# Patient Record
Sex: MALE | Race: WHITE | Employment: UNEMPLOYED | URBAN - METROPOLITAN AREA
[De-identification: names, ages, dates, MRNs, and addresses within clinical notes are randomized per-mention and may not be internally consistent; named-entity substitution may affect disease eponyms.]

---

## 2017-07-30 ENCOUNTER — APPOINTMENT (OUTPATIENT)
Dept: GENERAL RADIOLOGY | Age: 52
DRG: 137 | End: 2017-07-30
Attending: INTERNAL MEDICINE
Payer: MEDICAID

## 2017-07-30 ENCOUNTER — HOSPITAL ENCOUNTER (INPATIENT)
Age: 52
LOS: 2 days | Discharge: HOME | DRG: 137 | End: 2017-08-01
Attending: INTERNAL MEDICINE | Admitting: HOSPITALIST
Payer: MEDICAID

## 2017-07-30 DIAGNOSIS — T17.908A ASPIRATION INTO AIRWAY, INITIAL ENCOUNTER: ICD-10-CM

## 2017-07-30 DIAGNOSIS — E87.1 HYPONATREMIA: Primary | ICD-10-CM

## 2017-07-30 DIAGNOSIS — C14.0 THROAT CANCER (HCC): ICD-10-CM

## 2017-07-30 PROBLEM — Z93.1 STATUS POST INSERTION OF PERCUTANEOUS ENDOSCOPIC GASTROSTOMY (PEG) TUBE (HCC): Status: ACTIVE | Noted: 2017-07-30

## 2017-07-30 LAB
ALBUMIN SERPL BCP-MCNC: 3.2 G/DL (ref 3.4–5)
ALBUMIN/GLOB SERPL: 0.9 {RATIO} (ref 0.8–1.7)
ALP SERPL-CCNC: 93 U/L (ref 45–117)
ALT SERPL-CCNC: 16 U/L (ref 16–61)
ANION GAP BLD CALC-SCNC: 5 MMOL/L (ref 3–18)
AST SERPL W P-5'-P-CCNC: 19 U/L (ref 15–37)
ATRIAL RATE: 100 BPM
BASOPHILS # BLD AUTO: 0 K/UL (ref 0–0.06)
BASOPHILS # BLD: 0 % (ref 0–2)
BILIRUB SERPL-MCNC: 0.3 MG/DL (ref 0.2–1)
BNP SERPL-MCNC: 172 PG/ML (ref 0–900)
BUN SERPL-MCNC: 7 MG/DL (ref 7–18)
BUN/CREAT SERPL: 9 (ref 12–20)
CALCIUM SERPL-MCNC: 8.7 MG/DL (ref 8.5–10.1)
CALCULATED P AXIS, ECG09: 77 DEGREES
CALCULATED R AXIS, ECG10: 71 DEGREES
CALCULATED T AXIS, ECG11: 68 DEGREES
CHLORIDE SERPL-SCNC: 87 MMOL/L (ref 100–108)
CK MB CFR SERPL CALC: 2.7 % (ref 0–4)
CK MB SERPL-MCNC: 2.8 NG/ML (ref 5–25)
CK SERPL-CCNC: 104 U/L (ref 39–308)
CO2 SERPL-SCNC: 29 MMOL/L (ref 21–32)
CREAT SERPL-MCNC: 0.75 MG/DL (ref 0.6–1.3)
DIAGNOSIS, 93000: NORMAL
DIFFERENTIAL METHOD BLD: ABNORMAL
EOSINOPHIL # BLD: 0.2 K/UL (ref 0–0.4)
EOSINOPHIL NFR BLD: 3 % (ref 0–5)
ERYTHROCYTE [DISTWIDTH] IN BLOOD BY AUTOMATED COUNT: 13.6 % (ref 11.6–14.5)
EST. AVERAGE GLUCOSE BLD GHB EST-MCNC: NORMAL MG/DL
GLOBULIN SER CALC-MCNC: 3.5 G/DL (ref 2–4)
GLUCOSE SERPL-MCNC: 91 MG/DL (ref 74–99)
HBA1C MFR BLD: 4.8 % (ref 4.5–5.6)
HCT VFR BLD AUTO: 31 % (ref 36–48)
HGB BLD-MCNC: 11 G/DL (ref 13–16)
LACTATE SERPL-SCNC: 1.4 MMOL/L (ref 0.4–2)
LYMPHOCYTES # BLD AUTO: 10 % (ref 21–52)
LYMPHOCYTES # BLD: 0.6 K/UL (ref 0.9–3.6)
MCH RBC QN AUTO: 34.5 PG (ref 24–34)
MCHC RBC AUTO-ENTMCNC: 35.5 G/DL (ref 31–37)
MCV RBC AUTO: 97.2 FL (ref 74–97)
MONOCYTES # BLD: 0.5 K/UL (ref 0.05–1.2)
MONOCYTES NFR BLD AUTO: 8 % (ref 3–10)
NEUTS SEG # BLD: 4.9 K/UL (ref 1.8–8)
NEUTS SEG NFR BLD AUTO: 79 % (ref 40–73)
P-R INTERVAL, ECG05: 146 MS
PLATELET # BLD AUTO: 228 K/UL (ref 135–420)
PMV BLD AUTO: 9.1 FL (ref 9.2–11.8)
POTASSIUM SERPL-SCNC: 4.2 MMOL/L (ref 3.5–5.5)
PROT SERPL-MCNC: 6.7 G/DL (ref 6.4–8.2)
Q-T INTERVAL, ECG07: 360 MS
QRS DURATION, ECG06: 80 MS
QTC CALCULATION (BEZET), ECG08: 464 MS
RBC # BLD AUTO: 3.19 M/UL (ref 4.7–5.5)
SODIUM SERPL-SCNC: 121 MMOL/L (ref 136–145)
TROPONIN I SERPL-MCNC: <0.02 NG/ML (ref 0–0.06)
VENTRICULAR RATE, ECG03: 100 BPM
WBC # BLD AUTO: 6.1 K/UL (ref 4.6–13.2)

## 2017-07-30 PROCEDURE — 74011000250 HC RX REV CODE- 250: Performed by: EMERGENCY MEDICINE

## 2017-07-30 PROCEDURE — 80053 COMPREHEN METABOLIC PANEL: CPT | Performed by: INTERNAL MEDICINE

## 2017-07-30 PROCEDURE — 77030029684 HC NEB SM VOL KT MONA -A

## 2017-07-30 PROCEDURE — 74011250636 HC RX REV CODE- 250/636: Performed by: HOSPITALIST

## 2017-07-30 PROCEDURE — 83036 HEMOGLOBIN GLYCOSYLATED A1C: CPT | Performed by: HOSPITALIST

## 2017-07-30 PROCEDURE — 93005 ELECTROCARDIOGRAM TRACING: CPT

## 2017-07-30 PROCEDURE — 99285 EMERGENCY DEPT VISIT HI MDM: CPT

## 2017-07-30 PROCEDURE — 74011250636 HC RX REV CODE- 250/636: Performed by: EMERGENCY MEDICINE

## 2017-07-30 PROCEDURE — 96374 THER/PROPH/DIAG INJ IV PUSH: CPT

## 2017-07-30 PROCEDURE — 74011000258 HC RX REV CODE- 258: Performed by: EMERGENCY MEDICINE

## 2017-07-30 PROCEDURE — 96361 HYDRATE IV INFUSION ADD-ON: CPT

## 2017-07-30 PROCEDURE — 74011000250 HC RX REV CODE- 250: Performed by: HOSPITALIST

## 2017-07-30 PROCEDURE — 87040 BLOOD CULTURE FOR BACTERIA: CPT | Performed by: INTERNAL MEDICINE

## 2017-07-30 PROCEDURE — 83880 ASSAY OF NATRIURETIC PEPTIDE: CPT | Performed by: INTERNAL MEDICINE

## 2017-07-30 PROCEDURE — 94762 N-INVAS EAR/PLS OXIMTRY CONT: CPT

## 2017-07-30 PROCEDURE — 96376 TX/PRO/DX INJ SAME DRUG ADON: CPT

## 2017-07-30 PROCEDURE — 65660000000 HC RM CCU STEPDOWN

## 2017-07-30 PROCEDURE — 85025 COMPLETE CBC W/AUTO DIFF WBC: CPT | Performed by: INTERNAL MEDICINE

## 2017-07-30 PROCEDURE — 74011250636 HC RX REV CODE- 250/636: Performed by: INTERNAL MEDICINE

## 2017-07-30 PROCEDURE — 94640 AIRWAY INHALATION TREATMENT: CPT

## 2017-07-30 PROCEDURE — 71010 XR CHEST PORT: CPT

## 2017-07-30 PROCEDURE — 82550 ASSAY OF CK (CPK): CPT | Performed by: INTERNAL MEDICINE

## 2017-07-30 PROCEDURE — 96375 TX/PRO/DX INJ NEW DRUG ADDON: CPT

## 2017-07-30 PROCEDURE — C9113 INJ PANTOPRAZOLE SODIUM, VIA: HCPCS | Performed by: HOSPITALIST

## 2017-07-30 PROCEDURE — 83605 ASSAY OF LACTIC ACID: CPT | Performed by: INTERNAL MEDICINE

## 2017-07-30 RX ORDER — INSULIN LISPRO 100 [IU]/ML
INJECTION, SOLUTION INTRAVENOUS; SUBCUTANEOUS
Status: DISCONTINUED | OUTPATIENT
Start: 2017-07-30 | End: 2017-08-01 | Stop reason: HOSPADM

## 2017-07-30 RX ORDER — ALBUTEROL SULFATE 0.83 MG/ML
5 SOLUTION RESPIRATORY (INHALATION)
Status: COMPLETED | OUTPATIENT
Start: 2017-07-30 | End: 2017-07-30

## 2017-07-30 RX ORDER — ONDANSETRON 2 MG/ML
4 INJECTION INTRAMUSCULAR; INTRAVENOUS
Status: COMPLETED | OUTPATIENT
Start: 2017-07-30 | End: 2017-07-30

## 2017-07-30 RX ORDER — GUAIFENESIN 100 MG/5ML
100 SOLUTION ORAL
Status: DISCONTINUED | OUTPATIENT
Start: 2017-07-30 | End: 2017-08-01 | Stop reason: HOSPADM

## 2017-07-30 RX ORDER — OXYCODONE AND ACETAMINOPHEN 10; 325 MG/1; MG/1
1 TABLET ORAL
Status: ACTIVE | OUTPATIENT
Start: 2017-07-30 | End: 2017-07-31

## 2017-07-30 RX ORDER — HYDROMORPHONE HYDROCHLORIDE 1 MG/ML
1 INJECTION, SOLUTION INTRAMUSCULAR; INTRAVENOUS; SUBCUTANEOUS
Status: DISCONTINUED | OUTPATIENT
Start: 2017-07-30 | End: 2017-08-01 | Stop reason: HOSPADM

## 2017-07-30 RX ORDER — DEXAMETHASONE SODIUM PHOSPHATE 4 MG/ML
10 INJECTION, SOLUTION INTRA-ARTICULAR; INTRALESIONAL; INTRAMUSCULAR; INTRAVENOUS; SOFT TISSUE ONCE
Status: COMPLETED | OUTPATIENT
Start: 2017-07-30 | End: 2017-07-30

## 2017-07-30 RX ORDER — MAGNESIUM SULFATE 100 %
4 CRYSTALS MISCELLANEOUS AS NEEDED
Status: DISCONTINUED | OUTPATIENT
Start: 2017-07-30 | End: 2017-08-01 | Stop reason: HOSPADM

## 2017-07-30 RX ORDER — IPRATROPIUM BROMIDE AND ALBUTEROL SULFATE 2.5; .5 MG/3ML; MG/3ML
3 SOLUTION RESPIRATORY (INHALATION)
Status: DISCONTINUED | OUTPATIENT
Start: 2017-07-30 | End: 2017-07-31

## 2017-07-30 RX ORDER — ACETAMINOPHEN 325 MG/1
650 TABLET ORAL
Status: DISCONTINUED | OUTPATIENT
Start: 2017-07-30 | End: 2017-08-01 | Stop reason: HOSPADM

## 2017-07-30 RX ORDER — HYDROMORPHONE HYDROCHLORIDE 1 MG/ML
1 INJECTION, SOLUTION INTRAMUSCULAR; INTRAVENOUS; SUBCUTANEOUS ONCE
Status: COMPLETED | OUTPATIENT
Start: 2017-07-30 | End: 2017-07-30

## 2017-07-30 RX ORDER — ENOXAPARIN SODIUM 100 MG/ML
40 INJECTION SUBCUTANEOUS EVERY 24 HOURS
Status: DISCONTINUED | OUTPATIENT
Start: 2017-07-30 | End: 2017-08-01 | Stop reason: HOSPADM

## 2017-07-30 RX ORDER — DEXTROSE 50 % IN WATER (D50W) INTRAVENOUS SYRINGE
25-50 AS NEEDED
Status: DISCONTINUED | OUTPATIENT
Start: 2017-07-30 | End: 2017-08-01 | Stop reason: HOSPADM

## 2017-07-30 RX ORDER — CLINDAMYCIN PHOSPHATE 900 MG/50ML
900 INJECTION, SOLUTION INTRAVENOUS
Status: DISCONTINUED | OUTPATIENT
Start: 2017-07-30 | End: 2017-07-30

## 2017-07-30 RX ORDER — IBUPROFEN 200 MG
1 TABLET ORAL DAILY
Status: DISCONTINUED | OUTPATIENT
Start: 2017-07-31 | End: 2017-08-01 | Stop reason: HOSPADM

## 2017-07-30 RX ORDER — ONDANSETRON 2 MG/ML
4 INJECTION INTRAMUSCULAR; INTRAVENOUS
Status: DISCONTINUED | OUTPATIENT
Start: 2017-07-30 | End: 2017-08-01 | Stop reason: HOSPADM

## 2017-07-30 RX ORDER — SODIUM CHLORIDE 9 MG/ML
150 INJECTION, SOLUTION INTRAVENOUS CONTINUOUS
Status: DISCONTINUED | OUTPATIENT
Start: 2017-07-30 | End: 2017-07-31

## 2017-07-30 RX ADMIN — HYDROMORPHONE HYDROCHLORIDE 1 MG: 1 INJECTION, SOLUTION INTRAMUSCULAR; INTRAVENOUS; SUBCUTANEOUS at 19:36

## 2017-07-30 RX ADMIN — SODIUM CHLORIDE 40 MG: 9 INJECTION INTRAMUSCULAR; INTRAVENOUS; SUBCUTANEOUS at 23:11

## 2017-07-30 RX ADMIN — HYDROMORPHONE HYDROCHLORIDE 1 MG: 1 INJECTION, SOLUTION INTRAMUSCULAR; INTRAVENOUS; SUBCUTANEOUS at 23:06

## 2017-07-30 RX ADMIN — SODIUM CHLORIDE 1000 ML: 900 INJECTION, SOLUTION INTRAVENOUS at 21:02

## 2017-07-30 RX ADMIN — CLINDAMYCIN PHOSPHATE 900 MG: 150 INJECTION, SOLUTION INTRAVENOUS at 23:19

## 2017-07-30 RX ADMIN — HYDROMORPHONE HYDROCHLORIDE 1 MG: 1 INJECTION, SOLUTION INTRAMUSCULAR; INTRAVENOUS; SUBCUTANEOUS at 20:49

## 2017-07-30 RX ADMIN — ENOXAPARIN SODIUM 40 MG: 40 INJECTION SUBCUTANEOUS at 23:09

## 2017-07-30 RX ADMIN — ALBUTEROL SULFATE 5 MG: 2.5 SOLUTION RESPIRATORY (INHALATION) at 20:49

## 2017-07-30 RX ADMIN — METHYLPREDNISOLONE SODIUM SUCCINATE 40 MG: 40 INJECTION, POWDER, FOR SOLUTION INTRAMUSCULAR; INTRAVENOUS at 23:10

## 2017-07-30 RX ADMIN — DEXAMETHASONE SODIUM PHOSPHATE 10 MG: 4 INJECTION, SOLUTION INTRAMUSCULAR; INTRAVENOUS at 20:48

## 2017-07-30 RX ADMIN — ONDANSETRON 4 MG: 2 INJECTION INTRAMUSCULAR; INTRAVENOUS at 19:36

## 2017-07-30 RX ADMIN — SODIUM CHLORIDE 1000 ML: 900 INJECTION, SOLUTION INTRAVENOUS at 19:36

## 2017-07-30 NOTE — IP AVS SNAPSHOT
Summary of Care Report The Summary of Care report has been created to help improve care coordination. Users with access to [employer] or [address] (Web-based application) may access additional patient information including the Discharge Summary. If you are not currently a [address] user and need more information, please call the number listed below in the [place] [address] section and ask to be connected with Medical Records. Facility Information Name Address Phone [address] [place] [id number] [phone] Patient Information Patient Name Sex  Kelley Mitchell ([id number]) Male 1965 Discharge Information Admitting Provider Service Area Unit César Acevedo MD / [phone] [address] [facility] 3 [address] [facility] / [phone] Discharge Provider Discharge Date/Time Discharge Disposition Destination (none) (none) (none) (none) Patient Language Language ENGLISH [13] Hospital Problems as of 2017  Reviewed: 2017  9:01 PM by César Acevedo MD  
  
  
  
 Class Noted - Resolved Last Modified POA Active Problems * (Principal)Aspiration into airway  2017 - Present 2017 by César Acevedo MD Yes Entered by César Acevedo MD  
  Throat cancer [facility])  2017 - Present 2017 by César Acevedo MD Yes Entered by César Acevedo MD  
  Status post insertion of percutaneous endoscopic gastrostomy (PEG) tube ([facility] [address]  2017 - Present 2017 by éCsar Acevedo MD Yes Entered by César Acevedo MD  
  Hyponatremia  2017 - Present 2017 by César Acevedo MD Yes Entered by César Acevedo MD  
  
Non-Hospital Problems as of 2017  Reviewed: 2017  9:01 PM by César Acevedo MD  
 None You are allergic to the following No active allergies Current Discharge Medication List  
  
 START taking these medications Dose & Instructions Dispensing Information Comments  
 albuterol-ipratropium 2.5 mg-0.5 mg/3 ml Nebu Commonly known as:  Sky Cast Dose:  3 mL  
3 mL by Nebulization route every four (4) hours as needed. Quantity:  30 Nebule Refills:  1  
   
 guaiFENesin 100 mg/5 mL liquid Commonly known as:  ROBITUSSIN Dose:  100 mg  
5 mL by Per G Tube route every four (4) hours as needed for Cough. Quantity:  1 Bottle Refills:  0  
   
 levoFLOXacin 500 mg tablet Commonly known as:  Hellen Beards Dose:  500 mg  
1 Tab by Per G Tube route daily. Quantity:  7 Tab Refills:  0  
   
 multivitamin, tx-iron-ca-min 9 mg iron-400 mcg Tab tablet Commonly known as:  THERA-M w/ IRON Dose:  1 Tab Take 1 Tab by mouth daily. Quantity:  30 Tab Refills:  1 Nebulizer Accessories Kit Use nebs Q4H PRN Quantity:  1 Kit Refills:  0  
   
 nicotine 14 mg/24 hr patch Commonly known as:  Ermias Mins Dose:  1 Patch 1 Patch by TransDERmal route daily for 30 days. Quantity:  30 Patch Refills:  1  
   
 predniSONE 20 mg tablet Commonly known as:  DELTASONE  
 2 tab via peg daily X 3 days, then 1 tab daily via peg X 3 days, then 1/2 tab daily via peg X 3 days Quantity:  12 Tab Refills:  0 CONTINUE these medications which have NOT CHANGED Dose & Instructions Dispensing Information Comments PERCOCET PO Take  by mouth. Refills:  0 Follow-up Information Follow up With Details Comments Contact Info [facility] physician Schedule an appointment as soon as possible for a visit in 1 week Discharge Instructions DISCHARGE SUMMARY from Nurse The following personal items are in your possession at time of discharge: 
 
Dental Appliances: None Visual Aid: None Home Medications: None Jewelry: None Clothing: Footwear, Shorts, Shirt, Undergarments Other Valuables: Cell Phone, Eyeglasses, With patient PATIENT INSTRUCTIONS: 
 
 
F-face looks uneven A-arms unable to move or move unevenly S-speech slurred or non-existent T-time-call 911 as soon as signs and symptoms begin-DO NOT go Back to bed or wait to see if you get better-TIME IS BRAIN. Warning Signs of HEART ATTACK Call 911 if you have these symptoms: 
? Chest discomfort. Most heart attacks involve discomfort in the center of the chest that lasts more than a few minutes, or that goes away and comes back. It can feel like uncomfortable pressure, squeezing, fullness, or pain. ? Discomfort in other areas of the upper body. Symptoms can include pain or discomfort in one or both arms, the back, neck, jaw, or stomach. ? Shortness of breath with or without chest discomfort. ? Other signs may include breaking out in a cold sweat, nausea, or lightheadedness. Don't wait more than five minutes to call [address]! Fast action can save your life. Calling 911 is almost always the fastest way to get lifesaving treatment. Emergency Medical Services staff can begin treatment when they arrive  up to an hour sooner than if someone gets to the hospital by car. The discharge information has been reviewed with the patient. The patient verbalized understanding. Discharge medications reviewed with the patient and appropriate educational materials and side effects teaching were provided. Patient armband removed and shredded Chart Review Routing History No Routing History on File

## 2017-07-30 NOTE — ED PROVIDER NOTES
[address]  EMERGENCY DEPARTMENT HISTORY AND PHYSICAL EXAM       Date: 7/30/2017   Patient Name: Carol Ann Casarez   YOB: 1965  Medical Record Number: [id number]    History of Presenting Illness     Chief Complaint   Patient presents with    Shortness of Breath        History Provided By:  patient    Additional History:   6:13 PM   Carol Ann Casarez is a 45 y.o. 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 Associated sxs include throat pain and cough. Pt states he used his inhaler a few times today with no relief. Most recent radiation therapy in May. PMHx includes HTN and peg tube in palce. SHx includes tobacco use. Pt denies fever, chills, n/v/d/constipation, bleed, cp, and/or any other symptoms or complaints. Written by DONAL Jamesibzainab, as dictated by Keyonna Geronimo MD     Primary Care Provider: Hugh Garrido MD   Specialist:    Past History     Past Medical History:   Past Medical History:   Diagnosis Date    Hypertension     Ill-defined condition     peg tube in place    Ill-defined condition     chemo    S/P radiation therapy     [facility] [address]     Tobacco abuse         Past Surgical History:   Past Surgical History:   Procedure Laterality Date    HX HEENT      sinus surgery        Family History:   No family history on file. Social History:   Social History   Substance Use Topics    Smoking status: Current Every Day Smoker    Smokeless tobacco: Not on file    Alcohol use Not on file        Allergies:   No Known Allergies     Review of Systems   Review of Systems   Constitutional: Negative for chills and fever. HENT:        (+) throat pain     Respiratory: Positive for cough (productive) and shortness of breath. All other systems reviewed and are negative.       Physical Exam  Vitals:    07/30/17 2000 07/30/17 2030 07/30/17 2045 07/30/17 2049   BP:    141/86   Pulse: (!) 106 (!) 104 (!) 111 (!) 109   Resp: 20 14 17    Temp:       SpO2: 97% 93% 98%    Weight:       Height:           Physical Exam    Vital signs and nursing notes reviewed    CONSTITUTIONAL: Alert, in no apparent distress; well-developed thin male  HEAD:  Normocephalic, atraumatic  EYES: PERRL; EOM's intact. ENTM: Nose: no rhinorrhea; Throat: mild erythema in posterior oropharynx  Neck:  No JVD, supple without lymphadenopathy  RESP: Crackles in bilateral bases, expiratory wheezing  CV: S1 and S2 WNL; No murmurs, gallops or rubs. GI: Normal bowel sounds, abdomen soft and non-tender. No masses or organomegaly. peg tube in LUQ no drainage, bleed, redness or mass  : No costo-vertebral angle tenderness. BACK:  Non-tender  UPPER EXT:  Normal inspection  LOWER EXT: No edema, no calf tenderness. Distal pulses intact. NEURO: CN intact, reflexes 2/4 and sym, strength 5/5 and sym, sensation intact. SKIN: No rashes; Normal for age and stage. PSYCH:  Alert and oriented, normal affect. Diagnostic Study Results     Labs -      Recent Results (from the past 12 hour(s))   CBC WITH AUTOMATED DIFF    Collection Time: 07/30/17  6:22 PM   Result Value Ref Range    WBC 6.1 4.6 - 13.2 K/uL    RBC 3.19 (L) 4.70 - 5.50 M/uL    HGB 11.0 (L) 13.0 - 16.0 g/dL    HCT 31.0 (L) 36.0 - 48.0 %    MCV 97.2 (H) 74.0 - 97.0 FL    MCH 34.5 (H) 24.0 - 34.0 PG    MCHC 35.5 31.0 - 37.0 g/dL    RDW 13.6 11.6 - 14.5 %    PLATELET 972 907 - 219 K/uL    MPV 9.1 (L) 9.2 - 11.8 FL    NEUTROPHILS 79 (H) 40 - 73 %    LYMPHOCYTES 10 (L) 21 - 52 %    MONOCYTES 8 3 - 10 %    EOSINOPHILS 3 0 - 5 %    BASOPHILS 0 0 - 2 %    ABS. NEUTROPHILS 4.9 1.8 - 8.0 K/UL    ABS. LYMPHOCYTES 0.6 (L) 0.9 - 3.6 K/UL    ABS. MONOCYTES 0.5 0.05 - 1.2 K/UL    ABS. EOSINOPHILS 0.2 0.0 - 0.4 K/UL    ABS.  BASOPHILS 0.0 0.0 - 0.06 K/UL    DF AUTOMATED     METABOLIC PANEL, COMPREHENSIVE    Collection Time: 07/30/17  6:22 PM Result Value Ref Range    Sodium 121 (L) 136 - 145 mmol/L    Potassium 4.2 3.5 - 5.5 mmol/L    Chloride 87 (L) 100 - 108 mmol/L    CO2 29 21 - 32 mmol/L    Anion gap 5 3.0 - 18 mmol/L    Glucose 91 74 - 99 mg/dL    BUN 7 7.0 - 18 MG/DL    Creatinine 0.75 0.6 - 1.3 MG/DL    BUN/Creatinine ratio 9 (L) 12 - 20      GFR est AA >60 >60 ml/min/1.73m2    GFR est non-AA >60 >60 ml/min/1.73m2    Calcium 8.7 8.5 - 10.1 MG/DL    Bilirubin, total 0.3 0.2 - 1.0 MG/DL    ALT (SGPT) 16 16 - 61 U/L    AST (SGOT) 19 15 - 37 U/L    Alk.  phosphatase 93 45 - 117 U/L    Protein, total 6.7 6.4 - 8.2 g/dL    Albumin 3.2 (L) 3.4 - 5.0 g/dL    Globulin 3.5 2.0 - 4.0 g/dL    A-G Ratio 0.9 0.8 - 1.7     NT-PRO BNP    Collection Time: 07/30/17  6:22 PM   Result Value Ref Range    NT pro- 0 - 900 PG/ML   CARDIAC PANEL,(CK, CKMB & TROPONIN)    Collection Time: 07/30/17  6:22 PM   Result Value Ref Range     39 - 308 U/L    CK - MB 2.8 <3.6 ng/ml    CK-MB Index 2.7 0.0 - 4.0 %    Troponin-I, Qt. <0.02 0.00 - 0.06 NG/ML   LACTIC ACID    Collection Time: 07/30/17  6:35 PM   Result Value Ref Range    Lactic acid 1.4 0.4 - 2.0 MMOL/L   EKG, 12 LEAD, INITIAL    Collection Time: 07/30/17  7:46 PM   Result Value Ref Range    Ventricular Rate 100 BPM    Atrial Rate 100 BPM    P-R Interval 146 ms    QRS Duration 80 ms    Q-T Interval 360 ms    QTC Calculation (Bezet) 464 ms    Calculated P Axis 77 degrees    Calculated R Axis 71 degrees    Calculated T Axis 68 degrees    Diagnosis       Sinus rhythm with Possible premature atrial complexes with aberrant   conduction  Otherwise normal ECG  When compared with ECG of 07-AUG-2012 09:00,  aberrant conduction is now present  Non-specific change in ST segment in Anterior leads  T wave inversion no longer evident in Inferior leads  Nonspecific T wave abnormality no longer evident in Anterolateral leads         Radiologic Studies -  The following have been ordered and reviewed:  XR CHEST PORT (Results Pending)     RADIOLOGY FINDINGS  Chest X-ray shows NAP  Pending review by Radiologist  Recorded by Hilda Jane ED Scribe, as dictated by Mary Celestin MD      Medical Decision Making   I am the first provider for this patient. I reviewed the vital signs, available nursing notes, past medical history, past surgical history, family history and social history. Vital Signs-Reviewed the patient's vital signs. Patient Vitals for the past 12 hrs:   Temp Pulse Resp BP SpO2   07/30/17 2049 - (!) 109 - 141/86 -   07/30/17 2045 - (!) 111 17 - 98 %   07/30/17 2030 - (!) 104 14 - 93 %   07/30/17 2000 - (!) 106 20 - 97 %   07/30/17 1945 - 99 17 - 96 %   07/30/17 1930 - (!) 108 21 133/72 97 %   07/30/17 1915 - (!) 110 20 - 99 %   07/30/17 1900 - (!) 104 10 114/64 98 %   07/30/17 1830 - (!) 106 14 - 93 %   07/30/17 1815 - (!) 112 16 - 93 %   07/30/17 1800 - (!) 105 14 112/71 94 %   07/30/17 1748 97.5 °F (36.4 °C) (!) 109 14 116/72 94 %       Pulse Oximetry Analysis - Normal 94% on RA     Cardiac Monitor:   Rate: 100 bpm  Rhythm: Sinus rhythm with possible premature atrial complexes with aberrant conduction. EKG interpretation: (Preliminary)  Rhythm: Sinus rhythm with possible premature atrial complexes with aberrant conduction. Rate (approx.): 100 bpm; Normal QRS   EKG read by Mary Celestin MD at 19:46    Old Medical Records: Nursing notes. DDX: Bronchitis, PNA, CHF, CA, pleuritic effusion    Procedures:   Procedures    ED Course:  6:13 PM  Initial assessment performed. The patients presenting problems have been discussed, and they are in agreement with the care plan formulated and outlined with them. I have encouraged them to ask questions as they arise throughout their visit. BEDSIDE TRANSFER OF CARE:  7:02 PM  Patient care will be transferred from Vivian Montoya MD  to Mary Celestin MD .  Discussed available diagnostic results and care plan at length at beside.  Patient and family made aware of provider change. All questions answered. Patient and family voiced understanding. Written by DONAL Kyle, as dictated by Reed Daniel MD .     7:25 PM Assumed care from Dr. Kush Borrero. Pt with stage 3 throat CA on radiation with G-tube. Pt is c/o severe throat pain. He has some stridor. He feels like he may have swallowed wrong and he presented wheezing and SOB. Hx of COPD. Awaiting labs and CXR. His sodium is 121. Most likely secondary to hyponatremic dehydration or radiation. 8:54 PM Discussed patient's history, exam, and available diagnostics results with Lisandra Nelson MD, internal medicine, who agrees to admit pt to [facility]. Medications Given in the ED:  Medications   oxyCODONE-acetaminophen (PERCOCET 10)  mg per tablet 1 Tab (not administered)   sodium chloride 0.9 % bolus infusion 1,000 mL (not administered)   0.9% sodium chloride infusion (not administered)   sodium chloride 0.9 % bolus infusion 1,000 mL (1,000 mL IntraVENous New Bag 7/30/17 1936)   ondansetron (ZOFRAN) injection 4 mg (4 mg IntraVENous Given 7/30/17 1936)   HYDROmorphone (PF) (DILAUDID) injection 1 mg (1 mg IntraVENous Given 7/30/17 1936)   dexamethasone (DECADRON) 4 mg/mL injection 10 mg (10 mg IntraVENous Given 7/30/17 2048)   albuterol (PROVENTIL VENTOLIN) nebulizer solution 5 mg (5 mg Nebulization Given 7/30/17 2049)   HYDROmorphone (PF) (DILAUDID) injection 1 mg (1 mg IntraVENous Given 7/30/17 2049)       Admission Note:  8:59 PM Patient is being admitted to the hospital by Lisandra Nelson MD. The results of their tests and reasons for their admission have been discussed with them and/or available family. They convey agreement and understanding for the need to be admitted and for their admission diagnosis. CONDITIONS ON ADMISSION:  Deep Vein Thrombosis is not present at the time of admission. Thrombosis is not present at the time of admission.  Urinary Tract Infection is not present at the time of admission. Pneumonia is not present at the time of admission. MRSA is not present at the time of admission. Wound infection is not present at the time of admission. Pressure Ulcer is not present at the time of admission. Diagnosis   Clinical Impression:   1. Hyponatremia    2. Aspiration into airway, initial encounter    3. Throat cancer ([address]           _______________________________   Attestations: This note is prepared by Tona Montero, acting as a Scribe for Vladimir Joiner MD  on 5:53 PM on 7/30/2017 . Vladimir Joiner MD : The scribe's documentation has been prepared under my direction and personally reviewed by me in its entirety. This note is also prepared by Oli Edmonds, acting as Scribe for [employer], MD.    [employer], MD: The scribe's documentation has been prepared under my direction and personally reviewed by me in its entirety.  I confirm that the note above accurately reflects all work, treatment, procedures, and medical decision making performed by me.   _______________________________

## 2017-07-30 NOTE — IP AVS SNAPSHOT
[address] [place] [id number] 
[phone] Patient: Juvenal Molina MRN: [id number] :1965 You are allergic to the following No active allergies Recent Documentation Height Weight BMI Smoking Status 1.702 m 74.6 kg 25.76 kg/m2 Current Every Day Smoker Unresulted Labs Order Current Status CULTURE, BLOOD Preliminary result Emergency Contacts Name Discharge Info Relation Home Work Mobile [place]  Parent [1] [phone] [address]  Spouse [3]   [phone] About your hospitalization You were admitted on:  2017 You last received care in the:  [address] [place] [address] You were discharged on:  2017 Unit phone number:  [phone] Why you were hospitalized Your primary diagnosis was:  Aspiration Into Airway Your diagnoses also included:  Throat Cancer (Hcc), Status Post Insertion Of Percutaneous Endoscopic Gastrostomy (Peg) Tube (Hcc), Hyponatremia Providers Seen During Your Hospitalizations Provider Role Specialty Primary office phone Brandi Baldwin MD Attending Provider Emergency Medicine [phone] Matilda Warner MD Attending Provider [facility] [phone] Your Primary Care Physician (PCP) Primary Care Physician Office Phone Office Fax OTHER, PHYS ** None ** ** None ** Follow-up Information Follow up With Details Comments Contact Info [facility] physician Schedule an appointment as soon as possible for a visit in 1 week Current Discharge Medication List  
  
START taking these medications Dose & Instructions Dispensing Information Comments Morning Noon Evening Bedtime  
 albuterol-ipratropium 2.5 mg-0.5 mg/3 ml Nebu Commonly known as:  Giovany Medellin Your last dose was: Your next dose is:    
   
   
 Dose:  3 mL 3 mL by Nebulization route every four (4) hours as needed. Quantity:  30 Nebule Refills:  1  
     
   
   
   
  
 guaiFENesin 100 mg/5 mL liquid Commonly known as:  ROBITUSSIN Your last dose was: Your next dose is:    
   
   
 Dose:  100 mg  
5 mL by Per G Tube route every four (4) hours as needed for Cough. Quantity:  1 Bottle Refills:  0  
     
   
   
   
  
 levoFLOXacin 500 mg tablet Commonly known as:  Ligia Havers Your last dose was: Your next dose is:    
   
   
 Dose:  500 mg  
1 Tab by Per G Tube route daily. Quantity:  7 Tab Refills:  0  
     
   
   
   
  
 multivitamin, tx-iron-ca-min 9 mg iron-400 mcg Tab tablet Commonly known as:  THERA-M w/ IRON Your last dose was: Your next dose is:    
   
   
 Dose:  1 Tab Take 1 Tab by mouth daily. Quantity:  30 Tab Refills:  1 Nebulizer Accessories Kit Your last dose was: Your next dose is:    
   
   
 Use nebs Q4H PRN Quantity:  1 Kit Refills:  0  
     
   
   
   
  
 nicotine 14 mg/24 hr patch Commonly known as:  Leslye Sergey Your last dose was: Your next dose is:    
   
   
 Dose:  1 Patch 1 Patch by TransDERmal route daily for 30 days. Quantity:  30 Patch Refills:  1  
     
   
   
   
  
 predniSONE 20 mg tablet Commonly known as:  Ambreen Humbreonnad Your last dose was: Your next dose is:    
   
   
 2 tab via peg daily X 3 days, then 1 tab daily via peg X 3 days, then 1/2 tab daily via peg X 3 days Quantity:  12 Tab Refills:  0 CONTINUE these medications which have NOT CHANGED Dose & Instructions Dispensing Information Comments Morning Noon Evening Bedtime PERCOCET PO Your last dose was: Your next dose is: Take  by mouth. Refills:  0 Where to Get Your Medications Information on where to get these meds will be given to you by the nurse or doctor. ! Ask your nurse or doctor about these medications  
  albuterol-ipratropium 2.5 mg-0.5 mg/3 ml Nebu  
 guaiFENesin 100 mg/5 mL liquid  
 levoFLOXacin 500 mg tablet  
 multivitamin, tx-iron-ca-min 9 mg iron-400 mcg Tab tablet Nebulizer Accessories Kit  
 nicotine 14 mg/24 hr patch  
 predniSONE 20 mg tablet Discharge Instructions DISCHARGE SUMMARY from Nurse The following personal items are in your possession at time of discharge: 
 
Dental Appliances: None Visual Aid: None Home Medications: None Jewelry: None Clothing: Footwear, Shorts, Shirt, Undergarments Other Valuables: Cell Phone, Eyeglasses, With patient PATIENT INSTRUCTIONS: 
 
 
F-face looks uneven A-arms unable to move or move unevenly S-speech slurred or non-existent T-time-call 911 as soon as signs and symptoms begin-DO NOT go Back to bed or wait to see if you get better-TIME IS BRAIN. Warning Signs of HEART ATTACK Call 911 if you have these symptoms: 
? Chest discomfort. Most heart attacks involve discomfort in the center of the chest that lasts more than a few minutes, or that goes away and comes back. It can feel like uncomfortable pressure, squeezing, fullness, or pain. ? Discomfort in other areas of the upper body. Symptoms can include pain or discomfort in one or both arms, the back, neck, jaw, or stomach. ? Shortness of breath with or without chest discomfort. ? Other signs may include breaking out in a cold sweat, nausea, or lightheadedness. Don't wait more than five minutes to call [address]! Fast action can save your life. Calling 911 is almost always the fastest way to get lifesaving treatment.  Emergency Medical Services staff can begin treatment when they arrive  up to an hour sooner than if someone gets to the hospital by car. The discharge information has been reviewed with the patient. The patient verbalized understanding. Discharge medications reviewed with the patient and appropriate educational materials and side effects teaching were provided. Patient armband removed and shredded Discharge Instructions Attachments/References ASPIRATION PNEUMONIA (ENGLISH) Discharge Orders None [employer] Announcement We are excited to announce that we are making your provider's discharge notes available to you in [employer]. You will see these notes when they are completed and signed by the physician that discharged you from your recent hospital stay. If you have any questions or concerns about any information you see in [employer], please call the Health Information Department where you were seen or reach out to your Primary Care Provider for more information about your plan of care. Introducing [facility]! Anya Sumner introduces [employer] patient portal. Now you can access parts of your medical record, email your doctor's office, and request medication refills online. 1. In your internet browser, go to [email or web address] 2. Click on the First Time User? Click Here link in the Sign In box. You will see the New Member Sign Up page. 3. Enter your [employer] Access Code exactly as it appears below. You will not need to use this code after youve completed the sign-up process. If you do not sign up before the expiration date, you must request a new code. · [employer] Access Code: [email or web address] Expires: 10/30/2017  1:10 PM 
 
4. Enter the last four digits of your Social Security Number (xxxx) and Date of Birth (mm/dd/yyyy) as indicated and click Submit. You will be taken to the next sign-up page. 5. Create a [employer] ID.  This will be your [employer] login ID and cannot be changed, so think of one that is secure and easy to remember. 6. Create a [employer] password. You can change your password at any time. 7. Enter your Password Reset Question and Answer. This can be used at a later time if you forget your password. 8. Enter your e-mail address. You will receive e-mail notification when new information is available in [address] 9. Click Sign Up. You can now view and download portions of your medical record. 10. Click the Download Summary menu link to download a portable copy of your medical information. If you have questions, please visit the Frequently Asked Questions section of the [employer] website. Remember, [employer] is NOT to be used for urgent needs. For medical emergencies, dial 911. Now available from your iPhone and Android! General Information Please provide this summary of care documentation to your next provider. Patient Signature:  ____________________________________________________________ Date:  ____________________________________________________________  
  
Scott Banerjee Provider Signature:  ____________________________________________________________ Date:  ____________________________________________________________ More Information Aspiration Pneumonia: Care Instructions Your Care Instructions Aspiration pneumonia is an inflammation of the lungs. It may occur after you breathe in large amounts of foreign material, such as food, liquid, vomit, or mucus. Aspiration may happen because of a health problem that makes it hard to swallow. These problems include stroke or seizure. Pneumonia makes it hard to breathe. Follow-up care is a key part of your treatment and safety. Be sure to make and go to all appointments, and call your doctor if you are having problems. It's also a good idea to know your test results and keep a list of the medicines you take. How can you care for yourself at home? To help with swallowing · You may need to do exercises to train your muscles to work together to help you swallow. You may also need to learn how to position your body or how to put food in your mouth to be able to swallow better. · You may need to change the foods you eat. Your doctor may tell you to eat certain foods and liquids to make swallowing easier. · You may need to change how you prepare foods. For example, you may need to add thickeners to some liquids, or puree certain foods in a . To help with pneumonia · Take your antibiotics as directed. Do not stop taking them just because you feel better. You need to take the full course of antibiotics. · Take your medicines exactly as prescribed. For example, your doctor may have given you medicine that makes breathing easier. Call your doctor if you think you are having a problem with your medicine. · Get plenty of rest and sleep. You may feel weak and tired for a while, but your energy level will improve with time. · Take care of your cough so you can rest. A cough that brings up mucus from your lungs is common with pneumonia. It is one way your body gets rid of the infection. But if coughing keeps you from resting or causes severe fatigue and chest-wall pain, talk to your doctor. He or she may suggest that you take a medicine to reduce the cough. · Use a humidifier to increase the moisture in the air. Dry air makes coughing worse. Follow the instructions for cleaning the machine. · Do not smoke, and avoid others smoke. Smoke will make your cough last longer. If you need help quitting, talk to your doctor about stop-smoking programs and medicines. These can increase your chances of quitting for good.  
· Take an over-the-counter pain medicine, such as acetaminophen (Tylenol), ibuprofen (Advil, Motrin), or naproxen (Aleve) to help reduce fever and reduce chest pain caused by coughing. Read and follow all instructions on the label. · Do not take two or more pain medicines at the same time unless the doctor told you to. Many pain medicines have acetaminophen, which is Tylenol. Too much acetaminophen (Tylenol) can be harmful. When should you call for help? Call 911 anytime you think you may need emergency care. For example, call if: 
· You have severe trouble breathing. Call your doctor now or seek immediate medical care if: 
· You have a new or higher fever. · You have new or worse trouble breathing. · You cough up blood. · You are dizzy or lightheaded, or you feel like you may faint. Watch closely for changes in your health, and be sure to contact your doctor if: 
· You do not get better as expected. · You are coughing more deeply or more often. Where can you learn more? Go to http://justine-serena.info/. Enter 01211 [phone] [id number] [phone] in the search box to learn more about \"Aspiration Pneumonia: Care Instructions. \" Current as of: March 25, 2017 Content Version: 11.3 © 6146-3518 [employer]. Care instructions adapted under license by [employer] (which disclaims liability or warranty for this information). If you have questions about a medical condition or this instruction, always ask your healthcare professional. [place] [address] any warranty or liability for your use of this information.

## 2017-07-30 NOTE — ED TRIAGE NOTES
C/o increased shortness of breath for 1 week. Pt [place] with [place] [facility] Sepsis Screening completed    (  )Patient meets SIRS criteria. ( x )Patient does not meet SIRS criteria.       SIRS Criteria is achieved when two or more of the following are present   Temperature < 96.8°F (36°C) or > 100.9°F (38.3°C)   Heart Rate > 90 beats per minute   Respiratory Rate > 20 breaths per minute   WBC count > 12,000 or <4,000 or > 10% bands

## 2017-07-31 LAB
ANION GAP BLD CALC-SCNC: 10 MMOL/L (ref 3–18)
BUN SERPL-MCNC: 10 MG/DL (ref 7–18)
BUN/CREAT SERPL: 15 (ref 12–20)
CALCIUM SERPL-MCNC: 9.5 MG/DL (ref 8.5–10.1)
CHLORIDE SERPL-SCNC: 97 MMOL/L (ref 100–108)
CO2 SERPL-SCNC: 26 MMOL/L (ref 21–32)
CREAT SERPL-MCNC: 0.68 MG/DL (ref 0.6–1.3)
ERYTHROCYTE [DISTWIDTH] IN BLOOD BY AUTOMATED COUNT: 14 % (ref 11.6–14.5)
GLUCOSE BLD STRIP.AUTO-MCNC: 155 MG/DL (ref 70–110)
GLUCOSE BLD STRIP.AUTO-MCNC: 155 MG/DL (ref 70–110)
GLUCOSE BLD STRIP.AUTO-MCNC: 156 MG/DL (ref 70–110)
GLUCOSE BLD STRIP.AUTO-MCNC: 232 MG/DL (ref 70–110)
GLUCOSE SERPL-MCNC: 137 MG/DL (ref 74–99)
HCT VFR BLD AUTO: 34 % (ref 36–48)
HGB BLD-MCNC: 12 G/DL (ref 13–16)
MCH RBC QN AUTO: 34.9 PG (ref 24–34)
MCHC RBC AUTO-ENTMCNC: 35.3 G/DL (ref 31–37)
MCV RBC AUTO: 98.8 FL (ref 74–97)
PLATELET # BLD AUTO: 234 K/UL (ref 135–420)
PMV BLD AUTO: 9.3 FL (ref 9.2–11.8)
POTASSIUM SERPL-SCNC: 4 MMOL/L (ref 3.5–5.5)
RBC # BLD AUTO: 3.44 M/UL (ref 4.7–5.5)
SODIUM SERPL-SCNC: 133 MMOL/L (ref 136–145)
WBC # BLD AUTO: 4.4 K/UL (ref 4.6–13.2)

## 2017-07-31 PROCEDURE — 94640 AIRWAY INHALATION TREATMENT: CPT

## 2017-07-31 PROCEDURE — 65660000000 HC RM CCU STEPDOWN

## 2017-07-31 PROCEDURE — 74011000250 HC RX REV CODE- 250: Performed by: INTERNAL MEDICINE

## 2017-07-31 PROCEDURE — 85027 COMPLETE CBC AUTOMATED: CPT | Performed by: HOSPITALIST

## 2017-07-31 PROCEDURE — 74011636637 HC RX REV CODE- 636/637: Performed by: HOSPITALIST

## 2017-07-31 PROCEDURE — 74011250636 HC RX REV CODE- 250/636: Performed by: EMERGENCY MEDICINE

## 2017-07-31 PROCEDURE — 74011250636 HC RX REV CODE- 250/636: Performed by: HOSPITALIST

## 2017-07-31 PROCEDURE — 74011000250 HC RX REV CODE- 250: Performed by: HOSPITALIST

## 2017-07-31 PROCEDURE — 36415 COLL VENOUS BLD VENIPUNCTURE: CPT | Performed by: HOSPITALIST

## 2017-07-31 PROCEDURE — 74011250637 HC RX REV CODE- 250/637: Performed by: HOSPITALIST

## 2017-07-31 PROCEDURE — 74011000258 HC RX REV CODE- 258: Performed by: INTERNAL MEDICINE

## 2017-07-31 PROCEDURE — 94760 N-INVAS EAR/PLS OXIMETRY 1: CPT

## 2017-07-31 PROCEDURE — 80048 BASIC METABOLIC PNL TOTAL CA: CPT | Performed by: HOSPITALIST

## 2017-07-31 PROCEDURE — C9113 INJ PANTOPRAZOLE SODIUM, VIA: HCPCS | Performed by: HOSPITALIST

## 2017-07-31 PROCEDURE — 82962 GLUCOSE BLOOD TEST: CPT

## 2017-07-31 RX ORDER — IPRATROPIUM BROMIDE AND ALBUTEROL SULFATE 2.5; .5 MG/3ML; MG/3ML
3 SOLUTION RESPIRATORY (INHALATION)
Status: DISCONTINUED | OUTPATIENT
Start: 2017-07-31 | End: 2017-08-01 | Stop reason: HOSPADM

## 2017-07-31 RX ORDER — OXYCODONE AND ACETAMINOPHEN 10; 325 MG/1; MG/1
2 TABLET ORAL
Status: DISCONTINUED | OUTPATIENT
Start: 2017-07-31 | End: 2017-08-01 | Stop reason: HOSPADM

## 2017-07-31 RX ORDER — OXYCODONE AND ACETAMINOPHEN 10; 325 MG/1; MG/1
1 TABLET ORAL
Status: DISCONTINUED | OUTPATIENT
Start: 2017-07-31 | End: 2017-07-31

## 2017-07-31 RX ORDER — DIAZEPAM 5 MG/1
2.5 TABLET ORAL
Status: DISCONTINUED | OUTPATIENT
Start: 2017-07-31 | End: 2017-08-01 | Stop reason: HOSPADM

## 2017-07-31 RX ADMIN — IPRATROPIUM BROMIDE AND ALBUTEROL SULFATE 3 ML: .5; 3 SOLUTION RESPIRATORY (INHALATION) at 20:12

## 2017-07-31 RX ADMIN — INSULIN LISPRO 2 UNITS: 100 INJECTION, SOLUTION INTRAVENOUS; SUBCUTANEOUS at 17:24

## 2017-07-31 RX ADMIN — INSULIN LISPRO 4 UNITS: 100 INJECTION, SOLUTION INTRAVENOUS; SUBCUTANEOUS at 22:10

## 2017-07-31 RX ADMIN — CLINDAMYCIN PHOSPHATE 600 MG: 150 INJECTION, SOLUTION INTRAVENOUS at 14:34

## 2017-07-31 RX ADMIN — SODIUM CHLORIDE 40 MG: 9 INJECTION INTRAMUSCULAR; INTRAVENOUS; SUBCUTANEOUS at 22:09

## 2017-07-31 RX ADMIN — HYDROMORPHONE HYDROCHLORIDE 1 MG: 1 INJECTION, SOLUTION INTRAMUSCULAR; INTRAVENOUS; SUBCUTANEOUS at 03:34

## 2017-07-31 RX ADMIN — CLINDAMYCIN PHOSPHATE 600 MG: 150 INJECTION, SOLUTION INTRAVENOUS at 07:16

## 2017-07-31 RX ADMIN — INSULIN LISPRO 2 UNITS: 100 INJECTION, SOLUTION INTRAVENOUS; SUBCUTANEOUS at 08:02

## 2017-07-31 RX ADMIN — METHYLPREDNISOLONE SODIUM SUCCINATE 40 MG: 40 INJECTION, POWDER, FOR SOLUTION INTRAMUSCULAR; INTRAVENOUS at 07:16

## 2017-07-31 RX ADMIN — OXYCODONE HYDROCHLORIDE AND ACETAMINOPHEN 2 TABLET: 10; 325 TABLET ORAL at 22:10

## 2017-07-31 RX ADMIN — IPRATROPIUM BROMIDE AND ALBUTEROL SULFATE 3 ML: .5; 3 SOLUTION RESPIRATORY (INHALATION) at 09:38

## 2017-07-31 RX ADMIN — METHYLPREDNISOLONE SODIUM SUCCINATE 40 MG: 40 INJECTION, POWDER, FOR SOLUTION INTRAMUSCULAR; INTRAVENOUS at 14:34

## 2017-07-31 RX ADMIN — HYDROMORPHONE HYDROCHLORIDE 1 MG: 1 INJECTION, SOLUTION INTRAMUSCULAR; INTRAVENOUS; SUBCUTANEOUS at 09:14

## 2017-07-31 RX ADMIN — IPRATROPIUM BROMIDE AND ALBUTEROL SULFATE 3 ML: .5; 3 SOLUTION RESPIRATORY (INHALATION) at 15:36

## 2017-07-31 RX ADMIN — INSULIN LISPRO 2 UNITS: 100 INJECTION, SOLUTION INTRAVENOUS; SUBCUTANEOUS at 12:31

## 2017-07-31 RX ADMIN — CLINDAMYCIN PHOSPHATE 600 MG: 150 INJECTION, SOLUTION INTRAVENOUS at 22:10

## 2017-07-31 RX ADMIN — SODIUM CHLORIDE 150 ML/HR: 900 INJECTION, SOLUTION INTRAVENOUS at 03:02

## 2017-07-31 RX ADMIN — IPRATROPIUM BROMIDE AND ALBUTEROL SULFATE 3 ML: .5; 3 SOLUTION RESPIRATORY (INHALATION) at 11:48

## 2017-07-31 RX ADMIN — OXYCODONE HYDROCHLORIDE AND ACETAMINOPHEN 2 TABLET: 10; 325 TABLET ORAL at 14:34

## 2017-07-31 RX ADMIN — METHYLPREDNISOLONE SODIUM SUCCINATE 40 MG: 40 INJECTION, POWDER, FOR SOLUTION INTRAMUSCULAR; INTRAVENOUS at 22:10

## 2017-07-31 RX ADMIN — ENOXAPARIN SODIUM 40 MG: 40 INJECTION SUBCUTANEOUS at 22:10

## 2017-07-31 RX ADMIN — HYDROMORPHONE HYDROCHLORIDE 1 MG: 1 INJECTION, SOLUTION INTRAMUSCULAR; INTRAVENOUS; SUBCUTANEOUS at 12:35

## 2017-07-31 RX ADMIN — HYDROMORPHONE HYDROCHLORIDE 1 MG: 1 INJECTION, SOLUTION INTRAMUSCULAR; INTRAVENOUS; SUBCUTANEOUS at 17:53

## 2017-07-31 NOTE — PROGRESS NOTES
[facility] care of from Dusty Ricardo RN    924 AM assessment completed, patient is alert and oriented x's 4, c/o bilateral ear pain, PRN pain medication administered per patient's request. ST on the monitor, lung fields are clear throughout on RA with a productive cough noted. Dietician consult placed to assist patient with peg tube nutrition needs. Patient is voiding in urinal without any complications. Scheduled nicotine patch applied to right arm. Patient is currently sitting up on side of bed, no s/s of any distress, will continue to monitor. 1237 PRN pain medication administered per patient's request. Patient is currently sitting up on side of bed watching television, no s/s of any distress, will continue to monitor. 1415 patient left unit after being told not to leave. Patient's room has the aroma of cigarettes, none  physically found. Code purple activated    1430 patient returned back to unit by security, patient admitted to going outside to smoke. Patient was made aware of the risks of smoking while having a nicotine patch in place. Patient stated that he performs this behavior at home as well.     1600 NAD, will continue to monitor. 1753 PRN pain medication administered per patient's request. Patient is currently OOB to chair, no s/s of any distress, will continue to monitor. 2001 Bedside and Verbal shift change report given to Alejandra Booth RN (oncoming nurse) by Berlin Montague RN (offgoing nurse). Report included the following information SBAR.

## 2017-07-31 NOTE — ED NOTES
RN in room with patient. Patient on phone with wife and asks RN to update wife. Spoke with wife on phone and retrieved good phone number to reach her - Lovetta Challenger [phone].

## 2017-07-31 NOTE — ROUTINE PROCESS
TRANSFER - OUT REPORT:    Verbal report given to Morris Canales RN (name) on [place]  being transferred to telemetry (unit) for routine progression of care       Report consisted of patients Situation, Background, Assessment and   Recommendations(SBAR). MEWS score: 2 communicated with RN assuming care. All questions answered at this time at bedside    Information from the following report(s) SBAR, Kardex, ED Summary, Procedure Summary, Intake/Output, MAR, Accordion, Recent Results, Med Rec Status, Cardiac Rhythm sinus tach and Alarm Parameters  was reviewed with the receiving nurse. Lines:   Peripheral IV 07/30/17 Left Antecubital (Active)   Site Assessment Clean, dry, & intact 7/30/2017  5:56 PM   Phlebitis Assessment 0 7/30/2017  5:56 PM   Infiltration Assessment 0 7/30/2017  5:56 PM   Dressing Status Clean, dry, & intact 7/30/2017  5:56 PM   Dressing Type Transparent 7/30/2017  5:56 PM       Peripheral IV 07/30/17 Right Antecubital (Active)   Site Assessment Clean, dry, & intact 7/30/2017  6:34 PM   Phlebitis Assessment 0 7/30/2017  6:34 PM   Infiltration Assessment 0 7/30/2017  6:34 PM   Dressing Status Clean, dry, & intact 7/30/2017  6:34 PM   Dressing Type Transparent 7/30/2017  6:34 PM   Hub Color/Line Status Green;Flushed 7/30/2017  6:34 PM   Action Taken Blood drawn 7/30/2017  6:34 PM   Alcohol Cap Used Yes 7/30/2017  6:34 PM        Opportunity for questions and clarification was provided.       Patient transported with:   Monitor  Registered Nurse  Tech

## 2017-07-31 NOTE — PROGRESS NOTES
attempted to visit patient but he was unavailable,  provided brochure and devotional material for patient. . Chaplains remain available to provide pastoral support to patient and family as needed and requested. Rev.  Nickolas Mutters    [phone]

## 2017-07-31 NOTE — PROGRESS NOTES
TRANSFER - IN REPORT:    Verbal report received from ARIANNE Camarillo R.N. on [facility]  being received from Emergency Dept. for routine progression of care      Report consisted of patients Situation, Background, Assessment and   Recommendations(SBAR). Information from the following report(s) SBAR, Kardex, ED Summary, Procedure Summary, Intake/Output, MAR and Recent Results was reviewed with the receiving nurse. Opportunity for questions and clarification was provided. Assessment completed upon patients arrival to unit and care assumed. 2200: Assumed patient care, he was alert and oriented to person, place, time and situation. Respiratory status was stable on room air. Vital signs were stable. MEWS score was a two. Patient denied any nausea vomiting dizziness or anxiety. White board was updated and explained. Bed was locked and in lowest position. Call bell, water and personal belongings were within reach. Patient had no questions, comments or concerns after bedside shift report. 0700: Patient had an uneventful shift. Respiratory status, vital signs and MEWS score remained stable. Patient was resting quietly with no signs of distress noted. Bed locked and in lowest position. Call bell water and personal belongings were within reach. Patient had no questions, comments or concerns after bedside shift report. Bedside report given to ARIANNE Lee R.N.

## 2017-07-31 NOTE — ED NOTES
MD in room to discuss treatment plan and being admitted to hospital. Pt requesting RN to call wife to notify her. Wife notified at this time.

## 2017-07-31 NOTE — PROGRESS NOTES
Hospitalist Progress Note    Patient: Satinder Gary MRN: [id number]  CSN: [id number]    YOB: 1965  Age: 45 y.o.   Sex: male    DOA: 7/30/2017 LOS:  LOS: 1 day          Chief Complaint:  Hyponatremia, SOB        Assessment/Plan     Aspiration pneumonitis  COPD exacerbation  Hyponatremia  Throat cancer with peg in place for dysphagia  Continued tobacco abuse  Chronic pain and narcotic dependence    Na has elevated notably, thus stop IVF and start free water with tube feeds as Na low from volume depletion, now resolved  Continue IV abx, steroids, nebs for breathing  He asks for 30 mg percocets at one time as this is what he is on at home-will inc dose and monitor, I suspect he will withdraw if we dont come close to that dose  He also asked if he can leave this am, as 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, I would like to see him less wheezy and dyspneic before we clear him for that as well as tolerating tube feedings again  nicoderm patch    DVT proph  Tele monitoring    Patient Active Problem List   Diagnosis Code    Aspiration into airway T17.908A    Throat cancer ([address] C14.0    Status post insertion of percutaneous endoscopic gastrostomy (PEG) tube ([address] Z93.1    Hyponatremia E87.1       Subjective:    I need my 30 mg percocet tabs  Denies Chest pain  Feeling wheezy and winded after being up to change this am  Has a lot of mucus production which is not new for him    Review of systems:    Constitutional: denies fevers, chills, myalgias  Respiratory: SOB, cough  Cardiovascular: denies chest pain, palpitations  Gastrointestinal: denies nausea, vomiting, diarrhea      Vital signs/Intake and Output:  Visit Vitals    /74 (BP 1 Location: Left arm, BP Patient Position: At rest)    Pulse (!) 104    Temp 98 °F (36.7 °C)    Resp 18    Ht 5' 7\" (1.702 m)    Wt 73 kg (161 lb)    SpO2 98%    BMI 25.22 kg/m2     Current Shift:     Last three shifts:  07/29 1901 - 07/31 0700  In: 0   Out: 2900 [[id number]]    Exam:    General: Well developed, alert, NAD, OX3  Head/Neck: NCAT, supple, No masses, No lymphadenopathy  CVS:Regular rate and rhythm, no M/R/G, S1/S2 heard, no thrill  Lungs:exp wheezes, coarse BS, few rhonchi  Abdomen: Soft, Nontender, peg tube, No distention, Normal Bowel sounds, No hepatomegaly  Extremities: No C/C/E, pulses palpable 2+  Skin:normal texture and turgor, no rashes, no lesions  Neuro:grossly normal , follows commands  Psych:appropriate                Labs: Results:       Chemistry Recent Labs      07/31/17 0249 07/30/17 1822   GLU  137*  91   NA  133*  121*   K  4.0  4.2   CL  97*  87*   CO2  26  29   BUN  10  7   CREA  0.68  0.75   CA  9.5  8.7   AGAP  10  5   BUCR  15  9*   AP   --   93   TP   --   6.7   ALB   --   3.2*   GLOB   --   3.5   AGRAT   --   0.9      CBC w/Diff Recent Labs      07/31/17 0249 07/30/17 1822   WBC  4.4*  6.1   RBC  3.44*  3.19*   HGB  12.0*  11.0*   HCT  34.0*  31.0*   PLT  234  228   GRANS   --   79*   LYMPH   --   10*   EOS   --   3      Cardiac Enzymes Recent Labs      07/30/17 1822   CPK  104   CKND1  2.7      Coagulation No results for input(s): PTP, INR, APTT in the last 72 hours. No lab exists for component: INREXT    Lipid Panel No results found for: CHOL, CHOLPOCT, CHOLX, CHLST, CHOLV, 837176, HDL, LDL, LDLC, DLDLP, 273500, VLDLC, VLDL, TGLX, TRIGL, TRIGP, TGLPOCT, CHHD, CHHDX   BNP No results for input(s): BNPP in the last 72 hours.    Liver Enzymes Recent Labs      07/30/17 1822   TP  6.7   ALB  3.2*   AP  93   SGOT  19      Thyroid Studies No results found for: T4, T3U, TSH, TSHEXT     Procedures/imaging: see electronic medical records for all procedures/Xrays and details which were not copied into this note but were reviewed prior to creation of Lui Abebe MD

## 2017-07-31 NOTE — PROGRESS NOTES
D/c plan anticipate home     Patient may need HHC but unable to verify at this tiime    Met with patient during IDR's wants to leave today. D    Patient states he does his own tube feedings via peg at home.  Patient left room will attempt to met with him

## 2017-07-31 NOTE — PROGRESS NOTES
Chart Reviewed. Noted patient admitted to the hospital for further medical management. Care Management to follow up with patient and/or family for transition of care needs prn. Readmission Risk Assessment: Low Risk and MSSP/[facility] patients    RRAT Score: 1 - 12    Initial Assessment: ) presents to the emergency department via EMS C/O SOB onset 1 week ago Admitted for medical management of aspiration to airway    Emergency Contact: see face sheet    Pertinent Medical Hx: Throat cancer, tobacco use, HTN    PCP/Specialists: [address]:     DME:     Low Risk Care Transition Plan:  1. Evaluate for [facility] or [address] care coordination of resources  2. Involve patient/caregiver in assessment, planning, education and implement of intervention. 3. CM daily patient care huddles/interdisciplinary rounds. 4. PCP/Specialist appointment within 7 - 10 days made prior to discharge. 5. Facilitate transportation and logistics for follow-up appointments.   6. Handoff to [facility] Nurse Navigator or PCP practice    Care Management Interventions  Transition of Care Consult (CM Consult): Discharge Planning     Care manager available and will assist with safe transition of care

## 2017-07-31 NOTE — PROGRESS NOTES
INITIAL NUTRITION ASSESSMENT     RECOMMENDATIONS/PLAN:   1. Pt previously not meeting estimated needs because ensure cannot provide for pt estimated needs. Contacted care manager to see if there is a way to get pt actual tube feeds   Will change order to twocal 5 cans daily to provide: 2370kcal, 99g Protein, 830ml free water, 259g CHO. 2. Will recommend additional 150ml free water flush before and after each can. 3. Monitor lytes, fluid status, weight, skin integrity, labs, lytes  4. Will order MVI+M because tube feeds alone will not provide for estimated needs    Adult Malnutrition Criteria:     Nutrition assessment was completed by RDN and the patient was found to meet the following malnutrition criteria established by ASPEN/AND:    Adult Malnutrition Guidelines:  SEVERE PROTEIN CALORIE MALNUTRITION IN THE CONTEXT OF CHRONIC ILLNESS  Weight Loss:  >5% x 1 month or >7.5% x 3 months or >10% x 6 months or >20% x 12 months  Energy Intake: <75% energy intake compared to estimated energy needs >1 month  Muscle Mass: Severe Depletion      Janneth Malave  07/31/17      REASON FOR ASSESSMENT:   [x]  MD Consult:    [] General Nutrition Management and Supplements   [x] Management of Tube Feeding   [] Calorie Count    [] Diet Education    NUTRITION ASSESSMENT:   Client History: 45 yrs old Male admitted with hyponatremia, SOB, admitted with possible aspiration pneumonia after pt attempted to swallow water and chocked per MD note. Pt with PEG in place for feedings. PMHx: HTN, throat cancer s/p chemo and radiation, ongoing tobacco abuse. Noted today code purple called on pt leaving unit  Cultural/Restoration Food Preferences: None Identified    FOOD/NUTRITION HISTORY  Diet History: per documentation pt is taking 6 cans of ensure daily  Food Allergies:  [x] NKFA    Pertinent PTA Medications: reviewed    NUTRITION INTAKE   Diet Order:  NPO  Tube feeds: 6 cans Jevity 1.2 daily:  This provides: 1706kcal, 79g protein 241g CHO, 1148ml free water, 100% RDI     Average PO Intake:       Patient Vitals for the past 100 hrs:   % Diet Eaten   07/30/17 2200 0 %      Pertinent Medications:  [x] Reviewed; lispro, protonix, prednisone  Insulin:  [] SSI  [x] Pre-meal   []  Basal   [] Drip  [] None  Pt expected to meet estimated nutrient needs through next review:          [x]  Yes     Tube feed regimen meets estimated needs ANTHROPOMETRICS  Height: 5' 7\" (170.2 cm)       Weight: 73 kg (161 lb)    BMI: 25.2 kg/m^2  -  overweight (25.0%-29.9% BMI)        Weight change: wt loss of approx 40-50lbs x 4-5 months. Approx  21% body weight                                  Comparison to Reference Standards:  IBW: 148 lbs      %IBW: 109%      AdjBW: N/A kg    NUTRITION-FOCUSED PHYSICAL ASSESSMENT  Skin: skin intact per documentation   GI: BM 7/29    BIOCHEMICAL DATA & MEDICAL TESTS  Pertinent Labs:  [x] Reviewed; [id number]     NUTRITION PRESCRIPTION  Calories: 2185 kcal/day based on miffilin x 1.3 x1.1  Protein: 102-116 g/day based on 1.4-1.6 g/kg  CHO: 273 g/day based on 50% of total energy  Fluid: 2185 ml/day based on 1 kcal/ml      NUTRITION DIAGNOSES:   1. Inadequate energy and protein intake related to unable to meet estimated needs with ensure alone as evidenced by pt reported wt loss of 50lbs x 4-5 months    NUTRITION INTERVENTIONS:   INTERVENTIONS:        GOALS:  1. Adjust tube feed regimen to meet estimated needs 1.  Meet estimated needs via tube feeds throughout the next 3 days     LEARNING NEEDS (Diet, Supplementation, Food/Nutrient-Drug Interaction):   [x] None Identified  [] Inpatient education provided/documented    [] Identified and patient:  [] Declined     [] Was not appropriate/indicated  NUTRITION MONITORING /EVALUATION:   Adjust EN/PN as appropriate  Monitor wt  Monitor renal labs, electrolytes, fluid status  Monitor for additional supplement needs    [] Participated in Interdisciplinary Rounds  [x] [address] [place] [address] Reviewed/Documented  DISCHARGE NUTRITION RECOMMENDATIONS ADDRESSED:     [x] Yes- recommended tube feeds as noted above    [] To be determined closer to discharge    NUTRITION RISK:     [x]  At risk                     []  Not currently at risk     Will follow-up per policy.   Nikole Quiros, [address] [place] [address]  PAGER:  [phone]

## 2017-08-01 VITALS
TEMPERATURE: 97.7 F | DIASTOLIC BLOOD PRESSURE: 63 MMHG | BODY MASS INDEX: 25.81 KG/M2 | SYSTOLIC BLOOD PRESSURE: 107 MMHG | HEIGHT: 67 IN | OXYGEN SATURATION: 96 % | HEART RATE: 107 BPM | RESPIRATION RATE: 18 BRPM | WEIGHT: 164.46 LBS

## 2017-08-01 LAB
ANION GAP BLD CALC-SCNC: 11 MMOL/L (ref 3–18)
BUN SERPL-MCNC: 21 MG/DL (ref 7–18)
BUN/CREAT SERPL: 24 (ref 12–20)
CALCIUM SERPL-MCNC: 9.2 MG/DL (ref 8.5–10.1)
CHLORIDE SERPL-SCNC: 95 MMOL/L (ref 100–108)
CO2 SERPL-SCNC: 30 MMOL/L (ref 21–32)
CREAT SERPL-MCNC: 0.89 MG/DL (ref 0.6–1.3)
GLUCOSE BLD STRIP.AUTO-MCNC: 170 MG/DL (ref 70–110)
GLUCOSE BLD STRIP.AUTO-MCNC: 216 MG/DL (ref 70–110)
GLUCOSE SERPL-MCNC: 145 MG/DL (ref 74–99)
POTASSIUM SERPL-SCNC: 4.2 MMOL/L (ref 3.5–5.5)
SODIUM SERPL-SCNC: 136 MMOL/L (ref 136–145)

## 2017-08-01 PROCEDURE — 82962 GLUCOSE BLOOD TEST: CPT

## 2017-08-01 PROCEDURE — 74011250637 HC RX REV CODE- 250/637: Performed by: HOSPITALIST

## 2017-08-01 PROCEDURE — 80048 BASIC METABOLIC PNL TOTAL CA: CPT | Performed by: HOSPITALIST

## 2017-08-01 PROCEDURE — 74011000258 HC RX REV CODE- 258: Performed by: INTERNAL MEDICINE

## 2017-08-01 PROCEDURE — 74011000250 HC RX REV CODE- 250: Performed by: INTERNAL MEDICINE

## 2017-08-01 PROCEDURE — 94760 N-INVAS EAR/PLS OXIMETRY 1: CPT

## 2017-08-01 PROCEDURE — 36415 COLL VENOUS BLD VENIPUNCTURE: CPT | Performed by: HOSPITALIST

## 2017-08-01 PROCEDURE — 94640 AIRWAY INHALATION TREATMENT: CPT

## 2017-08-01 PROCEDURE — 74011000250 HC RX REV CODE- 250: Performed by: HOSPITALIST

## 2017-08-01 PROCEDURE — 74011250636 HC RX REV CODE- 250/636: Performed by: HOSPITALIST

## 2017-08-01 PROCEDURE — 74011636637 HC RX REV CODE- 636/637: Performed by: HOSPITALIST

## 2017-08-01 RX ORDER — IPRATROPIUM BROMIDE AND ALBUTEROL SULFATE 2.5; .5 MG/3ML; MG/3ML
3 SOLUTION RESPIRATORY (INHALATION)
Qty: 30 NEBULE | Refills: 1 | Status: SHIPPED | OUTPATIENT
Start: 2017-08-01

## 2017-08-01 RX ORDER — LEVOFLOXACIN 500 MG/1
500 TABLET, FILM COATED ORAL DAILY
Qty: 7 TAB | Refills: 0 | Status: SHIPPED | OUTPATIENT
Start: 2017-08-01 | End: 2017-08-16

## 2017-08-01 RX ORDER — IBUPROFEN 200 MG
1 TABLET ORAL DAILY
Qty: 30 PATCH | Refills: 1 | Status: SHIPPED | OUTPATIENT
Start: 2017-08-01 | End: 2017-08-31

## 2017-08-01 RX ORDER — PREDNISONE 20 MG/1
TABLET ORAL
Qty: 12 TAB | Refills: 0 | Status: SHIPPED | OUTPATIENT
Start: 2017-08-01 | End: 2017-08-16

## 2017-08-01 RX ORDER — GUAIFENESIN 100 MG/5ML
100 SOLUTION ORAL
Qty: 1 BOTTLE | Refills: 0 | Status: SHIPPED | OUTPATIENT
Start: 2017-08-01 | End: 2017-08-16

## 2017-08-01 RX ADMIN — METHYLPREDNISOLONE SODIUM SUCCINATE 40 MG: 40 INJECTION, POWDER, FOR SOLUTION INTRAMUSCULAR; INTRAVENOUS at 06:41

## 2017-08-01 RX ADMIN — IPRATROPIUM BROMIDE AND ALBUTEROL SULFATE 3 ML: .5; 3 SOLUTION RESPIRATORY (INHALATION) at 07:00

## 2017-08-01 RX ADMIN — CLINDAMYCIN PHOSPHATE 600 MG: 150 INJECTION, SOLUTION INTRAVENOUS at 06:41

## 2017-08-01 RX ADMIN — HYDROMORPHONE HYDROCHLORIDE 1 MG: 1 INJECTION, SOLUTION INTRAMUSCULAR; INTRAVENOUS; SUBCUTANEOUS at 10:37

## 2017-08-01 RX ADMIN — OXYCODONE HYDROCHLORIDE AND ACETAMINOPHEN 2 TABLET: 10; 325 TABLET ORAL at 06:41

## 2017-08-01 RX ADMIN — IPRATROPIUM BROMIDE AND ALBUTEROL SULFATE 3 ML: .5; 3 SOLUTION RESPIRATORY (INHALATION) at 00:59

## 2017-08-01 RX ADMIN — HYDROMORPHONE HYDROCHLORIDE 1 MG: 1 INJECTION, SOLUTION INTRAMUSCULAR; INTRAVENOUS; SUBCUTANEOUS at 01:03

## 2017-08-01 RX ADMIN — IPRATROPIUM BROMIDE AND ALBUTEROL SULFATE 3 ML: .5; 3 SOLUTION RESPIRATORY (INHALATION) at 03:53

## 2017-08-01 RX ADMIN — INSULIN LISPRO 6 UNITS: 100 INJECTION, SOLUTION INTRAVENOUS; SUBCUTANEOUS at 06:41

## 2017-08-01 RX ADMIN — IPRATROPIUM BROMIDE AND ALBUTEROL SULFATE 3 ML: .5; 3 SOLUTION RESPIRATORY (INHALATION) at 11:18

## 2017-08-01 NOTE — DISCHARGE SUMMARY
[facility] SUMMARY    Name:  Katlyn Champion  MR#:  [id number]  :  1965  Account #:  [de-identified]  Date of Adm:  2017  Date of Discharge:  2017      DIAGNOSES AT DISCHARGE:  1. Aspiration pneumonitis. 2. Chronic obstructive pulmonary disease exacerbation. 3. Hyponatremia. 4. Dehydration. 5. Throat cancer with PEG in place for dysphagia. 6. Continued tobacco abuse. 7. Chronic pain and narcotic dependence. HOSPITAL SUMMARY: The patient is 45years old. He is dealing with  throat cancer. Currently he has completed a cycle of therapy for this  and he has a PEG tube in place as he has notable dysphasia to liquids  and solids. All medications and nutrition goes through his PEG. He is  managed at [facility] by an oncologist there, but he  lives down in this area. He came to the emergency room because he  had tried to drink water at home and he had choked on it. He  developed worsening shortness of breath, wheezing, cough and  congestion as well as sore throat and that was over the course of a  week. When he came in his sodium was quite low and it was notable  that he looked dehydrated, so he was started on intravenous normal  saline and we are able to correct his sodium appropriately with  hydration. He has had elevated blood sugars due to steroids that were  administered because of acute wheezing and COPD exacerbation. Unfortunately, he has an extensive history of heavy tobacco use and  recently started smoking heavily, at least a pack of cigarettes a day  again. His lactate was normal. Cardiac markers normal as well. He is  not a diabetic. His sodium has improved. We wanted a repeat  metabolic profile this morning to confirm that his sodium was at least  133 or better considering what it was yesterday, but the lab is  delayed in getting this test for us.  In the meantime, the patient is  anxious to leave the hospital because he has to care for his elderly  mother. His wheezing is better. He is feeling a bit better today. He has  less dyspnea on exertion and he feels like he is able to manage this at  home. He does not have a nebulizer which would be helpful for him, so  we are going to order one of those for him. His chest x-ray showed no  acute cardiopulmonary disease. When he was admitted an EKG  showed sinus rhythm with PAC's. He appears to be improved overall. A few expiratory wheezes were heard on exam. Cardiac exam  otherwise mildly tachycardic, regular rhythm, no murmur, rub, or  gallop. Abdomen is soft, nontender. PEG tube in place. Bowel sounds  are normal. Lower extremities, no clubbing, cyanosis, or edema. Today's vital signs are blood pressure 107/63, pulse 107, temperature  97.7, respiratory rate 18, SaO2 98% on room air. He wants to be discharged today. He feels like he can manage this at  home. I would like to have his metabolic profile before he leaves, but  he may leave before then. He has a followup with his [facility] physician  coming up, which he is going to continue in the next 1-2 weeks. For  discharge I recommended a nebulizer machine which we are going to  have either delivered here before he leaves or brought to him; it  will likely be charitable as he has no medical insurance to cover this. DISCHARGE MEDICATIONS:  1. We are going to prescribe DuoNeb every 4 hours as needed via the  nebulizer. 2. Robitussin 5 mL per G-tube every 4 hours as needed for cough. 3. Levaquin 500 mg per G-tube daily for 7 days. 4. Multivitamin one a day through the PEG tube. 5. Nicoderm patch 14 mg daily and avoid any tobacco use. 6. Prednisone 40 mg daily through the PEG for 3 days, 20 mg daily  through the PEG for 3 days thereafter, then 10 mg daily through the  PEG for 3 days. 7. He can resume his Percocet that he was on at home for pain.  I  believe he takes OxyContin as well, but that was not on his prior to  admission list.    At this point he should followup with his physician as noted. He has been recommended to change his nutritional nourishment to  TwoCal 5 cans a day with 150 mL of water before and after each can;  the registered dietitian met with him here and consulted on that. We  have written a prescription for him to take this to a pharmacy to get his  supplies for his feedings, so hopefully he can maintain his electrolytes  better, as well as his weight with that instead of using Ensure that he  was doing at home prior. At this point he appears stable to be discharged from the hospital. If he  has any worsening breathing or respiratory complaints, he is to come  straight back to the ER, but he is very anxious to be able to leave the  hospital today and would like that to happen now, so will go ahead and  help him with that and advise that he should come back if anything  changes.         MD KURT Barron / Basil Gonzalez  D:  08/01/2017   11:09  T:  08/01/2017   12:28  Job #:  [id number]

## 2017-08-01 NOTE — PROGRESS NOTES
1930: Care assumed of pt at this time from [facility] Pt appears to be in stable condition. No distress noted. Will monitor. 2030: Pt PEG tube site clean dry intact. Tube is pt with no residual. Pt administered feedings independently. Will monitor. 2200: Pt with c/o pain. Meds administered through PEG. Pt tolerated well. 0200: Pt sleeping at this time. No distress noted. 0600: Pt had an uneventful shift. No acute changes.

## 2017-08-01 NOTE — ROUTINE PROCESS
Dual AVS reviewed with Valeriano Sarkar. All medications reviewed individually with patient. Opportunities for questions and concerns provided. Patient discharged via (mode of transport ie. Car, ambulance or air transport) car  Patient's arm band appropriately discarded.

## 2017-08-01 NOTE — PROGRESS NOTES
Bedside and Verbal shift change report given to Amrit RN (oncoming nurse) by Chastity Jaramillo RN   (offgoing nurse). Report included the following information SBAR, Kardex and MAR.

## 2017-08-01 NOTE — PROGRESS NOTES
Spoke with patient  States he did get approved for SSDI and they cut out his food stamps. States he was living in [place] but had to move to . Lives with his wife and his mother. He expressed concern that his wife will loose her job if he does not come home and care for his elderly mother. States she goes to the pace program but he has to be there to get her on and off the bus and change her diaper because she is incont. States his wife works at [employer] in [place]. Mr. Ramya Batista reports that he does get his nutrition from [facility] [place] [facility] on [address]. He is willing to change to the one that was recommended for his tube feedings. However, he needs a prescription to be called into that pharmacy to change. States he does not have to pay for these Nutritional  Feeds when he gets them from the pharmacy. Patient did state that [facility] has applied for medicaid. Patient states he is anxious to get home. [address] patient unable to provide Nebullizer    1311 Telephone call with patient informed him that [employer] has on line for $45.00 for Nebulizer. . States he has internet on his phone and will order one. States he will call  if needs further assistance.

## 2017-08-03 ENCOUNTER — APPOINTMENT (OUTPATIENT)
Dept: CT IMAGING | Age: 52
End: 2017-08-03
Attending: INTERNAL MEDICINE
Payer: SELF-PAY

## 2017-08-03 ENCOUNTER — APPOINTMENT (OUTPATIENT)
Dept: GENERAL RADIOLOGY | Age: 52
End: 2017-08-03
Attending: EMERGENCY MEDICINE
Payer: SELF-PAY

## 2017-08-03 ENCOUNTER — HOSPITAL ENCOUNTER (OUTPATIENT)
Age: 52
Setting detail: OBSERVATION
Discharge: HOME | End: 2017-08-04
Attending: EMERGENCY MEDICINE | Admitting: INTERNAL MEDICINE
Payer: SELF-PAY

## 2017-08-03 DIAGNOSIS — C14.0 THROAT CANCER (HCC): ICD-10-CM

## 2017-08-03 DIAGNOSIS — R06.03 ACUTE RESPIRATORY DISTRESS: ICD-10-CM

## 2017-08-03 DIAGNOSIS — R09.02 HYPOXIA: Primary | ICD-10-CM

## 2017-08-03 PROBLEM — R06.89 RESPIRATORY DEPRESSION: Status: ACTIVE | Noted: 2017-08-03

## 2017-08-03 PROBLEM — R06.1 STRIDOR: Status: ACTIVE | Noted: 2017-08-03

## 2017-08-03 LAB
ALBUMIN SERPL BCP-MCNC: 3.4 G/DL (ref 3.4–5)
ALBUMIN/GLOB SERPL: 0.9 {RATIO} (ref 0.8–1.7)
ALP SERPL-CCNC: 105 U/L (ref 45–117)
ALT SERPL-CCNC: 20 U/L (ref 16–61)
ANION GAP BLD CALC-SCNC: 9 MMOL/L (ref 3–18)
ARTERIAL PATENCY WRIST A: YES
AST SERPL W P-5'-P-CCNC: 17 U/L (ref 15–37)
BASE EXCESS BLD CALC-SCNC: 7 MMOL/L
BASOPHILS # BLD AUTO: 0 K/UL (ref 0–0.06)
BASOPHILS # BLD: 0 % (ref 0–2)
BDY SITE: ABNORMAL
BILIRUB SERPL-MCNC: 0.2 MG/DL (ref 0.2–1)
BUN SERPL-MCNC: 18 MG/DL (ref 7–18)
BUN/CREAT SERPL: 20 (ref 12–20)
CALCIUM SERPL-MCNC: 9.4 MG/DL (ref 8.5–10.1)
CHLORIDE SERPL-SCNC: 98 MMOL/L (ref 100–108)
CK MB CFR SERPL CALC: 1.9 % (ref 0–4)
CK MB SERPL-MCNC: 1.3 NG/ML (ref 5–25)
CK SERPL-CCNC: 69 U/L (ref 39–308)
CO2 SERPL-SCNC: 30 MMOL/L (ref 21–32)
CREAT SERPL-MCNC: 0.88 MG/DL (ref 0.6–1.3)
DIFFERENTIAL METHOD BLD: ABNORMAL
EOSINOPHIL # BLD: 0 K/UL (ref 0–0.4)
EOSINOPHIL NFR BLD: 0 % (ref 0–5)
ERYTHROCYTE [DISTWIDTH] IN BLOOD BY AUTOMATED COUNT: 14.6 % (ref 11.6–14.5)
GAS FLOW.O2 O2 DELIVERY SYS: ABNORMAL L/MIN
GLOBULIN SER CALC-MCNC: 3.8 G/DL (ref 2–4)
GLUCOSE BLD STRIP.AUTO-MCNC: 140 MG/DL (ref 70–110)
GLUCOSE BLD STRIP.AUTO-MCNC: 182 MG/DL (ref 70–110)
GLUCOSE BLD STRIP.AUTO-MCNC: 205 MG/DL (ref 70–110)
GLUCOSE BLD STRIP.AUTO-MCNC: 221 MG/DL (ref 70–110)
GLUCOSE SERPL-MCNC: 127 MG/DL (ref 74–99)
HCO3 BLD-SCNC: 30.7 MMOL/L (ref 22–26)
HCT VFR BLD AUTO: 34.4 % (ref 36–48)
HGB BLD-MCNC: 11.8 G/DL (ref 13–16)
LIPASE SERPL-CCNC: 46 U/L (ref 73–393)
LYMPHOCYTES # BLD AUTO: 6 % (ref 21–52)
LYMPHOCYTES # BLD: 0.5 K/UL (ref 0.9–3.6)
MCH RBC QN AUTO: 35.1 PG (ref 24–34)
MCHC RBC AUTO-ENTMCNC: 34.3 G/DL (ref 31–37)
MCV RBC AUTO: 102.4 FL (ref 74–97)
MONOCYTES # BLD: 1 K/UL (ref 0.05–1.2)
MONOCYTES NFR BLD AUTO: 12 % (ref 3–10)
NEUTS SEG # BLD: 6.5 K/UL (ref 1.8–8)
NEUTS SEG NFR BLD AUTO: 82 % (ref 40–73)
PCO2 BLD: 41.4 MMHG (ref 35–45)
PH BLD: 7.48 [PH] (ref 7.35–7.45)
PLATELET # BLD AUTO: 194 K/UL (ref 135–420)
PMV BLD AUTO: 9.4 FL (ref 9.2–11.8)
PO2 BLD: 52 MMHG (ref 80–100)
POTASSIUM SERPL-SCNC: 4 MMOL/L (ref 3.5–5.5)
PROT SERPL-MCNC: 7.2 G/DL (ref 6.4–8.2)
RBC # BLD AUTO: 3.36 M/UL (ref 4.7–5.5)
SAO2 % BLD: 89 % (ref 92–97)
SERVICE CMNT-IMP: ABNORMAL
SODIUM SERPL-SCNC: 137 MMOL/L (ref 136–145)
SPECIMEN TYPE: ABNORMAL
TOTAL RESP. RATE, ITRR: 14
TROPONIN I SERPL-MCNC: <0.02 NG/ML (ref 0–0.06)
WBC # BLD AUTO: 8 K/UL (ref 4.6–13.2)

## 2017-08-03 PROCEDURE — 80053 COMPREHEN METABOLIC PANEL: CPT | Performed by: EMERGENCY MEDICINE

## 2017-08-03 PROCEDURE — 82962 GLUCOSE BLOOD TEST: CPT

## 2017-08-03 PROCEDURE — 96374 THER/PROPH/DIAG INJ IV PUSH: CPT

## 2017-08-03 PROCEDURE — 74011250637 HC RX REV CODE- 250/637: Performed by: INTERNAL MEDICINE

## 2017-08-03 PROCEDURE — 96375 TX/PRO/DX INJ NEW DRUG ADDON: CPT

## 2017-08-03 PROCEDURE — 74011000250 HC RX REV CODE- 250: Performed by: EMERGENCY MEDICINE

## 2017-08-03 PROCEDURE — 94640 AIRWAY INHALATION TREATMENT: CPT

## 2017-08-03 PROCEDURE — 71010 XR CHEST PORT: CPT

## 2017-08-03 PROCEDURE — 74011636320 HC RX REV CODE- 636/320: Performed by: INTERNAL MEDICINE

## 2017-08-03 PROCEDURE — 74011636637 HC RX REV CODE- 636/637: Performed by: INTERNAL MEDICINE

## 2017-08-03 PROCEDURE — 74011250636 HC RX REV CODE- 250/636: Performed by: INTERNAL MEDICINE

## 2017-08-03 PROCEDURE — 94760 N-INVAS EAR/PLS OXIMETRY 1: CPT

## 2017-08-03 PROCEDURE — 36600 WITHDRAWAL OF ARTERIAL BLOOD: CPT

## 2017-08-03 PROCEDURE — 85025 COMPLETE CBC W/AUTO DIFF WBC: CPT | Performed by: EMERGENCY MEDICINE

## 2017-08-03 PROCEDURE — 99285 EMERGENCY DEPT VISIT HI MDM: CPT

## 2017-08-03 PROCEDURE — 74011250636 HC RX REV CODE- 250/636: Performed by: EMERGENCY MEDICINE

## 2017-08-03 PROCEDURE — 99218 HC RM OBSERVATION: CPT

## 2017-08-03 PROCEDURE — 77030013140 HC MSK NEB VYRM -A

## 2017-08-03 PROCEDURE — 74011000250 HC RX REV CODE- 250: Performed by: INTERNAL MEDICINE

## 2017-08-03 PROCEDURE — 70491 CT SOFT TISSUE NECK W/DYE: CPT

## 2017-08-03 PROCEDURE — 93005 ELECTROCARDIOGRAM TRACING: CPT

## 2017-08-03 PROCEDURE — 74011000258 HC RX REV CODE- 258: Performed by: INTERNAL MEDICINE

## 2017-08-03 PROCEDURE — 82550 ASSAY OF CK (CPK): CPT | Performed by: EMERGENCY MEDICINE

## 2017-08-03 PROCEDURE — 82803 BLOOD GASES ANY COMBINATION: CPT

## 2017-08-03 PROCEDURE — 83690 ASSAY OF LIPASE: CPT | Performed by: EMERGENCY MEDICINE

## 2017-08-03 RX ORDER — ONDANSETRON 2 MG/ML
4 INJECTION INTRAMUSCULAR; INTRAVENOUS
Status: COMPLETED | OUTPATIENT
Start: 2017-08-03 | End: 2017-08-03

## 2017-08-03 RX ORDER — OXYCODONE AND ACETAMINOPHEN 10; 325 MG/1; MG/1
2 TABLET ORAL
Status: DISCONTINUED | OUTPATIENT
Start: 2017-08-03 | End: 2017-08-04 | Stop reason: HOSPADM

## 2017-08-03 RX ORDER — OXYCODONE AND ACETAMINOPHEN 10; 325 MG/1; MG/1
1 TABLET ORAL
Status: DISCONTINUED | OUTPATIENT
Start: 2017-08-03 | End: 2017-08-03

## 2017-08-03 RX ORDER — IPRATROPIUM BROMIDE AND ALBUTEROL SULFATE 2.5; .5 MG/3ML; MG/3ML
3 SOLUTION RESPIRATORY (INHALATION)
Status: COMPLETED | OUTPATIENT
Start: 2017-08-03 | End: 2017-08-03

## 2017-08-03 RX ORDER — DIAZEPAM 5 MG/1
2.5 TABLET ORAL
Status: DISCONTINUED | OUTPATIENT
Start: 2017-08-03 | End: 2017-08-04 | Stop reason: HOSPADM

## 2017-08-03 RX ORDER — IBUPROFEN 200 MG
1 TABLET ORAL DAILY
Status: DISCONTINUED | OUTPATIENT
Start: 2017-08-03 | End: 2017-08-04 | Stop reason: HOSPADM

## 2017-08-03 RX ORDER — DEXTROSE 50 % IN WATER (D50W) INTRAVENOUS SYRINGE
25-50 AS NEEDED
Status: DISCONTINUED | OUTPATIENT
Start: 2017-08-03 | End: 2017-08-03 | Stop reason: SDUPTHER

## 2017-08-03 RX ORDER — HYDROMORPHONE HYDROCHLORIDE 1 MG/ML
1 INJECTION, SOLUTION INTRAMUSCULAR; INTRAVENOUS; SUBCUTANEOUS
Status: DISCONTINUED | OUTPATIENT
Start: 2017-08-03 | End: 2017-08-04 | Stop reason: HOSPADM

## 2017-08-03 RX ORDER — IPRATROPIUM BROMIDE AND ALBUTEROL SULFATE 2.5; .5 MG/3ML; MG/3ML
3 SOLUTION RESPIRATORY (INHALATION)
Status: DISCONTINUED | OUTPATIENT
Start: 2017-08-03 | End: 2017-08-04 | Stop reason: HOSPADM

## 2017-08-03 RX ORDER — ENOXAPARIN SODIUM 100 MG/ML
40 INJECTION SUBCUTANEOUS EVERY 24 HOURS
Status: DISCONTINUED | OUTPATIENT
Start: 2017-08-03 | End: 2017-08-04 | Stop reason: HOSPADM

## 2017-08-03 RX ORDER — MAGNESIUM SULFATE 100 %
4 CRYSTALS MISCELLANEOUS AS NEEDED
Status: DISCONTINUED | OUTPATIENT
Start: 2017-08-03 | End: 2017-08-04 | Stop reason: HOSPADM

## 2017-08-03 RX ORDER — ACETAMINOPHEN 325 MG/1
650 TABLET ORAL
Status: DISCONTINUED | OUTPATIENT
Start: 2017-08-03 | End: 2017-08-04 | Stop reason: HOSPADM

## 2017-08-03 RX ORDER — HYDROMORPHONE HYDROCHLORIDE 1 MG/ML
1 INJECTION, SOLUTION INTRAMUSCULAR; INTRAVENOUS; SUBCUTANEOUS
Status: DISCONTINUED | OUTPATIENT
Start: 2017-08-03 | End: 2017-08-03 | Stop reason: SDUPTHER

## 2017-08-03 RX ORDER — INSULIN LISPRO 100 [IU]/ML
INJECTION, SOLUTION INTRAVENOUS; SUBCUTANEOUS
Status: DISCONTINUED | OUTPATIENT
Start: 2017-08-03 | End: 2017-08-04 | Stop reason: HOSPADM

## 2017-08-03 RX ORDER — ONDANSETRON 2 MG/ML
4 INJECTION INTRAMUSCULAR; INTRAVENOUS
Status: DISCONTINUED | OUTPATIENT
Start: 2017-08-03 | End: 2017-08-04 | Stop reason: HOSPADM

## 2017-08-03 RX ORDER — GUAIFENESIN 100 MG/5ML
100 SOLUTION ORAL
Status: DISCONTINUED | OUTPATIENT
Start: 2017-08-03 | End: 2017-08-04 | Stop reason: HOSPADM

## 2017-08-03 RX ORDER — HYDROMORPHONE HYDROCHLORIDE 1 MG/ML
1 INJECTION, SOLUTION INTRAMUSCULAR; INTRAVENOUS; SUBCUTANEOUS ONCE
Status: COMPLETED | OUTPATIENT
Start: 2017-08-03 | End: 2017-08-03

## 2017-08-03 RX ORDER — INSULIN LISPRO 100 [IU]/ML
INJECTION, SOLUTION INTRAVENOUS; SUBCUTANEOUS
Status: DISCONTINUED | OUTPATIENT
Start: 2017-08-03 | End: 2017-08-03 | Stop reason: SDUPTHER

## 2017-08-03 RX ORDER — PANTOPRAZOLE SODIUM 40 MG/1
40 TABLET, DELAYED RELEASE ORAL
Status: DISCONTINUED | OUTPATIENT
Start: 2017-08-03 | End: 2017-08-04 | Stop reason: HOSPADM

## 2017-08-03 RX ORDER — DEXTROSE 50 % IN WATER (D50W) INTRAVENOUS SYRINGE
25-50 AS NEEDED
Status: DISCONTINUED | OUTPATIENT
Start: 2017-08-03 | End: 2017-08-04 | Stop reason: HOSPADM

## 2017-08-03 RX ORDER — ENOXAPARIN SODIUM 100 MG/ML
40 INJECTION SUBCUTANEOUS EVERY 24 HOURS
Status: DISCONTINUED | OUTPATIENT
Start: 2017-08-03 | End: 2017-08-03 | Stop reason: SDUPTHER

## 2017-08-03 RX ORDER — MAGNESIUM SULFATE 100 %
4 CRYSTALS MISCELLANEOUS AS NEEDED
Status: DISCONTINUED | OUTPATIENT
Start: 2017-08-03 | End: 2017-08-03 | Stop reason: SDUPTHER

## 2017-08-03 RX ADMIN — IPRATROPIUM BROMIDE AND ALBUTEROL SULFATE 3 ML: .5; 3 SOLUTION RESPIRATORY (INHALATION) at 07:08

## 2017-08-03 RX ADMIN — IPRATROPIUM BROMIDE AND ALBUTEROL SULFATE 3 ML: .5; 3 SOLUTION RESPIRATORY (INHALATION) at 01:05

## 2017-08-03 RX ADMIN — ONDANSETRON 4 MG: 2 INJECTION INTRAMUSCULAR; INTRAVENOUS at 02:46

## 2017-08-03 RX ADMIN — CLINDAMYCIN PHOSPHATE 600 MG: 150 INJECTION, SOLUTION, CONCENTRATE INTRAVENOUS at 06:48

## 2017-08-03 RX ADMIN — OXYCODONE HYDROCHLORIDE AND ACETAMINOPHEN 2 TABLET: 10; 325 TABLET ORAL at 22:24

## 2017-08-03 RX ADMIN — ENOXAPARIN SODIUM 40 MG: 40 INJECTION SUBCUTANEOUS at 08:15

## 2017-08-03 RX ADMIN — METHYLPREDNISOLONE SODIUM SUCCINATE 40 MG: 40 INJECTION, POWDER, FOR SOLUTION INTRAMUSCULAR; INTRAVENOUS at 08:25

## 2017-08-03 RX ADMIN — HYDROMORPHONE HYDROCHLORIDE 1 MG: 1 INJECTION, SOLUTION INTRAMUSCULAR; INTRAVENOUS; SUBCUTANEOUS at 18:44

## 2017-08-03 RX ADMIN — INSULIN LISPRO 6 UNITS: 100 INJECTION, SOLUTION INTRAVENOUS; SUBCUTANEOUS at 22:24

## 2017-08-03 RX ADMIN — PANTOPRAZOLE SODIUM 40 MG: 40 TABLET, DELAYED RELEASE ORAL at 08:14

## 2017-08-03 RX ADMIN — INSULIN LISPRO 6 UNITS: 100 INJECTION, SOLUTION INTRAVENOUS; SUBCUTANEOUS at 16:51

## 2017-08-03 RX ADMIN — HYDROMORPHONE HYDROCHLORIDE 1 MG: 1 INJECTION, SOLUTION INTRAMUSCULAR; INTRAVENOUS; SUBCUTANEOUS at 02:48

## 2017-08-03 RX ADMIN — IPRATROPIUM BROMIDE AND ALBUTEROL SULFATE 3 ML: .5; 3 SOLUTION RESPIRATORY (INHALATION) at 20:08

## 2017-08-03 RX ADMIN — HYDROMORPHONE HYDROCHLORIDE 1 MG: 1 INJECTION, SOLUTION INTRAMUSCULAR; INTRAVENOUS; SUBCUTANEOUS at 13:17

## 2017-08-03 RX ADMIN — METHYLPREDNISOLONE SODIUM SUCCINATE 125 MG: 125 INJECTION, POWDER, FOR SOLUTION INTRAMUSCULAR; INTRAVENOUS at 01:11

## 2017-08-03 RX ADMIN — IPRATROPIUM BROMIDE AND ALBUTEROL SULFATE 3 ML: .5; 3 SOLUTION RESPIRATORY (INHALATION) at 15:13

## 2017-08-03 RX ADMIN — CLINDAMYCIN PHOSPHATE 600 MG: 150 INJECTION, SOLUTION, CONCENTRATE INTRAVENOUS at 13:05

## 2017-08-03 RX ADMIN — MULTIPLE VITAMINS W/ MINERALS TAB 1 TABLET: TAB at 08:14

## 2017-08-03 RX ADMIN — HYDROMORPHONE HYDROCHLORIDE 1 MG: 1 INJECTION, SOLUTION INTRAMUSCULAR; INTRAVENOUS; SUBCUTANEOUS at 08:14

## 2017-08-03 RX ADMIN — OXYCODONE HYDROCHLORIDE AND ACETAMINOPHEN 2 TABLET: 10; 325 TABLET ORAL at 14:02

## 2017-08-03 RX ADMIN — INSULIN LISPRO 3 UNITS: 100 INJECTION, SOLUTION INTRAVENOUS; SUBCUTANEOUS at 08:14

## 2017-08-03 RX ADMIN — METHYLPREDNISOLONE SODIUM SUCCINATE 40 MG: 40 INJECTION, POWDER, FOR SOLUTION INTRAMUSCULAR; INTRAVENOUS at 16:51

## 2017-08-03 RX ADMIN — CLINDAMYCIN PHOSPHATE 600 MG: 150 INJECTION, SOLUTION, CONCENTRATE INTRAVENOUS at 22:24

## 2017-08-03 RX ADMIN — IOPAMIDOL 100 ML: 612 INJECTION, SOLUTION INTRAVENOUS at 08:31

## 2017-08-03 RX ADMIN — IPRATROPIUM BROMIDE AND ALBUTEROL SULFATE 3 ML: .5; 3 SOLUTION RESPIRATORY (INHALATION) at 11:12

## 2017-08-03 NOTE — IP AVS SNAPSHOT
Adolfo Dillon [facility] [address] 
[phone] Patient: Wayne Calhoun MRN: [id number] :1965 You are allergic to the following No active allergies Recent Documentation Height Weight BMI Smoking Status 1.702 m 69.4 kg 23.96 kg/m2 Current Every Day Smoker Emergency Contacts Name Discharge Info Relation Home Work Mobile Kely Sena N/A  AT THIS TIME [6] Spouse [3]   [phone] Dulce  Parent [1] [phone] About your hospitalization You were admitted on:  August 3, 2017 You last received care in the:  [facility] You were discharged on:  2017 Unit phone number:  [phone] Why you were hospitalized Your primary diagnosis was:  Not on File Your diagnoses also included:  Throat Cancer (Hcc), Acute Respiratory Distress, Hypoxia, Stridor, Respiratory Depression Providers Seen During Your Hospitalizations Provider Role Specialty Primary office phone Jillian Baires MD Attending Provider Emergency Medicine [phone] Jennifer Cevallos MD Attending Provider Internal Medicine [phone] Your Primary Care Physician (PCP) Primary Care Physician Office Phone Office Fax OTHER, PHYS ** None ** ** None ** Follow-up Information Follow up With Details Comments Contact Info Francisca Brito MD On 8/10/2017 Follow up appointment scheduled for August 10, 2017 at 4:00 p.m. Francisca Brito MD 
[address] 
[phone] Phys MD Radhika   Patient can only remember the practice name and not the physician Dr Ashutosh Delgado, PCP Schedule an appointment as soon as possible for a visit in 3 days [facility] On 2017 Follow up appointment scheduled for 2017 at 1:00 p.m.  Please bring picture ID, list of current medications, proof of income, $75.00 to visit. Please arrive 30 minutes prior to appointment for check in. (first available appt). [address] [place] [address] 
[phone] Current Discharge Medication List  
  
CONTINUE these medications which have NOT CHANGED Dose & Instructions Dispensing Information Comments Morning Noon Evening Bedtime  
 albuterol-ipratropium 2.5 mg-0.5 mg/3 ml Nebu Commonly known as:  Izola Cons Dose:  3 mL  
3 mL by Nebulization route every four (4) hours as needed. Quantity:  30 Nebule Refills:  1  
     
   
   
   
  
 guaiFENesin 100 mg/5 mL liquid Commonly known as:  ROBITUSSIN Dose:  100 mg  
5 mL by Per G Tube route every four (4) hours as needed for Cough. Quantity:  1 Bottle Refills:  0  
     
   
   
   
  
 levoFLOXacin 500 mg tablet Commonly known as:  Walkerton Fang Dose:  500 mg  
1 Tab by Per G Tube route daily. Quantity:  7 Tab Refills:  0  
     
  
   
   
   
  
 multivitamin, tx-iron-ca-min 9 mg iron-400 mcg Tab tablet Commonly known as:  THERA-M w/ IRON Dose:  1 Tab Take 1 Tab by mouth daily. Quantity:  30 Tab Refills:  1 Nebulizer Accessories Kit Use nebs Q4H PRN Quantity:  1 Kit Refills:  0  
     
   
   
   
  
 nicotine 14 mg/24 hr patch Commonly known as:  Arcelia Camilla Dose:  1 Patch 1 Patch by TransDERmal route daily for 30 days. Quantity:  30 Patch Refills:  1 PERCOCET PO Take  by mouth. Refills:  0  
     
   
   
   
  
 predniSONE 20 mg tablet Commonly known as:  DELTASONE  
   
 2 tab via peg daily X 3 days, then 1 tab daily via peg X 3 days, then 1/2 tab daily via peg X 3 days Quantity:  12 Tab Refills:  0 Discharge Instructions DISCHARGE SUMMARY from Nurse The following personal items are in your possession at time of discharge: 
 
Dental Appliances: None Visual Aid: Glasses Home Medications: None Jewelry: None Clothing: Shorts, Jacket/Coat, Shirt, Undergarments Other Valuables: Cell Phone Personal Items Sent to Safe: none PATIENT INSTRUCTIONS: 
 
 
F-face looks uneven A-arms unable to move or move unevenly S-speech slurred or non-existent T-time-call 911 as soon as signs and symptoms begin-DO NOT go Back to bed or wait to see if you get better-TIME IS BRAIN. Warning Signs of HEART ATTACK Call 911 if you have these symptoms: 
? Chest discomfort. Most heart attacks involve discomfort in the center of the chest that lasts more than a few minutes, or that goes away and comes back. It can feel like uncomfortable pressure, squeezing, fullness, or pain. ? Discomfort in other areas of the upper body. Symptoms can include pain or discomfort in one or both arms, the back, neck, jaw, or stomach. ? Shortness of breath with or without chest discomfort. ? Other signs may include breaking out in a cold sweat, nausea, or lightheadedness. Don't wait more than five minutes to call [address]! Fast action can save your life. Calling 911 is almost always the fastest way to get lifesaving treatment. Emergency Medical Services staff can begin treatment when they arrive  up to an hour sooner than if someone gets to the hospital by car. The discharge information has been reviewed with the patient. The patient verbalized understanding.  
 
Discharge medications reviewed with the patient and appropriate educational materials and side effects teaching were provided. Resume prednisone, antibitoics, cough medicine, and nebulizer treatments ordered at last discharge that he now has picked up-instructions are the same. Patient armband removed and shredded. Discharge Orders None Introducing [address]! Ilan Caballero introduces [facility] patient portal. Now you can access parts of your medical record, email your doctor's office, and request medication refills online. 1. In your internet browser, go to [email or web address] 2. Click on the First Time User? Click Here link in the Sign In box. You will see the New Member Sign Up page. 3. Enter your [facility] Access Code exactly as it appears below. You will not need to use this code after youve completed the sign-up process. If you do not sign up before the expiration date, you must request a new code. · [facility] Access Code: [email or web address] Expires: 10/30/2017  1:10 PM 
 
4. Enter the last four digits of your Social Security Number (xxxx) and Date of Birth (mm/dd/yyyy) as indicated and click Submit. You will be taken to the next sign-up page. 5. Create a [facility] ID. This will be your [facility] login ID and cannot be changed, so think of one that is secure and easy to remember. 6. Create a [facility] password. You can change your password at any time. 7. Enter your Password Reset Question and Answer. This can be used at a later time if you forget your password. 8. Enter your e-mail address. You will receive e-mail notification when new information is available in [address] 9. Click Sign Up. You can now view and download portions of your medical record. 10. Click the Download Summary menu link to download a portable copy of your medical information. If you have questions, please visit the Frequently Asked Questions section of the [facility] website.  Remember, [facility] is NOT to be used for urgent needs. For medical emergencies, dial 911. Now available from your iPhone and Android! General Information Please provide this summary of care documentation to your next provider. Patient Signature:  ____________________________________________________________ Date:  ____________________________________________________________  
  
[facility] Signature:  ____________________________________________________________ Date:  ____________________________________________________________

## 2017-08-03 NOTE — H&P
History & Physical    Patient: Carl Padilla MRN: [id number]  CSN: [id number]    YOB: 1965  Age: 45 y.o. Sex: male      DOA: 8/3/2017    Chief Complaint:   Chief Complaint   Patient presents with    Shortness of Breath          HPI:     Carl Padilla is a 45 y.o.  male who returns to ER after being discharged from hospital less than 2 days ago patient states he was having increasing difficulty catching his breath and finishing sentences also noted he was having worsening swelling in his throat are and harder talking with inspirtion in ER was given several duonebs and steroids prior to improvement in breathing. Unfortunately when patient was discharged from hospital he did not  antibiotic or streoid taper that was prescribed so he went over 24 hr with no meds   He is unable to take po meds and uses his peg tube for all meds and most of his nutriton   In ER ABg showed compensated hypoxemia patient has no home nebulizer or oxygen and wonders if he can get this to help in his out patient care / and help with respitory symtoms    Past Medical History:   Diagnosis Date    Hypertension     Ill-defined condition     peg tube in place    Ill-defined condition     chemo    S/P radiation therapy     Throat cancer ([facility] [address]     Tobacco abuse        Past Surgical History:   Procedure Laterality Date    HX HEENT      sinus surgery       History reviewed. No pertinent family history. Social History     Social History    Marital status:      Spouse name: N/A    Number of children: N/A    Years of education: N/A     Social History Main Topics    Smoking status: Current Every Day Smoker     Packs/day: 0.50    Smokeless tobacco: Never Used    Alcohol use No    Drug use: No    Sexual activity: Not Asked     Other Topics Concern    None     Social History Narrative       Prior to Admission medications    Medication Sig Start Date End Date Taking?  Authorizing Provider albuterol-ipratropium (DUO-NEB) 2.5 mg-0.5 mg/3 ml nebu 3 mL by Nebulization route every four (4) hours as needed. 8/1/17  Yes George Ordoñez MD   guaiFENesin (ROBITUSSIN) 100 mg/5 mL liquid 5 mL by Per G Tube route every four (4) hours as needed for Cough. 8/1/17  Yes George Ordoñez MD   multivitamin, tx-iron-ca-min (THERA-M W/ IRON) 9 mg iron-400 mcg tab tablet Take 1 Tab by mouth daily. 8/1/17  Yes George Ordoñez MD   nicotine (NICODERM CQ) 14 mg/24 hr patch 1 Patch by TransDERmal route daily for 30 days. 8/1/17 8/31/17 Yes George Ordoñez MD   predniSONE (DELTASONE) 20 mg tablet 2 tab via peg daily X 3 days, then 1 tab daily via peg X 3 days, then 1/2 tab daily via peg X 3 days 8/1/17  Yes George Ordoñez MD   levoFLOXacin (LEVAQUIN) 500 mg tablet 1 Tab by Per G Tube route daily. 8/1/17  Yes George Ordoñez MD   Nebulizer Accessories kit Use nebs Q4H PRN 8/1/17  Yes George Ordoñez MD   OXYCODONE HCL/ACETAMINOPHEN (PERCOCET PO) Take  by mouth. Yes Hugh Garrido MD       No Known Allergies      Review of Systems  GENERAL: Patient alert, awake and oriented times 3, able to communicate full sentences and not in distress. HEENT: No change in vision, no earache, tinnitus, sore throat or sinus congestion. NECK: No pain or stiffness. PULMONARY:worsening shortness of breath, no cough worsening  wheeze. Cardiovascular: no pnd or orthopnea, no CP  GASTROINTESTINAL: No abdominal pain, nausea, vomiting or diarrhea, melena or bright red blood per rectum. GENITOURINARY: No urinary frequency, urgency, hesitancy or dysuria. MUSCULOSKELETAL: chronic joint or muscle pain, no back pain, no recent trauma. DERMATOLOGIC: No rash, no itching, no lesions. ENDOCRINE: No polyuria, polydipsia, no heat or cold intolerance. No recent change in weight. HEMATOLOGICAL: No anemia or easy bruising or bleeding. NEUROLOGIC: No headache, seizures, numbness, tingling or weakness.        Physical Exam:     Physical Exam:  Visit Vitals    /85 (BP 1 Location: Left arm, BP Patient Position: At rest)    Pulse (!) 121    Temp 98.1 °F (36.7 °C)    Resp 20    Ht 5' 7\" (1.702 m)    Wt 73 kg (161 lb)    SpO2 92%    BMI 25.22 kg/m2      O2 Device: Room air    Temp (24hrs), Av.1 °F (36.7 °C), Min:98.1 °F (36.7 °C), Max:98.1 °F (36.7 °C)             General:  Alert, cooperative, no distress, appears stated age. Head: Normocephalic, without obvious abnormality, atraumatic. Eyes:  Conjunctivae/corneas clear. PERRL, EOMs intact. Nose: Nares normal. No drainage or sinus tenderness. Neck: Supple, symmetrical, trachea midline, no adenopathy, thyroid: no enlargement, no carotid bruit and no JVD. stridor present pharngeal redness noted external radiation changes    Lungs:   Clear to auscultation bilaterally. diminshed breath sounds upper air way stridor noted    Heart:  Regular rate and rhythm, S1, S2 normal.     Abdomen: Soft, non-tender. Bowel sounds normal. Peg tube in    Extremities: Extremities normal, atraumatic, no cyanosis or edema. Pulses: 2+ and symmetric all extremities. Skin:  No rashes or lesions multiple keratosis    Neurologic: AAOx3, No focal motor or sensory deficit. Labs Reviewed:    Lab results reviewed. For significant abnormal values and values requiring intervention, see assessment and plan.   Xray no change labs stable nd EKG    Procedures/imaging: see electronic medical records for all procedures/Xrays and details which were not copied into this note but were reviewed prior to creation of Plan      Assessment/Plan     Active Problems:    Throat cancer ([facility] [address] (2017)  Pending [facility] at [facility] will need follow up has out patient PET       Acute respiratory distress (8/3/2017)  Observation   23 hrs of steroid/iv abx  Fill abx and Respitory  meds       Hypoxia (8/3/2017)  Evaluate for home oxygen case management consult   RT eval    Neck Swelling  Check CT with contrast     DVT/GI Prophylaxis: Hep SQ    Discussed with patient at bedside about hospital admission and my plan care, who understood and agree with my plan care.     Silvia Quijano MD  8/3/2017 3:24 AM

## 2017-08-03 NOTE — ED TRIAGE NOTES
Pt reports shortness of breath. EMS was called to home at 1900 for same, gave neb with relief and refused transport. Pt then called again this morning and agreed to come to hospital. Pt was discharged from hospital yesterday for COPD pneumonia and did not get Rx's filled upon discharge. + smoking since discharge. Sepsis Screening completed    (  )Patient meets SIRS criteria. (  X)Patient does not meet SIRS criteria.       SIRS Criteria is achieved when two or more of the following are present   Temperature < 96.8°F (36°C) or > 100.9°F (38.3°C)   Heart Rate > 90 beats per minute   Respiratory Rate > 20 breaths per minute   WBC count > 12,000 or <4,000 or > 10% bands

## 2017-08-03 NOTE — PROGRESS NOTES
Bedside and Verbal shift change report given to WILLIAM Trimble RN  (oncoming nurse) by  ANNALISA Love RN  (offgoing nurse). Report included the following information SBAR, Kardex, Recent Results and Med Rec Status.   Summary :

## 2017-08-03 NOTE — ED NOTES
Pt reports recently discharged from hospital for aspiration pneumonia, pt did not have prescriptions filled, states his breathing improves with his pain medication, spitting up large amount of clear phlegm.

## 2017-08-03 NOTE — PROGRESS NOTES
Shift Summary :  Since arrival to unit has been without pain or nausea. Self suctioning set up. Chest sounds with rhonchi and a congested cough. Peg tube site unremarkable. An unremarkable shift.

## 2017-08-03 NOTE — PROGRESS NOTES
Hospitalist Progress Note    Patient: Taylor Gonzalez MRN: [id number]  CSN: [id number]    YOB: 1965  Age: 45 y.o.   Sex: male    DOA: 8/3/2017 LOS:  LOS: 0 days          Chief Complaint:    Ac COPD exacerbation      Assessment/Plan     COPD exacerbation-recent d/c after tx for COPD and aspiration, hyponatremia-did NOT fill Rx for steroids or abx, nor get the nebulizer we ordered for him-thus worsened and returned to ER  Hyponatremia resolved  Throat cancer with peg in place, NPO status  Chronic narcotic dependence  Tobacco abuse  COPD    No evidence for pneumonia, but did not complete abx for recent aspiration pneumoniaits-so continue clina  IV steroids for COPD exac, regular nebs  2 [facility] Tube feeds with h20 flushes to peg  DVT prophylaxis    Resume his percocet-he takes a very high dosage at home, would watch for w/d sx    Patient Active Problem List   Diagnosis Code    Aspiration into airway T17.908A    Throat cancer ([facility] [address] C14.0    Status post insertion of percutaneous endoscopic gastrostomy (PEG) tube ([facility] [address] Z93.1    Hyponatremia E87.1    Acute respiratory distress R06.00    Hypoxia R09.02    Stridor R06.1    Respiratory depression R06.89       Subjective:    Breathing a little better  Denies worsened SOB since admission  NP cough, throat congested  No abd pain  No CP    Review of systems:    Constitutional: denies fevers, chills, myalgias    Cardiovascular: denies chest pain, palpitations  Gastrointestinal: denies nausea, vomiting, diarrhea      Vital signs/Intake and Output:  Visit Vitals    /87 (BP 1 Location: Right arm, BP Patient Position: At rest)    Pulse (!) 105    Temp 98.6 °F (37 °C)    Resp 20    Ht 5' 7\" (1.702 m)    Wt 69.4 kg (153 lb)    SpO2 99%    BMI 23.96 kg/m2     Current Shift:  08/03 0701 - 08/03 1900  In: -   Out: 650 [Urine:650]  Last three shifts:       Exam:    General: Well developed, alert, NAD, OX3  Head/Neck: NCAT, supple, No masses, No lymphadenopathy  CVS:Regular rate and rhythm, no M/R/G, S1/S2 heard, no thrill  Lungs:Cleoarse BS, few exp wheezes, scattered rhonchi  Abdomen: Soft, Nontender, peg tube, No distention, Normal Bowel sounds, No hepatomegaly  Extremities: No C/C/E, pulses palpable 2+  Skin:normal texture and turgor, no rashes, no lesions  Neuro:grossly normal , follows commands  Psych:appropriate                Labs: Results:       Chemistry Recent Labs      08/03/17   0028  08/01/17   1050   GLU  127*  145*   NA  137  136   K  4.0  4.2   CL  98*  95*   CO2  30  30   BUN  18  21*   CREA  0.88  0.89   CA  9.4  9.2   AGAP  9  11   BUCR  20  24*   AP  105   --    TP  7.2   --    ALB  3.4   --    GLOB  3.8   --    AGRAT  0.9   --       CBC w/Diff Recent Labs      08/03/17   0028   WBC  8.0   RBC  3.36*   HGB  11.8*   HCT  34.4*   PLT  194   GRANS  82*   LYMPH  6*   EOS  0      Cardiac Enzymes Recent Labs      08/03/17   0028   CPK  69   CKND1  1.9      Coagulation No results for input(s): PTP, INR, APTT in the last 72 hours. No lab exists for component: INREXT    Lipid Panel No results found for: CHOL, CHOLPOCT, CHOLX, CHLST, CHOLV, 356375, HDL, LDL, LDLC, DLDLP, 208100, VLDLC, VLDL, TGLX, TRIGL, TRIGP, TGLPOCT, CHHD, CHHDX   BNP No results for input(s): BNPP in the last 72 hours.    Liver Enzymes Recent Labs      08/03/17   0028   TP  7.2   ALB  3.4   AP  105   SGOT  17      Thyroid Studies No results found for: T4, T3U, TSH, TSHEXT     Procedures/imaging: see electronic medical records for all procedures/Xrays and details which were not copied into this note but were reviewed prior to creation of Payal Ndiaye MD

## 2017-08-03 NOTE — Clinical Note
Patient Class[de-identified] Observation [151] Type of Bed: Medical [8] Reason for Observation: hypoxia Admitting Diagnosis: Hypoxia [698842] Admitting Diagnosis: Acute respiratory distress [073695] Admitting Diagnosis: Throat cancer [facility]) [[id number]] Admitting Physician: Steven Robbins [[id number]] Attending Physician: Steven Robbins [[id number]]

## 2017-08-03 NOTE — PROGRESS NOTES
Physical Therapy Screening:  Services are not indicated at this time. An InBasket screening referral was triggered for physical therapy based on results obtained during the nursing admission assessment. The patients chart was reviewed and the patient is not appropriate for a skilled therapy evaluation at this time. Please consult physical therapy if any therapy needs arise. Thank you.     Sancho Wilkes PT, DPT

## 2017-08-03 NOTE — ROUTINE PROCESS
TRANSFER - OUT REPORT:    Verbal report given to ANNALISA Dailey RN on Constantin Hensley  being transferred to [facility]  for routine progression of care       Report consisted of patients Situation, Background, Assessment and   Recommendations(SBAR). Information from the following report(s) SBAR, ED Summary and MAR was reviewed with the receiving nurse. Lines:   Peripheral IV 08/03/17 Left Antecubital (Active)   Site Assessment Clean, dry, & intact 8/3/2017 12:37 AM   Phlebitis Assessment 0 8/3/2017 12:37 AM   Infiltration Assessment 0 8/3/2017 12:37 AM   Dressing Status Clean, dry, & intact 8/3/2017 12:37 AM        Opportunity for questions and clarification was provided.       Patient transported with:   [employer]

## 2017-08-03 NOTE — PROGRESS NOTES
Readmission Risk Assessment: Low Risk and [facility] patients    RRAT Score: 1 - 12    Initial Assessment: Chart reviewed and noted PT admitted to unit from ED for SOB. Recent dc from hospital. Pt did not fill his prescriptions as stated he would be doing. Pt continues to smoke. Pt has no insurance but is working with [facility]    Emergency Contact: Spouse- See facesheet    Pertinent Medical Hx: HTN, peg tube in place, chemo, throat Ca, tobacco abuse,     PCP/Specialists: Tana 6: nnoneon  DME: WC,     Low Risk Care Transition Plan:  1. Evaluate for [facility] or [address] coordination of resources  2. Involve patient/caregiver in assessment, planning, education and implement of intervention. 3. CM daily patient care huddles/interdisciplinary rounds. 4. PCP/Specialist appointment within 7 - 10 days made prior to discharge. 5. Facilitate transportation and logistics for follow-up appointments.   6. Handoff to [address] Nurse Navigator or PCP practice

## 2017-08-03 NOTE — PROGRESS NOTES
INITIAL NUTRITION ASSESSMENT     RECOMMENDATIONS/PLAN:   1. Recommend Twocal 4.5 cans daily to provide: 2133kcal, 97 Protein, 747ml free water, 233g CHO. 2. Will recommend additional 150ml free water flush before and after each can. 3. Monitor lytes, fluid status, weight, skin integrity, labs, lytes      Adult Malnutrition Criteria:      Nutrition assessment was completed by RDN and the patient was found to meet the following malnutrition criteria established by ASPEN/AND:    Adult Malnutrition Guidelines:  SEVERE PROTEIN CALORIE MALNUTRITION IN THE CONTEXT OF CHRONIC ILLNESS  Weight Loss:  >5% x 1 month or >7.5% x 3 months or >10% x 6 months or >20% x 12 months  Energy Intake: <75% energy intake compared to estimated energy needs >1 month  Muscle Mass: Severe Depletion      Clara S Edinson  08/03/17    REASON FOR ASSESSMENT:     []  RN Referral:    [x] MST score >/=2  Malnutrition Screening Tool (MST):  Recently Lost Weight Without Trying: Yes  If Yes, How Much Weight Loss: >33 lbs  Eating Poorly Due to Decreased Appetite: No  MST Score: 4      NUTRITION ASSESSMENT:   Client History: 45 yrs old Male admitted with COPD exacerbation was recent d/c after tx for COPD and aspiration, hyponatremia, but did not fill RXs causing him to worsen and return to ER per MD note. Pt has PEG in place for feedings. Zack Prado PMHx: HTN, s/p cehmo and radiation therapy, throat cancer, tobacco abuse   Cultural/Bahai Food Preferences: None Identified    FOOD/NUTRITION HISTORY  Diet History: per documentation pt was taking 5 cans Two [place]odn daily during previous hospital admission   Food Allergies:  [x] NKFA       Pertinent PTA Medications: MVI, prednisone,     NUTRITION INTAKE   Diet Order:  NPO  twocal 5 cans daily to provide: 2370kcal, 99g Protein, 830ml free water, 259g CHO w/ 150 ml free water flushes before and after each can     Average PO Intake:       No data found. Pertinent Medications:  [x] Reviewed;  Lovenox, MVI, methylprednisolone,protonix   Electrolyte Replacement Protocol: []K  []Mg  []PO4    Insulin:  [] SSI  [x] Pre-meal   []  Basal   [] Drip  [] None  Pt expected to meet estimated nutrient needs through next review:          [x]  Yes     [] No; Tube feed regimen meets estimated needs  ANTHROPOMETRICS  Height: 5' 7\" (170.2 cm)       Weight: 69.4 kg (153 lb)    BMI: 24 kg/m^2  -  normal weight (18.5%-24.9% BMI)        Weight change:  -8lbs since 7/30 RD note; Per pt on 7/30 wt loss of approx 40-50lbs x 4-5 months. Approx  21% body weight                                   Comparison to Reference Standards:  IBW: 148 lbs      %IBW: 103%      AdjBW: N/A kg    NUTRITION-FOCUSED PHYSICAL ASSESSMENT  Skin: no pressure injuries noted      GI: WNL    BIOCHEMICAL DATA & MEDICAL TESTS  Pertinent Labs:  [x] Reviewed;      NUTRITION PRESCRIPTION  Calories:2089kcal/day based on Miffilin x1.6x1.1  Protein: g/day based on 1.4-1.6 g/kg  CHO: 261g/day based on 50% of total energy  Fluid: 2089 ml/day based on 1 kcal/ml      NUTRITION DIAGNOSES:   1. Increased energy needs related to SOB and COPD as evidence by 8lb weight loss since 7/30 and 21% wt loss in last 4-5 months. NUTRITION INTERVENTIONS:   INTERVENTIONS:        GOALS:  1. Change Tube Feed to 4.5 Cans of Two Sheldon to provide: 2133kcal, 97 Protein, 747ml free water, 233g CHO 1. Meet estimated needs via tube feeds throughout the next 3 days             LEARNING NEEDS (Diet, Supplementation, Food/Nutrient-Drug Interaction):   [x] None Identified  [] Inpatient education provided/documented    [] Identified and patient:  [] Declined     [] Was not appropriate/indicated  NUTRITION MONITORING /EVALUATION:   Follow PO intake  Monitor wt  Monitor renal labs, electrolytes, fluid status   Adjust EN/PN as appropriate    [] Participated in Interdisciplinary Rounds  [x] [address] [place] [address] Reviewed/Documented  DISCHARGE NUTRITION RECOMMENDATIONS ADDRESSED: [x] Yes- recommended tube feeds as noted above    NUTRITION RISK:     [x]  At risk                     []  Not currently at risk     Will follow-up per policy.   Miquel Stephens RD  PAGER:  [phone]

## 2017-08-03 NOTE — PROGRESS NOTES
conducted an initial consultation and Spiritual Assessment for Chadwick Hopkins, who is a 45 y.o.,male. Patients Primary Language is: [facility]. According to the patients EMR Bahai Affiliation is: [facility].     The reason the Patient came to the hospital is:   Patient Active Problem List    Diagnosis Date Noted    Acute respiratory distress 08/03/2017    Hypoxia 08/03/2017    Stridor 08/03/2017    Respiratory depression 08/03/2017    Aspiration into airway 07/30/2017    Throat cancer ([facility] [address] 07/30/2017    Status post insertion of percutaneous endoscopic gastrostomy (PEG) tube ([facility] [address] 07/30/2017    Hyponatremia 07/30/2017        The  provided the following Interventions:  Initiated a relationship of care and support. Explored issues of dheeraj, belief, spirituality and Mandaeism/ritual needs while hospitalized. Listened empathically. Provided chaplaincy education. Provided information about Spiritual Care Services. Offered prayer and assurance of continued prayers on patients behalf. Chart reviewed. The following outcomes were achieved:  Patient shared limited information about both their medical narrative and spiritual journey/beliefs. Patient processed feeling about current hospitalization. Patient expressed gratitude for pastoral care visit. Assessment:  Patient does not have any Mandaeism/cultural needs that will affect patients preferences in health care. There are no further spiritual or Mandaeism issues which require intervention at this time. Plan:  Chaplains will continue to follow and will provide pastoral care on an as needed/requested basis.  recommends bedside caregivers page  on duty if patient shows signs of acute spiritual or emotional distress.       Sister Molina Jibrennen, [address], [address]  [phone]

## 2017-08-03 NOTE — IP AVS SNAPSHOT
Summary of Care Report The Summary of Care report has been created to help improve care coordination. Users with access to [employer] or [address] (Web-based application) may access additional patient information including the Discharge Summary. If you are not currently a [address] user and need more information, please call the number listed below in the [place] [address] section and ask to be connected with Medical Records. Facility Information Name Address Phone [place] [id number] [phone] Patient Information Patient Name Sex  Endy Mclean ([id number]) Male 1965 Discharge Information Admitting Provider Service Area Unit Rosas Ko MD / [address] [place] [address] Surg/Onco / [phone] Discharge Provider Discharge Date/Time Discharge Disposition Destination (none) 2017 (Pending) AHR (none) Patient Language Language ENGLISH [13] Hospital Problems as of 2017  Reviewed: 8/3/2017  3:22 AM by Rosas Ko MD  
  
  
  
 Class Noted - Resolved Last Modified POA Active Problems Throat cancer ([facility] [address]  2017 - Present 8/3/2017 by Yvonne Zamroa MD Unknown Entered by Matilda Warner MD  
  Acute respiratory distress  8/3/2017 - Present 8/3/2017 by Yvonne Zamora MD Unknown Entered by Yvonne Zamora MD  
  Hypoxia  8/3/2017 - Present 8/3/2017 by Yvonne Zamora MD Unknown Entered by Yvonne Zamora MD  
  Stridor  8/3/2017 - Present 8/3/2017 by Rosas Ko MD Unknown Entered by Rosas Ko MD  
  Respiratory depression  8/3/2017 - Present 8/3/2017 by Rosas Ko MD Unknown   Entered by Rosas Ko MD  
  
Non-Hospital Problems as of 2017  Reviewed: 8/3/2017  3:22 AM by Anne Nails MD  
  
  
  
 Class Noted - Resolved Last Modified Active Problems Aspiration into airway  7/30/2017 - Present 7/30/2017 by Philip Jiang MD  
  Entered by Philip Jiang MD  
  Status post insertion of percutaneous endoscopic gastrostomy (PEG) tube ([facility] [address]  7/30/2017 - Present 7/30/2017 by Philip Jiang MD  
  Entered by Philip Jiang MD  
  Hyponatremia  7/30/2017 - Present 7/30/2017 by Philip Jiang MD  
  Entered by Philip Jiang MD  
  
You are allergic to the following No active allergies Current Discharge Medication List  
  
CONTINUE these medications which have NOT CHANGED Dose & Instructions Dispensing Information Comments  
 albuterol-ipratropium 2.5 mg-0.5 mg/3 ml Nebu Commonly known as:  Neil Railing Dose:  3 mL  
3 mL by Nebulization route every four (4) hours as needed. Quantity:  30 Nebule Refills:  1  
   
 guaiFENesin 100 mg/5 mL liquid Commonly known as:  ROBITUSSIN Dose:  100 mg  
5 mL by Per G Tube route every four (4) hours as needed for Cough. Quantity:  1 Bottle Refills:  0  
   
 levoFLOXacin 500 mg tablet Commonly known as:  Loistine William Dose:  500 mg  
1 Tab by Per G Tube route daily. Quantity:  7 Tab Refills:  0  
   
 multivitamin, tx-iron-ca-min 9 mg iron-400 mcg Tab tablet Commonly known as:  THERA-M w/ IRON Dose:  1 Tab Take 1 Tab by mouth daily. Quantity:  30 Tab Refills:  1 Nebulizer Accessories Kit Use nebs Q4H PRN Quantity:  1 Kit Refills:  0  
   
 nicotine 14 mg/24 hr patch Commonly known as:  Charmaine Alt Dose:  1 Patch 1 Patch by TransDERmal route daily for 30 days. Quantity:  30 Patch Refills:  1 PERCOCET PO Take  by mouth. Refills:  0  
   
 predniSONE 20 mg tablet Commonly known as:  DELTASONE  
 2 tab via peg daily X 3 days, then 1 tab daily via peg X 3 days, then 1/2 tab daily via peg X 3 days Quantity:  12 Tab Refills:  0 Follow-up Information Follow up With Details Comments Contact Info Hieu Petty MD On 8/10/2017 Follow up appointment scheduled for August 10, 2017 at 4:00 p.m. Hieu Petty MD 
[address]  
[phone] Hugh Garrido MD   Patient can only remember the practice name and not the physician Dr Joseline Mendoza, PCP Schedule an appointment as soon as possible for a visit in 3 days [facility] On 8/16/2017 Follow up appointment scheduled for August 16, 2017 at 1:00 p.m. Please bring picture ID, list of current medications, proof of income, $75.00 to visit. Please arrive 30 minutes prior to appointment for check in. (first available appt). [address] [place] [address] 
[phone] Discharge Instructions DISCHARGE SUMMARY from Nurse The following personal items are in your possession at time of discharge: 
 
Dental Appliances: None Visual Aid: Glasses Home Medications: None Jewelry: None Clothing: Shorts, Jacket/Coat, Shirt, Undergarments Other Valuables: Cell Phone Personal Items Sent to Safe: none PATIENT INSTRUCTIONS: 
 
 
F-face looks uneven A-arms unable to move or move unevenly S-speech slurred or non-existent T-time-call 911 as soon as signs and symptoms begin-DO NOT go Back to bed or wait to see if you get better-TIME IS BRAIN. Warning Signs of HEART ATTACK Call 911 if you have these symptoms: 
? Chest discomfort. Most heart attacks involve discomfort in the center of the chest that lasts more than a few minutes, or that goes away and comes back. It can feel like uncomfortable pressure, squeezing, fullness, or pain. ? Discomfort in other areas of the upper body. Symptoms can include pain or discomfort in one or both arms, the back, neck, jaw, or stomach. ? Shortness of breath with or without chest discomfort. ? Other signs may include breaking out in a cold sweat, nausea, or lightheadedness. Don't wait more than five minutes to call [address]! Fast action can save your life. Calling 911 is almost always the fastest way to get lifesaving treatment. Emergency Medical Services staff can begin treatment when they arrive  up to an hour sooner than if someone gets to the hospital by car. The discharge information has been reviewed with the patient. The patient verbalized understanding. Discharge medications reviewed with the patient and appropriate educational materials and side effects teaching were provided. Resume prednisone, antibitoics, cough medicine, and nebulizer treatments ordered at last discharge that he now has picked up-instructions are the same. Patient armband removed and shredded. Chart Review Routing History No Routing History on File

## 2017-08-03 NOTE — ED PROVIDER NOTES
HPI Comments: 12:23 AM   Melissa Vidal is a 45 y.o. male presents to the ED via EMS c/o SOB onset PTA. Associated sxs include increased urine frequency. Pt was hospitalized yesterday and diagnosed with COPD exacerbation and PNA, but pt did not fill rx prescribed to him at hospital. Pt called EMS 5 hours ago and felt better after neb tx but pt stated having sxs again. Pt is currently receiving radiation in neck. Pt states throat pain is baseline and he is rx'd Percocet which he takes regularly. Pt had 2 cigarettes today and 2 yesterday. PMHx includes throat CA. SHx includes tobacco use (1ppd). Pt denies fever, chills and any other symptoms or complaints. Written by Luba Batista ED Scribzainab, as dictated by [employer]. Leana Menchaca MD      The history is provided by the patient. No  was used. Past Medical History:   Diagnosis Date    Hypertension     Ill-defined condition     peg tube in place    Ill-defined condition     chemo    S/P radiation therapy     Throat cancer ([facility] [address]     Tobacco abuse        Past Surgical History:   Procedure Laterality Date    HX HEENT      sinus surgery         History reviewed. No pertinent family history. Social History     Social History    Marital status:      Spouse name: N/A    Number of children: N/A    Years of education: N/A     Occupational History    Not on file. Social History Main Topics    Smoking status: Current Every Day Smoker     Packs/day: 0.50    Smokeless tobacco: Never Used    Alcohol use No    Drug use: No    Sexual activity: Not on file     Other Topics Concern    Not on file     Social History Narrative         ALLERGIES: Review of patient's allergies indicates no known allergies. Review of Systems   Constitutional: Negative for chills and fever. Respiratory: Positive for shortness of breath. Genitourinary: Positive for frequency. All other systems reviewed and are negative.       Vitals:    08/03/17 0030 08/03/17 0108   BP: 157/84    Pulse: (!) 124    Resp: 20    SpO2: 98% 98%   Weight: 73 kg (161 lb)    Height: 5' 7\" (1.702 m)           RESULTS:    CARDIAC MONITOR NOTE:  Cardiac Rhythm: Tachycardia   Rate: 124 bpm     PULSE OXIMETRY NOTE:  Pulse-ox is 98% on RA  Interpretation: normal        EKG interpretation: (Preliminary)  Rhythm: Sinus tachycardia   Rate: 112 bpm  Normal ST segments, no MAT  EKG read by Guerda García. Edmundo Bucio MD  at 12:47 AM    XR CHEST PORT    (Results Pending)        RADIOLOGY FINDINGS  Chest X-ray shows no visible evolving PNA, slight right perihilar adenopathy actually improved from last   Pending review by Radiologist  Recorded by Julienne Hunter , ED Scribe, as dictated by Guerda García. Edmundo Bucio MD      Labs Reviewed   CBC WITH AUTOMATED DIFF - Abnormal; Notable for the following:        Result Value    RBC 3.36 (*)     HGB 11.8 (*)     HCT 34.4 (*)     .4 (*)     MCH 35.1 (*)     RDW 14.6 (*)     NEUTROPHILS 82 (*)     LYMPHOCYTES 6 (*)     MONOCYTES 12 (*)     ABS.  LYMPHOCYTES 0.5 (*)     All other components within normal limits   METABOLIC PANEL, COMPREHENSIVE - Abnormal; Notable for the following:     Chloride 98 (*)     Glucose 127 (*)     All other components within normal limits   LIPASE - Abnormal; Notable for the following:     Lipase 46 (*)     All other components within normal limits   POC G3 - Abnormal; Notable for the following:     pH (POC) 7.478 (*)     pO2 (POC) 52 (*)     HCO3 (POC) 30.7 (*)     sO2 (POC) 89 (*)     All other components within normal limits   CARDIAC PANEL,(CK, CKMB & TROPONIN)   BLOOD GAS, ARTERIAL       Recent Results (from the past 12 hour(s))   CBC WITH AUTOMATED DIFF    Collection Time: 08/03/17 12:28 AM   Result Value Ref Range    WBC 8.0 4.6 - 13.2 K/uL    RBC 3.36 (L) 4.70 - 5.50 M/uL    HGB 11.8 (L) 13.0 - 16.0 g/dL    HCT 34.4 (L) 36.0 - 48.0 %    .4 (H) 74.0 - 97.0 FL    MCH 35.1 (H) 24.0 - 34.0 PG    MCHC 34.3 31.0 - 37.0 g/dL RDW 14.6 (H) 11.6 - 14.5 %    PLATELET 685 770 - 261 K/uL    MPV 9.4 9.2 - 11.8 FL    NEUTROPHILS 82 (H) 40 - 73 %    LYMPHOCYTES 6 (L) 21 - 52 %    MONOCYTES 12 (H) 3 - 10 %    EOSINOPHILS 0 0 - 5 %    BASOPHILS 0 0 - 2 %    ABS. NEUTROPHILS 6.5 1.8 - 8.0 K/UL    ABS. LYMPHOCYTES 0.5 (L) 0.9 - 3.6 K/UL    ABS. MONOCYTES 1.0 0.05 - 1.2 K/UL    ABS. EOSINOPHILS 0.0 0.0 - 0.4 K/UL    ABS. BASOPHILS 0.0 0.0 - 0.06 K/UL    DF AUTOMATED     METABOLIC PANEL, COMPREHENSIVE    Collection Time: 08/03/17 12:28 AM   Result Value Ref Range    Sodium 137 136 - 145 mmol/L    Potassium 4.0 3.5 - 5.5 mmol/L    Chloride 98 (L) 100 - 108 mmol/L    CO2 30 21 - 32 mmol/L    Anion gap 9 3.0 - 18 mmol/L    Glucose 127 (H) 74 - 99 mg/dL    BUN 18 7.0 - 18 MG/DL    Creatinine 0.88 0.6 - 1.3 MG/DL    BUN/Creatinine ratio 20 12 - 20      GFR est AA >60 >60 ml/min/1.73m2    GFR est non-AA >60 >60 ml/min/1.73m2    Calcium 9.4 8.5 - 10.1 MG/DL    Bilirubin, total 0.2 0.2 - 1.0 MG/DL    ALT (SGPT) 20 16 - 61 U/L    AST (SGOT) 17 15 - 37 U/L    Alk.  phosphatase 105 45 - 117 U/L    Protein, total 7.2 6.4 - 8.2 g/dL    Albumin 3.4 3.4 - 5.0 g/dL    Globulin 3.8 2.0 - 4.0 g/dL    A-G Ratio 0.9 0.8 - 1.7     LIPASE    Collection Time: 08/03/17 12:28 AM   Result Value Ref Range    Lipase 46 (L) 73 - 393 U/L   CARDIAC PANEL,(CK, CKMB & TROPONIN)    Collection Time: 08/03/17 12:28 AM   Result Value Ref Range    CK 69 39 - 308 U/L    CK - MB 1.3 <3.6 ng/ml    CK-MB Index 1.9 0.0 - 4.0 %    Troponin-I, Qt. <0.02 0.00 - 0.06 NG/ML   EKG, 12 LEAD, INITIAL    Collection Time: 08/03/17 12:47 AM   Result Value Ref Range    Ventricular Rate 112 BPM    Atrial Rate 112 BPM    P-R Interval 116 ms    QRS Duration 76 ms    Q-T Interval 324 ms    QTC Calculation (Bezet) 442 ms    Calculated P Axis 75 degrees    Calculated R Axis 69 degrees    Calculated T Axis 61 degrees    Diagnosis       Sinus tachycardia  Otherwise normal ECG  When compared with ECG of 30-JUL-2017 19:46,  aberrant conduction is no longer present     POC G3    Collection Time: 08/03/17  1:00 AM   Result Value Ref Range    Device: ROOM AIR      pH (POC) 7.478 (H) 7.35 - 7.45      pCO2 (POC) 41.4 35.0 - 45.0 MMHG    pO2 (POC) 52 (L) 80 - 100 MMHG    HCO3 (POC) 30.7 (H) 22 - 26 MMOL/L    sO2 (POC) 89 (L) 92 - 97 %    Base excess (POC) 7 mmol/L    Allens test (POC) YES      Total resp. rate 14      Site RIGHT RADIAL      Specimen type (POC) ARTERIAL      Performed by Laci Guzman       Physical Exam   Constitutional: He is oriented to person, place, and time. He appears well-developed and well-nourished. No distress. Middle aged   [de-identified] older than age stated   HENT:   Head: Normocephalic and atraumatic. Right Ear: External ear normal.   Left Ear: External ear normal.   Mouth/Throat: Oropharynx is clear and moist. Mucous membranes are dry. No oropharyngeal exudate. Tongue brown    Eyes: EOM are normal. Pupils are equal, round, and reactive to light. Right conjunctiva is injected. Left conjunctiva is injected. No scleral icterus. Right eye exhibits no nystagmus. Left eye exhibits no nystagmus. No pallor  No icterus  No disconjugate gaze     Neck: Normal range of motion. Neck supple. No JVD present. Tracheal tenderness (diffuse with scatter adenopathy but no prominent mass) present. No tracheal deviation present. No thyromegaly present. Cardiovascular: Regular rhythm and normal heart sounds. Tachycardia present. Pulmonary/Chest: Accessory muscle usage (some) present. No stridor. No respiratory distress. He has wheezes (inspiratory and expiratory in all fields). Speaking 5-7 word sentences with raspy voice. No stridor. Abdominal: Soft. Bowel sounds are normal. He exhibits no distension. There is no tenderness. There is no rebound and no guarding. Peg Tube LUQ   Musculoskeletal: Normal range of motion. He exhibits no edema or tenderness.    No soft tissue injuries   Lymphadenopathy:     He has no cervical adenopathy. Neurological: He is alert and oriented to person, place, and time. He has normal reflexes. No cranial nerve deficit. Coordination normal.   Skin: Skin is warm and dry. No rash noted. He is not diaphoretic. No erythema. Psychiatric: He has a normal mood and affect. His behavior is normal. Judgment and thought content normal.   Nursing note and vitals reviewed. MDM  Number of Diagnoses or Management Options  Acute respiratory distress:   Hypoxia:   Throat cancer [facility]):   Diagnosis management comments: DDX: most likely COPD exacerbation from lack of rx and continued tobacco use. Amount and/or Complexity of Data Reviewed  Clinical lab tests: ordered and reviewed  Tests in the radiology section of CPT®: ordered and reviewed (CXR)  Tests in the medicine section of CPT®: ordered and reviewed (EKG)  Decide to obtain previous medical records or to obtain history from someone other than the patient: yes  Review and summarize past medical records: yes  Independent visualization of images, tracings, or specimens: yes    Risk of Complications, Morbidity, and/or Mortality  Presenting problems: high  Diagnostic procedures: high  Management options: high    Critical Care  Total time providing critical care: 30-74 minutes    Patient Progress  Patient progress: stable    ED Course     MEDICATIONS GIVEN:  Medications   methylPREDNISolone (PF) (SOLU-MEDROL) injection 125 mg (125 mg IntraVENous Given 8/3/17 0111)   albuterol-ipratropium (DUO-NEB) 2.5 MG-0.5 MG/3 ML (3 mL Nebulization Given 8/3/17 0105)   albuterol-ipratropium (DUO-NEB) 2.5 MG-0.5 MG/3 ML (3 mL Nebulization Given 8/3/17 0105)      Procedures    PROGRESS NOTE:  12:23 AM  Initial assessment performed. CONSULT NOTE:   2:29 AM  [employer]. Reese Babinski, MD  spoke with Dr. Jihan Bates  Specialty: Hospitalist   Discussed pt's hx, disposition, and available diagnostic and imaging results over the telephone. Reviewed care plans. Consulting physician agrees with plans as outlined. Will admit pt for observation. ADMISSION NOTE:  2:35 AM  Patient is being admitted to the hospital by Dr. Padma Weiner. The results of their tests and reasons for their admission have been discussed with them and/or available family. They convey agreement and understanding for the need to be admitted and for their admission diagnosis. Written by Tamera Gan ED Scribe, as dictated by Mariluz Gruber. German Garcia MD .    CONDITIONS ON ADMISSION:  Deep Vein Thrombosis is not present at the time of admission. Thrombosis is not present at the time of admission. Urinary Tract Infection is not present at the time of admission. Pneumonia is not present at the time of admission. MRSA is not present at the time of admission. Wound infection is not present at the time of admission. Pressure Ulcer is not present at the time of admission. CLINICAL IMPRESSION    1. Hypoxia    2. Acute respiratory distress    3. Throat cancer ([facility] [address]       CLINICAL IMPRESSION:    1. Hypoxia    2. Acute respiratory distress    3. Throat cancer [facility])        PLAN: DISCHARGE HOME    Follow-up Information     None          Current Discharge Medication List              SCRIBE ATTESTATION STATEMENT  Documented by: Tamera Gan scribing for and in the presence of Mariluz Gruber. German Garcia MD      PROVIDER ATTESTATION STATEMENT  Mariluz Gruber. German Garcia MD  : I personally performed the services described in the documentation, reviewed the documentation, as recorded by the scribe in my presence, and it accurately and completely records my words and actions. CRITICAL CARE NOTE:  2:49 AM  I have spent 45 minutes of critical care time involved in lab review, consultations with specialist, family decision-making, and documentation. During this entire length of time I was immediately available to the patient. Critical Care:   The reason for providing this level of medical care for this critically ill patient was due a critical illness that impaired one or more vital organ systems such that there was a high probability of imminent or life threatening deterioration in the patients condition. This care involved high complexity decision making to assess, manipulate, and support vital system functions, to treat this degreee vital organ system failure and to prevent further life threatening deterioration of the patients condition.

## 2017-08-03 NOTE — ROUTINE PROCESS
TRANSFER - IN REPORT:    Verbal report received from ARIANNE Dunlap RN (name) on Maryan Pitch  being received from  Emergency Dept. (unit) for routine progression of care      Report consisted of patients Situation, Background, Assessment and   Recommendations(SBAR). Information from the following report(s) SBAR, Kardex, ED Summary, Recent Results and Med Rec Status was reviewed with the receiving nurse. Opportunity for questions and clarification was provided. Assessment completed upon patients arrival to unit and care assumed.

## 2017-08-04 VITALS
OXYGEN SATURATION: 99 % | RESPIRATION RATE: 20 BRPM | SYSTOLIC BLOOD PRESSURE: 117 MMHG | BODY MASS INDEX: 24.01 KG/M2 | WEIGHT: 153 LBS | HEIGHT: 67 IN | DIASTOLIC BLOOD PRESSURE: 70 MMHG | TEMPERATURE: 97.8 F | HEART RATE: 85 BPM

## 2017-08-04 LAB
ANION GAP BLD CALC-SCNC: 8 MMOL/L (ref 3–18)
BASOPHILS # BLD AUTO: 0 K/UL (ref 0–0.06)
BASOPHILS # BLD: 0 % (ref 0–2)
BUN SERPL-MCNC: 27 MG/DL (ref 7–18)
BUN/CREAT SERPL: 34 (ref 12–20)
CALCIUM SERPL-MCNC: 9.3 MG/DL (ref 8.5–10.1)
CHLORIDE SERPL-SCNC: 97 MMOL/L (ref 100–108)
CO2 SERPL-SCNC: 30 MMOL/L (ref 21–32)
CREAT SERPL-MCNC: 0.79 MG/DL (ref 0.6–1.3)
DIFFERENTIAL METHOD BLD: ABNORMAL
EOSINOPHIL # BLD: 0 K/UL (ref 0–0.4)
EOSINOPHIL NFR BLD: 0 % (ref 0–5)
ERYTHROCYTE [DISTWIDTH] IN BLOOD BY AUTOMATED COUNT: 14.2 % (ref 11.6–14.5)
GLUCOSE BLD STRIP.AUTO-MCNC: 139 MG/DL (ref 70–110)
GLUCOSE SERPL-MCNC: 118 MG/DL (ref 74–99)
HCT VFR BLD AUTO: 31.7 % (ref 36–48)
HGB BLD-MCNC: 10.6 G/DL (ref 13–16)
LYMPHOCYTES # BLD AUTO: 4 % (ref 21–52)
LYMPHOCYTES # BLD: 0.2 K/UL (ref 0.9–3.6)
MCH RBC QN AUTO: 34.1 PG (ref 24–34)
MCHC RBC AUTO-ENTMCNC: 33.4 G/DL (ref 31–37)
MCV RBC AUTO: 101.9 FL (ref 74–97)
MONOCYTES # BLD: 0.2 K/UL (ref 0.05–1.2)
MONOCYTES NFR BLD AUTO: 4 % (ref 3–10)
NEUTS SEG # BLD: 4.8 K/UL (ref 1.8–8)
NEUTS SEG NFR BLD AUTO: 92 % (ref 40–73)
PLATELET # BLD AUTO: 180 K/UL (ref 135–420)
PMV BLD AUTO: 9.2 FL (ref 9.2–11.8)
POTASSIUM SERPL-SCNC: 4 MMOL/L (ref 3.5–5.5)
RBC # BLD AUTO: 3.11 M/UL (ref 4.7–5.5)
SODIUM SERPL-SCNC: 135 MMOL/L (ref 136–145)
WBC # BLD AUTO: 5.3 K/UL (ref 4.6–13.2)

## 2017-08-04 PROCEDURE — 82962 GLUCOSE BLOOD TEST: CPT

## 2017-08-04 PROCEDURE — 85025 COMPLETE CBC W/AUTO DIFF WBC: CPT | Performed by: INTERNAL MEDICINE

## 2017-08-04 PROCEDURE — 74011000258 HC RX REV CODE- 258: Performed by: INTERNAL MEDICINE

## 2017-08-04 PROCEDURE — 94760 N-INVAS EAR/PLS OXIMETRY 1: CPT

## 2017-08-04 PROCEDURE — 94640 AIRWAY INHALATION TREATMENT: CPT

## 2017-08-04 PROCEDURE — 74011250636 HC RX REV CODE- 250/636: Performed by: INTERNAL MEDICINE

## 2017-08-04 PROCEDURE — 99218 HC RM OBSERVATION: CPT

## 2017-08-04 PROCEDURE — 74011000250 HC RX REV CODE- 250: Performed by: INTERNAL MEDICINE

## 2017-08-04 PROCEDURE — 74011250637 HC RX REV CODE- 250/637: Performed by: INTERNAL MEDICINE

## 2017-08-04 PROCEDURE — 36415 COLL VENOUS BLD VENIPUNCTURE: CPT | Performed by: INTERNAL MEDICINE

## 2017-08-04 PROCEDURE — 80048 BASIC METABOLIC PNL TOTAL CA: CPT | Performed by: INTERNAL MEDICINE

## 2017-08-04 RX ADMIN — ENOXAPARIN SODIUM 40 MG: 40 INJECTION SUBCUTANEOUS at 07:12

## 2017-08-04 RX ADMIN — MULTIPLE VITAMINS W/ MINERALS TAB 1 TABLET: TAB at 09:46

## 2017-08-04 RX ADMIN — OXYCODONE HYDROCHLORIDE AND ACETAMINOPHEN 2 TABLET: 10; 325 TABLET ORAL at 07:12

## 2017-08-04 RX ADMIN — IPRATROPIUM BROMIDE AND ALBUTEROL SULFATE 3 ML: .5; 3 SOLUTION RESPIRATORY (INHALATION) at 00:57

## 2017-08-04 RX ADMIN — HYDROMORPHONE HYDROCHLORIDE 1 MG: 1 INJECTION, SOLUTION INTRAMUSCULAR; INTRAVENOUS; SUBCUTANEOUS at 09:46

## 2017-08-04 RX ADMIN — IPRATROPIUM BROMIDE AND ALBUTEROL SULFATE 3 ML: .5; 3 SOLUTION RESPIRATORY (INHALATION) at 07:33

## 2017-08-04 RX ADMIN — HYDROMORPHONE HYDROCHLORIDE 1 MG: 1 INJECTION, SOLUTION INTRAMUSCULAR; INTRAVENOUS; SUBCUTANEOUS at 01:48

## 2017-08-04 RX ADMIN — CLINDAMYCIN PHOSPHATE 600 MG: 150 INJECTION, SOLUTION, CONCENTRATE INTRAVENOUS at 05:51

## 2017-08-04 RX ADMIN — METHYLPREDNISOLONE SODIUM SUCCINATE 40 MG: 40 INJECTION, POWDER, FOR SOLUTION INTRAMUSCULAR; INTRAVENOUS at 09:46

## 2017-08-04 RX ADMIN — METHYLPREDNISOLONE SODIUM SUCCINATE 40 MG: 40 INJECTION, POWDER, FOR SOLUTION INTRAMUSCULAR; INTRAVENOUS at 01:48

## 2017-08-04 RX ADMIN — IPRATROPIUM BROMIDE AND ALBUTEROL SULFATE 3 ML: .5; 3 SOLUTION RESPIRATORY (INHALATION) at 11:33

## 2017-08-04 RX ADMIN — IPRATROPIUM BROMIDE AND ALBUTEROL SULFATE 3 ML: .5; 3 SOLUTION RESPIRATORY (INHALATION) at 04:37

## 2017-08-04 RX ADMIN — HYDROMORPHONE HYDROCHLORIDE 1 MG: 1 INJECTION, SOLUTION INTRAMUSCULAR; INTRAVENOUS; SUBCUTANEOUS at 05:52

## 2017-08-04 NOTE — PROGRESS NOTES
NUTRITION FOLLOW-UP    RECOMMENDATIONS/PLAN:   Continue w/ POC  Monitor labs/lytes, fluid status, weight, skin integrity     NUTRITION ASSESSMENT:   Client Update: 45 yrs old Male with COPD exacerbation was recent d/c after tx for COPD and aspiration, hyponatremia, but did not fill RXs causing him to worsen and return to ER per MD note. Pt has PEG in place for feedings. .     Adult Malnutrition Criteria:     Nutrition assessment was completed by RDN and the patient was found to meet the following malnutrition criteria established by ASPEN/AND:    Adult Malnutrition Guidelines:  SEVERE PROTEIN CALORIE MALNUTRITION IN THE CONTEXT OF CHRONIC ILLNESS  Weight Loss:  >5% x 1 month or >7.5% x 3 months or >10% x 6 months or >20% x 12 months  Energy Intake: <75% energy intake compared to estimated energy needs >1 month  Muscle Mass: Severe Depletion        Clara PETERS Edinson  08/04/17       FOOD/NUTRITION INTAKE   Diet Order:  Twocal 4.5 cans daily to provide: 2133kcal, 97 Protein, 747ml free water, 233g CHO     Food Allergies: NKFA/  Average PO Intake:      No data found. Pertinent Medications:  [x] Reviewed; Lovenox, MVI, Humalog, methylprednisolone, Protonix     Insulin:  []SSI  [x]Pre-meal   []Basal    []Drip  []None  Cultural/Caodaism Food Preferences: None Identified    BIOCHEMICAL DATA & MEDICAL TESTS  Pertinent Labs:  [x] Reviewed; Sodium-135  ANTHROPOMETRICS  Height: 5' 7\" (170.2 cm)       Weight: 69.4 kg (153 lb)         BMI: 24 kg/m^2 normal weight (18.5%-24.9% BMI)   Adm Weight: 161 lbs                Weight change:  -8lbs since 7/30 RD note; Per pt on 7/30 wt loss of approx 40-50lbs x 4-5 months.  Approx  21% body weight    Adjusted Body Weight: n/a    NUTRITION-FOCUSED PHYSICAL ASSESSMENT  Skin: No Pressure Injury Noted     GI: WNL    NUTRITION PRESCRIPTION  Calories:2089kcal/day based on Miffilin x1.6x1.1  Protein: g/day based on 1.4-1.6 g/kg  CHO: 261g/day based on 50% of total energy  Fluid: 2089 ml/day based on 1 kcal/ml                   NUTRITION DIAGNOSES:   1. Increased energy needs related to SOB and COPD as evidence by 8lb weight loss since 7/30 and 21% wt loss in last 4-5 months. NUTRITION INTERVENTIONS:   INTERVENTIONS:        GOALS:  1. Continue w/ POC Meet estimated needs via tube feeds throughout the next 3 days             LEARNING NEEDS (Diet, Supplementation, Food/Nutrient-Drug Interaction):   [x] None Identified   [] Education provided/documented      Identified and patient: [] Declined   [] Was not appropriate/indicated        NUTRITION MONITORING /EVALUATION:   Follow PO intake  Monitor wt  Monitor renal labs, electrolytes, fluid status     Previous Recommendations Implemented: yes        Previous Goals Met:  yes -      [] Participated in Interdisciplinary Rounds    [x] Interdisciplinary Care Plan Reviewed  DISCHARGE NUTRITION RECOMMENDATIONS ADDRESSED:   [x] Yes- recommended tube feeds as noted above       NUTRITION RISK:           [x] At risk                        [] Not currently at risk        Will follow-up per policy.   Katarina Webster, ASIF  PAGER:  [phone]

## 2017-08-04 NOTE — ROUTINE PROCESS
Bedside shift change report given to Usama Wilkes  (oncoming nurse) by Shira Garza RN (offgoing nurse). Report included the following information Kardex, OR Summary, Intake/Output and Recent Results.

## 2017-08-04 NOTE — PROGRESS NOTES
Shift summary: pt pleasant and cooperative with care. Pt up ad felecia, able to give self feedings through peg tube. Verbalizes understanding to restart medications he was previously on. Pt discharged home. Dual AVS reviewed with Jacqueline Jimenez RN. All medications reviewed individually with patient. Opportunities for questions and concerns provided. Patient discharged via (mode of transport ie. Car, ambulance or air transport) car. Patient's arm band appropriately discarded.

## 2017-08-04 NOTE — ROUTINE PROCESS
Bedside and Verbal shift change report given to WILLIAM Hanson (oncoming nurse) by WILLIAM Cartagena RN (offgoing nurse). Report included the following information SBAR, Kardex, MAR and Recent Results.

## 2017-08-04 NOTE — DISCHARGE INSTRUCTIONS
DISCHARGE SUMMARY from Nurse    The following personal items are in your possession at time of discharge:    Dental Appliances: None  Visual Aid: Glasses     Home Medications: None  Jewelry: None  Clothing: Shorts, Jacket/Coat, Shirt, Undergarments  Other Valuables: Cell Phone  Personal Items Sent to Safe: none          PATIENT INSTRUCTIONS:    After general anesthesia or intravenous sedation, for 24 hours or while taking prescription Narcotics:  · Limit your activities  · Do not drive and operate hazardous machinery  · Do not make important personal or business decisions  · Do  not drink alcoholic beverages  · If you have not urinated within 8 hours after discharge, please contact your surgeon on call. Report the following to your surgeon:  · Excessive pain, swelling, redness or odor of or around the surgical area  · Temperature over 100.5  · Nausea and vomiting lasting longer than 4 hours or if unable to take medications  · Any signs of decreased circulation or nerve impairment to extremity: change in color, persistent  numbness, tingling, coldness or increase pain  · Any questions        What to do at Home:  Recommended activity: Activity as tolerated and No driving while on analgesics. If you experience any of the following symptoms Shortness of breath, unable to breathe, please follow up with MD/ER. *  Please give a list of your current medications to your Primary Care Provider. *  Please update this list whenever your medications are discontinued, doses are      changed, or new medications (including over-the-counter products) are added. *  Please carry medication information at all times in case of emergency situations. These are general instructions for a healthy lifestyle:    No smoking/ No tobacco products/ Avoid exposure to second hand smoke    Surgeon General's Warning:  Quitting smoking now greatly reduces serious risk to your health.     Obesity, smoking, and sedentary lifestyle greatly increases your risk for illness    A healthy diet, regular physical exercise & weight monitoring are important for maintaining a healthy lifestyle    You may be retaining fluid if you have a history of heart failure or if you experience any of the following symptoms:  Weight gain of 3 pounds or more overnight or 5 pounds in a week, increased swelling in our hands or feet or shortness of breath while lying flat in bed. Please call your doctor as soon as you notice any of these symptoms; do not wait until your next office visit. Recognize signs and symptoms of STROKE:    F-face looks uneven    A-arms unable to move or move unevenly    S-speech slurred or non-existent    T-time-call 911 as soon as signs and symptoms begin-DO NOT go       Back to bed or wait to see if you get better-TIME IS BRAIN. Warning Signs of HEART ATTACK     Call 911 if you have these symptoms:   Chest discomfort. Most heart attacks involve discomfort in the center of the chest that lasts more than a few minutes, or that goes away and comes back. It can feel like uncomfortable pressure, squeezing, fullness, or pain.  Discomfort in other areas of the upper body. Symptoms can include pain or discomfort in one or both arms, the back, neck, jaw, or stomach.  Shortness of breath with or without chest discomfort.  Other signs may include breaking out in a cold sweat, nausea, or lightheadedness. Don't wait more than five minutes to call 911 - MINUTES MATTER! Fast action can save your life. Calling 911 is almost always the fastest way to get lifesaving treatment. Emergency Medical Services staff can begin treatment when they arrive -- up to an hour sooner than if someone gets to the hospital by car. The discharge information has been reviewed with the patient. The patient verbalized understanding.     Discharge medications reviewed with the patient and appropriate educational materials and side effects teaching were provided. Resume prednisone, antibitoics, cough medicine, and nebulizer treatments ordered at last discharge that he now has picked up-instructions are the same. Patient armband removed and shredded.

## 2017-08-04 NOTE — DISCHARGE SUMMARY
[facility] SUMMARY    Name:  Ha Song  MR#:  [id number]  :  1965  Account #:  [de-identified]  Date of Adm:  2017  Date of Discharge:  2017      DIAGNOSES AT DISCHARGE  1. Acute chronic obstructive pulmonary disease exacerbation. 2. Chronic obstructive pulmonary disease. 3. Tobacco abuse. 4. Throat cancer with percutaneous endoscopic gastrostomy tube in  place for chronic dysphagia. 5. Chronic narcotic dependence. 6. Hyponatremia. HOSPITAL SUMMARY: The patient is 45years old. He has throat  cancer. He has a PEG in place because of throat narrowing and  chronic dysphagia due to this. He is on chronic narcotic therapy, which  apparently is prescribed to him by Dr. Dread Palacios in [place], and he was  recently in the hospital for aspiration pneumonitis after trying to drink  water which he had been advised not to. He developed some worsening  cough, shortness of breath and congestion after that, so we kept him in  the hospital; treated him with steroids, antibiotics, nebulizer treatments;  stabilized him for discharge and prescribed continued steroid therapy  orally as well as antibiotics and a nebulizer machine for him to do  treatments at home as needed. He was also advised not to continue  smoking, which he had started doing again over the last few months. However, the patient did discharge and did not  any of his  medications nor his nebulizer. This subsequently led to his decline  again and clinical status, and he came back to the hospital again with  worsening shortness of breath and again was admitted to the hospital,  started on antibiotics and nebulizer treatments which he tolerated very  nicely, and today he is again anxious to go home. He seems to have  improved; however, his lungs are clear today on auscultation, no  wheezes are noted. He is up and dressed, ambulating around the  room without any obvious dyspnea.  He had an uneventful night last  night. His blood pressure is stable at 117/70, pulse is 85, temperature  97.8, respiratory rate 20. He is saturating 99% on room air. He has had  no further issues with the hyponatremia since we changed his tube  feedings to TwoCal HN which he is doing with water flushes before  and after each can. Those cans have been provided to him by a  pharmacy that he has already picked those up, but that was another  problem, he did not pick those up right away after his discharge either. The cardiac markers were normal. His other electrolytes have been  stable since this admission. White counts have been normal range. Hemoglobin and hematocrit are stable at 10.6 and 31.7. His PEG tube  is working well. His chest x-ray from 08/03/2017 showed no active  pulmonary disease, so he can discharge from the hospital today. He  confirmed to me that he has actually picked up the medications that he  is supposed to be on, which he was supposed to be on last time, and  those are the following. DISCHARGE MEDICATIONS  1. DuoNeb every 4 hours as needed with nebulizer machine. 2. Robitussin solution 100 mg every 4 hours as needed for cough  through the PEG. 3. Levaquin 500 mg per PEG daily for 7 days. 4. Multivitamin 1 A day through the PEG. 5. Prednisone 40 mg daily for 3 days, then 20 mg daily for 3 days, then  10 mg daily through the PEG tube until completed. The nebulizer, again, he confirmed that someone has picked that up for  him. So he has got an antibiotic, the steroid taper, the nebulizer  solution and the nebulizer machine to continue his treatment. He is  stable for discharge from the hospital today. He has a followup at [address] [place] [address] for 08/16/2017 at 1 o'clock. He has a followup with his [facility]  doctor on 08/10/2017 at 4 o'clock, that is Dr. Barbara Philip, his oncologist; and  he says Dr. Dread Palacios in [place] prescribes his narcotics. I have not given  him any new narcotic prescriptions here.  He appears clinically stable to  discharge. Thirty-five minutes discharge time.         MD KURT Barnes / GABBY  D:  08/04/2017   12:03  T:  08/04/2017   17:54  Job #:  [id number]

## 2017-08-05 LAB
BACTERIA SPEC CULT: NORMAL
SERVICE CMNT-IMP: NORMAL

## 2017-08-13 LAB
ATRIAL RATE: 112 BPM
CALCULATED P AXIS, ECG09: 75 DEGREES
CALCULATED R AXIS, ECG10: 69 DEGREES
CALCULATED T AXIS, ECG11: 61 DEGREES
DIAGNOSIS, 93000: NORMAL
P-R INTERVAL, ECG05: 116 MS
Q-T INTERVAL, ECG07: 324 MS
QRS DURATION, ECG06: 76 MS
QTC CALCULATION (BEZET), ECG08: 442 MS
VENTRICULAR RATE, ECG03: 112 BPM

## 2017-08-16 ENCOUNTER — APPOINTMENT (OUTPATIENT)
Dept: GENERAL RADIOLOGY | Age: 52
DRG: 121 | End: 2017-08-16
Attending: EMERGENCY MEDICINE
Payer: MEDICAID

## 2017-08-16 ENCOUNTER — HOSPITAL ENCOUNTER (INPATIENT)
Age: 52
LOS: 13 days | Discharge: HOME HEALTH | DRG: 121 | End: 2017-08-29
Attending: EMERGENCY MEDICINE | Admitting: HOSPITALIST
Payer: MEDICAID

## 2017-08-16 DIAGNOSIS — R06.03 RESPIRATORY DISTRESS: Primary | ICD-10-CM

## 2017-08-16 DIAGNOSIS — J44.1 COPD WITH ACUTE EXACERBATION (HCC): ICD-10-CM

## 2017-08-16 PROBLEM — J96.01 ACUTE HYPOXEMIC RESPIRATORY FAILURE (HCC): Status: ACTIVE | Noted: 2017-08-16

## 2017-08-16 LAB
ABO + RH BLD: NORMAL
ALBUMIN SERPL-MCNC: 3.6 G/DL (ref 3.4–5)
ALBUMIN/GLOB SERPL: 0.9 {RATIO} (ref 0.8–1.7)
ALP SERPL-CCNC: 92 U/L (ref 45–117)
ALT SERPL-CCNC: 18 U/L (ref 16–61)
ANION GAP BLD CALC-SCNC: 0 MMOL/L (ref 3–18)
APPEARANCE UR: ABNORMAL
ARTERIAL PATENCY WRIST A: YES
ARTERIAL PATENCY WRIST A: YES
AST SERPL-CCNC: 38 U/L (ref 15–37)
BACTERIA URNS QL MICRO: ABNORMAL /HPF
BASE EXCESS BLD CALC-SCNC: 10 MMOL/L
BASE EXCESS BLD CALC-SCNC: 4 MMOL/L
BASOPHILS # BLD: 0 K/UL (ref 0–0.06)
BASOPHILS NFR BLD: 0 % (ref 0–2)
BDY SITE: ABNORMAL
BDY SITE: ABNORMAL
BILIRUB SERPL-MCNC: 0.4 MG/DL (ref 0.2–1)
BILIRUB UR QL: NEGATIVE
BLOOD GROUP ANTIBODIES SERPL: NORMAL
BNP SERPL-MCNC: 144 PG/ML (ref 0–900)
BODY TEMPERATURE: 98.6
BODY TEMPERATURE: 98.6
BUN SERPL-MCNC: 13 MG/DL (ref 7–18)
BUN SERPL-MCNC: 14 MG/DL (ref 7–18)
BUN/CREAT SERPL: 18 (ref 12–20)
CALCIUM SERPL-MCNC: 9 MG/DL (ref 8.5–10.1)
CHLORIDE SERPL-SCNC: 93 MMOL/L (ref 100–108)
CK MB CFR SERPL CALC: 3.4 % (ref 0–4)
CK MB SERPL-MCNC: 2.9 NG/ML (ref 5–25)
CK SERPL-CCNC: 85 U/L (ref 39–308)
CO2 SERPL-SCNC: 37 MMOL/L (ref 21–32)
COLOR UR: YELLOW
CREAT SERPL-MCNC: 0.78 MG/DL (ref 0.6–1.3)
DIFFERENTIAL METHOD BLD: ABNORMAL
EOSINOPHIL # BLD: 0.1 K/UL (ref 0–0.4)
EOSINOPHIL NFR BLD: 1 % (ref 0–5)
EPITH CASTS URNS QL MICRO: NEGATIVE /LPF (ref 0–5)
ERYTHROCYTE [DISTWIDTH] IN BLOOD BY AUTOMATED COUNT: 14.3 % (ref 11.6–14.5)
EST. AVERAGE GLUCOSE BLD GHB EST-MCNC: 114 MG/DL
GAS FLOW.O2 O2 DELIVERY SYS: ABNORMAL L/MIN
GAS FLOW.O2 O2 DELIVERY SYS: ABNORMAL L/MIN
GAS FLOW.O2 SETTING OXYMISER: 15 BPM
GAS FLOW.O2 SETTING OXYMISER: 15 L/M
GLOBULIN SER CALC-MCNC: 4 G/DL (ref 2–4)
GLUCOSE BLD STRIP.AUTO-MCNC: 141 MG/DL (ref 70–110)
GLUCOSE BLD STRIP.AUTO-MCNC: 163 MG/DL (ref 70–110)
GLUCOSE BLD STRIP.AUTO-MCNC: 224 MG/DL (ref 70–110)
GLUCOSE SERPL-MCNC: 168 MG/DL (ref 74–99)
GLUCOSE UR STRIP.AUTO-MCNC: 100 MG/DL
HBA1C MFR BLD: 5.6 % (ref 4.5–5.6)
HCO3 BLD-SCNC: 28.5 MMOL/L (ref 22–26)
HCO3 BLD-SCNC: 33.9 MMOL/L (ref 22–26)
HCT VFR BLD AUTO: 38.3 % (ref 36–48)
HGB BLD-MCNC: 12.9 G/DL (ref 13–16)
HGB UR QL STRIP: ABNORMAL
INR PPP: 0.9 (ref 0.8–1.2)
KETONES UR QL STRIP.AUTO: NEGATIVE MG/DL
LACTATE SERPL-SCNC: 1.4 MMOL/L (ref 0.4–2)
LEUKOCYTE ESTERASE UR QL STRIP.AUTO: NEGATIVE
LYMPHOCYTES # BLD: 1.4 K/UL (ref 0.9–3.6)
LYMPHOCYTES NFR BLD: 18 % (ref 21–52)
MAGNESIUM SERPL-MCNC: 2.1 MG/DL (ref 1.6–2.6)
MCH RBC QN AUTO: 34.7 PG (ref 24–34)
MCHC RBC AUTO-ENTMCNC: 33.7 G/DL (ref 31–37)
MCV RBC AUTO: 103 FL (ref 74–97)
MONOCYTES # BLD: 1 K/UL (ref 0.05–1.2)
MONOCYTES NFR BLD: 12 % (ref 3–10)
NEUTS SEG # BLD: 5.5 K/UL (ref 1.8–8)
NEUTS SEG NFR BLD: 69 % (ref 40–73)
NITRITE UR QL STRIP.AUTO: NEGATIVE
O2/TOTAL GAS SETTING VFR VENT: 100 %
O2/TOTAL GAS SETTING VFR VENT: 40 %
PCO2 BLD: 45.7 MMHG (ref 35–45)
PCO2 BLD: 47.1 MMHG (ref 35–45)
PEEP RESPIRATORY: 5 CMH2O
PH BLD: 7.39 [PH] (ref 7.35–7.45)
PH BLD: 7.48 [PH] (ref 7.35–7.45)
PH UR STRIP: 8 [PH] (ref 5–8)
PLATELET # BLD AUTO: 185 K/UL (ref 135–420)
PMV BLD AUTO: 9.6 FL (ref 9.2–11.8)
PO2 BLD: 332 MMHG (ref 80–100)
PO2 BLD: 98 MMHG (ref 80–100)
POTASSIUM SERPL-SCNC: 4.9 MMOL/L (ref 3.5–5.5)
PROT SERPL-MCNC: 7.6 G/DL (ref 6.4–8.2)
PROT UR STRIP-MCNC: 100 MG/DL
PROTHROMBIN TIME: 11.5 SEC (ref 11.5–15.2)
RBC # BLD AUTO: 3.72 M/UL (ref 4.7–5.5)
RBC #/AREA URNS HPF: ABNORMAL /HPF (ref 0–5)
SAO2 % BLD: 100 % (ref 92–97)
SAO2 % BLD: 97 % (ref 92–97)
SERVICE CMNT-IMP: ABNORMAL
SERVICE CMNT-IMP: ABNORMAL
SODIUM SERPL-SCNC: 130 MMOL/L (ref 136–145)
SP GR UR REFRACTOMETRY: 1.01 (ref 1–1.03)
SPECIMEN EXP DATE BLD: NORMAL
SPECIMEN TYPE: ABNORMAL
SPECIMEN TYPE: ABNORMAL
TOTAL RESP. RATE, ITRR: 16
TOTAL RESP. RATE, ITRR: 27
TROPONIN I SERPL-MCNC: 0.03 NG/ML (ref 0–0.06)
UROBILINOGEN UR QL STRIP.AUTO: 0.2 EU/DL (ref 0.2–1)
VENTILATION MODE VENT: ABNORMAL
VOLUME CONTROL IVLC: YES
VT SETTING VENT: 450 ML
WBC # BLD AUTO: 8 K/UL (ref 4.6–13.2)
WBC URNS QL MICRO: ABNORMAL /HPF (ref 0–5)

## 2017-08-16 PROCEDURE — 74011250636 HC RX REV CODE- 250/636: Performed by: HOSPITALIST

## 2017-08-16 PROCEDURE — 82962 GLUCOSE BLOOD TEST: CPT

## 2017-08-16 PROCEDURE — 36600 WITHDRAWAL OF ARTERIAL BLOOD: CPT

## 2017-08-16 PROCEDURE — 31500 INSERT EMERGENCY AIRWAY: CPT

## 2017-08-16 PROCEDURE — 94640 AIRWAY INHALATION TREATMENT: CPT

## 2017-08-16 PROCEDURE — 5A1945Z RESPIRATORY VENTILATION, 24-96 CONSECUTIVE HOURS: ICD-10-PCS | Performed by: EMERGENCY MEDICINE

## 2017-08-16 PROCEDURE — 77010033678 HC OXYGEN DAILY

## 2017-08-16 PROCEDURE — 82550 ASSAY OF CK (CPK): CPT | Performed by: EMERGENCY MEDICINE

## 2017-08-16 PROCEDURE — 74011000250 HC RX REV CODE- 250: Performed by: INTERNAL MEDICINE

## 2017-08-16 PROCEDURE — 74011250636 HC RX REV CODE- 250/636: Performed by: EMERGENCY MEDICINE

## 2017-08-16 PROCEDURE — 65610000006 HC RM INTENSIVE CARE

## 2017-08-16 PROCEDURE — 86900 BLOOD TYPING SEROLOGIC ABO: CPT | Performed by: EMERGENCY MEDICINE

## 2017-08-16 PROCEDURE — C9113 INJ PANTOPRAZOLE SODIUM, VIA: HCPCS | Performed by: HOSPITALIST

## 2017-08-16 PROCEDURE — 80053 COMPREHEN METABOLIC PANEL: CPT | Performed by: EMERGENCY MEDICINE

## 2017-08-16 PROCEDURE — 0BJ18ZZ INSPECTION OF TRACHEA, VIA NATURAL OR ARTIFICIAL OPENING ENDOSCOPIC: ICD-10-PCS | Performed by: EMERGENCY MEDICINE

## 2017-08-16 PROCEDURE — 77030011989 HC AIRWY NP ROBTZ RUSC -A

## 2017-08-16 PROCEDURE — 77030013140 HC MSK NEB VYRM -A

## 2017-08-16 PROCEDURE — 96374 THER/PROPH/DIAG INJ IV PUSH: CPT

## 2017-08-16 PROCEDURE — 94002 VENT MGMT INPAT INIT DAY: CPT

## 2017-08-16 PROCEDURE — 74011000250 HC RX REV CODE- 250: Performed by: HOSPITALIST

## 2017-08-16 PROCEDURE — 81001 URINALYSIS AUTO W/SCOPE: CPT | Performed by: EMERGENCY MEDICINE

## 2017-08-16 PROCEDURE — 85025 COMPLETE CBC W/AUTO DIFF WBC: CPT | Performed by: EMERGENCY MEDICINE

## 2017-08-16 PROCEDURE — 87070 CULTURE OTHR SPECIMN AEROBIC: CPT | Performed by: INTERNAL MEDICINE

## 2017-08-16 PROCEDURE — 82803 BLOOD GASES ANY COMBINATION: CPT

## 2017-08-16 PROCEDURE — 83036 HEMOGLOBIN GLYCOSYLATED A1C: CPT | Performed by: HOSPITALIST

## 2017-08-16 PROCEDURE — 74011000258 HC RX REV CODE- 258: Performed by: INTERNAL MEDICINE

## 2017-08-16 PROCEDURE — 0BH17EZ INSERTION OF ENDOTRACHEAL AIRWAY INTO TRACHEA, VIA NATURAL OR ARTIFICIAL OPENING: ICD-10-PCS | Performed by: INTERNAL MEDICINE

## 2017-08-16 PROCEDURE — 74011250636 HC RX REV CODE- 250/636: Performed by: INTERNAL MEDICINE

## 2017-08-16 PROCEDURE — 51702 INSERT TEMP BLADDER CATH: CPT

## 2017-08-16 PROCEDURE — 93005 ELECTROCARDIOGRAM TRACING: CPT

## 2017-08-16 PROCEDURE — 77030034850

## 2017-08-16 PROCEDURE — 94644 CONT INHLJ TX 1ST HOUR: CPT

## 2017-08-16 PROCEDURE — 96375 TX/PRO/DX INJ NEW DRUG ADDON: CPT

## 2017-08-16 PROCEDURE — 85610 PROTHROMBIN TIME: CPT | Performed by: EMERGENCY MEDICINE

## 2017-08-16 PROCEDURE — 74011250636 HC RX REV CODE- 250/636

## 2017-08-16 PROCEDURE — 36415 COLL VENOUS BLD VENIPUNCTURE: CPT | Performed by: HOSPITALIST

## 2017-08-16 PROCEDURE — 83880 ASSAY OF NATRIURETIC PEPTIDE: CPT | Performed by: EMERGENCY MEDICINE

## 2017-08-16 PROCEDURE — 84520 ASSAY OF UREA NITROGEN: CPT | Performed by: INTERNAL MEDICINE

## 2017-08-16 PROCEDURE — 74011000250 HC RX REV CODE- 250

## 2017-08-16 PROCEDURE — 83605 ASSAY OF LACTIC ACID: CPT | Performed by: INTERNAL MEDICINE

## 2017-08-16 PROCEDURE — 74011000250 HC RX REV CODE- 250: Performed by: EMERGENCY MEDICINE

## 2017-08-16 PROCEDURE — 71010 XR CHEST PORT: CPT

## 2017-08-16 PROCEDURE — 77030008477 HC STYL SATN SLP COVD -A

## 2017-08-16 PROCEDURE — 83735 ASSAY OF MAGNESIUM: CPT | Performed by: EMERGENCY MEDICINE

## 2017-08-16 PROCEDURE — 74011636637 HC RX REV CODE- 636/637: Performed by: HOSPITALIST

## 2017-08-16 PROCEDURE — 99285 EMERGENCY DEPT VISIT HI MDM: CPT

## 2017-08-16 PROCEDURE — 77030020454 HC TU ET CUF HI/LO COVD -B

## 2017-08-16 RX ORDER — HEPARIN SODIUM 5000 [USP'U]/ML
5000 INJECTION, SOLUTION INTRAVENOUS; SUBCUTANEOUS EVERY 8 HOURS
Status: DISCONTINUED | OUTPATIENT
Start: 2017-08-16 | End: 2017-08-29 | Stop reason: HOSPADM

## 2017-08-16 RX ORDER — MIDAZOLAM IN 0.9 % SOD.CHLORID 1 MG/ML
0-10 PLASTIC BAG, INJECTION (ML) INTRAVENOUS
Status: DISCONTINUED | OUTPATIENT
Start: 2017-08-16 | End: 2017-08-21

## 2017-08-16 RX ORDER — INSULIN LISPRO 100 [IU]/ML
INJECTION, SOLUTION INTRAVENOUS; SUBCUTANEOUS EVERY 6 HOURS
Status: DISCONTINUED | OUTPATIENT
Start: 2017-08-16 | End: 2017-08-22

## 2017-08-16 RX ORDER — ALBUTEROL SULFATE 0.83 MG/ML
SOLUTION RESPIRATORY (INHALATION)
Status: COMPLETED
Start: 2017-08-16 | End: 2017-08-16

## 2017-08-16 RX ORDER — ALBUTEROL SULFATE 0.83 MG/ML
10 SOLUTION RESPIRATORY (INHALATION)
Status: COMPLETED | OUTPATIENT
Start: 2017-08-16 | End: 2017-08-16

## 2017-08-16 RX ORDER — IPRATROPIUM BROMIDE AND ALBUTEROL SULFATE 2.5; .5 MG/3ML; MG/3ML
10 SOLUTION RESPIRATORY (INHALATION)
Status: DISCONTINUED | OUTPATIENT
Start: 2017-08-16 | End: 2017-08-16

## 2017-08-16 RX ORDER — MIDAZOLAM HYDROCHLORIDE 1 MG/ML
INJECTION, SOLUTION INTRAMUSCULAR; INTRAVENOUS
Status: COMPLETED
Start: 2017-08-16 | End: 2017-08-16

## 2017-08-16 RX ORDER — MAGNESIUM SULFATE HEPTAHYDRATE 40 MG/ML
2 INJECTION, SOLUTION INTRAVENOUS ONCE
Status: COMPLETED | OUTPATIENT
Start: 2017-08-16 | End: 2017-08-16

## 2017-08-16 RX ORDER — FENTANYL CITRATE 50 UG/ML
50 INJECTION, SOLUTION INTRAMUSCULAR; INTRAVENOUS
Status: ACTIVE | OUTPATIENT
Start: 2017-08-16 | End: 2017-08-16

## 2017-08-16 RX ORDER — MIDAZOLAM HYDROCHLORIDE 1 MG/ML
2 INJECTION, SOLUTION INTRAMUSCULAR; INTRAVENOUS
Status: ACTIVE | OUTPATIENT
Start: 2017-08-16 | End: 2017-08-16

## 2017-08-16 RX ORDER — SODIUM CHLORIDE 9 MG/ML
50 INJECTION, SOLUTION INTRAVENOUS CONTINUOUS
Status: DISCONTINUED | OUTPATIENT
Start: 2017-08-16 | End: 2017-08-21

## 2017-08-16 RX ORDER — MIDAZOLAM HYDROCHLORIDE 1 MG/ML
2 INJECTION, SOLUTION INTRAMUSCULAR; INTRAVENOUS
Status: COMPLETED | OUTPATIENT
Start: 2017-08-16 | End: 2017-08-16

## 2017-08-16 RX ORDER — ETOMIDATE 2 MG/ML
20 INJECTION INTRAVENOUS
Status: COMPLETED | OUTPATIENT
Start: 2017-08-16 | End: 2017-08-16

## 2017-08-16 RX ORDER — LEVOFLOXACIN 5 MG/ML
750 INJECTION, SOLUTION INTRAVENOUS EVERY 24 HOURS
Status: DISCONTINUED | OUTPATIENT
Start: 2017-08-16 | End: 2017-08-19

## 2017-08-16 RX ORDER — ACETAMINOPHEN 325 MG/1
650 TABLET ORAL
Status: DISCONTINUED | OUTPATIENT
Start: 2017-08-16 | End: 2017-08-19 | Stop reason: ALTCHOICE

## 2017-08-16 RX ORDER — MAGNESIUM SULFATE 100 %
4 CRYSTALS MISCELLANEOUS AS NEEDED
Status: DISCONTINUED | OUTPATIENT
Start: 2017-08-16 | End: 2017-08-29 | Stop reason: HOSPADM

## 2017-08-16 RX ORDER — DEXTROSE 50 % IN WATER (D50W) INTRAVENOUS SYRINGE
25-50 AS NEEDED
Status: DISCONTINUED | OUTPATIENT
Start: 2017-08-16 | End: 2017-08-29 | Stop reason: HOSPADM

## 2017-08-16 RX ORDER — IPRATROPIUM BROMIDE AND ALBUTEROL SULFATE 2.5; .5 MG/3ML; MG/3ML
3 SOLUTION RESPIRATORY (INHALATION)
Status: DISCONTINUED | OUTPATIENT
Start: 2017-08-16 | End: 2017-08-19

## 2017-08-16 RX ORDER — OXYCODONE HYDROCHLORIDE 10 MG/1
10 TABLET ORAL 2 TIMES DAILY
COMMUNITY
End: 2017-08-29

## 2017-08-16 RX ORDER — OXYCODONE HYDROCHLORIDE 30 MG/1
30 TABLET ORAL 2 TIMES DAILY
COMMUNITY
End: 2017-08-29

## 2017-08-16 RX ADMIN — HEPARIN SODIUM 5000 UNITS: 5000 INJECTION, SOLUTION INTRAVENOUS; SUBCUTANEOUS at 19:53

## 2017-08-16 RX ADMIN — IPRATROPIUM BROMIDE AND ALBUTEROL SULFATE 3 ML: .5; 3 SOLUTION RESPIRATORY (INHALATION) at 20:13

## 2017-08-16 RX ADMIN — MAGNESIUM SULFATE HEPTAHYDRATE 2 G: 40 INJECTION, SOLUTION INTRAVENOUS at 09:56

## 2017-08-16 RX ADMIN — ALBUTEROL SULFATE 10 MG: 0.83 SOLUTION RESPIRATORY (INHALATION) at 09:46

## 2017-08-16 RX ADMIN — MIDAZOLAM HYDROCHLORIDE 2 MG: 1 INJECTION, SOLUTION INTRAMUSCULAR; INTRAVENOUS at 11:05

## 2017-08-16 RX ADMIN — SODIUM CHLORIDE 40 MG: 9 INJECTION INTRAMUSCULAR; INTRAVENOUS; SUBCUTANEOUS at 10:57

## 2017-08-16 RX ADMIN — IPRATROPIUM BROMIDE AND ALBUTEROL SULFATE 3 ML: .5; 3 SOLUTION RESPIRATORY (INHALATION) at 23:34

## 2017-08-16 RX ADMIN — MIDAZOLAM HYDROCHLORIDE 2 MG: 1 INJECTION, SOLUTION INTRAMUSCULAR; INTRAVENOUS at 09:55

## 2017-08-16 RX ADMIN — HEPARIN SODIUM 5000 UNITS: 5000 INJECTION, SOLUTION INTRAVENOUS; SUBCUTANEOUS at 12:31

## 2017-08-16 RX ADMIN — INSULIN LISPRO 2 UNITS: 100 INJECTION, SOLUTION INTRAVENOUS; SUBCUTANEOUS at 18:26

## 2017-08-16 RX ADMIN — DEXMEDETOMIDINE HYDROCHLORIDE 0.2 MCG/KG/HR: 100 INJECTION, SOLUTION INTRAVENOUS at 18:17

## 2017-08-16 RX ADMIN — ETOMIDATE 20 MG: 2 INJECTION, SOLUTION INTRAVENOUS at 09:17

## 2017-08-16 RX ADMIN — Medication 10 MG/HR: at 22:34

## 2017-08-16 RX ADMIN — SODIUM CHLORIDE 100 ML/HR: 900 INJECTION, SOLUTION INTRAVENOUS at 12:15

## 2017-08-16 RX ADMIN — MIDAZOLAM HYDROCHLORIDE 2 MG: 1 INJECTION, SOLUTION INTRAMUSCULAR; INTRAVENOUS at 09:36

## 2017-08-16 RX ADMIN — INSULIN LISPRO 4 UNITS: 100 INJECTION, SOLUTION INTRAVENOUS; SUBCUTANEOUS at 12:00

## 2017-08-16 RX ADMIN — IPRATROPIUM BROMIDE AND ALBUTEROL SULFATE 3 ML: .5; 3 SOLUTION RESPIRATORY (INHALATION) at 11:46

## 2017-08-16 RX ADMIN — SODIUM CHLORIDE 100 ML/HR: 900 INJECTION, SOLUTION INTRAVENOUS at 22:48

## 2017-08-16 RX ADMIN — LEVOFLOXACIN 750 MG: 5 INJECTION, SOLUTION INTRAVENOUS at 10:56

## 2017-08-16 RX ADMIN — Medication 2 MG/HR: at 09:46

## 2017-08-16 RX ADMIN — SODIUM CHLORIDE 1000 ML: 900 INJECTION, SOLUTION INTRAVENOUS at 14:36

## 2017-08-16 RX ADMIN — PIPERACILLIN SODIUM,TAZOBACTAM SODIUM 3.38 G: 3; .375 INJECTION, POWDER, FOR SOLUTION INTRAVENOUS at 12:32

## 2017-08-16 RX ADMIN — IPRATROPIUM BROMIDE AND ALBUTEROL SULFATE 3 ML: .5; 3 SOLUTION RESPIRATORY (INHALATION) at 15:11

## 2017-08-16 RX ADMIN — Medication 50 MCG/HR: at 11:43

## 2017-08-16 RX ADMIN — PIPERACILLIN SODIUM,TAZOBACTAM SODIUM 3.38 G: 3; .375 INJECTION, POWDER, FOR SOLUTION INTRAVENOUS at 19:53

## 2017-08-16 RX ADMIN — ALBUTEROL SULFATE 10 MG: 2.5 SOLUTION RESPIRATORY (INHALATION) at 09:46

## 2017-08-16 RX ADMIN — METHYLPREDNISOLONE SODIUM SUCCINATE 80 MG: 40 INJECTION, POWDER, FOR SOLUTION INTRAMUSCULAR; INTRAVENOUS at 12:32

## 2017-08-16 RX ADMIN — METHYLPREDNISOLONE SODIUM SUCCINATE 80 MG: 40 INJECTION, POWDER, FOR SOLUTION INTRAMUSCULAR; INTRAVENOUS at 19:53

## 2017-08-16 RX ADMIN — SODIUM CHLORIDE 1000 ML: 900 INJECTION, SOLUTION INTRAVENOUS at 15:47

## 2017-08-16 NOTE — ED NOTES
Patient resting on stretcher. Intubated at this time and awaiting transfer to ICU. Versed drip infusing, as patient was starting to waken and pull at tubes. Magnsesium gtt infusing as well. Stomach decompressed from air. RT currently at bedside. Skin is mottled and cool to the touch. Indwelling diop catheter inserted. Urine sample sent to lab. 16 FR 10 ml.

## 2017-08-16 NOTE — PROGRESS NOTES
Patient brought in and intubated by Pulmonologist x 3 attempts. Patient has large mass in airway. Breath sounds coarse and equal. ETT size 6 and secured at 22 lip line on right side of mouth. Continuous neb treatment given in line with vent.

## 2017-08-16 NOTE — PROGRESS NOTES
Hospitalist Progress Note    Patient: Wayne Calhoun MRN: [id number]  CSN: [id number]    YOB: 1965  Age: 45 y.o. Sex: male    DOA: 8/16/2017 LOS:  LOS: 0 days          [phone]    [facility] transfer center will accept patient but they are currently on diversion due to high volume- thus each day they will require a call to the transfer center to update his condition and stability for transfer and to see what bed availability is.             César Acevedo MD

## 2017-08-16 NOTE — ED PROVIDER NOTES
[address]  EMERGENCY DEPARTMENT HISTORY AND PHYSICAL EXAM       Date: 8/16/2017   Patient Name: Wayne Calhoun   YOB: 1965  Medical Record Number: [id number]    History of Presenting Illness     Chief Complaint   Patient presents with    Respiratory Distress        History Provided By:  EMS    Additional History: 9:04 AM  Wayne Calhoun is a 45 y.o. male with HTN and stage 3 esophogeal cancer who presents to the emergency department via EMS C/O respiratory distress. Initial assessment was done in the back of ambulance at the hospital because EMS asked for a provider to come and assist in intubation. Per EMS on arrival pt was sitting up not moving air and BVM  was immediately started. Pt was 98% on RA. Breathing treatment started and Solumedrol was given in route. C-pap was started on truck but airway got worse. RSI was attempted and intubation failed. Primary Care Provider: Hugh Garrido MD   Specialist:    Past History     Past Medical History:   Past Medical History:   Diagnosis Date    Hypertension     Ill-defined condition     peg tube in place    Ill-defined condition     chemo    S/P radiation therapy     Throat cancer ([facility] [address]     Tobacco abuse         Past Surgical History:   Past Surgical History:   Procedure Laterality Date    HX HEENT      sinus surgery        Family History:   No family history on file.      Social History:   Social History   Substance Use Topics    Smoking status: Current Every Day Smoker     Packs/day: 0.50    Smokeless tobacco: Never Used    Alcohol use No        Allergies:   No Known Allergies     Review of Systems   Review of Systems   Unable to perform ROS: Severe respiratory distress       Physical Exam  Vitals:    08/16/17 0921 08/16/17 0930 08/16/17 0938 08/16/17 0948   BP: (!) 83/45 113/84     Pulse: (!) 131 (!) 117  97   Resp: 24 21  21   SpO2: 95% 100%  100%   Weight:   73.5 kg (162 lb 1.6 oz)        Physical Exam   Nursing note and vitals reviewed. As encounter in back of ambulance:  Constitutional: acute distress  Head: Normocephalic, Atraumatic  Eyes: Pupils are equal, round, and reactive to light, EOMI  Mouth- bloody sputum in airway  Neck: Supple  Cardiovascular: tachy, no murmurs, rubs, or gallops  Chest: Poor chest excursion bilaterally  Lungs: coarse and diminished with poor air movement  Abdomen: Soft, distended  Back: No evidence of trauma or deformity  Extremities: No evidence of trauma or deformity, no LE edema  Skin: mottled  Neuro: sedated and paralyzed    Diagnostic Study Results     Labs -      Recent Results (from the past 12 hour(s))   CBC WITH AUTOMATED DIFF    Collection Time: 08/16/17  9:10 AM   Result Value Ref Range    WBC 8.0 4.6 - 13.2 K/uL    RBC 3.72 (L) 4.70 - 5.50 M/uL    HGB 12.9 (L) 13.0 - 16.0 g/dL    HCT 38.3 36.0 - 48.0 %    .0 (H) 74.0 - 97.0 FL    MCH 34.7 (H) 24.0 - 34.0 PG    MCHC 33.7 31.0 - 37.0 g/dL    RDW 14.3 11.6 - 14.5 %    PLATELET 304 736 - 823 K/uL    MPV 9.6 9.2 - 11.8 FL    NEUTROPHILS 69 40 - 73 %    LYMPHOCYTES 18 (L) 21 - 52 %    MONOCYTES 12 (H) 3 - 10 %    EOSINOPHILS 1 0 - 5 %    BASOPHILS 0 0 - 2 %    ABS. NEUTROPHILS 5.5 1.8 - 8.0 K/UL    ABS. LYMPHOCYTES 1.4 0.9 - 3.6 K/UL    ABS. MONOCYTES 1.0 0.05 - 1.2 K/UL    ABS. EOSINOPHILS 0.1 0.0 - 0.4 K/UL    ABS.  BASOPHILS 0.0 0.0 - 0.06 K/UL    DF AUTOMATED     PROTHROMBIN TIME + INR    Collection Time: 08/16/17  9:10 AM   Result Value Ref Range    Prothrombin time 11.5 11.5 - 15.2 sec    INR 0.9 0.8 - 1.2     METABOLIC PANEL, COMPREHENSIVE    Collection Time: 08/16/17  9:10 AM   Result Value Ref Range    Sodium 130 (L) 136 - 145 mmol/L    Potassium 4.9 3.5 - 5.5 mmol/L    Chloride 93 (L) 100 - 108 mmol/L    CO2 37 (H) 21 - 32 mmol/L    Anion gap 0 (L) 3.0 - 18 mmol/L    Glucose 168 (H) 74 - 99 mg/dL    BUN 14 7.0 - 18 MG/DL    Creatinine 0.78 0.6 - 1.3 MG/DL    BUN/Creatinine ratio 18 12 - 20      GFR est AA >60 >60 ml/min/1.73m2    GFR est non-AA >60 >60 ml/min/1.73m2    Calcium 9.0 8.5 - 10.1 MG/DL    Bilirubin, total 0.4 0.2 - 1.0 MG/DL    ALT (SGPT) 18 16 - 61 U/L    AST (SGOT) 38 (H) 15 - 37 U/L    Alk. phosphatase 92 45 - 117 U/L    Protein, total 7.6 6.4 - 8.2 g/dL    Albumin 3.6 3.4 - 5.0 g/dL    Globulin 4.0 2.0 - 4.0 g/dL    A-G Ratio 0.9 0.8 - 1.7     MAGNESIUM    Collection Time: 08/16/17  9:10 AM   Result Value Ref Range    Magnesium 2.1 1.6 - 2.6 mg/dL   CARDIAC PANEL,(CK, CKMB & TROPONIN)    Collection Time: 08/16/17  9:10 AM   Result Value Ref Range    CK 85 39 - 308 U/L    CK - MB 2.9 <3.6 ng/ml    CK-MB Index 3.4 0.0 - 4.0 %    Troponin-I, Qt. 0.03 0.00 - 0.06 NG/ML   NT-PRO BNP    Collection Time: 08/16/17  9:10 AM   Result Value Ref Range    NT pro- 0 - 900 PG/ML   POC G3    Collection Time: 08/16/17  9:25 AM   Result Value Ref Range    Device: AMBU      Flow rate (POC) 15 L/M    FIO2 (POC) 100 %    pH (POC) 7.478 (H) 7.35 - 7.45      pCO2 (POC) 45.7 (H) 35.0 - 45.0 MMHG    pO2 (POC) 332 (H) 80 - 100 MMHG    HCO3 (POC) 33.9 (H) 22 - 26 MMOL/L    sO2 (POC) 100 (H) 92 - 97 %    Base excess (POC) 10 mmol/L    Allens test (POC) YES      Total resp. rate 27      Site RIGHT RADIAL      Patient temp.  98.6      Specimen type (POC) ARTERIAL      Performed by Ezekiel Aragon    EKG, 12 LEAD, INITIAL    Collection Time: 08/16/17  9:30 AM   Result Value Ref Range    Ventricular Rate 117 BPM    Atrial Rate 117 BPM    P-R Interval 122 ms    QRS Duration 84 ms    Q-T Interval 332 ms    QTC Calculation (Bezet) 463 ms    Calculated P Axis 76 degrees    Calculated R Axis 36 degrees    Calculated T Axis 80 degrees    Diagnosis       Sinus tachycardia  Nonspecific ST and T wave abnormality  Abnormal ECG  When compared with ECG of 03-AUG-2017 00:47,  Nonspecific T wave abnormality no longer evident in Anterior leads     URINALYSIS W/ RFLX MICROSCOPIC    Collection Time: 08/16/17  9:39 AM   Result Value Ref Range    Color YELLOW      Appearance CLOUDY      Specific gravity 1.011 1.005 - 1.030      pH (UA) 8.0 5.0 - 8.0      Protein 100 (A) NEG mg/dL    Glucose 100 (A) NEG mg/dL    Ketone NEGATIVE  NEG mg/dL    Bilirubin NEGATIVE  NEG      Blood TRACE (A) NEG      Urobilinogen 0.2 0.2 - 1.0 EU/dL    Nitrites NEGATIVE  NEG      Leukocyte Esterase NEGATIVE  NEG     URINE MICROSCOPIC ONLY    Collection Time: 08/16/17  9:39 AM   Result Value Ref Range    WBC 0 to 3 0 - 5 /hpf    RBC 0 to 3 0 - 5 /hpf    Epithelial cells NEGATIVE  0 - 5 /lpf    Bacteria FEW (A) NEG /hpf       Radiologic Studies -  The following have been ordered and reviewed:  XR CHEST PORT   Final Result   Tip of endotracheal tube is 6 cm above the reji.     No acute cardiopulmonary disease. As read by the radiologist.        Medical Decision Making   I am the first provider for this patient. I reviewed the vital signs, available nursing notes, past medical history, past surgical history, family history and social history. Vital Signs-Reviewed the patient's vital signs. Patient Vitals for the past 12 hrs:   Pulse Resp BP SpO2   08/16/17 0948 97 21 - 100 %   08/16/17 0930 (!) 117 21 113/84 100 %   08/16/17 0921 (!) 131 24 (!) 83/45 95 %       Pulse Oximetry Analysis - 100% on ventilation     Cardiac Monitor:   Rate: 117 bpm  Rhythm: Sinus Tachycardia     EKG interpretation: (Preliminary)  Rhythm: sinus tach. Rate (approx.): 117 bpm; nonspecific ST and T wave abnormality.     EKG read by Stephanie Garrison MD  at 9:30 AM    Procedures:   Intubation  Date/Time: 8/16/2017 9:04 AM  Performed by: Nicki Diaz  Authorized by: Nidia AL     Consent:     Consent obtained:  Emergent situation    Consent given by:  Healthcare agent  Pre-procedure details:     Patient status:  Unresponsive    Pretreatment medications:  None    Paralytics:  None  Procedure details:     Preoxygenation:  Bag valve mask    CPR in progress: no      Intubation method:  Oral    Oral intubation technique:  Video-assisted    Laryngoscope blade: Mac 4    Tube size (mm):  6.0    Tube type:  Cuffed    Number of attempts:  1  Comments:      Failed due to large mass in airway, intensivitst Dr. Rajeev Salcido at beside successfully passed 6.0 tube. ED Course:  9:04 AM  Initial assessment performed. The patients presenting problems have been discussed, and they are in agreement with the care plan formulated and outlined with them. I have encouraged them to ask questions as they arise throughout their visit. 9:04 AM LMA was tried but was not successful     9:17 AM Amidate administered    9:55 AM Discussed patient's history, exam, and available diagnostics results with Wero Zuleta MD, hospitalist, who agree with admitting pt to ICU.     Medications Given in the ED:  Medications   fentaNYL (PF) 900 mcg/30 ml infusion soln (not administered)   midazolam in normal saline (VERSED) 1 mg/mL infusion (2 mg/hr IntraVENous New Bag 8/16/17 0946)   magnesium sulfate 2 g/50 ml IVPB (premix or compounded) (2 g IntraVENous New Bag 8/16/17 0956)   fentaNYL citrate (PF) injection 50 mcg (not administered)   0.9% sodium chloride infusion (not administered)   methylPREDNISolone (PF) (SOLU-MEDROL) injection 40 mg (not administered)   pantoprazole (PROTONIX) 40 mg in sodium chloride 0.9 % 10 mL injection (not administered)   albuterol-ipratropium (DUO-NEB) 2.5 MG-0.5 MG/3 ML (not administered)   acetaminophen (TYLENOL) tablet 650 mg (not administered)   levoFLOXacin (LEVAQUIN) 750 mg in D5W IVPB (not administered)   midazolam (VERSED) 1 mg/mL injection (2 mg  Given 8/16/17 0936)   etomidate (AMIDATE) 2 mg/mL injection 20 mg (20 mg IntraVENous Given 8/16/17 0917)   midazolam (VERSED) injection 2 mg (2 mg IntraVENous Given 8/16/17 0955)   albuterol (PROVENTIL VENTOLIN) nebulizer solution 10 mg (10 mg Nebulization Given 8/16/17 0946)       9:55 AM  Patient is being admitted to the hospital by Wero Zuleta MD. The results of their tests and reasons for their admission have been discussed with them and/or available family. They convey agreement and understanding for the need to be admitted and for their admission diagnosis. CONDITIONS ON ADMISSION:  Deep Vein Thrombosis is not present at the time of admission. Thrombosis is not present at the time of admission. Urinary Tract Infection is not present at the time of admission. Pneumonia is not present at the time of admission. MRSA is not present at the time of admission. Wound infection is not present at the time of admission. Pressure Ulcer is not present at the time of admission. Critical Care Time:  9:57 AM  I have spent 42 minutes of critical care time involved in lab review, consultations with specialist, family decision-making, and documentation. During this entire length of time I was immediately available to the patient. Critical Care: The reason for providing this level of medical care for this critically ill patient was due a critical illness that impaired one or more vital organ systems such that there was a high probability of imminent or life threatening deterioration in the patients condition. This care involved high complexity decision making to assess, manipulate, and support vital system functions, to treat this degreee vital organ system failure and to prevent further life threatening deterioration of the patients condition. Diagnosis   Clinical Impression:   1. Respiratory distress    2. COPD with acute exacerbation [facility])         Discussion:  45 y.o. male arriving via EMS status post RSI with failed intubation. Initial intubation attempt revealed mass in airway. Intensivist at bedside successfully passed 6.0 tube.  Hx and exam consistent with COPD exacerbation discussed with hospitalist and intensivist for admission     Follow-up Information     None          Current Discharge Medication List          _______________________________ Attestations: This note is prepared by Marian Gómez , acting as a Scribe for Areli Arevalo MD  on 9:23 AM on 8/16/2017 . Areli Arevalo MD: The scribe's documentation has been prepared under my direction and personally reviewed by me in its entirety.   _______________________________

## 2017-08-16 NOTE — IP AVS SNAPSHOT
Sue Roland 
 
 
 [address] [place] [address] 
[phone] Patient: Taylor Gonzalez MRN: [id number] :1965 You are allergic to the following No active allergies Recent Documentation Height Weight BMI Smoking Status 1.702 m 74 kg 25.55 kg/m2 Current Every Day Smoker Emergency Contacts Name Discharge Info Relation Home Work Mobile Kely Sena N/A  AT THIS TIME [6] Spouse [3]   [phone] Dulce  Parent [1] [phone] About your hospitalization You were admitted on:  2017 You last received care in the:  [facility] You were discharged on:  2017 Unit phone number:  [phone] Why you were hospitalized Your primary diagnosis was:  Acute Hypoxemic Respiratory Failure (Hcc) Your diagnoses also included:  Copd Exacerbation (Hcc), Throat Cancer (Hcc), Status Post Insertion Of Percutaneous Endoscopic Gastrostomy (Peg) Tube (Hcc), Severe Protein-Calorie Malnutrition Alejandro Leblanc: Less Than 60% Of Standard Weight) (Hcc), Tracheostomy Dependence (Hcc) Providers Seen During Your Hospitalizations Provider Role Specialty Primary office phone Denise Chandler MD Attending Provider Emergency Medicine [phone] Mj Kasper MD Attending Provider [facility] [phone] Your Primary Care Physician (PCP) Primary Care Physician Office Phone Office Fax Radha Frazier [phone] Follow-up Information Follow up With Details Comments Contact Info Homar Flaherty MD On 2017 Follow up appointment scheduled for 2017 at 10:15 a.m. (MD's office is open on Holiday). [address] [facility] [place] [address] [address] [phone] [address] to continue managing your healthcare needs [facility] -[facility] Current Discharge Medication List  
  
START taking these medications Dose & Instructions Dispensing Information Comments Morning Noon Evening Bedtime  
 amoxicillin-clavulanate 400-57 mg/5 mL suspension Commonly known as:  AUGMENTIN Your last dose was: Your next dose is:    
   
   
 Dose:  400 mg  
5 mL by Per G Tube route every twelve (12) hours. Quantity:  70 mL Refills:  0  
     
   
   
   
  
 cloNIDine 0.2 mg/24 hr patch Commonly known as:  CATAPRES Your last dose was: Your next dose is:    
   
   
 Dose:  1 Patch 1 Patch by TransDERmal route every seven (7) days. Quantity:  4 Patch Refills:  0  
     
   
   
   
  
 * fentaNYL 100 mcg/hr PATCH Commonly known as:  Nathan Hobson Your last dose was: Your next dose is:    
   
   
 Dose:  1 Patch 1 Patch by TransDERmal route every seventy-two (72) hours. Max Daily Amount: 1 Patch. Quantity:  5 Patch Refills:  0  
     
   
   
   
  
 * fentaNYL 50 mcg/hr PATCH Commonly known as:  Nathan Hobson Your last dose was: Your next dose is:    
   
   
 Dose:  1 Patch 1 Patch by TransDERmal route every seventy-two (72) hours. Max Daily Amount: 1 Patch. Quantity:  5 Patch Refills:  0  
     
   
   
   
  
 guaiFENesin 100 mg/5 mL liquid Commonly known as:  ROBITUSSIN Your last dose was: Your next dose is:    
   
   
 Dose:  100 mg Take 5 mL by mouth every four (4) hours as needed for Cough. Quantity:  200 mL Refills:  0  
     
   
   
   
  
 lisinopril 5 mg tablet Commonly known as:  Kerry Lone Jack Your last dose was: Your next dose is:    
   
   
 Dose:  5 mg Take 1 Tab by mouth every twelve (12) hours. Quantity:  30 Tab Refills:  0  
     
   
   
   
  
 * Notice: This list has 2 medication(s) that are the same as other medications prescribed for you.  Read the directions carefully, and ask your doctor or other care provider to review them with you. CONTINUE these medications which have CHANGED Dose & Instructions Dispensing Information Comments Morning Noon Evening Bedtime  
 oxyCODONE IR 10 mg Tab immediate release tablet Commonly known as:  Doris Barr What changed:   
- how to take this - when to take this 
- reasons to take this - Another medication with the same name was removed. Continue taking this medication, and follow the directions you see here. Your last dose was: Your next dose is:    
   
   
 Dose:  10 mg  
1 Tab by Per G Tube route every six (6) hours as needed. Max Daily Amount: 40 mg.  
 Quantity:  30 Tab Refills:  0 CONTINUE these medications which have NOT CHANGED Dose & Instructions Dispensing Information Comments Morning Noon Evening Bedtime  
 albuterol-ipratropium 2.5 mg-0.5 mg/3 ml Nebu Commonly known as:  Haze Burt Your last dose was: Your next dose is:    
   
   
 Dose:  3 mL  
3 mL by Nebulization route every four (4) hours as needed. Quantity:  30 Nebule Refills:  1  
     
   
   
   
  
 magic mouthwash 475--40 mg/30 mL Mwsh oral suspension Commonly known as:  FIRST-MOUTHWASH BLM Your last dose was: Your next dose is:    
   
   
 Dose:  10 mL Take 10 mL by mouth every four (4) hours as needed. Refills:  0  
     
   
   
   
  
 multivitamin, tx-iron-ca-min 9 mg iron-400 mcg Tab tablet Commonly known as:  THERA-M w/ IRON Your last dose was: Your next dose is:    
   
   
 Dose:  1 Tab Take 1 Tab by mouth daily. Quantity:  30 Tab Refills:  1 Nebulizer Accessories Kit Your last dose was: Your next dose is:    
   
   
 Use nebs Q4H PRN Quantity:  1 Kit Refills:  0  
     
   
   
   
  
 nicotine 14 mg/24 hr patch Commonly known as:  Clemetine Fujita Your last dose was: Your next dose is:    
   
   
 Dose:  1 Patch 1 Patch by TransDERmal route daily for 30 days. Quantity:  30 Patch Refills:  1 Where to Get Your Medications Information on where to get these meds will be given to you by the nurse or doctor. ! Ask your nurse or doctor about these medications  
  amoxicillin-clavulanate 400-57 mg/5 mL suspension  
 cloNIDine 0.2 mg/24 hr patch  
 fentaNYL 100 mcg/hr PATCH  
 fentaNYL 50 mcg/hr PATCH  
 guaiFENesin 100 mg/5 mL liquid  
 lisinopril 5 mg tablet  
 oxyCODONE IR 10 mg Tab immediate release tablet Discharge Instructions Shortness of Breath: Care Instructions Your Care Instructions Shortness of breath has many causes. Sometimes conditions such as anxiety can lead to shortness of breath. Some people get mild shortness of breath when they exercise. Trouble breathing also can be a symptom of a serious problem, such as asthma, lung disease, emphysema, heart problems, and pneumonia. If your shortness of breath continues, you may need tests and treatment. Watch for any changes in your breathing and other symptoms. Follow-up care is a key part of your treatment and safety. Be sure to make and go to all appointments, and call your doctor if you are having problems. Its also a good idea to know your test results and keep a list of the medicines you take. How can you care for yourself at home? · Do not smoke or allow others to smoke around you. If you need help quitting, talk to your doctor about stop-smoking programs and medicines. These can increase your chances of quitting for good. · Get plenty of rest and sleep. · Take your medicines exactly as prescribed. Call your doctor if you think you are having a problem with your medicine. · Find healthy ways to deal with stress. ¨ Exercise daily. ¨ Get plenty of sleep. ¨ Eat regularly and well. When should you call for help? Call 911 anytime you think you may need emergency care. For example, call if: 
· You have severe shortness of breath. · You have symptoms of a heart attack. These may include: ¨ Chest pain or pressure, or a strange feeling in the chest. 
¨ Sweating. ¨ Shortness of breath. ¨ Nausea or vomiting. ¨ Pain, pressure, or a strange feeling in the back, neck, jaw, or upper belly or in one or both shoulders or arms. ¨ Lightheadedness or sudden weakness. ¨ A fast or irregular heartbeat. After you call 911, the  may tell you to chew 1 adult-strength or 2 to 4 low-dose aspirin. Wait for an ambulance. Do not try to drive yourself. Call your doctor now or seek immediate medical care if: 
· Your shortness of breath gets worse or you start to wheeze. Wheezing is a high-pitched sound when you breathe. · You wake up at night out of breath or have to prop your head up on several pillows to breathe. · You are short of breath after only light activity or while at rest. 
Watch closely for changes in your health, and be sure to contact your doctor if: 
· You do not get better over the next 1 to 2 days. Where can you learn more? Go to http://justine-serena.info/. Enter S780 in the search box to learn more about \"Shortness of Breath: Care Instructions. \" Current as of: March 25, 2017 Content Version: 11.3 © 4928-9988 [employer]. Care instructions adapted under license by [employer] (which disclaims liability or warranty for this information). If you have questions about a medical condition or this instruction, always ask your healthcare professional. [place] [address] any warranty or liability for your use of this information. Patient armband removed and shredded. [employer] Activation Thank you for requesting access to [employer]. Please follow the instructions below to securely access and download your online medical record.  [employer] allows you to send messages to your doctor, view your test results, renew your prescriptions, schedule appointments, and more. How Do I Sign Up? 1. In your internet browser, go to [email or web address] 
2. Click on the First Time User? Click Here link in the Sign In box. You will be redirect to the New Member Sign Up page. 3. Enter your [employer]M [employer] Access Code exactly as it appears below. You will not need to use this code after youve completed the sign-up process. If you do not sign up before the expiration date, you must request a new code. [employer] Access Code: [email or web address] Expires: 10/30/2017  1:10 PM (This is the date your [employer] access code will ) 4. Enter the last four digits of your Social Security Number (xxxx) and Date of Birth (mm/dd/yyyy) as indicated and click Submit. You will be taken to the next sign-up page. 5. Create a [employer] Software ID. This will be your [employer] login ID and cannot be changed, so think of one that is secure and easy to remember. 6. Create a [employer] password. You can change your password at any time. 7. Enter your Password Reset Question and Answer. This can be used at a later time if you forget your password. 8. Enter your e-mail address. You will receive e-mail notification when new information is available in [address] 9. Click Sign Up. You can now view and download portions of your medical record. 10. Click the Download Summary menu link to download a portable copy of your medical information. Additional Information If you have questions, please visit the Frequently Asked Questions section of the [employer] website at [email or web address] Remember, [employer] is NOT to be used for urgent needs. For medical emergencies, dial 911. DISCHARGE SUMMARY from Nurse The following personal items are in your possession at time of discharge: 
 
Dental Appliances: None Visual Aid: None Home Medications: None Jewelry: None Clothing: With patient PATIENT INSTRUCTIONS: 
 
 
F-face looks uneven A-arms unable to move or move unevenly S-speech slurred or non-existent T-time-call 911 as soon as signs and symptoms begin-DO NOT go Back to bed or wait to see if you get better-TIME IS BRAIN. Warning Signs of HEART ATTACK Call 911 if you have these symptoms: 
? Chest discomfort. Most heart attacks involve discomfort in the center of the chest that lasts more than a few minutes, or that goes away and comes back. It can feel like uncomfortable pressure, squeezing, fullness, or pain. ? Discomfort in other areas of the upper body. Symptoms can include pain or discomfort in one or both arms, the back, neck, jaw, or stomach. ? Shortness of breath with or without chest discomfort. ? Other signs may include breaking out in a cold sweat, nausea, or lightheadedness. Don't wait more than five minutes to call [address]! Fast action can save your life. Calling 911 is almost always the fastest way to get lifesaving treatment. Emergency Medical Services staff can begin treatment when they arrive  up to an hour sooner than if someone gets to the hospital by car. The discharge information has been reviewed with the patient. The patient verbalized understanding. Discharge medications reviewed with the patient and appropriate educational materials and side effects teaching were provided. Discharge Orders None [facility] Announcement We are excited to announce that we are making your provider's discharge notes available to you in [email or web address]. You will see these notes when they are completed and signed by the physician that discharged you from your recent hospital stay.   If you have any questions or concerns about any information you see in Catamaran, please call the Health Information Department where you were seen or reach out to your Primary Care Provider for more information about your plan of care. Introducing \[facility] [place] [facility]\"" & HEALTH SERVICES! [employer] introduces [employer] patient portal. Now you can access parts of your medical record, email your doctor's office, and request medication refills online. 1. In your internet browser, go to [email or web address] 2. Click on the First Time User? Click Here link in the Sign In box. You will see the New Member Sign Up page. 3. Enter your Catamaran Access Code exactly as it appears below. You will not need to use this code after youve completed the sign-up process. If you do not sign up before the expiration date, you must request a new code. · Cat[employer] Access Code: [email or web address] Expires: 10/30/2017  1:10 PM 
 
4. Enter the last four digits of your Social Security Number (xxxx) and Date of Birth (mm/dd/yyyy) as indicated and click Submit. You will be taken to the next sign-up page. 5. Create a Catamaran ID. This will be your Catamaran login ID and cannot be changed, so think of one that is secure and easy to remember. 6. Create a Catamaran password. You can change your password at any time. 7. Enter your Password Reset Question and Answer. This can be used at a later time if you forget your password. 8. Enter your e-mail address. You will receive e-mail notification when new information is available in [address] 9. Click Sign Up. You can now view and download portions of your medical record. 10. Click the Download Summary menu link to download a portable copy of your medical information. If you have questions, please visit the Frequently Asked Questions section of the [employer] website. Remember, Cat[employer]aran is NOT to be used for urgent needs. For medical emergencies, dial 911. Now available from your iPhone and Android! General Information Please provide this summary of care documentation to your next provider. Patient Signature:  ____________________________________________________________ Date:  ____________________________________________________________  
  
Georgette Ronquillo Provider Signature:  ____________________________________________________________ Date:  ____________________________________________________________

## 2017-08-16 NOTE — ED NOTES
0824- patient moved to Bed 5  Dr Tung Li and anesthesia  at bedside attempting to tube patient. 0915-Pulmononogist Dr. Yunior Amaya at bedside to attempt intubation  0917- 20 atomidate administered by ANNALISA Bell,   5540- ETT tube 6.0 and 22 at the lip obtained

## 2017-08-16 NOTE — ED NOTES
TRANSFER - OUT REPORT:    Verbal report given to Mayo Power RN(name) on Constantin Hensley  being transferred to ICU(unit) for routine progression of care       Report consisted of patients Situation, Background, Assessment and   Recommendations(SBAR). Information from the following report(s) SBAR, Kardex and ED Summary was reviewed with the receiving nurse. Lines:       Opportunity for questions and clarification was provided.       Patient transported with:   Registered Nurse  Tech

## 2017-08-16 NOTE — PROGRESS NOTES
Attempted to perform admission med rec on this pt being admitted through the ED. Pt is currently intubated and no family present to interview. [facility] as well as [facility] with Zaynab Mendosa at [facility] to obtain current RX med info. EHR updated accordingly. Unable to obtain any OTC med info at this time. Medication Reconciliation Interventions:   Wrong Medication Identified 1  Wrong/missing medication strength or dose identified  0  Wrong/missing Interval Identified 0  Wrong/missing Route Identified 0  Medication Duplication 0  Omissions 3  Commissions 3  Other Issue(s) Identified (Indicate): 0            Medication Compliance Issues and/or Medication Concerns: removed cough syrup, ABX and steroid from [facility] as short term tx completed already and these are not currently being taken. Although duoneb was prescribed and filled at [address] [place] [address] it was never picked up.     [address] [place] [address] Pharmacist  [phone]

## 2017-08-16 NOTE — IP AVS SNAPSHOT
[facility] [address] [address] 
[phone] Patient: Cecile Alejandre MRN: [id number] :1965 Current Discharge Medication List  
  
START taking these medications Dose & Instructions Dispensing Information Comments Morning Noon Evening Bedtime  
 amoxicillin-clavulanate 400-57 mg/5 mL suspension Commonly known as:  AUGMENTIN Your last dose was: Your next dose is:    
   
   
 Dose:  400 mg  
5 mL by Per G Tube route every twelve (12) hours. Quantity:  70 mL Refills:  0  
     
   
   
   
  
 cloNIDine 0.2 mg/24 hr patch Commonly known as:  CATAPRES Your last dose was: Your next dose is:    
   
   
 Dose:  1 Patch 1 Patch by TransDERmal route every seven (7) days. Quantity:  4 Patch Refills:  0  
     
   
   
   
  
 * fentaNYL 100 mcg/hr PATCH Commonly known as:  Sharon Guitar Your last dose was: Your next dose is:    
   
   
 Dose:  1 Patch 1 Patch by TransDERmal route every seventy-two (72) hours. Max Daily Amount: 1 Patch. Quantity:  5 Patch Refills:  0  
     
   
   
   
  
 * fentaNYL 50 mcg/hr PATCH Commonly known as:  Sharon Guitar Your last dose was: Your next dose is:    
   
   
 Dose:  1 Patch 1 Patch by TransDERmal route every seventy-two (72) hours. Max Daily Amount: 1 Patch. Quantity:  5 Patch Refills:  0  
     
   
   
   
  
 guaiFENesin 100 mg/5 mL liquid Commonly known as:  ROBITUSSIN Your last dose was: Your next dose is:    
   
   
 Dose:  100 mg Take 5 mL by mouth every four (4) hours as needed for Cough. Quantity:  200 mL Refills:  0  
     
   
   
   
  
 lisinopril 5 mg tablet Commonly known as:  Ankush Little Your last dose was: Your next dose is:    
   
   
 Dose:  5 mg Take 1 Tab by mouth every twelve (12) hours. Quantity:  30 Tab Refills:  0 * Notice: This list has 2 medication(s) that are the same as other medications prescribed for you. Read the directions carefully, and ask your doctor or other care provider to review them with you. CONTINUE these medications which have CHANGED Dose & Instructions Dispensing Information Comments Morning Noon Evening Bedtime  
 oxyCODONE IR 10 mg Tab immediate release tablet Commonly known as:  Onita Estimable What changed:   
- how to take this - when to take this 
- reasons to take this - Another medication with the same name was removed. Continue taking this medication, and follow the directions you see here. Your last dose was: Your next dose is:    
   
   
 Dose:  10 mg  
1 Tab by Per G Tube route every six (6) hours as needed. Max Daily Amount: 40 mg.  
 Quantity:  30 Tab Refills:  0 CONTINUE these medications which have NOT CHANGED Dose & Instructions Dispensing Information Comments Morning Noon Evening Bedtime  
 albuterol-ipratropium 2.5 mg-0.5 mg/3 ml Nebu Commonly known as:  Shilpi Barreto Your last dose was: Your next dose is:    
   
   
 Dose:  3 mL  
3 mL by Nebulization route every four (4) hours as needed. Quantity:  30 Nebule Refills:  1  
     
   
   
   
  
 magic mouthwash 841--40 mg/30 mL Mwsh oral suspension Commonly known as:  FIRST-MOUTHWASH BLM Your last dose was: Your next dose is:    
   
   
 Dose:  10 mL Take 10 mL by mouth every four (4) hours as needed. Refills:  0  
     
   
   
   
  
 multivitamin, tx-iron-ca-min 9 mg iron-400 mcg Tab tablet Commonly known as:  THERA-M w/ IRON Your last dose was: Your next dose is:    
   
   
 Dose:  1 Tab Take 1 Tab by mouth daily. Quantity:  30 Tab Refills:  1 Nebulizer Accessories Kit Your last dose was: Your next dose is:    
   
   
 Use nebs Q4H PRN Quantity:  1 Kit Refills:  0  
     
   
   
   
  
 nicotine 14 mg/24 hr patch Commonly known as:  Shaye Ramires Your last dose was: Your next dose is:    
   
   
 Dose:  1 Patch 1 Patch by TransDERmal route daily for 30 days. Quantity:  30 Patch Refills:  1 Where to Get Your Medications Information on where to get these meds will be given to you by the nurse or doctor. ! Ask your nurse or doctor about these medications  
  amoxicillin-clavulanate 400-57 mg/5 mL suspension  
 cloNIDine 0.2 mg/24 hr patch  
 fentaNYL 100 mcg/hr PATCH  
 fentaNYL 50 mcg/hr PATCH  
 guaiFENesin 100 mg/5 mL liquid  
 lisinopril 5 mg tablet  
 oxyCODONE IR 10 mg Tab immediate release tablet

## 2017-08-16 NOTE — PROGRESS NOTES
1052  Report received from A. Meldon Ganser, RN.     9303  Patient arrived on unit. Assumed patient care. Versed infusing at 2 mg/hr. 6.0 ETT, [email or web address]. Bilateral soft wrist restraints applied at this time per Dr. Felicity Palacios. Marrero in place, draining clear yellow urine. 1200  Reassessment complete per flowsheet. VSS, NAD, FLACC = 0.      1430  Patient BP 80's/50's. Dr. David López paged. Order received for 2L NS bolus. 1600  Reassessment complete per flowsheet. VSS, NAD, FLACC = 0.      1755  Patient awake, fighting vent, pulling at restraints. Fentanyl and versed infusing at max rates. Dr. Felicity Palacios paged, order received to start precedex drip. 1919  Bedside and Verbal shift change report given to Spencer Quezada RN (oncoming nurse) by Tayler Erickson. Jamal Shay RN (offgoing nurse). Report included the following information SBAR, Kardex, ED Summary, Procedure Summary, Intake/Output, MAR, Recent Results and Cardiac Rhythm ST/SR.

## 2017-08-16 NOTE — PROGRESS NOTES
Readmission Risk Assessment:     Moderate Risk and [facility]P/[facility] patients    RRAT Score:  13-20    Initial Assessment:chart reviewed pt came to ED via EMT in respiratory distress from home,pt intubated IN ED, diagnosed for COPD exacerbation,per Kamlesh Dawn note 8-16-17 pt pending [facility] transfer due to diverson physician plans to follow thru and update [facility] transfer center daily,cm will remain available for assistance if needed. Emergency Contact:  See chart    Pertinent Medical Hx: see chart       PCP/Specialists: non listed    Community Services:       DME:          Moderate Risk Care Transition Plan:  1. Evaluate for [facility], SNF, acute rehab, community care coordination of resources. 2. Involve patient/caregiver in assessment, planning, education and implement of intervention. 3. CM daily patient care huddles/interdisciplinary rounds. 4. PCP/Specialist appointment within 5  7 days made prior to discharge. 5. Facilitate transportation and logistics for follow-up appointments. 6. Medication reconciliation [address]  7. Formal handoff between hospital provider and post-acute provider to transition patient  Handoff to [facility] Nurse Navigator or PCP practice.

## 2017-08-16 NOTE — H&P
[address] [place] [address]  ROUTINE HISTORY AND PHYSICAL    Name:  Angelic White  MR#:  [id number]  :  1965  Account #:  [de-identified]  Date of Adm:  2017      ADMITTING DIAGNOSES  1. Acute hypoxic respiratory failure. 2. Chronic obstructive pulmonary disease exacerbation. 3. Throat cancer. 4. Percutaneous endoscopic gastrostomy tube. 5. Hyponatremia. 6. Continued tobacco use. HISTORY OF PRESENT ILLNESS: The patient came via ambulance  to the emergency room in acute respiratory distress. There was  difficulty intubating him. The ER physician was called to the  ambulance. Ultimately, the intensivist came in, as well as Anesthesia  to the emergency room for a difficult intubation. It was successful,  however, and he is actually awake on the ventilator at this point in  time. He is a gentleman who is 45years old and has a history of throat  cancer. He received radiation treatment through [facility]. He is on chronic narcotic therapy, which is prescribed to him  by Dr. Clau Golden. He had recently been in the hospital twice, initially for  aspiration pneumonitis and hyponatremia with dehydration. He is  insisting on going home after a short stay because he had to take care  of his elderly mother, but ended up coming back to the hospital with  worsening shortness of breath and COPD exacerbation. Extensive  work went into getting him on the right tube feedings and making  arrangements so that he could get these free through a pharmacy in  [place]. He was set up with a nebulizer machine, appropriate  medications for his illness, and close followup. Unfortunately, the  patient continues to smoke cigarettes. PAST MEDICAL HISTORY: As noted throat cancer, PEG tube in place  status post chemo and radiation, continued tobacco use, hypertension,  history of 2 hospitalizations for aspiration pneumonitis, hyponatremia,  and COPD exacerbation.     PREVIOUS SURGERIES: Sinus and throat surgery. FAMILY HISTORY: Cannot elucidate currently, but from what he said  in the past no notable cancer history. SOCIAL HISTORY: Smoking currently at least 1/2 pack of cigarettes a  day, prior to that smoking multiple packs of cigarettes a day for many  decades. He cannot speak at the moment, so I cannot clarify the  details on that, but from what he told me in the past, no regular alcohol  use. He lives at home with his fiancee. ALLERGIES: HE HAS NO MEDICATION ALLERGIES. MEDICATIONS  That he is on at home include:  1. Magic mouthwash. 2. DuoNeb. 3. Nicoderm patch was prescribed him the last time; I do not know if he  is wearing it. 4. OxyIR 30 mg twice a day. 5. Oxycodone 10 mg twice a day. REVIEW OF SYSTEMS: Generally no fevers or chills currently. Other  review of systems is not able to be obtained, due to the fact that the  patient is intubated. He is awake, however. PHYSICAL EXAMINATION  GENERAL: He is awake. He is motioning with his hands. He is able to  track and follow some basic commands. He is in no acute distress. He  has a peter facies. He is intubated orally. VITAL SIGNS: His pulse is 110, blood pressure 106/67, respiratory  rate 24, SaO2 100%. NECK: Supple. No thyromegaly or lymphadenopathy. His sclerae are  anicteric. Conjunctivae pink. LUNGS: Clear anteriorly now. No rales, rhonchi, or wheezes. CARDIAC: Tachycardic. Regular rhythm. No murmur, rub, or gallop. ABDOMEN: Soft, nontender. No distention. Diminished bowel sounds. No hepatosplenomegaly. PEG tube is in place. No surrounding  erythema or drainage. LOWER EXTREMITIES: No clubbing, cyanosis, or edema. Distal  pulses are weak, but palpable. SKIN: Shows no evidence for rash. LABORATORIES: White 8, hemoglobin and hematocrit 12.9 and 38.3,  platelets are 752. Urinalysis: No nitrites, leukocyte esterase, 0-3  WBCs. INR if 0.9. Chemistry: Sodium 130, potassium 4.9, glucose  168, GFR is greater than 60.  Troponin 0. 03. . Blood gas  showed a pH of 7.478, pCO2 45.7, pO2 was 332. This was from the  ambulance. He had a chest x-ray that showed no acute cardiopulmonary disease. Tip of endotracheal tube 6 cm above the reji. An EKG showed sinus  tachycardia, nonspecific ST and T-wave abnormality. ASSESSMENT  1. Acute hypoxic respiratory failure. 2. Chronic obstructive pulmonary disease exacerbation. 3. Advanced chronic obstructive pulmonary disease. 4. Throat cancer. 5. Chronic dysphasia, with dependence on a percutaneous endoscopic  gastrostomy tube. 6. Hyponatremia. 7. Continued tobacco use. PLAN: Admission to the intensive care unit. Vent bundle and care per  intensivist. Phani Mckinley consult placed from the emergency room. Empiric antibiotics with Levaquin daily. N.p.o. status. Intravenous  steroids with subcutaneous insulin via correctional scale coverage. DuoNeb every 4 hours routinely. Protonix IV daily for stress ulcer  prophylaxis and sedation as per intensivist.    RECOMMENDATIONS: Follow up hemoglobin A1c, BMP, CBC in the  morning. Fluid resuscitation with intravenous normal saline 100 mL an  hour. ANTICIPATED LENGTH OF STAY: Three days at this point in time. He may be able to extubate by tomorrow if he remains awake and alert  and improving, but the next main question will be due to 3 recent  hospitalizations and continued respiratory issues with a difficult  intubation status, addressing his code status and also addressing what  the status of his malignancy is, and consultation with his oncologist if  possible and [facility] once we get that information.         MD KURT Walter / CRISTOPHER  D:  08/16/2017   11:31  T:  08/16/2017   12:12  Job #:  [id number]

## 2017-08-16 NOTE — PROCEDURES
[facility]  Pulmonary, Critical Care, and Sleep Medicine    Name: Rodolfo Godinez MRN: [id number]   : 1965 Hospital: [facility]   Date: 2017        Intubation Note    Procedure: elective/emergent intubation    Indication: respiratory insufficiency    Anesthesia- Rapid sequence:      After assessing the airway, the patient underwent preoxygenation with 100% FiO2 for 5 min. The Sellick maneuver was performed throughout the entire sequence.    /4.0MAC,  blade  laryngoscope was used to visualize the epiglottis and vocal chords. The epiglottis seems enlarged and difficult to see the vocal cords clearly ? Mass . Third attempt done and able to intubate. O2 sat stays high 90's through out     After positive identification of the vocal cords, an 7cm ET tube was placed into the trachea with direct visualization. The tube was seen passing through the vocal chords  with some difficulty. CO2 colorimetry was employed immediately to verify tube in airway with  appropriate color change indicating detection/lack of CO2. Water vapor was seen within the ET tube, and auscultation of the abdomen revealed no bubbling sounds. Auscultation  and inspection of the chest after intubation showed symmetric chest excursion and symmetric air entry bilaterally. Chest X-ray has been ordered and is pending. The patient has been placed on a mechanical ventilator. There were no complications.     Fernando Bearden MD

## 2017-08-16 NOTE — PROGRESS NOTES
conducted an initial consultation and Spiritual Assessment for Isidro Crump, who is a 45 y.o.,male. According to the patients chart Alevism Affiliation is: [facility].     Patient was in ED. Difficult intubation. Woman arrived. Introduced herself to me as his wife but to another fiance. Her daughter whom she said is patient's step-daughter arrived. Patient had his eyes open when she spoke to him and nodded. Patient was moved to ICU 4. Family went home to gather and rest.  They care for patient's mother at home. She is with [facility]. Later in the ICU patient's son was at the bedside also. The reason the Patient came to the hospital is:   Patient Active Problem List    Diagnosis Date Noted    COPD exacerbation ([facility] 08/16/2017    Acute hypoxemic respiratory failure ([facility] 08/16/2017    Acute respiratory distress 08/03/2017    Hypoxia 08/03/2017    Stridor 08/03/2017    Respiratory depression 08/03/2017    Aspiration into airway 07/30/2017    Throat cancer ([facility] 07/30/2017    Status post insertion of percutaneous endoscopic gastrostomy (PEG) tube ([facility] 07/30/2017    Hyponatremia 07/30/2017        The  provided the following Interventions with loved ones:  Initiated a relationship of care and support. Listened empathically. Provided information about Spiritual Care Services. Offered prayer and assurance of continued prayers on patients behalf. Chart reviewed. The following outcomes were achieved:  Family expressed gratitude for Spiritual Care visit. Assessment:  There are no significant spiritual or Sikhism issues which require further intervention at this time. Patient does not have any Sikhism or cultural needs that will affect patients preferences in health care. Plan:  Chaplains will continue to follow and will provide pastoral care as needed or requested.     recommends bedside caregivers page  on duty if patient shows signs of acute spiritual or emotional distress. [id number]   Board Certified   [phone] - Office

## 2017-08-16 NOTE — ED TRIAGE NOTES
Pt broght into er via ems for complaints of respiratory distress, upon ems arrival they stated they could clearly see patient was in distress and not moving air. BVM in place RA 98%, C02 78, goave JAREK treatment and 125mg solumedrol. EMS gave RSI and attempted intubation. Upon arrival to er Dr. Jose Rodriguez in ambulance bay to assist in intubation.

## 2017-08-16 NOTE — CONSULTS
[employer] [facility]  Pulmonary, Critical Care, and Sleep Medicine    Name: Patrick Bowen MRN: [id number]   : 1965 Hospital: [facility] [place]   Date: 2017        Critical Care Follow Up                                                [x]I have reviewed the flowsheet and previous days notes. Events, vitals, medications and notes from last 24 hours reviewed. Care plan discussed with staff, patient, family and on multidisciplinary rounds. IMPRESSION:   · Acute respiratory failure  · Stridor on admission S/P intubation  · COPD with acute exacerbation  · Throat cancer was treated with Chemotherapy by Oncologist in [place]  · Tobacco user  · Dysphagia S/P PEG tube  · Hypotension. resolving    · Code status:       RECOMMENDATIONS:   · ventilator management  · Empirically start antibiotics  · PEG tube to low intermittent suction  · DVT and GI prophylaxis  · Sputum cultures  · Sedation with Versed and Fentanyl  · ENT consult  Will definitely need their input when ready for extubation. ? Trach or any furtgher surgical intervention  · Will get records from [facility]  · Folys cath  · Vent, Folys cath, ventilator bundle orders. ·   ·   ·        Subjective/History of Present Illness:     Patrick Bowen has been seen and evaluated in ICU/ERPatient is a 45 y.o. male who was brought in to ER with history of throat cancer and recent hospitalization 2 weeks ago after he had cycle of chemotherapy and then developed COPD with acute exacerbation and was also mentioned that he had Dysphagia. He had PEG tube but had another hospitalization after he tried to drink water and aspirated. Anyway according to EMS he was found at home very short of breath, stridor, and trial if intubation was attempted on route. Later in ER he was then intubated by myself and placed on vent then transferred to ICU. He is sedated    All info were from chart review last hospitalization.     No family present now for more history      The patient is critically ill and can not provide additional history due to Unconsciousness and Ventilated. Review of Systems:  Constitutional: stridor and short of breath according to EMS  Respiratory: positive for stridor  Cardiovascular: negative  Gastrointestinal: negative  Genitourinary:negative     No Known Allergies   Past Medical History:   Diagnosis Date    Hypertension     Ill-defined condition     peg tube in place    Ill-defined condition     chemo    S/P radiation therapy     Throat cancer (HCC)     Tobacco abuse       Current Facility-Administered Medications   Medication Dose Route Frequency    fentaNYL (PF) 900 mcg/30 ml infusion soln  0-200 mcg/hr IntraVENous TITRATE    midazolam in normal saline (VERSED) 1 mg/mL infusion  0-10 mg/hr IntraVENous TITRATE    fentaNYL citrate (PF) injection 50 mcg  50 mcg IntraVENous NOW    0.9% sodium chloride infusion  100 mL/hr IntraVENous CONTINUOUS    methylPREDNISolone (PF) (SOLU-MEDROL) injection 40 mg  40 mg IntraVENous Q8H    pantoprazole (PROTONIX) 40 mg in sodium chloride 0.9 % 10 mL injection  40 mg IntraVENous DAILY    albuterol-ipratropium (DUO-NEB) 2.5 MG-0.5 MG/3 ML  3 mL Nebulization Q4H RT    levoFLOXacin (LEVAQUIN) 750 mg in D5W IVPB  750 mg IntraVENous Q24H    midazolam (VERSED) injection 2 mg  2 mg IntraVENous NOW    insulin lispro (HUMALOG) injection   SubCUTAneous Q6H       Lines: All central lines examined by me. No signs of erythema, induration, discharge. Feeding Tube:    PEG tube to low intermittent suction                      Peripheral Intravenous Line: 3 sites noted   Drain(s):PEG tube     Airway:  Airway - Endotracheal Tube 08/16/17 (Active)   Insertion Depth (cm) 22 cm 8/16/2017  9:48 AM   Line Gary Lips 8/16/2017  9:48 AM   Side Secured Device; Right 8/16/2017  9:48 AM   Site Assessment Clean, dry, & intact 8/16/2017  9:48 AM           Objective:   Vital Signs:    Visit Vitals    /67    Pulse (!) 110    Temp 98.4 °F (36.9 °C)    Resp 24    Wt 73.5 kg (162 lb 1.6 oz)    SpO2 100%    BMI 25.39 kg/m2               Temp (24hrs), Av.4 °F (36.9 °C), Min:98.4 °F (36.9 °C), Max:98.4 °F (36.9 °C)       Intake/Output:   Last shift:           Last 3 shifts:      No intake or output data in the 24 hours ending 17 1145    Last 3 Recorded Weights in this Encounter    17 0938   Weight: 73.5 kg (162 lb 1.6 oz)       Ventilator Settings:  Mode Rate Tidal Volume Pressure FiO2 PEEP   Assist control   500 ml    40 % 5 cm H20     Peak airway pressure: 30 cm H2O    Plateau pressure:     Tidal volume:    Minute ventilation: 11 l/min   SPO2 100     ARDS network Guidelines: Lung protective strategy and Plateau pressure goals less than or equal to 30    Telemetry:normal sinus rhythm    Physical Exam:     General: sedated on vent  Head: atraumatic, normocephalic  Eye: PERRLA, no scleral icterus, no pallor, no cyanosis  Nose: no sinus tenderness, no erythema, no induration, no discharge  Throat: no tonsillar enlargement, no erythema, no exudates, no oral thrush  Neck: large irregular mass more under R mandible   Lung: adequate air entry bilateral equal, no bronchospasm  Heart: S1 S2 present, no murmur, no gallop, tachycardia  Abdomen: soft, . PEG tube in place   LE: no pedal edema, no cyanosis, no clubbing, 2+ peripheral pulses in DP  Lymphatic: mass with irregular firm on palpation more underneath R Mandible  No supra clavicle lymph nodes  Neurologic:sedated on vent      Data:         Chemistry Recent Labs      17   0910   GLU  168*   NA  130*   K  4.9   CL  93*   CO2  37*   BUN  14   CREA  0.78   CA  9.0   MG  2.1   AGAP  0*   BUCR  18   AP  92   TP  7.6   ALB  3.6   GLOB  4.0   AGRAT  0.9        Lactic Acid Lactic acid   Date Value Ref Range Status   2017 1.4 0.4 - 2.0 MMOL/L Final     No results for input(s): LAC in the last 72 hours.      Liver Enzymes Protein, total   Date Value Ref Range Status   08/16/2017 7.6 6.4 - 8.2 g/dL Final     Albumin   Date Value Ref Range Status   08/16/2017 3.6 3.4 - 5.0 g/dL Final     Globulin   Date Value Ref Range Status   08/16/2017 4.0 2.0 - 4.0 g/dL Final     A-G Ratio   Date Value Ref Range Status   08/16/2017 0.9 0.8 - 1.7   Final     AST (SGOT)   Date Value Ref Range Status   08/16/2017 38 (H) 15 - 37 U/L Final     Alk.  phosphatase   Date Value Ref Range Status   08/16/2017 92 45 - 117 U/L Final     Recent Labs      08/16/17   0910   TP  7.6   ALB  3.6   GLOB  4.0   AGRAT  0.9   SGOT  38*   AP  92        CBC w/Diff Recent Labs      08/16/17   0910   WBC  8.0   RBC  3.72*   HGB  12.9*   HCT  38.3   PLT  185   GRANS  69   LYMPH  18*   EOS  1        Cardiac Enzymes Lab Results   Component Value Date/Time    CPK 85 08/16/2017 09:10 AM    CKMB 2.9 08/16/2017 09:10 AM    CKND1 3.4 08/16/2017 09:10 AM    TROIQ 0.03 08/16/2017 09:10 AM        BNP No results found for: BNP, BNPP, XBNPT     Coagulation Recent Labs      08/16/17   0910   PTP  11.5   INR  0.9         Thyroid  No results found for: T4, T3U, TSH, TSHEXT       Lipid Panel No results found for: CHOL, CHOLPOCT, CHOLX, CHLST, CHOLV, 512926, HDL, LDL, LDLC, DLDLP, 717311, VLDLC, VLDL, TGLX, TRIGL, TRIGP, TGLPOCT, CHHD, CHHDX     ABG Recent Labs      08/16/17   0925   PHI  7.478*   PCO2I  45.7*   PO2I  332*   HCO3I  33.9*   FIO2I  100        Urinalysis Lab Results   Component Value Date/Time    Color YELLOW 08/16/2017 09:39 AM    Appearance CLOUDY 08/16/2017 09:39 AM    Specific gravity 1.011 08/16/2017 09:39 AM    pH (UA) 8.0 08/16/2017 09:39 AM    Protein 100 08/16/2017 09:39 AM    Glucose 100 08/16/2017 09:39 AM    Ketone NEGATIVE  08/16/2017 09:39 AM    Bilirubin NEGATIVE  08/16/2017 09:39 AM    Urobilinogen 0.2 08/16/2017 09:39 AM    Nitrites NEGATIVE  08/16/2017 09:39 AM    Leukocyte Esterase NEGATIVE  08/16/2017 09:39 AM    Epithelial cells NEGATIVE  08/16/2017 09:39 AM    Bacteria FEW 08/16/2017 09:39 AM WBC 0 to 3 08/16/2017 09:39 AM    RBC 0 to 3 08/16/2017 09:39 AM        Micro  No results for input(s): SDES, CULT in the last 72 hours. No results for input(s): CULT in the last 72 hours. XR (Most Recent). CXR reviewed by me and compared with previous CXR   Results from Hospital Encounter encounter on 08/16/17   XR CHEST PORT   Narrative EXAM: Chest x-ray single AP view    INDICATION: ET tube placement. Shortness of breath. COMPARISON: 8/3/2017    _____________    FINDINGS: Tip of endotracheal tube is 6 cm above the reji    The lungs are clear. The cardiomediastinal silhouette is within normal limits. No pleural effusion. No acute osseus abnormality. _____________         Impression IMPRESSION:     Tip of endotracheal tube is 6 cm above the reji. No acute cardiopulmonary disease. CT (Most Recent)   Results from Hospital Encounter encounter on 08/03/17   CT NECK SOFT TISSUE W CONT   Narrative CT neck    History: Pulsatile mass, throat cancer, difficulty swallowing    Comparison: No prior study    Technique: Multiple axial CT images of the neck were obtained extending from the  skull base to the upper thorax after administration of IV contrast. Coronal and  sagittal reformations were performed. One or more dose reduction techniques  were used on this CT: automated exposure control, adjustment of the mAs and/or  kVp according to patient's size, and iterative reconstruction techniques. The  specific techniques utilized on this CT exam have been documented in the  patient's electronic medical record. Findings:    A discrete focal masslike lesion is not identified. There is mucosal thickening  identified along the epiglottis and the aryepiglottic folds in the hypopharynx  with overall edematous appearance. There is mild amount of stranding/hypodensity  seen along the preepiglottic fat likely representing postradiation changes given  history.  This mucosal thickening and edematous changes extends inferiorly  towards the supraglottic region. No discrete focal laryngeal masslike lesion is  identified although again overall edematous appearance seen. No subglottic  lesion is identified. No adenopathy is present. No cystic/necrotic nodes are identified. Atherosclerotic calcifications are present along the bilateral carotid siphons. Poor dentition is present with numerous absent teeth. The thyroid gland is  unremarkable. Degenerative changes are seen in the spine. 3 mm nodular density is present anteriorly right upper lobe axial image 6. Paraseptal and centrilobular emphysematous changes are present in the bilateral  lung apices. Impression IMPRESSION:    1. No discrete masslike lesion is identified to correlate with history of  pulsatile mass. 2. Diffuse mucosal thickening and edematous changes in the hypopharyngeal and  laryngeal regions most likely representing postradiation changes given history. No definite discrete masslike lesion is seen to suggest residual/recurrent  tumor. No prior comparison study. No adenopathy based on size criteria. 3. Bilateral carotid bifurcation atherosclerotic calcifications. EKG No results found for this or any previous visit. ECHO No results found for this or any previous visit. PFT No flowsheet data found.      Other ASA reactivity:   Pre-albumin:   Ionized Calcium:   NH4:   T3, FT4:  Cortisol:  Urine Osm:  Urine Lytes:   HbA1c:      Recent Results (from the past 24 hour(s))   CBC WITH AUTOMATED DIFF    Collection Time: 08/16/17  9:10 AM   Result Value Ref Range    WBC 8.0 4.6 - 13.2 K/uL    RBC 3.72 (L) 4.70 - 5.50 M/uL    HGB 12.9 (L) 13.0 - 16.0 g/dL    HCT 38.3 36.0 - 48.0 %    .0 (H) 74.0 - 97.0 FL    MCH 34.7 (H) 24.0 - 34.0 PG    MCHC 33.7 31.0 - 37.0 g/dL    RDW 14.3 11.6 - 14.5 %    PLATELET 589 607 - 462 K/uL    MPV 9.6 9.2 - 11.8 FL    NEUTROPHILS 69 40 - 73 %    LYMPHOCYTES 18 (L) 21 - 52 %    MONOCYTES 12 (H) 3 - 10 %    EOSINOPHILS 1 0 - 5 %    BASOPHILS 0 0 - 2 %    ABS. NEUTROPHILS 5.5 1.8 - 8.0 K/UL    ABS. LYMPHOCYTES 1.4 0.9 - 3.6 K/UL    ABS. MONOCYTES 1.0 0.05 - 1.2 K/UL    ABS. EOSINOPHILS 0.1 0.0 - 0.4 K/UL    ABS. BASOPHILS 0.0 0.0 - 0.06 K/UL    DF AUTOMATED     PROTHROMBIN TIME + INR    Collection Time: 08/16/17  9:10 AM   Result Value Ref Range    Prothrombin time 11.5 11.5 - 15.2 sec    INR 0.9 0.8 - 1.2     METABOLIC PANEL, COMPREHENSIVE    Collection Time: 08/16/17  9:10 AM   Result Value Ref Range    Sodium 130 (L) 136 - 145 mmol/L    Potassium 4.9 3.5 - 5.5 mmol/L    Chloride 93 (L) 100 - 108 mmol/L    CO2 37 (H) 21 - 32 mmol/L    Anion gap 0 (L) 3.0 - 18 mmol/L    Glucose 168 (H) 74 - 99 mg/dL    BUN 14 7.0 - 18 MG/DL    Creatinine 0.78 0.6 - 1.3 MG/DL    BUN/Creatinine ratio 18 12 - 20      GFR est AA >60 >60 ml/min/1.73m2    GFR est non-AA >60 >60 ml/min/1.73m2    Calcium 9.0 8.5 - 10.1 MG/DL    Bilirubin, total 0.4 0.2 - 1.0 MG/DL    ALT (SGPT) 18 16 - 61 U/L    AST (SGOT) 38 (H) 15 - 37 U/L    Alk.  phosphatase 92 45 - 117 U/L    Protein, total 7.6 6.4 - 8.2 g/dL    Albumin 3.6 3.4 - 5.0 g/dL    Globulin 4.0 2.0 - 4.0 g/dL    A-G Ratio 0.9 0.8 - 1.7     MAGNESIUM    Collection Time: 08/16/17  9:10 AM   Result Value Ref Range    Magnesium 2.1 1.6 - 2.6 mg/dL   CARDIAC PANEL,(CK, CKMB & TROPONIN)    Collection Time: 08/16/17  9:10 AM   Result Value Ref Range    CK 85 39 - 308 U/L    CK - MB 2.9 <3.6 ng/ml    CK-MB Index 3.4 0.0 - 4.0 %    Troponin-I, Qt. 0.03 0.00 - 0.06 NG/ML   NT-PRO BNP    Collection Time: 08/16/17  9:10 AM   Result Value Ref Range    NT pro- 0 - 900 PG/ML   POC G3    Collection Time: 08/16/17  9:25 AM   Result Value Ref Range    Device: AMBU      Flow rate (POC) 15 L/M    FIO2 (POC) 100 %    pH (POC) 7.478 (H) 7.35 - 7.45      pCO2 (POC) 45.7 (H) 35.0 - 45.0 MMHG    pO2 (POC) 332 (H) 80 - 100 MMHG    HCO3 (POC) 33.9 (H) 22 - 26 MMOL/L sO2 (POC) 100 (H) 92 - 97 %    Base excess (POC) 10 mmol/L    Allens test (POC) YES      Total resp. rate 27      Site RIGHT RADIAL      Patient temp. 98.6      Specimen type (POC) ARTERIAL      Performed by Sharmaine Mueller    EKG, 12 LEAD, INITIAL    Collection Time: 08/16/17  9:30 AM   Result Value Ref Range    Ventricular Rate 117 BPM    Atrial Rate 117 BPM    P-R Interval 122 ms    QRS Duration 84 ms    Q-T Interval 332 ms    QTC Calculation (Bezet) 463 ms    Calculated P Axis 76 degrees    Calculated R Axis 36 degrees    Calculated T Axis 80 degrees    Diagnosis       Sinus tachycardia  Nonspecific ST and T wave abnormality  Abnormal ECG  When compared with ECG of 03-AUG-2017 00:47,  Nonspecific T wave abnormality no longer evident in Anterior leads     URINALYSIS W/ RFLX MICROSCOPIC    Collection Time: 08/16/17  9:39 AM   Result Value Ref Range    Color YELLOW      Appearance CLOUDY      Specific gravity 1.011 1.005 - 1.030      pH (UA) 8.0 5.0 - 8.0      Protein 100 (A) NEG mg/dL    Glucose 100 (A) NEG mg/dL    Ketone NEGATIVE  NEG mg/dL    Bilirubin NEGATIVE  NEG      Blood TRACE (A) NEG      Urobilinogen 0.2 0.2 - 1.0 EU/dL    Nitrites NEGATIVE  NEG      Leukocyte Esterase NEGATIVE  NEG     URINE MICROSCOPIC ONLY    Collection Time: 08/16/17  9:39 AM   Result Value Ref Range    WBC 0 to 3 0 - 5 /hpf    RBC 0 to 3 0 - 5 /hpf    Epithelial cells NEGATIVE  0 - 5 /lpf    Bacteria FEW (A) NEG /hpf         Best practice : All below core measures reviewed and addressed:   [x] Antibiotic choice. [x] Severe Sepsis bundles follwed; SIRS screen met? Yes [x] Lactic acid ordered- initial and repeat Q6hrs if elevated till normalized. [x] Cultures drawn. [x] Antibiotic administered within 1hr-ICU and 3hrs ED. [x] Large bore IV- 2 sites         CVP      [x] Pressors aim MAP >65mmHg. [x] Steroids. [x] Glycemic control. [x] Infection control. [x] Mech.  Ventilated patients- aim to keep peak plateau pressure 99-22TT H2O.  [x] HOB at >= 30 degree. [x] Stress ulcer prophylaxis. [x] DVT prophylaxis. [x] Central Line Bundle Followed. [x] Marrero Bundle Followed. [x] Ventilator Bundle Followed. (Daily sedation holiday to assess readiness for weaning from ventilator & SBT trial starting at 6.00 am, HOB 30-degree elevation, Chlorhexidine mouth washes & Routine oral care every 4 hours, Renée tube to suction at 20-30 cm H2O, Maintain Renée tube with 5-10ml air every 4 hours, DVT prophylaxis, GI prophylaxis etc.). The patient is: [] acutely ill Risk of deterioration: [x] moderate    [x] critically ill  [x] high     [x]See my orders for details    My assessment, plan of care, findings, medications, side effects etc were discussed with:  [x] Nurse [x] PT/OT    [x] Respiratory therapy []     [x] Family: answered all questions to satisfaction [] Patient: answered all questions to satisfaction   [x] Pharmacist []      [x] Case & management strategies discussed today on multidisciplinary rounds    High complexity decision making was performed during the evaluation of this patient at high risk for decompensation with multiple organ involvement    [x]Total critical care time spent on reviewing the case/medical record/data/notes/EMR/patient examination/documentation/coordinating care with nurse/consultants, exclusive of procedures 50 minutes with complex decision making performed and > 50% time spent in face to face evaluation.       Miriam West MD  8/16/2017

## 2017-08-17 ENCOUNTER — APPOINTMENT (OUTPATIENT)
Dept: GENERAL RADIOLOGY | Age: 52
DRG: 121 | End: 2017-08-17
Attending: INTERNAL MEDICINE
Payer: MEDICAID

## 2017-08-17 ENCOUNTER — APPOINTMENT (OUTPATIENT)
Dept: CT IMAGING | Age: 52
DRG: 121 | End: 2017-08-17
Attending: INTERNAL MEDICINE
Payer: MEDICAID

## 2017-08-17 LAB
ALBUMIN SERPL-MCNC: 2.2 G/DL (ref 3.4–5)
ALBUMIN/GLOB SERPL: 0.7 {RATIO} (ref 0.8–1.7)
ALP SERPL-CCNC: 52 U/L (ref 45–117)
ALT SERPL-CCNC: 17 U/L (ref 16–61)
ANION GAP SERPL CALC-SCNC: 6 MMOL/L (ref 3–18)
ARTERIAL PATENCY WRIST A: YES
AST SERPL-CCNC: 14 U/L (ref 15–37)
ATRIAL RATE: 117 BPM
BASE EXCESS BLD CALC-SCNC: 0 MMOL/L
BDY SITE: ABNORMAL
BILIRUB SERPL-MCNC: 0.2 MG/DL (ref 0.2–1)
BODY TEMPERATURE: 97.5
BUN SERPL-MCNC: 14 MG/DL (ref 7–18)
BUN/CREAT SERPL: 22 (ref 12–20)
CALCIUM SERPL-MCNC: 8.1 MG/DL (ref 8.5–10.1)
CALCULATED P AXIS, ECG09: 76 DEGREES
CALCULATED R AXIS, ECG10: 36 DEGREES
CALCULATED T AXIS, ECG11: 80 DEGREES
CHLORIDE SERPL-SCNC: 104 MMOL/L (ref 100–108)
CO2 SERPL-SCNC: 27 MMOL/L (ref 21–32)
CREAT SERPL-MCNC: 0.64 MG/DL (ref 0.6–1.3)
DIAGNOSIS, 93000: NORMAL
ERYTHROCYTE [DISTWIDTH] IN BLOOD BY AUTOMATED COUNT: 14.4 % (ref 11.6–14.5)
GAS FLOW.O2 O2 DELIVERY SYS: ABNORMAL L/MIN
GAS FLOW.O2 SETTING OXYMISER: 15 BPM
GLOBULIN SER CALC-MCNC: 3 G/DL (ref 2–4)
GLUCOSE BLD STRIP.AUTO-MCNC: 136 MG/DL (ref 70–110)
GLUCOSE BLD STRIP.AUTO-MCNC: 140 MG/DL (ref 70–110)
GLUCOSE BLD STRIP.AUTO-MCNC: 177 MG/DL (ref 70–110)
GLUCOSE SERPL-MCNC: 137 MG/DL (ref 74–99)
HCO3 BLD-SCNC: 25 MMOL/L (ref 22–26)
HCT VFR BLD AUTO: 27.5 % (ref 36–48)
HGB BLD-MCNC: 9.3 G/DL (ref 13–16)
INSPIRATION.DURATION SETTING TIME VENT: 0.9 SEC
MCH RBC QN AUTO: 33.8 PG (ref 24–34)
MCHC RBC AUTO-ENTMCNC: 33.8 G/DL (ref 31–37)
MCV RBC AUTO: 100 FL (ref 74–97)
O2/TOTAL GAS SETTING VFR VENT: 40 %
P-R INTERVAL, ECG05: 122 MS
PCO2 BLD: 37.9 MMHG (ref 35–45)
PEEP RESPIRATORY: 5 CMH2O
PH BLD: 7.42 [PH] (ref 7.35–7.45)
PLATELET # BLD AUTO: 114 K/UL (ref 135–420)
PMV BLD AUTO: 9.4 FL (ref 9.2–11.8)
PO2 BLD: 173 MMHG (ref 80–100)
POTASSIUM SERPL-SCNC: 3.7 MMOL/L (ref 3.5–5.5)
PROT SERPL-MCNC: 5.2 G/DL (ref 6.4–8.2)
Q-T INTERVAL, ECG07: 332 MS
QRS DURATION, ECG06: 84 MS
QTC CALCULATION (BEZET), ECG08: 463 MS
RBC # BLD AUTO: 2.75 M/UL (ref 4.7–5.5)
SAO2 % BLD: 100 % (ref 92–97)
SERVICE CMNT-IMP: ABNORMAL
SODIUM SERPL-SCNC: 137 MMOL/L (ref 136–145)
SPECIMEN TYPE: ABNORMAL
TOTAL RESP. RATE, ITRR: 20
VENTILATION MODE VENT: ABNORMAL
VENTRICULAR RATE, ECG03: 117 BPM
VOLUME CONTROL PLUS IVLCP: YES
VT SETTING VENT: 450 ML
WBC # BLD AUTO: 3.6 K/UL (ref 4.6–13.2)

## 2017-08-17 PROCEDURE — 94640 AIRWAY INHALATION TREATMENT: CPT

## 2017-08-17 PROCEDURE — 71010 XR CHEST PORT: CPT

## 2017-08-17 PROCEDURE — 74011250636 HC RX REV CODE- 250/636: Performed by: INTERNAL MEDICINE

## 2017-08-17 PROCEDURE — 74011000250 HC RX REV CODE- 250: Performed by: HOSPITALIST

## 2017-08-17 PROCEDURE — 74011250637 HC RX REV CODE- 250/637: Performed by: FAMILY MEDICINE

## 2017-08-17 PROCEDURE — 65610000006 HC RM INTENSIVE CARE

## 2017-08-17 PROCEDURE — 82803 BLOOD GASES ANY COMBINATION: CPT

## 2017-08-17 PROCEDURE — 74011000250 HC RX REV CODE- 250: Performed by: INTERNAL MEDICINE

## 2017-08-17 PROCEDURE — 74011636320 HC RX REV CODE- 636/320: Performed by: HOSPITALIST

## 2017-08-17 PROCEDURE — 94003 VENT MGMT INPAT SUBQ DAY: CPT

## 2017-08-17 PROCEDURE — C9113 INJ PANTOPRAZOLE SODIUM, VIA: HCPCS | Performed by: HOSPITALIST

## 2017-08-17 PROCEDURE — 74011636637 HC RX REV CODE- 636/637: Performed by: HOSPITALIST

## 2017-08-17 PROCEDURE — 85027 COMPLETE CBC AUTOMATED: CPT | Performed by: INTERNAL MEDICINE

## 2017-08-17 PROCEDURE — 36600 WITHDRAWAL OF ARTERIAL BLOOD: CPT

## 2017-08-17 PROCEDURE — 74011250636 HC RX REV CODE- 250/636: Performed by: EMERGENCY MEDICINE

## 2017-08-17 PROCEDURE — 74011000258 HC RX REV CODE- 258: Performed by: INTERNAL MEDICINE

## 2017-08-17 PROCEDURE — 74011250636 HC RX REV CODE- 250/636: Performed by: HOSPITALIST

## 2017-08-17 PROCEDURE — 80053 COMPREHEN METABOLIC PANEL: CPT | Performed by: INTERNAL MEDICINE

## 2017-08-17 PROCEDURE — 36415 COLL VENOUS BLD VENIPUNCTURE: CPT | Performed by: INTERNAL MEDICINE

## 2017-08-17 PROCEDURE — 82962 GLUCOSE BLOOD TEST: CPT

## 2017-08-17 PROCEDURE — 77030018798 HC PMP KT ENTRL FED COVD -A

## 2017-08-17 PROCEDURE — 70491 CT SOFT TISSUE NECK W/DYE: CPT

## 2017-08-17 RX ORDER — IBUPROFEN 200 MG
1 TABLET ORAL DAILY
Status: DISCONTINUED | OUTPATIENT
Start: 2017-08-17 | End: 2017-08-29 | Stop reason: HOSPADM

## 2017-08-17 RX ADMIN — SODIUM CHLORIDE 100 ML/HR: 900 INJECTION, SOLUTION INTRAVENOUS at 08:27

## 2017-08-17 RX ADMIN — SODIUM CHLORIDE 40 MG: 9 INJECTION INTRAMUSCULAR; INTRAVENOUS; SUBCUTANEOUS at 08:26

## 2017-08-17 RX ADMIN — HEPARIN SODIUM 5000 UNITS: 5000 INJECTION, SOLUTION INTRAVENOUS; SUBCUTANEOUS at 12:02

## 2017-08-17 RX ADMIN — IPRATROPIUM BROMIDE AND ALBUTEROL SULFATE 3 ML: .5; 3 SOLUTION RESPIRATORY (INHALATION) at 15:08

## 2017-08-17 RX ADMIN — DEXMEDETOMIDINE HYDROCHLORIDE 0.7 MCG/KG/HR: 100 INJECTION, SOLUTION INTRAVENOUS at 10:35

## 2017-08-17 RX ADMIN — PIPERACILLIN SODIUM,TAZOBACTAM SODIUM 3.38 G: 3; .375 INJECTION, POWDER, FOR SOLUTION INTRAVENOUS at 03:51

## 2017-08-17 RX ADMIN — HEPARIN SODIUM 5000 UNITS: 5000 INJECTION, SOLUTION INTRAVENOUS; SUBCUTANEOUS at 03:52

## 2017-08-17 RX ADMIN — IPRATROPIUM BROMIDE AND ALBUTEROL SULFATE 3 ML: .5; 3 SOLUTION RESPIRATORY (INHALATION) at 05:05

## 2017-08-17 RX ADMIN — SODIUM CHLORIDE 100 ML/HR: 900 INJECTION, SOLUTION INTRAVENOUS at 21:13

## 2017-08-17 RX ADMIN — Medication 10 MG/HR: at 09:34

## 2017-08-17 RX ADMIN — IOPAMIDOL 100 ML: 612 INJECTION, SOLUTION INTRAVENOUS at 14:00

## 2017-08-17 RX ADMIN — Medication 150 MCG/HR: at 11:00

## 2017-08-17 RX ADMIN — Medication 150 MCG/HR: at 22:18

## 2017-08-17 RX ADMIN — METHYLPREDNISOLONE SODIUM SUCCINATE 80 MG: 40 INJECTION, POWDER, FOR SOLUTION INTRAMUSCULAR; INTRAVENOUS at 03:52

## 2017-08-17 RX ADMIN — IPRATROPIUM BROMIDE AND ALBUTEROL SULFATE 3 ML: .5; 3 SOLUTION RESPIRATORY (INHALATION) at 07:24

## 2017-08-17 RX ADMIN — PIPERACILLIN SODIUM,TAZOBACTAM SODIUM 3.38 G: 3; .375 INJECTION, POWDER, FOR SOLUTION INTRAVENOUS at 21:13

## 2017-08-17 RX ADMIN — IPRATROPIUM BROMIDE AND ALBUTEROL SULFATE 3 ML: .5; 3 SOLUTION RESPIRATORY (INHALATION) at 19:31

## 2017-08-17 RX ADMIN — DEXMEDETOMIDINE HYDROCHLORIDE 0.7 MCG/KG/HR: 100 INJECTION, SOLUTION INTRAVENOUS at 18:15

## 2017-08-17 RX ADMIN — HEPARIN SODIUM 5000 UNITS: 5000 INJECTION, SOLUTION INTRAVENOUS; SUBCUTANEOUS at 21:13

## 2017-08-17 RX ADMIN — Medication 10 MG/HR: at 21:16

## 2017-08-17 RX ADMIN — Medication 150 MCG/HR: at 16:31

## 2017-08-17 RX ADMIN — METHYLPREDNISOLONE SODIUM SUCCINATE 80 MG: 40 INJECTION, POWDER, FOR SOLUTION INTRAMUSCULAR; INTRAVENOUS at 11:59

## 2017-08-17 RX ADMIN — INSULIN LISPRO 2 UNITS: 100 INJECTION, SOLUTION INTRAVENOUS; SUBCUTANEOUS at 12:03

## 2017-08-17 RX ADMIN — IPRATROPIUM BROMIDE AND ALBUTEROL SULFATE 3 ML: .5; 3 SOLUTION RESPIRATORY (INHALATION) at 11:14

## 2017-08-17 RX ADMIN — IPRATROPIUM BROMIDE AND ALBUTEROL SULFATE 3 ML: .5; 3 SOLUTION RESPIRATORY (INHALATION) at 23:20

## 2017-08-17 RX ADMIN — LEVOFLOXACIN 750 MG: 5 INJECTION, SOLUTION INTRAVENOUS at 10:35

## 2017-08-17 RX ADMIN — PIPERACILLIN SODIUM,TAZOBACTAM SODIUM 3.38 G: 3; .375 INJECTION, POWDER, FOR SOLUTION INTRAVENOUS at 12:03

## 2017-08-17 RX ADMIN — METHYLPREDNISOLONE SODIUM SUCCINATE 80 MG: 40 INJECTION, POWDER, FOR SOLUTION INTRAMUSCULAR; INTRAVENOUS at 21:13

## 2017-08-17 RX ADMIN — Medication 150 MCG/HR: at 03:56

## 2017-08-17 NOTE — PROGRESS NOTES
Problem: Falls - Risk of  Goal: *Absence of Falls  Document Caryl Fall Risk and appropriate interventions in the flowsheet.    Outcome: Progressing Towards Goal  Fall Risk Interventions:        Mentation Interventions: Door open when patient unattended, Evaluate medications/consider consulting pharmacy, Reorient patient     Medication Interventions: Bed/chair exit alarm     Elimination Interventions: Bed/chair exit alarm, Call light in reach

## 2017-08-17 NOTE — INTERDISCIPLINARY ROUNDS
CRITICAL CARE INTERDISCIPLINARY ROUNDS    Patient Information:    Name:   Warren Boo    Age:   45 y.o. Admission Date:   8/16/2017    Critical Care Day:  2    Surgery Date:      Attending Provider:   Sherry Cantu MD    Surgeon:        Consultant(s):   [address] Physician:  Nazia Ly    Code Status: Prior    Problem List:     Patient Active Problem List   Diagnosis Code    Aspiration into airway T17.908A    Throat cancer ([facility] C14.0    Status post insertion of percutaneous endoscopic gastrostomy (PEG) tube ([facility] Z93.1    Hyponatremia E87.1    Acute respiratory distress R06.00    Hypoxia R09.02    Stridor R06.1    Respiratory depression R06.89    COPD exacerbation ([facility] J44.1    Acute hypoxemic respiratory failure ([facility] J96.01       Principal Problem:  Acute hypoxemic respiratory failure ([facility]    During rounds the following quality care indicators and evidence based practices were addressed :  DVT Prophylaxis and PUD Prophylaxis Marrero Day 1 ([facility] y) ; Central Line Day:na Isolation:na; Antibiotic Stewardship: review; Probiotics Necessary: na    Ventilator Bundle:  Ventilator Bundle Order Set:  y Sedation Vacation na; Spontaneous Breathing Trial na; Restraints y (Order, Necessity, Documentation)  Vent Day: 2    Sepsis Order Set:       Glycemic Control:   Recent Labs      08/17/17   0426  08/16/17   0910   GLU  137*  168*   ;  Recent Labs      08/17/17   0602  08/16/17   1251  08/16/17   0925   PHI  7.425  7.389  7.478*   PCO2I  37.9  47.1*  45.7*   PO2I  173*  98  332*    Adjustments     Acute MI/PCI:   Not applicable    Door to Balloon: Admission Time: 0910      Heart Failure:    Not applicable     SCIP Measures for other Surgeries:   Not applicable     Pneumonia:    Not applicable    Interdisciplinary team rounds were held with the following team membersCare Management, Diabetes Treatment Specialist, Nursing, Nutrition, Pharmacy, Physician and Respiratory Therapy.  Plan of care discussed. Goals of Care/ Recommendations: [facility] still on divert, restraints maintained, start TF via PEG, restraints maintained,     See clinical pathway and/or care plan for interventions and desired outcomes.     Critical Care Discharge Status:  Red

## 2017-08-17 NOTE — PROGRESS NOTES
Pt transported to CT scan on portable vent with no incident, pt placed back on 840 vent at same settings.

## 2017-08-17 NOTE — DIABETES MGMT
GLYCEMIC CONTROL & NUTRITION:      - Discussed in rounds and chart reviewed.  BG currently within target range, A1C wnl  - noted steroids on board, POCT + Humalog corrective coverage orders in place, recommend continue  - noted has PEG, anticipate TF to initiate today    Recent Glucose Results:   Lab Results   Component Value Date/Time     (H) 08/17/2017 04:26 AM    GLUCPOC 136 (H) 08/17/2017 06:12 AM    GLUCPOC 141 (H) 08/16/2017 11:34 PM    GLUCPOC 163 (H) 08/16/2017 05:27 PM       Lab Results   Component Value Date/Time    Hemoglobin A1c 5.6 08/16/2017 12:20 PM             Camron Zuniga, MPH, RD, CDE

## 2017-08-17 NOTE — PROGRESS NOTES
INITIAL NUTRITION ASSESSMENT     RECOMMENDATIONS/PLAN:   Two Sheldon starting at 20ml/hr increasing by 5ml/hr Q4 until goal rate of 47ml/hr is met. Recc Two Sheldon @ 47ml/hr providing total 2089kcals, 87g pro, 731ml H20, 228g CHO, 95g Fat  Recc 340ml Free H20 flushes Q6  Monitor labs/lytes, tube feed tolerance, wt, fluid status, skin integrity    Adult Malnutrition Criteria:     Nutrition assessment was completed by RDN and the patient was found to meet the following malnutrition criteria established by ASPEN/AND:    Adult Malnutrition Guidelines:  SEVERE PROTEIN CALORIE MALNUTRITION IN THE CONTEXT OF CHRONIC ILLNESS  Weight Loss:  >5% x 1 month or >7.5% x 3 months or >10% x 6 months or >20% x 12 months  Muscle Mass: Severe Depletion    Please document MALNUTRITION in Problem List if in agreement. Mira Driver  08/17/17    REASON FOR ASSESSMENT:   []  [x] ICU admission  NUTRITION ASSESSMENT:   Client History: 45 yrs old Male admitted with c/o respiratory distress. Pt is currently intubated in the ICU. Pt was last seen in [facility] 8/4/17. Pt has HTN and stage 3 esophageal Ca. Per ICU rounds today pt is a smoker, drinker, and drug user. Pt has a PEG tube and previously was on Two Sheldon 6 cans a day. Recc Two Sheldon @ 47ml/hr providing total 2089kcals, 87g pro, 731ml H20, 228g CHO, 95g Fat  Recc 340ml Free H20 flushes Q6       PMHx: HTN, Stage 3 esophogeal ca, tobacco abuse   Cultural/[employer] Food Preferences: None Identified    FOOD/NUTRITION HISTORY  Diet History: Pt has a PEG tube and previously was on Two Sheldon 6 cans a day. Food Allergies:  [x] NKFA       Pertinent PTA Medications: Roxicodone, oxycodone, MVI,      NUTRITION INTAKE   Diet Order:  NPO     Average PO Intake:       No data found.      Pertinent Medications:  [x] Reviewed; pepcid, fentanyl, heparin,methylprednisolone, zosyn   Electrolyte Replacement Protocol: []K  []Mg  []PO4    Insulin:  [x] SSI  [] Pre-meal   []  Basal   [] Drip  [] None  Pt expected to meet estimated nutrient needs through next review:          [x]  Yes     [] No; Two Sheldon should meet estimated needs  ANTHROPOMETRICS  Height:   5'7      Weight: 73.5 kg (162 lb 1.6 oz)    BMI:   25.4 kg/m^2  -  overweight (25.0%-29.9% BMI)        Weight change: pt has had 40-50lbs wt loss in the last 4-5 months which is appr. 21% bw                                   Comparison to Reference Standards:  IBW: 148 lbs      %IBW: 109%      AdjBW: n/a    NUTRITION-FOCUSED PHYSICAL ASSESSMENT  Skin: No pressure injury noted. GI: No BM noted    BIOCHEMICAL DATA & MEDICAL TESTS  Pertinent Labs:  [x] Reviewed; Ca-8.1     NUTRITION PRESCRIPTION  Calories:2089kcal/day based on Miffilin x1.6x1.1  Protein: g/day based on 1.4-1.6 g/kg  CHO: 261g/day based on 50% of total energy  Fluid: 2089 ml/day based on 1 kcal/ml       NUTRITION DIAGNOSES:   1. Increased energy needs related to SOB and COPD as evidence by 7lb weight loss since 7/30 and 21% wt loss in last 4-5 months. NUTRITION INTERVENTIONS:   INTERVENTIONS:        GOALS:  1. Two Sheldon starting at 20ml/hr increasing by 5ml/hr Q4 until goal rate of 47ml/hr is met. 1. Meet goal rate by next review 2 days   2. Recc Two Sheldon @ 47ml/hr providing total 2089kcals, 87g pro, 731ml H20, 228g CHO, 95g Fat  Recc 340ml Free H20 flushes Q6   2.  Meet estimated needs by next review 2 days         LEARNING NEEDS (Diet, Supplementation, Food/Nutrient-Drug Interaction):   [x] None Identified  [] Inpatient education provided/documented    [] Identified and patient:  [] Declined     [] Was not appropriate/indicated  NUTRITION MONITORING /EVALUATION:   Adjust EN/PN as appropriate  Follow PO intake  Monitor wt  Monitor renal labs, electrolytes, fluid status    [x] Participated in Interdisciplinary Rounds  [x] [address] [place] [address] Reviewed/Documented  DISCHARGE NUTRITION RECOMMENDATIONS ADDRESSED:     [x] Yes- recommended Two Sheldon via Peg Tube     NUTRITION RISK:     [x]  At risk []  Not currently at risk     Will follow-up per policy.   Asia Gillliand, ASIF  PAGER:  [phone]

## 2017-08-17 NOTE — PROGRESS NOTES
[facility]  Pulmonary, Critical Care, and Sleep Medicine    Name: Sherrie Rai MRN: [id number]   : 1965 Hospital: [facility] [place]   Date: 2017        Critical Care Follow Up                                                [x]I have reviewed the flowsheet and previous days notes. Events, vitals, medications and notes from last 24 hours reviewed. Care plan discussed with staff, patient, family and on multidisciplinary rounds. IMPRESSION:     · Stridor on admission S/P intubation  · ABG stable  · COPD with acute exacerbation  · Throat cancer was treated with Chemotherapy by Oncologist in [facility]  · Tobacco user  · Dysphagia S/P PEG tube  · Hypotension. resolving    · Code status: FULL      RECOMMENDATIONS:   · ventilator management  · Empirically start antibiotics and high dose Steroid  · PEG tube to low intermittent suction  · DVT and GI prophylaxis  ·   · Sedation with Versed and Fentanyl and Precedex  · Await on bed in [place]  ·   · Folys cath  · Vent, Folys cath, ventilator bundle orders. ·   ·   ·        Subjective/History of Present Illness:     Sherrie Rai has been seen and evaluated in ICU/ERPatient is a 45 y.o. male who was brought in to ER with history of throat cancer and recent hospitalization 2 weeks ago after he had cycle of chemotherapy and then developed COPD with acute exacerbation and was also mentioned that he had Dysphagia. He had PEG tube but had another hospitalization after he tried to drink water and aspirated. Anyway according to EMS he was found at home very short of breath, stridor, and trial if intubation was attempted on route. Later in ER he was then intubated by myself and placed on vent then transferred to ICU. He is sedated      The patient is critically ill and can not provide additional history due to Unconsciousness and Ventilated    Await on bed in [facility]    On high doses sedation . Very high tolerance noted.      Review of Systems:  Constitutional: stridor and short of breath according to EMS  Respiratory: positive for stridor  Cardiovascular: negative  Gastrointestinal: negative  Genitourinary:negative     No Known Allergies   Past Medical History:   Diagnosis Date    Hypertension     Ill-defined condition     peg tube in place    Ill-defined condition     chemo    S/P radiation therapy     Throat cancer (HCC)     Tobacco abuse       Current Facility-Administered Medications   Medication Dose Route Frequency    fentaNYL (PF) 900 mcg/30 ml infusion soln  0-200 mcg/hr IntraVENous TITRATE    midazolam in normal saline (VERSED) 1 mg/mL infusion  0-10 mg/hr IntraVENous TITRATE    0.9% sodium chloride infusion  100 mL/hr IntraVENous CONTINUOUS    pantoprazole (PROTONIX) 40 mg in sodium chloride 0.9 % 10 mL injection  40 mg IntraVENous DAILY    albuterol-ipratropium (DUO-NEB) 2.5 MG-0.5 MG/3 ML  3 mL Nebulization Q4H RT    levoFLOXacin (LEVAQUIN) 750 mg in D5W IVPB  750 mg IntraVENous Q24H    insulin lispro (HUMALOG) injection   SubCUTAneous Q6H    methylPREDNISolone (PF) (SOLU-MEDROL) injection 80 mg  80 mg IntraVENous Q8H    piperacillin-tazobactam (ZOSYN) 3.375 g in 0.9% sodium chloride (MBP/ADV) 100 mL MBP  3.375 g IntraVENous Q8H    heparin (porcine) injection 5,000 Units  5,000 Units SubCUTAneous Q8H    dexmedeTOMidine (PRECEDEX) 400 mcg in 0.9% sodium chloride 100 mL infusion  0.2-0.7 mcg/kg/hr IntraVENous TITRATE       Lines: All central lines examined by me. No signs of erythema, induration, discharge. Feeding Tube:    PEG tube to low intermittent suction                      Peripheral Intravenous Line: 3 sites noted   Drain(s):PEG tube     Airway:  Airway - Endotracheal Tube 08/16/17 (Active)   Insertion Depth (cm) 22 cm 8/16/2017  9:48 AM   Line Gary Lips 8/16/2017  9:48 AM   Side Secured Device; Right 8/16/2017  9:48 AM   Site Assessment Clean, dry, & intact 8/16/2017  9:48 AM           Objective:   Vital Signs: Visit Vitals    /74    Pulse 74    Temp 97.5 °F (36.4 °C)    Resp 17    Wt 73.5 kg (162 lb 1.6 oz)    SpO2 99%    BMI 25.39 kg/m2               Temp (24hrs), Av °F (36.7 °C), Min:97.5 °F (36.4 °C), Max:98.4 °F (36.9 °C)       Intake/Output:   Last shift:      701 - 1900  In: -   Out: 300 [Urine:300]    Last 3 shifts: 08/15 1901 - 700  In: -   Out:  [Urine:]      Intake/Output Summary (Last 24 hours) at 17  Last data filed at 17   Gross per 24 hour   Intake                0 ml   Output             2350 ml   Net            -2350 ml       Last 3 Recorded Weights in this Encounter    17 0938   Weight: 73.5 kg (162 lb 1.6 oz)       Ventilator Settings:  Mode Rate Tidal Volume Pressure FiO2 PEEP   VC+   450 ml    30 % 5 cm H20     Peak airway pressure: 17 cm H2O    Plateau pressure:     Tidal volume:    Minute ventilation: 9.03 l/min   SPO2 100     ARDS network Guidelines: Lung protective strategy and Plateau pressure goals less than or equal to 30    Telemetry:normal sinus rhythm    Physical Exam:     General: sedated on vent  Head: atraumatic, normocephalic  Eye: PERRLA, no scleral icterus, no pallor, no cyanosis  Nose: no sinus tenderness, no erythema, no induration, no discharge  Throat: no tonsillar enlargement, no erythema, no exudates, no oral thrush  Neck: large irregular mass more under R mandible   Lung: adequate air entry bilateral equal, no bronchospasm  Heart: S1 S2 present, no murmur, no gallop, tachycardia  Abdomen: soft, .  PEG tube in place   LE: no pedal edema, no cyanosis, no clubbing, 2+ peripheral pulses in DP  Lymphatic: mass with irregular firm on palpation more underneath R Mandible  No supra clavicle lymph nodes  Neurologic:sedated on vent      Data:         Chemistry Recent Labs      17   0426  17   1220  17   0910   GLU  137*   --   168*   NA  137   --   130*   K  3.7   --   4.9   CL  104   -- 93*   CO2  27   --   37*   BUN  14  13  14   CREA  0.64   --   0.78   CA  8.1*   --   9.0   MG   --    --   2.1   AGAP  6   --   0*   BUCR  22*   --   18   AP  52   --   92   TP  5.2*   --   7.6   ALB  2.2*   --   3.6   GLOB  3.0   --   4.0   AGRAT  0.7*   --   0.9        Lactic Acid Lactic acid   Date Value Ref Range Status   08/16/2017 1.4 0.4 - 2.0 MMOL/L Final     Recent Labs      08/16/17   1220   LAC  1.4        Liver Enzymes Protein, total   Date Value Ref Range Status   08/17/2017 5.2 (L) 6.4 - 8.2 g/dL Final     Albumin   Date Value Ref Range Status   08/17/2017 2.2 (L) 3.4 - 5.0 g/dL Final     Globulin   Date Value Ref Range Status   08/17/2017 3.0 2.0 - 4.0 g/dL Final     A-G Ratio   Date Value Ref Range Status   08/17/2017 0.7 (L) 0.8 - 1.7   Final     AST (SGOT)   Date Value Ref Range Status   08/17/2017 14 (L) 15 - 37 U/L Final     Alk.  phosphatase   Date Value Ref Range Status   08/17/2017 52 45 - 117 U/L Final     Recent Labs      08/17/17   0426  08/16/17   0910   TP  5.2*  7.6   ALB  2.2*  3.6   GLOB  3.0  4.0   AGRAT  0.7*  0.9   SGOT  14*  38*   AP  52  92        CBC w/Diff Recent Labs      08/17/17   0426  08/16/17   0910   WBC  3.6*  8.0   RBC  2.75*  3.72*   HGB  9.3*  12.9*   HCT  27.5*  38.3   PLT  114*  185   GRANS   --   69   LYMPH   --   18*   EOS   --   1        Cardiac Enzymes Lab Results   Component Value Date/Time    CPK 85 08/16/2017 09:10 AM    CKMB 2.9 08/16/2017 09:10 AM    CKND1 3.4 08/16/2017 09:10 AM    TROIQ 0.03 08/16/2017 09:10 AM        BNP No results found for: BNP, BNPP, XBNPT     Coagulation Recent Labs      08/16/17   0910   PTP  11.5   INR  0.9         Thyroid  No results found for: T4, T3U, TSH, TSHEXT, TSHEXT       Lipid Panel No results found for: CHOL, CHOLPOCT, CHOLX, CHLST, CHOLV, 528130, HDL, LDL, LDLC, DLDLP, [id number], VLDLC, VLDL, Lisa Foots, CHHD, CHHDX     ABG Recent Labs      08/17/17   0602  08/16/17   1251  08/16/17   0925   PHI  7.425 7.389  7.478*   PCO2I  37.9  47.1*  45.7*   PO2I  173*  98  332*   HCO3I  25.0  28.5*  33.9*   FIO2I  40  40  100        Urinalysis Lab Results   Component Value Date/Time    Color YELLOW 08/16/2017 09:39 AM    Appearance CLOUDY 08/16/2017 09:39 AM    Specific gravity 1.011 08/16/2017 09:39 AM    pH (UA) 8.0 08/16/2017 09:39 AM    Protein 100 08/16/2017 09:39 AM    Glucose 100 08/16/2017 09:39 AM    Ketone NEGATIVE  08/16/2017 09:39 AM    Bilirubin NEGATIVE  08/16/2017 09:39 AM    Urobilinogen 0.2 08/16/2017 09:39 AM    Nitrites NEGATIVE  08/16/2017 09:39 AM    Leukocyte Esterase NEGATIVE  08/16/2017 09:39 AM    Epithelial cells NEGATIVE  08/16/2017 09:39 AM    Bacteria FEW 08/16/2017 09:39 AM    WBC 0 to 3 08/16/2017 09:39 AM    RBC 0 to 3 08/16/2017 09:39 AM        Micro  Recent Labs      08/16/17   1510   CULT  PENDING     Recent Labs      08/16/17   1510   CULT  PENDING        XR (Most Recent). CXR reviewed by me and compared with previous CXR   Results from Hospital Encounter encounter on 08/16/17   XR CHEST PORT   Narrative EXAM: Chest x-ray single AP view    INDICATION: ET tube placement. Shortness of breath. COMPARISON: 8/3/2017    _____________    FINDINGS: Tip of endotracheal tube is 6 cm above the reji    The lungs are clear. The cardiomediastinal silhouette is within normal limits. No pleural effusion. No acute osseus abnormality. _____________         Impression IMPRESSION:     Tip of endotracheal tube is 6 cm above the reji. No acute cardiopulmonary disease.                      CT (Most Recent)   Results from Hospital Encounter encounter on 08/03/17   CT NECK SOFT TISSUE W CONT   Narrative CT neck    History: Pulsatile mass, throat cancer, difficulty swallowing    Comparison: No prior study    Technique: Multiple axial CT images of the neck were obtained extending from the  skull base to the upper thorax after administration of IV contrast. Coronal and  sagittal reformations were performed. One or more dose reduction techniques  were used on this CT: automated exposure control, adjustment of the mAs and/or  kVp according to patient's size, and iterative reconstruction techniques. The  specific techniques utilized on this CT exam have been documented in the  patient's electronic medical record. Findings:    A discrete focal masslike lesion is not identified. There is mucosal thickening  identified along the epiglottis and the aryepiglottic folds in the hypopharynx  with overall edematous appearance. There is mild amount of stranding/hypodensity  seen along the preepiglottic fat likely representing postradiation changes given  history. This mucosal thickening and edematous changes extends inferiorly  towards the supraglottic region. No discrete focal laryngeal masslike lesion is  identified although again overall edematous appearance seen. No subglottic  lesion is identified. No adenopathy is present. No cystic/necrotic nodes are identified. Atherosclerotic calcifications are present along the bilateral carotid siphons. Poor dentition is present with numerous absent teeth. The thyroid gland is  unremarkable. Degenerative changes are seen in the spine. 3 mm nodular density is present anteriorly right upper lobe axial image 6. Paraseptal and centrilobular emphysematous changes are present in the bilateral  lung apices. Impression IMPRESSION:    1. No discrete masslike lesion is identified to correlate with history of  pulsatile mass. 2. Diffuse mucosal thickening and edematous changes in the hypopharyngeal and  laryngeal regions most likely representing postradiation changes given history. No definite discrete masslike lesion is seen to suggest residual/recurrent  tumor. No prior comparison study. No adenopathy based on size criteria. 3. Bilateral carotid bifurcation atherosclerotic calcifications.                  EKG No results found for this or any previous visit.     ECHO No results found for this or any previous visit. PFT No flowsheet data found. Other ASA reactivity:   Pre-albumin:   Ionized Calcium:   NH4:   T3, FT4:  Cortisol:  Urine Osm:  Urine Lytes:   HbA1c:      Recent Results (from the past 24 hour(s))   CBC WITH AUTOMATED DIFF    Collection Time: 08/16/17  9:10 AM   Result Value Ref Range    WBC 8.0 4.6 - 13.2 K/uL    RBC 3.72 (L) 4.70 - 5.50 M/uL    HGB 12.9 (L) 13.0 - 16.0 g/dL    HCT 38.3 36.0 - 48.0 %    .0 (H) 74.0 - 97.0 FL    MCH 34.7 (H) 24.0 - 34.0 PG    MCHC 33.7 31.0 - 37.0 g/dL    RDW 14.3 11.6 - 14.5 %    PLATELET 654 080 - 812 K/uL    MPV 9.6 9.2 - 11.8 FL    NEUTROPHILS 69 40 - 73 %    LYMPHOCYTES 18 (L) 21 - 52 %    MONOCYTES 12 (H) 3 - 10 %    EOSINOPHILS 1 0 - 5 %    BASOPHILS 0 0 - 2 %    ABS. NEUTROPHILS 5.5 1.8 - 8.0 K/UL    ABS. LYMPHOCYTES 1.4 0.9 - 3.6 K/UL    ABS. MONOCYTES 1.0 0.05 - 1.2 K/UL    ABS. EOSINOPHILS 0.1 0.0 - 0.4 K/UL    ABS. BASOPHILS 0.0 0.0 - 0.06 K/UL    DF AUTOMATED     PROTHROMBIN TIME + INR    Collection Time: 08/16/17  9:10 AM   Result Value Ref Range    Prothrombin time 11.5 11.5 - 15.2 sec    INR 0.9 0.8 - 1.2     METABOLIC PANEL, COMPREHENSIVE    Collection Time: 08/16/17  9:10 AM   Result Value Ref Range    Sodium 130 (L) 136 - 145 mmol/L    Potassium 4.9 3.5 - 5.5 mmol/L    Chloride 93 (L) 100 - 108 mmol/L    CO2 37 (H) 21 - 32 mmol/L    Anion gap 0 (L) 3.0 - 18 mmol/L    Glucose 168 (H) 74 - 99 mg/dL    BUN 14 7.0 - 18 MG/DL    Creatinine 0.78 0.6 - 1.3 MG/DL    BUN/Creatinine ratio 18 12 - 20      GFR est AA >60 >60 ml/min/1.73m2    GFR est non-AA >60 >60 ml/min/1.73m2    Calcium 9.0 8.5 - 10.1 MG/DL    Bilirubin, total 0.4 0.2 - 1.0 MG/DL    ALT (SGPT) 18 16 - 61 U/L    AST (SGOT) 38 (H) 15 - 37 U/L    Alk.  phosphatase 92 45 - 117 U/L    Protein, total 7.6 6.4 - 8.2 g/dL    Albumin 3.6 3.4 - 5.0 g/dL    Globulin 4.0 2.0 - 4.0 g/dL    A-G Ratio 0.9 0.8 - 1.7     MAGNESIUM    Collection Time: 08/16/17  9:10 AM   Result Value Ref Range    Magnesium 2.1 1.6 - 2.6 mg/dL   CARDIAC PANEL,(CK, CKMB & TROPONIN)    Collection Time: 08/16/17  9:10 AM   Result Value Ref Range    CK 85 39 - 308 U/L    CK - MB 2.9 <3.6 ng/ml    CK-MB Index 3.4 0.0 - 4.0 %    Troponin-I, Qt. 0.03 0.00 - 0.06 NG/ML   NT-PRO BNP    Collection Time: 08/16/17  9:10 AM   Result Value Ref Range    NT pro- 0 - 900 PG/ML   POC G3    Collection Time: 08/16/17  9:25 AM   Result Value Ref Range    Device: AMBU      Flow rate (POC) 15 L/M    FIO2 (POC) 100 %    pH (POC) 7.478 (H) 7.35 - 7.45      pCO2 (POC) 45.7 (H) 35.0 - 45.0 MMHG    pO2 (POC) 332 (H) 80 - 100 MMHG    HCO3 (POC) 33.9 (H) 22 - 26 MMOL/L    sO2 (POC) 100 (H) 92 - 97 %    Base excess (POC) 10 mmol/L    Allens test (POC) YES      Total resp. rate 27      Site RIGHT RADIAL      Patient temp.  98.6      Specimen type (POC) ARTERIAL      Performed by Cristiana Whitehead    EKG, 12 LEAD, INITIAL    Collection Time: 08/16/17  9:30 AM   Result Value Ref Range    Ventricular Rate 117 BPM    Atrial Rate 117 BPM    P-R Interval 122 ms    QRS Duration 84 ms    Q-T Interval 332 ms    QTC Calculation (Bezet) 463 ms    Calculated P Axis 76 degrees    Calculated R Axis 36 degrees    Calculated T Axis 80 degrees    Diagnosis       Sinus tachycardia  Nonspecific ST and T wave abnormality  Abnormal ECG  When compared with ECG of 03-AUG-2017 00:47,  Nonspecific T wave abnormality no longer evident in Anterior leads     URINALYSIS W/ RFLX MICROSCOPIC    Collection Time: 08/16/17  9:39 AM   Result Value Ref Range    Color YELLOW      Appearance CLOUDY      Specific gravity 1.011 1.005 - 1.030      pH (UA) 8.0 5.0 - 8.0      Protein 100 (A) NEG mg/dL    Glucose 100 (A) NEG mg/dL    Ketone NEGATIVE  NEG mg/dL    Bilirubin NEGATIVE  NEG      Blood TRACE (A) NEG      Urobilinogen 0.2 0.2 - 1.0 EU/dL    Nitrites NEGATIVE  NEG      Leukocyte Esterase NEGATIVE  NEG     URINE MICROSCOPIC ONLY Collection Time: 08/16/17  9:39 AM   Result Value Ref Range    WBC 0 to 3 0 - 5 /hpf    RBC 0 to 3 0 - 5 /hpf    Epithelial cells NEGATIVE  0 - 5 /lpf    Bacteria FEW (A) NEG /hpf   GLUCOSE, POC    Collection Time: 08/16/17 12:09 PM   Result Value Ref Range    Glucose (POC) 224 (H) 70 - 110 mg/dL   TYPE & SCREEN    Collection Time: 08/16/17 12:20 PM   Result Value Ref Range    Crossmatch Expiration 08/19/2017     ABO/Rh(D) Authur Arin POSITIVE     Antibody screen NEG    HEMOGLOBIN A1C WITH EAG    Collection Time: 08/16/17 12:20 PM   Result Value Ref Range    Hemoglobin A1c 5.6 4.5 - 5.6 %    Est. average glucose 114 mg/dL   LACTIC ACID    Collection Time: 08/16/17 12:20 PM   Result Value Ref Range    Lactic acid 1.4 0.4 - 2.0 MMOL/L   BUN    Collection Time: 08/16/17 12:20 PM   Result Value Ref Range    BUN 13 7.0 - 18 MG/DL   POC G3    Collection Time: 08/16/17 12:51 PM   Result Value Ref Range    Device: VENT      FIO2 (POC) 40 %    pH (POC) 7.389 7.35 - 7.45      pCO2 (POC) 47.1 (H) 35.0 - 45.0 MMHG    pO2 (POC) 98 80 - 100 MMHG    HCO3 (POC) 28.5 (H) 22 - 26 MMOL/L    sO2 (POC) 97 92 - 97 %    Base excess (POC) 4 mmol/L    Mode ASSIST CONTROL      Tidal volume 450 ml    Set Rate 15 bpm    PEEP/CPAP (POC) 5.0 cmH2O    Allens test (POC) YES      Total resp. rate 16      Site LEFT RADIAL      Patient temp.  98.6      Specimen type (POC) ARTERIAL      Performed by Valene Postal     Volume control YES     CULTURE, RESPIRATORY/SPUTUM/BRONCH Fredick Anger STAIN    Collection Time: 08/16/17  3:10 PM   Result Value Ref Range    Special Requests: NO SPECIAL REQUESTS      GRAM STAIN 10-25 WBC/lpf      GRAM STAIN <10 EPI/lpf      GRAM STAIN MANY GRAM POSITIVE COCCI IN PAIRS      GRAM STAIN MODERATE GRAM POSITIVE COCCI IN CHAINS      GRAM STAIN FEW GRAM POSITIVE COCCI IN GROUPS      GRAM STAIN FEW GRAM POSITIVE RODS      GRAM STAIN MANY GRAM NEGATIVE RODS      GRAM STAIN MUCUS PRESENT      Culture result: PENDING    GLUCOSE, POC    Collection Time: 08/16/17  5:27 PM   Result Value Ref Range    Glucose (POC) 163 (H) 70 - 110 mg/dL   GLUCOSE, POC    Collection Time: 08/16/17 11:34 PM   Result Value Ref Range    Glucose (POC) 141 (H) 70 - 333 mg/dL   METABOLIC PANEL, COMPREHENSIVE    Collection Time: 08/17/17  4:26 AM   Result Value Ref Range    Sodium 137 136 - 145 mmol/L    Potassium 3.7 3.5 - 5.5 mmol/L    Chloride 104 100 - 108 mmol/L    CO2 27 21 - 32 mmol/L    Anion gap 6 3.0 - 18 mmol/L    Glucose 137 (H) 74 - 99 mg/dL    BUN 14 7.0 - 18 MG/DL    Creatinine 0.64 0.6 - 1.3 MG/DL    BUN/Creatinine ratio 22 (H) 12 - 20      GFR est AA >60 >60 ml/min/1.73m2    GFR est non-AA >60 >60 ml/min/1.73m2    Calcium 8.1 (L) 8.5 - 10.1 MG/DL    Bilirubin, total 0.2 0.2 - 1.0 MG/DL    ALT (SGPT) 17 16 - 61 U/L    AST (SGOT) 14 (L) 15 - 37 U/L    Alk. phosphatase 52 45 - 117 U/L    Protein, total 5.2 (L) 6.4 - 8.2 g/dL    Albumin 2.2 (L) 3.4 - 5.0 g/dL    Globulin 3.0 2.0 - 4.0 g/dL    A-G Ratio 0.7 (L) 0.8 - 1.7     CBC W/O DIFF    Collection Time: 08/17/17  4:26 AM   Result Value Ref Range    WBC 3.6 (L) 4.6 - 13.2 K/uL    RBC 2.75 (L) 4.70 - 5.50 M/uL    HGB 9.3 (L) 13.0 - 16.0 g/dL    HCT 27.5 (L) 36.0 - 48.0 %    .0 (H) 74.0 - 97.0 FL    MCH 33.8 24.0 - 34.0 PG    MCHC 33.8 31.0 - 37.0 g/dL    RDW 14.4 11.6 - 14.5 %    PLATELET 020 (L) 550 - 420 K/uL    MPV 9.4 9.2 - 11.8 FL   POC G3    Collection Time: 08/17/17  6:02 AM   Result Value Ref Range    Device: VENT      FIO2 (POC) 40 %    pH (POC) 7.425 7.35 - 7.45      pCO2 (POC) 37.9 35.0 - 45.0 MMHG    pO2 (POC) 173 (H) 80 - 100 MMHG    HCO3 (POC) 25.0 22 - 26 MMOL/L    sO2 (POC) 100 (H) 92 - 97 %    Base excess (POC) 0 mmol/L    Mode ASSIST CONTROL      Tidal volume 450 ml    Set Rate 15 bpm    PEEP/CPAP (POC) 5 cmH2O    Allens test (POC) YES      Inspiratory Time 0.9 sec    Total resp. rate 20      Site RIGHT RADIAL      Patient temp.  97.5      Specimen type (POC) ARTERIAL      Performed by Melvin Woodbury Volume control plus YES     GLUCOSE, POC    Collection Time: 08/17/17  6:12 AM   Result Value Ref Range    Glucose (POC) 136 (H) 70 - 110 mg/dL         Needing vent mainly because od stridor so he will not be extubated till he goes to [facility] in case further surgery is needed. Will keep high dose   Steroid. Best practice : All below core measures reviewed and addressed:   [x] Antibiotic choice. [x] Severe Sepsis bundles follwed; SIRS screen met? Yes [x] Lactic acid ordered- initial and repeat Q6hrs if elevated till normalized. [x] Cultures drawn. [x] Antibiotic administered within 1hr-ICU and 3hrs ED. [x] Large bore IV- 2 sites         CVP      [x] Pressors aim MAP >65mmHg. [x] Steroids. [x] Glycemic control. [x] Infection control. [x] Mech. Ventilated patients- aim to keep peak plateau pressure 82-14MT H2O. [x] HOB at >= 30 degree. [x] Stress ulcer prophylaxis. [x] DVT prophylaxis. [x] Central Line Bundle Followed. [x] [place] Bundle Followed. [x] Ventilator Bundle Followed. (Daily sedation holiday to assess readiness for weaning from ventilator & SBT trial starting at 6.00 am, HOB 30-degree elevation, Chlorhexidine mouth washes & Routine oral care every 4 hours, [place] tube to suction at 20-30 cm H2O, Maintain [place] tube with 5-10ml air every 4 hours, DVT prophylaxis, GI prophylaxis etc.).     The patient is: [] acutely ill Risk of deterioration: [x] moderate    [x] critically ill  [x] high     [x]See my orders for details    My assessment, plan of care, findings, medications, side effects etc were discussed with:  [x] Nurse [x] PT/OT    [x] Respiratory therapy []     [x] Family: answered all questions to satisfaction [] Patient: answered all questions to satisfaction   [x] Pharmacist []      [x] Case & management strategies discussed today on multidisciplinary rounds    High complexity decision making was performed during the evaluation of this patient at high risk for decompensation with multiple organ involvement    [x]Total critical care time spent on reviewing the case/medical record/data/notes/EMR/patient examination/documentation/coordinating care with nurse/consultants, exclusive of procedures 40 minutes with complex decision making performed and > 50% time spent in face to face evaluation.       Faye Dey MD  8/17/2017

## 2017-08-17 NOTE — PROGRESS NOTES
[facility] transfer center called. [facility] is still on diversion due to high volume at this time. Transfer center instructed us to call back this afternoon.

## 2017-08-17 NOTE — PROGRESS NOTES
Bedside shift change report given to [id number] (oncoming nurse) by Milka Perez RN   (offgoing nurse). Report included the following information SBAR, Kardex and MAR.

## 2017-08-17 NOTE — PROGRESS NOTES
Hospitalist Progress Note    Patient: Monse Morales MRN: [id number]  CSN: [id number]    YOB: 1965  Age: 45 y.o. Sex: male    DOA: 8/16/2017 LOS:  LOS: 1 day          Chief Complaint:    Respiratory Distress    Assessment/Plan     Hospital Problems  Date Reviewed: 8/3/2017          Codes Class Noted POA    COPD exacerbation ([facility] ICD-10-CM: J44.1  ICD-9-CM: 491.21  8/16/2017 Yes        Acute hypoxemic respiratory failure ([facility] ICD-10-CM: J96.01  ICD-9-CM: 518.81  8/16/2017 Yes        Throat cancer ([facility] ICD-10-CM: C14.0  ICD-9-CM: 149.0  7/30/2017 Yes        Status post insertion of percutaneous endoscopic gastrostomy (PEG) tube [facility]) ICD-10-CM: Z93.1  ICD-9-CM: V44.1  7/30/2017 Yes              Monse Morales is a 45 y.o. male who was admitted on 8/16/17 with respiratory distress requiring intubation. He has a history of throat cancer and is being followed by an oncologist at [address] [place] [address] in [place]. The plan is for him to be transferred there when they have a bed available. They are currently on diversion due to high volume. Please call transfer center every day: [phone] to see if a bed is available for him. Vitals have been stable overnight. Initial hypotension has resolved. Oxygenating well. Afebrile. Requiring both precedex, versed, fentanyl for sedation--opiate history. On zosyn + levaquin, solumedrol+duonebs or COPD/questionable aspiration event. SSI. Throat cancer ([facility] (7/30/2017)  - following with Heme/Onc at [address] [place] [address]  - transfer when stable      Status post insertion of percutaneous endoscopic gastrostomy (PEG) tube ([facility] (7/30/2017)  - ?  Aspiration event  - PEG to LIWS      COPD exacerbation ([facility] (8/16/2017)  - steroids, abx, nebs as above      Acute hypoxemic respiratory failure ([facility] (8/16/2017)  - COPD exacerbation +/- aspiration event  - abx + steroids as above  - vent management per pulm/ccm  - continues to smoke  - will likely be a recurring event--needs code status discussion when stabilized/not on 3 medications for sedation      PT/OT when able    CM for DC planning    LINDSAY: [email or web address]; p-heparin; NPO; ppi    Dispo: Transfer to [facility] when willing to accept. Unlikely today. Subjective: Intubated, sedated   Interval summary: No acute events overnight; AFVSS    Review of systems:  Unable to obtain.       Vital signs/Intake and Output:  Visit Vitals    /74    Pulse 74    Temp 97.5 °F (36.4 °C)    Resp 17    Wt 73.5 kg (162 lb 1.6 oz)    SpO2 99%    BMI 25.39 kg/m2     Current Shift:  08/17 0701 - 08/17 1900  In: -   Out: 300 [Urine:300]  Last three shifts:  08/15 1901 - 08/17 0700  In: -   Out: 2050 [Urine:2050]    Exam:    General: sedated on vent  Head: Atraumatic, normocephalic  Eyes: PERRLA, EOMI, sclerae non-icteric  ENT: mucous membranes moist, ET tube in place  Neck: supple, no masses, no lymphadenopathy, no rigidity ; large mass under R mandible  CVS:Regular rate and rhythm, no murmurs, rubs, gallops, or clicks  Lungs:Clear to auscultation bilaterally, no wheezes, rales, or rhonchi  Abdomen: PEG in place soft, Nontender, nondistended, bowel sounds normal throughout  Extremities: atraumatic, pulses 2+ all ext, no edema  Skin: warm, dry, well perfused, without rash; no cyanosis or clubbing  Neuro/Psych: Sedated on vent                Labs: Results:       Chemistry Recent Labs      08/17/17   0426  08/16/17   1220  08/16/17   0910   GLU  137*   --   168*   NA  137   --   130*   K  3.7   --   4.9   CL  104   --   93*   CO2  27   --   37*   BUN  14  13  14   CREA  0.64   --   0.78   CA  8.1*   --   9.0   AGAP  6   --   0*   BUCR  22*   --   18   AP  52   --   92   TP  5.2*   --   7.6   ALB  2.2*   --   3.6   GLOB  3.0   --   4.0   AGRAT  0.7*   --   0.9      CBC w/Diff Recent Labs      08/17/17   0426  08/16/17   0910   WBC  3.6*  8.0   RBC  2.75*  3.72*   HGB  9.3*  12.9*   HCT  27.5*  38.3   PLT  114*  185   GRANS   --   69   LYMPH   --   18*   EOS   -- 1      Cardiac Enzymes Recent Labs      08/16/17   0910   CPK  85   CKND1  3.4      Coagulation Recent Labs      08/16/17   0910   PTP  11.5   INR  0.9       Lipid Panel No results found for: CHOL, CHOLPOCT, CHOLX, CHLST, CHOLV, 181738, HDL, LDL, LDLC, DLDLP, 720517, VLDLC, VLDL, TGLX, TRIGL, TRIGP, TGLPOCT, CHHD, CHHDX   BNP No results for input(s): BNPP in the last 72 hours.    Liver Enzymes Recent Labs      08/17/17   0426   TP  5.2*   ALB  2.2*   AP  52   SGOT  14*      Thyroid Studies No results found for: T4, T3U, TSH, TSHEXT     Procedures/imaging: see electronic medical records for all procedures/Xrays and details which were not copied into this note but were reviewed prior to creation of [id number], DO

## 2017-08-17 NOTE — PROGRESS NOTES
@8430 pt taken over asleep in bed easily aroused  And follows commands ,on versed, fentanyl and Precedex drips. Pt intubated via ET tube ,Peg tube remains on low suction, Marrero in situ draining clear yellow urine. Soft wrist restraints on bilateral hands. Pt attempts to pull on tubes once awake. Assessment done and documented in appropriate flow sheets. Nursing management continues. @2100 Relatives at bedside reminded not to over stimulate  Patient ,no new changes ,requested brief update, they had pt pass code , brief update given . Nursing observation continues. @2300 no changes care continues. @0100 no new developments, vital signs stable, No new developments management continues. @0300 nursing observation continues. No changes . @0500 care continues. @0550 pt awake hitting bed rails kicking up feet attempting to sit up in bed. Precedex  Increased observation continues. @1576 Bedside and Verbal shift change report given to Marisol Espino (oncoming nurse) by Deondre Buck (offgoing nurse). Report included the following information SBAR, Kardex, Intake/Output, MAR, Recent Results and Med Rec Status.    Alarm parameters reviewed, on and audible Appropriate for patient clinical condition

## 2017-08-17 NOTE — WOUND CARE
Attempted to assess pt for monthly prevalence, nurse requesting pt not be assessed at this time. Wound care remains available as needed.

## 2017-08-18 ENCOUNTER — ANESTHESIA (OUTPATIENT)
Dept: SURGERY | Age: 52
DRG: 121 | End: 2017-08-18
Payer: MEDICAID

## 2017-08-18 ENCOUNTER — ANESTHESIA EVENT (OUTPATIENT)
Dept: SURGERY | Age: 52
DRG: 121 | End: 2017-08-18
Payer: MEDICAID

## 2017-08-18 ENCOUNTER — APPOINTMENT (OUTPATIENT)
Dept: GENERAL RADIOLOGY | Age: 52
DRG: 121 | End: 2017-08-18
Attending: INTERNAL MEDICINE
Payer: MEDICAID

## 2017-08-18 PROBLEM — E43 SEVERE PROTEIN-CALORIE MALNUTRITION (GOMEZ: LESS THAN 60% OF STANDARD WEIGHT) (HCC): Status: ACTIVE | Noted: 2017-08-18

## 2017-08-18 LAB
ALBUMIN SERPL-MCNC: 2.2 G/DL (ref 3.4–5)
ALBUMIN/GLOB SERPL: 0.7 {RATIO} (ref 0.8–1.7)
ALP SERPL-CCNC: 53 U/L (ref 45–117)
ALT SERPL-CCNC: 17 U/L (ref 16–61)
ANION GAP SERPL CALC-SCNC: 7 MMOL/L (ref 3–18)
ARTERIAL PATENCY WRIST A: ABNORMAL
ARTERIAL PATENCY WRIST A: YES
AST SERPL-CCNC: 10 U/L (ref 15–37)
BACTERIA SPEC CULT: ABNORMAL
BACTERIA SPEC CULT: ABNORMAL
BASE EXCESS BLD CALC-SCNC: 5 MMOL/L
BASE EXCESS BLD CALC-SCNC: 6 MMOL/L
BDY SITE: ABNORMAL
BDY SITE: ABNORMAL
BILIRUB SERPL-MCNC: 0.2 MG/DL (ref 0.2–1)
BODY TEMPERATURE: 97.4
BODY TEMPERATURE: 98.6
BUN SERPL-MCNC: 17 MG/DL (ref 7–18)
BUN SERPL-MCNC: 20 MG/DL (ref 7–18)
BUN/CREAT SERPL: 29 (ref 12–20)
CALCIUM SERPL-MCNC: 8.2 MG/DL (ref 8.5–10.1)
CHLORIDE SERPL-SCNC: 102 MMOL/L (ref 100–108)
CO2 SERPL-SCNC: 27 MMOL/L (ref 21–32)
CREAT SERPL-MCNC: 0.58 MG/DL (ref 0.6–1.3)
ERYTHROCYTE [DISTWIDTH] IN BLOOD BY AUTOMATED COUNT: 13.8 % (ref 11.6–14.5)
GAS FLOW.O2 O2 DELIVERY SYS: ABNORMAL L/MIN
GAS FLOW.O2 O2 DELIVERY SYS: ABNORMAL L/MIN
GAS FLOW.O2 SETTING OXYMISER: 15 BPM
GLOBULIN SER CALC-MCNC: 3.3 G/DL (ref 2–4)
GLUCOSE BLD STRIP.AUTO-MCNC: 114 MG/DL (ref 70–110)
GLUCOSE BLD STRIP.AUTO-MCNC: 142 MG/DL (ref 70–110)
GLUCOSE BLD STRIP.AUTO-MCNC: 154 MG/DL (ref 70–110)
GLUCOSE BLD STRIP.AUTO-MCNC: 166 MG/DL (ref 70–110)
GLUCOSE SERPL-MCNC: 145 MG/DL (ref 74–99)
GRAM STN SPEC: ABNORMAL
HCO3 BLD-SCNC: 28.8 MMOL/L (ref 22–26)
HCO3 BLD-SCNC: 29.3 MMOL/L (ref 22–26)
HCT VFR BLD AUTO: 30.8 % (ref 36–48)
HGB BLD-MCNC: 10.7 G/DL (ref 13–16)
INSPIRATION.DURATION SETTING TIME VENT: 0.9 SEC
MAGNESIUM SERPL-MCNC: 1.8 MG/DL (ref 1.6–2.6)
MCH RBC QN AUTO: 34.4 PG (ref 24–34)
MCHC RBC AUTO-ENTMCNC: 34.7 G/DL (ref 31–37)
MCV RBC AUTO: 99 FL (ref 74–97)
O2/TOTAL GAS SETTING VFR VENT: 0.3 %
O2/TOTAL GAS SETTING VFR VENT: 30 %
PCO2 BLD: 39.4 MMHG (ref 35–45)
PCO2 BLD: 41.6 MMHG (ref 35–45)
PEEP RESPIRATORY: 5 CMH2O
PEEP RESPIRATORY: 5 CMH2O
PH BLD: 7.45 [PH] (ref 7.35–7.45)
PH BLD: 7.48 [PH] (ref 7.35–7.45)
PHOSPHATE SERPL-MCNC: 2.4 MG/DL (ref 2.5–4.9)
PIP ISTAT,IPIP: 21
PLATELET # BLD AUTO: 121 K/UL (ref 135–420)
PMV BLD AUTO: 9.8 FL (ref 9.2–11.8)
PO2 BLD: 111 MMHG (ref 80–100)
PO2 BLD: 137 MMHG (ref 80–100)
POTASSIUM SERPL-SCNC: 3 MMOL/L (ref 3.5–5.5)
PRESSURE SUPPORT SETTING VENT: 7 CMH2O
PROT SERPL-MCNC: 5.5 G/DL (ref 6.4–8.2)
RBC # BLD AUTO: 3.11 M/UL (ref 4.7–5.5)
SAO2 % BLD: 99 % (ref 92–97)
SAO2 % BLD: 99 % (ref 92–97)
SERVICE CMNT-IMP: ABNORMAL
SODIUM SERPL-SCNC: 136 MMOL/L (ref 136–145)
SPECIMEN TYPE: ABNORMAL
SPECIMEN TYPE: ABNORMAL
TOTAL RESP. RATE, ITRR: 17
TOTAL RESP. RATE, ITRR: 9
VENTILATION MODE VENT: ABNORMAL
VENTILATION MODE VENT: ABNORMAL
VOLUME CONTROL PLUS IVLCP: YES
VT SETTING VENT: 450 ML
WBC # BLD AUTO: 3.5 K/UL (ref 4.6–13.2)

## 2017-08-18 PROCEDURE — 74011000258 HC RX REV CODE- 258: Performed by: INTERNAL MEDICINE

## 2017-08-18 PROCEDURE — 74011636637 HC RX REV CODE- 636/637: Performed by: HOSPITALIST

## 2017-08-18 PROCEDURE — 36415 COLL VENOUS BLD VENIPUNCTURE: CPT | Performed by: INTERNAL MEDICINE

## 2017-08-18 PROCEDURE — 71010 XR CHEST PORT: CPT

## 2017-08-18 PROCEDURE — 74011250636 HC RX REV CODE- 250/636: Performed by: EMERGENCY MEDICINE

## 2017-08-18 PROCEDURE — 36600 WITHDRAWAL OF ARTERIAL BLOOD: CPT

## 2017-08-18 PROCEDURE — 74011250637 HC RX REV CODE- 250/637: Performed by: FAMILY MEDICINE

## 2017-08-18 PROCEDURE — 87070 CULTURE OTHR SPECIMN AEROBIC: CPT | Performed by: HOSPITALIST

## 2017-08-18 PROCEDURE — 77030002996 HC SUT SLK J&J -A: Performed by: OTOLARYNGOLOGY

## 2017-08-18 PROCEDURE — 83735 ASSAY OF MAGNESIUM: CPT | Performed by: INTERNAL MEDICINE

## 2017-08-18 PROCEDURE — 94640 AIRWAY INHALATION TREATMENT: CPT

## 2017-08-18 PROCEDURE — 77030011640 HC PAD GRND REM COVD -A: Performed by: OTOLARYNGOLOGY

## 2017-08-18 PROCEDURE — 76010000138 HC OR TIME 0.5 TO 1 HR: Performed by: OTOLARYNGOLOGY

## 2017-08-18 PROCEDURE — 77010033678 HC OXYGEN DAILY

## 2017-08-18 PROCEDURE — 0CJS8ZZ INSPECTION OF LARYNX, VIA NATURAL OR ARTIFICIAL OPENING ENDOSCOPIC: ICD-10-PCS | Performed by: OTOLARYNGOLOGY

## 2017-08-18 PROCEDURE — 85027 COMPLETE CBC AUTOMATED: CPT | Performed by: INTERNAL MEDICINE

## 2017-08-18 PROCEDURE — 87075 CULTR BACTERIA EXCEPT BLOOD: CPT | Performed by: HOSPITALIST

## 2017-08-18 PROCEDURE — 65610000006 HC RM INTENSIVE CARE

## 2017-08-18 PROCEDURE — 74011250636 HC RX REV CODE- 250/636: Performed by: FAMILY MEDICINE

## 2017-08-18 PROCEDURE — 74011000250 HC RX REV CODE- 250: Performed by: INTERNAL MEDICINE

## 2017-08-18 PROCEDURE — 87070 CULTURE OTHR SPECIMN AEROBIC: CPT | Performed by: OTOLARYNGOLOGY

## 2017-08-18 PROCEDURE — 82962 GLUCOSE BLOOD TEST: CPT

## 2017-08-18 PROCEDURE — 82803 BLOOD GASES ANY COMBINATION: CPT

## 2017-08-18 PROCEDURE — 84100 ASSAY OF PHOSPHORUS: CPT | Performed by: INTERNAL MEDICINE

## 2017-08-18 PROCEDURE — 84520 ASSAY OF UREA NITROGEN: CPT | Performed by: INTERNAL MEDICINE

## 2017-08-18 PROCEDURE — 74011250636 HC RX REV CODE- 250/636: Performed by: INTERNAL MEDICINE

## 2017-08-18 PROCEDURE — 94003 VENT MGMT INPAT SUBQ DAY: CPT

## 2017-08-18 PROCEDURE — 74011250636 HC RX REV CODE- 250/636: Performed by: HOSPITALIST

## 2017-08-18 PROCEDURE — 77030011267 HC ELECTRD BLD COVD -A: Performed by: OTOLARYNGOLOGY

## 2017-08-18 PROCEDURE — 76060000032 HC ANESTHESIA 0.5 TO 1 HR: Performed by: OTOLARYNGOLOGY

## 2017-08-18 PROCEDURE — 77030018836 HC SOL IRR NACL ICUM -A: Performed by: OTOLARYNGOLOGY

## 2017-08-18 PROCEDURE — 74011000250 HC RX REV CODE- 250: Performed by: HOSPITALIST

## 2017-08-18 PROCEDURE — 80053 COMPREHEN METABOLIC PANEL: CPT | Performed by: INTERNAL MEDICINE

## 2017-08-18 RX ORDER — ASPIRIN 325 MG/1
100 TABLET, FILM COATED ORAL DAILY
Status: DISCONTINUED | OUTPATIENT
Start: 2017-08-19 | End: 2017-08-29 | Stop reason: HOSPADM

## 2017-08-18 RX ORDER — POTASSIUM CHLORIDE 7.45 MG/ML
10 INJECTION INTRAVENOUS
Status: COMPLETED | OUTPATIENT
Start: 2017-08-18 | End: 2017-08-19

## 2017-08-18 RX ORDER — GUAIFENESIN 100 MG/5ML
100 SOLUTION ORAL
Status: DISCONTINUED | OUTPATIENT
Start: 2017-08-18 | End: 2017-08-29 | Stop reason: HOSPADM

## 2017-08-18 RX ORDER — SODIUM CHLORIDE 0.9 % (FLUSH) 0.9 %
5-10 SYRINGE (ML) INJECTION AS NEEDED
Status: DISCONTINUED | OUTPATIENT
Start: 2017-08-18 | End: 2017-08-29 | Stop reason: HOSPADM

## 2017-08-18 RX ORDER — POTASSIUM CHLORIDE 7.45 MG/ML
10 INJECTION INTRAVENOUS
Status: CANCELLED | OUTPATIENT
Start: 2017-08-18 | End: 2017-08-18

## 2017-08-18 RX ORDER — SODIUM CHLORIDE 0.9 % (FLUSH) 0.9 %
5-10 SYRINGE (ML) INJECTION EVERY 8 HOURS
Status: DISCONTINUED | OUTPATIENT
Start: 2017-08-18 | End: 2017-08-29 | Stop reason: HOSPADM

## 2017-08-18 RX ORDER — PROPOFOL 10 MG/ML
INJECTION, EMULSION INTRAVENOUS AS NEEDED
Status: DISCONTINUED | OUTPATIENT
Start: 2017-08-18 | End: 2017-08-18 | Stop reason: HOSPADM

## 2017-08-18 RX ORDER — LIDOCAINE HYDROCHLORIDE 20 MG/ML
INJECTION, SOLUTION EPIDURAL; INFILTRATION; INTRACAUDAL; PERINEURAL AS NEEDED
Status: DISCONTINUED | OUTPATIENT
Start: 2017-08-18 | End: 2017-08-18 | Stop reason: HOSPADM

## 2017-08-18 RX ORDER — SODIUM CHLORIDE 9 MG/ML
INJECTION, SOLUTION INTRAVENOUS
Status: DISCONTINUED | OUTPATIENT
Start: 2017-08-18 | End: 2017-08-18 | Stop reason: HOSPADM

## 2017-08-18 RX ORDER — NALOXONE HYDROCHLORIDE 0.4 MG/ML
0.4 INJECTION, SOLUTION INTRAMUSCULAR; INTRAVENOUS; SUBCUTANEOUS AS NEEDED
Status: DISCONTINUED | OUTPATIENT
Start: 2017-08-18 | End: 2017-08-29 | Stop reason: HOSPADM

## 2017-08-18 RX ADMIN — SODIUM CHLORIDE 100 ML/HR: 900 INJECTION, SOLUTION INTRAVENOUS at 17:06

## 2017-08-18 RX ADMIN — HEPARIN SODIUM 5000 UNITS: 5000 INJECTION, SOLUTION INTRAVENOUS; SUBCUTANEOUS at 21:48

## 2017-08-18 RX ADMIN — DEXMEDETOMIDINE HYDROCHLORIDE 0.7 MCG/KG/HR: 100 INJECTION, SOLUTION INTRAVENOUS at 02:28

## 2017-08-18 RX ADMIN — POTASSIUM CHLORIDE 10 MEQ: 10 INJECTION, SOLUTION INTRAVENOUS at 12:28

## 2017-08-18 RX ADMIN — IPRATROPIUM BROMIDE AND ALBUTEROL SULFATE 3 ML: .5; 3 SOLUTION RESPIRATORY (INHALATION) at 05:10

## 2017-08-18 RX ADMIN — POTASSIUM CHLORIDE 10 MEQ: 10 INJECTION, SOLUTION INTRAVENOUS at 13:51

## 2017-08-18 RX ADMIN — FAMOTIDINE 20 MG: 10 INJECTION, SOLUTION INTRAVENOUS at 09:00

## 2017-08-18 RX ADMIN — IPRATROPIUM BROMIDE AND ALBUTEROL SULFATE 3 ML: .5; 3 SOLUTION RESPIRATORY (INHALATION) at 11:04

## 2017-08-18 RX ADMIN — Medication 150 MCG/HR: at 03:51

## 2017-08-18 RX ADMIN — METHYLPREDNISOLONE SODIUM SUCCINATE 80 MG: 40 INJECTION, POWDER, FOR SOLUTION INTRAMUSCULAR; INTRAVENOUS at 03:53

## 2017-08-18 RX ADMIN — FAMOTIDINE 20 MG: 10 INJECTION, SOLUTION INTRAVENOUS at 21:49

## 2017-08-18 RX ADMIN — PIPERACILLIN SODIUM,TAZOBACTAM SODIUM 3.38 G: 3; .375 INJECTION, POWDER, FOR SOLUTION INTRAVENOUS at 17:07

## 2017-08-18 RX ADMIN — Medication 10 ML: at 16:54

## 2017-08-18 RX ADMIN — POTASSIUM CHLORIDE 10 MEQ: 10 INJECTION, SOLUTION INTRAVENOUS at 11:21

## 2017-08-18 RX ADMIN — Medication 150 MCG/HR: at 09:35

## 2017-08-18 RX ADMIN — PROPOFOL 40 MG: 10 INJECTION, EMULSION INTRAVENOUS at 14:41

## 2017-08-18 RX ADMIN — SODIUM CHLORIDE: 9 INJECTION, SOLUTION INTRAVENOUS at 14:22

## 2017-08-18 RX ADMIN — LEVOFLOXACIN 750 MG: 5 INJECTION, SOLUTION INTRAVENOUS at 10:12

## 2017-08-18 RX ADMIN — Medication 10 MG/HR: at 08:05

## 2017-08-18 RX ADMIN — PIPERACILLIN SODIUM,TAZOBACTAM SODIUM 3.38 G: 3; .375 INJECTION, POWDER, FOR SOLUTION INTRAVENOUS at 12:00

## 2017-08-18 RX ADMIN — METHYLPREDNISOLONE SODIUM SUCCINATE 80 MG: 40 INJECTION, POWDER, FOR SOLUTION INTRAMUSCULAR; INTRAVENOUS at 21:49

## 2017-08-18 RX ADMIN — LIDOCAINE HYDROCHLORIDE 60 MG: 20 INJECTION, SOLUTION EPIDURAL; INFILTRATION; INTRACAUDAL; PERINEURAL at 14:37

## 2017-08-18 RX ADMIN — PROPOFOL 60 MG: 10 INJECTION, EMULSION INTRAVENOUS at 14:37

## 2017-08-18 RX ADMIN — Medication: at 16:34

## 2017-08-18 RX ADMIN — POTASSIUM CHLORIDE 10 MEQ: 10 INJECTION, SOLUTION INTRAVENOUS at 10:22

## 2017-08-18 RX ADMIN — PIPERACILLIN SODIUM,TAZOBACTAM SODIUM 3.38 G: 3; .375 INJECTION, POWDER, FOR SOLUTION INTRAVENOUS at 03:54

## 2017-08-18 RX ADMIN — HEPARIN SODIUM 5000 UNITS: 5000 INJECTION, SOLUTION INTRAVENOUS; SUBCUTANEOUS at 03:54

## 2017-08-18 RX ADMIN — METHYLPREDNISOLONE SODIUM SUCCINATE 40 MG: 40 INJECTION, POWDER, FOR SOLUTION INTRAMUSCULAR; INTRAVENOUS at 11:21

## 2017-08-18 RX ADMIN — IPRATROPIUM BROMIDE AND ALBUTEROL SULFATE 3 ML: .5; 3 SOLUTION RESPIRATORY (INHALATION) at 20:39

## 2017-08-18 RX ADMIN — IPRATROPIUM BROMIDE AND ALBUTEROL SULFATE 3 ML: .5; 3 SOLUTION RESPIRATORY (INHALATION) at 07:25

## 2017-08-18 RX ADMIN — IPRATROPIUM BROMIDE AND ALBUTEROL SULFATE 3 ML: .5; 3 SOLUTION RESPIRATORY (INHALATION) at 15:17

## 2017-08-18 RX ADMIN — INSULIN LISPRO 2 UNITS: 100 INJECTION, SOLUTION INTRAVENOUS; SUBCUTANEOUS at 11:39

## 2017-08-18 NOTE — PROGRESS NOTES
Pt received from surgery extubated on NRB- sats 100% changed to 4 l/m nc then down to 3 l/m O2 sats 99%. RN aware.

## 2017-08-18 NOTE — PROGRESS NOTES
Hospitalist Progress Note    Patient: Sherrie Rai MRN: [id number]  CSN: [id number]    YOB: 1965  Age: 45 y.o. Sex: male    DOA: 8/16/2017 LOS:  LOS: 2 days          Chief Complaint:    Respiratory Distress    Assessment/Plan     Hospital Problems  Date Reviewed: 8/3/2017          Codes Class Noted POA    COPD exacerbation ([facility] ICD-10-CM: J44.1  ICD-9-CM: 491.21  8/16/2017 Yes        * (Principal)Acute hypoxemic respiratory failure ([facility] ICD-10-CM: J96.01  ICD-9-CM: 518.81  8/16/2017 Yes        Throat cancer ([facility] ICD-10-CM: C14.0  ICD-9-CM: 149.0  7/30/2017 Yes        Status post insertion of percutaneous endoscopic gastrostomy (PEG) tube [facility]) ICD-10-CM: Z93.1  ICD-9-CM: V44.1  7/30/2017 Yes              Sherrie Rai is a 45 y.o. male who was admitted on 8/16/17 with respiratory distress requiring intubation. He has a history of throat cancer and is being followed by an oncologist at [facility] in [place]. The plan is for him to be transferred there when they have a bed available. They are currently on diversion due to high volume. Please call transfer center every day: [phone] to see if a bed is available for him. Vitals have been stable overnight. Initial hypotension has resolved. Oxygenating well. Afebrile. Requiring both precedex, versed, fentanyl for sedation--opiate history. On zosyn + levaquin, solumedrol+duonebs or COPD/questionable aspiration event. SSI. Throat cancer ([facility] (7/30/2017)  - following with Heme/Onc at [facility]  - transfer doubtful      Status post insertion of percutaneous endoscopic gastrostomy (PEG) tube ([facility] (7/30/2017)  - ?  Aspiration event  - PEG to LIWS      COPD exacerbation ([facility] (8/16/2017)  - steroids, abx, nebs as above      Acute hypoxemic respiratory failure ([facility] (8/16/2017)  - COPD exacerbation +/- aspiration event  - abx + steroids as above  - vent management per pulm/ccm  - continues to smoke  - will likely be a recurring event--needs code status discussion when stabilized/not on 3 medications for sedation    SBT currently--will be attempting to extubate in OR if he passes   - ENT consulting    Fluid and electrolyte protocols initiated    PT/OT when able    CM for DC planning    Dispo: Attempting to extubate today      Subjective: Intubated, sedated     Review of systems:  Unable to obtain.       Vital signs/Intake and Output:  Visit Vitals    /90    Pulse (!) 109    Temp 97.4 °F (36.3 °C)    Resp 15    Wt 73.5 kg (162 lb 1.6 oz)    SpO2 100%    BMI 25.39 kg/m2     Current Shift:     Last three shifts:  08/16 1901 - 08/18 0700  In: 7110 [I.V.:6010]  Out: 2725 [Urine:2725]    Exam:    General: sedated on vent  Head: Atraumatic, normocephalic  Eyes: PERRLA, EOMI, sclerae non-icteric  ENT: mucous membranes moist, ET tube in place  Neck: supple, no masses, no lymphadenopathy, no rigidity ; large mass under R mandible  CVS:Regular rate and rhythm, no murmurs, rubs, gallops, or clicks  Lungs:Clear to auscultation bilaterally, no wheezes, rales, or rhonchi  Abdomen: PEG in place soft, Nontender, nondistended, bowel sounds normal throughout  Extremities: atraumatic, pulses 2+ all ext, no edema  Skin: warm, dry, well perfused, without rash; no cyanosis or clubbing  Neuro/Psych: Sedated on vent                Labs: Results:       Chemistry Recent Labs      08/18/17   0305  08/17/17   0426  08/16/17   1220  08/16/17   0910   GLU  145*  137*   --   168*   NA  136  137   --   130*   K  3.0*  3.7   --   4.9   CL  102  104   --   93*   CO2  27  27   --   37*   BUN  17  14  13  14   CREA  0.58*  0.64   --   0.78   CA  8.2*  8.1*   --   9.0   AGAP  7  6   --   0*   BUCR  29*  22*   --   18   AP  53  52   --   92   TP  5.5*  5.2*   --   7.6   ALB  2.2*  2.2*   --   3.6   GLOB  3.3  3.0   --   4.0   AGRAT  0.7*  0.7*   --   0.9      CBC w/Diff Recent Labs      08/18/17   0305  08/17/17   0426  08/16/17   0910   WBC  3.5*  3.6*  8.0   RBC  3.11*  2.75* 3.72*   HGB  10.7*  9.3*  12.9*   HCT  30.8*  27.5*  38.3   PLT  121*  114*  185   GRANS   --    --   69   LYMPH   --    --   18*   EOS   --    --   1      Cardiac Enzymes Recent Labs      08/16/17   0910   CPK  85   CKND1  3.4      Coagulation Recent Labs      08/16/17   0910   PTP  11.5   INR  0.9       Lipid Panel No results found for: CHOL, CHOLPOCT, CHOLX, CHLST, CHOLV, 926124, HDL, LDL, LDLC, DLDLP, 698213, VLDLC, VLDL, TGLX, TRIGL, TRIGP, TGLPOCT, CHHD, CHHDX   BNP No results for input(s): BNPP in the last 72 hours.    Liver Enzymes Recent Labs      08/18/17   0305   TP  5.5*   ALB  2.2*   AP  53   SGOT  10*      Thyroid Studies No results found for: T4, T3U, TSH, TSHEXT, TSHEXT     Procedures/imaging: see electronic medical records for all procedures/Xrays and details which were not copied into this note but were reviewed prior to creation of [id number], DO

## 2017-08-18 NOTE — INTERDISCIPLINARY ROUNDS
CRITICAL CARE INTERDISCIPLINARY ROUNDS    Patient Information:    Name:   Isidro Crump    Age:   45 y.o. Admission Date:   8/16/2017    Critical Care Day:  3    Surgery Date:      Attending Provider:   Pascual Keller MD    Surgeon:        Consultant(s):   [address] Physician:  Clark Ulloa    Code Status: Prior    Problem List:     Patient Active Problem List   Diagnosis Code    Aspiration into airway T17.908A    Throat cancer ([address] [facility] C14.0    Status post insertion of percutaneous endoscopic gastrostomy (PEG) tube ([address] Z93.1    Hyponatremia E87.1    Acute respiratory distress R06.00    Hypoxia R09.02    Stridor R06.1    Respiratory depression R06.89    COPD exacerbation ([address] J44.1    Acute hypoxemic respiratory failure ([address] J96.01       Principal Problem:  Acute hypoxemic respiratory failure ([facility]    During rounds the following quality care indicators and evidence based practices were addressed :  DVT Prophylaxis and PUD Prophylaxis Marrero Day 2 ([facility]) ; Central Line Day:na Isolation:na;  Antibiotic Stewardship: review; Probiotics Necessary: na    Ventilator Bundle:  Ventilator Bundle Order Set:  y Sedation Vacation na; Spontaneous Breathing Trial na; Restraints y (Order, Necessity, Documentation)  Vent Day: 2    Sepsis Order Set:       Glycemic Control:   Recent Labs      08/18/17   0305  08/17/17   0426  08/16/17   0910   GLU  145*  137*  168*   ;  Recent Labs      08/18/17   1006  08/18/17   0520  08/17/17   0602   PHI  7.448  7.476*  7.425   PCO2I  41.6  39.4  37.9   PO2I  137*  111*  173*    Adjustments     Acute MI/PCI:   Not applicable    Door to Balloon: Admission Time: 0910 Procedure:  (direct laryngoscopy extubation, possible tracheotomy) (08/18/17 0900)    Heart Failure:    Not applicable     SCIP Measures for other Surgeries:   Not applicable     Pneumonia:    Not applicable    Interdisciplinary team rounds were held with the following team members Care Management, Nursing, Nutrition, Pharmacy, Physician and Respiratory Therapy. Plan of care discussed. Goals of Care/ Recommendations: sedation reduced and will shut off Fentanyl. To OR for extubation with Dr. Renzo Viramontes and possible gilles. See clinical pathway and/or care plan for interventions and desired outcomes.     Critical Care Discharge Status:  Red

## 2017-08-18 NOTE — PROGRESS NOTES
NUTRITION FOLLOW-UP    RECOMMENDATIONS/PLAN:   Add Phos & Mg labs  Add Thiamine  Add MVI  Recc restart TwoCal when diet is adv. Two Sheldon starting at 20ml/hr increasing by 5ml/hr Q4 until goal rate of 47ml/hr is met. Recc Two Sheldon @ 47ml/hr providing total 2089kcals, 87g pro, 731ml H20, 228g CHO, 95g Fat  Recc 340ml Free H20 flushes Q6  Monitor labs/lytes, tube feed tolerance, wt, fluid status, skin integrity    Adult Malnutrition Criteria:      Nutrition assessment was completed by RDN and the patient was found to meet the following malnutrition criteria established by ASPEN/AND:     Adult Malnutrition Guidelines:  SEVERE PROTEIN CALORIE MALNUTRITION IN THE CONTEXT OF CHRONIC ILLNESS  Weight Loss:  >5% x 1 month or >7.5% x 3 months or >10% x 6 months or >20% x 12 months  Muscle Mass: Severe Depletion     Please document MALNUTRITION in Problem List if in agreement.     Clara PETERS Edinson  08/18/17     NUTRITION ASSESSMENT:   Client Update: 45 yrs old Male with c/o respiratory distress. Pt is currently intubated in the ICU. Pt was last [facility] in [facility] 8/4/17. Pt has HTN and stage 3 esophageal Ca. Per ICU rounds today pt is a smoker, drinker, and drug user. Pt is in the OR today to see about extubating or possible trach per ICU rounds. Per MD note waiting for a bed at [facility] so that he can be transferred. Pt has a PEG tube and previously was on Two Sheldon 6 cans a day. Recc Two Sheldon @ 47ml/hr providing total 2089kcals, 87g pro, 731ml H20, 228g CHO, 95g Fat  Recc 340ml Free H20 flushes Q6    FOOD/NUTRITION INTAKE   Diet Order:  NPO   Supplements: n/a  Food Allergies: NKFA/  Average PO Intake:      No data found.      Pertinent Medications:  [x] Reviewed; pepcid, methylprednisolone, zosyn, propofol    Insulin:  [x]SSI  []Pre-meal   []Basal    []Drip  []None  Cultural/Episcopalian Food Preferences: None Identified    BIOCHEMICAL DATA & MEDICAL TESTS  Pertinent Labs:  [x] Reviewed; K-3.0  ANTHROPOMETRICS  Height:    5'7     Weight: 73.5 kg (162 lb 1.6 oz)         BMI:   25.4 kg/m^2kg/m^2 overweight (25.0%-29.9% BMI)   Adm Weight: 162 lbs                Weight change: pt has remained the same wt since admission. pt has had 40-50lbs wt loss in the last 4-5 months which is appr. 21% bw    Adjusted Body Weight: n/a     NUTRITION-FOCUSED PHYSICAL ASSESSMENT  Skin: No pressure injury noted      GI: No BM noted    NUTRITION PRESCRIPTION  Calories:2089kcal/day based on Miffilin x1.6x1.1  Protein: g/day based on 1.4-1.6 g/kg  CHO: 261g/day based on 50% of total energy  Fluid: 2089 ml/day based on 1 kcal/ml      NUTRITION DIAGNOSES:   1. Increased energy needs related to SOB and COPD as evidence by 7lb weight loss since 7/30 and 21% wt loss in last 4-5 months.      NUTRITION INTERVENTIONS:   INTERVENTIONS:                                                                                                                                                                         GOALS:  1. Two Sheldon starting at 20ml/hr increasing by 5ml/hr Q4 until goal rate of 47ml/hr is met. 1. Meet goal rate by next review 3 days   2. Recc Two Sheldon @ 47ml/hr providing total 2089kcals, 87g pro, 731ml H20, 228g CHO, 95g Fat  Recc 340ml Free H20 flushes Q6    2. Meet estimated needs by next review 3 days     3.  Add MVI, Thiamine  Order Phos & Mg labs      LEARNING NEEDS (Diet, Supplementation, Food/Nutrient-Drug Interaction):   [x] None Identified   [] Education provided/documented      Identified and patient: [] Declined   [] Was not appropriate/indicated        NUTRITION MONITORING /EVALUATION:   Adjust EN/PN as appropriate  Follow PO intake  Monitor wt  Monitor renal labs, electrolytes, fluid status     Previous Recommendations Implemented: no        Previous Goals Met:  no -pt made NPO      [x] Participated in Interdisciplinary Rounds    [x] Interdisciplinary Care Plan Reviewed  DISCHARGE NUTRITION RECOMMENDATIONS ADDRESSED:      [x] To be determined closer to discharge    NUTRITION RISK:           [x] At risk                        [] Not currently at risk        Will follow-up per policy.   Leida Puga, RD  PAGER:  [phone]

## 2017-08-18 NOTE — PROGRESS NOTES
[facility]  Pulmonary, Critical Care, and Sleep Medicine    Name: Wayne Calhoun MRN: [id number]   : 1965 Hospital: [facility] [place]   Date: 2017        Critical Care Follow Up                                                [x]I have reviewed the flowsheet and previous days notes. Events, vitals, medications and notes from last 24 hours reviewed. Care plan discussed with staff, patient, family and on multidisciplinary rounds. IMPRESSION:     · Stridor on admission S/P intubation still tubed  Going to OR today for extubation and with presence of ENT. · ABG stable  ·   · Throat cancer was treated with Chemotherapy by Oncologist in   · [place] and S/P Radiation  · Tobacco user  · Dysphagia S/P PEG tube  · Hypotension. Resolving  · Ileus  resolving    · Code status: FULL      RECOMMENDATIONS:   · ventilator management  · Empirically start antibiotics and high dose Steroid  · PEG tube clamped for ? Surgery if needed  · DVT and GI prophylaxis  ·   · Folys cath  · Vent, Folys cath, ventilator bundle orders. ·   ·   ·        Subjective/History of Present Illness:     Wayne Calhoun has been seen and evaluated in ICU/ERPatient is a 45 y.o. male who was brought in to ER with history of throat cancer and recent hospitalization 2 weeks ago after he had cycle of chemotherapy and then developed COPD with acute exacerbation and was also mentioned that he had Dysphagia. He had PEG tube but had another hospitalization after he tried to drink water and aspirated. Anyway according to EMS he was found at home very short of breath, stridor, and trial if intubation was attempted on route. Later in ER he was then intubated by myself and placed on vent then transferred to ICU. He is sedated      The patient is critically ill and can not provide additional history due to Unconsciousness and Ventilated  On high doses sedation . Very high tolerance noted.       Off sedation and more awake Now on spontaneous breathing   ABG good      Review of Systems:  Constitutional: stridor and short of breath according to EMS  Respiratory: positive for stridor  Cardiovascular: negative  Gastrointestinal: negative  Genitourinary:negative     No Known Allergies   Past Medical History:   Diagnosis Date    Hypertension     Ill-defined condition     peg tube in place    Ill-defined condition     chemo    S/P radiation therapy     Throat cancer (HCC)     Tobacco abuse       Current Facility-Administered Medications   Medication Dose Route Frequency    potassium chloride 10 mEq in 100 ml IVPB  10 mEq IntraVENous Q1H    famotidine (PF) (PEPCID) 20 mg in sodium chloride 0.9 % 10 mL injection  20 mg IntraVENous Q12H    nicotine (NICODERM CQ) 21 mg/24 hr patch 1 Patch  1 Patch TransDERmal DAILY    fentaNYL (PF) 900 mcg/30 ml infusion soln  0-200 mcg/hr IntraVENous TITRATE    midazolam in normal saline (VERSED) 1 mg/mL infusion  0-10 mg/hr IntraVENous TITRATE    0.9% sodium chloride infusion  100 mL/hr IntraVENous CONTINUOUS    albuterol-ipratropium (DUO-NEB) 2.5 MG-0.5 MG/3 ML  3 mL Nebulization Q4H RT    levoFLOXacin (LEVAQUIN) 750 mg in D5W IVPB  750 mg IntraVENous Q24H    insulin lispro (HUMALOG) injection   SubCUTAneous Q6H    methylPREDNISolone (PF) (SOLU-MEDROL) injection 80 mg  80 mg IntraVENous Q8H    piperacillin-tazobactam (ZOSYN) 3.375 g in 0.9% sodium chloride (MBP/ADV) 100 mL MBP  3.375 g IntraVENous Q8H    heparin (porcine) injection 5,000 Units  5,000 Units SubCUTAneous Q8H    dexmedeTOMidine (PRECEDEX) 400 mcg in 0.9% sodium chloride 100 mL infusion  0.2-0.7 mcg/kg/hr IntraVENous TITRATE       Lines: All central lines examined by me. No signs of erythema, induration, discharge.      Feeding Tube:    PEG tube to low intermittent suction                      Peripheral Intravenous Line: 3 sites noted   Drain(s):PEG tube     Airway:  Airway - Endotracheal Tube 08/16/17 (Active)   Insertion Depth (cm) 22 cm 2017  9:48 AM   Line Gary Lips 2017  9:48 AM   Side Secured Device; Right 2017  9:48 AM   Site Assessment Clean, dry, & intact 2017  9:48 AM           Objective:   Vital Signs:    Visit Vitals    /90    Pulse (!) 109    Temp 97.4 °F (36.3 °C)    Resp 15    Wt 73.5 kg (162 lb 1.6 oz)    SpO2 100%    BMI 25.39 kg/m2       O2 Device: Endotracheal tube       Temp (24hrs), Av.2 °F (36.2 °C), Min:97 °F (36.1 °C), Max:97.4 °F (36.3 °C)       Intake/Output:   Last shift:           Last 3 shifts:  1901 -  0700  In: 7110 [I.V.:6010]  Out: 2725 [Urine:2725]      Intake/Output Summary (Last 24 hours) at 17 1046  Last data filed at 17 0607   Gross per 24 hour   Intake          3972.93 ml   Output             1775 ml   Net          2197.93 ml       Last 3 Recorded Weights in this Encounter    17 0938   Weight: 73.5 kg (162 lb 1.6 oz)       Ventilator Settings:  Mode Rate Tidal Volume Pressure FiO2 PEEP   Assist control, VC+   450 ml    30 % 5 cm H20     Peak airway pressure: 19 cm H2O    Plateau pressure:     Tidal volume:    Minute ventilation: 8.94 l/min   SPO2 100     ARDS network Guidelines: Lung protective strategy and Plateau pressure goals less than or equal to 30    Telemetry:normal sinus rhythm    Physical Exam:     General: sedated on vent  Head: atraumatic, normocephalic  Eye: PERRLA, no scleral icterus, no pallor, no cyanosis  Nose: no sinus tenderness, no erythema, no induration, no discharge  Throat: no tonsillar enlargement, no erythema, no exudates, no oral thrush  Neck: large irregular mass more under R mandible   Lung: adequate air entry bilateral equal, no bronchospasm  Heart: S1 S2 present, no murmur, no gallop, tachycardia  Abdomen: soft, .  PEG tube in place   LE: no pedal edema, no cyanosis, no clubbing, 2+ peripheral pulses in DP  Lymphatic: mass with irregular firm on palpation more underneath R Mandible  No supra clavicle lymph nodes  Neurologic:sedated on vent      Data:         Chemistry Recent Labs      08/18/17   0305  08/17/17   0426  08/16/17   1220  08/16/17   0910   GLU  145*  137*   --   168*   NA  136  137   --   130*   K  3.0*  3.7   --   4.9   CL  102  104   --   93*   CO2  27  27   --   37*   BUN  17  14  13  14   CREA  0.58*  0.64   --   0.78   CA  8.2*  8.1*   --   9.0   MG   --    --    --   2.1   AGAP  7  6   --   0*   BUCR  29*  22*   --   18   AP  53  52   --   92   TP  5.5*  5.2*   --   7.6   ALB  2.2*  2.2*   --   3.6   GLOB  3.3  3.0   --   4.0   AGRAT  0.7*  0.7*   --   0.9        Lactic Acid Lactic acid   Date Value Ref Range Status   08/16/2017 1.4 0.4 - 2.0 MMOL/L Final     Recent Labs      08/16/17   1220   LAC  1.4        Liver Enzymes Protein, total   Date Value Ref Range Status   08/18/2017 5.5 (L) 6.4 - 8.2 g/dL Final     Albumin   Date Value Ref Range Status   08/18/2017 2.2 (L) 3.4 - 5.0 g/dL Final     Globulin   Date Value Ref Range Status   08/18/2017 3.3 2.0 - 4.0 g/dL Final     A-G Ratio   Date Value Ref Range Status   08/18/2017 0.7 (L) 0.8 - 1.7   Final     AST (SGOT)   Date Value Ref Range Status   08/18/2017 10 (L) 15 - 37 U/L Final     Alk.  phosphatase   Date Value Ref Range Status   08/18/2017 53 45 - 117 U/L Final     Recent Labs      08/18/17   0305  08/17/17   0426  08/16/17   0910   TP  5.5*  5.2*  7.6   ALB  2.2*  2.2*  3.6   GLOB  3.3  3.0  4.0   AGRAT  0.7*  0.7*  0.9   SGOT  10*  14*  38*   AP  53  52  92        CBC w/Diff Recent Labs      08/18/17   0305  08/17/17   0426  08/16/17   0910   WBC  3.5*  3.6*  8.0   RBC  3.11*  2.75*  3.72*   HGB  10.7*  9.3*  12.9*   HCT  30.8*  27.5*  38.3   PLT  121*  114*  185   GRANS   --    --   69   LYMPH   --    --   18*   EOS   --    --   1        Cardiac Enzymes No results found for: CPK, CKMMB, CKMB, RCK3, CKMBT, CKNDX, CKND1, CHUCKY, TROPT, TROIQ, TAMMY, TROPT, TNIPOC, BNP, BNPP     BNP No results found for: BNP, BNPP, XBNPT     Coagulation Recent Labs      08/16/17   0910   PTP  11.5   INR  0.9         Thyroid  No results found for: T4, T3U, TSH, TSHEXT, TSHEXT       Lipid Panel No results found for: CHOL, CHOLPOCT, CHOLX, CHLST, CHOLV, 296831, HDL, LDL, LDLC, DLDLP, 961726, VLDLC, VLDL, Valdo Lessen, TGLPOCT, CHHD, CHHDX     ABG Recent Labs      08/18/17   1006  08/18/17   0520  08/17/17   0602   PHI  7.448  7.476*  7.425   PCO2I  41.6  39.4  37.9   PO2I  137*  111*  173*   HCO3I  28.8*  29.3*  25.0   FIO2I  30  0.3  40        Urinalysis Lab Results   Component Value Date/Time    Color YELLOW 08/16/2017 09:39 AM    Appearance CLOUDY 08/16/2017 09:39 AM    Specific gravity 1.011 08/16/2017 09:39 AM    pH (UA) 8.0 08/16/2017 09:39 AM    Protein 100 08/16/2017 09:39 AM    Glucose 100 08/16/2017 09:39 AM    Ketone NEGATIVE  08/16/2017 09:39 AM    Bilirubin NEGATIVE  08/16/2017 09:39 AM    Urobilinogen 0.2 08/16/2017 09:39 AM    Nitrites NEGATIVE  08/16/2017 09:39 AM    Leukocyte Esterase NEGATIVE  08/16/2017 09:39 AM    Epithelial cells NEGATIVE  08/16/2017 09:39 AM    Bacteria FEW 08/16/2017 09:39 AM    WBC 0 to 3 08/16/2017 09:39 AM    RBC 0 to 3 08/16/2017 09:39 AM        Micro  Recent Labs      08/16/17   1510   CULT  MANY NORMAL RESPIRATORY FARA  FEW THIERNO ALBICANS*     Recent Labs      08/16/17   1510   CULT  MANY NORMAL RESPIRATORY FARA  FEW THIERNO ALBICANS*        XR (Most Recent). CXR reviewed by me and compared with previous CXR   Results from Hospital Encounter encounter on 08/16/17   XR CHEST PORT   Narrative PORTABLE CHEST AT 0555 HOURS:    Indication: Respiratory failure. Technique:  Portable, erect AP view. Comparison:  08/17/2017    Findings:  Endotracheal tube is approximately 5.5 cm above the reji. Lungs are adequately expanded. No focal consolidation, pulmonary edema or  pneumothorax. Cardiac silhouette is within normal limits. Chronic, healed left  posterior seventh rib fracture.           Impression IMPRESSION:    1. Stable position of endotracheal tube. No acute cardiopulmonary disease. CT (Most Recent)   Results from Hospital Encounter encounter on 08/16/17   CT NECK SOFT TISSUE W CONT   Narrative CT neck    History: Neck mass, history of throat cancer with prior radiation therapy    Comparison: CT neck 8/3/2017    Technique: Multiple axial CT images of the neck were obtained extending from the  skull base to the upper thorax after administration of IV contrast. Coronal and  sagittal reformations were performed. One or more dose reduction techniques  were used on this CT: automated exposure control, adjustment of the mAs and/or  kVp according to patient's size, and iterative reconstruction techniques. The  specific techniques utilized on this CT exam have been documented in the  patient's electronic medical record. Findings:    Endotracheal tube has been placed during the interval. Some layering hyperdense  oral secretions are seen within the oropharynx and hypopharynx. Tip of  endotracheal tube is in the tracheal airway at about the level of the clavicular  heads. Endotracheal tube and secretions does limit evaluation of the laryngeal,  hypopharyngeal, and oropharyngeal regions. No discrete suspicious masslike lesion is seen. There is suggestion of hypodense  thickening of the mucosa of the oropharynx and hypopharynx similar to prior  study. Thickening of the platysma is present with mild fatty stranding across  multiple fascial planes, nonspecific but suspect posttreatment changes. These  findings show no short-term interval change. There is no prior more remote study  for comparison. No adenopathy is present based on size criteria. There are a few scattered very  small subcentimeter anterior and posterior cervical chain nodes. No  cystic/necrotic nodes are identified. The parotid and submandibular glands are  unremarkable.   Mild atherosclerotic calcifications are present particularly  along the carotid bifurcations additional atheromatous plaque is also seen. The  thyroid gland is unremarkable. Degenerative changes are scattered throughout the cervical and visualized  thoracic spine. Poor dentition is present with majority of teeth absent. Visualized paranasal sinuses and mastoid air cells are clear. Changes of centrilobular and paraseptal emphysema are seen in the bilateral lung  apices. No consolidation seen in the lung apices. Impression IMPRESSION:    1. Interval placement of endotracheal tube. Expected layering mucus secretions  within the oropharynx and hypopharynx. 2. No discrete masslike lesion seen to correlate with history of throat cancer. Stable diffuse mildly edematous mucosal thickening and soft tissue stranding  likely reflecting posttreatment changes. No more remote comparison study. 3. No adenopathy based on size criteria. No cystic/necrotic nodes. 4. Emphysema seen in bilateral lung apices without consolidation. EKG No results found for this or any previous visit. ECHO No results found for this or any previous visit. PFT No flowsheet data found.      Other ASA reactivity:   Pre-albumin:   Ionized Calcium:   NH4:   T3, FT4:  Cortisol:  Urine Osm:  Urine Lytes:   HbA1c:      Recent Results (from the past 24 hour(s))   GLUCOSE, POC    Collection Time: 08/17/17 11:50 AM   Result Value Ref Range    Glucose (POC) 177 (H) 70 - 110 mg/dL   GLUCOSE, POC    Collection Time: 08/17/17  6:10 PM   Result Value Ref Range    Glucose (POC) 140 (H) 70 - 110 mg/dL   GLUCOSE, POC    Collection Time: 08/18/17  1:33 AM   Result Value Ref Range    Glucose (POC) 154 (H) 70 - 926 mg/dL   METABOLIC PANEL, COMPREHENSIVE    Collection Time: 08/18/17  3:05 AM   Result Value Ref Range    Sodium 136 136 - 145 mmol/L    Potassium 3.0 (L) 3.5 - 5.5 mmol/L    Chloride 102 100 - 108 mmol/L    CO2 27 21 - 32 mmol/L    Anion gap 7 3.0 - 18 mmol/L Glucose 145 (H) 74 - 99 mg/dL    BUN 17 7.0 - 18 MG/DL    Creatinine 0.58 (L) 0.6 - 1.3 MG/DL    BUN/Creatinine ratio 29 (H) 12 - 20      GFR est AA >60 >60 ml/min/1.73m2    GFR est non-AA >60 >60 ml/min/1.73m2    Calcium 8.2 (L) 8.5 - 10.1 MG/DL    Bilirubin, total 0.2 0.2 - 1.0 MG/DL    ALT (SGPT) 17 16 - 61 U/L    AST (SGOT) 10 (L) 15 - 37 U/L    Alk. phosphatase 53 45 - 117 U/L    Protein, total 5.5 (L) 6.4 - 8.2 g/dL    Albumin 2.2 (L) 3.4 - 5.0 g/dL    Globulin 3.3 2.0 - 4.0 g/dL    A-G Ratio 0.7 (L) 0.8 - 1.7     CBC W/O DIFF    Collection Time: 08/18/17  3:05 AM   Result Value Ref Range    WBC 3.5 (L) 4.6 - 13.2 K/uL    RBC 3.11 (L) 4.70 - 5.50 M/uL    HGB 10.7 (L) 13.0 - 16.0 g/dL    HCT 30.8 (L) 36.0 - 48.0 %    MCV 99.0 (H) 74.0 - 97.0 FL    MCH 34.4 (H) 24.0 - 34.0 PG    MCHC 34.7 31.0 - 37.0 g/dL    RDW 13.8 11.6 - 14.5 %    PLATELET 075 (L) 305 - 420 K/uL    MPV 9.8 9.2 - 11.8 FL   POC G3    Collection Time: 08/18/17  5:20 AM   Result Value Ref Range    Device: VENT      FIO2 (POC) 0.3 %    pH (POC) 7.476 (H) 7.35 - 7.45      pCO2 (POC) 39.4 35.0 - 45.0 MMHG    pO2 (POC) 111 (H) 80 - 100 MMHG    HCO3 (POC) 29.3 (H) 22 - 26 MMOL/L    sO2 (POC) 99 (H) 92 - 97 %    Base excess (POC) 6 mmol/L    Mode ASSIST CONTROL      Tidal volume 450 ml    Set Rate 15 bpm    PEEP/CPAP (POC) 5 cmH2O    PIP (POC) 21      Allens test (POC) N/A      Inspiratory Time 0.9 sec    Total resp.  rate 17      Site RIGHT RADIAL      Patient temp. 97.4      Specimen type (POC) ARTERIAL      Performed by Seymour Beltrán     Volume control plus YES     GLUCOSE, POC    Collection Time: 08/18/17  5:23 AM   Result Value Ref Range    Glucose (POC) 142 (H) 70 - 110 mg/dL   POC G3    Collection Time: 08/18/17 10:06 AM   Result Value Ref Range    Device: VENT      FIO2 (POC) 30 %    pH (POC) 7.448 7.35 - 7.45      pCO2 (POC) 41.6 35.0 - 45.0 MMHG    pO2 (POC) 137 (H) 80 - 100 MMHG    HCO3 (POC) 28.8 (H) 22 - 26 MMOL/L    sO2 (POC) 99 (H) 92 - 97 %    Base excess (POC) 5 mmol/L    Mode CPAP/SPON      PEEP/CPAP (POC) 5.0 cmH2O    Pressure support 7 cmH2O    Allens test (POC) YES      Total resp. rate 9      Site LEFT RADIAL      Patient temp. 98.6      Specimen type (POC) ARTERIAL      Performed by Leno Peterson          Needing vent mainly because od stridor so he will not be extubated till he goes to [facility] in case further surgery is needed. Will keep high dose   Steroid. Best practice : All below core measures reviewed and addressed:   [x] Antibiotic choice. [x] Severe Sepsis bundles follwed; SIRS screen met? Yes [x] Lactic acid ordered- initial and repeat Q6hrs if elevated till normalized. [x] Cultures drawn. [x] Antibiotic administered within 1hr-ICU and 3hrs ED. [x] Large bore IV- 2 sites         CVP      [x] Pressors aim MAP >65mmHg. [x] Steroids. [x] Glycemic control. [x] Infection control. [x] Mech. Ventilated patients- aim to keep peak plateau pressure 72-51NG H2O. [x] HOB at >= 30 degree. [x] Stress ulcer prophylaxis. [x] DVT prophylaxis. [x] Central Line Bundle Followed. [x] Marrero Bundle Followed. [x] Ventilator Bundle Followed. (Daily sedation holiday to assess readiness for weaning from ventilator & SBT trial starting at 6.00 am, HOB 30-degree elevation, Chlorhexidine mouth washes & Routine oral care every 4 hours, [place] tube to suction at 20-30 cm H2O, Maintain [place] tube with 5-10ml air every 4 hours, DVT prophylaxis, GI prophylaxis etc.).     The patient is: [] acutely ill Risk of deterioration: [x] moderate    [x] critically ill  [] high     [x]See my orders for details    My assessment, plan of care, findings, medications, side effects etc were discussed with:  [x] Nurse [x] PT/OT    [x] Respiratory therapy []     [x] Family: answered all questions to satisfaction [] Patient: answered all questions to satisfaction   [x] Pharmacist []      [x] Case & management strategies discussed today on multidisciplinary rounds    High complexity decision making was performed during the evaluation of this patient at high risk for decompensation with multiple organ involvement    [x]Total critical care time spent on reviewing the case/medical record/data/notes/EMR/patient examination/documentation/coordinating care with nurse/consultants, exclusive of procedures 35 minutes with complex decision making performed and > 50% time spent in face to face evaluation.     Plan for hopefully extubation in OR today    Kurt Melo MD  8/18/2017

## 2017-08-18 NOTE — BRIEF OP NOTE
BRIEF OPERATIVE NOTE    Date of Procedure: 8/18/2017   Preoperative Diagnosis: ACUTE HYPOXEMIC RESPIRATORY FAILURE, Laryngeal cancer  Postoperative Diagnosis: ACUTE HYPOXEMIC RESPIRATORY FAILURE, Laryngeal Cancer    Procedure(s):  DIRECT LARYNGOSCOPY   Surgeon(s) and Role:     * Ye Yin MD - Primary         Assistant Staff:       Surgical Staff:  Circ-1: Addison Iraheta  Circ-2: Terri Marte RN  Scrub Tech-1: Rohan [facility]  Scrub Tech-2: Seble Toure  Float Staff: Nolvia Blanc RN  Event Time In   Incision Start 1436   Incision Close 1500     Anesthesia: General   Estimated Blood Loss: none  Specimens:   ID Type Source Tests Collected by Time Destination   1 : TRACHEAL SWAB Body Fluid Mouth CULTURE, AEROBIC, GRAM STAIN Ye Yin MD 8/18/2017 1447 Microbiology   2 : LARYNGEAL SECRETIONS Body Fluid Mouth CULTURE, AEROBIC, GRAM STAIN Ye Yin MD 8/18/2017 1448 Microbiology      Findings: laryngeal mucosa mildly erythematous, viscous secretions, several white lesions right FVC, excellent glottic airway   Complications: NONE  Implants: * No implants in log *

## 2017-08-18 NOTE — ROUTINE PROCESS
TRANSFER - OUT REPORT:    Verbal report given to Ritu White RN(name) on Carolyn Brand by Mia Ramos RN being transferred to ICU(unit) for routine post - op       Report consisted of patients Situation, Background, Assessment and   Recommendations(SBAR). Information from the following report(s) OR Summary and Procedure Summary was reviewed with the receiving nurse. Lines:   Peripheral IV 08/16/17 Right Antecubital (Active)   Site Assessment Intact 8/18/2017  8:00 AM   Phlebitis Assessment 0 8/18/2017  8:00 AM   Infiltration Assessment 0 8/18/2017  8:00 AM   Dressing Status Intact 8/18/2017  8:00 AM   Dressing Type Transparent 8/18/2017  8:00 AM   Hub Color/Line Status Green;Capped;Flushed 8/18/2017  8:00 AM   Action Taken Open ports on tubing capped 8/18/2017  8:00 AM   Alcohol Cap Used Yes 8/18/2017  8:00 AM       Peripheral IV 08/16/17 Left Antecubital (Active)   Site Assessment Intact 8/18/2017  8:00 AM   Phlebitis Assessment 0 8/18/2017  8:00 AM   Infiltration Assessment 0 8/18/2017  8:00 AM   Dressing Status Intact 8/18/2017  8:00 AM   Dressing Type Transparent 8/18/2017  8:00 AM   Hub Color/Line Status Green; Infusing 8/18/2017  8:00 AM   Action Taken Open ports on tubing capped 8/18/2017  8:00 AM   Alcohol Cap Used Yes 8/18/2017  8:00 AM       Peripheral IV 08/16/17 Left Wrist (Active)   Site Assessment Intact 8/18/2017  8:00 AM   Phlebitis Assessment 0 8/18/2017  8:00 AM   Infiltration Assessment 0 8/18/2017  8:00 AM   Dressing Status Intact 8/18/2017  8:00 AM   Dressing Type Transparent 8/18/2017  8:00 AM   Hub Color/Line Status Green; Infusing 8/18/2017  8:00 AM   Action Taken Open ports on tubing capped 8/18/2017  8:00 AM   Alcohol Cap Used Yes 8/18/2017  8:00 AM        Opportunity for questions and clarification was provided.       Patient transported with:   Registered Nurse Blaine Aviles

## 2017-08-18 NOTE — PROGRESS NOTES
Pharmacy Dosing Services:  Zosyn    Indication:  Pneumonia  Previous Regimen Zosyn 3.375 gm IV q8hrs   Serum Creatinine Lab Results   Component Value Date/Time    Creatinine 0.58 08/18/2017 03:05 AM      Creatinine Clearance Estimated Creatinine Clearance: 136.1 mL/min (based on Cr of 0.58). BUN Lab Results   Component Value Date/Time    BUN 20 08/18/2017 12:11 PM         Dose administration notes:   Changed to Zosyn 3.375 gm IV q6hrs per renal dosing protocol    Plan:  Continue to monitor     Gary Ballesteros [place]  [phone]

## 2017-08-18 NOTE — ANESTHESIA PREPROCEDURE EVALUATION
Anesthetic History   No history of anesthetic complications            Review of Systems / Medical History  Patient summary reviewed, nursing notes reviewed and pertinent labs reviewed    Pulmonary    COPD      Smoker    Pertinent negatives: No asthma, recent URI and sleep apnea  Comments: Acute respiratory failure requiring intubation 8/16   Neuro/Psych   Within defined limits           Cardiovascular    Hypertension: well controlled            Pertinent negatives: No past MI, CAD, PAD, dysrhythmias, angina and CHF  Exercise tolerance: >4 METS     GI/Hepatic/Renal  Within defined limits              Endo/Other        Cancer  Pertinent negatives: No diabetes, hypothyroidism, hyperthyroidism, obesity and blood dyscrasia   Other Findings   Comments: H/o throat CA with radiation           Physical Exam    Airway            Comments: Unable to assess because patient is intubated Cardiovascular    Rhythm: regular          Comments: tachy Dental      Comments: Unable to assess because patient is intubated   Pulmonary  Breath sounds clear to auscultation               Abdominal  GI exam deferred       Other Findings            Anesthetic Plan    ASA: 3  Anesthesia type: MAC          Induction: Intravenous  Anesthetic plan and risks discussed with: Patient      History obtained from limited patient interview and chart review. Discussed monitored anesthesia care and possible GA with patient and he acknowledged understanding of the plan.

## 2017-08-18 NOTE — PROGRESS NOTES
1930 Vent and drip settings verified with offgoing nurse. Assisted day shift RN with bed change. Originally patient RASS -1, when moving patient in bed he becomes easily agitated and confused RASS +3. Reoriented. Has calmed down some. Restraints in place to protect patency of lines/tubes. Will continue to monitor. 2000 Assessment completed. See flowsheet. VSS. Restraints in place. RASS -1. Resting comfortably. Obtained phone consent for OR extubation and possible placement of trach with CHRISTINA Sandoval RN from nearest of kin, pt son. Dr. Awilda Echols from [address] [place] [address] oncology made aware of ENT and intensivist plan for extubation tomorrow in OR. Agrees with treatment plan. Bed unavailable at [facility] at this time. 2200 No acute change. Resting comfortably. Pt turned q2 for prevention measures. 0000 Tube feed discontinued for procedure in AM. HOB 30. Resting comfortably. Reassessment completed. See flowsheet. NAD.     0300 Reassessment completed. VSS. NAD. RASS -2.    0500 Resting. NAD. VSS    0545 Chest xray being initiated by techs. Pt became agitated, throwing arms, grabbing rails. Attempted to reorient. Repositioned for comfort.    0600 Has calmed down a bit. RASS +1.    0630 At bedside with Dr. Fidelia Willard. Small scope inserted into nostrils to view airway. At this time physician is comfortable with plan to extubate in OR. Day shift RN provided with number to reach Dr. Fidelia Willard after SBT. 0710 Bedside and Verbal shift change report given to RED Sandoval RN (oncoming nurse) by Jarad Maynard RN   (offgoing nurse). Report included the following information SBAR, Kardex and MAR. VSS. Resting at this time. RT made aware of need for SBT.

## 2017-08-18 NOTE — ROUTINE PROCESS
TRANSFER - IN REPORT:    Verbal report received from Audrey Jones NickiePat (name) on Tanda Gary  being received from ICU (unit) for ordered procedure      Report consisted of patients Situation, Background, Assessment and   Recommendations(SBAR). Information from the following report(s) SBAR, Kardex and MAR was reviewed with the receiving nurse. Opportunity for questions and clarification was provided. Assessment completed upon patients arrival to unit and care assumed.

## 2017-08-18 NOTE — CDMP QUERY
Please clarify if this patient is being treated/managed for:    =>Severe protein calorie malnutrition Per RD per Aspen criteria /Nutrition assessment was completed by RDN and the patient was found to meet the following malnutrition criteria established by ASPEN/AND:     SEVERE PROTEIN CALORIE MALNUTRITION IN THE CONTEXT OF CHRONIC ILLNESS  Weight Loss:  >5% x 1 month or >7.5% x 3 months or >10% x 6 months or >20% x 12 months  Muscle Mass: Severe Depletion    =>Other Explanation of clinical findings  =>Unable to Determine (no explanation of clinical findings)    The medical record reflects the following:    Risk:throat  ca/ dysphagia s/p peg tube    Clinical Indicators:-ANTHROPOMETRICS  Height:   5'7      Weight: 73.5 kg (162 lb 1.6 oz)    BMI:   25.4 kg/m^2  -  overweight (25.0%-29.9% BMI)        Weight change: pt has had 40-50lbs wt loss in the last 4-5 months which is appr. 21% bw      Increased energy needs related to SOB and COPD as evidence by 7lb weight loss since 7/30 and 21% wt loss in last 4-5 months.        Treatment: Two Sheldon starting at 20ml/hr increasing by 5ml/hr Q4 until goal rate of 47ml/hr is met.   Recc Two Sheldon @ 47ml/hr providing total 2089kcals, 87g pro, 731ml H20, 228g CHO, 95g Fat  Recc 340ml Free H20 flushes Q6  Monitor labs/lytes, tube feed tolerance, wt, fluid status, skin integrity        Thank you   Aristides Kelly RN [facility] [phone]

## 2017-08-18 NOTE — PROGRESS NOTES
Initial assessment completed. Arrouasable & follow command by squeezing both hands. On  Fentanyl, versed & precedex drips, see mar for dosages & concentrations. Afebrile. Monitor shows nsr w/out ectopy. Vented w/ spon't respirations. As taken from report consent signed to or for extubation & possible trach. 1000- Dr Sarbjit Gonzalez visited w/ new orders. KCL bolus to start. Called [facility] - possible transfer & spoked w/ Liz Murillo from transport center & no bed avail & does no even the name & to try to call everyday. 1100- Called OR & was told 1400. See pre op checklist.    1200- AA& communicate by writing & off all sedations. Kcl bolus in progres. VS stable. No sob. Suction for same secretions. Afebrile. Monitor now sinus tach w/ rate 120's cause more awake. Marrero emtied 1500cc clear pale yellow urine. Maintained npo. Son called & update of going to or @ 1400.    1400- Dr Alpesh Chappell visited  W/out new order    [id number] OR team came & transfer via icu bed. Report given to [facility]. 1515- Back from OR on nrb, off restraints, AAOx4. Wife visited. B/l soft wrist restraints is off.     1600- C/o of throat pain & paged Dr George Randle & he will order something. Monitor shows low sinus tach. Notified Dr Sarbjit Gonzalez & order swallowing studies. Speech therapy around & spoke to pt, & wilfe & will be back tomorrow. 1634- Pca dilaudid started, see flowsheet. 1800- VS stable. Pain bearable. Friend @ bedside & requested to get  &  notified & cannot be done & to get paperwork from the court. Rest of assessment no changed. 1900- Bedside and Verbal shift change report given to [address] (oncoming nurse) by  Enrique Rios RN (offgoing nurse). Report included the following information SBAR, Kardex, ED Summary, Procedure Summary, Intake/Output, MAR and Recent Results.

## 2017-08-18 NOTE — CONSULTS
Ears/Nose/Throat Consult    Subjective:     Date of Consultation:  August 18, 2017    Referring Physician: Dr. Karthik Collier    History of Present Illness:   Patient is a 45 y.o.  male who is being seen for stridor prior to intubation. He was admitted to the hospital for COPD exacerbation ([facility]  ACUTE HYPOXEMIC RESPIRATORY FAILURE. 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 Primary tumor was located on the left TVC and supraglottic larynx with left Level III node. He has received XRT/chemo for treatment and I am unsure when his treatment was completed. Dr. Toya Ashby noted that he was having difficulty handling his secretions but at his last visit 2-3 weeks ago, he seemed to be doing better. He is currently intubated in the ICU and he could not be transferred to [facility] as they are on diversion. The plan is to examine the airway and possibly extubate later today. Patient Active Problem List    Diagnosis Date Noted    [facility]ation ([facility] 08/16/2017    Acute hypoxemic respiratory failure ([facility] 08/16/2017    Acute respiratory distress 08/03/2017    Hypoxia 08/03/2017    Stridor 08/03/2017    Respiratory depression 08/03/2017    Aspiration into airway 07/30/2017    Throat cancer ([facility] 07/30/2017    Status post insertion of percutaneous endoscopic gastrostomy (PEG) tube ([facility] 07/30/2017    Hyponatremia 07/30/2017     Past Medical History:   Diagnosis Date    Hypertension     Ill-defined condition     peg tube in place    Ill-defined condition     chemo    S/P radiation therapy     Throat cancer ([facility]     Tobacco abuse       No family history on file.    Social History   Substance Use Topics    Smoking status: Current Every Day Smoker     Packs/day: 0.50    Smokeless tobacco: Never Used    Alcohol use No     Past Surgical History:   Procedure Laterality Date    HX HEENT      sinus surgery      Current Facility-Administered Medications   Medication Dose Route Frequency    potassium chloride 10 mEq in 100 ml IVPB  10 mEq IntraVENous Q1H    famotidine (PF) (PEPCID) 20 mg in sodium chloride 0.9 % 10 mL injection  20 mg IntraVENous Q12H    nicotine (NICODERM CQ) 21 mg/24 hr patch 1 Patch  1 Patch TransDERmal DAILY    fentaNYL (PF) 900 mcg/30 ml infusion soln  0-200 mcg/hr IntraVENous TITRATE    midazolam in normal saline (VERSED) 1 mg/mL infusion  0-10 mg/hr IntraVENous TITRATE    0.9% sodium chloride infusion  100 mL/hr IntraVENous CONTINUOUS    albuterol-ipratropium (DUO-NEB) 2.5 MG-0.5 MG/3 ML  3 mL Nebulization Q4H RT    acetaminophen (TYLENOL) tablet 650 mg  650 mg Oral Q6H PRN    levoFLOXacin (LEVAQUIN) 750 mg in D5W IVPB  750 mg IntraVENous Q24H    insulin lispro (HUMALOG) injection   SubCUTAneous Q6H    glucose chewable tablet 16 g  4 Tab Oral PRN    glucagon (GLUCAGEN) injection 1 mg  1 mg IntraMUSCular PRN    dextrose (D50W) injection syrg 12.5-25 g  25-50 mL IntraVENous PRN    methylPREDNISolone (PF) (SOLU-MEDROL) injection 80 mg  80 mg IntraVENous Q8H    piperacillin-tazobactam (ZOSYN) 3.375 g in 0.9% sodium chloride (MBP/ADV) 100 mL MBP  3.375 g IntraVENous Q8H    heparin (porcine) injection 5,000 Units  5,000 Units SubCUTAneous Q8H    dexmedeTOMidine (PRECEDEX) 400 mcg in 0.9% sodium chloride 100 mL infusion  0.2-0.7 mcg/kg/hr IntraVENous TITRATE      No Known Allergies     Review of Systems:  Review of systems not obtained due to patient factors.      Objective:     Patient Vitals for the past 8 hrs:   BP Temp Pulse Resp SpO2   17 0729 - - (!) 109 15 100 %   17 0510 - - 72 15 98 %   17 0500 152/90 - - - -   17 0400 (!) 158/92 - 69 15 99 %   17 0300 (!) 153/92 97.4 °F (36.3 °C) 86 14 98 %     Temp (24hrs), Av.2 °F (36.2 °C), Min:97 °F (36.1 °C), Max:97.4 °F (36.3 °C)     1901 -  0700  In: 7110 [I.V.:6010]  Out: 4709 [Urine:2725]    Physical Exam:   ENT exam: NC/AT, EAC's normal, nose without lesions or discharge, OC-ETT in place, neck without palpable mass, thyroid normal, flexible laryngoscopy needed to visualize hypopharynx. Flexible laryngoscopy: flexible nasopharyngoscope passed through the left nares, septum midline, no polyps or purulence, NP normal, unable to visualize tongue base, epiglottis or glottis due to secretions. He tolerated the procedure well. CT ST neck films reviewed-no active tumor noted. Assessment:     1. Upper airway obstruction-requiring intubation. He will require direct laryngoscopy to visualize/assess airway prior to extubation especially since he has presented with airway issues prior to this. He is currently scheduled for 1 PM but the OR will try to proceed with this as early as possible. 2. Stage III laryngeal SCC- S/P XRT/chemotherapy with no CT evidence of recurrence. Plan:     1. Scheduled for direct laryngoscopy/possible tracheotomy/extubation today.       Signed By: Renaldo Núñez MD     August 18, 2017

## 2017-08-18 NOTE — PERIOP NOTES
Pt transported from ICU to OR# 1 with wrist restraints in place. Pt transported back to ICU following surgical procedure with wrist restraints in place.

## 2017-08-19 ENCOUNTER — APPOINTMENT (OUTPATIENT)
Dept: GENERAL RADIOLOGY | Age: 52
DRG: 121 | End: 2017-08-19
Attending: INTERNAL MEDICINE
Payer: MEDICAID

## 2017-08-19 LAB
ALBUMIN SERPL-MCNC: 2.5 G/DL (ref 3.4–5)
ALBUMIN/GLOB SERPL: 0.8 {RATIO} (ref 0.8–1.7)
ALP SERPL-CCNC: 55 U/L (ref 45–117)
ALT SERPL-CCNC: 24 U/L (ref 16–61)
ANION GAP SERPL CALC-SCNC: 7 MMOL/L (ref 3–18)
AST SERPL-CCNC: 21 U/L (ref 15–37)
BILIRUB SERPL-MCNC: 0.3 MG/DL (ref 0.2–1)
BUN SERPL-MCNC: 19 MG/DL (ref 7–18)
BUN/CREAT SERPL: 28 (ref 12–20)
CALCIUM SERPL-MCNC: 8.4 MG/DL (ref 8.5–10.1)
CHLORIDE SERPL-SCNC: 104 MMOL/L (ref 100–108)
CO2 SERPL-SCNC: 31 MMOL/L (ref 21–32)
CREAT SERPL-MCNC: 0.68 MG/DL (ref 0.6–1.3)
ERYTHROCYTE [DISTWIDTH] IN BLOOD BY AUTOMATED COUNT: 13.9 % (ref 11.6–14.5)
GLOBULIN SER CALC-MCNC: 3.2 G/DL (ref 2–4)
GLUCOSE BLD STRIP.AUTO-MCNC: 100 MG/DL (ref 70–110)
GLUCOSE BLD STRIP.AUTO-MCNC: 111 MG/DL (ref 70–110)
GLUCOSE BLD STRIP.AUTO-MCNC: 129 MG/DL (ref 70–110)
GLUCOSE BLD STRIP.AUTO-MCNC: 134 MG/DL (ref 70–110)
GLUCOSE SERPL-MCNC: 112 MG/DL (ref 74–99)
HCT VFR BLD AUTO: 31.9 % (ref 36–48)
HGB BLD-MCNC: 10.9 G/DL (ref 13–16)
MAGNESIUM SERPL-MCNC: 1.7 MG/DL (ref 1.6–2.6)
MAGNESIUM SERPL-MCNC: 1.9 MG/DL (ref 1.6–2.6)
MCH RBC QN AUTO: 33.7 PG (ref 24–34)
MCHC RBC AUTO-ENTMCNC: 34.2 G/DL (ref 31–37)
MCV RBC AUTO: 98.8 FL (ref 74–97)
PHOSPHATE SERPL-MCNC: 3 MG/DL (ref 2.5–4.9)
PLATELET # BLD AUTO: 133 K/UL (ref 135–420)
PMV BLD AUTO: 9.6 FL (ref 9.2–11.8)
POTASSIUM SERPL-SCNC: 2.6 MMOL/L (ref 3.5–5.5)
POTASSIUM SERPL-SCNC: 3.1 MMOL/L (ref 3.5–5.5)
PROT SERPL-MCNC: 5.7 G/DL (ref 6.4–8.2)
RBC # BLD AUTO: 3.23 M/UL (ref 4.7–5.5)
SODIUM SERPL-SCNC: 142 MMOL/L (ref 136–145)
WBC # BLD AUTO: 5 K/UL (ref 4.6–13.2)

## 2017-08-19 PROCEDURE — 74011000250 HC RX REV CODE- 250: Performed by: INTERNAL MEDICINE

## 2017-08-19 PROCEDURE — 74011000258 HC RX REV CODE- 258: Performed by: INTERNAL MEDICINE

## 2017-08-19 PROCEDURE — 84100 ASSAY OF PHOSPHORUS: CPT | Performed by: INTERNAL MEDICINE

## 2017-08-19 PROCEDURE — 82962 GLUCOSE BLOOD TEST: CPT

## 2017-08-19 PROCEDURE — 74011250636 HC RX REV CODE- 250/636: Performed by: INTERNAL MEDICINE

## 2017-08-19 PROCEDURE — 74011250637 HC RX REV CODE- 250/637: Performed by: FAMILY MEDICINE

## 2017-08-19 PROCEDURE — 94640 AIRWAY INHALATION TREATMENT: CPT

## 2017-08-19 PROCEDURE — 74011250637 HC RX REV CODE- 250/637: Performed by: INTERNAL MEDICINE

## 2017-08-19 PROCEDURE — 74011250636 HC RX REV CODE- 250/636: Performed by: HOSPITALIST

## 2017-08-19 PROCEDURE — 83735 ASSAY OF MAGNESIUM: CPT | Performed by: INTERNAL MEDICINE

## 2017-08-19 PROCEDURE — 74011250637 HC RX REV CODE- 250/637: Performed by: HOSPITALIST

## 2017-08-19 PROCEDURE — 65610000006 HC RM INTENSIVE CARE

## 2017-08-19 PROCEDURE — 74011000250 HC RX REV CODE- 250: Performed by: HOSPITALIST

## 2017-08-19 PROCEDURE — 77030018798 HC PMP KT ENTRL FED COVD -A

## 2017-08-19 PROCEDURE — 71010 XR CHEST PORT: CPT

## 2017-08-19 PROCEDURE — 85027 COMPLETE CBC AUTOMATED: CPT | Performed by: INTERNAL MEDICINE

## 2017-08-19 PROCEDURE — 83735 ASSAY OF MAGNESIUM: CPT | Performed by: HOSPITALIST

## 2017-08-19 PROCEDURE — 77010033678 HC OXYGEN DAILY

## 2017-08-19 PROCEDURE — 36415 COLL VENOUS BLD VENIPUNCTURE: CPT | Performed by: INTERNAL MEDICINE

## 2017-08-19 PROCEDURE — 74011636637 HC RX REV CODE- 636/637: Performed by: FAMILY MEDICINE

## 2017-08-19 PROCEDURE — 84132 ASSAY OF SERUM POTASSIUM: CPT | Performed by: INTERNAL MEDICINE

## 2017-08-19 RX ORDER — POTASSIUM CHLORIDE 7.45 MG/ML
10 INJECTION INTRAVENOUS
Status: COMPLETED | OUTPATIENT
Start: 2017-08-19 | End: 2017-08-19

## 2017-08-19 RX ORDER — MOXIFLOXACIN HYDROCHLORIDE 400 MG/1
400 TABLET ORAL
Status: DISCONTINUED | OUTPATIENT
Start: 2017-08-19 | End: 2017-08-20

## 2017-08-19 RX ORDER — LORAZEPAM 2 MG/ML
0.5 INJECTION INTRAMUSCULAR
Status: DISCONTINUED | OUTPATIENT
Start: 2017-08-19 | End: 2017-08-25

## 2017-08-19 RX ORDER — LISINOPRIL 5 MG/1
5 TABLET ORAL EVERY 12 HOURS
Status: DISCONTINUED | OUTPATIENT
Start: 2017-08-19 | End: 2017-08-29 | Stop reason: HOSPADM

## 2017-08-19 RX ORDER — POTASSIUM CHLORIDE 7.45 MG/ML
10 INJECTION INTRAVENOUS
Status: COMPLETED | OUTPATIENT
Start: 2017-08-19 | End: 2017-08-20

## 2017-08-19 RX ORDER — FAMOTIDINE 20 MG/1
20 TABLET, FILM COATED ORAL 2 TIMES DAILY
Status: DISCONTINUED | OUTPATIENT
Start: 2017-08-19 | End: 2017-08-29 | Stop reason: HOSPADM

## 2017-08-19 RX ORDER — MOXIFLOXACIN HYDROCHLORIDE 400 MG/1
400 TABLET ORAL
Status: DISCONTINUED | OUTPATIENT
Start: 2017-08-19 | End: 2017-08-19

## 2017-08-19 RX ORDER — IPRATROPIUM BROMIDE AND ALBUTEROL SULFATE 2.5; .5 MG/3ML; MG/3ML
3 SOLUTION RESPIRATORY (INHALATION)
Status: DISCONTINUED | OUTPATIENT
Start: 2017-08-19 | End: 2017-08-29 | Stop reason: HOSPADM

## 2017-08-19 RX ORDER — MAGNESIUM SULFATE 1 G/100ML
1 INJECTION INTRAVENOUS ONCE
Status: COMPLETED | OUTPATIENT
Start: 2017-08-19 | End: 2017-08-19

## 2017-08-19 RX ORDER — CLONIDINE 0.2 MG/24H
1 PATCH, EXTENDED RELEASE TRANSDERMAL
Status: DISCONTINUED | OUTPATIENT
Start: 2017-08-19 | End: 2017-08-29 | Stop reason: HOSPADM

## 2017-08-19 RX ADMIN — MULTIPLE VITAMIN LIQUID 30 ML: LIQUID at 10:05

## 2017-08-19 RX ADMIN — HEPARIN SODIUM 5000 UNITS: 5000 INJECTION, SOLUTION INTRAVENOUS; SUBCUTANEOUS at 05:45

## 2017-08-19 RX ADMIN — POTASSIUM CHLORIDE 10 MEQ: 10 INJECTION, SOLUTION INTRAVENOUS at 10:03

## 2017-08-19 RX ADMIN — FAMOTIDINE 20 MG: 10 INJECTION, SOLUTION INTRAVENOUS at 08:43

## 2017-08-19 RX ADMIN — POTASSIUM CHLORIDE 10 MEQ: 10 INJECTION, SOLUTION INTRAVENOUS at 15:09

## 2017-08-19 RX ADMIN — POTASSIUM CHLORIDE 10 MEQ: 10 INJECTION, SOLUTION INTRAVENOUS at 11:09

## 2017-08-19 RX ADMIN — PREDNISONE 50 MG: 20 TABLET ORAL at 10:05

## 2017-08-19 RX ADMIN — IPRATROPIUM BROMIDE AND ALBUTEROL SULFATE 3 ML: .5; 3 SOLUTION RESPIRATORY (INHALATION) at 05:25

## 2017-08-19 RX ADMIN — PIPERACILLIN SODIUM,TAZOBACTAM SODIUM 3.38 G: 3; .375 INJECTION, POWDER, FOR SOLUTION INTRAVENOUS at 05:45

## 2017-08-19 RX ADMIN — LORAZEPAM 0.5 MG: 2 INJECTION INTRAMUSCULAR; INTRAVENOUS at 22:13

## 2017-08-19 RX ADMIN — MOXIFLOXACIN HYDROCHLORIDE 400 MG: 400 TABLET, FILM COATED ORAL at 10:02

## 2017-08-19 RX ADMIN — POTASSIUM CHLORIDE 10 MEQ: 10 INJECTION, SOLUTION INTRAVENOUS at 12:09

## 2017-08-19 RX ADMIN — HEPARIN SODIUM 5000 UNITS: 5000 INJECTION, SOLUTION INTRAVENOUS; SUBCUTANEOUS at 13:10

## 2017-08-19 RX ADMIN — SODIUM CHLORIDE 100 ML/HR: 900 INJECTION, SOLUTION INTRAVENOUS at 10:25

## 2017-08-19 RX ADMIN — POTASSIUM CHLORIDE 10 MEQ: 10 INJECTION, SOLUTION INTRAVENOUS at 14:10

## 2017-08-19 RX ADMIN — PIPERACILLIN SODIUM,TAZOBACTAM SODIUM 3.38 G: 3; .375 INJECTION, POWDER, FOR SOLUTION INTRAVENOUS at 01:12

## 2017-08-19 RX ADMIN — Medication 10 ML: at 15:11

## 2017-08-19 RX ADMIN — Medication 10 ML: at 05:46

## 2017-08-19 RX ADMIN — Medication 10 ML: at 22:15

## 2017-08-19 RX ADMIN — POTASSIUM CHLORIDE 10 MEQ: 10 INJECTION, SOLUTION INTRAVENOUS at 22:13

## 2017-08-19 RX ADMIN — DEXMEDETOMIDINE HYDROCHLORIDE 0.2 MCG/KG/HR: 100 INJECTION, SOLUTION INTRAVENOUS at 19:43

## 2017-08-19 RX ADMIN — Medication 10 ML: at 01:16

## 2017-08-19 RX ADMIN — METHYLPREDNISOLONE SODIUM SUCCINATE 80 MG: 40 INJECTION, POWDER, FOR SOLUTION INTRAMUSCULAR; INTRAVENOUS at 05:45

## 2017-08-19 RX ADMIN — ACETAMINOPHEN 650 MG: 325 SOLUTION ORAL at 17:01

## 2017-08-19 RX ADMIN — POTASSIUM CHLORIDE 10 MEQ: 10 INJECTION, SOLUTION INTRAVENOUS at 23:17

## 2017-08-19 RX ADMIN — MAGNESIUM SULFATE HEPTAHYDRATE 1 G: 1 INJECTION, SOLUTION INTRAVENOUS at 08:39

## 2017-08-19 RX ADMIN — HYDROCODONE BITARTRATE AND ACETAMINOPHEN 5 MG: 7.5; 325 TABLET ORAL at 13:02

## 2017-08-19 RX ADMIN — FAMOTIDINE 20 MG: 20 TABLET ORAL at 22:13

## 2017-08-19 RX ADMIN — IPRATROPIUM BROMIDE AND ALBUTEROL SULFATE 3 ML: .5; 3 SOLUTION RESPIRATORY (INHALATION) at 11:12

## 2017-08-19 RX ADMIN — POTASSIUM CHLORIDE 10 MEQ: 10 INJECTION, SOLUTION INTRAVENOUS at 13:01

## 2017-08-19 RX ADMIN — HEPARIN SODIUM 5000 UNITS: 5000 INJECTION, SOLUTION INTRAVENOUS; SUBCUTANEOUS at 22:25

## 2017-08-19 RX ADMIN — IPRATROPIUM BROMIDE AND ALBUTEROL SULFATE 3 ML: .5; 3 SOLUTION RESPIRATORY (INHALATION) at 00:05

## 2017-08-19 RX ADMIN — HYDROCODONE BITARTRATE AND ACETAMINOPHEN 5 MG: 7.5; 325 TABLET ORAL at 22:13

## 2017-08-19 RX ADMIN — Medication 100 MG: at 08:42

## 2017-08-19 RX ADMIN — IPRATROPIUM BROMIDE AND ALBUTEROL SULFATE 3 ML: .5; 3 SOLUTION RESPIRATORY (INHALATION) at 07:22

## 2017-08-19 NOTE — PROGRESS NOTES
[facility]  Pulmonary, Critical Care, and Sleep Medicine    Name: Taylor Gonzalez MRN: [id number]   : 1965 Hospital: [facility] [place]   Date: 2017        Critical Care Follow Up                                                [x]I have reviewed the flowsheet and previous days notes. Events, vitals, medications and notes from last 24 hours reviewed. Care plan discussed with staff, patient, family and on multidisciplinary rounds. 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 Primary tumor was located on the left TVC and supraglottic larynx with left Level III node. He has received XRT/chemo for treatment and I am unsure when his treatment was completed. ·   ·   · Tobacco user current  · Dysphagia S/P PEG tube    · Code status: FULL      RECOMMENDATIONS:   · Extubated yesterday  Doing well  · Some hoarseness noted Empirically start antibiotics and high dose Steroid  · PEG tube clamped for ? Surgery if needed  · DVT and GI prophylaxis    I think he is ok to be transferred to regular floor  Discussed with him, regarding smoking        Subjective/History of Present Illness:     Taylor Gonzalez has been seen and evaluated in ICU/ERPatient is a 45 y.o. male who was brought in to ER with history of throat cancer and recent hospitalization 2 weeks ago after he had cycle of chemotherapy and then developed COPD with acute exacerbation and was also mentioned that he had Dysphagia. He had PEG tube but had another hospitalization after he tried to drink water and aspirated. Anyway according to EMS he was found at home very short of breath, stridor, and trial if intubation was attempted on route.  Later in ER he was then intubated by myself and placed on vent then transferred to ICU. He is sedated        8/18   Off sedation and more awake   Now on spontaneous breathing   ABG good    8/19  Extubated yesterday   No stridor but definitely hoarse/ ?  Vocal cord/ scarring S/P radiation    Review of Systems:  Doing ok now  No specific complain  No Known Allergies   Past Medical History:   Diagnosis Date    Chronic obstructive pulmonary disease (HCC)     acute hypoxic respiratory failure 8/16    Hypertension     Ill-defined condition     peg tube in place    Ill-defined condition     chemo    S/P radiation therapy     Throat cancer (HCC)     Tobacco abuse       Current Facility-Administered Medications   Medication Dose Route Frequency    potassium chloride 10 mEq in 100 ml IVPB  10 mEq IntraVENous Q1H    predniSONE (DELTASONE) tablet 50 mg  50 mg Oral DAILY WITH BREAKFAST    famotidine (PEPCID) tablet 20 mg  20 mg Oral BID    multivitamin (MULTI-DELYN, WELLESSE) oral liquid 30 mL  30 mL Per G Tube DAILY    lisinopril (PRINIVIL, ZESTRIL) tablet 5 mg  5 mg Oral Q12H    moxifloxacin (AVELOX) tablet 400 mg  400 mg Oral ACB    Thiamine Mononitrate (B-1) tablet 100 mg  100 mg Oral DAILY    sodium chloride (NS) flush 5-10 mL  5-10 mL IntraVENous Q8H    HYDROmorphone (PF) (DILAUDID) 30 mg / 30 mL PCA   IntraVENous CONTINUOUS    nicotine (NICODERM CQ) 21 mg/24 hr patch 1 Patch  1 Patch TransDERmal DAILY    fentaNYL (PF) 900 mcg/30 ml infusion soln  0-200 mcg/hr IntraVENous TITRATE    midazolam in normal saline (VERSED) 1 mg/mL infusion  0-10 mg/hr IntraVENous TITRATE    0.9% sodium chloride infusion  100 mL/hr IntraVENous CONTINUOUS    albuterol-ipratropium (DUO-NEB) 2.5 MG-0.5 MG/3 ML  3 mL Nebulization Q4H RT    insulin lispro (HUMALOG) injection   SubCUTAneous Q6H    heparin (porcine) injection 5,000 Units  5,000 Units SubCUTAneous Q8H    dexmedeTOMidine (PRECEDEX) 400 mcg in 0.9% sodium chloride 100 mL infusion  0.2-0.7 mcg/kg/hr IntraVENous TITRATE       Lines: All central lines examined by me. No signs of erythema, induration, discharge. Feeding Tube:    PEG tube to low intermittent suction                      Peripheral Intravenous Line: 3 sites noted   Drain(s):PEG tube     Airway:  Airway - Endotracheal Tube 17 (Active)   Insertion Depth (cm) 22 cm 2017  9:48 AM   Line Gary Lips 2017  9:48 AM   Side Secured Device; Right 2017  9:48 AM   Site Assessment Clean, dry, & intact 2017  9:48 AM           Objective:   Vital Signs:    Visit Vitals    /83    Pulse (!) 105    Temp 97.9 °F (36.6 °C)    Resp 14    Wt 73.9 kg (162 lb 14.7 oz)    SpO2 100%    BMI 25.52 kg/m2       O2 Device: Room air   O2 Flow Rate (L/min): 3 l/min   Temp (24hrs), Av.9 °F (36.6 °C), Min:97.4 °F (36.3 °C), Max:98.4 °F (36.9 °C)       Intake/Output:   Last shift:       07 - 1900  In: 200 [I.V.:200]  Out: 1900 [Urine:1900]    Last 3 shifts:  190 -  0700  In: 5032.1 [I.V.:4692.1]  Out: 6000 [Urine:6000]      Intake/Output Summary (Last 24 hours) at 17 1006  Last data filed at 17 1003   Gross per 24 hour   Intake          3142.32 ml   Output             6600 ml   Net         -3457.68 ml       Last 3 Recorded Weights in this Encounter    17 0938 17 0400   Weight: 73.5 kg (162 lb 1.6 oz) 73.9 kg (162 lb 14.7 oz)       Ventilator Settings:  Mode Rate Tidal Volume Pressure FiO2 PEEP   CPAP, Spontaneous   450 ml  7 cm H2O 21 % 5 cm H20     Peak airway pressure: 19 cm H2O    Plateau pressure:     Tidal volume:    Minute ventilation: 7.73 l/min   SPO2 100     ARDS network Guidelines: Lung protective strategy and Plateau pressure goals less than or equal to 30    Telemetry:normal sinus rhythm    Physical Exam:     General: sedated on vent  Head: atraumatic, normocephalic  Eye: PERRLA, no scleral icterus, no pallor, no cyanosis  Nose: no sinus tenderness, no erythema, no induration, no discharge  Neck normal   I am unable to feel what I thought I felt on admission when he was in ER . It seems normal on exam now  Hoarsness. Narrowing airway noted  Lung: adequate air entry bilateral equal, no bronchospasm  Heart: S1 S2 present, no murmur, no gallop, tachycardia  Abdomen: soft, . PEG tube in place   LE: no pedal edema, no cyanosis, no clubbing, 2+ peripheral pulses in DP    No supra clavicle lymph nodes  Neurologic:sedated on vent      Data:         Chemistry Recent Labs      08/19/17   0435  08/18/17   1515  08/18/17   1211  08/18/17   0305  08/17/17   0426   GLU  112*   --    --   145*  137*   NA  142   --    --   136  137   K  2.6*   --    --   3.0*  3.7   CL  104   --    --   102  104   CO2  31   --    --   27  27   BUN  19*   --   20*  17  14   CREA  0.68   --    --   0.58*  0.64   CA  8.4*   --    --   8.2*  8.1*   MG  1.7  1.8   --    --    --    PHOS  3.0  2.4*   --    --    --    AGAP  7   --    --   7  6   BUCR  28*   --    --   29*  22*   AP  55   --    --   53  52   TP  5.7*   --    --   5.5*  5.2*   ALB  2.5*   --    --   2.2*  2.2*   GLOB  3.2   --    --   3.3  3.0   AGRAT  0.8   --    --   0.7*  0.7*        Lactic Acid Lactic acid   Date Value Ref Range Status   08/16/2017 1.4 0.4 - 2.0 MMOL/L Final     Recent Labs      08/16/17   1220   LAC  1.4        Liver Enzymes Protein, total   Date Value Ref Range Status   08/19/2017 5.7 (L) 6.4 - 8.2 g/dL Final     Albumin   Date Value Ref Range Status   08/19/2017 2.5 (L) 3.4 - 5.0 g/dL Final     Globulin   Date Value Ref Range Status   08/19/2017 3.2 2.0 - 4.0 g/dL Final     A-G Ratio   Date Value Ref Range Status   08/19/2017 0.8 0.8 - 1.7   Final     AST (SGOT)   Date Value Ref Range Status   08/19/2017 21 15 - 37 U/L Final     Alk.  phosphatase   Date Value Ref Range Status   08/19/2017 55 45 - 117 U/L Final     Recent Labs      08/19/17   0435  08/18/17   0305  08/17/17   0426   TP  5.7*  5.5*  5.2*   ALB  2.5*  2.2*  2.2*   GLOB  3.2  3.3  3.0   AGRAT  0.8  0.7*  0.7* SGOT  21  10*  14*   AP  55  53  52        CBC w/Diff Recent Labs      08/19/17   0435  08/18/17   0305  08/17/17   0426   WBC  5.0  3.5*  3.6*   RBC  3.23*  3.11*  2.75*   HGB  10.9*  10.7*  9.3*   HCT  31.9*  30.8*  27.5*   PLT  133*  121*  114*        Cardiac Enzymes No results found for: CPK, CKMMB, CKMB, RCK3, CKMBT, CKNDX, CKND1, CHUCKY, TROPT, TROIQ, TAMMY, TROPT, TNIPOC, BNP, BNPP     BNP No results found for: BNP, BNPP, XBNPT     Coagulation No results for input(s): PTP, INR, APTT in the last 72 hours.     No lab exists for component: INREXT, INREXT      Thyroid  No results found for: T4, T3U, TSH, TSHEXT, TSHEXT       Lipid Panel No results found for: CHOL, CHOLPOCT, CHOLX, CHLST, CHOLV, 121999, HDL, LDL, LDLC, DLDLP, 231042, VLDLC, VLDL, [place], TGLPOCT, CHHD, CHHDX     ABG Recent Labs      08/18/17   1006  08/18/17   0520  08/17/17   0602   PHI  7.448  7.476*  7.425   PCO2I  41.6  39.4  37.9   PO2I  137*  111*  173*   HCO3I  28.8*  29.3*  25.0   FIO2I  30  0.3  40        Urinalysis Lab Results   Component Value Date/Time    Color YELLOW 08/16/2017 09:39 AM    Appearance CLOUDY 08/16/2017 09:39 AM    Specific gravity 1.011 08/16/2017 09:39 AM    pH (UA) 8.0 08/16/2017 09:39 AM    Protein 100 08/16/2017 09:39 AM    Glucose 100 08/16/2017 09:39 AM    Ketone NEGATIVE  08/16/2017 09:39 AM    Bilirubin NEGATIVE  08/16/2017 09:39 AM    Urobilinogen 0.2 08/16/2017 09:39 AM    Nitrites NEGATIVE  08/16/2017 09:39 AM    Leukocyte Esterase NEGATIVE  08/16/2017 09:39 AM    Epithelial cells NEGATIVE  08/16/2017 09:39 AM    Bacteria FEW 08/16/2017 09:39 AM    WBC 0 to 3 08/16/2017 09:39 AM    RBC 0 to 3 08/16/2017 09:39 AM        Micro  Recent Labs      08/18/17   1448  08/18/17   1445  08/16/17   1510   CULT  PENDING  PENDING  MANY NORMAL RESPIRATORY FARA  FEW THIERNO ALBICANS*     Recent Labs      08/18/17   1448  08/18/17   1445  08/16/17   1510   CULT  PENDING  PENDING  MANY NORMAL RESPIRATORY FARA FEW THIERNO ALBICANS*        XR (Most Recent). CXR reviewed by me and compared with previous CXR   Results from Hospital Encounter encounter on 08/16/17   XR CHEST PORT   Narrative PORTABLE CHEST AT 0555 HOURS:    Indication: Respiratory failure. Technique:  Portable, erect AP view. Comparison:  08/17/2017    Findings:  Endotracheal tube is approximately 5.5 cm above the reji. Lungs are adequately expanded. No focal consolidation, pulmonary edema or  pneumothorax. Cardiac silhouette is within normal limits. Chronic, healed left  posterior seventh rib fracture. Impression IMPRESSION:    1. Stable position of endotracheal tube. No acute cardiopulmonary disease. CT (Most Recent)   Results from Hospital Encounter encounter on 08/16/17   CT NECK SOFT TISSUE W CONT   Narrative CT neck    History: Neck mass, history of throat cancer with prior radiation therapy    Comparison: CT neck 8/3/2017    Technique: Multiple axial CT images of the neck were obtained extending from the  skull base to the upper thorax after administration of IV contrast. Coronal and  sagittal reformations were performed. One or more dose reduction techniques  were used on this CT: automated exposure control, adjustment of the mAs and/or  kVp according to patient's size, and iterative reconstruction techniques. The  specific techniques utilized on this CT exam have been documented in the  patient's electronic medical record. Findings:    Endotracheal tube has been placed during the interval. Some layering hyperdense  oral secretions are seen within the oropharynx and hypopharynx. Tip of  endotracheal tube is in the tracheal airway at about the level of the clavicular  heads. Endotracheal tube and secretions does limit evaluation of the laryngeal,  hypopharyngeal, and oropharyngeal regions. No discrete suspicious masslike lesion is seen.  There is suggestion of hypodense  thickening of the mucosa of the oropharynx and hypopharynx similar to prior  study. Thickening of the platysma is present with mild fatty stranding across  multiple fascial planes, nonspecific but suspect posttreatment changes. These  findings show no short-term interval change. There is no prior more remote study  for comparison. No adenopathy is present based on size criteria. There are a few scattered very  small subcentimeter anterior and posterior cervical chain nodes. No  cystic/necrotic nodes are identified. The parotid and submandibular glands are  unremarkable. Mild atherosclerotic calcifications are present particularly  along the carotid bifurcations additional atheromatous plaque is also seen. The  thyroid gland is unremarkable. Degenerative changes are scattered throughout the cervical and visualized  thoracic spine. Poor dentition is present with majority of teeth absent. Visualized paranasal sinuses and mastoid air cells are clear. Changes of centrilobular and paraseptal emphysema are seen in the bilateral lung  apices. No consolidation seen in the lung apices. Impression IMPRESSION:    1. Interval placement of endotracheal tube. Expected layering mucus secretions  within the oropharynx and hypopharynx. 2. No discrete masslike lesion seen to correlate with history of throat cancer. Stable diffuse mildly edematous mucosal thickening and soft tissue stranding  likely reflecting posttreatment changes. No more remote comparison study. 3. No adenopathy based on size criteria. No cystic/necrotic nodes. 4. Emphysema seen in bilateral lung apices without consolidation. EKG No results found for this or any previous visit. ECHO No results found for this or any previous visit. PFT No flowsheet data found.      Other ASA reactivity:   Pre-albumin:   Ionized Calcium:   NH4:   T3, FT4:  Cortisol:  Urine Osm:  Urine Lytes:   HbA1c:      Recent Results (from the past 24 hour(s))   GLUCOSE, POC Collection Time: 08/18/17 11:29 AM   Result Value Ref Range    Glucose (POC) 166 (H) 70 - 110 mg/dL   BUN    Collection Time: 08/18/17 12:11 PM   Result Value Ref Range    BUN 20 (H) 7.0 - 18 MG/DL   CULTURE, WOUND W GRAM STAIN    Collection Time: 08/18/17  2:45 PM   Result Value Ref Range    Special Requests: NO SPECIAL REQUESTS      GRAM STAIN NO WBC'S SEEN      GRAM STAIN NO ORGANISMS SEEN      Culture result: PENDING    CULTURE, BODY FLUID W GRAM STAIN    Collection Time: 08/18/17  2:48 PM   Result Value Ref Range    Special Requests: LARYNGEAL SECRETIONS     GRAM STAIN FEW WBC'S      GRAM STAIN FEW GRAM POSITIVE COCCI IN PAIRS      GRAM STAIN MANY GRAM POSITIVE RODS      GRAM STAIN FEW YEAST      GRAM STAIN FEW HYPHAL ELEMENTS SEEN      Culture result: PENDING    PHOSPHORUS    Collection Time: 08/18/17  3:15 PM   Result Value Ref Range    Phosphorus 2.4 (L) 2.5 - 4.9 MG/DL   MAGNESIUM    Collection Time: 08/18/17  3:15 PM   Result Value Ref Range    Magnesium 1.8 1.6 - 2.6 mg/dL   GLUCOSE, POC    Collection Time: 08/18/17  4:52 PM   Result Value Ref Range    Glucose (POC) 114 (H) 70 - 110 mg/dL   GLUCOSE, POC    Collection Time: 08/19/17  1:15 AM   Result Value Ref Range    Glucose (POC) 111 (H) 70 - 200 mg/dL   METABOLIC PANEL, COMPREHENSIVE    Collection Time: 08/19/17  4:35 AM   Result Value Ref Range    Sodium 142 136 - 145 mmol/L    Potassium 2.6 (LL) 3.5 - 5.5 mmol/L    Chloride 104 100 - 108 mmol/L    CO2 31 21 - 32 mmol/L    Anion gap 7 3.0 - 18 mmol/L    Glucose 112 (H) 74 - 99 mg/dL    BUN 19 (H) 7.0 - 18 MG/DL    Creatinine 0.68 0.6 - 1.3 MG/DL    BUN/Creatinine ratio 28 (H) 12 - 20      GFR est AA >60 >60 ml/min/1.73m2    GFR est non-AA >60 >60 ml/min/1.73m2    Calcium 8.4 (L) 8.5 - 10.1 MG/DL    Bilirubin, total 0.3 0.2 - 1.0 MG/DL    ALT (SGPT) 24 16 - 61 U/L    AST (SGOT) 21 15 - 37 U/L    Alk.  phosphatase 55 45 - 117 U/L    Protein, total 5.7 (L) 6.4 - 8.2 g/dL    Albumin 2.5 (L) 3.4 - 5.0 g/dL    Globulin 3.2 2.0 - 4.0 g/dL    A-G Ratio 0.8 0.8 - 1.7     CBC W/O DIFF    Collection Time: 08/19/17  4:35 AM   Result Value Ref Range    WBC 5.0 4.6 - 13.2 K/uL    RBC 3.23 (L) 4.70 - 5.50 M/uL    HGB 10.9 (L) 13.0 - 16.0 g/dL    HCT 31.9 (L) 36.0 - 48.0 %    MCV 98.8 (H) 74.0 - 97.0 FL    MCH 33.7 24.0 - 34.0 PG    MCHC 34.2 31.0 - 37.0 g/dL    RDW 13.9 11.6 - 14.5 %    PLATELET 031 (L) 806 - 420 K/uL    MPV 9.6 9.2 - 11.8 FL   PHOSPHORUS    Collection Time: 08/19/17  4:35 AM   Result Value Ref Range    Phosphorus 3.0 2.5 - 4.9 MG/DL   MAGNESIUM    Collection Time: 08/19/17  4:35 AM   Result Value Ref Range    Magnesium 1.7 1.6 - 2.6 mg/dL   GLUCOSE, POC    Collection Time: 08/19/17  6:59 AM   Result Value Ref Range    Glucose (POC) 100 70 - 110 mg/dL         Needing vent mainly because od stridor so he will not be extubated till he goes to [place] in case further surgery is needed. Will keep high dose   Steroid. Stable at the present time  On no pressors.  And airway and breathing seems stable   [x]See my orders for details    My assessment, plan of care, findings, medications, side effects etc were discussed with:  [x] Nurse [] PT/OT    [x] Respiratory therapy []     [x] Family: answered all questions to satisfaction [] Patient: answered all questions to satisfaction   [] Pharmacist []      [x] Case & management strategies discussed today on multidisciplinary rounds    High complexity decision making was performed during the evaluation of this patient at high risk for decompensation with multiple organ involvement    stable and can be transferred to floor    Plan for hopefully extubation in OR today    Jannet Osgood, MD  8/19/2017

## 2017-08-19 NOTE — PROGRESS NOTES
Hospitalist Progress Note    Patient: Da Wynne MRN: [id number]  CSN: [id number]    YOB: 1965  Age: 45 y.o. Sex: male    DOA: 8/16/2017 LOS:  LOS: 3 days          Chief Complaint:    Respiratory Distress    Assessment/Plan     Hospital Problems  Date Reviewed: 8/18/2017          Codes Class Noted POA    Severe protein-calorie malnutrition Merna Salter: less than 60% of standard weight) ([facility] ICD-10-CM: E43  ICD-9-CM: 262  8/18/2017 Unknown        COPD exacerbation ([facility] ICD-10-CM: J44.1  ICD-9-CM: 491.21  8/16/2017 Yes        * (Principal)Acute hypoxemic respiratory failure ([facility] ICD-10-CM: J96.01  ICD-9-CM: 518.81  8/16/2017 Yes        Throat cancer ([place] [address].) ICD-10-CM: C14.0  ICD-9-CM: 149.0  7/30/2017 Yes        Status post insertion of percutaneous endoscopic gastrostomy (PEG) tube [facility]) ICD-10-CM: Z93.1  ICD-9-CM: V44.1  7/30/2017 Yes              Da Wynne is a 45 y.o. male who was admitted on 8/16/17 with respiratory distress requiring intubation. He has a history of throat cancer and is being followed by an oncologist at [facility] in [place]. The plan was for him to be transferred there when they had a bed available but have been unable to accept. If this is a consideration, call transfer center: [phone] to see if a bed is available for him. Vitals have been stable. Initial hypotension has resolved. Oxygenating well. Afebrile. He was initially requiring precedex, versed, and fentanyl for sedation--opiate history. On zosyn + levaquin, solumedrol+duonebs or COPD/questionable aspiration event. SSI. He was extubated in OR on 8/18/17 by ENT without incident. He is now oxygenating well on room air. He is on a dilaudid PCA for pain control. This will be aggressively weaned. Transition to per tube meds today.        Throat cancer ([facility] (7/30/2017)  - following with Heme/Onc at [place]  - transfer doubtful      Status post insertion of percutaneous [facility] gastrost[facility] (PEG) tube ([facility] (7/30/2017)  - ? Aspiration event  - levaquin + zosyn currently  - transition to avelox per tube  - monitor for fevers      COPD exacerbation ([facility] (8/16/2017)  - steroids, abx, nebs as above      Acute hypoxemic respiratory failure ([facility] (8/16/2017)  - COPD exacerbation +/- aspiration event  - abx + steroids as above  - off of vent  - continues to smoke  - will likely be a recurring event--needs code status discussion    PT/OT when able    CM for DC planning    Dispo: transition to per tube meds as above; monitor for fevers; anticipate DC 48h      Subjective:    Feeling much better. No complaints. Review of systems:    General: No fevers or chills. Cardiovascular: No chest pain or pressure. No palpitations. Pulmonary: No shortness of breath. Gastrointestinal: No nausea, vomiting.        Vital signs/Intake and Output:  Visit Vitals    /83    Pulse (!) 105    Temp 97.9 °F (36.6 °C)    Resp 14    Wt 73.9 kg (162 lb 14.7 oz)    SpO2 100%    BMI 25.52 kg/m2     Current Shift:     Last three shifts:  08/17 1901 - 08/19 0700  In: 5032.1 [I.V.:4692.1]  Out: 6000 [Urine:6000]    Exam:    General: awake and alert; NAD  Head: Atraumatic, normocephalic  Eyes: PERRLA, EOMI, sclerae non-icteric  ENT: mucous membranes moist  Neck: supple, no masses, no lymphadenopathy, no rigidity ; large mass under R mandible  CVS:Regular rate and rhythm, no murmurs, rubs, gallops, or clicks  Lungs:Clear to auscultation bilaterally, no wheezes, rales, or rhonchi  Abdomen: PEG in place soft, Nontender, nondistended, bowel sounds normal throughout  Extremities: atraumatic, pulses 2+ all ext, no edema  Skin: warm, dry, well perfused, without rash; no cyanosis or clubbing  Neuro/Psych: AAOx3, no focal deficits                Labs: Results:       Chemistry Recent Labs      08/19/17   0435  08/18/17   1211  08/18/17   0305  08/17/17   0426   GLU  112*   --   145*  137*   NA  142   --   136  137   K  2.6*   --   3.0*  3.7   CL  104 --   102  104   CO2  31   --   27  27   BUN  19*  20*  17  14   CREA  0.68   --   0.58*  0.64   CA  8.4*   --   8.2*  8.1*   AGAP  7   --   7  6   BUCR  28*   --   29*  22*   AP  55   --   53  52   TP  5.7*   --   5.5*  5.2*   ALB  2.5*   --   2.2*  2.2*   GLOB  3.2   --   3.3  3.0   AGRAT  0.8   --   0.7*  0.7*      CBC w/Diff Recent Labs      08/19/17   0435  08/18/17   0305  08/17/17   0426  08/16/17   0910   WBC  5.0  3.5*  3.6*  8.0   RBC  3.23*  3.11*  2.75*  3.72*   HGB  10.9*  10.7*  9.3*  12.9*   HCT  31.9*  30.8*  27.5*  38.3   PLT  133*  121*  114*  185   GRANS   --    --    --   69   LYMPH   --    --    --   18*   EOS   --    --    --   1      Cardiac Enzymes Recent Labs      08/16/17   0910   CPK  85   CKND1  3.4      Coagulation Recent Labs      08/16/17   0910   PTP  11.5   INR  0.9       Lipid Panel No results found for: CHOL, CHOLPOCT, CHOLX, CHLST, CHOLV, 975365, HDL, LDL, LDLC, DLDLP, 070116, VLDLC, VLDL, TGLX, TRIGL, TRIGP, TGLPOCT, CHHD, CHHDX   BNP No results for input(s): BNPP in the last 72 hours.    Liver Enzymes Recent Labs      08/19/17   0435   TP  5.7*   ALB  2.5*   AP  55   SGOT  21      Thyroid Studies No results found for: T4, T3U, TSH, TSHEXT, TSHEXT     Procedures/imaging: see electronic medical records for all procedures/Xrays and details which were not copied into this note but were reviewed prior to creation of [facility], DO

## 2017-08-19 NOTE — PROGRESS NOTES
**SHIFT SUMMARY**     Patient had critical K+ level at the end of night shift and I gave IV KCL 10mEq x 6 runs for replacement. Patient also had Mg+ of 1.7 and received 1g IV. Patient was decreased on PCA Dilaudid dosing. Patient has constant sore throat even pre-admission d/t radiation to that area. Patient c/o some nausea near the end of my shift and Dr. Ar Busby was concerned about possible withdrawal symptoms so Clonidine patch was ordered. Patient was having some HTN. Patient was also started on Lisinopril per PEG. I have dosing late given critical KCL result and need for replacement. New IVL was obtained my shift to [facility]. Patient requesting some thing for sleep tonight but Dr. Ar Busby would like the patient to try to sleep on his own and reassess need for medication later this evening. I spoked c Dr. David Rey at the end of my shift concerning possible withdrawal and received orders to give low dose Percedex gtt.

## 2017-08-19 NOTE — PROGRESS NOTES
2000 Assessment completed. VSS. Pt states pain is controlled with PCA Dilaudid. Extubated today. Denies SOB. Resting on 3L NC. Will continue to monitor. Speech Eval to see tomorrow morning for eval, will evaluate restart of tube feed tomorrow per dayshift RN. 2100 Dr Sylvie Peabody at bedside checking on patient. No change. 2200 No change. Resting comfortably. VSS. NAD.    0000 Reassessment completed. See flowsheet. Pt took off NC O2, educated on this. Persists with not having it at this time. O2 98% room air.    0200 Resting comfortably. FLACC 0. IVFs infusing without difficulty. Denies any current needs. Denies pain, SOB.    0400 Reassessment completed. See flowsheet. 0600 Resting, eyes closed. NAD. VSS. Bed bath completed. 0630 Informed on call hospitalist, Dr. Taryn Sandhu, of critical potassium level. Placed on protocol. See new orders. 0730 Bedside and Verbal shift change report given to SIRISHA Pan RN (oncoming nurse) by Isabel Howell RN   (offgoing nurse). Report included the following information SBAR, Kardex and MAR.  VSS

## 2017-08-19 NOTE — ROUTINE PROCESS
Bedside and Verbal shift change report given to Valerie Garcia RN (oncoming nurse) by Saúl Jefferson RN (offgoing nurse).  Report included the following information SBAR, Kardex, Procedure Summary, Intake/Output, MAR, Recent Results, Med Rec Status and Cardiac Rhythm SR.

## 2017-08-20 ENCOUNTER — ANESTHESIA EVENT (OUTPATIENT)
Dept: SURGERY | Age: 52
DRG: 121 | End: 2017-08-20
Payer: MEDICAID

## 2017-08-20 ENCOUNTER — APPOINTMENT (OUTPATIENT)
Dept: GENERAL RADIOLOGY | Age: 52
DRG: 121 | End: 2017-08-20
Attending: OTOLARYNGOLOGY
Payer: MEDICAID

## 2017-08-20 ENCOUNTER — APPOINTMENT (OUTPATIENT)
Dept: GENERAL RADIOLOGY | Age: 52
DRG: 121 | End: 2017-08-20
Attending: INTERNAL MEDICINE
Payer: MEDICAID

## 2017-08-20 ENCOUNTER — ANESTHESIA (OUTPATIENT)
Dept: SURGERY | Age: 52
DRG: 121 | End: 2017-08-20
Payer: MEDICAID

## 2017-08-20 LAB
BACTERIA SPEC CULT: ABNORMAL
BUN SERPL-MCNC: 20 MG/DL (ref 7–18)
GLUCOSE BLD STRIP.AUTO-MCNC: 130 MG/DL (ref 70–110)
GLUCOSE BLD STRIP.AUTO-MCNC: 134 MG/DL (ref 70–110)
GLUCOSE BLD STRIP.AUTO-MCNC: 82 MG/DL (ref 70–110)
GRAM STN SPEC: ABNORMAL
MAGNESIUM SERPL-MCNC: 1.7 MG/DL (ref 1.6–2.6)
PHOSPHATE SERPL-MCNC: 3.1 MG/DL (ref 2.5–4.9)
POTASSIUM SERPL-SCNC: 4.1 MMOL/L (ref 3.5–5.5)
SERVICE CMNT-IMP: ABNORMAL
SERVICE CMNT-IMP: ABNORMAL

## 2017-08-20 PROCEDURE — 77030002916 HC SUT ETHLN J&J -A: Performed by: OTOLARYNGOLOGY

## 2017-08-20 PROCEDURE — 0B110F4 BYPASS TRACHEA TO CUTANEOUS WITH TRACHEOSTOMY DEVICE, OPEN APPROACH: ICD-10-PCS | Performed by: OTOLARYNGOLOGY

## 2017-08-20 PROCEDURE — 74011250636 HC RX REV CODE- 250/636

## 2017-08-20 PROCEDURE — 77030011267 HC ELECTRD BLD COVD -A: Performed by: OTOLARYNGOLOGY

## 2017-08-20 PROCEDURE — 84100 ASSAY OF PHOSPHORUS: CPT | Performed by: INTERNAL MEDICINE

## 2017-08-20 PROCEDURE — 74011000250 HC RX REV CODE- 250

## 2017-08-20 PROCEDURE — 77030008683 HC TU ET CUF COVD -A: Performed by: SPECIALIST

## 2017-08-20 PROCEDURE — 74011000250 HC RX REV CODE- 250: Performed by: INTERNAL MEDICINE

## 2017-08-20 PROCEDURE — 77030006643: Performed by: SPECIALIST

## 2017-08-20 PROCEDURE — 77030006998: Performed by: OTOLARYNGOLOGY

## 2017-08-20 PROCEDURE — 74011000258 HC RX REV CODE- 258: Performed by: INTERNAL MEDICINE

## 2017-08-20 PROCEDURE — 77030013079 HC BLNKT BAIR HGGR 3M -A: Performed by: SPECIALIST

## 2017-08-20 PROCEDURE — 94640 AIRWAY INHALATION TREATMENT: CPT

## 2017-08-20 PROCEDURE — 77030018846 HC SOL IRR STRL H20 ICUM -A

## 2017-08-20 PROCEDURE — 77030018836 HC SOL IRR NACL ICUM -A: Performed by: OTOLARYNGOLOGY

## 2017-08-20 PROCEDURE — 74011000250 HC RX REV CODE- 250: Performed by: OTOLARYNGOLOGY

## 2017-08-20 PROCEDURE — 77030008477 HC STYL SATN SLP COVD -A: Performed by: SPECIALIST

## 2017-08-20 PROCEDURE — 36415 COLL VENOUS BLD VENIPUNCTURE: CPT | Performed by: INTERNAL MEDICINE

## 2017-08-20 PROCEDURE — 71010 XR CHEST PORT: CPT

## 2017-08-20 PROCEDURE — 77030018836 HC SOL IRR NACL ICUM -A

## 2017-08-20 PROCEDURE — 74011250636 HC RX REV CODE- 250/636: Performed by: HOSPITALIST

## 2017-08-20 PROCEDURE — 74011250637 HC RX REV CODE- 250/637: Performed by: FAMILY MEDICINE

## 2017-08-20 PROCEDURE — 84520 ASSAY OF UREA NITROGEN: CPT | Performed by: INTERNAL MEDICINE

## 2017-08-20 PROCEDURE — 76210000006 HC OR PH I REC 0.5 TO 1 HR: Performed by: OTOLARYNGOLOGY

## 2017-08-20 PROCEDURE — 74011250636 HC RX REV CODE- 250/636: Performed by: INTERNAL MEDICINE

## 2017-08-20 PROCEDURE — 74011250636 HC RX REV CODE- 250/636: Performed by: OTOLARYNGOLOGY

## 2017-08-20 PROCEDURE — 77030002996 HC SUT SLK J&J -A: Performed by: OTOLARYNGOLOGY

## 2017-08-20 PROCEDURE — 74011250637 HC RX REV CODE- 250/637: Performed by: INTERNAL MEDICINE

## 2017-08-20 PROCEDURE — 77010033678 HC OXYGEN DAILY

## 2017-08-20 PROCEDURE — 77030008793 HC TU TRACH CUF COVD -B: Performed by: OTOLARYNGOLOGY

## 2017-08-20 PROCEDURE — 77030011640 HC PAD GRND REM COVD -A: Performed by: OTOLARYNGOLOGY

## 2017-08-20 PROCEDURE — 83735 ASSAY OF MAGNESIUM: CPT | Performed by: INTERNAL MEDICINE

## 2017-08-20 PROCEDURE — 65610000006 HC RM INTENSIVE CARE

## 2017-08-20 PROCEDURE — 74011250636 HC RX REV CODE- 250/636: Performed by: SPECIALIST

## 2017-08-20 PROCEDURE — 76060000033 HC ANESTHESIA 1 TO 1.5 HR: Performed by: OTOLARYNGOLOGY

## 2017-08-20 PROCEDURE — 77030009834 HC MSK O2 TRACH VYRM -A

## 2017-08-20 PROCEDURE — 84132 ASSAY OF SERUM POTASSIUM: CPT | Performed by: INTERNAL MEDICINE

## 2017-08-20 PROCEDURE — 76010000149 HC OR TIME 1 TO 1.5 HR: Performed by: OTOLARYNGOLOGY

## 2017-08-20 DEVICE — IMPLANTABLE DEVICE: Type: IMPLANTABLE DEVICE | Site: TRACHEA | Status: FUNCTIONAL

## 2017-08-20 RX ORDER — NALOXONE HYDROCHLORIDE 0.4 MG/ML
0.1 INJECTION, SOLUTION INTRAMUSCULAR; INTRAVENOUS; SUBCUTANEOUS
Status: DISCONTINUED | OUTPATIENT
Start: 2017-08-20 | End: 2017-08-20 | Stop reason: HOSPADM

## 2017-08-20 RX ORDER — GLYCOPYRROLATE 0.2 MG/ML
INJECTION INTRAMUSCULAR; INTRAVENOUS AS NEEDED
Status: DISCONTINUED | OUTPATIENT
Start: 2017-08-20 | End: 2017-08-20 | Stop reason: HOSPADM

## 2017-08-20 RX ORDER — HYDROMORPHONE HYDROCHLORIDE 1 MG/ML
0.5 INJECTION, SOLUTION INTRAMUSCULAR; INTRAVENOUS; SUBCUTANEOUS
Status: DISCONTINUED | OUTPATIENT
Start: 2017-08-20 | End: 2017-08-20 | Stop reason: HOSPADM

## 2017-08-20 RX ORDER — SODIUM CHLORIDE, SODIUM LACTATE, POTASSIUM CHLORIDE, CALCIUM CHLORIDE 600; 310; 30; 20 MG/100ML; MG/100ML; MG/100ML; MG/100ML
INJECTION, SOLUTION INTRAVENOUS
Status: DISCONTINUED | OUTPATIENT
Start: 2017-08-20 | End: 2017-08-20 | Stop reason: HOSPADM

## 2017-08-20 RX ORDER — OXYCODONE AND ACETAMINOPHEN 5; 325 MG/1; MG/1
1 TABLET ORAL AS NEEDED
Status: DISCONTINUED | OUTPATIENT
Start: 2017-08-20 | End: 2017-08-20 | Stop reason: HOSPADM

## 2017-08-20 RX ORDER — MAGNESIUM SULFATE 1 G/100ML
1 INJECTION INTRAVENOUS ONCE
Status: COMPLETED | OUTPATIENT
Start: 2017-08-20 | End: 2017-08-20

## 2017-08-20 RX ORDER — MIDAZOLAM HYDROCHLORIDE 1 MG/ML
INJECTION, SOLUTION INTRAMUSCULAR; INTRAVENOUS AS NEEDED
Status: DISCONTINUED | OUTPATIENT
Start: 2017-08-20 | End: 2017-08-20 | Stop reason: HOSPADM

## 2017-08-20 RX ORDER — POTASSIUM CHLORIDE 7.45 MG/ML
10 INJECTION INTRAVENOUS
Status: DISCONTINUED | OUTPATIENT
Start: 2017-08-20 | End: 2017-08-20

## 2017-08-20 RX ORDER — FENTANYL CITRATE 50 UG/ML
25 INJECTION, SOLUTION INTRAMUSCULAR; INTRAVENOUS
Status: DISCONTINUED | OUTPATIENT
Start: 2017-08-20 | End: 2017-08-20 | Stop reason: HOSPADM

## 2017-08-20 RX ORDER — SODIUM CHLORIDE, SODIUM LACTATE, POTASSIUM CHLORIDE, CALCIUM CHLORIDE 600; 310; 30; 20 MG/100ML; MG/100ML; MG/100ML; MG/100ML
50 INJECTION, SOLUTION INTRAVENOUS CONTINUOUS
Status: DISCONTINUED | OUTPATIENT
Start: 2017-08-20 | End: 2017-08-20 | Stop reason: HOSPADM

## 2017-08-20 RX ORDER — LIDOCAINE HYDROCHLORIDE 20 MG/ML
INJECTION, SOLUTION EPIDURAL; INFILTRATION; INTRACAUDAL; PERINEURAL AS NEEDED
Status: DISCONTINUED | OUTPATIENT
Start: 2017-08-20 | End: 2017-08-20 | Stop reason: HOSPADM

## 2017-08-20 RX ORDER — ROCURONIUM BROMIDE 10 MG/ML
INJECTION, SOLUTION INTRAVENOUS AS NEEDED
Status: DISCONTINUED | OUTPATIENT
Start: 2017-08-20 | End: 2017-08-20 | Stop reason: HOSPADM

## 2017-08-20 RX ORDER — SUCCINYLCHOLINE CHLORIDE 20 MG/ML
INJECTION INTRAMUSCULAR; INTRAVENOUS AS NEEDED
Status: DISCONTINUED | OUTPATIENT
Start: 2017-08-20 | End: 2017-08-20 | Stop reason: HOSPADM

## 2017-08-20 RX ORDER — FENTANYL CITRATE 50 UG/ML
INJECTION, SOLUTION INTRAMUSCULAR; INTRAVENOUS AS NEEDED
Status: DISCONTINUED | OUTPATIENT
Start: 2017-08-20 | End: 2017-08-20 | Stop reason: HOSPADM

## 2017-08-20 RX ORDER — SODIUM CHLORIDE 0.9 % (FLUSH) 0.9 %
5-10 SYRINGE (ML) INJECTION AS NEEDED
Status: DISCONTINUED | OUTPATIENT
Start: 2017-08-20 | End: 2017-08-20 | Stop reason: HOSPADM

## 2017-08-20 RX ORDER — ONDANSETRON 2 MG/ML
INJECTION INTRAMUSCULAR; INTRAVENOUS AS NEEDED
Status: DISCONTINUED | OUTPATIENT
Start: 2017-08-20 | End: 2017-08-20 | Stop reason: HOSPADM

## 2017-08-20 RX ORDER — ONDANSETRON 2 MG/ML
4 INJECTION INTRAMUSCULAR; INTRAVENOUS ONCE
Status: DISCONTINUED | OUTPATIENT
Start: 2017-08-20 | End: 2017-08-20 | Stop reason: HOSPADM

## 2017-08-20 RX ORDER — PROPOFOL 10 MG/ML
INJECTION, EMULSION INTRAVENOUS AS NEEDED
Status: DISCONTINUED | OUTPATIENT
Start: 2017-08-20 | End: 2017-08-20 | Stop reason: HOSPADM

## 2017-08-20 RX ADMIN — POTASSIUM CHLORIDE 10 MEQ: 10 INJECTION, SOLUTION INTRAVENOUS at 00:36

## 2017-08-20 RX ADMIN — FENTANYL CITRATE 50 MCG: 50 INJECTION, SOLUTION INTRAMUSCULAR; INTRAVENOUS at 11:06

## 2017-08-20 RX ADMIN — POTASSIUM CHLORIDE 10 MEQ: 10 INJECTION, SOLUTION INTRAVENOUS at 03:37

## 2017-08-20 RX ADMIN — HEPARIN SODIUM 5000 UNITS: 5000 INJECTION, SOLUTION INTRAVENOUS; SUBCUTANEOUS at 21:20

## 2017-08-20 RX ADMIN — POTASSIUM CHLORIDE 10 MEQ: 10 INJECTION, SOLUTION INTRAVENOUS at 17:55

## 2017-08-20 RX ADMIN — MIDAZOLAM HYDROCHLORIDE 2 MG: 1 INJECTION, SOLUTION INTRAMUSCULAR; INTRAVENOUS at 10:55

## 2017-08-20 RX ADMIN — METHYLPREDNISOLONE SODIUM SUCCINATE 80 MG: 40 INJECTION, POWDER, FOR SOLUTION INTRAMUSCULAR; INTRAVENOUS at 13:35

## 2017-08-20 RX ADMIN — ROCURONIUM BROMIDE 5 MG: 10 INJECTION, SOLUTION INTRAVENOUS at 11:00

## 2017-08-20 RX ADMIN — PROPOFOL 200 MG: 10 INJECTION, EMULSION INTRAVENOUS at 11:00

## 2017-08-20 RX ADMIN — DEXMEDETOMIDINE HYDROCHLORIDE 0.4 MCG/KG/HR: 100 INJECTION, SOLUTION INTRAVENOUS at 21:19

## 2017-08-20 RX ADMIN — FENTANYL CITRATE 25 MCG: 50 INJECTION, SOLUTION INTRAMUSCULAR; INTRAVENOUS at 12:19

## 2017-08-20 RX ADMIN — SUCCINYLCHOLINE CHLORIDE 100 MG: 20 INJECTION INTRAMUSCULAR; INTRAVENOUS at 11:00

## 2017-08-20 RX ADMIN — FENTANYL CITRATE 50 MCG: 50 INJECTION, SOLUTION INTRAMUSCULAR; INTRAVENOUS at 11:24

## 2017-08-20 RX ADMIN — POTASSIUM CHLORIDE 10 MEQ: 10 INJECTION, SOLUTION INTRAVENOUS at 01:41

## 2017-08-20 RX ADMIN — HYDROCODONE BITARTRATE AND ACETAMINOPHEN 5 MG: 7.5; 325 TABLET ORAL at 13:27

## 2017-08-20 RX ADMIN — FENTANYL CITRATE 50 MCG: 50 INJECTION, SOLUTION INTRAMUSCULAR; INTRAVENOUS at 11:57

## 2017-08-20 RX ADMIN — MAGNESIUM SULFATE HEPTAHYDRATE 1 G: 1 INJECTION, SOLUTION INTRAVENOUS at 16:52

## 2017-08-20 RX ADMIN — FENTANYL CITRATE 25 MCG: 50 INJECTION, SOLUTION INTRAMUSCULAR; INTRAVENOUS at 12:26

## 2017-08-20 RX ADMIN — PIPERACILLIN SODIUM,TAZOBACTAM SODIUM 3.38 G: 3; .375 INJECTION, POWDER, FOR SOLUTION INTRAVENOUS at 08:27

## 2017-08-20 RX ADMIN — SODIUM CHLORIDE, SODIUM LACTATE, POTASSIUM CHLORIDE, CALCIUM CHLORIDE: 600; 310; 30; 20 INJECTION, SOLUTION INTRAVENOUS at 11:10

## 2017-08-20 RX ADMIN — IPRATROPIUM BROMIDE AND ALBUTEROL SULFATE 3 ML: .5; 3 SOLUTION RESPIRATORY (INHALATION) at 05:46

## 2017-08-20 RX ADMIN — LORAZEPAM 0.5 MG: 2 INJECTION INTRAMUSCULAR; INTRAVENOUS at 21:20

## 2017-08-20 RX ADMIN — POTASSIUM CHLORIDE 10 MEQ: 10 INJECTION, SOLUTION INTRAVENOUS at 04:55

## 2017-08-20 RX ADMIN — HEPARIN SODIUM 5000 UNITS: 5000 INJECTION, SOLUTION INTRAVENOUS; SUBCUTANEOUS at 07:08

## 2017-08-20 RX ADMIN — METHYLPREDNISOLONE SODIUM SUCCINATE 80 MG: 40 INJECTION, POWDER, FOR SOLUTION INTRAMUSCULAR; INTRAVENOUS at 21:20

## 2017-08-20 RX ADMIN — LIDOCAINE HYDROCHLORIDE 60 MG: 20 INJECTION, SOLUTION EPIDURAL; INFILTRATION; INTRACAUDAL; PERINEURAL at 11:00

## 2017-08-20 RX ADMIN — HEPARIN SODIUM 5000 UNITS: 5000 INJECTION, SOLUTION INTRAVENOUS; SUBCUTANEOUS at 13:34

## 2017-08-20 RX ADMIN — POTASSIUM CHLORIDE 10 MEQ: 10 INJECTION, SOLUTION INTRAVENOUS at 18:54

## 2017-08-20 RX ADMIN — GLYCOPYRROLATE 0.2 MG: 0.2 INJECTION INTRAMUSCULAR; INTRAVENOUS at 10:44

## 2017-08-20 RX ADMIN — FAMOTIDINE 20 MG: 20 TABLET ORAL at 13:27

## 2017-08-20 RX ADMIN — MULTIPLE VITAMIN LIQUID 30 ML: LIQUID at 13:26

## 2017-08-20 RX ADMIN — Medication 10 ML: at 21:21

## 2017-08-20 RX ADMIN — PIPERACILLIN SODIUM,TAZOBACTAM SODIUM 3.38 G: 3; .375 INJECTION, POWDER, FOR SOLUTION INTRAVENOUS at 16:48

## 2017-08-20 RX ADMIN — PROPOFOL 50 MG: 10 INJECTION, EMULSION INTRAVENOUS at 11:36

## 2017-08-20 RX ADMIN — Medication 10 ML: at 07:08

## 2017-08-20 RX ADMIN — Medication 100 MG: at 13:27

## 2017-08-20 RX ADMIN — METHYLPREDNISOLONE SODIUM SUCCINATE 125 MG: 125 INJECTION, POWDER, FOR SOLUTION INTRAMUSCULAR; INTRAVENOUS at 08:06

## 2017-08-20 RX ADMIN — DEXMEDETOMIDINE HYDROCHLORIDE 0.5 MCG/KG/HR: 100 INJECTION, SOLUTION INTRAVENOUS at 06:03

## 2017-08-20 RX ADMIN — FENTANYL CITRATE 100 MCG: 50 INJECTION, SOLUTION INTRAMUSCULAR; INTRAVENOUS at 10:57

## 2017-08-20 RX ADMIN — FENTANYL CITRATE 50 MCG: 50 INJECTION, SOLUTION INTRAMUSCULAR; INTRAVENOUS at 11:37

## 2017-08-20 RX ADMIN — FAMOTIDINE 20 MG: 20 TABLET ORAL at 21:20

## 2017-08-20 RX ADMIN — ONDANSETRON 4 MG: 2 INJECTION INTRAMUSCULAR; INTRAVENOUS at 11:47

## 2017-08-20 RX ADMIN — Medication 10 ML: at 13:28

## 2017-08-20 NOTE — PROGRESS NOTES
[facility]  Pulmonary, Critical Care, and Sleep Medicine    Name: Chadwick Hopkins MRN: [id number]   : 1965 Hospital: [facility] [place]   Date: 2017        Critical Care Follow Up                                                [x]I have reviewed the flowsheet and previous days notes. Events, vitals, medications and notes from last 24 hours reviewed. Care plan discussed with staff, patient, family and on multidisciplinary rounds. IMPRESSION:     · Stridor on admission S/P intubation   · Extubated yesterday     Recurrent stridor today  He sounds worse than yesterday  ·   · Throat cancer was treated with Chemotherapy by Oncologist in   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 Primary tumor was located on the left TVC and supraglottic larynx with left Level III node. He has received XRT/chemo for treatment and I am unsure when his treatment was completed. ·   ·   · Tobacco user current  · Dysphagia S/P PEG tube    · Code status: FULL      RECOMMENDATIONS:   · ENT will see him again today  Her help is Very appreciated  · Hoarseness worse today Empirically start antibiotics and high dose Steroid  · PEG tube clamped for ? Surgery if needed  · DVT and GI prophylaxis    Hold on transfer  Keep ICU  Switch to high dose IV steroids and IV antibiotics and discontinue oral    Close observation  ENT will see. No beds in [place]   ? trach  Discussed with him, regarding smoking        Subjective/History of Present Illness:     Chadwick Hopkins has been seen and evaluated in ICU/ERPatient is a 45 y.o. male who was brought in to ER with history of throat cancer and recent hospitalization 2 weeks ago after he had cycle of chemotherapy and then developed COPD with acute exacerbation and was also mentioned that he had Dysphagia. He had PEG tube but had another hospitalization after he tried to drink water and aspirated.   Anyway according to EMS he was found at home very short of breath, stridor, and trial if intubation was attempted on route. Later in ER he was then intubated by myself and placed on vent then transferred to ICU. He is sedated        8/18   Off sedation and more awake   Now on spontaneous breathing   ABG good    8/19  Extubated yesterday   No stridor but definitely hoarse/ ?  Vocal cord/ scarring S/P radiation    8/20  Sounds worse today  Had some O2 desat last night  Stridor seems worse  ENT following and will see today  Keep ICU    Restart IV antibiotics and switch back to high dose Steroids IV    Review of Systems:  Doing ok now  No specific complain  No Known Allergies   Past Medical History:   Diagnosis Date    Chronic obstructive pulmonary disease (HCC)     acute hypoxic respiratory failure 8/16    Hypertension     Ill-defined condition     peg tube in place    Ill-defined condition     chemo    S/P radiation therapy     Throat cancer ([facility] [address]     Tobacco abuse       Current Facility-Administered Medications   Medication Dose Route Frequency    predniSONE (DELTASONE) tablet 50 mg  50 mg Oral DAILY WITH BREAKFAST    famotidine (PEPCID) tablet 20 mg  20 mg Oral BID    multivitamin (MULTI-DELYN, WELLESSE) oral liquid 30 mL  30 mL Per G Tube DAILY    lisinopril (PRINIVIL, ZESTRIL) tablet 5 mg  5 mg Oral Q12H    moxifloxacin (AVELOX) tablet 400 mg  400 mg Oral ACB    cloNIDine (CATAPRES) 0.2 mg/24 hr patch 1 Patch  1 Patch TransDERmal Q7D    Thiamine Mononitrate (B-1) tablet 100 mg  100 mg Oral DAILY    sodium chloride (NS) flush 5-10 mL  5-10 mL IntraVENous Q8H    HYDROmorphone (PF) (DILAUDID) 30 mg / 30 mL PCA   IntraVENous CONTINUOUS    nicotine (NICODERM CQ) 21 mg/24 hr patch 1 Patch  1 Patch TransDERmal DAILY    fentaNYL (PF) 900 mcg/30 ml infusion soln  0-200 mcg/hr IntraVENous TITRATE    midazolam in normal saline (VERSED) 1 mg/mL infusion  0-10 mg/hr IntraVENous TITRATE    0.9% sodium chloride infusion  100 mL/hr IntraVENous CONTINUOUS    insulin lispro (HUMALOG) injection   SubCUTAneous Q6H    heparin (porcine) injection 5,000 Units  5,000 Units SubCUTAneous Q8H    dexmedeTOMidine (PRECEDEX) 400 mcg in 0.9% sodium chloride 100 mL infusion  0.2-0.7 mcg/kg/hr IntraVENous TITRATE       Lines: All central lines examined by me. No signs of erythema, induration, discharge. Feeding Tube:    PEG tube to low intermittent suction                      Peripheral Intravenous Line: 3 sites noted   Drain(s):PEG tube     Airway:  Airway - Endotracheal Tube 17 (Active)   Insertion Depth (cm) 22 cm 2017  9:48 AM   Line Gary Lips 2017  9:48 AM   Side Secured Device; Right 2017  9:48 AM   Site Assessment Clean, dry, & intact 2017  9:48 AM           Objective:   Vital Signs:    Visit Vitals    /80    Pulse 75    Temp 97.8 °F (36.6 °C)    Resp 11    Wt 73.9 kg (162 lb 14.7 oz)    SpO2 96%    BMI 25.52 kg/m2       O2 Device: Room air   O2 Flow Rate (L/min): 3 l/min   Temp (24hrs), Av.7 °F (36.5 °C), Min:97.4 °F (36.3 °C), Max:97.9 °F (36.6 °C)       Intake/Output:   Last shift:           Last 3 shifts:  1901 -  0700  In: 5389.2 [I.V.:4959.2]  Out: 46322 [Urine:33091]      Intake/Output Summary (Last 24 hours) at 17 0742  Last data filed at 17 0548   Gross per 24 hour   Intake          3989.24 ml   Output             7750 ml   Net         -3760.76 ml       Last 3 Recorded Weights in this Encounter    17 0938 17 0400   Weight: 73.5 kg (162 lb 1.6 oz) 73.9 kg (162 lb 14.7 oz)       Ventilator Settings:  Mode Rate Tidal Volume Pressure FiO2 PEEP   CPAP, Spontaneous   450 ml  7 cm H2O 21 % 5 cm H20     Peak airway pressure: 19 cm H2O    Plateau pressure:     Tidal volume:    Minute ventilation: 7.73 l/min   SPO2 100     ARDS network Guidelines: Lung protective strategy and Plateau pressure goals less than or equal to 30    Telemetry:normal sinus rhythm    Physical Exam:     General: awake , uncomfortable  hoarsness and some stridor noted  Head: atraumatic, normocephalic  Eye: PERRLA, no scleral icterus, no pallor, no cyanosis  Nose:   Neck  : stridor    I am unable to feel what I thought I felt on admission when he was in ER . It seems normal on exam now  Hoarsness. Narrowing airway noted worse than yesterday  Lung: adequate air entry bilateral equal, no bronchospasm in lungs   Heart: S1 S2 present, no murmur, no gallop, tachycardia  Abdomen: soft, . PEG tube in place   LE: no pedal edema, no cyanosis, no clubbing, 2+ peripheral pulses in DP    No supra clavicle lymph nodes        Data:         Chemistry Recent Labs      08/19/17   1950  08/19/17   0435  08/18/17   1515  08/18/17   1211  08/18/17   0305   GLU   --   112*   --    --   145*   NA   --   142   --    --   136   K  3.1*  2.6*   --    --   3.0*   CL   --   104   --    --   102   CO2   --   31   --    --   27   BUN   --   19*   --   20*  17   CREA   --   0.68   --    --   0.58*   CA   --   8.4*   --    --   8.2*   MG  1.9  1.7  1.8   --    --    PHOS   --   3.0  2.4*   --    --    AGAP   --   7   --    --   7   BUCR   --   28*   --    --   29*   AP   --   55   --    --   53   TP   --   5.7*   --    --   5.5*   ALB   --   2.5*   --    --   2.2*   GLOB   --   3.2   --    --   3.3   AGRAT   --   0.8   --    --   0.7*        Lactic Acid Lactic acid   Date Value Ref Range Status   08/16/2017 1.4 0.4 - 2.0 MMOL/L Final     No results for input(s): LAC in the last 72 hours. Liver Enzymes Protein, total   Date Value Ref Range Status   08/19/2017 5.7 (L) 6.4 - 8.2 g/dL Final     Albumin   Date Value Ref Range Status   08/19/2017 2.5 (L) 3.4 - 5.0 g/dL Final     Globulin   Date Value Ref Range Status   08/19/2017 3.2 2.0 - 4.0 g/dL Final     A-G Ratio   Date Value Ref Range Status   08/19/2017 0.8 0.8 - 1.7   Final     AST (SGOT)   Date Value Ref Range Status   08/19/2017 21 15 - 37 U/L Final     Alk. phosphatase   Date Value Ref Range Status   08/19/2017 55 45 - 117 U/L Final     Recent Labs      08/19/17   0435  08/18/17   0305   TP  5.7*  5.5*   ALB  2.5*  2.2*   GLOB  3.2  3.3   AGRAT  0.8  0.7*   SGOT  21  10*   AP  55  53        CBC w/Diff Recent Labs      08/19/17   0435  08/18/17   0305   WBC  5.0  3.5*   RBC  3.23*  3.11*   HGB  10.9*  10.7*   HCT  31.9*  30.8*   PLT  133*  121*        Cardiac Enzymes No results found for: CPK, CKMMB, CKMB, RCK3, CKMBT, CKNDX, CKND1, CHUCKY, TROPT, TROIQ, TAMMY, TROPT, TNIPOC, BNP, BNPP     BNP No results found for: BNP, BNPP, XBNPT     Coagulation No results for input(s): PTP, INR, APTT in the last 72 hours.     No lab exists for component: INREXT, INREXT      Thyroid  No results found for: T4, T3U, TSH, TSHEXT, TSHEXT       Lipid Panel No results found for: CHOL, CHOLPOCT, CHOLX, CHLST, CHOLV, 423048, HDL, LDL, LDLC, DLDLP, 233248, VLDLC, VLDL, TGLX, TRIGL, TRIGP, TGLPOCT, CHHD, CHHDX     ABG Recent Labs      08/18/17   1006  08/18/17   0520   PHI  7.448  7.476*   PCO2I  41.6  39.4   PO2I  137*  111*   HCO3I  28.8*  29.3*   FIO2I  30  0.3        Urinalysis Lab Results   Component Value Date/Time    Color YELLOW 08/16/2017 09:39 AM    Appearance CLOUDY 08/16/2017 09:39 AM    Specific gravity 1.011 08/16/2017 09:39 AM    pH (UA) 8.0 08/16/2017 09:39 AM    Protein 100 08/16/2017 09:39 AM    Glucose 100 08/16/2017 09:39 AM    Ketone NEGATIVE  08/16/2017 09:39 AM    Bilirubin NEGATIVE  08/16/2017 09:39 AM    Urobilinogen 0.2 08/16/2017 09:39 AM    Nitrites NEGATIVE  08/16/2017 09:39 AM    Leukocyte Esterase NEGATIVE  08/16/2017 09:39 AM    Epithelial cells NEGATIVE  08/16/2017 09:39 AM    Bacteria FEW 08/16/2017 09:39 AM    WBC 0 to 3 08/16/2017 09:39 AM    RBC 0 to 3 08/16/2017 09:39 AM        Micro  Recent Labs      08/18/17   1500  08/18/17   1448  08/18/17   1445   CULT  NO ANAEROBES ISOLATED 2 DAYS  MODERATE THIERNO ALBICANS*  SENSITIVITY TO FOLLOW  CULTURE IN PROGRESS,FURTHER UPDATES TO FOLLOW     Recent Labs      08/18/17   1500  08/18/17   1448  08/18/17   1445   CULT  NO ANAEROBES ISOLATED 2 DAYS  MODERATE THIERNO ALBICANS*  SENSITIVITY TO FOLLOW  CULTURE IN PROGRESS,FURTHER UPDATES TO FOLLOW        XR (Most Recent). CXR reviewed by me and compared with previous CXR   Results from Hospital Encounter encounter on 08/16/17   XR CHEST PORT   Narrative PORTABLE CHEST AT 0555 HOURS:    Indication: Respiratory failure. Technique:  Portable, erect AP view. Comparison:  08/17/2017    Findings:  Endotracheal tube is approximately 5.5 cm above the reji. Lungs are adequately expanded. No focal consolidation, pulmonary edema or  pneumothorax. Cardiac silhouette is within normal limits. Chronic, healed left  posterior seventh rib fracture. Impression IMPRESSION:    1. Stable position of endotracheal tube. No acute cardiopulmonary disease. CT (Most Recent)   Results from Hospital Encounter encounter on 08/16/17   CT NECK SOFT TISSUE W CONT   Narrative CT neck    History: Neck mass, history of throat cancer with prior radiation therapy    Comparison: CT neck 8/3/2017    Technique: Multiple axial CT images of the neck were obtained extending from the  skull base to the upper thorax after administration of IV contrast. Coronal and  sagittal reformations were performed. One or more dose reduction techniques  were used on this CT: automated exposure control, adjustment of the mAs and/or  kVp according to patient's size, and iterative reconstruction techniques. The  specific techniques utilized on this CT exam have been documented in the  patient's electronic medical record. Findings:    Endotracheal tube has been placed during the interval. Some layering hyperdense  oral secretions are seen within the oropharynx and hypopharynx. Tip of  endotracheal tube is in the tracheal airway at about the level of the clavicular  heads.  Endotracheal tube and secretions does limit evaluation of the laryngeal,  hypopharyngeal, and oropharyngeal regions. No discrete suspicious masslike lesion is seen. There is suggestion of hypodense  thickening of the mucosa of the oropharynx and hypopharynx similar to prior  study. Thickening of the platysma is present with mild fatty stranding across  multiple fascial planes, nonspecific but suspect posttreatment changes. These  findings show no short-term interval change. There is no prior more remote study  for comparison. No adenopathy is present based on size criteria. There are a few scattered very  small subcentimeter anterior and posterior cervical chain nodes. No  cystic/necrotic nodes are identified. The parotid and submandibular glands are  unremarkable. Mild atherosclerotic calcifications are present particularly  along the carotid bifurcations additional atheromatous plaque is also seen. The  thyroid gland is unremarkable. Degenerative changes are scattered throughout the cervical and visualized  thoracic spine. Poor dentition is present with majority of teeth absent. Visualized paranasal sinuses and mastoid air cells are clear. Changes of centrilobular and paraseptal emphysema are seen in the bilateral lung  apices. No consolidation seen in the lung apices. Impression IMPRESSION:    1. Interval placement of endotracheal tube. Expected layering mucus secretions  within the oropharynx and hypopharynx. 2. No discrete masslike lesion seen to correlate with history of throat cancer. Stable diffuse mildly edematous mucosal thickening and soft tissue stranding  likely reflecting posttreatment changes. No more remote comparison study. 3. No adenopathy based on size criteria. No cystic/necrotic nodes. 4. Emphysema seen in bilateral lung apices without consolidation. EKG No results found for this or any previous visit. ECHO No results found for this or any previous visit. PFT No flowsheet data found. Other ASA reactivity:   Pre-albumin:   Ionized Calcium:   NH4:   T3, FT4:  Cortisol:  Urine Osm:  Urine Lytes:   HbA1c:      Recent Results (from the past 24 hour(s))   GLUCOSE, POC    Collection Time: 08/19/17 11:42 AM   Result Value Ref Range    Glucose (POC) 129 (H) 70 - 110 mg/dL   GLUCOSE, POC    Collection Time: 08/19/17  5:37 PM   Result Value Ref Range    Glucose (POC) 134 (H) 70 - 110 mg/dL   POTASSIUM    Collection Time: 08/19/17  7:50 PM   Result Value Ref Range    Potassium 3.1 (L) 3.5 - 5.5 mmol/L   MAGNESIUM    Collection Time: 08/19/17  7:50 PM   Result Value Ref Range    Magnesium 1.9 1.6 - 2.6 mg/dL   GLUCOSE, POC    Collection Time: 08/19/17 11:56 PM   Result Value Ref Range    Glucose (POC) 130 (H) 70 - 110 mg/dL   GLUCOSE, POC    Collection Time: 08/20/17  5:56 AM   Result Value Ref Range    Glucose (POC) 82 70 - 110 mg/dL         Switch to IV  Keep ICU Plan as above  Stable at the present time  On no pressors.  And airway and breathing seems stable   [x]See my orders for details    My assessment, plan of care, findings, medications, side effects etc were discussed with:  [x] Nurse [] PT/OT    [x] Respiratory therapy [x]     [x] Family: answered all questions to satisfaction [] Patient: answered all questions to satisfaction   [] Pharmacist []      [x] Case & management strategies discussed today on multidisciplinary rounds    High complexity decision making was performed during the evaluation of this patient at high risk for decompensation with multiple organ involvement    CC time spent is 40 min        Nii Wagner MD  8/20/2017

## 2017-08-20 NOTE — PROGRESS NOTES
Ears/Nose/Throat Consult    Subjective:     Date of Consultation:  August 20, 2017    Referring Physician:     History of Present Illness:   Patient is a 45 y.o. male who is being seen for stridor. He was admitted to the hosptial for COPD exacerbation (HCC)  ACUTE HYPOXEMIC RESPIRATORY FAILURE. Today, he has had increased stridor. Still with copious secretions. He does feel that his breathing is difficult. His local ENT who referred him to [facility] is Dr. Ramon Padron. Patient Active Problem List    Diagnosis Date Noted    Severe protein-calorie malnutrition Honora Fees: less than 60% of standard weight) ([facility] [address] 08/18/2017    COPD exacerbation ([facility] [address] 08/16/2017    Acute hypoxemic respiratory failure ([facility] [address] 08/16/2017    Acute respiratory distress 08/03/2017    Hypoxia 08/03/2017    Stridor 08/03/2017    Respiratory depression 08/03/2017    Aspiration into airway 07/30/2017    Throat cancer ([facility] [address] 07/30/2017    Status post insertion of percutaneous endoscopic gastrostomy (PEG) tube ([facility] [address] 07/30/2017    Hyponatremia 07/30/2017     Past Medical History:   Diagnosis Date    Chronic obstructive pulmonary disease (HCC)     acute hypoxic respiratory failure 8/16    Hypertension     Ill-defined condition     peg tube in place    Ill-defined condition     chemo    S/P radiation therapy     Throat cancer ([facility] [address]     Tobacco abuse       History reviewed. No pertinent family history.    Social History   Substance Use Topics    Smoking status: Current Every Day Smoker     Packs/day: 0.50    Smokeless tobacco: Never Used    Alcohol use No     Past Surgical History:   Procedure Laterality Date    HX HEENT      sinus surgery      Current Facility-Administered Medications   Medication Dose Route Frequency    methylPREDNISolone (PF) (SOLU-MEDROL) injection 80 mg  80 mg IntraVENous Q6H    methylPREDNISolone (PF) (SOLU-MEDROL) injection 125 mg  125 mg IntraVENous ONCE    piperacillin-tazobactam (ZOSYN) 3.375 g in 0.9% sodium chloride (MBP/ADV) 100 mL MBP  3.375 g IntraVENous Q8H    Electrolyte Replacement Protocol - Potassium Standard Dosing  1 Each Other PRN    Electrolyte Replacement Protocol - Magnesium  1 Each Other PRN    famotidine (PEPCID) tablet 20 mg  20 mg Oral BID    multivitamin (MULTI-DELYN, WELLESSE) oral liquid 30 mL  30 mL Per G Tube DAILY    lisinopril (PRINIVIL, ZESTRIL) tablet 5 mg  5 mg Oral Q12H    albuterol-ipratropium (DUO-NEB) 2.5 MG-0.5 MG/3 ML  3 mL Nebulization Q4H PRN    acetaminophen (TYLENOL) solution 650 mg  650 mg Oral Q6H PRN    cloNIDine (CATAPRES) 0.2 mg/24 hr patch 1 Patch  1 Patch TransDERmal Q7D    LORazepam (ATIVAN) injection 0.5 mg  0.5 mg IntraVENous Q4H PRN    Thiamine Mononitrate (B-1) tablet 100 mg  100 mg Oral DAILY    guaiFENesin (ROBITUSSIN) 100 mg/5 mL oral liquid 100 mg  100 mg Oral Q4H PRN    sodium chloride (NS) flush 5-10 mL  5-10 mL IntraVENous Q8H    sodium chloride (NS) flush 5-10 mL  5-10 mL IntraVENous PRN    HYDROmorphone (PF) (DILAUDID) 30 mg / 30 mL PCA   IntraVENous CONTINUOUS    naloxone (NARCAN) injection 0.4 mg  0.4 mg IntraVENous PRN    nicotine (NICODERM CQ) 21 mg/24 hr patch 1 Patch  1 Patch TransDERmal DAILY    fentaNYL (PF) 900 mcg/30 ml infusion soln  0-200 mcg/hr IntraVENous TITRATE    midazolam in normal saline (VERSED) 1 mg/mL infusion  0-10 mg/hr IntraVENous TITRATE    0.9% sodium chloride infusion  100 mL/hr IntraVENous CONTINUOUS    insulin lispro (HUMALOG) injection   SubCUTAneous Q6H    glucose chewable tablet 16 g  4 Tab Oral PRN    glucagon (GLUCAGEN) injection 1 mg  1 mg IntraMUSCular PRN    dextrose (D50W) injection syrg 12.5-25 g  25-50 mL IntraVENous PRN    heparin (porcine) injection 5,000 Units  5,000 Units SubCUTAneous Q8H    dexmedeTOMidine (PRECEDEX) 400 mcg in 0.9% sodium chloride 100 mL infusion  0.2-0.7 mcg/kg/hr IntraVENous TITRATE      No Known Allergies     Review of Systems:  Pertinent items are noted in the History of Present Illness. Objective:     Patient Vitals for the past 8 hrs:   BP Temp Pulse Resp SpO2   17 0800 - 97.8 °F (36.6 °C) - - -   17 0600 112/80 - 75 11 96 %   17 0548 - - - - 97 %   17 0500 147/86 - 79 13 -   17 0400 149/88 - 83 21 97 %   17 0300 136/82 97.8 °F (36.6 °C) 69 11 97 %   17 0200 127/77 - 76 12 -     Temp (24hrs), Av.7 °F (36.5 °C), Min:97.4 °F (36.3 °C), Max:97.8 °F (36.6 °C)     1901 -  0700  In: 5389.2 [I.V.:4959.2]  Out: 17308 [Urine:35950]    Physical Exam:   ENT exam shows patient in no distress, intermittant inspiratory stridor, no neck nodes or sinus tenderness    There were no additional components assessed. Flexible endoscopy performed after Afrin and topical lidocaine. Flexible scope passed through the left nares. Normal left middle meatus and SER. Normal NP. Tongue base normal. Epiglottis edematous and arytenoids swollen. Unable to visualize glottis due to thick yellow secretions. Cannot assess vocal cord mobility. He tolerated the procedure well. Assessment:     1. Laryngeal edema-stable. I do not feel comfortable with his airway at this point. 2. Stage 3 SCC larynx S/P XRT/chemo    Plan:     1. Recommended elective tracheotomy. Explained the risks and benefits including bleeding, collapsed lung and displacement of the trach tube. He understands that the   Tube can be removed at a later date if/when his airway improves. Will try to get scheduled for today.       Signed By: Renaldo Núñez MD     2017

## 2017-08-20 NOTE — PROGRESS NOTES
2000 Assessment completed. VSS. Pain controlled at this time. Denies SOB, on room air. Course lung fields. Pt appears restless, complains of some anxiety. Precedex drip initiated with offgoing RN SIRISHA Cooper. Will continue to monitor. 2200 On call hospitalist, Dr. Medardo Fournier, paged with patient complaint of ongoing anxiety and restlessness. See new orders for prn Ativan. 2230 Resting. Comfortable. VSS.     0000 Reassessment completed. No acute changes. VSS.     0300 Reassessment completed. Denies any needs. Comfortable. NAD. VSS.     0545 Respiratory at bedside completing requested treatment. VSS. NAD.    0600 All potassium runs completed. Lab notified that recheck will be at 10 am. No acute changes in patient status. Denies pain, SOB. Resting comfortably.

## 2017-08-20 NOTE — ANESTHESIA POSTPROCEDURE EVALUATION
Post-Anesthesia Evaluation and Assessment    Cardiovascular Function/Vital Signs  Visit Vitals    /77    Pulse 90    Temp 36.8 °C (98.2 °F)    Resp 19    Wt 73.9 kg (162 lb 14.7 oz)    SpO2 100%    BMI 25.52 kg/m2       Patient is status post Procedure(s):  TRACHEOSTOMY. Nausea/Vomiting: Controlled. Postoperative hydration reviewed and adequate. Pain:  Pain Scale 1: FLACC (08/20/17 1234)  Pain Intensity 1: 0 (08/20/17 1234)   Managed. Neurological Status:   Neuro (WDL): Within Defined Limits (08/20/17 1229)   At baseline. Mental Status and Level of Consciousness: Arousable. Pulmonary Status:   O2 Device: Tracheal collar (08/20/17 1204)   Adequate oxygenation and airway patent. Complications related to anesthesia: None    Post-anesthesia assessment completed. No concerns. Patient has met all discharge requirements Transfer to ICU.     Signed By: Mikhail Sims CRNA    August 20, 2017

## 2017-08-20 NOTE — PERIOP NOTES
TRANSFER - OUT REPORT:    [facility]va [facility]  being transferred to ICU (unit) for routine progression of care       Report consisted of patients Situation, Background, Assessment and   Recommendations(SBAR). Information from the following report(s) SBAR, Kardex and Procedure Summary was reviewed with the receiving nurse. Lines:   Peripheral IV 08/16/17 Left Wrist (Active)   Site Assessment Clean, dry, & intact 8/20/2017 12:25 PM   Phlebitis Assessment 0 8/20/2017 12:25 PM   Infiltration Assessment 0 8/20/2017 12:25 PM   Dressing Status Clean, dry, & intact 8/20/2017 12:25 PM   Dressing Type Transparent;Tape 8/20/2017 12:25 PM   Hub Color/Line Status Infusing 8/20/2017 12:25 PM   Action Taken Open ports on tubing capped 8/20/2017  3:00 AM   Alcohol Cap Used Yes 8/20/2017  3:00 AM       Peripheral IV 08/19/17 Right Forearm (Active)   Site Assessment Clean, dry, & intact 8/20/2017 12:09 PM   Phlebitis Assessment 0 8/20/2017 12:09 PM   Infiltration Assessment 0 8/20/2017 12:09 PM   Dressing Status Clean, dry, & intact 8/20/2017 12:09 PM   Dressing Type Non-adherent dressing 8/20/2017 12:09 PM   Hub Color/Line Status Capped 8/20/2017 12:09 PM   Action Taken Open ports on tubing capped 8/20/2017  3:00 AM   Alcohol Cap Used Yes 8/20/2017  3:00 AM        Opportunity for questions and clarification was provided.       Patient transported with:   Monitor  O2 @ 2 liters  Registered Nurse

## 2017-08-20 NOTE — ROUTINE PROCESS
Bedside and Verbal shift change report given to Brooke Herrera RN (oncoming nurse) by Waymond Severin RN (offgoing nurse).  Report included the following information SBAR, Kardex, Intake/Output, MAR, Recent Results, Med Rec Status and Cardiac Rhythm SR-ST.

## 2017-08-20 NOTE — BRIEF OP NOTE
BRIEF OPERATIVE NOTE    Date of Procedure: 8/20/2017   Preoperative Diagnosis: respiratory stridor, upper airway obstruction  Postoperative Diagnosis: respiratory stridor, upper airway obstruction    Procedure(s):  TRACHEOSTOMY  Surgeon(s) and Role:     * Madelyn Dyson MD - Primary         Assistant Staff:       Surgical Staff:  Circ-1: Da Murray RN  Scrub Tech-1: Leopoldo Mosqueda  Scrub Tech-2: Bran Rodriguez  Event Time In   Incision Start 1117   Incision Close 1150     Anesthesia: General   Estimated Blood Loss: 10 ml  Specimens: * No specimens in log *   Findings: normal trachea   Complications: none  Implants:   Implant Name Type Inv.  Item Serial No.  Lot No. LRB No. Used Action   TUBE TRACH CUF LO PRSS 8 STRL -- KASH - [id number]   TUBE TRACH CUF LO PRSS 8 STRL -- KASH   [facility] [id number] N/A 1 Implanted

## 2017-08-20 NOTE — ADDENDUM NOTE
Addendum  created 08/20/17 1303 by Rikki Martinez, MERRY    Anesthesia Intra Flowsheets edited, Anesthesia Intra Meds edited, Orders acknowledged in Narrator

## 2017-08-20 NOTE — PROGRESS NOTES
PATIENT TRANSPORTED TO ICU WITHOUT INCIDENT. HE REMAINS ON 40% TC WITH SPO2 100%. RR 17.  HR 77. SHILEY #8.0 CUFFED TRACH IS SUTURED IN PLACE AND ALSO SECURED WITH TIES. STANDBY #6.0 AND 8.0 TRACHS AND AMBU BAG AND MASK AVAILABLE AT BEDSIDE. OBTURATOR ALSO PRESENT.

## 2017-08-20 NOTE — PROGRESS NOTES
2000 Assessment completed. VSS. Pain controlled at this time. Denies SOB, on room air. Course lung fields. Pt appears restless, complains of some anxiety. Precedex drip initiated with offgoing RN SIRISHA George. Will continue to monitor. 2200 On call hospitalist, Dr. Kelley Cartagena, paged with patient complaint of ongoing anxiety and restlessness. See new orders for prn Ativan. 2230 Resting. Comfortable. VSS.     0000 Reassessment completed. No acute changes. VSS.     0300 Reassessment completed. Denies any needs. Comfortable. NAD. VSS.     0545 Pt complains of mild SOB. 3L placed on patient. Respiratory at bedside completing requested treatment. VSS. NAD.    0600 All potassium runs completed. Lab notified that recheck will be at 10 am. No acute changes in patient status. Denies pain, SOB. Resting comfortably. 5248 Dr. Gagan Medina concerned for increased stridor. Requests that ENT physician be notified. Dr. Deshawn Boogie paged. 8160 Dr. Deshawn Boogie made aware of patient status, condition, Dr. Ashley Mcnair concern, and vitals. Will come to assess pt.    0745 Bedside and Verbal shift change report given to SIRISHA George RN (oncoming nurse) by Shannan Ribeiro RN   (offgoing nurse). Report included the following information SBAR, Kardex and MAR.

## 2017-08-20 NOTE — ROUTINE PROCESS
Bedside and Verbal shift change report given to Angelica Chin RN (oncoming nurse) by Corrine Aguilar RN (offgoing nurse).  Report included the following information SBAR, Kardex, Intake/Output, MAR, Recent Results, Med Rec Status and Cardiac Rhythm SR-ST.

## 2017-08-20 NOTE — ANESTHESIA PREPROCEDURE EVALUATION
Anesthetic History   No history of anesthetic complications            Review of Systems / Medical History  Patient summary reviewed, nursing notes reviewed and pertinent labs reviewed    Pulmonary    COPD: moderate      Smoker         Neuro/Psych   Within defined limits           Cardiovascular    Hypertension: well controlled                   GI/Hepatic/Renal  Within defined limits              Endo/Other        Cancer (head and neck ca s/p XRT)     Other Findings   Comments: Intubated 8/16 secondary to respiratory failure and extubated in OR 8/18 after ENT exam. Now, elective trach. Physical Exam    Airway  Mallampati: III  TM Distance: 4 - 6 cm  Neck ROM: normal range of motion   Mouth opening: Normal     Cardiovascular               Dental    Dentition: Poor dentition     Pulmonary                 Abdominal         Other Findings            Anesthetic Plan    ASA: 3  Anesthesia type: general          Induction: Intravenous  Anesthetic plan and risks discussed with: Patient      No respiratory distress. Intubated 8/16 with 6.0 ETT. Corona Yeboah

## 2017-08-20 NOTE — PROGRESS NOTES
PATIENT IS CURRENTLY IN PACU S/P TRACH. PLACED ON 40% TC. PATIENT AWAKE AND ALERT. PER DR. Jovan Biggs, ENT, TRACH TIES ARE NOT TO BE CHANGED. CUFF MAY BE DEFLATED. AWAITING TRANSPORT BACK TO ICU.

## 2017-08-20 NOTE — PROGRESS NOTES
Hospitalist Progress Note    Patient: Maryan Bridges MRN: [id number]  CSN: [id number]    YOB: 1965  Age: 45 y.o. Sex: male    DOA: 8/16/2017 LOS:  LOS: 4 days          Chief Complaint:    Respiratory Distress    Assessment/Plan     Hospital Problems  Date Reviewed: 8/20/2017          Codes Class Noted POA    Severe protein-calorie malnutrition [place]: less than 60% of standard weight) ([facility] ICD-10-CM: E43  ICD-9-CM: 262  8/18/2017 Unknown        COPD exacerbation ([facility] ICD-10-CM: J44.1  ICD-9-CM: 491.21  8/16/2017 Yes        * (Principal)Acute hypoxemic respiratory failure ([facility] ICD-10-CM: J96.01  ICD-9-CM: 518.81  8/16/2017 Yes        Throat cancer ([place] [address].) ICD-10-CM: C14.0  ICD-9-CM: 149.0  7/30/2017 Yes        Status post insertion of percutaneous endoscopic gastrostomy (PEG) tube [facility]) ICD-10-CM: Z93.1  ICD-9-CM: V44.1  7/30/2017 Yes              Maryan Bridges is a 45 y.o. male who was admitted on 8/16/17 with respiratory distress requiring intubation. He has a history of throat cancer and is being followed by an oncologist at [facility] in [facility]. The plan was for him to be transferred there when they had a bed available but have been unable to accept. If this is a consideration, call transfer center: [phone] to see if a bed is available for him. Vitals have been stable. Initial hypotension has resolved. Oxygenating well. Afebrile. He was initially requiring precedex, versed, and fentanyl for sedation--opiate history. On zosyn + levaquin, solumedrol+duonebs or COPD/questionable aspiration event. SSI. He was extubated in OR on 8/18/17 by ENT without incident. He is now oxygenating well on room air. He is on a dilaudid PCA for pain control. This will be aggressively weaned. Transition to per tube meds.   Plan for trach today      Throat cancer [facility]) (7/30/2017)  - following with Heme/Onc at [facility]  - transfer doubtful  - plan for trach today      Status post insertion of percutaneous endoscopic gastrostomy (PEG) tube ([facility] (7/30/2017)  - ? Aspiration event  - s/p levaquin + zosyn   - transition to avelox per tube  - monitor for fevers      COPD exacerbation ([facility] (8/16/2017)  - steroids, abx, nebs as above      Ac[facility] (8/16/2017)  - COPD exacerbation +/- aspiration event  - abx + steroids as above  - off of vent  - continues to smoke  - will likely be a recurring event--needs code status discussion    PT/OT when able    CM for DC planning    Dispo: transition to per tube meds as above; monitor for fevers; trach today      Subjective: Airway patency concerning today; to OR for trach    Review of systems:    General: No fevers or chills. Cardiovascular: No chest pain or pressure. No palpitations. Pulmonary: No shortness of breath. Gastrointestinal: No nausea, vomiting.        Vital signs/Intake and Output:  Visit Vitals    /81    Pulse 73    Temp 97.8 °F (36.6 °C)    Resp 10    Wt 73.9 kg (162 lb 14.7 oz)    SpO2 96%    BMI 25.52 kg/m2     Current Shift:  08/20 0701 - 08/20 1900  In: 212 [I.V.:100]  Out: 775 [Urine:775]  Last three shifts:  08/18 1901 - 08/20 0700  In: 5389.2 [I.V.:4959.2]  Out: 11563 [AVWEJ:82612]    Exam:    General: awake and alert; NAD  Head: Atraumatic, normocephalic  Eyes: PERRLA, EOMI, sclerae non-icteric  ENT: mucous membranes moist  Neck: supple, no masses, no lymphadenopathy, no rigidity ; large mass under R mandible  CVS:Regular rate and rhythm, no murmurs, rubs, gallops, or clicks  Lungs:Clear to auscultation bilaterally, no wheezes, rales, or rhonchi  Abdomen: PEG in place soft, Nontender, nondistended, bowel sounds normal throughout  Extremities: atraumatic, pulses 2+ all ext, no edema  Skin: warm, dry, well perfused, without rash; no cyanosis or clubbing  Neuro/Psych: AAOx3, no focal deficits                Labs: Results:       Chemistry Recent Labs      08/19/17   1950  08/19/17   0435  08/18/17   1211 08/18/17   0305   GLU   --   112*   --   145*   NA   --   142   --   136   K  3.1*  2.6*   --   3.0*   CL   --   104   --   102   CO2   --   31   --   27   BUN   --   19*  20*  17   CREA   --   0.68   --   0.58*   CA   --   8.4*   --   8.2*   AGAP   --   7   --   7   BUCR   --   28*   --   29*   AP   --   55   --   53   TP   --   5.7*   --   5.5*   ALB   --   2.5*   --   2.2*   GLOB   --   3.2   --   3.3   AGRAT   --   0.8   --   0.7*      CBC w/Diff Recent Labs      08/19/17 0435 08/18/17   0305   WBC  5.0  3.5*   RBC  3.23*  3.11*   HGB  10.9*  10.7*   HCT  31.9*  30.8*   PLT  133*  121*      Cardiac Enzymes No results for input(s): CPK, CKND1, CHUCKY in the last 72 hours. No lab exists for component: CKRMB, TROIP   Coagulation No results for input(s): PTP, INR, APTT in the last 72 hours. No lab exists for component: INREXT, INREXT    Lipid Panel No results found for: CHOL, CHOLPOCT, CHOLX, CHLST, CHOLV, 329112, HDL, LDL, LDLC, DLDLP, 931216, VLDLC, VLDL, TGLX, TRIGL, TRIGP, TGLPOCT, CHHD, CHHDX   BNP No results for input(s): BNPP in the last 72 hours.    Liver Enzymes Recent Labs      08/19/17   0435   TP  5.7*   ALB  2.5*   AP  55   SGOT  21      Thyroid Studies No results found for: T4, T3U, TSH, TSHEXT, TSHEXT     Procedures/imaging: see electronic medical records for all procedures/Xrays and details which were not copied into this note but were reviewed prior to creation of [place] [id number], DO

## 2017-08-21 LAB
GLUCOSE BLD STRIP.AUTO-MCNC: 102 MG/DL (ref 70–110)
GLUCOSE BLD STRIP.AUTO-MCNC: 116 MG/DL (ref 70–110)
GLUCOSE BLD STRIP.AUTO-MCNC: 122 MG/DL (ref 70–110)
GLUCOSE BLD STRIP.AUTO-MCNC: 131 MG/DL (ref 70–110)
GLUCOSE BLD STRIP.AUTO-MCNC: 137 MG/DL (ref 70–110)
MAGNESIUM SERPL-MCNC: 2.1 MG/DL (ref 1.6–2.6)
PHOSPHATE SERPL-MCNC: 4.2 MG/DL (ref 2.5–4.9)
POTASSIUM SERPL-SCNC: 3.5 MMOL/L (ref 3.5–5.5)
POTASSIUM SERPL-SCNC: 4.3 MMOL/L (ref 3.5–5.5)

## 2017-08-21 PROCEDURE — 82962 GLUCOSE BLOOD TEST: CPT

## 2017-08-21 PROCEDURE — 83735 ASSAY OF MAGNESIUM: CPT | Performed by: INTERNAL MEDICINE

## 2017-08-21 PROCEDURE — 74011250636 HC RX REV CODE- 250/636: Performed by: INTERNAL MEDICINE

## 2017-08-21 PROCEDURE — 74011250637 HC RX REV CODE- 250/637: Performed by: FAMILY MEDICINE

## 2017-08-21 PROCEDURE — 74011250636 HC RX REV CODE- 250/636

## 2017-08-21 PROCEDURE — 97163 PT EVAL HIGH COMPLEX 45 MIN: CPT

## 2017-08-21 PROCEDURE — 74011250637 HC RX REV CODE- 250/637: Performed by: HOSPITALIST

## 2017-08-21 PROCEDURE — 74011250637 HC RX REV CODE- 250/637: Performed by: INTERNAL MEDICINE

## 2017-08-21 PROCEDURE — 84132 ASSAY OF SERUM POTASSIUM: CPT | Performed by: INTERNAL MEDICINE

## 2017-08-21 PROCEDURE — 36415 COLL VENOUS BLD VENIPUNCTURE: CPT | Performed by: INTERNAL MEDICINE

## 2017-08-21 PROCEDURE — 84132 ASSAY OF SERUM POTASSIUM: CPT | Performed by: HOSPITALIST

## 2017-08-21 PROCEDURE — 97530 THERAPEUTIC ACTIVITIES: CPT

## 2017-08-21 PROCEDURE — 65610000006 HC RM INTENSIVE CARE

## 2017-08-21 PROCEDURE — 97162 PT EVAL MOD COMPLEX 30 MIN: CPT

## 2017-08-21 PROCEDURE — 94640 AIRWAY INHALATION TREATMENT: CPT

## 2017-08-21 PROCEDURE — 84100 ASSAY OF PHOSPHORUS: CPT | Performed by: INTERNAL MEDICINE

## 2017-08-21 PROCEDURE — 74011000250 HC RX REV CODE- 250: Performed by: INTERNAL MEDICINE

## 2017-08-21 PROCEDURE — 77010033678 HC OXYGEN DAILY

## 2017-08-21 PROCEDURE — 74011000258 HC RX REV CODE- 258: Performed by: INTERNAL MEDICINE

## 2017-08-21 RX ORDER — POTASSIUM CHLORIDE 20MEQ/15ML
40 LIQUID (ML) ORAL
Status: COMPLETED | OUTPATIENT
Start: 2017-08-21 | End: 2017-08-21

## 2017-08-21 RX ORDER — POTASSIUM CHLORIDE 7.45 MG/ML
10 INJECTION INTRAVENOUS
Status: DISCONTINUED | OUTPATIENT
Start: 2017-08-21 | End: 2017-08-21

## 2017-08-21 RX ORDER — FENTANYL 100 UG/H
1 PATCH TRANSDERMAL
Status: DISCONTINUED | OUTPATIENT
Start: 2017-08-21 | End: 2017-08-29 | Stop reason: HOSPADM

## 2017-08-21 RX ORDER — HYDROMORPHONE HYDROCHLORIDE 1 MG/ML
INJECTION, SOLUTION INTRAMUSCULAR; INTRAVENOUS; SUBCUTANEOUS
Status: DISPENSED
Start: 2017-08-21 | End: 2017-08-21

## 2017-08-21 RX ORDER — POTASSIUM CHLORIDE 20MEQ/15ML
20 LIQUID (ML) ORAL DAILY
Status: DISCONTINUED | OUTPATIENT
Start: 2017-08-22 | End: 2017-08-29 | Stop reason: HOSPADM

## 2017-08-21 RX ORDER — HYDROMORPHONE HYDROCHLORIDE 1 MG/ML
0.4 INJECTION, SOLUTION INTRAMUSCULAR; INTRAVENOUS; SUBCUTANEOUS ONCE
Status: COMPLETED | OUTPATIENT
Start: 2017-08-21 | End: 2017-08-21

## 2017-08-21 RX ORDER — FLUCONAZOLE 100 MG/1
100 TABLET ORAL DAILY
Status: DISCONTINUED | OUTPATIENT
Start: 2017-08-21 | End: 2017-08-21

## 2017-08-21 RX ADMIN — PIPERACILLIN SODIUM,TAZOBACTAM SODIUM 3.38 G: 3; .375 INJECTION, POWDER, FOR SOLUTION INTRAVENOUS at 21:37

## 2017-08-21 RX ADMIN — IPRATROPIUM BROMIDE AND ALBUTEROL SULFATE 3 ML: .5; 3 SOLUTION RESPIRATORY (INHALATION) at 07:39

## 2017-08-21 RX ADMIN — Medication 10 ML: at 22:00

## 2017-08-21 RX ADMIN — LORAZEPAM 0.5 MG: 2 INJECTION INTRAMUSCULAR; INTRAVENOUS at 16:17

## 2017-08-21 RX ADMIN — Medication 10 ML: at 13:51

## 2017-08-21 RX ADMIN — LORAZEPAM 0.5 MG: 2 INJECTION INTRAMUSCULAR; INTRAVENOUS at 23:09

## 2017-08-21 RX ADMIN — HEPARIN SODIUM 5000 UNITS: 5000 INJECTION, SOLUTION INTRAVENOUS; SUBCUTANEOUS at 21:37

## 2017-08-21 RX ADMIN — HYDROMORPHONE HYDROCHLORIDE 0.4 MG: 1 INJECTION, SOLUTION INTRAMUSCULAR; INTRAVENOUS; SUBCUTANEOUS at 01:19

## 2017-08-21 RX ADMIN — PIPERACILLIN SODIUM,TAZOBACTAM SODIUM 3.38 G: 3; .375 INJECTION, POWDER, FOR SOLUTION INTRAVENOUS at 13:46

## 2017-08-21 RX ADMIN — HYDROCODONE BITARTRATE AND ACETAMINOPHEN 5 MG: 7.5; 325 TABLET ORAL at 08:33

## 2017-08-21 RX ADMIN — HYDROCODONE BITARTRATE AND ACETAMINOPHEN 5 MG: 7.5; 325 TABLET ORAL at 21:37

## 2017-08-21 RX ADMIN — FAMOTIDINE 20 MG: 20 TABLET ORAL at 21:37

## 2017-08-21 RX ADMIN — POTASSIUM CHLORIDE 40 MEQ: 20 SOLUTION ORAL at 08:32

## 2017-08-21 RX ADMIN — PIPERACILLIN SODIUM,TAZOBACTAM SODIUM 3.38 G: 3; .375 INJECTION, POWDER, FOR SOLUTION INTRAVENOUS at 01:27

## 2017-08-21 RX ADMIN — LORAZEPAM 0.5 MG: 2 INJECTION INTRAMUSCULAR; INTRAVENOUS at 19:40

## 2017-08-21 RX ADMIN — METHYLPREDNISOLONE SODIUM SUCCINATE 80 MG: 40 INJECTION, POWDER, FOR SOLUTION INTRAMUSCULAR; INTRAVENOUS at 08:34

## 2017-08-21 RX ADMIN — FAMOTIDINE 20 MG: 20 TABLET ORAL at 08:32

## 2017-08-21 RX ADMIN — PIPERACILLIN SODIUM,TAZOBACTAM SODIUM 3.38 G: 3; .375 INJECTION, POWDER, FOR SOLUTION INTRAVENOUS at 08:33

## 2017-08-21 RX ADMIN — HEPARIN SODIUM 5000 UNITS: 5000 INJECTION, SOLUTION INTRAVENOUS; SUBCUTANEOUS at 13:59

## 2017-08-21 RX ADMIN — IPRATROPIUM BROMIDE AND ALBUTEROL SULFATE 3 ML: .5; 3 SOLUTION RESPIRATORY (INHALATION) at 00:47

## 2017-08-21 RX ADMIN — METHYLPREDNISOLONE SODIUM SUCCINATE 80 MG: 40 INJECTION, POWDER, FOR SOLUTION INTRAMUSCULAR; INTRAVENOUS at 01:27

## 2017-08-21 RX ADMIN — MULTIPLE VITAMIN LIQUID 30 ML: LIQUID at 08:40

## 2017-08-21 RX ADMIN — FLUCONAZOLE 100 MG: 100 TABLET ORAL at 11:34

## 2017-08-21 RX ADMIN — HEPARIN SODIUM 5000 UNITS: 5000 INJECTION, SOLUTION INTRAVENOUS; SUBCUTANEOUS at 06:23

## 2017-08-21 RX ADMIN — Medication 100 MG: at 09:00

## 2017-08-21 RX ADMIN — LORAZEPAM 0.5 MG: 2 INJECTION INTRAMUSCULAR; INTRAVENOUS at 09:06

## 2017-08-21 NOTE — PROGRESS NOTES
NUTRITION FOLLOW-UP    RECOMMENDATIONS/PLAN:   Recc restart TwoCal when diet is adv. Two Sheldon starting at 20ml/hr increasing by 5ml/hr Q4 until goal rate of 47ml/hr is met. Recc Two Sheldon @ 47ml/hr providing total 2089kcals, 87g pro, 731ml H20, 228g CHO, 95g Fat  Recc 340ml Free H20 flushes Q6  Monitor labs/lytes, tube feed tolerance, wt, fluid status, skin integrity    Nutrition assessment was completed by RDN and the patient was found to meet the following malnutrition criteria established by ASPEN/AND:      Adult Malnutrition Guidelines:  SEVERE PROTEIN CALORIE MALNUTRITION IN THE CONTEXT OF CHRONIC ILLNESS  Weight Loss:  >5% x 1 month or >7.5% x 3 months or >10% x 6 months or >20% x 12 months  Muscle Mass: Severe Depletion      Please document MALNUTRITION in Problem List if in agreement.  Max Maier  08/21/17  NUTRITION ASSESSMENT:   Client Update: 45 yrs old Male with COPD exacerbation. Pt was extubated 2 days ago, but had respiratory stridor yesterday. Pt had trach placed 8/201/7. Pt has HTN and stage 3 esophageal Ca. Pt continues to smoke and hx ETOH abuse. FOOD/NUTRITION INTAKE   Diet Order:  NPO   Supplements: n/a  Food Allergies: NKFA/  Average PO Intake:      No data found. Pertinent Medications:  [x] Reviewed; pepcid, heparin, thiamine, KCl, zosyn, mvi  Electrolyte Replacement Protocol: [x]K [x]Mg []PO4 as needed   Insulin:  []SSI  []Pre-meal   []Basal    []Drip  []None  Cultural/Scientologist Food Preferences: None Identified    BIOCHEMICAL DATA & MEDICAL TESTS  Pertinent Labs:  [x] Reviewed;    ANTHROPOMETRICS  Height:   5'7      Weight: 73.9 kg (162 lb 14.7 oz)         BMI:   25.4 kg/m^2kg/m overweight (25.0%-29.9% BMI)   Adm Weight: 162 lbs                Weight change: n/a  Adjusted Body Weight: n/a     NUTRITION-FOCUSED PHYSICAL ASSESSMENT  Skin: No pressure injury noted      GI: No BM noted    NUTRITION PRESCRIPTION  Calories:2089kcal/day based on Miffilin x1.6x1.1  Protein: g/day based on 1.4-1.6 g/kg  CHO: 261g/day based on 50% of total energy  Fluid: 2089 ml/day based on 1 kcal/ml      NUTRITION DIAGNOSES:   1. Increased energy needs related to SOB and COPD as evidence by 7lb weight loss since 7/30 and 21% wt loss in last 4-5 months.       NUTRITION INTERVENTIONS:   INTERVENTIONS:                                                                                                                                                                         GOALS:  1. Two Sheldon starting at 20ml/hr increasing by 5ml/hr Q4 until goal rate of 47ml/hr is met. 1. Meet goal rate by next review 3 days   2. Recc Two Sheldon @ 47ml/hr providing total 2089kcals, 87g pro, 731ml H20, 228g CHO, 95g Fat  Recc 340ml Free H20 flushes Q6     2. Meet estimated needs by next review 3 days       LEARNING NEEDS (Diet, Supplementation, Food/Nutrient-Drug Interaction):   [x] None Identified   [] Education provided/documented      Identified and patient: [] Declined   [] Was not appropriate/indicated        NUTRITION MONITORING /EVALUATION:   Adjust EN/PN as appropriate  Follow PO intake  Monitor wt  Monitor renal labs, electrolytes, fluid status     Previous Recommendations Implemented: no        Previous Goals Met:  no -pt made NPO      [x] Participated in Interdisciplinary Rounds    [x] Interdisciplinary Care Plan Reviewed  DISCHARGE NUTRITION RECOMMENDATIONS ADDRESSED:        [x] To be determined closer to discharge    NUTRITION RISK:           [x] At risk                        [] Not currently at risk        Will follow-up per policy.   Rajendra Mckenna, RD  PAGER:  [phone]

## 2017-08-21 NOTE — PROGRESS NOTES
[facility]  Pulmonary, Critical Care, and Sleep Medicine    Name: Andrey Priest MRN: [id number]   : 1965 Hospital: [facility] [place]   Date: 2017        Critical Care Follow Up                                                [x]I have reviewed the flowsheet and previous days notes. Events, vitals, medications and notes from last 24 hours reviewed. Care plan discussed with staff, patient, family and on multidisciplinary rounds. IMPRESSION:     · SCC Laryngeal CA T2N1M0,  Stridor on admission S/P intubation  S/p chemo and XRT, Admitted with respiratory failure/stridor. · S/p Trach  · Stridor resolved due to above. ·  Dysphagia S/P PEG tube  · Chronic pain  · HTN  · Nicotine depednece,    · Code status: FULL      RECOMMENDATIONS:   · S/p Trach,  On trach collar  · No need for systemic steroid for laryngeal edema/stridor  · Will add fentanyl patch and wean the PCA  · De-escalate abx  · Continue 2 alphonse  As recommended by nutrition  · Continue clonidine patch and lisinopril   · Nicotine patch            Subjective/History of Present Illness:   TARUN Priest has been seen and evaluated in ICU/ERPatient is a 45 y.o. male who was brought in to ER with history of throat cancer and recent hospitalization 2 weeks ago after he had cycle of chemotherapy and then developed COPD with acute exacerbation and was also mentioned that he had Dysphagia. He had PEG tube but had another hospitalization after he tried to drink water and aspirated. Anyway according to EMS he was found at home very short of breath, stridor, and trial if intubation was attempted on route. Later in ER he was then intubated by myself and placed on vent then transferred to ICU. He is sedated      Tolerated the trach  Pain controlled  No respiratory distress. No evidence of bleed.            Review of Systems:  Doing ok now  No specific complain  No Known Allergies   Past Medical History:   Diagnosis Date    Chronic obstructive pulmonary disease (HCC)     acute hypoxic respiratory failure 8/16    Hypertension     Ill-defined condition     peg tube in place    Ill-defined condition     chemo    S/P radiation therapy     Throat cancer (HCC)     Tobacco abuse       Current Facility-Administered Medications   Medication Dose Route Frequency    HYDROmorphone (PF) (DILAUDID) 1 mg/mL injection        piperacillin-tazobactam (ZOSYN) 3.375 g in 0.9% sodium chloride (MBP/ADV) 100 mL MBP  3.375 g IntraVENous Q6H    fentaNYL (DURAGESIC) 100 mcg/hr patch 1 Patch  1 Patch TransDERmal Q72H    [START ON 8/22/2017] potassium chloride (KAON 10%) 20 mEq/15 mL oral liquid 20 mEq  20 mEq Oral DAILY    famotidine (PEPCID) tablet 20 mg  20 mg Oral BID    multivitamin (MULTI-DELYN, WELLESSE) oral liquid 30 mL  30 mL Per G Tube DAILY    lisinopril (PRINIVIL, ZESTRIL) tablet 5 mg  5 mg Oral Q12H    cloNIDine (CATAPRES) 0.2 mg/24 hr patch 1 Patch  1 Patch TransDERmal Q7D    Thiamine Mononitrate (B-1) tablet 100 mg  100 mg Oral DAILY    sodium chloride (NS) flush 5-10 mL  5-10 mL IntraVENous Q8H    HYDROmorphone (PF) (DILAUDID) 30 mg / 30 mL PCA   IntraVENous CONTINUOUS    nicotine (NICODERM CQ) 21 mg/24 hr patch 1 Patch  1 Patch TransDERmal DAILY    insulin lispro (HUMALOG) injection   SubCUTAneous Q6H    heparin (porcine) injection 5,000 Units  5,000 Units SubCUTAneous Q8H       Lines: All central lines examined by me. No signs of erythema, induration, discharge. Feeding Tube:    PEG tube to low intermittent suction                      Peripheral Intravenous Line: 3 sites noted   Drain(s):PEG tube     Airway:  Airway - Endotracheal Tube 08/16/17 (Active)   Insertion Depth (cm) 22 cm 8/16/2017  9:48 AM   Line Gary Lips 8/16/2017  9:48 AM   Side Secured Device; Right 8/16/2017  9:48 AM   Site Assessment Clean, dry, & intact 8/16/2017  9:48 AM           Objective:   Vital Signs:    Visit Vitals    /74    Pulse 80    Temp 98.2 °F (36.8 °C)    Resp 15    Wt 73.9 kg (162 lb 14.7 oz)    SpO2 100%    BMI 25.52 kg/m2       O2 Device: Tracheal collar   O2 Flow Rate (L/min): 8 l/min   Temp (24hrs), Av.1 °F (36.7 °C), Min:97.8 °F (36.6 °C), Max:98.3 °F (36.8 °C)       Intake/Output:   Last shift:       07 - 1900  In: -   Out: 1250 [Urine:1250]    Last 3 shifts: 1901 -  07  In: 5145.9 [I.V.:5033.9]  Out: 6535 [Urine:5625]      Intake/Output Summary (Last 24 hours) at 17 1217  Last data filed at 17 1139   Gross per 24 hour   Intake          2358.72 ml   Output             3400 ml   Net         -1041.28 ml       Last 3 Recorded Weights in this Encounter    17 0938 17 0400   Weight: 73.5 kg (162 lb 1.6 oz) 73.9 kg (162 lb 14.7 oz)         Physical Exam:     General: awake ,and appropriate  Head: atraumatic, normocephalic  Eye: PERRLA, no scleral icterus, no pallor, no cyanosis  Neck  : trach site clean intact  Hoarsness. Narrowing airway noted worse than yesterday  Lung: adequate air entry bilateral equal, no bronchospasm in lungs   Heart: S1 S2 present, no murmur, no gallop, tachycardia  Abdomen: soft, .  PEG tube in place   LE: no pedal edema, no cyanosis, no clubbing, 2+ peripheral pulses in DP    No supra clavicle lymph nodes        Data:         Chemistry Recent Labs      17   0459  17   1340  17   1950  17   0435   GLU   --    --    --   112*   NA   --    --    --   142   K  3.5  4.1  3.1*  2.6*   CL   --    --    --   104   CO2   --    --    --   31   BUN   --   20*   --   19*   CREA   --    --    --   0.68   CA   --    --    --   8.4*   MG  2.1  1.7  1.9  1.7   PHOS  4.2  3.1   --   3.0   AGAP   --    --    --   7   BUCR   --    --    --   28*   AP   --    --    --   55   TP   --    --    --   5.7*   ALB   --    --    --   2.5*   GLOB   --    --    --   3.2   AGRAT   --    --    --   0.8        Lactic Acid Lactic acid   Date Value Ref Range Status   08/16/2017 1.4 0.4 - 2.0 MMOL/L Final     No results for input(s): LAC in the last 72 hours. Liver Enzymes Protein, total   Date Value Ref Range Status   08/19/2017 5.7 (L) 6.4 - 8.2 g/dL Final     Albumin   Date Value Ref Range Status   08/19/2017 2.5 (L) 3.4 - 5.0 g/dL Final     Globulin   Date Value Ref Range Status   08/19/2017 3.2 2.0 - 4.0 g/dL Final     A-G Ratio   Date Value Ref Range Status   08/19/2017 0.8 0.8 - 1.7   Final     AST (SGOT)   Date Value Ref Range Status   08/19/2017 21 15 - 37 U/L Final     Alk. phosphatase   Date Value Ref Range Status   08/19/2017 55 45 - 117 U/L Final     Recent Labs      08/19/17   0435   TP  5.7*   ALB  2.5*   GLOB  3.2   AGRAT  0.8   SGOT  21   AP  55        CBC w/Diff Recent Labs      08/19/17   0435   WBC  5.0   RBC  3.23*   HGB  10.9*   HCT  31.9*   PLT  133*        Cardiac Enzymes No results found for: CPK, CKMMB, CKMB, RCK3, CKMBT, CKNDX, CKND1, CHUCKY, TROPT, TROIQ, TAMMY, TROPT, TNIPOC, BNP, BNPP     BNP No results found for: BNP, BNPP, XBNPT     Coagulation No results for input(s): PTP, INR, APTT in the last 72 hours. No lab exists for component: INREXT, INREXT      Thyroid  No results found for: T4, T3U, TSH, TSHEXT, TSHEXT       Lipid Panel No results found for: CHOL, CHOLPOCT, CHOLX, CHLST, CHOLV, 017016, HDL, LDL, LDLC, DLDLP, 175743, VLDLC, VLDL, TGLX, TRIGL, TRIGP, TGLPOCT, CHHD, CHHDX     ABG No results for input(s): PHI, PHI, POC2, PCO2I, PO2, PO2I, HCO3, HCO3I, FIO2, FIO2I in the last 72 hours.      Urinalysis Lab Results   Component Value Date/Time    Color YELLOW 08/16/2017 09:39 AM    Appearance CLOUDY 08/16/2017 09:39 AM    Specific gravity 1.011 08/16/2017 09:39 AM    pH (UA) 8.0 08/16/2017 09:39 AM    Protein 100 08/16/2017 09:39 AM    Glucose 100 08/16/2017 09:39 AM    Ketone NEGATIVE  08/16/2017 09:39 AM    Bilirubin NEGATIVE  08/16/2017 09:39 AM    Urobilinogen 0.2 08/16/2017 09:39 AM    Nitrites NEGATIVE  08/16/2017 09:39 AM Leukocyte Esterase NEGATIVE  08/16/2017 09:39 AM    Epithelial cells NEGATIVE  08/16/2017 09:39 AM    Bacteria FEW 08/16/2017 09:39 AM    WBC 0 to 3 08/16/2017 09:39 AM    RBC 0 to 3 08/16/2017 09:39 AM        Micro  Recent Labs      08/18/17   1500  08/18/17   1448  08/18/17   1445   CULT  NO ANAEROBES ISOLATED 3 DAYS  MODERATE THIERNO ALBICANS*  MODERATE  NORMAL ORAL FARA CONSISTING OF:    MODERATE DIPHTHEROIDS (TWO MORPHOTYPES)*  MODERATE STREPTOCOCCI, ALPHA HEMOLYTIC (TWO MORPHOTYPES)*  MODERATE STAPHYLOCOCCUS SPECIES, COAGULASE NEGATIVE*  RARE CANDIDA ALBICANS*  FEW  NORMAL ORAL FARA CONSISTING OF:     FEW DIPHTHEROIDS (TWO MORPHOTYPES)*  FEW STREPTOCOCCI, ALPHA HEMOLYTIC (TWO MORPHOTYPES)*     Recent Labs      08/18/17   1500  08/18/17   1448  08/18/17   1445   CULT  NO ANAEROBES ISOLATED 3 DAYS  MODERATE THIERNO ALBICANS*  MODERATE  NORMAL ORAL FARA CONSISTING OF:    MODERATE DIPHTHEROIDS (TWO MORPHOTYPES)*  MODERATE STREPTOCOCCI, ALPHA HEMOLYTIC (TWO MORPHOTYPES)*  MODERATE STAPHYLOCOCCUS SPECIES, COAGULASE NEGATIVE*  RARE CANDIDA ALBICANS*  FEW  NORMAL ORAL FARA CONSISTING OF:     FEW DIPHTHEROIDS (TWO MORPHOTYPES)*  FEW STREPTOCOCCI, ALPHA HEMOLYTIC (TWO MORPHOTYPES)*        XR (Most Recent). CXR reviewed by me and compared with previous CXR   Results from Hospital Encounter encounter on 08/16/17   XR CHEST PORT   Narrative EXAM: Portable upright chest, one view    INDICATION: Postop tracheostomy. Difficulty breathing with audible wheezing    COMPARISON: 8/18/2017    _______________    FINDINGS:    Interval tracheostomy placement with its tip projecting at the trachea. Cardiac  silhouette and pulmonary vascularity are normal. Lungs are clear. Costophrenic  angles are sharp. Old, healed left rib fracture deformity is redemonstrated. No  cardiopulmonary changes. _______________         Impression IMPRESSION:    Tracheostomy in situ. No active disease.                        CT (Most Recent)   Results from Hospital Encounter encounter on 08/16/17   CT NECK SOFT TISSUE W CONT   Narrative CT neck    History: Neck mass, history of throat cancer with prior radiation therapy    Comparison: CT neck 8/3/2017    Technique: Multiple axial CT images of the neck were obtained extending from the  skull base to the upper thorax after administration of IV contrast. Coronal and  sagittal reformations were performed. One or more dose reduction techniques  were used on this CT: automated exposure control, adjustment of the mAs and/or  kVp according to patient's size, and iterative reconstruction techniques. The  specific techniques utilized on this CT exam have been documented in the  patient's electronic medical record. Findings:    Endotracheal tube has been placed during the interval. Some layering hyperdense  oral secretions are seen within the oropharynx and hypopharynx. Tip of  endotracheal tube is in the tracheal airway at about the level of the clavicular  heads. Endotracheal tube and secretions does limit evaluation of the laryngeal,  hypopharyngeal, and oropharyngeal regions. No discrete suspicious masslike lesion is seen. There is suggestion of hypodense  thickening of the mucosa of the oropharynx and hypopharynx similar to prior  study. Thickening of the platysma is present with mild fatty stranding across  multiple fascial planes, nonspecific but suspect posttreatment changes. These  findings show no short-term interval change. There is no prior more remote study  for comparison. No adenopathy is present based on size criteria. There are a few scattered very  small subcentimeter anterior and posterior cervical chain nodes. No  cystic/necrotic nodes are identified. The parotid and submandibular glands are  unremarkable. Mild atherosclerotic calcifications are present particularly  along the carotid bifurcations additional atheromatous plaque is also seen.  The  thyroid gland is unremarkable. Degenerative changes are scattered throughout the cervical and visualized  thoracic spine. Poor dentition is present with majority of teeth absent. Visualized paranasal sinuses and mastoid air cells are clear. Changes of centrilobular and paraseptal emphysema are seen in the bilateral lung  apices. No consolidation seen in the lung apices. Impression IMPRESSION:    1. Interval placement of endotracheal tube. Expected layering mucus secretions  within the oropharynx and hypopharynx. 2. No discrete masslike lesion seen to correlate with history of throat cancer. Stable diffuse mildly edematous mucosal thickening and soft tissue stranding  likely reflecting posttreatment changes. No more remote comparison study. 3. No adenopathy based on size criteria. No cystic/necrotic nodes. 4. Emphysema seen in bilateral lung apices without consolidation. EKG No results found for this or any previous visit. ECHO No results found for this or any previous visit. PFT No flowsheet data found.      Other ASA reactivity:   Pre-albumin:   Ionized Calcium:   NH4:   T3, FT4:  Cortisol:  Urine Osm:  Urine Lytes:   HbA1c:      Recent Results (from the past 24 hour(s))   PHOSPHORUS    Collection Time: 08/20/17  1:40 PM   Result Value Ref Range    Phosphorus 3.1 2.5 - 4.9 MG/DL   MAGNESIUM    Collection Time: 08/20/17  1:40 PM   Result Value Ref Range    Magnesium 1.7 1.6 - 2.6 mg/dL   BUN    Collection Time: 08/20/17  1:40 PM   Result Value Ref Range    BUN 20 (H) 7.0 - 18 MG/DL   POTASSIUM    Collection Time: 08/20/17  1:40 PM   Result Value Ref Range    Potassium 4.1 3.5 - 5.5 mmol/L   GLUCOSE, POC    Collection Time: 08/20/17  6:15 PM   Result Value Ref Range    Glucose (POC) 134 (H) 70 - 110 mg/dL   GLUCOSE, POC    Collection Time: 08/21/17  1:25 AM   Result Value Ref Range    Glucose (POC) 116 (H) 70 - 110 mg/dL   PHOSPHORUS    Collection Time: 08/21/17  4:59 AM   Result Value Ref Range    Phosphorus 4.2 2.5 - 4.9 MG/DL   MAGNESIUM    Collection Time: 08/21/17  4:59 AM   Result Value Ref Range    Magnesium 2.1 1.6 - 2.6 mg/dL   POTASSIUM    Collection Time: 08/21/17  4:59 AM   Result Value Ref Range    Potassium 3.5 3.5 - 5.5 mmol/L   GLUCOSE, POC    Collection Time: 08/21/17  6:38 AM   Result Value Ref Range    Glucose (POC) 131 (H) 70 - 110 mg/dL         Switch to IV  Keep ICU Plan as above  Stable at the present time  On no pressors.  And airway and breathing seems stable   [x]See my orders for details    My assessment, plan of care, findings, medications, side effects etc were discussed with:  [x] Nurse [] PT/OT    [x] Respiratory therapy []     [] Family: answered all questions to satisfaction [] Patient: answered all questions to satisfaction   [x] Pharmacist []      [x] Case & management strategies discussed today on multidisciplinary rounds    High complexity decision making was performed during the evaluation of this patient at high risk for decompensation with multiple organ involvement    CC time spent is 30 min        Odilon Gomez MD  8/21/2017

## 2017-08-21 NOTE — PROGRESS NOTES
0730 Assumed care of patient at this time. PCA checked at bedside with off going shift. Patient s/p trach placement on 08/20/2017. Physical assessment completed at this time. Call bell within reach will cont to monitor and assess. 0900 Precedex drip dc'd. 0830 Potassium replaced per protocol via peg tube. 615 [facility] rounds completed. 1240 Diop catheter dc'd.  1430 Physical therapy at bedside for eval and treatment. 1500 Patient sitting up in bedside chair, girlfriend present, call bell within reach. 1615  Patient assisted back to bed, requesting Ativan. Trach care provided. 1845 Diop Dc'd @ [id number], Patient has not voided yet. Bladder scan shows 350 residual at this time, patient states no urge to void not feeling uncomfortable, wishes to not have diop re inserted at this time. 1915 Bedside and Verbal shift change report given to Bradley Brooks Rn (oncoming nurse) by Sharlene Graham (offgoing nurse). Report included the following information SBAR, Kardex, Intake/Output, MAR, Accordion, Recent Results, Med Rec Status and Cardiac Rhythm NSR-SB.

## 2017-08-21 NOTE — PROGRESS NOTES
Chart reviewed cm spoke with patient at bedside,pt plans to return back home which he assist with her care along with using pace at this time patients mother placed in nursing home while pt is here,pt also lives with girlfriend of 8 years,she is in the health field at [address] [place] [address] in [facility] which she does assist with patients care,pt feels he does not need any further assistance with his care,cm did explain that if he goes home it would be best to accept home health for trach education,pt states he has applied for medicaid but is pending,cm did contact [employer] left voice message regarding status on medicaid,at discharge and pt returns home [facility],pt states his Pcp is  in [facility] which patient informed cm he provides his rx for tube feedings thru [facility].

## 2017-08-21 NOTE — INTERDISCIPLINARY ROUNDS
CRITICAL CARE INTERDISCIPLINARY ROUNDS    Patient Information:    Name:   Ivette Oneil    Age:   45 y.o. Admission Date:   8/16/2017    Critical Care Day:  6    Surgery Date:      Attending Provider:   Sepideh Mancini MD    Surgeon:        Consultant(s):   [address] Physician:  juan    Code Status: Full Code    Problem List:     Patient Active Problem List   Diagnosis Code    Aspiration into airway T17.908A    Throat cancer ([address] C14.0    Status post insertion of percutaneous endoscopic gastrostomy (PEG) tube ([address] Z93.1    Hyponatremia E87.1    Acute respiratory distress R06.00    Hypoxia R09.02    Stridor R06.1    Respiratory depression R06.89    COPD exacerbation ([address] J44.1    Acute hypoxemic respiratory failure ([address] J96.01    Severe protein-calorie malnutrition [place]: less than 60% of standard weight) ([address] E43       Principal Problem:  Acute hypoxemic respiratory failure ([address]    During rounds the following quality care indicators and evidence based practices were addressed :  DVT Prophylaxis and PUD Prophylaxis [place] Day 5 ([facility]) ; Central Line Day:na Isolation:na; Antibiotic Stewardship: review; Probiotics Necessary: na    Ventilator Bundle:      Sepsis Order Set:       Glycemic Control:   Recent Labs      08/19/17   0435   GLU  112*   ; No results for input(s): PHI, PCO2I, PO2I in the last 72 hours. No lab exists for component: 3 Adjustments     Acute MI/PCI:   Not applicable    Door to Balloon: Admission Time: 0910 Procedure: Other (comment) (Tracheostomy) (08/20/17 1000)    Heart Failure:    Not applicable     SCIP Measures for other Surgeries:   Not applicable     Pneumonia:    Not applicable    Interdisciplinary team rounds were held with the following team members Care Management, Nursing, Nutrition, pasotrol,speech therapy, Pharmacy, Physician and Respiratory Therapy. Plan of care discussed.      Goals of Care/ Recommendations: Dr Lakesha Cabrera stated would downsize later this week. Start a Duragesic patch and wean PCA,  D/c solumedrol, diop out today, stop Diflucan after today dose. Stop NS and begin potassium daily via peg    See clinical pathway and/or care plan for interventions and desired outcomes.     Critical Care Discharge Status:  Red

## 2017-08-21 NOTE — PROGRESS NOTES
Problem: Mobility Impaired (Adult and Pediatric)  Goal: *Acute Goals and Plan of Care (Insert Text)  Physical Therapy Goals LT/ST  Initiated 8/21/2017 and to be accomplished within 3-5 day(s)  1. Patient will move from supine <> sit with S in prep for out of bed activity and change of position. 2. Patient will perform sit<> stand with S with LRAD in prep for transfers/ambulation. 3. Patient will transfer from bed <> chair with S with LRAD for time up in chair for completion of ADL activity. 4. Patient will ambulate 150 feet with LRAD/S for improved functional mobility/safe discharge. .   Outcome: Progressing Towards Goal  PHYSICAL THERAPY EVALUATION     Patient: Nghia Figueroa (7 y.o. male)  Date: 8/21/2017  Primary Diagnosis: COPD exacerbation (HCC)  ACUTE HYPOXEMIC RESPIRATORY FAILURE  respiratory stridor  Procedure(s) (LRB):  TRACHEOSTOMY (N/A) 1 Day Post-Op   Precautions:Fall      ASSESSMENT :  Based on the objective data described below, the patient presents with decreased independence in functional mobility with regard to bed mobility, transfers, gait, balance and activity tolerance with decreased motor performance following admission for above diagnosis. Patient reports pain no pain. Able to transition out of bed with min/CGA. Used RW/min/CGA for amb 2ft to chair. Vickie slow, balance good. Pt left sitting up in chair with all needs in reach and nurse notified. Recommend HHPT for follow up physical therapy upon discharge to reach maximal level of independence/safety with functional mobility. Pt Education: pt educated in safety/technique during functional mobility tasks      Patient will benefit from skilled intervention to address the above impairments.   Patients rehabilitation potential is considered to be Good  Factors which may influence rehabilitation potential include:   [ ]         None noted  [ ]         Mental ability/status  [X]         Medical condition  [X]         Home/family situation and support systems  [ ]         Safety awareness  [ ]         Pain tolerance/management  [ ]         Other:        PLAN :  Recommendations and Planned Interventions:  [X]           Bed Mobility Training             [ ]    Neuromuscular Re-Education  [X]           Transfer Training                   [ ]    Orthotic/Prosthetic Training  [X]           Gait Training                          [ ]    Modalities  [X]           Therapeutic Exercises          [ ]    Edema Management/Control  [X]           Therapeutic Activities            [X]    Patient and Family Training/Education  [ ]           Other (comment):     Frequency/Duration: Patient will be followed by physical therapy 1-2 times per day to address goals. Discharge Recommendations: Home Health  Further Equipment Recommendations for Discharge: rolling walker       SUBJECTIVE:   Patient non verbal due to trach tube. Writes notes to communicate. OBJECTIVE DATA SUMMARY:       Past Medical History:   Diagnosis Date    Chronic obstructive pulmonary disease ([facility] [address]       acute hypoxic respiratory failure 8/16    Hypertension      Ill-defined condition       peg tube in place    Ill-defined condition       chemo    S/P radiation therapy      Throat cancer ([facility] [address] [facility]      Tobacco abuse       Past Surgical History:   Procedure Laterality Date    HX HEENT         sinus surgery     Barriers to Learning/Limitations: yes;  physical  Compensate with: Other  trach tube present at this time  Prior Level of Function/Home Situation: amb with no device PTA  Home Situation  Home Environment: Private residence  Wheelchair Ramp: Yes  One/Two Story Residence: One story  Living Alone: No  Support Systems: Parent (pt recently became primary caregiver for his mother )  Patient Expects to be Discharged to[de-identified] Private residence  Current DME Used/Available at Home: Walker, rolling (was independent PTA)  Critical Behavior:  Neurologic State: Alert; Appropriate for age  Orientation Level: Oriented X4  Cognition: Appropriate decision making; Appropriate for age attention/concentration; Appropriate safety awareness; Follows commands  Safety/Judgement: Awareness of environment; Fall prevention  Psychosocial  Patient Behaviors: Calm; Cooperative  Purposeful Interaction: Yes  Pt Identified Daily Priority: Clinical issues (comment)  Caritas Process: Nurture loving kindness;Nurture spiritual self;Teaching/learning; Attend basic human needs;Create healing environment;Supportive expression  Caring Interventions: Reassure; Therapeutic modalities  Reassure: Therapeutic listening; Informing; Acceptance  Therapeutic Modalities: Deep breathing;Humor  Skin Condition/Temp: Dry;Flaky;Fragile; Poor turgor (comment)  Skin Integrity: Incision (comment)  Skin Integumentary  Skin Color: Appropriate for ethnicity  Skin Condition/Temp: Dry;Flaky;Fragile; Poor turgor (comment)  Skin Integrity: Incision (comment)  Turgor: Non-tenting  Hair Growth: Present  Varicosities: Absent  Strength:    Strength: Generally decreased, functional  Tone & Sensation:   Tone: Normal  Sensation: Intact  Range Of Motion:  AROM: Within functional limits (bilat UE's/LE's)  Functional Mobility:  Bed Mobility:  Supine to Sit: Contact guard assistance  Scooting: Contact guard assistance  Transfers:  Sit to Stand: Minimum assistance;Contact guard assistance  Stand to Sit: Minimum assistance;Contact guard assistance  Bed to Chair: Minimum assistance;Contact guard assistance  Balance:   Sitting: Intact  Standing: Intact; With support  Ambulation/Gait Training:  Assistive Device: Gait belt;Walker, rolling  Ambulation - Level of Assistance: Minimal assistance;Contact guard assistance  Gait Description (WDL): Exceptions to WDL  Gait Abnormalities: Decreased step clearance  Speed/Vickie: Slow  Step Length: Right shortened;Left shortened  Interventions: Safety awareness training;Verbal cues  Pain:  Pain Scale 1: Numeric (0 - 10)  Pain Intensity 1: 5  Pain Location 1: Neck  Pain Orientation 1: Anterior  Pain Description 1: Aching  Pain Intervention(s) 1: Medication (see MAR)  Activity Tolerance:   Fair   Please refer to the flowsheet for vital signs taken during this treatment. After treatment:   [X]         Patient left in no apparent distress sitting up in chair  [ ]         Patient left in no apparent distress in bed  [X]         Call bell left within reach  [X]         Nursing notified  [ ]         Caregiver present  [ ]         Bed alarm activated      COMMUNICATION/EDUCATION:   [X]         Fall prevention education was provided and the patient/caregiver indicated understanding. [X]         Patient/family have participated as able in goal setting and plan of care. [X]         Patient/family agree to work toward stated goals and plan of care. [ ]         Patient understands intent and goals of therapy, but is neutral about his/her participation. [ ]         Patient is unable to participate in goal setting and plan of care.      Eval Complexity: History: HIGH Complexity :3+ comorbidities / personal factors will impact the outcome/ POC Exam:LOW Complexity : 1-2 Standardized tests and measures addressing body structure, function, activity limitation and / or participation in recreation  Presentation: MEDIUM Complexity : Evolving with changing characteristics  Clinical Decision Making:Medium Complexity amb < 30ft; trach tube present Overall Complexity:MEDIUM     Thank you for this referral.  Dharmesh Blackman, PT   Time Calculation: 27 mins

## 2017-08-21 NOTE — PROGRESS NOTES
Problem: Falls - Risk of  Goal: *Absence of Falls  Document Caryl Fall Risk and appropriate interventions in the flowsheet.    Outcome: Progressing Towards Goal  Fall Risk Interventions:        Mentation Interventions: Adequate sleep, hydration, pain control, Increase mobility, More frequent rounding     Medication Interventions: Evaluate medications/consider consulting pharmacy     Elimination Interventions: Call light in reach, Patient to call for help with toileting needs, Toilet paper/wipes in reach, Toileting schedule/hourly rounds

## 2017-08-21 NOTE — ROUTINE PROCESS
Bedside and Verbal shift change report given to Saúl Jefferson RN (oncoming nurse) by Valerie Garcia RN (offgoing nurse).  Report included the following information SBAR, Kardex, OR Summary, Procedure Summary, Intake/Output, MAR, Recent Results, Med Rec Status and Cardiac Rhythm SR.

## 2017-08-21 NOTE — PROGRESS NOTES
**SHIFT SUMMARY**     Patient was having some increased resp distress and stridor at the beginning of my shift. Patient was changed back to IV steroids and ENT was consulted for re-eval. ENT recommendation after assessment was elective traceostomy. Patient was made NPO and taken to the OR mid morning. Patient returned to the unit at [phone] with a Shiley #8 cuffed trach. Patient was doing well on trach collar. Patient had some bloody sputum coming up, but patient was moving all of his own secretions, no real needs for deep suctioning during my shift. Received orders to resume TF this evening per Dr. Alpesh Chappell. Patient received K+ and Mg+ replacement this shift. Patient was tolerating PCA Dilaudid for pain control s/p surgical procedure.

## 2017-08-21 NOTE — PROGRESS NOTES
Pharmacy Dosing Services: Renal    Pharmacist Renal Dosing Progress Note for Zosyn   Physician Dr. Roxi Moreira    The following medication: Zosyn was automatically dose-adjusted per THE [facility] P&T Committee Protocol, with respect to renal function. Consult provided for this   45 y.o. , male , for the indication of head & neck infection. Pt Weight:   Wt Readings from Last 1 Encounters:   08/19/17 73.9 kg (162 lb 14.7 oz)         Previous Regimen Zosyn 3.375gm IV q8h   Serum Creatinine Lab Results   Component Value Date/Time    Creatinine 0.68 08/19/2017 04:35 AM       Creatinine Clearance Estimated Creatinine Clearance: 116.1 mL/min (based on Cr of 0.68). BUN Lab Results   Component Value Date/Time    BUN 20 08/20/2017 01:40 PM       Dosage changed to:  Zosyn 3.375gm IV q6h      Pharmacy to continue to monitor patient daily. Will make dosage adjustments based upon changing renal function.   Carleen Lyon, PHARMD. Contact information:  [phone]

## 2017-08-21 NOTE — ROUTINE PROCESS
TRANSFER - IN REPORT:    Verbal report received from [facility] RN(name) on Monse Morales  being received from PACU (unit) for routine post - op      Report consisted of patients Situation, Background, Assessment and   Recommendations(SBAR). Information from the following report(s) SBAR, Kardex, OR Summary, Procedure Summary, MAR, Recent Results, Med Rec Status and Cardiac Rhythm sr was reviewed with the receiving nurse. Opportunity for questions and clarification was provided. Assessment completed upon patients arrival to unit and care assumed.

## 2017-08-21 NOTE — PROGRESS NOTES
Hospitalist Progress Note    Patient: Monse Morales MRN: [id number]  CSN: [id number]    YOB: 1965  Age: 45 y.o.   Sex: male    DOA: 8/16/2017 LOS:  LOS: 5 days          Chief Complaint:    Acute resp failure      Assessment/Plan     Acute resp failure  Insertion of trachesotomy  laryngeal cancer s/p radiation and chemo  Peg tube dependence with dysphagia  Stridor resolved  COPD  Chronic pain and opioid dependence  tobacco abuse  Prot alphonse malnutrition    Continue with IV steroids, IV antibitoics  Can resume peg feedings and wean down IVF  Will need  to set up trach supplies for him as he plans to go home  DVT prophylaxis  Should wean PCA today  ICU care for now  Appreciate Pulm and ENT help with patient      Patient Active Problem List   Diagnosis Code    Aspiration into airway T17.908A    Throat cancer ([facility] [address] C14.0    Status post insertion of percutaneous endoscopic gastrostomy (PEG) tube ([facility] [address] Z93.1    Hyponatremia E87.1    Acute respiratory distress R06.00    Hypoxia R09.02    Stridor R06.1    Respiratory depression R06.89    COPD exacerbation ([address] J44.1    Acute hypoxemic respiratory failure ([address] J96.01    Severe protein-calorie malnutrition (Lilliam Fitting: less than 60% of standard weight) ([facility] [address] E43       Subjective:    Feeling better  Hopeful to go home soon as his oncology appt next week at [facility]  Denies new complaints but states he is having a lot of pain    Review of systems:    Constitutional: denies fevers, chills, myalgias  Respiratory: denies SOB, cough  Cardiovascular: denies chest pain, palpitations  Gastrointestinal: denies nausea, vomiting, diarrhea      Vital signs/Intake and Output:  Visit Vitals    /74    Pulse 67    Temp 98.3 °F (36.8 °C)    Resp 10    Wt 73.9 kg (162 lb 14.7 oz)    SpO2 100%    BMI 25.52 kg/m2     Current Shift:     Last three shifts:  08/19 1901 - 08/21 0700  In: 5145.9 [I.V.:5033.9]  Out: 5635 [Urine:5625]    Exam:    General: Well developed, alert, NAD, OX3  Head/Neck:trach in place  CVS:Regular rate and rhythm, no M/R/G, S1/S2 heard, no thrill  Lungs:Coarse BS, no rales, few exp wheezes  Abdomen: Soft, Nontender, No distention, Normal Bowel sounds, No hepatomegaly  Extremities: No C/C/E, pulses palpable 2+  Skin:normal texture and turgor, no rashes, no lesions  Neuro:grossly normal , follows commands  Psych:appropriate                Labs: Results:       Chemistry Recent Labs      08/21/17   0459  08/20/17   1340  08/19/17   1950  08/19/17   0435   08/18/17   1211   GLU   --    --    --   112*   --    --    NA   --    --    --   142   --    --    K  3.5  4.1  3.1*  2.6*   < >   --    CL   --    --    --   104   --    --    CO2   --    --    --   31   --    --    BUN   --   20*   --   19*   --   20*   CREA   --    --    --   0.68   --    --    CA   --    --    --   8.4*   --    --    AGAP   --    --    --   7   --    --    BUCR   --    --    --   28*   --    --    AP   --    --    --   55   --    --    TP   --    --    --   5.7*   --    --    ALB   --    --    --   2.5*   --    --    GLOB   --    --    --   3.2   --    --    AGRAT   --    --    --   0.8   --    --     < > = values in this interval not displayed. CBC w/Diff Recent Labs      08/19/17   0435   WBC  5.0   RBC  3.23*   HGB  10.9*   HCT  31.9*   PLT  133*      Cardiac Enzymes No results for input(s): CPK, CKND1, CHUCKY in the last 72 hours. No lab exists for component: CKRMB, TROIP   Coagulation No results for input(s): PTP, INR, APTT in the last 72 hours. No lab exists for component: INREXT    Lipid Panel No results found for: CHOL, CHOLPOCT, CHOLX, CHLST, CHOLV, 232489, HDL, LDL, LDLC, DLDLP, 676425, VLDLC, VLDL, TGLX, TRIGL, TRIGP, TGLPOCT, CHHD, CHHDX   BNP No results for input(s): BNPP in the last 72 hours.    Liver Enzymes Recent Labs      08/19/17   0435   TP  5.7*   ALB  2.5*   AP  55   SGOT  21      Thyroid Studies No results found for: T4, T3U, TSH, TSHEXT Procedures/imaging: see electronic medical records for all procedures/Xrays and details which were not copied into this note but were reviewed prior to creation of Corin Albarran MD

## 2017-08-21 NOTE — PROGRESS NOTES
Ears/Nose/Throat Consult    Subjective:     History of Present Illness:   Patient is a 45 y.o.  male who is being seen for  Post-op tracheotomy 8/20/2017 for upper airway obstruction. He was admitted to the hosptial for COPD exacerbation ([facility]  ACUTE HYPOXEMIC RESPIRATORY FAILURE  respiratory stridor. He has done well overnight. No bleeding. Asking questions via notes. Patient Active Problem List    Diagnosis Date Noted    Severe protein-calorie malnutrition Ramirez Bone: less than 60% of standard weight) ([facility] 08/18/2017    COPD exacerbation ([facility] 08/16/2017    Acute hypoxemic respiratory failure ([facility] 08/16/2017    Acute respiratory distress 08/03/2017    Hypoxia 08/03/2017    Stridor 08/03/2017    Respiratory depression 08/03/2017    Aspiration into airway 07/30/2017    Throat cancer ([facility] 07/30/2017    Status post insertion of percutaneous endoscopic gastrostomy (PEG) tube ([facility] 07/30/2017    Hyponatremia 07/30/2017     Past Medical History:   Diagnosis Date    Chronic obstructive pulmonary disease (HCC)     acute hypoxic respiratory failure 8/16    Hypertension     Ill-defined condition     peg tube in place    Ill-defined condition     chemo    S/P radiation therapy     Throat cancer ([facility]     Tobacco abuse       History reviewed. No pertinent family history.    Social History   Substance Use Topics    Smoking status: Current Every Day Smoker     Packs/day: 0.50    Smokeless tobacco: Never Used    Alcohol use No     Past Surgical History:   Procedure Laterality Date    HX HEENT      sinus surgery      Current Facility-Administered Medications   Medication Dose Route Frequency    HYDROmorphone (PF) (DILAUDID) 1 mg/mL injection        fluconazole (DIFLUCAN) tablet 100 mg  100 mg Per G Tube DAILY    piperacillin-tazobactam (ZOSYN) 3.375 g in 0.9% sodium chloride (MBP/ADV) 100 mL MBP  3.375 g IntraVENous Q6H    methylPREDNISolone (PF) (SOLU-MEDROL) injection 80 mg  80 mg IntraVENous Q6H    Electrolyte Replacement Protocol - Potassium Standard Dosing  1 Each Other PRN    Electrolyte Replacement Protocol - Magnesium  1 Each Other PRN    famotidine (PEPCID) tablet 20 mg  20 mg Oral BID    multivitamin (MULTI-DELYN, WELLESSE) oral liquid 30 mL  30 mL Per G Tube DAILY    lisinopril (PRINIVIL, ZESTRIL) tablet 5 mg  5 mg Oral Q12H    albuterol-ipratropium (DUO-NEB) 2.5 MG-0.5 MG/3 ML  3 mL Nebulization Q4H PRN    acetaminophen (TYLENOL) solution 650 mg  650 mg Oral Q6H PRN    cloNIDine (CATAPRES) 0.2 mg/24 hr patch 1 Patch  1 Patch TransDERmal Q7D    LORazepam (ATIVAN) injection 0.5 mg  0.5 mg IntraVENous Q4H PRN    Thiamine Mononitrate (B-1) tablet 100 mg  100 mg Oral DAILY    guaiFENesin (ROBITUSSIN) 100 mg/5 mL oral liquid 100 mg  100 mg Oral Q4H PRN    sodium chloride (NS) flush 5-10 mL  5-10 mL IntraVENous Q8H    sodium chloride (NS) flush 5-10 mL  5-10 mL IntraVENous PRN    HYDROmorphone (PF) (DILAUDID) 30 mg / 30 mL PCA   IntraVENous CONTINUOUS    naloxone (NARCAN) injection 0.4 mg  0.4 mg IntraVENous PRN    nicotine (NICODERM CQ) 21 mg/24 hr patch 1 Patch  1 Patch TransDERmal DAILY    0.9% sodium chloride infusion  50 mL/hr IntraVENous CONTINUOUS    insulin lispro (HUMALOG) injection   SubCUTAneous Q6H    glucose chewable tablet 16 g  4 Tab Oral PRN    glucagon (GLUCAGEN) injection 1 mg  1 mg IntraMUSCular PRN    dextrose (D50W) injection syrg 12.5-25 g  25-50 mL IntraVENous PRN    heparin (porcine) injection 5,000 Units  5,000 Units SubCUTAneous Q8H      No Known Allergies     Review of Systems:  A comprehensive review of systems was negative.      Objective:     Patient Vitals for the past 8 hrs:   BP Temp Pulse Resp SpO2   08/21/17 1000 116/73 - 70 13 100 %   08/21/17 0800 115/74 - 67 10 100 %   08/21/17 0741 - - - - 100 %   08/21/17 0700 144/80 - (!) 59 10 100 %   08/21/17 0458 - - - - 100 %   08/21/17 0400 144/85 98.3 °F (36.8 °C) (!) 59 18 100 %   08/21/17 0300 140/77 - Conor Castillo 47 11 100 %     Temp (24hrs), Av °F (36.7 °C), Min:97.7 °F (36.5 °C), Max:98.3 °F (36.8 °C)    1901 -  0700  In: 5145.9 [I.V.:5033.9]  Out: 0035 [Urine:5625]    Physical Exam:   [facility] Trach in position, sutures intact, secretions clear to white    There were no additional components assessed. Assessment:     POD#1 tracheotomy-stable    Plan:     1. Continue current care. Discussed trach parts and change of trach at the end of the week. He will need trach teaching and a suction machine at home prior to discharge.       Signed By: Kendall Mota MD     2017

## 2017-08-21 NOTE — PROGRESS NOTES
2000 Assessment completed. Drips verified with offgoing nurse. VSS. Pain controlled at this time. Denies SOB, trach collar in place 10 L, 30% O2. Blood tinged secretions noted. Patient able to clear own secretions without need for suctioning. Pt suctions own mouth, clear secretions noted. Area around trach cleansed with NS. Course lung fields. 2400 VSS. NAD. Resting comfortably. Reassessment completed. See flowsheet. 1030 Hospitalist made aware of patient increased pain upon wakening. 1 time IV dose dilaudid ordered. See MAR.     0130 Resting. NAD. VSS.     0400 Reassessment completed. See flowsheet. NAD. VSS.    0545 Attempted to speak with intensivist regarding HR. No contact able to be made. 0600 Hospitalist updated on patient vitals, medication, history and recent bradycardia. Intensivist to address during rounds, just monitor for now. 0630 Resting. NAD. VSS.     0715 Bedside and Verbal shift change report given to ARIANNE Romero RN (oncoming nurse) by Vishal Matta RN   (offgoing nurse). Report included the following information SBAR, Kardex and MAR.

## 2017-08-22 LAB
ANION GAP SERPL CALC-SCNC: 3 MMOL/L (ref 3–18)
BUN SERPL-MCNC: 26 MG/DL (ref 7–18)
BUN/CREAT SERPL: 39 (ref 12–20)
CALCIUM SERPL-MCNC: 8.6 MG/DL (ref 8.5–10.1)
CHLORIDE SERPL-SCNC: 103 MMOL/L (ref 100–108)
CO2 SERPL-SCNC: 31 MMOL/L (ref 21–32)
CREAT SERPL-MCNC: 0.67 MG/DL (ref 0.6–1.3)
ERYTHROCYTE [DISTWIDTH] IN BLOOD BY AUTOMATED COUNT: 13.7 % (ref 11.6–14.5)
GLUCOSE BLD STRIP.AUTO-MCNC: 89 MG/DL (ref 70–110)
GLUCOSE BLD STRIP.AUTO-MCNC: 92 MG/DL (ref 70–110)
GLUCOSE SERPL-MCNC: 95 MG/DL (ref 74–99)
HCT VFR BLD AUTO: 34.1 % (ref 36–48)
HGB BLD-MCNC: 11.5 G/DL (ref 13–16)
MAGNESIUM SERPL-MCNC: 1.9 MG/DL (ref 1.6–2.6)
MCH RBC QN AUTO: 33.4 PG (ref 24–34)
MCHC RBC AUTO-ENTMCNC: 33.7 G/DL (ref 31–37)
MCV RBC AUTO: 99.1 FL (ref 74–97)
PHOSPHATE SERPL-MCNC: 2.4 MG/DL (ref 2.5–4.9)
PLATELET # BLD AUTO: 137 K/UL (ref 135–420)
PMV BLD AUTO: 9.5 FL (ref 9.2–11.8)
POTASSIUM SERPL-SCNC: 3.6 MMOL/L (ref 3.5–5.5)
POTASSIUM SERPL-SCNC: 4.2 MMOL/L (ref 3.5–5.5)
RBC # BLD AUTO: 3.44 M/UL (ref 4.7–5.5)
SODIUM SERPL-SCNC: 137 MMOL/L (ref 136–145)
WBC # BLD AUTO: 5.7 K/UL (ref 4.6–13.2)

## 2017-08-22 PROCEDURE — 74011250636 HC RX REV CODE- 250/636: Performed by: INTERNAL MEDICINE

## 2017-08-22 PROCEDURE — 94640 AIRWAY INHALATION TREATMENT: CPT

## 2017-08-22 PROCEDURE — 83735 ASSAY OF MAGNESIUM: CPT | Performed by: HOSPITALIST

## 2017-08-22 PROCEDURE — 77010033678 HC OXYGEN DAILY

## 2017-08-22 PROCEDURE — 84132 ASSAY OF SERUM POTASSIUM: CPT | Performed by: HOSPITALIST

## 2017-08-22 PROCEDURE — 65610000006 HC RM INTENSIVE CARE

## 2017-08-22 PROCEDURE — 97110 THERAPEUTIC EXERCISES: CPT

## 2017-08-22 PROCEDURE — 74011250637 HC RX REV CODE- 250/637: Performed by: HOSPITALIST

## 2017-08-22 PROCEDURE — 74011250637 HC RX REV CODE- 250/637: Performed by: INTERNAL MEDICINE

## 2017-08-22 PROCEDURE — 74011000258 HC RX REV CODE- 258: Performed by: INTERNAL MEDICINE

## 2017-08-22 PROCEDURE — 84100 ASSAY OF PHOSPHORUS: CPT | Performed by: HOSPITALIST

## 2017-08-22 PROCEDURE — 74011250637 HC RX REV CODE- 250/637: Performed by: FAMILY MEDICINE

## 2017-08-22 PROCEDURE — 97116 GAIT TRAINING THERAPY: CPT

## 2017-08-22 PROCEDURE — 74011000250 HC RX REV CODE- 250: Performed by: INTERNAL MEDICINE

## 2017-08-22 PROCEDURE — 82962 GLUCOSE BLOOD TEST: CPT

## 2017-08-22 PROCEDURE — 85027 COMPLETE CBC AUTOMATED: CPT | Performed by: HOSPITALIST

## 2017-08-22 PROCEDURE — 74011250636 HC RX REV CODE- 250/636

## 2017-08-22 PROCEDURE — 36415 COLL VENOUS BLD VENIPUNCTURE: CPT | Performed by: HOSPITALIST

## 2017-08-22 PROCEDURE — 97535 SELF CARE MNGMENT TRAINING: CPT

## 2017-08-22 PROCEDURE — 97167 OT EVAL HIGH COMPLEX 60 MIN: CPT

## 2017-08-22 RX ORDER — FENTANYL 50 UG/1
1 PATCH TRANSDERMAL
Status: DISCONTINUED | OUTPATIENT
Start: 2017-08-22 | End: 2017-08-29 | Stop reason: HOSPADM

## 2017-08-22 RX ORDER — POTASSIUM CHLORIDE 20MEQ/15ML
40 LIQUID (ML) ORAL ONCE
Status: COMPLETED | OUTPATIENT
Start: 2017-08-22 | End: 2017-08-22

## 2017-08-22 RX ORDER — HYDROMORPHONE HYDROCHLORIDE 1 MG/ML
1 INJECTION, SOLUTION INTRAMUSCULAR; INTRAVENOUS; SUBCUTANEOUS ONCE
Status: COMPLETED | OUTPATIENT
Start: 2017-08-22 | End: 2017-08-22

## 2017-08-22 RX ORDER — HYDROMORPHONE HYDROCHLORIDE 2 MG/ML
1 INJECTION, SOLUTION INTRAMUSCULAR; INTRAVENOUS; SUBCUTANEOUS
Status: DISCONTINUED | OUTPATIENT
Start: 2017-08-22 | End: 2017-08-25

## 2017-08-22 RX ORDER — AMOXICILLIN AND CLAVULANATE POTASSIUM 400; 57 MG/5ML; MG/5ML
400 POWDER, FOR SUSPENSION ORAL EVERY 12 HOURS
Status: DISCONTINUED | OUTPATIENT
Start: 2017-08-22 | End: 2017-08-29 | Stop reason: HOSPADM

## 2017-08-22 RX ORDER — HYDROMORPHONE HYDROCHLORIDE 1 MG/ML
INJECTION, SOLUTION INTRAMUSCULAR; INTRAVENOUS; SUBCUTANEOUS
Status: DISPENSED
Start: 2017-08-22 | End: 2017-08-22

## 2017-08-22 RX ADMIN — HEPARIN SODIUM 5000 UNITS: 5000 INJECTION, SOLUTION INTRAVENOUS; SUBCUTANEOUS at 21:11

## 2017-08-22 RX ADMIN — POTASSIUM CHLORIDE 40 MEQ: 20 SOLUTION ORAL at 05:31

## 2017-08-22 RX ADMIN — Medication 10 ML: at 14:00

## 2017-08-22 RX ADMIN — HYDROMORPHONE HYDROCHLORIDE 1 MG: 1 INJECTION, SOLUTION INTRAMUSCULAR; INTRAVENOUS; SUBCUTANEOUS at 06:42

## 2017-08-22 RX ADMIN — Medication 10 ML: at 05:31

## 2017-08-22 RX ADMIN — FAMOTIDINE 20 MG: 20 TABLET ORAL at 21:10

## 2017-08-22 RX ADMIN — PIPERACILLIN SODIUM,TAZOBACTAM SODIUM 3.38 G: 3; .375 INJECTION, POWDER, FOR SOLUTION INTRAVENOUS at 03:06

## 2017-08-22 RX ADMIN — HYDROCODONE BITARTRATE AND ACETAMINOPHEN 5 MG: 7.5; 325 TABLET ORAL at 21:10

## 2017-08-22 RX ADMIN — HEPARIN SODIUM 5000 UNITS: 5000 INJECTION, SOLUTION INTRAVENOUS; SUBCUTANEOUS at 05:31

## 2017-08-22 RX ADMIN — HYDROMORPHONE HYDROCHLORIDE 1 MG: 2 INJECTION INTRAMUSCULAR; INTRAVENOUS; SUBCUTANEOUS at 23:47

## 2017-08-22 RX ADMIN — POTASSIUM CHLORIDE 20 MEQ: 20 SOLUTION ORAL at 09:40

## 2017-08-22 RX ADMIN — Medication 100 MG: at 09:40

## 2017-08-22 RX ADMIN — HYDROMORPHONE HYDROCHLORIDE 1 MG: 2 INJECTION INTRAMUSCULAR; INTRAVENOUS; SUBCUTANEOUS at 19:22

## 2017-08-22 RX ADMIN — IPRATROPIUM BROMIDE AND ALBUTEROL SULFATE 3 ML: .5; 3 SOLUTION RESPIRATORY (INHALATION) at 20:17

## 2017-08-22 RX ADMIN — HYDROCODONE BITARTRATE AND ACETAMINOPHEN 5 MG: 7.5; 325 TABLET ORAL at 09:39

## 2017-08-22 RX ADMIN — AMOXICILLIN AND CLAVULANATE POTASSIUM 400 MG: 400; 57 POWDER, FOR SUSPENSION ORAL at 09:00

## 2017-08-22 RX ADMIN — MULTIPLE VITAMIN LIQUID 30 ML: LIQUID at 09:44

## 2017-08-22 RX ADMIN — Medication 10 ML: at 19:23

## 2017-08-22 RX ADMIN — AMOXICILLIN AND CLAVULANATE POTASSIUM 400 MG: 400; 57 POWDER, FOR SUSPENSION ORAL at 21:10

## 2017-08-22 RX ADMIN — FAMOTIDINE 20 MG: 20 TABLET ORAL at 09:40

## 2017-08-22 RX ADMIN — LORAZEPAM 0.5 MG: 2 INJECTION INTRAMUSCULAR; INTRAVENOUS at 21:46

## 2017-08-22 RX ADMIN — LORAZEPAM 0.5 MG: 2 INJECTION INTRAMUSCULAR; INTRAVENOUS at 16:57

## 2017-08-22 NOTE — INTERDISCIPLINARY ROUNDS
CRITICAL CARE INTERDISCIPLINARY ROUNDS    Patient Information:    Name:   Andrey Priest    Age:   45 y.o. Admission Date:   8/16/2017    Critical Care Day:  7    Surgery Date:      Attending Provider:   Eusebia Rahman MD    Surgeon:        Consultant(s):   [address] Physician:  juan    Code Status: Full Code    Problem List:     Patient Active Problem List   Diagnosis Code    Aspiration into airway T17.908A    Throat cancer ([address] C14.0    Status post insertion of percutaneous endoscopic gastrostomy (PEG) tube ([address] Z93.1    Hyponatremia E87.1    Acute respiratory distress R06.00    Hypoxia R09.02    Stridor R06.1    Respiratory depression R06.89    COPD exacerbation ([facility] [facility] J44.1    Acute hypoxemic respiratory failure ([address] J96.01    Severe protein-calorie malnutrition Gillie Fairly: less than 60% of standard weight) ([facility] [facility] E43       Principal Problem:  Acute hypoxemic respiratory failure ([facility] [facility]    During rounds the following quality care indicators and evidence based practices were addressed :  DVT Prophylaxis and PUD Prophylaxis [place] Day  ([facility] y) ; Central Line Day:na Isolation:na; Antibiotic Stewardship: review; Probiotics Necessary: na    Ventilator Bundle:      Sepsis Order Set:       Glycemic Control:   Recent Labs      08/22/17   0420   GLU  95   ; No results for input(s): PHI, PCO2I, PO2I in the last 72 hours. No lab exists for component: 3 Adjustments     Acute MI/PCI:   Not applicable    Door to Balloon: Admission Time: 0910 Procedure: Other (comment) (Tracheostomy) (08/20/17 1000)    Heart Failure:    Not applicable     SCIP Measures for other Surgeries:   Not applicable     Pneumonia:    Not applicable    Interdisciplinary team rounds were held with the following team members Care Management, Nursing, Nutrition,  Pharmacy, Physician and Respiratory Therapy. Plan of care discussed. Goals of Care/ Recommendations: Dr Rinku Estrada stated would downsize later this week.  Add additional fentanyl patch, plan to stop PCA and keep dilaudid prn. Ok to have ice chips    See clinical pathway and/or care plan for interventions and desired outcomes.     Critical Care Discharge Status:  Red

## 2017-08-22 NOTE — PROGRESS NOTES
Moira Joshi from [facility] visited with patient to assist with medicaid application,cm was informed by [facility] pt declined

## 2017-08-22 NOTE — PROGRESS NOTES
Problem: Mobility Impaired (Adult and Pediatric)  Goal: *Acute Goals and Plan of Care (Insert Text)  Physical Therapy Goals LT/ST  Initiated 8/21/2017 and to be accomplished within 3-5 day(s)  1. Patient will move from supine <> sit with S in prep for out of bed activity and change of position. 2. Patient will perform sit<> stand with S with LRAD in prep for transfers/ambulation. 3. Patient will transfer from bed <> chair with S with LRAD for time up in chair for completion of ADL activity. 4. Patient will ambulate 150 feet with LRAD/S for improved functional mobility/safe discharge. .      Outcome: Progressing Towards Goal  PHYSICAL THERAPY TREATMENT     Patient: Sandrine Mariee (22 y.o. male)  Date: 8/22/2017  Diagnosis: COPD exacerbation ([address]  ACUTE HYPOXEMIC RESPIRATORY FAILURE  respiratory stridor Acute hypoxemic respiratory failure (HCC)  Procedure(s) (LRB):  TRACHEOSTOMY (N/A) 2 Days Post-Op  Precautions: Fall, Skin, Aspiration (Trach/PEG)   Chart, physical therapy assessment, plan of care and goals were reviewed. ASSESSMENT:  Pt sitting up in chair upon entering. Pt with peg tube, trach collar. Pt amb distance limited by lines and fatigue. Pt amb 10 ft x2, c/RW, CGA for safety. Pt tolerated standing there ex for strengthening; mini squats, marches, heel raises x10 ea. Vitals remained stable during session. Will continue to progress as pt tolerates. Nurse notified of pt request for ativan upon completion of PT session. Progression toward goals:  [X]      Improving appropriately and progressing toward goals  [ ]      Improving slowly and progressing toward goals  [ ]      Not making progress toward goals and plan of care will be adjusted       PLAN:  Patient continues to benefit from skilled intervention to address the above impairments. Continue treatment per established plan of care.   Discharge Recommendations:  Rehab vs home health; will continue to assess based on pt progress  Further Equipment Recommendations for Discharge:  rolling walker       SUBJECTIVE:   Patient stated I want to get up.       OBJECTIVE DATA SUMMARY:   Critical Behavior:  Neurologic State: Alert, Appropriate for age  Orientation Level: Oriented X4, Appropriate for age  Cognition: Impulsive, Follows commands  Safety/Judgement: Awareness of environment  Functional Mobility Training:  Transfers:  Sit to Stand: Contact guard assistance  Stand to Sit: Contact guard assistance  Balance:  Sitting: Intact  Standing: Intact; With support  Ambulation/Gait Training:  Assistive Device: Gait belt;Walker, rolling  Ambulation - Level of Assistance: Contact guard assistance  Gait Abnormalities: Decreased step clearance  Speed/Vickie: Shuffled; Slow  Step Length: Left shortened;Right shortened  Pain:  Pain Scale 1: Numeric (0 - 10)  Pain Intensity 1: 7  Pain Location 1: Throat  Pain Orientation 1: Anterior  Pain Description 1: Aching  Pain Intervention(s) 1: Medication (see MAR)  Activity Tolerance:   Fair  Please refer to the flowsheet for vital signs taken during this treatment.   After treatment:   [X] Patient left in no apparent distress sitting up in chair  [ ] Patient left in no apparent distress in bed  [X] Call bell left within reach  [X] Nurse Tad Dempsey notified  [ ] Caregiver present  [ ] Bed alarm activated      Ashok Elizondo   Time Calculation: 23 mins

## 2017-08-22 NOTE — PROGRESS NOTES
Problem: Self Care Deficits Care Plan (Adult)  Goal: *Acute Goals and Plan of Care (Insert Text)  Occupational Therapy Goals  Initiated 8/22/2017 within 7 day(s). 1. Patient will perform bathing with set-up/mod I.   2. Patient will perform grooming with set-up/mod I standing sinkside. 3. Patient will perform lower body dressing with set-up/mod I.  4. Patient will perform toilet transfers with supervision/set-up. 5. Patient will perform all aspects of toileting with supervision/set-up. 6. Patient will perform upper extremity therapeutic exercise/activities with supervision/set-up for 15 minutes. 7. Patient will utilize energy conservation techniques during functional activities with 1 verbal cue(s). Outcome: Progressing Towards Goal  OCCUPATIONAL THERAPY EVALUATION     Patient: Wayne Calhoun (29 y.o. male)  Date: 8/22/2017  Primary Diagnosis: COPD exacerbation (HCC)  ACUTE HYPOXEMIC RESPIRATORY FAILURE  respiratory stridor  Procedure(s) (LRB):  TRACHEOSTOMY (N/A) 2 Days Post-Op   Precautions:  Fall, Skin, Aspiration (Trach/PEG)      ASSESSMENT :  Based on the objective data described below, the patient presents with decreased functional strength, decreased functional balance, decreased overall activity tolerance limiting independence with ADLs. Patient reports C/spine deficits at baseline w/ noted difficulty w/ rotation laterally R/L and inability to flex/extend full ROM. Pt also w/ muscular tightness d/t XRT. Pt eager to increase functional independence as he assists in mother's care (who is bedbound/Sathya Lift to w/c). Pt able to perform LE socks utilizing ankle-over-knee technique. Pt able to stand 5 minutes at [facility] for simulation of grooming at sink. Limited in ICU setting w/ multiple wires. Pt grossly CGA with additional time d/t functional endurance to sustain task throughout day. Pt would benefit from continued OT services to maximize independence in ADLs.      Education: Reviewed body mechanics, home safety, Energy Conservation/Work Simplification Techniques, adaptive strategies and adaptive dressing techniques including clothing modifications with patient verbalizing understanding at this time. Patient will benefit from skilled intervention to address the above impairments. Patients rehabilitation potential is considered to be Good  Factors which may influence rehabilitation potential include:   [ ]             None noted  [X]             Mental ability/status  [X]             Medical condition  [ ]             Home/family situation and support systems  [X]             Safety awareness  [X]             Pain tolerance/management  [ ]             Other:        PLAN :  Recommendations and Planned Interventions:  [X]               Self Care Training                  [X]        Therapeutic Activities  [X]               Functional Mobility Training    [X]        Cognitive Retraining  [X]               Therapeutic Exercises           [X]        Endurance Activities  [X]               Balance Training                   [X]        Neuromuscular Re-Education  [X]               Visual/Perceptual Training     [X]   Home Safety Training  [X]               Patient Education                 [X]        Family Training/Education  [ ]               Other (comment):     Frequency/Duration: Patient will be followed by occupational therapy 1-2 times per day/4-7 days per week to address goals. Discharge Recommendations: Home Health  Further Equipment Recommendations for Discharge: N/A       SUBJECTIVE:   Patient stated (pt mouthing words & writing) I've been active all my life. I just need to get moving.       OBJECTIVE DATA SUMMARY:       Past Medical History:   Diagnosis Date    Chronic obstructive pulmonary disease ([facility] [address]       acute hypoxic respiratory failure 8/16    Hypertension      Ill-defined condition       peg tube in place    Ill-defined condition       chemo    S/P radiation therapy      Throat cancer ([facility] [address]  Tobacco abuse       Past Surgical History:   Procedure Laterality Date    HX HEENT         sinus surgery     Barriers to Learning/Limitations: yes;  other PCA/pain Rx  Compensate with: visual, verbal, tactile, kinesthetic cues/model     G-Codes (GP)  Self Care   Current  CJ= 20-39%   Goal  CI= 1-19%  The severity rating is based on the professional judgement & direct observation of Level of Assistance required for Functional Mobility and ADLs. Eval Complexity: History: HIGH Complexity : Extensive review of history including physical, cognitive and psychosocial history ; Examination: HIGH Complexity : 5 or more performance deficits relating to physical, cognitive , or psychosocial skils that result in activity limitations and / or participation restrictions; Decision Making:HIGH Complexity : Patient presents with comorbidities that affect occupational performance. Signifigant modification of tasks or assistance (eg, physical or verbal) with assessment (s) is necessary to enable patient to complete evaluation      Prior Level of Function/Home Situation: Pt lives w/ spouse who care for his mother. Spouse is a Nurse. They Jessica Coy mother into w/c and perform IADLs. Home Situation  Home Environment: Private residence  Wheelchair Ramp: Yes  One/Two Story Residence: One story  Living Alone: No  Support Systems: Spouse/Significant Other/Partner, Parent (lives w/ mother & is caretaker)  Patient Expects to be Discharged to[de-identified] Private residence  Current DME Used/Available at Home: Alfonse Mccrary, rolling, Grab bars ((doesn't use AD at baseline))  Tub or Shower Type: Tub/Shower combination  [X]  Right hand dominant          [ ]  Left hand dominant  Cognitive/Behavioral Status:  Neurologic State: Alert; Appropriate for age  Orientation Level: Oriented X4;Appropriate for age  Cognition: Impulsive; Follows commands  Safety/Judgement: Awareness of environment  Skin: Trach site bloody; PEG  Edema: no edema noted  Vision/Perceptual:     limited by neck ROM    Coordination:  Coordination: Within functional limits  Fine Motor Skills-Upper: Left Intact; Right Intact    Gross Motor Skills-Upper: Left Intact; Right Intact  Balance:  Sitting: Intact  Standing: Intact; With support  Strength:  Strength: Generally decreased, functional  Tone & Sensation:  Tone: Normal  Sensation: Intact  Range of Motion:  AROM: Within functional limits  PROM: Within functional limits  Functional Mobility and Transfers for ADLs:  Bed Mobility:   Pt received up in chair  Transfers:  Sit to Stand: Contact guard assistance  ADL Assessment:   Feeding:  (PEG)     Oral Facial Hygiene/Grooming: Contact guard assistance     Bathing: Contact guard assistance;Minimum assistance; Additional time     Upper Body Dressing: Contact guard assistance     Lower Body Dressing: Setup;Contact guard assistance     Toileting: Contact guard assistance     ADL Intervention:  Grooming  Washing Face: Contact guard assistance  Washing Hands: Contact guard assistance  Shaving: Moderate assistance (cues for trach)  Brushing/Combing Hair: Supervision/set-up (seated)     Upper Body Dressing Assistance  Hospital Gown: Contact guard assistance;Minimum  assistance     Lower Body Dressing Assistance  Underpants: Contact guard assistance  Socks: Contact guard assistance (ankle-over-knee technique)  Position Performed: Seated in chair  Cues: Tactile cues provided;Verbal cues provided;Visual cues provided  Adaptive Equipment Used: Walker     Toileting  Bladder Hygiene: Contact guard assistance  Clothing Management: Contact guard assistance  Adaptive Equipment:  (urinal)     Cognitive Retraining  Safety/Judgement: Awareness of environment     Pain:  Pre-treatment: 5/10  Post-treatment: 5/10     Activity Tolerance:   Pt able to stand 5 minute(s). Pt able to complete ADLs with intermittent rest breaks. Pt limited by pain & tubes/wires.  Pt unsteady      Please refer to the flowsheet for vital signs taken during this treatment. After treatment:   [X] Patient left in no apparent distress sitting up in chair  [ ] Patient left in no apparent distress in bed  [X] Call bell left within reach  [X] Nursing notified (in room during Eval/tx)  [ ] Caregiver present  [ ] Bed alarm activated      COMMUNICATION/EDUCATION:   [X] Home safety education was provided and the patient/caregiver indicated understanding. [X] Patient/family have participated as able in goal setting and plan of care. [X] Patient/family agree to work toward stated goals and plan of care. [ ] Patient understands intent and goals of therapy, but is neutral about his/her participation. [ ] Patient is unable to participate in goal setting and plan of care.      Thank you for this referral.  Ana Hurt, OTR/L  Time Calculation: 39 mins

## 2017-08-22 NOTE — PROGRESS NOTES
1923- Report and care received, assessment completed per flow sheet. Alert, oriented, cannot talk secondary to tracheostomy, however, writes on paper to communicate. PCA use encouraged for pain control, reports some anxiety, ativan administered per orders- see MAR. IVF infusing via IV pump without difficulty. Trach and PEG site care completed, dressings changed. 2305- Reassessment completed and without change. 0300- Reassessment completed and without change. 9521- When asked how pain is states \"It's okay\", however, rates as a 7/10.  updated, orders received for a 1 time dose of dilaudid and to request that day shift address pain regimen.

## 2017-08-22 NOTE — PROGRESS NOTES
Hospitalist Progress Note    Patient: Warren Boo MRN: [id number]  CSN: [id number]    YOB: 1965  Age: 45 y.o. Sex: male    DOA: 8/16/2017 LOS:  LOS: 6 days          Chief Complaint:       Ac resp failure    Assessment/Plan     Ac resp failure resolved  S/p tracheostomy for severe stridor and hx laryngeal cancer s/p chemo and XRT  COPD-advanced  Continued tobacco use  Peg tube dependence for dysphagia  HTN  Chronic pain  Severe prot alphonse malnutrition with weight loss    Stop PCA  Now on duragesic patch  Change abx to liquid thru peg for completion 10 days  Off steroids  Working with PT  Work on home plan with suction, trach supplies and care, home health    Tube feeds resumed, work on daily goal and with h20  As well  Will need to see his oncologist at [facility] in next week or so, missed appt there this week  Prognosis overall not very good-he is at high risk for fruther morbidity and readmissions    D/c planning-may need his percocet via peg for pain as he was on quite a bit of pain medicine PTA    Patient Active Problem List   Diagnosis Code    Aspiration into airway T17.908A    Throat cancer ([facility] [address] C14.0    Status post insertion of percutaneous endoscopic gastrostomy (PEG) tube ([facility] [address] Z93.1    Hyponatremia E87.1    Acute respiratory distress R06.00    Hypoxia R09.02    Stridor R06.1    Respiratory depression R06.89    COPD exacerbation ([address] J44.1    Acute hypoxemic respiratory failure ([facility] [address] J96.01    Severe protein-calorie malnutrition (Gayle Botello: less than 60% of standard weight) ([facility] [address] E43       Subjective:    Denies new issue  Hopeful to go home but worried he doesn't have all the supplies he needs  No new concerns  Writes on a clipboard to communicate  Review of systems:    Constitutional: denies fevers, chills, myalgias  Respiratory: denies SOB, cough  Cardiovascular: denies chest pain, palpitations  Gastrointestinal: denies nausea, vomiting, diarrhea      Vital signs/Intake and Output:  Visit Vitals    /82    Pulse 70    Temp 97.7 °F (36.5 °C)    Resp 10    Wt 73.9 kg (162 lb 14.7 oz)    SpO2 100%    BMI 25.52 kg/m2     Current Shift:     Last three shifts:  08/20 1901 - 08/22 0700  In: 4302.3 [I.V.:1650.3]  Out: 3450 [Urine:3450]    Exam:    General: Well developed, alert, NAD, OX3  Trach in place, no redness  Head/Neck: NCAT, supple, No masses, No lymphadenopathy  CVS:Regular rate and rhythm, no M/R/G, S1/S2 heard, no thrill  Lungs:Cloarse BS, no wheezes, rhonchi, or rales  Abdomen: Soft, Nontender, peg tube, No distention, Normal Bowel sounds, No hepatomegaly  Extremities: No C/C/E, pulses palpable 2+  Skin:normal texture and turgor, no rashes, no lesions  Neuro:grossly normal , follows commands  Psych:appropriate                Labs: Results:       Chemistry Recent Labs      08/22/17   0420  08/21/17   1350  08/21/17   0459  08/20/17   1340   GLU  95   --    --    --    NA  137   --    --    --    K  3.6  4.3  3.5  4.1   CL  103   --    --    --    CO2  31   --    --    --    BUN  26*   --    --   20*   CREA  0.67   --    --    --    CA  8.6   --    --    --    AGAP  3   --    --    --    BUCR  39*   --    --    --       CBC w/Diff Recent Labs      08/22/17   0420   WBC  5.7   RBC  3.44*   HGB  11.5*   HCT  34.1*   PLT  137      Cardiac Enzymes No results for input(s): CPK, CKND1, CHUCKY in the last 72 hours. No lab exists for component: CKRMB, TROIP   Coagulation No results for input(s): PTP, INR, APTT in the last 72 hours. No lab exists for component: INREXT    Lipid Panel No results found for: CHOL, CHOLPOCT, CHOLX, CHLST, CHOLV, 969075, HDL, LDL, LDLC, DLDLP, 280445, VLDLC, VLDL, TGLX, TRIGL, TRIGP, TGLPOCT, CHHD, CHHDX   BNP No results for input(s): BNPP in the last 72 hours. Liver Enzymes No results for input(s): TP, ALB, TBIL, AP, SGOT, GPT in the last 72 hours.     No lab exists for component: DBIL   Thyroid Studies No results found for: T4, T3U, TSH, TSHEXT Procedures/imaging: see electronic medical records for all procedures/Xrays and details which were not copied into this note but were reviewed prior to creation of Jase Faria MD

## 2017-08-22 NOTE — PROGRESS NOTES
1925- Report and care received, assessment completed per flow sheet. Alert, pleasant, cooperative. Reports neck/incisional pain - see MAR and flow sheet. 2146- Reports anxiety, requests ativan, see MAR.    2347- Reassessment completed and without change. 9177- Reassessment completed and without change.

## 2017-08-22 NOTE — PROGRESS NOTES
[facility]  Pulmonary, Critical Care, and Sleep Medicine    Name: Satinder Gary MRN: [id number]   : 1965 Hospital: [facility] [place]   Date: 2017        Critical Care Follow Up                                                [x]I have reviewed the flowsheet and previous days notes. Events, vitals, medications and notes from last 24 hours reviewed. Care plan discussed with staff, patient, family and on multidisciplinary rounds. IMPRESSION:   · SCC Laryngeal CA T2N1M0,  Stridor on admission S/P intubation  S/p chemo and XRT, Admitted with respiratory failure/stridor. · S/p Trach  · Stridor resolved due to above. ·  Dysphagia S/P PEG tube  · Chronic pain  · HTN  · Nicotine depednece,    · Code status: FULL      RECOMMENDATIONS:   · S/p Trach,  On trach collar  · No need for systemic steroid for laryngeal edema/stridor  · Fentanyl patch,  Wean off PCA  · De-escalate abx  · Continue 2 alphonse  As recommended by nutrition  · Continue clonidine patch and lisinopril   · Nicotine patch  · Ok to transfer to medical floor            Subjective/History of Present Illness:   HPI  Satinder Gary has been seen and evaluated in ICU/ERPatient is a 45 y.o. male who was brought in to ER with history of throat cancer and recent hospitalization 2 weeks ago after he had cycle of chemotherapy and then developed COPD with acute exacerbation and was also mentioned that he had Dysphagia. He had PEG tube but had another hospitalization after he tried to drink water and aspirated. Anyway according to EMS he was found at home very short of breath, stridor, and trial if intubation was attempted on route. Later in ER he was then intubated by myself and placed on vent then transferred to ICU. He is sedated      Tolerated the trach  Pain controlled  No respiratory distress. No evidence of bleed.   Sitting comfortably in chair  HD stable. Trach site clean.         Review of Systems:  Doing ok now  No specific complain  No Known Allergies   Past Medical History:   Diagnosis Date    Chronic obstructive pulmonary disease (HCC)     acute hypoxic respiratory failure 8/16    Hypertension     Ill-defined condition     peg tube in place    Ill-defined condition     chemo    S/P radiation therapy     Throat cancer (HCC)     Tobacco abuse       Current Facility-Administered Medications   Medication Dose Route Frequency    HYDROmorphone (PF) (DILAUDID) 1 mg/mL injection        amoxicillin-clavulanate (AUGMENTIN) 400-57 mg/5 mL oral suspension 400 mg  400 mg Per G Tube Q12H    fentaNYL (DURAGESIC) 100 mcg/hr patch 1 Patch  1 Patch TransDERmal Q72H    potassium chloride (KAON 10%) 20 mEq/15 mL oral liquid 20 mEq  20 mEq Oral DAILY    famotidine (PEPCID) tablet 20 mg  20 mg Oral BID    multivitamin (MULTI-DELYN, WELLESSE) oral liquid 30 mL  30 mL Per G Tube DAILY    lisinopril (PRINIVIL, ZESTRIL) tablet 5 mg  5 mg Oral Q12H    cloNIDine (CATAPRES) 0.2 mg/24 hr patch 1 Patch  1 Patch TransDERmal Q7D    Thiamine Mononitrate (B-1) tablet 100 mg  100 mg Oral DAILY    sodium chloride (NS) flush 5-10 mL  5-10 mL IntraVENous Q8H    nicotine (NICODERM CQ) 21 mg/24 hr patch 1 Patch  1 Patch TransDERmal DAILY    insulin lispro (HUMALOG) injection   SubCUTAneous Q6H    heparin (porcine) injection 5,000 Units  5,000 Units SubCUTAneous Q8H       Lines: All central lines examined by me. No signs of erythema, induration, discharge. Feeding Tube:    PEG tube to low intermittent suction                      Peripheral Intravenous Line: 3 sites noted   Drain(s):PEG tube     Airway:  Airway - Endotracheal Tube 08/16/17 (Active)   Insertion Depth (cm) 22 cm 8/16/2017  9:48 AM   Line Gary Lips 8/16/2017  9:48 AM   Side Secured Device; Right 8/16/2017  9:48 AM   Site Assessment Clean, dry, & intact 8/16/2017  9:48 AM           Objective:   Vital Signs:    Visit Vitals    /82    Pulse 70    Temp 97.7 °F (36.5 °C)    Resp 10    Wt 73.9 kg (162 lb 14.7 oz)    SpO2 100%    BMI 25.52 kg/m2       O2 Device: Tracheal collar   O2 Flow Rate (L/min): 8 l/min   Temp (24hrs), Av.1 °F (36.7 °C), Min:97.6 °F (36.4 °C), Max:98.3 °F (36.8 °C)       Intake/Output:   Last shift:           Last 3 shifts:  1901 -  0700  In: 4302.3 [I.V.:1650.3]  Out: 3450 [Urine:3450]      Intake/Output Summary (Last 24 hours) at 17 0915  Last data filed at 17 6655   Gross per 24 hour   Intake          2634.45 ml   Output             2650 ml   Net           -15.55 ml       Last 3 Recorded Weights in this Encounter    17 0938 17 0400   Weight: 73.5 kg (162 lb 1.6 oz) 73.9 kg (162 lb 14.7 oz)       ARDS network Guidelines: Lung protective strategy and Plateau pressure goals less than or equal to 30    Telemetry:normal sinus rhythm    Physical Exam:     General: awake ,and appropriate  Head: atraumatic, normocephalic  Eye: PERRLA, no scleral icterus, no pallor, no cyanosis  Neck  : trach site clean intact  Hoarsness. Narrowing airway noted worse than yesterday  Lung: adequate air entry bilateral equal, no bronchospasm in lungs   Heart: S1 S2 present, no murmur, no gallop, tachycardia  Abdomen: soft, .  PEG tube in place   LE: no pedal edema, no cyanosis, no clubbing, 2+ peripheral pulses in DP    No supra clavicle lymph nodes        Data:         Chemistry Recent Labs      17   0420  17   1350  17   0459  17   1340   GLU  95   --    --    --    NA  137   --    --    --    K  3.6  4.3  3.5  4.1   CL  103   --    --    --    CO2  31   --    --    --    BUN  26*   --    --   20*   CREA  0.67   --    --    --    CA  8.6   --    --    --    MG  1.9   --   2.1  1.7   PHOS  2.4*   --   4.2  3.1   AGAP  3   --    --    --    BUCR  39*   --    --    --         Lactic Acid Lactic acid   Date Value Ref Range Status   2017 1.4 0.4 - 2.0 MMOL/L Final     No results for input(s): LAC in the last 72 hours. Liver Enzymes Protein, total   Date Value Ref Range Status   08/19/2017 5.7 (L) 6.4 - 8.2 g/dL Final     Albumin   Date Value Ref Range Status   08/19/2017 2.5 (L) 3.4 - 5.0 g/dL Final     Globulin   Date Value Ref Range Status   08/19/2017 3.2 2.0 - 4.0 g/dL Final     A-G Ratio   Date Value Ref Range Status   08/19/2017 0.8 0.8 - 1.7   Final     AST (SGOT)   Date Value Ref Range Status   08/19/2017 21 15 - 37 U/L Final     Alk. phosphatase   Date Value Ref Range Status   08/19/2017 55 45 - 117 U/L Final     No results for input(s): TP, ALB, GLOB, AGRAT, SGOT, GPT, AP, TBIL in the last 72 hours. No lab exists for component: DBIL     CBC w/Diff Recent Labs      08/22/17   0420   WBC  5.7   RBC  3.44*   HGB  11.5*   HCT  34.1*   PLT  137        Cardiac Enzymes No results found for: CPK, CKMMB, CKMB, RCK3, CKMBT, CKNDX, CKND1, CHUCKY, TROPT, TROIQ, TAMMY, TROPT, TNIPOC, BNP, BNPP     BNP No results found for: BNP, BNPP, XBNPT     Coagulation No results for input(s): PTP, INR, APTT in the last 72 hours. No lab exists for component: INREXT, INREXT      Thyroid  No results found for: T4, T3U, TSH, TSHEXT, TSHEXT       Lipid Panel No results found for: CHOL, CHOLPOCT, CHOLX, CHLST, CHOLV, 344544, HDL, LDL, LDLC, DLDLP, 988174, VLDLC, VLDL, TGLX, TRIGL, TRIGP, TGLPOCT, CHHD, CHHDX     ABG No results for input(s): PHI, PHI, POC2, PCO2I, PO2, PO2I, HCO3, HCO3I, FIO2, FIO2I in the last 72 hours.      Urinalysis Lab Results   Component Value Date/Time    Color YELLOW 08/16/2017 09:39 AM    Appearance CLOUDY 08/16/2017 09:39 AM    Specific gravity 1.011 08/16/2017 09:39 AM    pH (UA) 8.0 08/16/2017 09:39 AM    Protein 100 08/16/2017 09:39 AM    Glucose 100 08/16/2017 09:39 AM    Ketone NEGATIVE  08/16/2017 09:39 AM    Bilirubin NEGATIVE  08/16/2017 09:39 AM    Urobilinogen 0.2 08/16/2017 09:39 AM    Nitrites NEGATIVE  08/16/2017 09:39 AM    Leukocyte Esterase NEGATIVE  08/16/2017 09:39 AM    Epithelial cells NEGATIVE  08/16/2017 09:39 AM    Bacteria FEW 08/16/2017 09:39 AM    WBC 0 to 3 08/16/2017 09:39 AM    RBC 0 to 3 08/16/2017 09:39 AM        Micro  No results for input(s): SDES, CULT in the last 72 hours. No results for input(s): CULT in the last 72 hours. XR (Most Recent). CXR reviewed by me and compared with previous CXR   Results from Hospital Encounter encounter on 08/16/17   XR CHEST PORT   Narrative EXAM: Portable upright chest, one view    INDICATION: Postop tracheostomy. Difficulty breathing with audible wheezing    COMPARISON: 8/18/2017    _______________    FINDINGS:    Interval tracheostomy placement with its tip projecting at the trachea. Cardiac  silhouette and pulmonary vascularity are normal. Lungs are clear. Costophrenic  angles are sharp. Old, healed left rib fracture deformity is redemonstrated. No  cardiopulmonary changes. _______________         Impression IMPRESSION:    Tracheostomy in situ. No active disease. CT (Most Recent)   Results from Hospital Encounter encounter on 08/16/17   CT NECK SOFT TISSUE W CONT   Narrative CT neck    History: Neck mass, history of throat cancer with prior radiation therapy    Comparison: CT neck 8/3/2017    Technique: Multiple axial CT images of the neck were obtained extending from the  skull base to the upper thorax after administration of IV contrast. Coronal and  sagittal reformations were performed. One or more dose reduction techniques  were used on this CT: automated exposure control, adjustment of the mAs and/or  kVp according to patient's size, and iterative reconstruction techniques. The  specific techniques utilized on this CT exam have been documented in the  patient's electronic medical record. Findings:    Endotracheal tube has been placed during the interval. Some layering hyperdense  oral secretions are seen within the oropharynx and hypopharynx.  Tip of  endotracheal tube is in the tracheal airway at about the level of the clavicular  heads. Endotracheal tube and secretions does limit evaluation of the laryngeal,  hypopharyngeal, and oropharyngeal regions. No discrete suspicious masslike lesion is seen. There is suggestion of hypodense  thickening of the mucosa of the oropharynx and hypopharynx similar to prior  study. Thickening of the platysma is present with mild fatty stranding across  multiple fascial planes, nonspecific but suspect posttreatment changes. These  findings show no short-term interval change. There is no prior more remote study  for comparison. No adenopathy is present based on size criteria. There are a few scattered very  small subcentimeter anterior and posterior cervical chain nodes. No  cystic/necrotic nodes are identified. The parotid and submandibular glands are  unremarkable. Mild atherosclerotic calcifications are present particularly  along the carotid bifurcations additional atheromatous plaque is also seen. The  thyroid gland is unremarkable. Degenerative changes are scattered throughout the cervical and visualized  thoracic spine. Poor dentition is present with majority of teeth absent. Visualized paranasal sinuses and mastoid air cells are clear. Changes of centrilobular and paraseptal emphysema are seen in the bilateral lung  apices. No consolidation seen in the lung apices. Impression IMPRESSION:    1. Interval placement of endotracheal tube. Expected layering mucus secretions  within the oropharynx and hypopharynx. 2. No discrete masslike lesion seen to correlate with history of throat cancer. Stable diffuse mildly edematous mucosal thickening and soft tissue stranding  likely reflecting posttreatment changes. No more remote comparison study. 3. No adenopathy based on size criteria. No cystic/necrotic nodes. 4. Emphysema seen in bilateral lung apices without consolidation. EKG No results found for this or any previous visit. ECHO No results found for this or any previous visit. PFT No flowsheet data found. Other ASA reactivity:   Pre-albumin:   Ionized Calcium:   NH4:   T3, FT4:  Cortisol:  Urine Osm:  Urine Lytes:   HbA1c:      Recent Results (from the past 24 hour(s))   GLUCOSE, POC    Collection Time: 08/21/17 12:17 PM   Result Value Ref Range    Glucose (POC) 137 (H) 70 - 110 mg/dL   POTASSIUM    Collection Time: 08/21/17  1:50 PM   Result Value Ref Range    Potassium 4.3 3.5 - 5.5 mmol/L   GLUCOSE, POC    Collection Time: 08/21/17  4:21 PM   Result Value Ref Range    Glucose (POC) 102 70 - 110 mg/dL   GLUCOSE, POC    Collection Time: 08/21/17 11:04 PM   Result Value Ref Range    Glucose (POC) 122 (H) 70 - 214 mg/dL   METABOLIC PANEL, BASIC    Collection Time: 08/22/17  4:20 AM   Result Value Ref Range    Sodium 137 136 - 145 mmol/L    Potassium 3.6 3.5 - 5.5 mmol/L    Chloride 103 100 - 108 mmol/L    CO2 31 21 - 32 mmol/L    Anion gap 3 3.0 - 18 mmol/L    Glucose 95 74 - 99 mg/dL    BUN 26 (H) 7.0 - 18 MG/DL    Creatinine 0.67 0.6 - 1.3 MG/DL    BUN/Creatinine ratio 39 (H) 12 - 20      GFR est AA >60 >60 ml/min/1.73m2    GFR est non-AA >60 >60 ml/min/1.73m2    Calcium 8.6 8.5 - 10.1 MG/DL   CBC W/O DIFF    Collection Time: 08/22/17  4:20 AM   Result Value Ref Range    WBC 5.7 4.6 - 13.2 K/uL    RBC 3.44 (L) 4.70 - 5.50 M/uL    HGB 11.5 (L) 13.0 - 16.0 g/dL    HCT 34.1 (L) 36.0 - 48.0 %    MCV 99.1 (H) 74.0 - 97.0 FL    MCH 33.4 24.0 - 34.0 PG    MCHC 33.7 31.0 - 37.0 g/dL    RDW 13.7 11.6 - 14.5 %    PLATELET 189 918 - 353 K/uL    MPV 9.5 9.2 - 11.8 FL   MAGNESIUM    Collection Time: 08/22/17  4:20 AM   Result Value Ref Range    Magnesium 1.9 1.6 - 2.6 mg/dL   PHOSPHORUS    Collection Time: 08/22/17  4:20 AM   Result Value Ref Range    Phosphorus 2.4 (L) 2.5 - 4.9 MG/DL         Switch to IV  Keep ICU Plan as above  Stable at the present time  On no pressors.  And airway and breathing seems stable   [x]See my orders for details    My assessment, plan of care, findings, medications, side effects etc were discussed with:  [x] Nurse [] PT/OT    [x] Respiratory therapy []     [] Family: answered all questions to satisfaction [] Patient: answered all questions to satisfaction   [x] Pharmacist []      [x] Case & management strategies discussed today on multidisciplinary rounds    High complexity decision making was performed during the evaluation of this patient at high risk for decompensation with multiple organ involvement    CC time spent is 30 min        Armida Pena MD  8/22/2017

## 2017-08-23 LAB
BACTERIA SPEC CULT: NORMAL
GLUCOSE BLD STRIP.AUTO-MCNC: 80 MG/DL (ref 70–110)
MAGNESIUM SERPL-MCNC: 1.8 MG/DL (ref 1.6–2.6)
PHOSPHATE SERPL-MCNC: 4 MG/DL (ref 2.5–4.9)
SERVICE CMNT-IMP: NORMAL

## 2017-08-23 PROCEDURE — 65610000006 HC RM INTENSIVE CARE

## 2017-08-23 PROCEDURE — 74011250637 HC RX REV CODE- 250/637: Performed by: INTERNAL MEDICINE

## 2017-08-23 PROCEDURE — 97535 SELF CARE MNGMENT TRAINING: CPT

## 2017-08-23 PROCEDURE — 94640 AIRWAY INHALATION TREATMENT: CPT

## 2017-08-23 PROCEDURE — 97116 GAIT TRAINING THERAPY: CPT

## 2017-08-23 PROCEDURE — 82962 GLUCOSE BLOOD TEST: CPT

## 2017-08-23 PROCEDURE — A4217 STERILE WATER/SALINE, 500 ML: HCPCS

## 2017-08-23 PROCEDURE — 74011250636 HC RX REV CODE- 250/636: Performed by: INTERNAL MEDICINE

## 2017-08-23 PROCEDURE — 84100 ASSAY OF PHOSPHORUS: CPT | Performed by: INTERNAL MEDICINE

## 2017-08-23 PROCEDURE — 74011250637 HC RX REV CODE- 250/637: Performed by: FAMILY MEDICINE

## 2017-08-23 PROCEDURE — 97530 THERAPEUTIC ACTIVITIES: CPT

## 2017-08-23 PROCEDURE — 97140 MANUAL THERAPY 1/> REGIONS: CPT

## 2017-08-23 PROCEDURE — 74011000250 HC RX REV CODE- 250: Performed by: INTERNAL MEDICINE

## 2017-08-23 PROCEDURE — 36415 COLL VENOUS BLD VENIPUNCTURE: CPT | Performed by: INTERNAL MEDICINE

## 2017-08-23 PROCEDURE — 77010033678 HC OXYGEN DAILY

## 2017-08-23 PROCEDURE — 74011250637 HC RX REV CODE- 250/637: Performed by: HOSPITALIST

## 2017-08-23 PROCEDURE — 83735 ASSAY OF MAGNESIUM: CPT | Performed by: INTERNAL MEDICINE

## 2017-08-23 RX ADMIN — IPRATROPIUM BROMIDE AND ALBUTEROL SULFATE 3 ML: .5; 3 SOLUTION RESPIRATORY (INHALATION) at 23:35

## 2017-08-23 RX ADMIN — HYDROMORPHONE HYDROCHLORIDE 1 MG: 2 INJECTION INTRAMUSCULAR; INTRAVENOUS; SUBCUTANEOUS at 04:09

## 2017-08-23 RX ADMIN — Medication 10 ML: at 21:10

## 2017-08-23 RX ADMIN — HYDROCODONE BITARTRATE AND ACETAMINOPHEN 5 MG: 7.5; 325 TABLET ORAL at 20:42

## 2017-08-23 RX ADMIN — HYDROMORPHONE HYDROCHLORIDE 1 MG: 2 INJECTION INTRAMUSCULAR; INTRAVENOUS; SUBCUTANEOUS at 15:09

## 2017-08-23 RX ADMIN — LORAZEPAM 0.5 MG: 2 INJECTION INTRAMUSCULAR; INTRAVENOUS at 11:57

## 2017-08-23 RX ADMIN — HEPARIN SODIUM 5000 UNITS: 5000 INJECTION, SOLUTION INTRAVENOUS; SUBCUTANEOUS at 06:33

## 2017-08-23 RX ADMIN — HYDROMORPHONE HYDROCHLORIDE 1 MG: 2 INJECTION INTRAMUSCULAR; INTRAVENOUS; SUBCUTANEOUS at 18:21

## 2017-08-23 RX ADMIN — HYDROMORPHONE HYDROCHLORIDE 1 MG: 2 INJECTION INTRAMUSCULAR; INTRAVENOUS; SUBCUTANEOUS at 08:19

## 2017-08-23 RX ADMIN — FAMOTIDINE 20 MG: 20 TABLET ORAL at 20:42

## 2017-08-23 RX ADMIN — Medication 100 MG: at 08:19

## 2017-08-23 RX ADMIN — HYDROCODONE BITARTRATE AND ACETAMINOPHEN 5 MG: 7.5; 325 TABLET ORAL at 08:19

## 2017-08-23 RX ADMIN — FAMOTIDINE 20 MG: 20 TABLET ORAL at 08:19

## 2017-08-23 RX ADMIN — AMOXICILLIN AND CLAVULANATE POTASSIUM 400 MG: 400; 57 POWDER, FOR SUSPENSION ORAL at 20:42

## 2017-08-23 RX ADMIN — POTASSIUM CHLORIDE 20 MEQ: 20 SOLUTION ORAL at 08:19

## 2017-08-23 RX ADMIN — AMOXICILLIN AND CLAVULANATE POTASSIUM 400 MG: 400; 57 POWDER, FOR SUSPENSION ORAL at 08:28

## 2017-08-23 RX ADMIN — Medication 10 ML: at 15:10

## 2017-08-23 RX ADMIN — MULTIPLE VITAMIN LIQUID 30 ML: LIQUID at 08:28

## 2017-08-23 RX ADMIN — HEPARIN SODIUM 5000 UNITS: 5000 INJECTION, SOLUTION INTRAVENOUS; SUBCUTANEOUS at 15:10

## 2017-08-23 RX ADMIN — HYDROMORPHONE HYDROCHLORIDE 1 MG: 2 INJECTION INTRAMUSCULAR; INTRAVENOUS; SUBCUTANEOUS at 21:05

## 2017-08-23 RX ADMIN — HEPARIN SODIUM 5000 UNITS: 5000 INJECTION, SOLUTION INTRAVENOUS; SUBCUTANEOUS at 21:09

## 2017-08-23 NOTE — INTERDISCIPLINARY ROUNDS
CRITICAL CARE INTERDISCIPLINARY ROUNDS    Patient Information:    Name:   Chris Angulo    Age:   45 y.o. Admission Date:   8/16/2017    Critical Care Day:  8    Surgery Date:      Attending Provider:   Milton Lu MD    Surgeon:        Consultant(s):   [address] Physician:  juan    Code Status: Full Code    Problem List:     Patient Active Problem List   Diagnosis Code    Aspiration into airway T17.908A    Throat cancer ([address] C14.0    Status post insertion of percutaneous endoscopic gastrostomy (PEG) tube ([address] Z93.1    Hyponatremia E87.1    Acute respiratory distress R06.00    Hypoxia R09.02    Stridor R06.1    Respiratory depression R06.89    COPD exacerbation ([address] J44.1    Acute hypoxemic respiratory failure ([address] J96.01    Severe protein-calorie malnutrition Lawayne Peto: less than 60% of standard weight) ([address] E43       Principal Problem:  Acute hypoxemic respiratory failure ([address]    During rounds the following quality care indicators and evidence based practices were addressed :  DVT Prophylaxis and PUD Prophylaxis [place] [facility]  ([facility] y) ; [place] Line Day:na Isolation:na; Antibiotic Stewardship: review; Probiotics Necessary: na    Ventilator Bundle:      Sepsis Order Set:       Glycemic Control:   Recent Labs      08/22/17   0420   GLU  95   ; No results for input(s): PHI, PCO2I, PO2I in the last 72 hours. No lab exists for component: 3 Adjustments     Acute MI/PCI:   Not applicable    Door to Balloon: Admission Time: 0910 Procedure: Other (comment) (Tracheostomy) (08/20/17 1000)    Heart Failure:    Not applicable     SCIP Measures for other Surgeries:   Not applicable     Pneumonia:    Not applicable    Interdisciplinary team rounds were held with the following team members Care Management, Nursing, Nutrition,  Pharmacy, Physician and Respiratory Therapy. Plan of care discussed. Goals of Care/ Recommendations: Dr Gwendolyn Briceño stated would downsize later this week.  Pain medication adjusted yesterday, Restart bolus feeding. Tele transfer. D/c POCT. Discharge planning in progress. See clinical pathway and/or care plan for interventions and desired outcomes.     Critical Care Discharge Status:  [place] - tele

## 2017-08-23 NOTE — PROGRESS NOTES
[facility]  Pulmonary, Critical Care, and Sleep Medicine    Name: Satinder Gary MRN: [id number]   : 1965 Hospital: [facility] [place]   Date: 2017        Critical Care Follow Up                                                [x]I have reviewed the flowsheet and previous days notes. Events, vitals, medications and notes from last 24 hours reviewed. Care plan discussed with staff, patient, family and on multidisciplinary rounds. IMPRESSION:   · SCC Laryngeal CA T2N1M0,  Stridor on admission S/P intubation  S/p chemo and XRT, Admitted with respiratory failure/stridor. · S/p Trach  · Stridor resolved due to above. ·  Dysphagia S/P PEG tube  · Chronic pain  · HTN  · Nicotine depednece,    · Code status: FULL      RECOMMENDATIONS:   · S/p Trach,  On trach collar  · No need for systemic steroid for laryngeal edema/stridor  · Fentanyl patch,    · De-escalate abx  · Continue 2 alphonse  As recommended by nutrition  · Continue clonidine patch and lisinopril   · Nicotine patch  · Ok to transfer to medical floor            Subjective/History of Present Illness:   HPI  Satinder Gary has been seen and evaluated in ICU/ERPatient is a 45 y.o. male who was brought in to ER with history of throat cancer and recent hospitalization 2 weeks ago after he had cycle of chemotherapy and then developed COPD with acute exacerbation and was also mentioned that he had Dysphagia. He had PEG tube but had another hospitalization after he tried to drink water and aspirated. Anyway according to EMS he was found at home very short of breath, stridor, and trial if intubation was attempted on route. Later in ER he was then intubated by myself and placed on vent then transferred to ICU. He is sedated      Tolerated the trach  Pain controlled  No respiratory distress. No evidence of bleed.   Sitting comfortably in chair  HD stable.    Trach site clean.        No issues overnight  Trach site Clean intact  Continue to tolerate the tube feed. Review of Systems:  Doing ok now  No specific complain  No Known Allergies   Past Medical History:   Diagnosis Date    Chronic obstructive pulmonary disease (HCC)     acute hypoxic respiratory failure 8/16    Hypertension     Ill-defined condition     peg tube in place    Ill-defined condition     chemo    S/P radiation therapy     Throat cancer (HCC)     Tobacco abuse       Current Facility-Administered Medications   Medication Dose Route Frequency    amoxicillin-clavulanate (AUGMENTIN) 400-57 mg/5 mL oral suspension 400 mg  400 mg Per G Tube Q12H    fentaNYL (DURAGESIC) 50 mcg/hr patch 1 Patch  1 Patch TransDERmal Q72H    fentaNYL (DURAGESIC) 100 mcg/hr patch 1 Patch  1 Patch TransDERmal Q72H    potassium chloride (KAON 10%) 20 mEq/15 mL oral liquid 20 mEq  20 mEq Oral DAILY    famotidine (PEPCID) tablet 20 mg  20 mg Oral BID    multivitamin (MULTI-DELYN, WELLESSE) oral liquid 30 mL  30 mL Per G Tube DAILY    lisinopril (PRINIVIL, ZESTRIL) tablet 5 mg  5 mg Oral Q12H    cloNIDine (CATAPRES) 0.2 mg/24 hr patch 1 Patch  1 Patch TransDERmal Q7D    Thiamine Mononitrate (B-1) tablet 100 mg  100 mg Oral DAILY    sodium chloride (NS) flush 5-10 mL  5-10 mL IntraVENous Q8H    nicotine (NICODERM CQ) 21 mg/24 hr patch 1 Patch  1 Patch TransDERmal DAILY    heparin (porcine) injection 5,000 Units  5,000 Units SubCUTAneous Q8H       Lines: All central lines examined by me. No signs of erythema, induration, discharge. Feeding Tube:    PEG tube to low intermittent suction                      Peripheral Intravenous Line: 3 sites noted   Drain(s):PEG tube     Airway:  Airway - Endotracheal Tube 08/16/17 (Active)   Insertion Depth (cm) 22 cm 8/16/2017  9:48 AM   Line Gary Lips 8/16/2017  9:48 AM   Side Secured Device; Right 8/16/2017  9:48 AM   Site Assessment Clean, dry, & intact 8/16/2017  9:48 AM           Objective:   Vital Signs:    Visit Vitals    /71    Pulse 74    Temp 97.7 °F (36.5 °C)    Resp 9    Wt 73.9 kg (162 lb 14.7 oz)    SpO2 100%    BMI 25.52 kg/m2       O2 Device: Tracheal collar   O2 Flow Rate (L/min): 6 l/min   Temp (24hrs), Av.6 °F (36.4 °C), Min:97.3 °F (36.3 °C), Max:97.9 °F (36.6 °C)       Intake/Output:   Last shift:           Last 3 shifts:  1901 -  0700  In: 3573.5 [I.V.:322.5]  Out: 2645 [Urine:1000]      Intake/Output Summary (Last 24 hours) at 17 0958  Last data filed at 17 0615   Gross per 24 hour   Intake             2347 ml   Output             1450 ml   Net              897 ml       Last 3 Recorded Weights in this Encounter    17 0938 17 0400   Weight: 73.5 kg (162 lb 1.6 oz) 73.9 kg (162 lb 14.7 oz)          Telemetry:normal sinus rhythm    Physical Exam:     General: awake ,and appropriate  Head: atraumatic, normocephalic  Eye: PERRLA, no scleral icterus, no pallor, no cyanosis  Neck  : trach site clean intact  Hoarsness. Narrowing airway noted worse than yesterday  Lung: adequate air entry bilateral equal, no bronchospasm in lungs   Heart: S1 S2 present, no murmur, no gallop, tachycardia  Abdomen: soft, .  PEG tube in place   LE: no pedal edema, no cyanosis, no clubbing, 2+ peripheral pulses in DP    No supra clavicle lymph nodes        Data:         Chemistry Recent Labs      17   0512  17   0930  17   0420  17   1350  17   0459  17   1340   GLU   --    --   95   --    --    --    NA   --    --   137   --    --    --    K   --   4.2  3.6  4.3  3.5  4.1   CL   --    --   103   --    --    --    CO2   --    --   31   --    --    --    BUN   --    --   26*   --    --   20*   CREA   --    --   0.67   --    --    --    CA   --    --   8.6   --    --    --    MG  1.8   --   1.9   --   2.1  1.7   PHOS  4.0   --   2.4*   --   4.2  3.1   AGAP   --    --   3   --    --    --    BUCR   --    --   39*   --    --    --         Lactic Acid Lactic acid Date Value Ref Range Status   08/16/2017 1.4 0.4 - 2.0 MMOL/L Final     No results for input(s): LAC in the last 72 hours. Liver Enzymes Protein, total   Date Value Ref Range Status   08/19/2017 5.7 (L) 6.4 - 8.2 g/dL Final     Albumin   Date Value Ref Range Status   08/19/2017 2.5 (L) 3.4 - 5.0 g/dL Final     Globulin   Date Value Ref Range Status   08/19/2017 3.2 2.0 - 4.0 g/dL Final     A-G Ratio   Date Value Ref Range Status   08/19/2017 0.8 0.8 - 1.7   Final     AST (SGOT)   Date Value Ref Range Status   08/19/2017 21 15 - 37 U/L Final     Alk. phosphatase   Date Value Ref Range Status   08/19/2017 55 45 - 117 U/L Final     No results for input(s): TP, ALB, GLOB, AGRAT, SGOT, GPT, AP, TBIL in the last 72 hours. No lab exists for component: DBIL     CBC w/Diff Recent Labs      08/22/17   0420   WBC  5.7   RBC  3.44*   HGB  11.5*   HCT  34.1*   PLT  137        Cardiac Enzymes No results found for: CPK, CKMMB, CKMB, RCK3, CKMBT, CKNDX, CKND1, CHUCKY, TROPT, TROIQ, TAMMY, TROPT, TNIPOC, BNP, BNPP     BNP No results found for: BNP, BNPP, XBNPT     Coagulation No results for input(s): PTP, INR, APTT in the last 72 hours. No lab exists for component: INREXT, INREXT      Thyroid  No results found for: T4, T3U, TSH, TSHEXT, TSHEXT       Lipid Panel No results found for: CHOL, CHOLPOCT, CHOLX, CHLST, CHOLV, 833502, HDL, LDL, LDLC, DLDLP, 212120, VLDLC, VLDL, TGLX, TRIGL, TRIGP, TGLPOCT, CHHD, CHHDX     ABG No results for input(s): PHI, PHI, POC2, PCO2I, PO2, PO2I, HCO3, HCO3I, FIO2, FIO2I in the last 72 hours.      Urinalysis Lab Results   Component Value Date/Time    Color YELLOW 08/16/2017 09:39 AM    Appearance CLOUDY 08/16/2017 09:39 AM    Specific gravity 1.011 08/16/2017 09:39 AM    pH (UA) 8.0 08/16/2017 09:39 AM    Protein 100 08/16/2017 09:39 AM    Glucose 100 08/16/2017 09:39 AM    Ketone NEGATIVE  08/16/2017 09:39 AM    Bilirubin NEGATIVE  08/16/2017 09:39 AM    Urobilinogen 0.2 08/16/2017 09:39 AM Nitrites NEGATIVE  08/16/2017 09:39 AM    Leukocyte Esterase NEGATIVE  08/16/2017 09:39 AM    Epithelial cells NEGATIVE  08/16/2017 09:39 AM    Bacteria FEW 08/16/2017 09:39 AM    WBC 0 to 3 08/16/2017 09:39 AM    RBC 0 to 3 08/16/2017 09:39 AM        Micro  No results for input(s): SDES, CULT in the last 72 hours. No results for input(s): CULT in the last 72 hours. XR (Most Recent). CXR reviewed by me and compared with previous CXR   Results from Hospital Encounter encounter on 08/16/17   XR CHEST PORT   Narrative EXAM: Portable upright chest, one view    INDICATION: Postop tracheostomy. Difficulty breathing with audible wheezing    COMPARISON: 8/18/2017    _______________    FINDINGS:    Interval tracheostomy placement with its tip projecting at the trachea. Cardiac  silhouette and pulmonary vascularity are normal. Lungs are clear. Costophrenic  angles are sharp. Old, healed left rib fracture deformity is redemonstrated. No  cardiopulmonary changes. _______________         Impression IMPRESSION:    Tracheostomy in situ. No active disease. CT (Most Recent)   Results from Hospital Encounter encounter on 08/16/17   CT NECK SOFT TISSUE W CONT   Narrative CT neck    History: Neck mass, history of throat cancer with prior radiation therapy    Comparison: CT neck 8/3/2017    Technique: Multiple axial CT images of the neck were obtained extending from the  skull base to the upper thorax after administration of IV contrast. Coronal and  sagittal reformations were performed. One or more dose reduction techniques  were used on this CT: automated exposure control, adjustment of the mAs and/or  kVp according to patient's size, and iterative reconstruction techniques. The  specific techniques utilized on this CT exam have been documented in the  patient's electronic medical record.     Findings:    Endotracheal tube has been placed during the interval. Some layering hyperdense  oral secretions are seen within the oropharynx and hypopharynx. Tip of  endotracheal tube is in the tracheal airway at about the level of the clavicular  heads. Endotracheal tube and secretions does limit evaluation of the laryngeal,  hypopharyngeal, and oropharyngeal regions. No discrete suspicious masslike lesion is seen. There is suggestion of hypodense  thickening of the mucosa of the oropharynx and hypopharynx similar to prior  study. Thickening of the platysma is present with mild fatty stranding across  multiple fascial planes, nonspecific but suspect posttreatment changes. These  findings show no short-term interval change. There is no prior more remote study  for comparison. No adenopathy is present based on size criteria. There are a few scattered very  small subcentimeter anterior and posterior cervical chain nodes. No  cystic/necrotic nodes are identified. The parotid and submandibular glands are  unremarkable. Mild atherosclerotic calcifications are present particularly  along the carotid bifurcations additional atheromatous plaque is also seen. The  thyroid gland is unremarkable. Degenerative changes are scattered throughout the cervical and visualized  thoracic spine. Poor dentition is present with majority of teeth absent. Visualized paranasal sinuses and mastoid air cells are clear. Changes of centrilobular and paraseptal emphysema are seen in the bilateral lung  apices. No consolidation seen in the lung apices. Impression IMPRESSION:    1. Interval placement of endotracheal tube. Expected layering mucus secretions  within the oropharynx and hypopharynx. 2. No discrete masslike lesion seen to correlate with history of throat cancer. Stable diffuse mildly edematous mucosal thickening and soft tissue stranding  likely reflecting posttreatment changes. No more remote comparison study. 3. No adenopathy based on size criteria. No cystic/necrotic nodes.     4. Emphysema seen in bilateral lung apices without consolidation. EKG No results found for this or any previous visit. ECHO No results found for this or any previous visit. PFT No flowsheet data found. Other ASA reactivity:   Pre-albumin:   Ionized Calcium:   NH4:   T3, FT4:  Cortisol:  Urine Osm:  Urine Lytes:   HbA1c:      Recent Results (from the past 24 hour(s))   GLUCOSE, POC    Collection Time: 08/22/17 11:57 AM   Result Value Ref Range    Glucose (POC) 92 70 - 110 mg/dL   GLUCOSE, POC    Collection Time: 08/22/17  6:13 PM   Result Value Ref Range    Glucose (POC) 89 70 - 110 mg/dL   PHOSPHORUS    Collection Time: 08/23/17  5:12 AM   Result Value Ref Range    Phosphorus 4.0 2.5 - 4.9 MG/DL   MAGNESIUM    Collection Time: 08/23/17  5:12 AM   Result Value Ref Range    Magnesium 1.8 1.6 - 2.6 mg/dL         Switch to IV  Keep ICU Plan as above  Stable at the present time  On no pressors.  And airway and breathing seems stable   [x]See my orders for details    My assessment, plan of care, findings, medications, side effects etc were discussed with:  [x] Nurse [] PT/OT    [x] Respiratory therapy []     [] Family: answered all questions to satisfaction [] Patient: answered all questions to satisfaction   [x] Pharmacist []      [x] Case & management strategies discussed today on multidisciplinary rounds    High complexity decision making was performed during the evaluation of this patient at high risk for decompensation with multiple organ involvement             Nikolas Sarmiento MD  8/23/2017

## 2017-08-23 NOTE — DIABETES MGMT
GLYCEMIC CONTROL & NUTRITION:      - Discussed in rounds and chart reviewed.  BG currently within target range, A1C wnl  - noted steroids have been d/c'd, recommend d/c POCT + Humalog corrective coverage, orders d/c'd per Dr. Carmelita Tavera  - noted has PEG, TF has been infusing with no issues, plan is to transition to bolus feeds in next 24 hours    Recent Glucose Results:   Lab Results   Component Value Date/Time    GLUCPOC 80 08/23/2017 11:23 AM    GLUCPOC 89 08/22/2017 06:13 PM       Lab Results   Component Value Date/Time    Hemoglobin A1c 5.6 08/16/2017 12:20 PM             Madi Jones, MPH, RD, CDE

## 2017-08-23 NOTE — PROGRESS NOTES
Hospitalist Progress Note    Patient: Manuel Faustin MRN: [id number]  [facility]: [id number]    YOB: 1965  Age: 45 y.o. Sex: male    DOA: 8/16/2017 LOS:  LOS: 7 days          Chief Complaint:    Respiratory Distress    Assessment/Plan     Hospital Problems  Date Reviewed: 8/20/2017          Codes Class Noted POA    Severe protein-calorie malnutrition Veneda Gens: less than 60% of standard weight) ([facility] ICD-10-CM: E43  ICD-9-CM: 262  8/18/2017 Unknown        COPD exacerbation ([facility] ICD-10-CM: J44.1  ICD-9-CM: 491.21  8/16/2017 Yes        * (Principal)Acute hypoxemic respiratory failure ([facility] ICD-10-CM: J96.01  ICD-9-CM: 518.81  8/16/2017 Yes        Throat cancer ([facility] ICD-10-CM: C14.0  ICD-9-CM: 149.0  7/30/2017 Yes        Status post insertion of percutaneous endoscopic gastrostomy (PEG) tube [facility]) ICD-10-CM: Z93.1  ICD-9-CM: V44.1  7/30/2017 Yes              Manuel Faustin is a 45 y.o. male who was admitted on 8/16/17 with respiratory distress requiring intubation. He has a history of throat cancer and is being followed by an oncologist at [facility] in [place]. He was initially requiring precedex, versed, and fentanyl for sedation thought to be due to his heavy opiate history. He was started on zosyn + levaquin, solumedrol+duonebs for COPD/questionable aspiration event. SSI. He was extubated in OR on 8/18/17 by ENT without incident. His respiratory status was deteriorating and tracheostomy placement was performed on 8/20/17. Dilaudid PCA initiated for pain control--now discontinued. He is on duragesic patch. He is now oxygenating well on trach collar. Transition to per tube meds. Steroids course has completed. Tube feeds have resumed. He is set to see his oncologist at [facility] in the next week or so.   [employer] teaching/supply procurement in progress via       Throat cancer ([facility] (7/30/2017)  - following with Heme/Onc at [facility]  - s/p trach placement      Status post insertion of percutaneous endoscopic gastrostomy (PEG) tube ([facility] (7/30/2017)  - ? Aspiration event  - s/p levaquin + zosyn   - now on augmentin per tube  - monitor for fevers      COPD exacerbation ([facility] (8/16/2017)  - s/p steroids  - continue abx, nebs as above      Acute hypoxemic respiratory failure ([facility] (8/16/2017)  - COPD exacerbation +/- aspiration event  - abx + steroids as above  - off of vent, now trach collar  - continues to smoke    PT/OT    CM for DC planning    Dispo: transfer to medical floor; discharge planning      Subjective:    No new issues    Review of systems:    General: No fevers or chills. Cardiovascular: No chest pain or pressure. No palpitations. Pulmonary: No shortness of breath. Gastrointestinal: No nausea, vomiting.        Vital signs/Intake and Output:  Visit Vitals    /71    Pulse 74    Temp 97.7 °F (36.5 °C)    Resp 9    Wt 73.9 kg (162 lb 14.7 oz)    SpO2 100%    BMI 25.52 kg/m2     Current Shift:     Last three shifts:  08/21 1901 - 08/23 0700  In: 3573.5 [I.V.:322.5]  Out: 9984 [Urine:1000]    Exam:    General: awake and alert; NAD  Head: Atraumatic, normocephalic  Eyes: PERRLA, EOMI, sclerae non-icteric  ENT: mucous membranes moist; trach in place, trach collar  Neck: supple, no masses, no lymphadenopathy, no rigidity ; large mass under R mandible  CVS:Regular rate and rhythm, no murmurs, rubs, gallops, or clicks  Lungs:Clear to auscultation bilaterally, no wheezes, rales, or rhonchi  Abdomen: PEG in place soft, Nontender, nondistended, bowel sounds normal throughout  Extremities: atraumatic, pulses 2+ all ext, no edema  Skin: warm, dry, well perfused, without rash; no cyanosis or clubbing  Neuro/Psych: AAOx3, no focal deficits                Labs: Results:       Chemistry Recent Labs      08/22/17   0930  08/22/17   0420  08/21/17   1350   08/20/17   1340   GLU   --   95   --    --    --    NA   --   137   --    --    --    K  4.2  3.6  4.3   < >  4.1   CL   --   103   --    --    --    CO2   -- 31   --    --    --    BUN   --   26*   --    --   20*   CREA   --   0.67   --    --    --    CA   --   8.6   --    --    --    AGAP   --   3   --    --    --    BUCR   --   39*   --    --    --     < > = values in this interval not displayed. CBC w/Diff Recent Labs      08/22/17   0420   WBC  5.7   RBC  3.44*   HGB  11.5*   HCT  34.1*   PLT  137      Cardiac Enzymes No results for input(s): CPK, CKND1, CHUCKY in the last 72 hours. No lab exists for component: CKRMB, TROIP   Coagulation No results for input(s): PTP, INR, APTT in the last 72 hours. No lab exists for component: INREXT, INREXT    Lipid Panel No results found for: CHOL, CHOLPOCT, CHOLX, CHLST, CHOLV, 233762, HDL, LDL, LDLC, DLDLP, 553687, VLDLC, VLDL, TGLX, TRIGL, TRIGP, TGLPOCT, CHHD, CHHDX   BNP No results for input(s): BNPP in the last 72 hours. Liver Enzymes No results for input(s): TP, ALB, TBIL, AP, SGOT, GPT in the last 72 hours.     No lab exists for component: DBIL   Thyroid Studies No results found for: T4, T3U, TSH, TSHEXT, TSHEXT     Procedures/imaging: see electronic medical records for all procedures/Xrays and details which were not copied into this note but were reviewed prior to creation of Claudiaa [id number], DO

## 2017-08-23 NOTE — PROGRESS NOTES
conducted a Follow up consultation and Spiritual Assessment for Rodolfo Godinez, who is a 45 y.o.,male. The  provided the following Interventions:  Continued the relationship of care and support. Listened empathically. Offered prayer and assurance of continued prayer on patients behalf. Chart reviewed. The following outcomes were achieved:  Patient expressed gratitude for pastoral care visit. Assessment:  There are no further spiritual or Jain issues which require Spiritual Care Services interventions at this time. Plan:  Chaplains will continue to follow and will provide pastoral care on an as needed/requested basis.  recommends bedside caregivers page  on duty if patient shows signs of acute spiritual or emotional distress.        [address] [facility],[address], [address] office  [phone] pager

## 2017-08-23 NOTE — PROGRESS NOTES
Problem: Mobility Impaired (Adult and Pediatric)  Goal: *Acute Goals and Plan of Care (Insert Text)  Physical Therapy Goals LT/ST  Initiated 8/21/2017 and to be accomplished within 3-5 day(s)  1. Patient will move from supine <> sit with S in prep for out of bed activity and change of position. 2. Patient will perform sit<> stand with S with LRAD in prep for transfers/ambulation. 3. Patient will transfer from bed <> chair with S with LRAD for time up in chair for completion of ADL activity. 4. Patient will ambulate 150 feet with LRAD/S for improved functional mobility/safe discharge. .      Outcome: Progressing Towards Goal  PHYSICAL THERAPY TREATMENT     Patient: Carl Padilla (57 y.o. male)  Date: 8/23/2017  Diagnosis: COPD exacerbation (HCC)  ACUTE HYPOXEMIC RESPIRATORY FAILURE  respiratory stridor Acute hypoxemic respiratory failure (HCC)  Procedure(s) (LRB):  TRACHEOSTOMY (N/A) 3 Days Post-Op  Precautions: Fall, Skin, Aspiration (Trach/PEG)   Chart, physical therapy assessment, plan of care and goals were reviewed. ASSESSMENT:  Pt sitting up in chair upon entering. Per IDR and discussion with respiratory, plan to attempt to amb pt s/O2 via trach collar. Pt moved to stand with supervision/SBA. Pt amb 100 ft in hallway c/RW, CGA, GB applied; required 3-4 short standing rest breaks during session due to fatigue, tachycardia, and SPO2 desat. With activity HR increased to 138 and SPOs to 89, improved with standing rest. Replaced trach collar upon returning to room. Pt fatigued and returned to bed with supervision. Pt left supine in bed with all needs met and call bell within reach. Wife in room upon exiting.   Progression toward goals:  [X]      Improving appropriately and progressing toward goals  [ ]      Improving slowly and progressing toward goals  [ ]      Not making progress toward goals and plan of care will be adjusted       PLAN:  Patient continues to benefit from skilled intervention to address the above impairments. Continue treatment per established plan of care. Discharge Recommendations:  Home health vs rehab; will continue to assess based on pt progress  Further Equipment Recommendations for Discharge:  rolling walker       SUBJECTIVE:   Patient stated I am ready to get up.       OBJECTIVE DATA SUMMARY:   Critical Behavior:  Neurologic State: Alert, Appropriate for age  Orientation Level: Oriented X4  Cognition: Impulsive, Follows commands  Safety/Judgement: Awareness of environment  Functional Mobility Training:  Bed Mobility:  Sit to Supine: Supervision  Transfers:  Sit to Stand: Supervision;Stand-by asssistance  Stand to Sit: Supervision  Bed to Chair: Contact guard assistance  Balance:  Sitting: Intact  Standing: Intact; With support  Ambulation/Gait Training:  Distance (ft): 100 Feet (ft)  Assistive Device: Gait belt;Walker, rolling  Ambulation - Level of Assistance: Contact guard assistance  Gait Abnormalities: Decreased step clearance  Speed/Vickie: Slow  Step Length: Right shortened;Left shortened  Pain:  Pain Scale 1: Numeric (0 - 10)  Pain Intensity 1: 5  Pain Location 1: Incisional  Pain Intervention(s) 1: Medication (see MAR)  Activity Tolerance:   Good  Please refer to the flowsheet for vital signs taken during this treatment.   After treatment:   [ ] Patient left in no apparent distress sitting up in chair  [X] Patient left in no apparent distress in bed  [X] Call bell left within reach  [X] Nursing notified  [ ] Caregiver present  [ ] Bed alarm activated      Kirsten Elizondo   Time Calculation: 34 mins

## 2017-08-23 NOTE — PROGRESS NOTES
Problem: Self Care Deficits Care Plan (Adult)  Goal: *Acute Goals and Plan of Care (Insert Text)  Occupational Therapy Goals  Initiated 8/22/2017 within 7 day(s). 1. Patient will perform bathing with set-up/mod I.   2. Patient will perform grooming with set-up/mod I standing sinkside. 3. Patient will perform lower body dressing with set-up/mod I.  4. Patient will perform toilet transfers with supervision/set-up. 5. Patient will perform all aspects of toileting with supervision/set-up. 6. Patient will perform upper extremity therapeutic exercise/activities with supervision/set-up for 15 minutes. 7. Patient will utilize energy conservation techniques during functional activities with 1 verbal cue(s). Outcome: Progressing Towards Goal  OCCUPATIONAL THERAPY TREATMENT     Patient: Patience Snow (63 y.o. male)  Date: 8/23/2017  Diagnosis: COPD exacerbation ([facility] [address]  ACUTE HYPOXEMIC RESPIRATORY FAILURE  respiratory stridor Acute hypoxemic respiratory failure (HCC)  Procedure(s) (LRB):  TRACHEOSTOMY (N/A) 3 Days Post-Op  Precautions: Fall, Skin, Aspiration (Trach/PEG)  Chart, occupational therapy assessment, plan of care, and goals were reviewed. ASSESSMENT:  Patient making good progress. Pt reports pressure in throat area. Pt curious what it looked like and requested OT take a pic and pt w/ decreased anxiety once able to see what Les Birch looked like. Pt able to stand 5 mins for simulation of ADLs at sinkside. Pt should transfer to [facility] and educated that able to achieve goals faster d/t being able to walk to sink and toilet for ADLs.  Pt reported neck pain w/ manual pressure points in insertion of upper/middle/lower trap as well as SCM w/ significant changes is palpable tension of muscle and pt reporting significant relief in pain and tightness  Progression toward goals:  [X]          Improving appropriately and progressing toward goals  [ ]          Improving slowly and progressing toward goals  [ ]          Not making progress toward goals and plan of care will be adjusted       PLAN:  Patient continues to benefit from skilled intervention to address the above impairments. Continue treatment per established plan of care. Discharge Recommendations:  Home Health  Further Equipment Recommendations for Discharge:  N/A       SUBJECTIVE:   Patient stated I just get anxious.       OBJECTIVE DATA SUMMARY:   Cognitive/Behavioral Status:  Neurologic State: Alert, Appropriate for age  Orientation Level: Oriented X4  Cognition: Impulsive, Follows commands  Safety/Judgement: Awareness of environment  Functional Mobility and Transfers for ADLs:              Bed Mobility:   pt sitting up in chair              Transfers:  Sit to Stand: Stand-by asssistance  Bed to Chair: Contact guard assistance  Balance:  Sitting: Intact  Standing: Intact; With support  ADL Intervention:  Feeding  Feeding Assistance: Supervision/set-up  Food to Mouth: Supervision/set-up (ice chips)     Grooming  Washing Face: Supervision/set-up (seated)  Brushing/Combing Hair: Supervision/set-up (seated)     Lower Body Dressing Assistance  Socks: Supervision/set-up (ankle-over-knee technique)     Cognitive Retraining  Safety/Judgement: Awareness of environment     Neuro Re-Education/Manual Therapy:  Pressure points to insertion sites of Traps and SCM  Educated patient that deep tissue massage contra-indicated if active cancer. Pt reports PET Scan for December to see if CA has spread. Pt w/ slight increase in ROM s/p tx, but significant palpable tension decrease in Traps & SCM     Pain:  Pre-treatment: 3/10  Post-treatment: 2/10     Activity Tolerance:    Pt able to stand 5 minute(s). Pt able to complete ADLs with intermittent rest breaks. Pt limited by tubes/wires & functional endurance to sustain extended standing ADLs. Pt unsteady      Please refer to the flowsheet for vital signs taken during this treatment.   After treatment:   [X]  Patient left in no apparent distress sitting up in chair  [ ]  Patient left in no apparent distress in bed  [X]  Call bell left within reach  [X]  Nursing notified/Summer  [ ]  Caregiver present  [ ]  Bed alarm activated     Thank you for this referral.  Ana Hurt, OTR/L  Time Calculation: 28 mins

## 2017-08-23 NOTE — PROGRESS NOTES
NUTRITION FOLLOW-UP    RECOMMENDATIONS/PLAN:   Recc 5 Cans of TwoCal to provide total 2370kcal, 99g Pro, 830ml H20, 259g CHO, 107g Fat  150ml Free H20 flushes before and after each can     Monitor labs/lytes, tube feed tolerance, wt, fluid status, skin integrity    Nutrition assessment was completed by RDN and the patient was found to meet the following malnutrition criteria established by ASPEN/AND:      Adult Malnutrition Guidelines:  SEVERE PROTEIN CALORIE MALNUTRITION IN THE CONTEXT OF CHRONIC ILLNESS  Weight Loss:  >5% x 1 month or >7.5% x 3 months or >10% x 6 months or >20% x 12 months  Muscle Mass: Severe Depletion      Please document MALNUTRITION in Problem List if in agreement.  Tyshawn Quinonez  08/23/17    NUTRITION ASSESSMENT:   Client Update: 45 yrs old Male with COPD exacerbation. Pt was extubated on 8/19/17. Pt had trach placed 8/201/7. Pt has HTN and stage 3 esophageal Ca. Pt continues to smoke and hx ETOH abuse. Recc adjusting tube feed back to bolus feedings. FOOD/NUTRITION INTAKE   Diet Order:  NPO-Tube Feed  Supplements:   Food Allergies: NKFA/  Average PO Intake:      No data found. Pertinent Medications:  [x] Reviewed; pepcid, MVI, thiamine, KCl,   Electrolyte Replacement Protocol: []K []Mg []PO4  Insulin:  []SSI  []Pre-meal   []Basal    []Drip  []None  Cultural/Yazdanism Food Preferences: None Identified    BIOCHEMICAL DATA & MEDICAL TESTS  Pertinent Labs:  [x] Reviewed;  Phos-2.4   ANTHROPOMETRICS  Height:   5'7      Weight: 73.9 kg (162 lb 14.7 oz)         BMI:   25.4 kg/m^2kg/m overweight (25.0%-29.9% BMI)   Adm Weight: 162 lbs                Weight change: n/a  Adjusted Body Weight: n/a      NUTRITION-FOCUSED PHYSICAL ASSESSMENT  Skin: No pressure injury noted      GI: +2 BM 8/23/17     NUTRITION PRESCRIPTION  Calories 4497-8875 kcal/day based on 30-35kcal/kg  Protein: g/day based on 1.4-1.6 g/kg  CHO: 277-323g/day based on 50% of total energy  JMYRI: [id number] SOO/QJOHANN based on 1 kcal/ml       NUTRITION DIAGNOSES:   1. Increased energy needs related to SOB and COPD as evidence by 7lb weight loss since 7/30 and 21% wt loss in last 4-5 months    NUTRITION INTERVENTIONS:   INTERVENTIONS:        GOALS:  1. Recc 5 Cans of TwoCal to provide total 2370kcal, 99g Pro, 830ml H20, 259g CHO, 107g Fat  150ml Free H20 flushes before and after each can 1. Meet EN recc  by next review 3 days             LEARNING NEEDS (Diet, Supplementation, Food/Nutrient-Drug Interaction):   [x] None Identified   [] Education provided/documented      Identified and patient: [] Declined   [] Was not appropriate/indicated        NUTRITION MONITORING /EVALUATION:   Adjust EN/PN as appropriate  Follow PO intake  Monitor wt  Monitor renal labs, electrolytes, fluid status     Previous Recommendations Implemented: yes        Previous Goals Met:  yes -      [x] Participated in Interdisciplinary Rounds    [x] Interdisciplinary Care Plan Reviewed  DISCHARGE NUTRITION RECOMMENDATIONS ADDRESSED:     [x] Yes- recommended TwoCal 5 Cans     NUTRITION RISK:           [x] At risk                        [] Not currently at risk        Will follow-up per policy.   Jessica Diop, ASIF  PAGER:  [phone]

## 2017-08-24 LAB
BUN SERPL-MCNC: 21 MG/DL (ref 7–18)
BUN SERPL-MCNC: 21 MG/DL (ref 7–18)
GLUCOSE BLD STRIP.AUTO-MCNC: 87 MG/DL (ref 70–110)
GLUCOSE BLD STRIP.AUTO-MCNC: 95 MG/DL (ref 70–110)
MAGNESIUM SERPL-MCNC: 1.7 MG/DL (ref 1.6–2.6)
PHOSPHATE SERPL-MCNC: 4.6 MG/DL (ref 2.5–4.9)

## 2017-08-24 PROCEDURE — 74011250637 HC RX REV CODE- 250/637: Performed by: INTERNAL MEDICINE

## 2017-08-24 PROCEDURE — 65660000000 HC RM CCU STEPDOWN

## 2017-08-24 PROCEDURE — 74011250636 HC RX REV CODE- 250/636: Performed by: INTERNAL MEDICINE

## 2017-08-24 PROCEDURE — 84520 ASSAY OF UREA NITROGEN: CPT | Performed by: INTERNAL MEDICINE

## 2017-08-24 PROCEDURE — 77030009834 HC MSK O2 TRACH VYRM -A

## 2017-08-24 PROCEDURE — 82962 GLUCOSE BLOOD TEST: CPT

## 2017-08-24 PROCEDURE — 77010033678 HC OXYGEN DAILY

## 2017-08-24 PROCEDURE — 84100 ASSAY OF PHOSPHORUS: CPT | Performed by: INTERNAL MEDICINE

## 2017-08-24 PROCEDURE — 36415 COLL VENOUS BLD VENIPUNCTURE: CPT | Performed by: INTERNAL MEDICINE

## 2017-08-24 PROCEDURE — 74011000250 HC RX REV CODE- 250: Performed by: INTERNAL MEDICINE

## 2017-08-24 PROCEDURE — 97116 GAIT TRAINING THERAPY: CPT

## 2017-08-24 PROCEDURE — 74011250637 HC RX REV CODE- 250/637: Performed by: FAMILY MEDICINE

## 2017-08-24 PROCEDURE — 97530 THERAPEUTIC ACTIVITIES: CPT

## 2017-08-24 PROCEDURE — 94640 AIRWAY INHALATION TREATMENT: CPT

## 2017-08-24 PROCEDURE — 74011250637 HC RX REV CODE- 250/637: Performed by: HOSPITALIST

## 2017-08-24 PROCEDURE — 83735 ASSAY OF MAGNESIUM: CPT | Performed by: INTERNAL MEDICINE

## 2017-08-24 PROCEDURE — 77030018846 HC SOL IRR STRL H20 ICUM -A

## 2017-08-24 RX ADMIN — HYDROMORPHONE HYDROCHLORIDE 1 MG: 2 INJECTION INTRAMUSCULAR; INTRAVENOUS; SUBCUTANEOUS at 23:30

## 2017-08-24 RX ADMIN — HYDROCODONE BITARTRATE AND ACETAMINOPHEN 5 MG: 7.5; 325 TABLET ORAL at 21:00

## 2017-08-24 RX ADMIN — FAMOTIDINE 20 MG: 20 TABLET ORAL at 09:35

## 2017-08-24 RX ADMIN — HYDROMORPHONE HYDROCHLORIDE 1 MG: 2 INJECTION INTRAMUSCULAR; INTRAVENOUS; SUBCUTANEOUS at 16:18

## 2017-08-24 RX ADMIN — HYDROMORPHONE HYDROCHLORIDE 1 MG: 2 INJECTION INTRAMUSCULAR; INTRAVENOUS; SUBCUTANEOUS at 06:26

## 2017-08-24 RX ADMIN — MULTIPLE VITAMIN LIQUID 30 ML: LIQUID at 09:35

## 2017-08-24 RX ADMIN — LORAZEPAM 0.5 MG: 2 INJECTION INTRAMUSCULAR; INTRAVENOUS at 13:36

## 2017-08-24 RX ADMIN — HYDROCODONE BITARTRATE AND ACETAMINOPHEN 5 MG: 7.5; 325 TABLET ORAL at 09:35

## 2017-08-24 RX ADMIN — Medication 10 ML: at 06:29

## 2017-08-24 RX ADMIN — HYDROMORPHONE HYDROCHLORIDE 1 MG: 2 INJECTION INTRAMUSCULAR; INTRAVENOUS; SUBCUTANEOUS at 10:13

## 2017-08-24 RX ADMIN — Medication 100 MG: at 09:35

## 2017-08-24 RX ADMIN — AMOXICILLIN AND CLAVULANATE POTASSIUM 400 MG: 400; 57 POWDER, FOR SUSPENSION ORAL at 09:46

## 2017-08-24 RX ADMIN — HEPARIN SODIUM 5000 UNITS: 5000 INJECTION, SOLUTION INTRAVENOUS; SUBCUTANEOUS at 06:26

## 2017-08-24 RX ADMIN — FAMOTIDINE 20 MG: 20 TABLET ORAL at 20:18

## 2017-08-24 RX ADMIN — HYDROMORPHONE HYDROCHLORIDE 1 MG: 2 INJECTION INTRAMUSCULAR; INTRAVENOUS; SUBCUTANEOUS at 20:14

## 2017-08-24 RX ADMIN — LORAZEPAM 0.5 MG: 2 INJECTION INTRAMUSCULAR; INTRAVENOUS at 20:14

## 2017-08-24 RX ADMIN — IPRATROPIUM BROMIDE AND ALBUTEROL SULFATE 3 ML: .5; 3 SOLUTION RESPIRATORY (INHALATION) at 19:43

## 2017-08-24 RX ADMIN — HEPARIN SODIUM 5000 UNITS: 5000 INJECTION, SOLUTION INTRAVENOUS; SUBCUTANEOUS at 21:08

## 2017-08-24 RX ADMIN — LORAZEPAM 0.5 MG: 2 INJECTION INTRAMUSCULAR; INTRAVENOUS at 00:04

## 2017-08-24 RX ADMIN — POTASSIUM CHLORIDE 20 MEQ: 20 SOLUTION ORAL at 09:35

## 2017-08-24 RX ADMIN — Medication 10 ML: at 13:41

## 2017-08-24 NOTE — PROGRESS NOTES
Bedside shift change report given to jason frederick (oncoming nurse) by jason fabian (offgoing nurse). Report included the following information SBAR, Kardex and MAR.

## 2017-08-24 NOTE — PROGRESS NOTES
[facility]  Pulmonary, Critical Care, and Sleep Medicine    Name: Sandrine Mariee MRN: [id number]   : 1965 Hospital: [facility] [place]   Date: 2017        Critical Care Follow Up                                                [x]I have reviewed the flowsheet and previous days notes. Events, vitals, medications and notes from last 24 hours reviewed. Care plan discussed with staff, patient, family and on multidisciplinary rounds. IMPRESSION:   · SCC Laryngeal CA T2N1M0,  Stridor on admission S/P intubation  S/p chemo and XRT, Admitted with respiratory failure/stridor. resolved  · S/p Trach  · Stridor resolved due to above. ·  Dysphagia S/P PEG tube  · Chronic pain   · HTN  · Nicotine depednece,    · Code status: FULL      RECOMMENDATIONS:   · S/p Trach,  On trach collar  · No need for systemic steroid for laryngeal edema/stridor  · Fentanyl patch,    · Continue 2 alphonse  As recommended by nutrition  · Continue clonidine patch and lisinopril   · Nicotine patch  · Ok to transfer to medical floor              Subjective/History of Present Illness:   HPI  Sandrine Mariee has been seen and evaluated in ICU/ERPatient is a 45 y.o. male who was brought in to ER with history of throat cancer and recent hospitalization 2 weeks ago after he had cycle of chemotherapy and then developed COPD with acute exacerbation and was also mentioned that he had Dysphagia. He had PEG tube but had another hospitalization after he tried to drink water and aspirated. Anyway according to EMS he was found at home very short of breath, stridor, and trial if intubation was attempted on route. Later in ER he was then intubated by myself and placed on vent then transferred to ICU. He is sedated      No issues overnight  Tolerating trach collar  Waiting for trach change by ENT. Still wants to go home  Per case management, pt refused the medicaid.      Review of Systems:  Doing ok now  No specific complain  No Known Allergies   Past Medical History:   Diagnosis Date    Chronic obstructive pulmonary disease (HCC)     acute hypoxic respiratory failure 8/16    Hypertension     Ill-defined condition     peg tube in place    Ill-defined condition     chemo    S/P radiation therapy     Throat cancer (HCC)     Tobacco abuse       Current Facility-Administered Medications   Medication Dose Route Frequency    amoxicillin-clavulanate (AUGMENTIN) 400-57 mg/5 mL oral suspension 400 mg  400 mg Per G Tube Q12H    fentaNYL (DURAGESIC) 50 mcg/hr patch 1 Patch  1 Patch TransDERmal Q72H    fentaNYL (DURAGESIC) 100 mcg/hr patch 1 Patch  1 Patch TransDERmal Q72H    potassium chloride (KAON 10%) 20 mEq/15 mL oral liquid 20 mEq  20 mEq Oral DAILY    famotidine (PEPCID) tablet 20 mg  20 mg Oral BID    multivitamin (MULTI-DELYN, WELLESSE) oral liquid 30 mL  30 mL Per G Tube DAILY    lisinopril (PRINIVIL, ZESTRIL) tablet 5 mg  5 mg Oral Q12H    cloNIDine (CATAPRES) 0.2 mg/24 hr patch 1 Patch  1 Patch TransDERmal Q7D    Thiamine Mononitrate (B-1) tablet 100 mg  100 mg Oral DAILY    sodium chloride (NS) flush 5-10 mL  5-10 mL IntraVENous Q8H    nicotine (NICODERM CQ) 21 mg/24 hr patch 1 Patch  1 Patch TransDERmal DAILY    heparin (porcine) injection 5,000 Units  5,000 Units SubCUTAneous Q8H       Lines: All central lines examined by me. No signs of erythema, induration, discharge. Feeding Tube:    PEG tube to low intermittent suction                      Peripheral Intravenous Line: 3 sites noted   Drain(s):PEG tube     Airway:  Airway - Endotracheal Tube 08/16/17 (Active)   Insertion Depth (cm) 22 cm 8/16/2017  9:48 AM   Line Gary Lips 8/16/2017  9:48 AM   Side Secured Device; Right 8/16/2017  9:48 AM   Site Assessment Clean, dry, & intact 8/16/2017  9:48 AM           Objective:   Vital Signs:    Visit Vitals    /68    Pulse (!) 113    Temp 97.4 °F (36.3 °C)    Resp 18    Wt 73.9 kg (162 lb 14.7 oz)  SpO2 99%    BMI 25.52 kg/m2       O2 Device: Tracheal collar   O2 Flow Rate (L/min): 6 l/min   Temp (24hrs), Av.7 °F (36.5 °C), Min:97.4 °F (36.3 °C), Max:98.3 °F (36.8 °C)       Intake/Output:   Last shift:           Last 3 shifts: 1901 -  0700  In: 6200   Out: 1926 [Urine:1026]      Intake/Output Summary (Last 24 hours) at 17 0954  Last data filed at 17 0424   Gross per 24 hour   Intake             1400 ml   Output             1026 ml   Net              374 ml       Last 3 Recorded Weights in this Encounter    17 0938 17 0400   Weight: 73.5 kg (162 lb 1.6 oz) 73.9 kg (162 lb 14.7 oz)           Telemetry:normal sinus rhythm    Physical Exam:     General: awake ,and appropriate  Head: atraumatic, normocephalic  Eye: PERRLA, no scleral icterus, no pallor, no cyanosis  Neck  : trach site clean intact  Hoarsness. Narrowing airway noted worse than yesterday  Lung: adequate air entry bilateral equal, no bronchospasm in lungs   Heart: S1 S2 present, no murmur, no gallop, tachycardia  Abdomen: soft, . PEG tube in place   LE: no pedal edema, no cyanosis, no clubbing, 2+ peripheral pulses in DP    No supra clavicle lymph nodes        Data:         Chemistry Recent Labs      17   0541  17   0512  17   0930  17   0420  17   1350   GLU   --    --    --   95   --    NA   --    --    --   137   --    K   --    --   4.2  3.6  4.3   CL   --    --    --   103   --    CO2   --    --    --   31   --    BUN  21*   --    --   26*   --    CREA   --    --    --   0.67   --    CA   --    --    --   8.6   --    MG  1.7  1.8   --   1.9   --    PHOS  4.6  4.0   --   2.4*   --    AGAP   --    --    --   3   --    BUCR   --    --    --   39*   --         Lactic Acid Lactic acid   Date Value Ref Range Status   2017 1.4 0.4 - 2.0 MMOL/L Final     No results for input(s): LAC in the last 72 hours.      Liver Enzymes Protein, total   Date Value Ref Range Status 08/19/2017 5.7 (L) 6.4 - 8.2 g/dL Final     Albumin   Date Value Ref Range Status   08/19/2017 2.5 (L) 3.4 - 5.0 g/dL Final     Globulin   Date Value Ref Range Status   08/19/2017 3.2 2.0 - 4.0 g/dL Final     A-G Ratio   Date Value Ref Range Status   08/19/2017 0.8 0.8 - 1.7   Final     AST (SGOT)   Date Value Ref Range Status   08/19/2017 21 15 - 37 U/L Final     Alk. phosphatase   Date Value Ref Range Status   08/19/2017 55 45 - 117 U/L Final     No results for input(s): TP, ALB, GLOB, AGRAT, SGOT, GPT, AP, TBIL in the last 72 hours. No lab exists for component: DBIL     CBC w/Diff Recent Labs      08/22/17   0420   WBC  5.7   RBC  3.44*   HGB  11.5*   HCT  34.1*   PLT  137        Cardiac Enzymes No results found for: CPK, CKMMB, CKMB, RCK3, CKMBT, CKNDX, CKND1, CHUCKY, TROPT, TROIQ, TAMMY, TROPT, TNIPOC, BNP, BNPP     BNP No results found for: BNP, BNPP, XBNPT     Coagulation No results for input(s): PTP, INR, APTT in the last 72 hours. No lab exists for component: INREXT, INREXT      Thyroid  No results found for: T4, T3U, TSH, TSHEXT, TSHEXT       Lipid Panel No results found for: CHOL, CHOLPOCT, CHOLX, CHLST, CHOLV, 551359, HDL, LDL, LDLC, DLDLP, 925831, VLDLC, VLDL, TGLX, TRIGL, TRIGP, TGLPOCT, CHHD, CHHDX     ABG No results for input(s): PHI, PHI, POC2, PCO2I, PO2, PO2I, HCO3, HCO3I, FIO2, FIO2I in the last 72 hours.      Urinalysis Lab Results   Component Value Date/Time    Color YELLOW 08/16/2017 09:39 AM    Appearance CLOUDY 08/16/2017 09:39 AM    Specific gravity 1.011 08/16/2017 09:39 AM    pH (UA) 8.0 08/16/2017 09:39 AM    Protein 100 08/16/2017 09:39 AM    Glucose 100 08/16/2017 09:39 AM    Ketone NEGATIVE  08/16/2017 09:39 AM    Bilirubin NEGATIVE  08/16/2017 09:39 AM    Urobilinogen 0.2 08/16/2017 09:39 AM    Nitrites NEGATIVE  08/16/2017 09:39 AM    Leukocyte Esterase NEGATIVE  08/16/2017 09:39 AM    Epithelial cells NEGATIVE  08/16/2017 09:39 AM    Bacteria FEW 08/16/2017 09:39 AM    WBC 0 to 3 08/16/2017 09:39 AM    RBC 0 to 3 08/16/2017 09:39 AM        Micro  No results for input(s): SDES, CULT in the last 72 hours. No results for input(s): CULT in the last 72 hours. XR (Most Recent). CXR reviewed by me and compared with previous CXR   Results from Hospital Encounter encounter on 08/16/17   XR CHEST PORT   Narrative EXAM: Portable upright chest, one view    INDICATION: Postop tracheostomy. Difficulty breathing with audible wheezing    COMPARISON: 8/18/2017    _______________    FINDINGS:    Interval tracheostomy placement with its tip projecting at the trachea. Cardiac  silhouette and pulmonary vascularity are normal. Lungs are clear. Costophrenic  angles are sharp. Old, healed left rib fracture deformity is redemonstrated. No  cardiopulmonary changes. _______________         Impression IMPRESSION:    Tracheostomy in situ. No active disease. CT (Most Recent)   Results from Hospital Encounter encounter on 08/16/17   CT NECK SOFT TISSUE W CONT   Narrative CT neck    History: Neck mass, history of throat cancer with prior radiation therapy    Comparison: CT neck 8/3/2017    Technique: Multiple axial CT images of the neck were obtained extending from the  skull base to the upper thorax after administration of IV contrast. Coronal and  sagittal reformations were performed. One or more dose reduction techniques  were used on this CT: automated exposure control, adjustment of the mAs and/or  kVp according to patient's size, and iterative reconstruction techniques. The  specific techniques utilized on this CT exam have been documented in the  patient's electronic medical record. Findings:    Endotracheal tube has been placed during the interval. Some layering hyperdense  oral secretions are seen within the oropharynx and hypopharynx. Tip of  endotracheal tube is in the tracheal airway at about the level of the clavicular  heads.  Endotracheal tube and secretions does limit evaluation of the laryngeal,  hypopharyngeal, and oropharyngeal regions. No discrete suspicious masslike lesion is seen. There is suggestion of hypodense  thickening of the mucosa of the oropharynx and hypopharynx similar to prior  study. Thickening of the platysma is present with mild fatty stranding across  multiple fascial planes, nonspecific but suspect posttreatment changes. These  findings show no short-term interval change. There is no prior more remote study  for comparison. No adenopathy is present based on size criteria. There are a few scattered very  small subcentimeter anterior and posterior cervical chain nodes. No  cystic/necrotic nodes are identified. The parotid and submandibular glands are  unremarkable. Mild atherosclerotic calcifications are present particularly  along the carotid bifurcations additional atheromatous plaque is also seen. The  thyroid gland is unremarkable. Degenerative changes are scattered throughout the cervical and visualized  thoracic spine. Poor dentition is present with majority of teeth absent. Visualized paranasal sinuses and mastoid air cells are clear. Changes of centrilobular and paraseptal emphysema are seen in the bilateral lung  apices. No consolidation seen in the lung apices. Impression IMPRESSION:    1. Interval placement of endotracheal tube. Expected layering mucus secretions  within the oropharynx and hypopharynx. 2. No discrete masslike lesion seen to correlate with history of throat cancer. Stable diffuse mildly edematous mucosal thickening and soft tissue stranding  likely reflecting posttreatment changes. No more remote comparison study. 3. No adenopathy based on size criteria. No cystic/necrotic nodes. 4. Emphysema seen in bilateral lung apices without consolidation. EKG No results found for this or any previous visit. ECHO No results found for this or any previous visit.      PFT No flowsheet data found. Other ASA reactivity:   Pre-albumin:   Ionized Calcium:   NH4:   T3, FT4:  Cortisol:  Urine Osm:  Urine Lytes:   HbA1c:      Recent Results (from the past 24 hour(s))   GLUCOSE, POC    Collection Time: 08/23/17 11:23 AM   Result Value Ref Range    Glucose (POC) 80 70 - 110 mg/dL   GLUCOSE, POC    Collection Time: 08/24/17 12:01 AM   Result Value Ref Range    Glucose (POC) 87 70 - 110 mg/dL   BUN    Collection Time: 08/24/17  5:41 AM   Result Value Ref Range    BUN 21 (H) 7.0 - 18 MG/DL   PHOSPHORUS    Collection Time: 08/24/17  5:41 AM   Result Value Ref Range    Phosphorus 4.6 2.5 - 4.9 MG/DL   MAGNESIUM    Collection Time: 08/24/17  5:41 AM   Result Value Ref Range    Magnesium 1.7 1.6 - 2.6 mg/dL   GLUCOSE, POC    Collection Time: 08/24/17  5:57 AM   Result Value Ref Range    Glucose (POC) 95 70 - 110 mg/dL         Switch to IV  Keep ICU Plan as above  Stable at the present time  On no pressors.  And airway and breathing seems stable   [x]See my orders for details    My assessment, plan of care, findings, medications, side effects etc were discussed with:  [x] Nurse [] PT/OT    [x] Respiratory therapy []     [] Family: answered all questions to satisfaction [] Patient: answered all questions to satisfaction   [x] Pharmacist []      [x] Case & management strategies discussed today on multidisciplinary rounds    High complexity decision making was performed during the evaluation of this patient at high risk for decompensation with multiple organ involvement             Mary Landry MD  8/24/2017

## 2017-08-24 NOTE — PROGRESS NOTES
Hospitalist Progress Note    Patient: Rodolfo Godinez MRN: [id number]  CSN: [id number]    YOB: 1965  Age: 45 y.o. Sex: male    DOA: 8/16/2017 LOS:  LOS: 8 days          Chief Complaint:    Respiratory Distress    Assessment/Plan     Hospital Problems  Date Reviewed: 8/20/2017          Codes Class Noted POA    Severe protein-calorie malnutrition Dilan Peto: less than 60% of standard weight) ([facility] ICD-10-CM: E43  ICD-9-CM: 262  8/18/2017 Unknown        COPD exacerbation ([facility] ICD-10-CM: J44.1  ICD-9-CM: 491.21  8/16/2017 Yes        * (Principal)Acute hypoxemic respiratory failure ([facility] ICD-10-CM: J96.01  ICD-9-CM: 518.81  8/16/2017 Yes        Throat cancer ([place] [address].) ICD-10-CM: C14.0  ICD-9-CM: 149.0  7/30/2017 Yes        Status post insertion of percutaneous endoscopic gastrostomy (PEG) tube [facility]) ICD-10-CM: Z93.1  ICD-9-CM: V44.1  7/30/2017 Yes              Rodolfo Godinez is a 45 y.o. male who was admitted on 8/16/17 with respiratory distress requiring intubation. He has a history of throat cancer and is being followed by an oncologist at [facility] in [place]. He was initially requiring precedex, versed, and fentanyl for sedation thought to be due to his heavy opiate history. He was started on zosyn + levaquin, solumedrol+duonebs for COPD/questionable aspiration event. SSI. He was extubated in OR on 8/18/17 by ENT without incident. His respiratory status was deteriorating and tracheostomy placement was performed on 8/20/17. Dilaudid PCA initiated for pain control--now discontinued. He is on duragesic patch. He is now oxygenating well on trach collar. Transition to per tube meds. Steroids course has completed. Tube feeds have resumed. He is set to see his oncologist at [facility] in the next week or so.   Ama[employer] [place] teaching/supply procurement in progress via       Throat cancer ([facility] (7/30/2017)  - following with Heme/Onc at [facility]  - s/p trach placement      Status post insertion of percutaneous endoscopic gastrostomy (PEG) tube ([facility] (7/30/2017)  - ? Aspiration event  - s/p levaquin + zosyn   - now on augmentin per tube  - monitor for fevers      COPD exacerbation ([facility] (8/16/2017)  - s/p steroids  - continue abx, nebs as above      Acute hypoxemic respiratory failure ([facility] (8/16/2017)  - COPD exacerbation +/- aspiration event  - abx + steroids as above  - off of vent, now trach collar  - continues to smoke    PT/OT    CM for DC planning    Dispo: transfer to medical floor; discharge planning      Subjective:    No new issues    Review of systems:    General: No fevers or chills. Cardiovascular: No chest pain or pressure. No palpitations. Pulmonary: No shortness of breath. Gastrointestinal: No nausea, vomiting.        Vital signs/Intake and Output:  Visit Vitals    BP 94/62    Pulse 86    Temp 97.6 °F (36.4 °C)    Resp 16    Wt 73.9 kg (162 lb 14.7 oz)    SpO2 100%    BMI 25.52 kg/m2     Current Shift:  08/24 0701 - 08/24 1900  In: 683   Out: 1050 [Urine:475]  Last three shifts:  08/22 1901 - 08/24 0700  In: 2704   Out: 1926 [Urine:1026]    Exam:    General: awake and alert; NAD  Head: Atraumatic, normocephalic  Eyes: PERRLA, EOMI, sclerae non-icteric  ENT: mucous membranes moist; trach in place, trach collar  Neck: supple, no masses, no lymphadenopathy, no rigidity ; large mass under R mandible  CVS:Regular rate and rhythm, no murmurs, rubs, gallops, or clicks  Lungs:Clear to auscultation bilaterally, no wheezes, rales, or rhonchi  Abdomen: PEG in place soft, Nontender, nondistended, bowel sounds normal throughout  Extremities: atraumatic, pulses 2+ all ext, no edema  Skin: warm, dry, well perfused, without rash; no cyanosis or clubbing  Neuro/Psych: AAOx3, no focal deficits                Labs: Results:       Chemistry Recent Labs      08/24/17   1203  08/24/17   0541  08/22/17   0930  08/22/17   0420   GLU   --    --    --   95   NA   --    --    --   137   K   --    --   4.2  3.6   CL   --    --    -- 103   CO2   --    --    --   31   BUN  21*  21*   --   26*   CREA   --    --    --   0.67   CA   --    --    --   8.6   AGAP   --    --    --   3   BUCR   --    --    --   39*      CBC w/Diff Recent Labs      08/22/17   0420   WBC  5.7   RBC  3.44*   HGB  11.5*   HCT  34.1*   PLT  137      Cardiac Enzymes No results for input(s): CPK, CKND1, CHUCKY in the last 72 hours. No lab exists for component: CKRMB, TROIP   Coagulation No results for input(s): PTP, INR, APTT in the last 72 hours. No lab exists for component: INREXT, INREXT    Lipid Panel No results found for: CHOL, CHOLPOCT, CHOLX, CHLST, CHOLV, 373425, HDL, LDL, LDLC, DLDLP, 154390, VLDLC, VLDL, TGLX, TRIGL, TRIGP, TGLPOCT, CHHD, CHHDX   BNP No results for input(s): BNPP in the last 72 hours. Liver Enzymes No results for input(s): TP, ALB, TBIL, AP, SGOT, GPT in the last 72 hours.     No lab exists for component: DBIL   Thyroid Studies No results found for: T4, T3U, TSH, TSHEXT, TSHEXT     Procedures/imaging: see electronic medical records for all procedures/Xrays and details which were not copied into this note but were reviewed prior to creation of [place] [id number], DO

## 2017-08-24 NOTE — DIABETES MGMT
GLYCEMIC CONTROL & NUTRITION:      - Discussed in rounds and chart reviewed.  BG currently within target range, A1C wnl  - noted has PEG, bolus feeds going well, to increase to 5 cans today    Recent Glucose Results:   Lab Results   Component Value Date/Time    GLUCPOC 95 08/24/2017 05:57 AM    GLUCPOC 87 08/24/2017 12:01 AM       Lab Results   Component Value Date/Time    Hemoglobin A1c 5.6 08/16/2017 12:20 PM             Madi Jones, MPH, RD, CDE

## 2017-08-24 NOTE — PROGRESS NOTES
Problem: Falls - Risk of  Goal: *Absence of Falls  Document Caryl Fall Risk and appropriate interventions in the flowsheet.    Outcome: Progressing Towards Goal  Fall Risk Interventions:  Mobility Interventions: Communicate number of staff needed for ambulation/transfer     Mentation Interventions: Increase mobility, More frequent rounding, Reorient patient, Room close to nurse's station, Toileting rounds, Update white board     Medication Interventions: Patient to call before getting OOB, Teach patient to arise slowly     Elimination Interventions: Call light in reach, Urinal in reach, Patient to call for help with toileting needs

## 2017-08-24 NOTE — PROGRESS NOTES
Problem: Mobility Impaired (Adult and Pediatric)  Goal: *Acute Goals and Plan of Care (Insert Text)  Physical Therapy Goals LT/ST  Initiated 8/21/2017 and to be accomplished within 3-5 day(s)  1. Patient will move from supine <> sit with S in prep for out of bed activity and change of position. 2. Patient will perform sit<> stand with S with LRAD in prep for transfers/ambulation. 3. Patient will transfer from bed <> chair with S with LRAD for time up in chair for completion of ADL activity. 4. Patient will ambulate 150 feet with LRAD/S for improved functional mobility/safe discharge. .      Outcome: Progressing Towards Goal  PHYSICAL THERAPY TREATMENT     Patient: Monse Morales (43 y.o. male)  Date: 8/24/2017  Diagnosis: COPD exacerbation ([facility]  ACUTE HYPOXEMIC RESPIRATORY FAILURE  respiratory stridor Acute hypoxemic respiratory failure (HCC)  Procedure(s) (LRB):  TRACHEOSTOMY (N/A) 4 Days Post-Op  Precautions: Fall, Skin, Aspiration (Trach/PEG)   Chart, physical therapy assessment, plan of care and goals were reviewed. ASSESSMENT:  Pt pleasant and agreeable to participate. Pt moved to EOB and stand c/supervision. Pt amb in hallway c/RW, CGA, decreased sushant, good posture, intermittent report of pain in neck due to hx of arthritis. Pt amb increased distance today however continued to require intermittent standing rest breaks due to fatigue, decreased SPO2 on RA to 87% improved c/rest to 95%. Pt HR increased to 115 bpm c/amb today, improved compared to yesterday session. Pt returned to supine with supervision. RT in room addressing trach collar tubing toward end of session. Plan to continue to progress as pt tolerates.       Progression toward goals:  [X]      Improving appropriately and progressing toward goals  [ ]      Improving slowly and progressing toward goals  [ ]      Not making progress toward goals and plan of care will be adjusted       PLAN:  Patient continues to benefit from skilled intervention to address the above impairments. Continue treatment per established plan of care. Discharge Recommendations:  Home health; will continue to assess based on pt progress  Further Equipment Recommendations for Discharge:  rolling walker       SUBJECTIVE:   Patient stated The reason I can't walk far is all this spit I have.       OBJECTIVE DATA SUMMARY:   Critical Behavior:  Neurologic State: Appropriate for age, Alert  Orientation Level: Appropriate for age, Oriented X4  Cognition: Appropriate decision making, Appropriate for age attention/concentration, Appropriate safety awareness, Follows commands, Recognition of people/places  Safety/Judgement: Awareness of environment  Functional Mobility Training:  Bed Mobility:  Supine to Sit: Supervision  Sit to Supine: Supervision  Transfers:  Sit to Stand: Supervision  Stand to Sit: Supervision  Balance:  Sitting: Intact  Standing: Intact  Ambulation/Gait Training:  Distance (ft): 110 Feet (ft)  Assistive Device: Gait belt;Walker, rolling  Ambulation - Level of Assistance: Contact guard assistance  Gait Abnormalities: Decreased step clearance  Speed/Vickie: Slow  Step Length: Left shortened;Right shortened  Activity Tolerance:   Fair  Please refer to the flowsheet for vital signs taken during this treatment.   After treatment:   [ ] Patient left in no apparent distress sitting up in chair  [X] Patient left in no apparent distress in bed  [X] Call bell left within reach  [X] Nursing notified  [ ] Caregiver present  [ ] Bed alarm activated      Kirsten Elizondo   Time Calculation: 23 mins

## 2017-08-24 NOTE — PROGRESS NOTES
730 Assumed care of patient at this time, currently in bed watching TV. Trach collar intact, 6 liters at 28%. Duragesic patches intact. Call bell within reach, will cont to monitor and assess. 0820 Pain medication given as ordered. Patient requesting to start bolus feeding as previously done at home, states continuous tube feed causing gas and bloating. Requesting to hold hanging new bottle until speaking with physician. 1000 Sitting up in bedside chair. 1045 recommendations per interdisciplinary rounds  1200 Occupational therapy at bedside for treatment. 1445 Physical therapy on unit for treatment. 1600 wife at bedside, trach care provided, awaiting ENT to change trach ties per orders. [address] changed, patient assisted up to bedside commode. 1930 Bedside and Verbal shift change report given to Alo Newby (oncoming nurse) by Jah Contreras rn (offgoing nurse). Report included the following informatiNSRSBAR, Kardex, Procedure Summary, Intake/Output, MAR, Accordion, Recent Results, Med Rec Status and Cardiac Rhythm NSR.

## 2017-08-25 PROBLEM — Z93.0 TRACHEOSTOMY DEPENDENCE (HCC): Status: ACTIVE | Noted: 2017-08-25

## 2017-08-25 LAB
MAGNESIUM SERPL-MCNC: 1.8 MG/DL (ref 1.6–2.6)
PHOSPHATE SERPL-MCNC: 4.6 MG/DL (ref 2.5–4.9)

## 2017-08-25 PROCEDURE — 97530 THERAPEUTIC ACTIVITIES: CPT

## 2017-08-25 PROCEDURE — 77010033678 HC OXYGEN DAILY

## 2017-08-25 PROCEDURE — 74011250636 HC RX REV CODE- 250/636: Performed by: INTERNAL MEDICINE

## 2017-08-25 PROCEDURE — 84100 ASSAY OF PHOSPHORUS: CPT | Performed by: INTERNAL MEDICINE

## 2017-08-25 PROCEDURE — 77030014148 HC TY WND CLSR BUSS -A

## 2017-08-25 PROCEDURE — 65660000000 HC RM CCU STEPDOWN

## 2017-08-25 PROCEDURE — 97116 GAIT TRAINING THERAPY: CPT

## 2017-08-25 PROCEDURE — 36415 COLL VENOUS BLD VENIPUNCTURE: CPT | Performed by: INTERNAL MEDICINE

## 2017-08-25 PROCEDURE — 74011250637 HC RX REV CODE- 250/637: Performed by: INTERNAL MEDICINE

## 2017-08-25 PROCEDURE — 74011250637 HC RX REV CODE- 250/637: Performed by: HOSPITALIST

## 2017-08-25 PROCEDURE — 83735 ASSAY OF MAGNESIUM: CPT | Performed by: INTERNAL MEDICINE

## 2017-08-25 PROCEDURE — 74011250637 HC RX REV CODE- 250/637: Performed by: FAMILY MEDICINE

## 2017-08-25 RX ORDER — OXYCODONE HYDROCHLORIDE 5 MG/1
10 TABLET ORAL
Status: DISCONTINUED | OUTPATIENT
Start: 2017-08-25 | End: 2017-08-29 | Stop reason: HOSPADM

## 2017-08-25 RX ADMIN — FAMOTIDINE 20 MG: 20 TABLET ORAL at 09:39

## 2017-08-25 RX ADMIN — HEPARIN SODIUM 5000 UNITS: 5000 INJECTION, SOLUTION INTRAVENOUS; SUBCUTANEOUS at 14:45

## 2017-08-25 RX ADMIN — Medication 100 MG: at 09:39

## 2017-08-25 RX ADMIN — MULTIPLE VITAMIN LIQUID 30 ML: LIQUID at 09:46

## 2017-08-25 RX ADMIN — POTASSIUM CHLORIDE 20 MEQ: 20 SOLUTION ORAL at 09:38

## 2017-08-25 RX ADMIN — Medication 10 ML: at 14:45

## 2017-08-25 RX ADMIN — AMOXICILLIN AND CLAVULANATE POTASSIUM 400 MG: 400; 57 POWDER, FOR SUSPENSION ORAL at 09:38

## 2017-08-25 RX ADMIN — HEPARIN SODIUM 5000 UNITS: 5000 INJECTION, SOLUTION INTRAVENOUS; SUBCUTANEOUS at 05:52

## 2017-08-25 RX ADMIN — HEPARIN SODIUM 5000 UNITS: 5000 INJECTION, SOLUTION INTRAVENOUS; SUBCUTANEOUS at 21:46

## 2017-08-25 RX ADMIN — OXYCODONE HYDROCHLORIDE 10 MG: 5 TABLET ORAL at 19:25

## 2017-08-25 RX ADMIN — FAMOTIDINE 20 MG: 20 TABLET ORAL at 21:46

## 2017-08-25 RX ADMIN — LORAZEPAM 0.5 MG: 2 INJECTION INTRAMUSCULAR; INTRAVENOUS at 09:46

## 2017-08-25 RX ADMIN — LORAZEPAM 0.5 MG: 2 INJECTION INTRAMUSCULAR; INTRAVENOUS at 01:00

## 2017-08-25 RX ADMIN — LORAZEPAM 0.5 MG: 2 INJECTION INTRAMUSCULAR; INTRAVENOUS at 05:51

## 2017-08-25 RX ADMIN — AMOXICILLIN AND CLAVULANATE POTASSIUM 400 MG: 400; 57 POWDER, FOR SUSPENSION ORAL at 01:35

## 2017-08-25 RX ADMIN — HYDROMORPHONE HYDROCHLORIDE 1 MG: 2 INJECTION INTRAMUSCULAR; INTRAVENOUS; SUBCUTANEOUS at 05:53

## 2017-08-25 RX ADMIN — Medication 10 ML: at 21:47

## 2017-08-25 RX ADMIN — OXYCODONE HYDROCHLORIDE 10 MG: 5 TABLET ORAL at 12:40

## 2017-08-25 RX ADMIN — HYDROMORPHONE HYDROCHLORIDE 1 MG: 2 INJECTION INTRAMUSCULAR; INTRAVENOUS; SUBCUTANEOUS at 09:38

## 2017-08-25 RX ADMIN — HYDROCODONE BITARTRATE AND ACETAMINOPHEN 5 MG: 7.5; 325 TABLET ORAL at 21:46

## 2017-08-25 RX ADMIN — HYDROMORPHONE HYDROCHLORIDE 1 MG: 2 INJECTION INTRAMUSCULAR; INTRAVENOUS; SUBCUTANEOUS at 02:53

## 2017-08-25 RX ADMIN — AMOXICILLIN AND CLAVULANATE POTASSIUM 400 MG: 400; 57 POWDER, FOR SUSPENSION ORAL at 21:46

## 2017-08-25 RX ADMIN — Medication 10 ML: at 09:37

## 2017-08-25 NOTE — PROGRESS NOTES
Hospitalist Progress Note    Patient: Jluis Mazariegos MRN: [id number]  CSN: [id number]    YOB: 1965  Age: 45 y.o. Sex: male    DOA: 8/16/2017 LOS:  LOS: 9 days          Chief Complaint:     Ac resp failure      Assessment/Plan     Ac resp failure with stridor  Throat cancer NOW s/p trach placement 8/20-adjusted today per ENT  Peg tube dependence  COPD  Heavy tobacco use  Chronic narcotic dependence  Severe prot alphonse malnutrition    Continue meds as doing  Speech therapy to work with him on PO as per ENT  May need new cuff as per ENT prior to d/c  Supplies to be arranged for home care  Should be able to d/c shortly from hospital    Patient Active Problem List   Diagnosis Code    Aspiration into airway T17.908A    Throat cancer ([facility] C14.0    Status post insertion of percutaneous endoscopic gastrostomy (PEG) tube ([facility] Z93.1    Hyponatremia E87.1    Acute respiratory distress R06.00    Hypoxia R09.02    Stridor R06.1    Respiratory depression R06.89    COPD exacerbation ([address] J44.1    Acute hypoxemic respiratory failure ([address] J96.01    Severe protein-calorie malnutrition (Lilliam Fitting: less than 60% of standard weight) ([facility] E43       Subjective:  Hopeful to get home soon  frustarted about his trach      Review of systems:    Constitutional: denies fevers, chills, myalgias  Respiratory: denies SOB, cough  Cardiovascular: denies chest pain, palpitations  Gastrointestinal: denies nausea, vomiting, diarrhea      Vital signs/Intake and Output:  Visit Vitals    /66    Pulse 91    Temp 98 °F (36.7 °C)    Resp 18    Wt 65.3 kg (144 lb)    SpO2 100%    BMI 22.55 kg/m2     Current Shift:  08/25 0701 - 08/25 1900  In: -   Out: 800 [Urine:800]  Last three shifts:  08/23 1901 - 08/25 0700  In: 1246   Out: 2425 [Urine:1850]    Exam:    General:WM,alert, NAD, OX3  Head/Neck: trach in place  CVS:Regular rate and rhythm, no M/R/G, S1/S2 heard, no thrill  Lungs:Clear to auscultation bilaterally, no wheezes, rhonchi, or rales  Abdomen: Soft, Nontender, peg tube, No distention, Normal Bowel sounds, No hepatomegaly  Extremities: No C/C/E, pulses palpable 2+  Skin:normal texture and turgor, no rashes, no lesions  Neuro:grossly normal , follows commands  Psych:appropriate                Labs: Results:       Chemistry Recent Labs      08/24/17   1203  08/24/17   0541   BUN  21*  21*      CBC w/Diff No results for input(s): WBC, RBC, HGB, HCT, PLT, GRANS, LYMPH, EOS, HGBEXT, HCTEXT, PLTEXT in the last 72 hours. Cardiac Enzymes No results for input(s): CPK, CKND1, CHUCKY in the last 72 hours. No lab exists for component: CKRMB, TROIP   Coagulation No results for input(s): PTP, INR, APTT in the last 72 hours. No lab exists for component: INREXT    Lipid Panel No results found for: CHOL, CHOLPOCT, CHOLX, CHLST, CHOLV, 337042, HDL, LDL, LDLC, DLDLP, 394939, VLDLC, VLDL, TGLX, TRIGL, TRIGP, TGLPOCT, CHHD, CHHDX   BNP No results for input(s): BNPP in the last 72 hours. Liver Enzymes No results for input(s): TP, ALB, TBIL, AP, SGOT, GPT in the last 72 hours.     No lab exists for component: DBIL   Thyroid Studies No results found for: T4, T3U, TSH, TSHEXT     Procedures/imaging: see electronic medical records for all procedures/Xrays and details which were not copied into this note but were reviewed prior to creation of Fred Grove MD

## 2017-08-25 NOTE — PROGRESS NOTES
conducted a Follow up consultation and Spiritual Assessment for Eugenia Brown, who is a 45 y.o.,male. The  provided the following Interventions:  Continued the relationship of care and support. Listened empathically. Offered prayer and assurance of continued prayer on patients behalf. Chart reviewed. The following outcomes were achieved:  Patient expressed gratitude for pastoral care visit. Assessment:  There are no spiritual or Nondenominational issues which require intervention at this time. Plan:  Chaplains will continue to follow and will provide pastoral care on an as needed/requested basis.  recommends bedside caregivers page  on duty if patient shows signs of acute spiritual or emotional distress.        Sister Heri Sandhu, [place], [address] [phone]

## 2017-08-25 NOTE — PROGRESS NOTES
Orders received, and chart reviewed. SLP to arrive tomorrow AM to complete full evaluation for PMV and dysphagia.      Thank you for this referral,   Santos Montoya M.S.Ed., EUNICE/SLP  Speech Language Pathologist

## 2017-08-25 NOTE — PROGRESS NOTES
[facility]  Pulmonary, Critical Care, and Sleep Medicine    Name: Carolyn Brand MRN: [id number]   : 1965 Hospital: [facility] [place]   Date: 2017        Critical Care Follow Up                                                [x]I have reviewed the flowsheet and previous days notes. Events, vitals, medications and notes from last 24 hours reviewed. Care plan discussed with staff, patient, family and on multidisciplinary rounds. IMPRESSION:   · SCC Laryngeal CA T2N1M0,  Stridor on admission S/P intubation  S/p chemo and XRT, Admitted with respiratory failure/stridor. resolved  · S/p Trach  · Stridor resolved due to above. ·  Dysphagia S/P PEG tube  · Chronic pain   · HTN  · Nicotine depednece,    · Code status: FULL      RECOMMENDATIONS:   · S/p Trach,  On trach collar  · No need for systemic steroid for laryngeal edema/stridor  · Fentanyl patch,    · Continue 2 alphonse  As recommended by nutrition  · Continue clonidine patch and lisinopril   · Nicotine patch  · Continue tt and dvt ppx   · Please call if needed  · Will see prn   · Thanks               Subjective/History of Present Illness:   HPI  Carolyn Brand has been seen and evaluated in ICU/ERPatient is a 45 y.o. male who was brought in to ER with history of throat cancer and recent hospitalization 2 weeks ago after he had cycle of chemotherapy and then developed COPD with acute exacerbation and was also mentioned that he had Dysphagia. He had PEG tube but had another hospitalization after he tried to drink water and aspirated. Anyway according to EMS he was found at home very short of breath, stridor, and trial if intubation was attempted on route. Later in ER he was then intubated by myself and placed on vent then transferred to ICU. He is sedated      No issues overnight  Tolerating trach collar  Waiting for trach change by ENT. Still wants to go home  Per case management, pt refused the medicaid. 8/25  No new events  Om 5 lpm o2   Few clear secretions   No cp or sob   ent has downsized trach today   Nutrition via peg tube     Review of Systems:  Doing ok now  No specific complain  No Known Allergies   Past Medical History:   Diagnosis Date    Chronic obstructive pulmonary disease (HCC)     acute hypoxic respiratory failure 8/16    Hypertension     Ill-defined condition     peg tube in place    Ill-defined condition     chemo    S/P radiation therapy     Throat cancer ([facility] [address]     Tobacco abuse       Current Facility-Administered Medications   Medication Dose Route Frequency    amoxicillin-clavulanate (AUGMENTIN) 400-57 mg/5 mL oral suspension 400 mg  400 mg Per G Tube Q12H    fentaNYL (DURAGESIC) 50 mcg/hr patch 1 Patch  1 Patch TransDERmal Q72H    fentaNYL (DURAGESIC) 100 mcg/hr patch 1 Patch  1 Patch TransDERmal Q72H    potassium chloride (KAON 10%) 20 mEq/15 mL oral liquid 20 mEq  20 mEq Oral DAILY    famotidine (PEPCID) tablet 20 mg  20 mg Oral BID    multivitamin (MULTI-DELYN, WELLESSE) oral liquid 30 mL  30 mL Per G Tube DAILY    lisinopril (PRINIVIL, ZESTRIL) tablet 5 mg  5 mg Oral Q12H    cloNIDine (CATAPRES) 0.2 mg/24 hr patch 1 Patch  1 Patch TransDERmal Q7D    Thiamine Mononitrate (B-1) tablet 100 mg  100 mg Oral DAILY    sodium chloride (NS) flush 5-10 mL  5-10 mL IntraVENous Q8H    nicotine (NICODERM CQ) 21 mg/24 hr patch 1 Patch  1 Patch TransDERmal DAILY    heparin (porcine) injection 5,000 Units  5,000 Units SubCUTAneous Q8H       Lines: All central lines examined by me. No signs of erythema, induration, discharge. Feeding Tube:    PEG tube to low intermittent suction                      Peripheral Intravenous Line: 3 sites noted   Drain(s):PEG tube     Airway:  Airway - Endotracheal Tube 08/16/17 (Active)   Insertion Depth (cm) 22 cm 8/16/2017  9:48 AM   Line Gary Lips 8/16/2017  9:48 AM   Side Secured Device; Right 8/16/2017  9:48 AM   Site Assessment Clean, dry, & intact 2017  9:48 AM           Objective:   Vital Signs:    Visit Vitals    /66    Pulse 91    Temp 98 °F (36.7 °C)    Resp 18    Wt 65.3 kg (144 lb)    SpO2 100%    BMI 22.55 kg/m2       O2 Device: Tracheal collar, Tracheostomy   O2 Flow Rate (L/min): 5 l/min   Temp (24hrs), Av °F (36.7 °C), Min:97.6 °F (36.4 °C), Max:98.6 °F (37 °C)       Intake/Output:   Last shift:       07 - 1900  In: -   Out: 800 [Urine:800]    Last 3 shifts: 1901 -  07  In: 4263   Out: 0820 [Urine:1850]      Intake/Output Summary (Last 24 hours) at 17 1227  Last data filed at 17 1147   Gross per 24 hour   Intake              769 ml   Output             2200 ml   Net            -1431 ml       Last 3 Recorded Weights in this Encounter    17 0938 17 0400 17 0553   Weight: 73.5 kg (162 lb 1.6 oz) 73.9 kg (162 lb 14.7 oz) 65.3 kg (144 lb)           Telemetry:normal sinus rhythm    Physical Exam:     General: awake ,and appropriate  Head: atraumatic, normocephalic  Eye: PERRLA, no scleral icterus, no pallor, no cyanosis  Neck  : trach site clean intact  Hoarsness. Narrowing airway noted worse than yesterday  Lung: adequate air entry bilateral equal, no bronchospasm in lungs   Heart: S1 S2 present, no murmur, no gallop, tachycardia  Abdomen: soft, . PEG tube in place   LE: no pedal edema, no cyanosis, no clubbing, 2+ peripheral pulses in DP    No supra clavicle lymph nodes        Data:         Chemistry Recent Labs      17   0426  17   1203  17   0541  17   0512   BUN   --   21*  21*   --    MG  1.8   --   1.7  1.8   PHOS  4.6   --   4.6  4.0        Lactic Acid Lactic acid   Date Value Ref Range Status   2017 1.4 0.4 - 2.0 MMOL/L Final     No results for input(s): LAC in the last 72 hours.      Liver Enzymes Protein, total   Date Value Ref Range Status   2017 5.7 (L) 6.4 - 8.2 g/dL Final     Albumin   Date Value Ref Range Status 08/19/2017 2.5 (L) 3.4 - 5.0 g/dL Final     Globulin   Date Value Ref Range Status   08/19/2017 3.2 2.0 - 4.0 g/dL Final     A-G Ratio   Date Value Ref Range Status   08/19/2017 0.8 0.8 - 1.7   Final     AST (SGOT)   Date Value Ref Range Status   08/19/2017 21 15 - 37 U/L Final     Alk. phosphatase   Date Value Ref Range Status   08/19/2017 55 45 - 117 U/L Final     No results for input(s): TP, ALB, GLOB, AGRAT, SGOT, GPT, AP, TBIL in the last 72 hours. No lab exists for component: DBIL     CBC w/Diff No results for input(s): WBC, RBC, HGB, HCT, PLT, GRANS, LYMPH, EOS, HGBEXT, HCTEXT, PLTEXT, HGBEXT, HCTEXT, PLTEXT in the last 72 hours. Cardiac Enzymes No results found for: CPK, CKMMB, CKMB, RCK3, CKMBT, CKNDX, CKND1, CHUCKY, TROPT, TROIQ, TAMMY, TROPT, TNIPOC, BNP, BNPP     BNP No results found for: BNP, BNPP, XBNPT     Coagulation No results for input(s): PTP, INR, APTT in the last 72 hours. No lab exists for component: INREXT, INREXT      Thyroid  No results found for: T4, T3U, TSH, TSHEXT, TSHEXT       Lipid Panel No results found for: CHOL, CHOLPOCT, CHOLX, CHLST, CHOLV, 889002, HDL, LDL, LDLC, DLDLP, 286788, VLDLC, VLDL, TGLX, TRIGL, TRIGP, TGLPOCT, CHHD, CHHDX     ABG No results for input(s): PHI, PHI, POC2, PCO2I, PO2, PO2I, HCO3, HCO3I, FIO2, FIO2I in the last 72 hours.      Urinalysis Lab Results   Component Value Date/Time    Color YELLOW 08/16/2017 09:39 AM    Appearance CLOUDY 08/16/2017 09:39 AM    Specific gravity 1.011 08/16/2017 09:39 AM    pH (UA) 8.0 08/16/2017 09:39 AM    Protein 100 08/16/2017 09:39 AM    Glucose 100 08/16/2017 09:39 AM    Ketone NEGATIVE  08/16/2017 09:39 AM    Bilirubin NEGATIVE  08/16/2017 09:39 AM    Urobilinogen 0.2 08/16/2017 09:39 AM    Nitrites NEGATIVE  08/16/2017 09:39 AM    Leukocyte Esterase NEGATIVE  08/16/2017 09:39 AM    Epithelial cells NEGATIVE  08/16/2017 09:39 AM    Bacteria FEW 08/16/2017 09:39 AM    WBC 0 to 3 08/16/2017 09:39 AM    RBC 0 to 3 08/16/2017 09:39 AM        Micro  No results for input(s): SDES, CULT in the last 72 hours. No results for input(s): CULT in the last 72 hours. XR (Most Recent). CXR reviewed by me and compared with previous CXR   Results from Hospital Encounter encounter on 08/16/17   XR CHEST PORT   Narrative EXAM: Portable upright chest, one view    INDICATION: Postop tracheostomy. Difficulty breathing with audible wheezing    COMPARISON: 8/18/2017    _______________    FINDINGS:    Interval tracheostomy placement with its tip projecting at the trachea. Cardiac  silhouette and pulmonary vascularity are normal. Lungs are clear. Costophrenic  angles are sharp. Old, healed left rib fracture deformity is redemonstrated. No  cardiopulmonary changes. _______________         Impression IMPRESSION:    Tracheostomy in situ. No active disease. CT (Most Recent)   Results from Hospital Encounter encounter on 08/16/17   CT NECK SOFT TISSUE W CONT   Narrative CT neck    History: Neck mass, history of throat cancer with prior radiation therapy    Comparison: CT neck 8/3/2017    Technique: Multiple axial CT images of the neck were obtained extending from the  skull base to the upper thorax after administration of IV contrast. Coronal and  sagittal reformations were performed. One or more dose reduction techniques  were used on this CT: automated exposure control, adjustment of the mAs and/or  kVp according to patient's size, and iterative reconstruction techniques. The  specific techniques utilized on this CT exam have been documented in the  patient's electronic medical record. Findings:    Endotracheal tube has been placed during the interval. Some layering hyperdense  oral secretions are seen within the oropharynx and hypopharynx. Tip of  endotracheal tube is in the tracheal airway at about the level of the clavicular  heads.  Endotracheal tube and secretions does limit evaluation of the laryngeal,  hypopharyngeal, and oropharyngeal regions. No discrete suspicious masslike lesion is seen. There is suggestion of hypodense  thickening of the mucosa of the oropharynx and hypopharynx similar to prior  study. Thickening of the platysma is present with mild fatty stranding across  multiple fascial planes, nonspecific but suspect posttreatment changes. These  findings show no short-term interval change. There is no prior more remote study  for comparison. No adenopathy is present based on size criteria. There are a few scattered very  small subcentimeter anterior and posterior cervical chain nodes. No  cystic/necrotic nodes are identified. The parotid and submandibular glands are  unremarkable. Mild atherosclerotic calcifications are present particularly  along the carotid bifurcations additional atheromatous plaque is also seen. The  thyroid gland is unremarkable. Degenerative changes are scattered throughout the cervical and visualized  thoracic spine. Poor dentition is present with majority of teeth absent. Visualized paranasal sinuses and mastoid air cells are clear. Changes of centrilobular and paraseptal emphysema are seen in the bilateral lung  apices. No consolidation seen in the lung apices. Impression IMPRESSION:    1. Interval placement of endotracheal tube. Expected layering mucus secretions  within the oropharynx and hypopharynx. 2. No discrete masslike lesion seen to correlate with history of throat cancer. Stable diffuse mildly edematous mucosal thickening and soft tissue stranding  likely reflecting posttreatment changes. No more remote comparison study. 3. No adenopathy based on size criteria. No cystic/necrotic nodes. 4. Emphysema seen in bilateral lung apices without consolidation. EKG No results found for this or any previous visit. ECHO No results found for this or any previous visit. PFT No flowsheet data found.      Other ASA reactivity:   Pre-albumin:   Ionized Calcium:   NH4:   T3, FT4:  Cortisol:  Urine Osm:  Urine Lytes:   HbA1c:      Recent Results (from the past 24 hour(s))   PHOSPHORUS    Collection Time: 08/25/17  4:26 AM   Result Value Ref Range    Phosphorus 4.6 2.5 - 4.9 MG/DL   MAGNESIUM    Collection Time: 08/25/17  4:26 AM   Result Value Ref Range    Magnesium 1.8 1.6 - 2.6 mg/dL         Switch to IV  Keep ICU Plan as above  Stable at the present time  On no pressors.  And airway and breathing seems stable   [x]See my orders for details    My assessment, plan of care, findings, medications, side effects etc were discussed with:  [x] Nurse [] PT/OT    [x] Respiratory therapy []     [] Family: answered all questions to satisfaction [] Patient: answered all questions to satisfaction   [x] Pharmacist []      [x] Case & management strategies discussed today on multidisciplinary rounds               Tomasa Pena MD  8/25/2017

## 2017-08-25 NOTE — PROGRESS NOTES
Patient is S/P tracheotomy 8-20-17. No post-op problems. Exam: trach tube in place with intact sutures. Secretions clear to white. Procedure note: Skin sutures removed. Trach suctioned. #8 Shiley removed and #6 uncuffed nonfenestrated Shiley trach tube inserted without difficulty. Trach suctioned and inner cannula placed. Patient tolerated the procedure well. Assessment: S/P trach for laryngeal swelling/upper airway obstruction    Recommend: Will try to obtain a fenestrated tube prior to discharge. Speech to see today for teaching. Needs suction machine at home prior to discharge. He will follow-up with Dr. Alexandria Hodges after discharge as she is his local ENT.

## 2017-08-25 NOTE — PROGRESS NOTES
1200 Dr. Sarah Juan at bedside to change trach    1930 Bedside shift change report given to [address] [place] [address] (oncoming nurse) by Milka Perez RN   (offgoing nurse). Report included the following information SBAR, Kardex and MAR.

## 2017-08-25 NOTE — PROGRESS NOTES
conducted an initial consultation and Spiritual Assessment for Joaquin Jaramillo, who is a 45 y.o.,male. Patients Primary Language is: [place]. According to the patients EMR Islam Affiliation is: Angelic Iverson.     Patient unable to talk and uses paper and pen to communicate. The reason the Patient came to the hospital is:   Patient Active Problem List    Diagnosis Date Noted    Severe protein-calorie malnutrition Sesar Jelly: less than 60% of standard weight) ([facility] [address] 08/18/2017    COPD exacerbation ([facility] [address] 08/16/2017    Acute hypoxemic respiratory failure ([facility] [address] 08/16/2017    Acute respiratory distress 08/03/2017    Hypoxia 08/03/2017    Stridor 08/03/2017    Respiratory depression 08/03/2017    Aspiration into airway 07/30/2017    Throat cancer ([facility] [address] 07/30/2017    Status post [facility] of [facility] 07/30/2017    Hyponatremia 07/30/2017        The  provided the following Interventions:  Initiated a relationship of care and support. Explored issues of dheeraj, belief, spirituality and Latter day/ritual needs while hospitalized. Listened empathically. Provided chaplaincy education. Provided information about Spiritual Care Services. Offered prayer and assurance of continued prayers on patients behalf. Chart reviewed. The following outcomes were achieved:  Patient shared limited information about both their medical narrative and spiritual journey/beliefs. Patient processed feeling about current hospitalization. Patient expressed gratitude for pastoral care visit. Assessment:  Patient does not have any Latter day/cultural needs that will affect patients preferences in health care. There are no further spiritual or Latter day issues which require intervention at this time. Plan:  Chaplains will continue to follow and will provide pastoral care on an as needed/requested basis.    recommends bedside caregivers page  on duty if patient shows signs of acute spiritual or emotional distress.       Sister Andrea Pfeiffer [place], [address]  [phone]

## 2017-08-25 NOTE — ROUTINE PROCESS
TRANSFER - IN REPORT:    Verbal report received from SIRISHA Nolasco(name) on Melissa Vidal  being received from ICU(unit) for routine progression of care      Report consisted of patients Situation, Background, Assessment and   Recommendations(SBAR). Information from the following report(s) SBAR and Kardex was reviewed with the receiving nurse. Opportunity for questions and clarification was provided. Assessment completed upon patients arrival to unit and care assumed.

## 2017-08-25 NOTE — PROGRESS NOTES
Problem: Self Care Deficits Care Plan (Adult)  Goal: *Acute Goals and Plan of Care (Insert Text)  Occupational Therapy Goals  Initiated 8/22/2017 within 7 day(s). 1. Patient will perform bathing with set-up/mod I.   2. Patient will perform grooming with set-up/mod I standing sinkside. 3. Patient will perform lower body dressing with set-up/mod I.  4. Patient will perform toilet transfers with supervision/set-up. 5. Patient will perform all aspects of toileting with supervision/set-up. 6. Patient will perform upper extremity therapeutic exercise/activities with supervision/set-up for 15 minutes. 7. Patient will utilize energy conservation techniques during functional activities with 1 verbal cue(s). OCCUPATIONAL THERAPY TREATMENT     Patient: Apoorva Zamora (79 y.o. male)  Date: 8/25/2017  Diagnosis: COPD exacerbation ([facility] [address]  ACUTE HYPOXEMIC RESPIRATORY FAILURE  respiratory stridor Acute hypoxemic respiratory failure (HCC)  Procedure(s) (LRB):  TRACHEOSTOMY (N/A) 5 Days Post-Op  Precautions: Fall  Chart, occupational therapy assessment, plan of care, and goals were reviewed. ASSESSMENT:  Pt completed sit to stand and functional mobility with RW and supervision. Pt needed rest period during session but O2 stats remained at 98. Pt had completed UB and LB dressing this session with SBA/CGA. Pt education on home safety and ADL technique at home as well as equipment at home for increased safety. Pt demonstrated understanding through verbal feedback. Continue addressing goals. Progression toward goals:  [X]          Improving appropriately and progressing toward goals  [ ]          Improving slowly and progressing toward goals  [ ]          Not making progress toward goals and plan of care will be adjusted       PLAN:  Patient continues to benefit from skilled intervention to address the above impairments. Continue treatment per established plan of care.   Discharge Recommendations:  Home Health  Further Equipment Recommendations for Discharge:  N/A       SUBJECTIVE:   Patient stated Pt pleasant and cooperative.       OBJECTIVE DATA SUMMARY:   Cognitive/Behavioral Status:  Neurologic State: Alert  Orientation Level: Appropriate for age, Unable to verbalize, Oriented X4  Cognition: Follows commands  Safety/Judgement: Awareness of environment, Fall prevention  Functional Mobility and Transfers for ADLs:              Bed Mobility: N/A              Transfers:  Sit to Stand: Supervision  Balance:  Sitting: Intact  Standing: Intact; With support  ADL Intervention:     Cognitive Retraining  Safety/Judgement: Awareness of environment; Fall prevention     UB dressing: SBA  LB dressing: SBA/CGA     Pain:  Pain Scale 1: Numeric (0 - 10)  Pain Intensity 1: 10  Pain Location 1: Throat  Pain Intervention(s) 1: Medication (see MAR)     Activity Tolerance:    Fair +  Please refer to the flowsheet for vital signs taken during this treatment.   After treatment:   [ ]  Patient left in no apparent distress sitting up in chair  [X]  Patient left in no apparent distress in bed  [X]  Call bell left within reach  [ ]  Nursing notified  [X]  Caregiver present  [ ]  Bed alarm activated     LEIDY Anthony/L  Time Calculation: 16 mins

## 2017-08-25 NOTE — ROUTINE PROCESS
1900-Report received from Odalis Capps RN. SBAR, mars, labs, and patient status reviewed. 2000-Transfer orders received to transfer to Room 348. Called and gave report to Shelby Granda RN.     [id number] states pain in neck around trach stoma is 7-10 on pain scale. Gave PRN Dilaudid and Ativan for anxiety. Noted some leaking from Rt AC IV. Inserted new 20G Left forearm IV X1 Attempt. 2030-Transferred to room 348 Via Wheelchair with Patrick Pickard RN.

## 2017-08-25 NOTE — PROGRESS NOTES
Problem: Mobility Impaired (Adult and Pediatric)  Goal: *Acute Goals and Plan of Care (Insert Text)  Physical Therapy Goals LT/ST  Initiated 8/21/2017 and to be accomplished within 3-5 day(s)  1. Patient will move from supine <> sit with S in prep for out of bed activity and change of position. 2. Patient will perform sit<> stand with S with LRAD in prep for transfers/ambulation. 3. Patient will transfer from bed <> chair with S with LRAD for time up in chair for completion of ADL activity. 4. Patient will ambulate 150 feet with LRAD/S for improved functional mobility/safe discharge. .      Attempted to see pt for PT session, pt somewhat anxious and waiting for them to come downsize the trach. Pt requested to hold PT until this afternoon. Will return later for PT this PM session as pt schedule allows.

## 2017-08-25 NOTE — PROGRESS NOTES
Problem: Mobility Impaired (Adult and Pediatric)  Goal: *Acute Goals and Plan of Care (Insert Text)  Physical Therapy Goals LT/ST  Initiated 8/21/2017 and to be accomplished within 3-5 day(s)  1. Patient will move from supine <> sit with S in prep for out of bed activity and change of position. 2. Patient will perform sit<> stand with S with LRAD in prep for transfers/ambulation. 3. Patient will transfer from bed <> chair with S with LRAD for time up in chair for completion of ADL activity. 4. Patient will ambulate 150 feet with LRAD/S for improved functional mobility/safe discharge. .      Outcome: Progressing Towards Goal  PHYSICAL THERAPY TREATMENT     Patient: José Luis Fink (1 y.o. male)  Date: 8/25/2017  Diagnosis: COPD exacerbation ([address]  ACUTE HYPOXEMIC RESPIRATORY FAILURE  respiratory stridor Acute hypoxemic respiratory failure (HCC)  Procedure(s) (LRB):  TRACHEOSTOMY (N/A) 5 Days Post-Op  Precautions: Fall   Chart, physical therapy assessment, plan of care and goals were reviewed. ASSESSMENT:  Pt agreeable to participate in therapy. Pt demonstrated improved mobility and balance today. Pt amb 120 ft in hallway c/RW, supervision, occasional rest break for fatigue. SPO2 remained 98% on RA. HR up to 128 c/amb, quickly improved with standing rest. Reviewed exercises pt can perform to continue to improved strength in LE in the hospital and at home. Plan to continue to progress as pt tolerates. Progression toward goals:  [X]      Improving appropriately and progressing toward goals  [ ]      Improving slowly and progressing toward goals  [ ]      Not making progress toward goals and plan of care will be adjusted       PLAN:  Patient continues to benefit from skilled intervention to address the above impairments. Continue treatment per established plan of care.   Discharge Recommendations:  Home Health  Further Equipment Recommendations for Discharge:  Rolling walker       SUBJECTIVE:   Patient stated I am doing pretty good today, they downsized the trach earlier.       OBJECTIVE DATA SUMMARY:   Critical Behavior:  Neurologic State: Alert  Orientation Level: Appropriate for age, Unable to verbalize, Oriented X4  Cognition: Follows commands  Safety/Judgement: Awareness of environment, Fall prevention  Functional Mobility Training:  Bed Mobility:  Supine to Sit: Modified independent  Sit to Supine: Modified independent  Transfers:  Sit to Stand: Supervision  Stand to Sit: Supervision  Balance:  Sitting: Intact  Standing: Intact  Ambulation/Gait Training:  Distance (ft): 120 Feet (ft)  Assistive Device: Gait belt;Walker, rolling  Ambulation - Level of Assistance: Supervision  Gait Abnormalities: Decreased step clearance  Speed/Vickie: Pace decreased (<100 feet/min)  Step Length: Right shortened;Left shortened  Swing Pattern: Left asymmetrical;Right asymmetrical  Pain:  Pain Scale 1: Numeric (0 - 10)  Pain Intensity 1: 10  Pain Location 1: Throat  Pain Intervention(s) 1: Medication (see MAR)  Activity Tolerance:   Good  Please refer to the flowsheet for vital signs taken during this treatment.   After treatment:   [ ] Patient left in no apparent distress sitting up in chair  [X] Patient left in no apparent distress in bed  [X] Call bell left within reach  [X] Nursing notified  [ ] Caregiver present  [ ] Bed alarm activated      Sofia Elizondo   Time Calculation: 23 mins

## 2017-08-25 NOTE — PROGRESS NOTES
NUTRITION FOLLOW-UP    RECOMMENDATIONS/PLAN:   Recc updated blood chemistry labs  Continue w/ current tube feed reccs  Monitor labs/lytes, tube feed tolerance, wt, fluid status, skin integrity    Nutrition assessment was completed by RDN and the patient was found to meet the following malnutrition criteria established by ASPEN/AND:      Adult Malnutrition Guidelines:  SEVERE PROTEIN CALORIE MALNUTRITION IN THE CONTEXT OF CHRONIC ILLNESS  Weight Loss:  >5% x 1 month or >7.5% x 3 months or >10% x 6 months or >20% x 12 months  Muscle Mass: Severe Depletion      Please document MALNUTRITION in Problem List if in agreement.  Lisa Billingsleycarloz  08/25/17  NUTRITION ASSESSMENT:   Client Update: 45 yrs old Male with COPD exacerbation. Pt was extubated on 8/19/17. Pt had trach placed 8/201/7. Pt has HTN and stage 3 esophageal Ca. Pt continues to smoke and hx ETOH abuse. FOOD/NUTRITION INTAKE   Diet Order:  TwoCal 5 cans/day   Supplements: n/a   Food Allergies: NKFA/  Average PO Intake:      No data found. Pertinent Medications:  [x] Reviewed; pepcid, MVI, KCl, thiamine     Insulin:  []SSI  []Pre-meal   []Basal    []Drip  [x]None  Cultural/Hoahaoism Food Preferences: None Identified    BIOCHEMICAL DATA & MEDICAL TESTS  Pertinent Labs:  [x] Reviewed; ANTHROPOMETRICS  Height:   5'7      Weight: 65.3 kg (144 lb)         BMI:  22.6 kg/m^2 normal weight (18.5%-24.9% BMI)   Adm Weight: 162 lbs                Weight change: -18lbs ? Accuracy I/o -.9L since admission  Adjusted Body Weight: n/a    NUTRITION-FOCUSED PHYSICAL ASSESSMENT  Skin: No pressure injury noted       GI: +BM watery 8/24/17    NUTRITION PRESCRIPTION  Calories 2084-9155 kcal/day based on 30-35kcal/kg  Protein: g/day based on 1.4-1.6 g/kg  CHO: 277-323g/day based on 50% of total energy  Fluid: 3133-6485 ml/day based on 1 kcal/ml                            NUTRITION DIAGNOSES:   1.  Increased energy needs related to SOB and COPD as evidence by 7lb weight loss since 7/30 and 21% wt loss in last 4-5 months    NUTRITION INTERVENTIONS:   INTERVENTIONS:        GOALS:  1. Continue w/ current tube feed reccs 1. Continue to meet estimated needs w/ TwoCal 5 cans by next review 3 days             LEARNING NEEDS (Diet, Supplementation, Food/Nutrient-Drug Interaction):   [x] None Identified   [] Education provided/documented      Identified and patient: [] Declined   [] Was not appropriate/indicated        NUTRITION MONITORING /EVALUATION:   Adjust EN/PN as appropriate  Follow PO intake  Monitor wt  Monitor renal labs, electrolytes, fluid status     Previous Recommendations Implemented: yes        Previous Goals Met:  yes       [] Participated in Interdisciplinary Rounds    [x] Interdisciplinary Care Plan Reviewed  DISCHARGE NUTRITION RECOMMENDATIONS ADDRESSED:     [x] Yes- recommended TwoCal 5 Cans/day diet     NUTRITION RISK:           [x] At risk                        [] Not currently at risk        Will follow-up per policy.   Quentin Giron RD  PAGER:  [phone]

## 2017-08-26 PROCEDURE — 74011250637 HC RX REV CODE- 250/637: Performed by: HOSPITALIST

## 2017-08-26 PROCEDURE — 92507 TX SP LANG VOICE COMM INDIV: CPT

## 2017-08-26 PROCEDURE — 77010033678 HC OXYGEN DAILY

## 2017-08-26 PROCEDURE — 74011250637 HC RX REV CODE- 250/637: Performed by: INTERNAL MEDICINE

## 2017-08-26 PROCEDURE — 74011250637 HC RX REV CODE- 250/637: Performed by: FAMILY MEDICINE

## 2017-08-26 PROCEDURE — 92523 SPEECH SOUND LANG COMPREHEN: CPT

## 2017-08-26 PROCEDURE — 65660000000 HC RM CCU STEPDOWN

## 2017-08-26 PROCEDURE — 74011250636 HC RX REV CODE- 250/636: Performed by: INTERNAL MEDICINE

## 2017-08-26 RX ORDER — LORAZEPAM 2 MG/ML
1 CONCENTRATE ORAL
Status: DISCONTINUED | OUTPATIENT
Start: 2017-08-26 | End: 2017-08-26

## 2017-08-26 RX ORDER — LORAZEPAM 1 MG/1
1 TABLET ORAL
Status: DISCONTINUED | OUTPATIENT
Start: 2017-08-26 | End: 2017-08-29 | Stop reason: HOSPADM

## 2017-08-26 RX ADMIN — OXYCODONE HYDROCHLORIDE 10 MG: 5 TABLET ORAL at 14:21

## 2017-08-26 RX ADMIN — HEPARIN SODIUM 5000 UNITS: 5000 INJECTION, SOLUTION INTRAVENOUS; SUBCUTANEOUS at 06:13

## 2017-08-26 RX ADMIN — POTASSIUM CHLORIDE 20 MEQ: 20 SOLUTION ORAL at 08:51

## 2017-08-26 RX ADMIN — Medication 100 MG: at 08:51

## 2017-08-26 RX ADMIN — OXYCODONE HYDROCHLORIDE 10 MG: 5 TABLET ORAL at 20:36

## 2017-08-26 RX ADMIN — HEPARIN SODIUM 5000 UNITS: 5000 INJECTION, SOLUTION INTRAVENOUS; SUBCUTANEOUS at 22:15

## 2017-08-26 RX ADMIN — HEPARIN SODIUM 5000 UNITS: 5000 INJECTION, SOLUTION INTRAVENOUS; SUBCUTANEOUS at 14:21

## 2017-08-26 RX ADMIN — FAMOTIDINE 20 MG: 20 TABLET ORAL at 20:35

## 2017-08-26 RX ADMIN — HYDROCODONE BITARTRATE AND ACETAMINOPHEN 5 MG: 7.5; 325 TABLET ORAL at 08:52

## 2017-08-26 RX ADMIN — MULTIPLE VITAMIN LIQUID 30 ML: LIQUID at 09:00

## 2017-08-26 RX ADMIN — AMOXICILLIN AND CLAVULANATE POTASSIUM 400 MG: 400; 57 POWDER, FOR SUSPENSION ORAL at 09:00

## 2017-08-26 RX ADMIN — OXYCODONE HYDROCHLORIDE 10 MG: 5 TABLET ORAL at 08:51

## 2017-08-26 RX ADMIN — HYDROCODONE BITARTRATE AND ACETAMINOPHEN 5 MG: 7.5; 325 TABLET ORAL at 22:15

## 2017-08-26 RX ADMIN — Medication 10 ML: at 22:16

## 2017-08-26 RX ADMIN — LORAZEPAM 1 MG: 1 TABLET ORAL at 22:06

## 2017-08-26 RX ADMIN — OXYCODONE HYDROCHLORIDE 10 MG: 5 TABLET ORAL at 02:02

## 2017-08-26 RX ADMIN — FAMOTIDINE 20 MG: 20 TABLET ORAL at 08:52

## 2017-08-26 NOTE — PROGRESS NOTES
Problem: Communication Impaired (Adult)  Goal: *Acute Goals and Plan of Care (Insert Text)  Communication Impaired POC    The patient will:   1) independently recall and comply with PMV precautions with 100% accuracy. 2) clearly produce 5-7 words with coordinated breathing given Belinda. 3) independently don/doff PMV with 100% accuracy. 4) complete glottic closure exercises with 100% accuracy given maxA. 5) completed diaphragmatic breathing exercises with 100% accuracy given Belinda. Outcome: Progressing Towards Goal  SPEECH LANGUAGE PATHOLOGY TREATMENT     Patient: Carol Ann Casarez (6 y.o. male)  Date: 8/26/2017  Diagnosis: COPD exacerbation ([facility] [address]  ACUTE HYPOXEMIC RESPIRATORY FAILURE  respiratory stridor Acute hypoxemic respiratory failure (HCC)  Procedure(s) (LRB):  TRACHEOSTOMY (N/A) 6 Days Post-Op      ASSESSMENT:  Moderate/severe voice disorder. The patient was AOx4 during voice therapy. Patient was successful across finger occulusion trials. He tolerated initial PMV trial across 15\" before wheezing ID with noted increase in SPO2 from 97 to 99 & HR from 67 to <90. Education re: PMV don/doff provided; required visual feedback and verbal cues. Patient able to clear oral secretions via yaunker suction independently; however the patient exhibits decreased awareness of frothy secretions from trach requiring SLP removal of secretions. Replaced PMV after 5\" with increased tolerance for additional 40\". Established diaphragmatic breathing with 1-1 cues fading to modA. Able to produce 5-7 words per utterance with vocal deterioration evident. Continue speech therapy to address communication goals. Recommend MBS after clinical bedside evaluation on next business date. Progression toward goals:  [X]       Improving appropriately and progressing toward goals  [ ]       Improving slowly and progressing toward goals  [ ]       Not making progress toward goals and plan of care will be adjusted       PLAN:  As above. Patient continues to benefit from skilled intervention to address the above impairments. Continue treatment per established plan of care. Discharge Recommendations:  Home Health vs Outpatient       SUBJECTIVE:   Patient stated \"Nice to meet you.       OBJECTIVE:   Mental Status:  Neurologic State: Alert  Orientation Level: Oriented X4  Cognition: Follows commands, Appropriate safety awareness, Appropriate for age attention/concentration, Appropriate decision making  Perception: Appears intact  Perseveration: Tactile cues provided, Verbal cues provided, Visual cues provided  Safety/Judgement: Insight into deficits  Treatment & Interventions:  Language Comprehension and Expression:  Auditory Comprehension   Response to Complex Yes/No Questions (%) : 100 % (% accuracy)  Complex Commands (%): 100 % (given none to Belinda )  Verbal Expression  Auditory/Reading/Writing Activities:  Auditory Comprehension   Response to Complex Yes/No Questions (%) : 100 % (% accuracy)  Complex Commands (%): 100 % (given none to Belinda )  PAIN:  Start of Tx: 0  End of Tx: 0      After treatment:   [ ]       Patient left in no apparent distress sitting up in chair  [X]       Patient left in no apparent distress in bed  [X]       Call bell left within reach  [X]       Nursing notified  [ ]       Caregiver present  [ ]       Bed alarm activated         COMMUNICATION/EDUCATION:   [X]             Compensatory speech/language/comprehension techniques     Bibi Phan SLP  Time Calculation: 29 mins

## 2017-08-26 NOTE — PROGRESS NOTES
Problem: Falls - Risk of  Goal: *Absence of Falls  Document Caryl Fall Risk and appropriate interventions in the flowsheet. Fall Risk Interventions:  Mobility Interventions: Patient to call before getting OOB     Mentation Interventions: Update white board, More frequent rounding     Medication Interventions: Patient to call before getting OOB, Teach patient to arise slowly     Elimination Interventions:  Toileting schedule/hourly rounds, Call light in reach

## 2017-08-26 NOTE — PROGRESS NOTES
Problem: Mobility Impaired (Adult and Pediatric)  Goal: *Acute Goals and Plan of Care (Insert Text)  Physical Therapy Goals LT/ST  Initiated 8/21/2017 and to be accomplished within 3-5 day(s)  1. Patient will move from supine <> sit with S in prep for out of bed activity and change of position. 2. Patient will perform sit<> stand with S with LRAD in prep for transfers/ambulation. 3. Patient will transfer from bed <> chair with S with LRAD for time up in chair for completion of ADL activity. 4. Patient will ambulate 150 feet with LRAD/S for improved functional mobility/safe discharge. .         8/26/2017  Pt chart reviewed and treament attempted. Session unable to be completed due to:  [ ] Off Unit for testing/procedure  [ ] Pain  [ ] Eating  [ ] Patient too lethargic  [ ] Nausea/vomiting  [ ] Dialysis treatment in progress   [X] Other: pt declines PT today due to family and friends visiting, stating \"I got people visiting\". Will f/u tomorrow per pt request.  Thank you.    Ap Temple, PT

## 2017-08-26 NOTE — PROGRESS NOTES
Notified of pt need for home suction and home oxygen. Reviewed previous discharge planning notes. Pt is uninsured and declined assistance from [employer]. CM is exploring options to obtain home suctioning and home oxygen given pt with no payor source. CM to follow up with management Monday when oxygen company staff is available.

## 2017-08-26 NOTE — PROGRESS NOTES
Hospitalist Progress Note    Patient: Taylor Gonzalez MRN: [id number]  CSN: [id number]    YOB: 1965  Age: 45 y.o. Sex: male    DOA: 8/16/2017 LOS:  LOS: 10 days            Assessment/Plan     Principal Problem:    Acute hypoxemic respiratory failure ([address] (8/16/2017)    Active Problems:    Throat cancer ([facility] [address] (7/30/2017)      Status post insertion of percutaneous endoscopic gastrostomy (PEG) tube ([address] (7/30/2017)      COPD exacerbation ([address] (8/16/2017)      Severe protein-calorie malnutrition Loreatha Leblanc: less than 60% of standard weight) ([address] (8/18/2017)      Tracheostomy dependence ([address] (8/25/2017)      Ac resp failure with stridor: Improving. S/P trach. Speech therapy to work with him on PO and speech as per ENT. Need for home suction and home oxygen before discharge. Pt is uninsured and declined assistance from [employer]. Insomnia: Add benzo prn for sleep. Throat cancer NOW s/p trach placement 8/20-adjusted today per ENT    Peg tube dependence    COPD    Heavy tobacco use    Chronic narcotic dependence    Severe prot alphonse malnutrition: NT support     Remain hospital care. CC: Insomnia, S/P trach and PEG. COPD, throat Ca, malnutrition           Subjective:     Pt was seen and examined with the nurse in the morning round. Reports that he did not get much sleep last night. Speech therapist was at the bedside. Review of systems  Limited 2nd to trach. Objective:      Visit Vitals    /77 (BP 1 Location: Right arm, BP Patient Position: At rest)    Pulse 78    Temp 98.1 °F (36.7 °C)    Resp 18    Wt 66.2 kg (145 lb 15.1 oz)    SpO2 100%    BMI 22.86 kg/m2       Physical Exam:    Gen: NAD, trach in place  Heent:  MMM, NC, AT. Cor: s1s2 RRR. No MRG. PMI mid 5th intercostal space. Resp:  CTA b/l. No w/r/r. Nml effort and diaphragmatic excursion. Abd:  NT ND.  BS positive. PEG in place  Ext: No edema or cyanosis.       Intake and Output:  Current Shift:  08/26 0467 - 08/26 1900  In: 360 [P.O.:360]  Out: -   Last three shifts:  08/24 1901 - 08/26 0700  In: 1126   Out: 1850 [[id number]]    Labs: Results:       Chemistry Recent Labs      08/24/17   1203  08/24/17   0541   BUN  21*  21*      CBC w/Diff No results for input(s): WBC, RBC, HGB, HCT, PLT, GRANS, LYMPH, EOS, HGBEXT, HCTEXT, PLTEXT in the last 72 hours. Cardiac Enzymes No results for input(s): CPK, CKND1, CHUCKY in the last 72 hours. No lab exists for component: CKRMB, TROIP   Coagulation No results for input(s): PTP, INR, APTT in the last 72 hours. No lab exists for component: INREXT    Lipid Panel No results found for: CHOL, CHOLPOCT, CHOLX, CHLST, CHOLV, 583639, HDL, LDL, LDLC, DLDLP, 509217, VLDLC, VLDL, TGLX, TRIGL, TRIGP, TGLPOCT, CHHD, CHHDX   BNP No results for input(s): BNPP in the last 72 hours. Liver Enzymes No results for input(s): TP, ALB, TBIL, AP, SGOT, GPT in the last 72 hours.     No lab exists for component: DBIL   Thyroid Studies No results found for: T4, T3U, TSH, TSHEXT     Procedures/imaging: see electronic medical records for all procedures/Xrays and details which were not copied into this note but were reviewed prior to creation of Plan      Medications Reviewed  Jordy Dias MD

## 2017-08-26 NOTE — PROGRESS NOTES
0630: Shift Summary: Uneventful Shift; Pain addressed with PRN meds;  Pt.  Remain stable throughout shift

## 2017-08-26 NOTE — PROGRESS NOTES
Speech LAnguage Pathology evaluation    Patient: Ivette Oneil (89 y.o. male)  Date: 8/26/2017  Primary Diagnosis: COPD exacerbation ([facility] [address]  ACUTE HYPOXEMIC RESPIRATORY FAILURE  respiratory stridor  Procedure(s) (LRB):  TRACHEOSTOMY (N/A) 6 Days Post-Op   Precautions: Aspiration. Fall    ASSESSMENT :  Based on the objective data described below, the patient is aphonic without PMV s/p trach; improved to moderate/severe voice disorder with utilization of PMV. Patient's voice c/b as hoarse and breathy. Education and training provided re: don/off PMV, cleaning instructions, purpose of PMV, etc. All questions answered. See evaluation data below. Patient will benefit from skilled intervention to address the above impairments. Patients rehabilitation potential is considered to be Good  Factors which may influence rehabilitation potential include:   [x]              None noted     PLAN :  Recommendations and Planned Interventions:  See goals. SLP to follow up for voice exercises and ongoing education. Frequency/Duration: Patient will be followed by speech-language pathology 1-2 times per day/4-7 days per week to address goals. Discharge Recommendations: Home Health vs Outpatient     SUBJECTIVE:   Patient stated Thank you.     OBJECTIVE:     Past Medical History:   Diagnosis Date    Chronic obstructive pulmonary disease ([facility] [address]     acute hypoxic respiratory failure 8/16    Hypertension     Ill-defined condition     peg tube in place    Ill-defined condition     chemo    S/P radiation therapy     Throat cancer ([facility] [address]     Tobacco abuse      Past Surgical History:   Procedure Laterality Date    HX HEENT      sinus surgery     Prior Level of Function/Home Situation: Per pt/chart  Home Situation  Home Environment: Private residence  Wheelchair Ramp: Yes  One/Two Story Residence: One story  Living Alone: No  Support Systems: Spouse/Significant Other/Partner, Parent (lives w/ mother & is caretaker)  Patient Expects to be Discharged to[de-identified] Private residence  Current DME Used/Available at Home: Walker, rolling, Grab bars ((doesn't use AD at baseline))  Tub or Shower Type: Tub/Shower combination  Mental Status:  Neurologic State: Alert  Orientation Level: Oriented X4  Cognition: Follows commands, Appropriate safety awareness, Appropriate for age attention/concentration, Appropriate decision making  Perception: Appears intact  Perseveration: Tactile cues provided, Verbal cues provided, Visual cues provided  Safety/Judgement: Insight into deficits  Language Comprehension and Expression:  Auditory Comprehension  Response to Complex Yes/No Questions (%) : 100 % (% accuracy)  Complex Commands (%): 100 % (given none to Belinda )   Voice:  Vocal Quality: Hoarse;Breathy  Vocal Intensity: Too soft; Other (comment) (cues to improve volume)    SPEECH GCODE GCODEVoice:  Voice Current Status CN= 100%   Voice Goal Status CK= 40-59%    The severity rating is based on the following outcomes:    KARELY Noms 1 at evaluation   KARELY Noms 3 after placement of PMV  Clinical judgement     08/26/17 1000   Type of Assessment   Type of Assessment Evaluation   Tracheostomy Data/Eval   Trach Size/Status #6 ;Non-fenestrated; Uncuffed;Other (comment)  (angelito)   Date of Placement 08/25/17  (downsized from # which was placed on 8/20/2017)   Additional Trach Info Size change; Other (comment)  (above)   Passy-Elizabeth Placement Patient;SLP   On Ventilator No   Mental Status   Neurologic State Alert   Orientation Level Oriented X4   Cognition Follows commands; Appropriate safety awareness; Appropriate for age attention/concentration; Appropriate decision making   Perception Appears intact   Perseveration Tactile cues provided;Verbal cues provided;Visual cues provided   Safety/Judgement Insight into deficits   Evidence of Comprehension   Meaningful Eye Movement/Gaze Yes   Response to Complex Yes/No Questions (%)  100 %  (% accuracy)   Complex Commands (%) 100 %  (given none to Belinda )   Cough  Present   Finger Occlusion   Application Patient;SLP   Tolerance Tolerated without changes in vitals, breathing effort or level of anxiety   Vocal Quality Hoarse;Breathy   Vocal Intensity Too soft; Other (comment)  (cues to improve volume)   PMV Trial    Application Patient;SLP   Tolerance Increased heart rate; Tolerated without changes in vitals, breathing effort or level of anxiety; Other (comment)  (after first 15\")   Vocal Quality Hoarse;Breathy   Vocal Intensity Too soft; Other (comment)  (cues to improve volume)   Duration (minutes) 30 minutes   Oxygen Therapy   O2 Sat (%) 97 %       O2 Device Room air   Airway Clearance   Suction Oral   Suction Device [employer]   Sputum Method Obtained Oral tracheal   Sputum Amount Moderate   Sputum Color/Odor Clear; White   Sputum Consistency Frothy   Treatment Diagnosis   Treatment Diagnosis voice disorder   Treatment Diagnosis 2 dysphagia     PAIN:  Start of Eval: 0  End of Eval: 0     After treatment:   []              Patient left in no apparent distress sitting up in chair  [x]              Patient left in no apparent distress in bed  [x]              Call bell left within reach  [x]              Nursing notified  []              Caregiver present  []              Bed alarm activated    COMMUNICATION/EDUCATION:   [x] Patient/family have participated as able in goal setting and plan of care. [x]  Patient/family agree to work toward stated goals and plan of care. []  Patient understands intent and goals of therapy, but is neutral about his/her participation. []  Patient is unable to participate in goal setting and plan of care.     Thank you for the referral,  Santos Montoya M.S.Ed., CCC/SLP  Speech Language Pathologist

## 2017-08-27 PROCEDURE — 77030018836 HC SOL IRR NACL ICUM -A

## 2017-08-27 PROCEDURE — 74011250637 HC RX REV CODE- 250/637: Performed by: INTERNAL MEDICINE

## 2017-08-27 PROCEDURE — 74011250637 HC RX REV CODE- 250/637: Performed by: HOSPITALIST

## 2017-08-27 PROCEDURE — 97116 GAIT TRAINING THERAPY: CPT

## 2017-08-27 PROCEDURE — 74011250637 HC RX REV CODE- 250/637: Performed by: FAMILY MEDICINE

## 2017-08-27 PROCEDURE — 74011250636 HC RX REV CODE- 250/636: Performed by: INTERNAL MEDICINE

## 2017-08-27 PROCEDURE — 65660000000 HC RM CCU STEPDOWN

## 2017-08-27 RX ADMIN — HEPARIN SODIUM 5000 UNITS: 5000 INJECTION, SOLUTION INTRAVENOUS; SUBCUTANEOUS at 14:26

## 2017-08-27 RX ADMIN — MULTIPLE VITAMIN LIQUID 30 ML: LIQUID at 09:10

## 2017-08-27 RX ADMIN — AMOXICILLIN AND CLAVULANATE POTASSIUM 400 MG: 400; 57 POWDER, FOR SUSPENSION ORAL at 23:00

## 2017-08-27 RX ADMIN — OXYCODONE HYDROCHLORIDE 10 MG: 5 TABLET ORAL at 14:37

## 2017-08-27 RX ADMIN — LORAZEPAM 1 MG: 1 TABLET ORAL at 22:50

## 2017-08-27 RX ADMIN — Medication 10 ML: at 07:29

## 2017-08-27 RX ADMIN — HYDROCODONE BITARTRATE AND ACETAMINOPHEN 5 MG: 7.5; 325 TABLET ORAL at 22:50

## 2017-08-27 RX ADMIN — HYDROCODONE BITARTRATE AND ACETAMINOPHEN 5 MG: 7.5; 325 TABLET ORAL at 09:10

## 2017-08-27 RX ADMIN — Medication 10 ML: at 22:51

## 2017-08-27 RX ADMIN — HEPARIN SODIUM 5000 UNITS: 5000 INJECTION, SOLUTION INTRAVENOUS; SUBCUTANEOUS at 07:28

## 2017-08-27 RX ADMIN — POTASSIUM CHLORIDE 20 MEQ: 20 SOLUTION ORAL at 09:09

## 2017-08-27 RX ADMIN — OXYCODONE HYDROCHLORIDE 10 MG: 5 TABLET ORAL at 09:10

## 2017-08-27 RX ADMIN — FAMOTIDINE 20 MG: 20 TABLET ORAL at 09:10

## 2017-08-27 RX ADMIN — OXYCODONE HYDROCHLORIDE 10 MG: 5 TABLET ORAL at 22:50

## 2017-08-27 RX ADMIN — Medication 100 MG: at 09:10

## 2017-08-27 RX ADMIN — AMOXICILLIN AND CLAVULANATE POTASSIUM 400 MG: 400; 57 POWDER, FOR SUSPENSION ORAL at 09:00

## 2017-08-27 RX ADMIN — FAMOTIDINE 20 MG: 20 TABLET ORAL at 22:50

## 2017-08-27 RX ADMIN — Medication 10 ML: at 14:00

## 2017-08-27 RX ADMIN — HEPARIN SODIUM 5000 UNITS: 5000 INJECTION, SOLUTION INTRAVENOUS; SUBCUTANEOUS at 22:50

## 2017-08-27 RX ADMIN — OXYCODONE HYDROCHLORIDE 10 MG: 5 TABLET ORAL at 03:14

## 2017-08-27 NOTE — PROGRESS NOTES
1925: Assumed patient care, he was alert and oriented to person, place, time and situation. Respiratory status was stable on room air. Vital signs were stable. MEWS score was a two. Patient denied any nausea vomiting dizziness or anxiety. White board was updated and explained. Bed was locked and in lowest position. Call bell, water and personal belongings were within reach. Patient had no questions, comments or concerns after bedside shift report. 2130: Pain reassessment: Patient continues to rate his pain at a 10/10. Patient tried to get this nurse to give him another dose of PRN Roxicodone after stating that he only received one 5 mg pill from the last nurse. This nurse checked the [employer] and verified that 10 mg of PRN Roxicodone was pulled, crushed and given by CHRISTINA Nolasco R.N. The patient recanted his statement after this nurse verified the medication was pulled and given at 2036. Patient's FLACC score was a zero. 2567: Patient asked for and received two 5 mg tablets of Roxicodone for neck pain which he rated at a 10/10 with five being an acceptable level. Will reassess the patient's pain level within one hour. 0410: Patient rated his pain at a 10/10 with 5 being an acceptable level. His FLACC score was a 0. He appeared to have no signs or symptoms of distress noted. 0700: Patient had an uneventful shift. Respiratory status, vital signs and MEWS score remained stable. Patient was resting quietly with no signs of distress noted. Bed locked and in lowest position. Call bell water and personal belongings were within reach. Patient had no questions, comments or concerns after bedside shift report.

## 2017-08-27 NOTE — PROGRESS NOTES
Problem: Falls - Risk of  Goal: *Absence of Falls  Document Caryl Fall Risk and appropriate interventions in the flowsheet.    Outcome: Progressing Towards Goal  Fall Risk Interventions:  Mobility Interventions: Patient to call before getting OOB     Mentation Interventions: Update white board, More frequent rounding     Medication Interventions: Patient to call before getting OOB, Teach patient to arise slowly     Elimination Interventions: Call light in reach

## 2017-08-27 NOTE — PROGRESS NOTES
Problem: Mobility Impaired (Adult and Pediatric)  Goal: *Acute Goals and Plan of Care (Insert Text)  Physical Therapy Goals LT/ST  Initiated 8/21/2017 and to be accomplished within 3-5 day(s)  1. Patient will move from supine <> sit with S in prep for out of bed activity and change of position. 2. Patient will perform sit<> stand with S with LRAD in prep for transfers/ambulation. 3. Patient will transfer from bed <> chair with S with LRAD for time up in chair for completion of ADL activity. 4. Patient will ambulate 150 feet with LRAD/S for improved functional mobility/safe discharge. Danielle Hoffmann PHYSICAL THERAPY TREATMENT     Patient: Patience Snow (67 y.o. male)  Date: 8/27/2017  Diagnosis: COPD exacerbation ([facility] [address]  ACUTE HYPOXEMIC RESPIRATORY FAILURE  respiratory stridor Acute hypoxemic respiratory failure (HCC)  Procedure(s) (LRB):  TRACHEOSTOMY (N/A) 7 Days Post-Op  Precautions: Fall   Chart, physical therapy assessment, plan of care and goals were reviewed. ASSESSMENT:  Pt showing improved mobility, and increasing ambulation distance without AD. Balance slightly unsteady and CGA needed for safety. No LOB. Mild fatigue noted post ambulation. Pt willing to remain sitting EOB post tx. Cont POC. Progression toward goals:  [ ]      Improving appropriately and progressing toward goals  [X]      Improving slowly and progressing toward goals  [ ]      Not making progress toward goals and plan of care will be adjusted       PLAN:  Patient continues to benefit from skilled intervention to address the above impairments. Continue treatment per established plan of care.   Discharge Recommendations:  Home Health  Further Equipment Recommendations for Discharge:  rolling walker       SUBJECTIVE:   Patient stated  I take care of my mother at home       OBJECTIVE DATA SUMMARY:   Critical Behavior:  Neurologic State: Alert  Orientation Level: Oriented X4  Cognition: Appropriate decision making, Appropriate for age attention/concentration, Appropriate safety awareness, Follows commands  Safety/Judgement: Insight into deficits  Functional Mobility Training:     Transfers:  Sit to Stand: Supervision  Stand to Sit: Supervision  Balance:  Sitting: Intact  Standing: Intact  Ambulation/Gait Training:  Distance (ft): 350 Feet (ft)  Assistive Device: Gait belt (no AD )  Ambulation - Level of Assistance: Contact guard assistance;Stand-by asssistance  Gait Abnormalities: Decreased step clearance  Speed/Vickie: Slow  Step Length: Right shortened;Left shortened  Swing Pattern: Left asymmetrical;Right asymmetrical  Interventions: Verbal cues     Activity Tolerance:   Fair      After treatment:   [ ] Patient left in no apparent distress sitting up in chair  [X] Patient left in no apparent distress in bed  [X] Call bell left within reach  [ ] Nursing notified  [ ] Caregiver present  [ ] Bed alarm activated      Nancy Joseph PTA   Time Calculation: 15 mins

## 2017-08-27 NOTE — PROGRESS NOTES
Hospitalist Progress Note    Patient: Nghia Figueroa MRN: [id number]  CSN: [id number]    YOB: 1965  Age: 45 y.o. Sex: male    DOA: 8/16/2017 LOS:  LOS: 11 days            Assessment/Plan     Principal Problem:    Acute hypoxemic respiratory failure ([address] (8/16/2017)    Active Problems:    Throat cancer ([facility] [address] (7/30/2017)      Status post insertion of percutaneous endoscopic gastrostomy (PEG) tube ([address] (7/30/2017)      COPD exacerbation ([address] (8/16/2017)      Severe protein-calorie malnutrition Slime Amour: less than 60% of standard weight) ([address] (8/18/2017)      Tracheostomy dependence ([facility] [address] (8/25/2017)      Ac resp failure with stridor: Improving. S/P trach. Speech therapy to work with him on PO and speech as per ENT. Need for home suction and home oxygen before discharge.  Pt is uninsured and declined assistance from [facility].     Insomnia: Add benzo prn for sleep.      Throat cancer NOW s/p trach placement 8/20-adjusted today per ENT     Peg tube dependence     COPD     Heavy tobacco use     Chronic narcotic dependence     Severe prot alphonse malnutrition: NT support      Remain hospital care.      CC: Insomnia, S/P trach and PEG. COPD, throat Ca, malnutrition           Subjective:      Pt was seen and examined with the nurse in the morning round.      Slept better last night. Has to suction the secretion frequently.        Review of systems  Limited 2nd to trach. Objective:      Visit Vitals    BP 93/53    Pulse 90    Temp 97.5 °F (36.4 °C)    Resp 18    Ht 5' 7\" (1.702 m)    Wt 69.3 kg (152 lb 12.5 oz)    SpO2 98%    BMI 23.93 kg/m2       Physical Exam:    Gen: NAD, S/P trach  Heent:  MMM, NC, AT. Cor: s1s2 RRR. No MRG. PMI mid 5th intercostal space. Resp:  CTA b/l. No w/r/r. Nml effort and diaphragmatic excursion. Abd:  G-tube in place. BS positive. Ext: No edema or cyanosis.       Intake and Output:  Current Shift:     Last three shifts:  08/25 1901 - 08/27 0700  In: 2376 [P.O.:600]  Out: 1000 [Urine:1000]    Labs: Results:       Chemistry Recent Labs      08/24/17   1203   BUN  21*      CBC w/Diff No results for input(s): WBC, RBC, HGB, HCT, PLT, GRANS, LYMPH, EOS, HGBEXT, HCTEXT, PLTEXT in the last 72 hours. Cardiac Enzymes No results for input(s): CPK, CKND1, CHUCKY in the last 72 hours. No lab exists for component: CKRMB, TROIP   Coagulation No results for input(s): PTP, INR, APTT in the last 72 hours. No lab exists for component: INREXT    Lipid Panel No results found for: CHOL, CHOLPOCT, CHOLX, CHLST, CHOLV, 909823, HDL, LDL, LDLC, DLDLP, 689164, VLDLC, VLDL, TGLX, TRIGL, TRIGP, TGLPOCT, CHHD, CHHDX   BNP No results for input(s): BNPP in the last 72 hours. Liver Enzymes No results for input(s): TP, ALB, TBIL, AP, SGOT, GPT in the last 72 hours.     No lab exists for component: DBIL   Thyroid Studies No results found for: T4, T3U, TSH, TSHEXT     Procedures/imaging: see electronic medical records for all procedures/Xrays and details which were not copied into this note but were reviewed prior to creation of Plan      Medications Reviewed  Aftab Colbert MD

## 2017-08-27 NOTE — PROGRESS NOTES
1935: Assumed patient care, he was alert and oriented to person, place, time and situation. Respiratory status was stable on room air. Vital signs were stable. MEWS score was a two. Patient denied any nausea vomiting dizziness or anxiety. White board was updated and explained. Bed was locked and in lowest position. Call bell, water and personal belongings were within reach. Patient had no questions, comments or concerns after bedside shift report. 0700: Patient had an uneventful shift. Respiratory status, vital signs and MEWS score remained stable. Patient was resting quietly with no signs of distress noted. Bed locked and in lowest position. Call bell water and personal belongings were within reach. Patient had no questions, comments or concerns after bedside shift report.  Bedside report given to [facility] JOSSELYN

## 2017-08-27 NOTE — PROGRESS NOTES
COMPLETE TRACH CARE GIVEN. CLEANED WITH NORMAL SALINE. CHANGED INNER CANNULA AND ANTIMICROBIAL SPONGES APPLIED TO SITE. ANSWERED PATIENT'S QUESTIONS REGARDING [facility] AND CARE TRACH. HE WILL NEED MUCH MORE TEACHING PRIOR TO DISCHARGE. PATIENT'S WIFE IS A RETIRED NURSE BUT RECOMMEND HAVING HER RECEIVE INSTRUCTION AS WELL. Peng Gudino, [address] [place] [address].

## 2017-08-27 NOTE — ROUTINE PROCESS
Bedside shift change report given to Alberto Dorantes RN (oncoming nurse) by Gustavo Blanc RN (offgoing nurse).  Report included the following information GLORIA Escobedo Mar  .

## 2017-08-28 PROCEDURE — 74011250636 HC RX REV CODE- 250/636: Performed by: INTERNAL MEDICINE

## 2017-08-28 PROCEDURE — 65270000029 HC RM PRIVATE

## 2017-08-28 PROCEDURE — 97116 GAIT TRAINING THERAPY: CPT

## 2017-08-28 PROCEDURE — 74011250637 HC RX REV CODE- 250/637: Performed by: FAMILY MEDICINE

## 2017-08-28 PROCEDURE — 74011250637 HC RX REV CODE- 250/637: Performed by: INTERNAL MEDICINE

## 2017-08-28 PROCEDURE — 74011250637 HC RX REV CODE- 250/637: Performed by: HOSPITALIST

## 2017-08-28 PROCEDURE — 92507 TX SP LANG VOICE COMM INDIV: CPT

## 2017-08-28 PROCEDURE — 97164 PT RE-EVAL EST PLAN CARE: CPT

## 2017-08-28 RX ADMIN — HYDROCODONE BITARTRATE AND ACETAMINOPHEN 5 MG: 7.5; 325 TABLET ORAL at 22:46

## 2017-08-28 RX ADMIN — HEPARIN SODIUM 5000 UNITS: 5000 INJECTION, SOLUTION INTRAVENOUS; SUBCUTANEOUS at 15:08

## 2017-08-28 RX ADMIN — Medication 100 MG: at 08:43

## 2017-08-28 RX ADMIN — OXYCODONE HYDROCHLORIDE 10 MG: 5 TABLET ORAL at 22:46

## 2017-08-28 RX ADMIN — Medication 10 ML: at 15:08

## 2017-08-28 RX ADMIN — HEPARIN SODIUM 5000 UNITS: 5000 INJECTION, SOLUTION INTRAVENOUS; SUBCUTANEOUS at 22:46

## 2017-08-28 RX ADMIN — Medication 10 ML: at 22:47

## 2017-08-28 RX ADMIN — POTASSIUM CHLORIDE 20 MEQ: 20 SOLUTION ORAL at 08:44

## 2017-08-28 RX ADMIN — MULTIPLE VITAMIN LIQUID 30 ML: LIQUID at 08:49

## 2017-08-28 RX ADMIN — LORAZEPAM 1 MG: 1 TABLET ORAL at 22:46

## 2017-08-28 RX ADMIN — OXYCODONE HYDROCHLORIDE 10 MG: 5 TABLET ORAL at 17:19

## 2017-08-28 RX ADMIN — HEPARIN SODIUM 5000 UNITS: 5000 INJECTION, SOLUTION INTRAVENOUS; SUBCUTANEOUS at 05:09

## 2017-08-28 RX ADMIN — Medication 10 ML: at 05:10

## 2017-08-28 RX ADMIN — FAMOTIDINE 20 MG: 20 TABLET ORAL at 22:46

## 2017-08-28 RX ADMIN — HYDROCODONE BITARTRATE AND ACETAMINOPHEN 5 MG: 7.5; 325 TABLET ORAL at 08:43

## 2017-08-28 RX ADMIN — AMOXICILLIN AND CLAVULANATE POTASSIUM 400 MG: 400; 57 POWDER, FOR SUSPENSION ORAL at 08:50

## 2017-08-28 RX ADMIN — AMOXICILLIN AND CLAVULANATE POTASSIUM 400 MG: 400; 57 POWDER, FOR SUSPENSION ORAL at 23:00

## 2017-08-28 RX ADMIN — FAMOTIDINE 20 MG: 20 TABLET ORAL at 08:43

## 2017-08-28 RX ADMIN — OXYCODONE HYDROCHLORIDE 10 MG: 5 TABLET ORAL at 05:10

## 2017-08-28 RX ADMIN — OXYCODONE HYDROCHLORIDE 10 MG: 5 TABLET ORAL at 11:17

## 2017-08-28 NOTE — PROGRESS NOTES
0800 Assumed care of patient from [facility] Barb MOON April Grammes. 2017 Patient delivered 240 mL twocal bolus feed via peg tube. Morning medications administered. 1117 Roxicodone 10 mg prn given via peg tub for complaints of throat and neck pain. See doc-flow sheet for follow up. Patient delivered 240 mL twocal bolus feed via peg tube. Morning medications administered. 1719 Roxicodone 10 mg prn given via peg tub for complaints of throat and neck pain. See doc-flow sheet for follow up. 1844 Patient had a uneventful shift.

## 2017-08-28 NOTE — PROGRESS NOTES
NUTRITION FOLLOW-UP    RECOMMENDATIONS/PLAN:   [facility] updated Blood Chemistry labs-K, Phos, Mg  Continue w/ current tube feed  Monitor labs/lytes, tube feed tolerance, wt, fluid status, skin integrity    Nutrition assessment was completed by RDN and the patient was found to meet the following malnutrition criteria established by ASPEN/AND:      Adult Malnutrition Guidelines:  SEVERE PROTEIN CALORIE MALNUTRITION IN THE CONTEXT OF CHRONIC ILLNESS  Weight Loss:  >5% x 1 month or >7.5% x 3 months or >10% x 6 months or >20% x 12 months  Muscle Mass: Severe Depletion      Please document MALNUTRITION in Problem List if in agreement.  Сергей Flattery  08/28/17    NUTRITION ASSESSMENT:   Client Update: 45 yrs old Male with COPD exacerbation. Pt was extubated on 8/19/17. Pt had trach placed 8/201/7 and adjusted per ENT on 8/27/17. Pt has HTN and stage 3 esophageal Ca. Pt continues to smoke and hx ETOH abuse.       FOOD/NUTRITION INTAKE   Diet Order:  NPO Tube Feed-TwoCal 5 Cans per day   Supplements:   Food Allergies: NKFA/  Average PO Intake:      Patient Vitals for the past 100 hrs:   % Diet Eaten   08/27/17 1935 100 %   08/26/17 1925 100 %   08/26/17 1259 100 %      Pertinent Medications:  [x] Reviewed; pepcid, MVI, thiamine,KCl,      Insulin:  []SSI  []Pre-meal   []Basal    []Drip  [x]None  Cultural/Mandaen Food Preferences: None Identified    BIOCHEMICAL DATA & MEDICAL TESTS  Pertinent Labs:  [x] Reviewed; no new labs   ANTHROPOMETRICS  Height: 5' 7\" (170.2 cm)       Weight: 70.6 kg (155 lb 10.3 oz)         BMI: 24.4 kg/m^2 normal weight (18.5%-24.9% BMI)   Adm Weight: 162 lbs                Weight change: -7lbs  Adjusted Body Weight: n/a     NUTRITION-FOCUSED PHYSICAL ASSESSMENT  Skin: No pressure injury noted      GI: +BM 8/27/17    NUTRITION PRESCRIPTION  Calories 9217-6624 kcal/day based on 30-35kcal/kg  Protein: g/day based on 1.4-1.6 g/kg  CHO: 277-323g/day based on 50% of total energy  [facility]: 3853-3431 ZF/VEU based on 1 kcal/ml      NUTRITION DIAGNOSES:   1. Increased energy needs related to SOB and COPD as evidence by 7lb weight loss since 7/30 and 21% wt loss in last 4-5 months     NUTRITION INTERVENTIONS:   INTERVENTIONS:                                                                                                                                                                         GOALS:  1. Continue w/ current tube feed reccs 1. Continue to meet estimated needs w/ TwoCal 5 cans by next review 3 days       LEARNING NEEDS (Diet, Supplementation, Food/Nutrient-Drug Interaction):   [x] None Identified   [] Education provided/documented      Identified and patient: [] Declined   [] Was not appropriate/indicated        NUTRITION MONITORING /EVALUATION:   Adjust EN/PN as appropriate  Follow PO intake  Monitor wt  Monitor renal labs, electrolytes, fluid status  Monitor for additional supplement needs     Previous Recommendations Implemented: no        Previous Goals Met:  yes -pt is receiving 5 cans TwoCal per day      [] Participated in Interdisciplinary Rounds    [x] Interdisciplinary Care Plan Reviewed  DISCHARGE NUTRITION RECOMMENDATIONS ADDRESSED:     [x] Yes- recommended 5 Can TwoCal per day diet     NUTRITION RISK:           [x] At risk                        [] Not currently at risk        Will follow-up per policy.   Emmet Dakin, RD  PAGER:  [phone]

## 2017-08-28 NOTE — PROGRESS NOTES
Problem: Self Care Deficits Care Plan (Adult)  Goal: *Acute Goals and Plan of Care (Insert Text)  Occupational Therapy Goals  Initiated 8/22/2017 within 7 day(s). 1. Patient will perform bathing with set-up/mod I.   2. Patient will perform grooming with set-up/mod I standing sinkside. 3. Patient will perform lower body dressing with set-up/mod I.  4. Patient will perform toilet transfers with supervision/set-up. 5. Patient will perform all aspects of toileting with supervision/set-up. 6. Patient will perform upper extremity therapeutic exercise/activities with supervision/set-up for 15 minutes. 7. Patient will utilize energy conservation techniques during functional activities with 1 verbal cue(s). Outcome: Resolved/Met Date Met:  08/28/17  Occupational Therapy Treatment Discharge     Chart reviewed. Attempted Occupational Therapy Treatment, Pt sitting EOB feeding self through PEG. Pt dressed and reports dressing self. Pt able to perform sock haroldo/doffing utilizing ankle-over-knee technique. Pt reports able to perform ADLs and initiating Energy Conservation/Work Simplification Techniques w/ rest breaks. Pt observed walking in pro. Pt is Self-Pay and in agreement with discharge from OT. Will defer functional endurance to PT and Trach mgmt to RT/ST. Will sign off. Thank you for allowing me to assist in the care of this patient.   Verna Tavera

## 2017-08-28 NOTE — PROGRESS NOTES
Problem: Mobility Impaired (Adult and Pediatric)  Goal: *Acute Goals and Plan of Care (Insert Text)  Physical Therapy Goals LT/ST  Initiated 8/21/2017 and to be accomplished within 3-5 day(s)  1. Patient will move from supine <> sit with S in prep for out of bed activity and change of position. 2. Patient will perform sit<> stand with S with LRAD in prep for transfers/ambulation. 3. Patient will transfer from bed <> chair with S with LRAD for time up in chair for completion of ADL activity. 4. Patient will ambulate 150 feet with LRAD/S for improved functional mobility/safe discharge. .      Outcome: Progressing Towards Goal  PHYSICAL THERAPY RE-EVALUATION AND TREATMENT     Patient: Dinah Vega (48 y.o. male)  Date: 8/28/2017  Diagnosis: COPD exacerbation ([address]  ACUTE HYPOXEMIC RESPIRATORY FAILURE  respiratory stridor Acute hypoxemic respiratory failure (HCC)  Procedure(s) (LRB):  TRACHEOSTOMY (N/A) 8 Days Post-Op  Precautions: Fall      ASSESSMENT:  Patient present continues to PT with functional mobility impairments with regard to bed mobility, transfers, gait, stair negotiation, balance, weakness, and overall tolerance for activity. Pt very motivated to participate with PT at this time. Today pt presented with fair static and dynamic standing balance thus RW was utilized. During gait training pt increased ambulation distance to 450 feet with RW use, CGA/supervision level assist.  Left pt sitting up at the EOB as requested with all needs met, call bell within reach and nursing informed of progress with PT. Recommend HHPT at time of discharge. Patient's progression toward goals since last assessment: Fair+       PLAN:  Goals have been updated based on progression since last assessment. Patient continues to benefit from skilled intervention to address the above impairments. Continue to follow the patient 1-2 times per day/4-7 days per week to address goals.   Planned Interventions:  [X]     Bed Mobility Training          [X]     Neuromuscular Re-Education  [X]     Transfer Training                [ ]    Orthotic/Prosthetic Training  [X]     Gait Training                       [ ]     Modalities  [X]     Therapeutic Exercises       [ ]     Edema Management/Control  [X]     Therapeutic Activities         [X]     Patient and Family Training/Education  [ ]     Other (comment):  Discharge Recommendations: Home Health  Further Equipment Recommendations for Discharge: rolling walker       SUBJECTIVE:   Patient stated I am doing ok, yesterday was rough.       OBJECTIVE DATA SUMMARY:   Critical Behavior:  Neurologic State: Alert, Appropriate for age  Orientation Level: Appropriate for age, Oriented X4  Cognition: Appropriate decision making, Appropriate for age attention/concentration, Appropriate safety awareness, Follows commands  Safety/Judgement: Awareness of environment, Fall prevention, Good awareness of safety precautions, Insight into deficits  Functional Mobility Training:  Bed Mobility:  Rolling: Contact guard assistance;Modified independent  Supine to Sit: Modified independent   Transfers:  Sit to Stand: Supervision  Stand to Sit: Supervision  Balance:  Sitting: Intact  Standing: Intact; With support  Ambulation/Gait Training:  Distance (ft): 450 Feet (ft)  Assistive Device: Walker, rolling;Gait belt  Ambulation - Level of Assistance: Contact guard assistance   Gait Abnormalities: Decreased step clearance   Speed/Vickie: Slow  Step Length: Right shortened;Left shortened  Swing Pattern: Right asymmetrical;Left asymmetrical   Interventions: Visual/Demos; Verbal cues; Safety awareness training; Tactile cues     Pain:  Pain Scale 1: Numeric (0 - 10)  Pain Intensity 1: 5  Pain Location 1: Throat;Neck  Pain Orientation 1: Anterior  Pain Description 1: Aching   Activity Tolerance:   Fair+  Please refer to the flowsheet for vital signs taken during this treatment.   After treatment:   [ ]  Patient left in no apparent distress sitting up in chair  [X]  Patient left in no apparent distress in bed  [X]  Call bell left within reach  [X]  Nursing notified  [ ]  Caregiver present  [ ]  Bed alarm activated           Thank you for this referral.  Jane Kingston, DPT      Time Calculation: 24 mins

## 2017-08-28 NOTE — PROGRESS NOTES
Problem: Mobility Impaired (Adult and Pediatric)  Goal: *Acute Goals and Plan of Care (Insert Text)  Physical Therapy Goals LT/ST  Initiated 8/21/2017 and to be accomplished within 3-5 day(s)  1. Patient will move from supine <> sit with S in prep for out of bed activity and change of position. 2. Patient will perform sit<> stand with S with LRAD in prep for transfers/ambulation. 3. Patient will transfer from bed <> chair with S with LRAD for time up in chair for completion of ADL activity. 4. Patient will ambulate 150 feet with LRAD/S for improved functional mobility/safe discharge. .      Pt refused PT due to:     [ ]  Nausea/vomiting  [ ]  Eating  [ ]  Pain  [ ]  Pt lethargic  [X]  Pt refused at this time stating he was too tired to participate.      Zion Wilkes PT, DPT

## 2017-08-28 NOTE — PROGRESS NOTES
Late entry from Friday due to down time with computers. Met with patient at [phone] @ bedside states he needs to get home. Patient is presently on 25% Fio2 collar. Patient wants to go home however he does not have insurance to cover his present oxygen. Patient states \"If I don't get to go home I want my dilaudid back\". CM will find out prices on oxygen for patient however due to Friday and computers down this may be delayed until Monday. Patient states they came and changed out the trach today. Patient had applied for medicaid the last time he was in patient however stopped it. States that he stopped the medicaid last time however, [facility] is working on his medicaid    9410 08/28/2017 left message [facility] to check on status of medicaid. IF not will have to get prices of oxygen so patient can return home.

## 2017-08-28 NOTE — ROUTINE PROCESS
Bedside and Verbal shift change report given to RED Gray (oncoming nurse) by Purvi (offgoing nurse). Report included the following information SBAR, Kardex, Intake/Output, MAR and Recent Results.

## 2017-08-28 NOTE — PROGRESS NOTES
At this time cm has been in contact with  [employer] [phone], [employer] [phone], at this tinme [employer] [facility] can assist with trach supplies thru contract/self pay from patient cm did provide pt with equipment pricing ranging approx $500 month,cm expressed the importance of applying for medicaid for coverage of supplies and necessary treatment for care,pt decided to apply which he has declined multiple times this hospital visit,Sheree meeks [facility] was notified and application started,adam also notified Tj Suarez of possible angella for home health needs if pt cannot pay,cm will cont with case in am, discharge plan not finalized at this time.

## 2017-08-28 NOTE — PROGRESS NOTES
Problem: Falls - Risk of  Goal: *Absence of Falls  Document Caryl Fall Risk and appropriate interventions in the flowsheet.    Outcome: Progressing Towards Goal  Fall Risk Interventions:  Mobility Interventions: Patient to call before getting OOB     Mentation Interventions: Update white board     Medication Interventions: Patient to call before getting OOB, Teach patient to arise slowly     Elimination Interventions: Call light in reach, Toileting schedule/hourly rounds, Urinal in reach

## 2017-08-28 NOTE — PROGRESS NOTES
Problem: Communication Impaired (Adult)  Goal: *Acute Goals and Plan of Care (Insert Text)  Communication Impaired POC    The patient will:   1) independently recall and comply with PMV precautions with 100% accuracy. 2) clearly produce 5-7 words with coordinated breathing given Belinda. 3) independently don/doff PMV with 100% accuracy. 4) complete glottic closure exercises with 100% accuracy given maxA. 5) completed diaphragmatic breathing exercises with 100% accuracy given Belinda. Outcome: Progressing Towards Goal  SPEECH LANGUAGE PATHOLOGY TREATMENT     Patient: Isidro Crump (89 y.o. male)  Date: 8/28/2017  Diagnosis: COPD exacerbation ([facility] [address]  ACUTE HYPOXEMIC RESPIRATORY FAILURE  respiratory stridor Acute hypoxemic respiratory failure (HCC)  Procedure(s) (LRB):  TRACHEOSTOMY (N/A) 8 Days Post-Op      ASSESSMENT:  Moderate voice disorder. The patient was AOx4 during speech therapy. Information re: trach care provided; patient discussed concerns with RT vs SLP recommendations. Will follow up with additional information on next business date. Voice assessment completed; see below. Patient continues to utilize mouth breathing upon inspiration; able to produce 4-5 words per breath with vocal deterioration at the end of the utterance. Provided patient with handout at bedside re: PMV, laryngeal strengthening exercises, and diaphragmatic breathing. Ended session as patient c/o \"tired voice\" and requested medications. Progression toward goals:  [X]       Improving appropriately and progressing toward goals  [ ]       Improving slowly and progressing toward goals  [ ]       Not making progress toward goals and plan of care will be adjusted       PLAN:  Patient continues to benefit from skilled intervention to address the above impairments. Continue treatment per established plan of care. Discharge Recommendations:  Home Health       SUBJECTIVE:   Patient stated [facility] voice is getting tired.       OBJECTIVE:   Mental Status:  Neurologic State: Alert, Appropriate for age  Orientation Level: Appropriate for age, Oriented X4  Cognition: Appropriate decision making, Appropriate for age attention/concentration, Appropriate safety awareness, Follows commands  Perception: Appears intact  Perseveration: No perseveration noted  Safety/Judgement: Awareness of environment, Fall prevention, Good awareness of safety precautions, Insight into deficits        08/28/17 1700   Voice Therapy   Vocal Onset Breathy;Hard   Vocal Quality Breathy;Hoarse   Vocal Intensity Too soft   Aphonia (comment) without PMV   Vocal Pitch Pitch breaks; Restricted range   Resonance No impairment   Breath Support Speaks on residual air;Respiration/Phonation incoordination;Clavicular breathing   Rate increased rate of speech   Prosody monotone   Overall Voice Impairment Severity Moderate         PAIN:  Start of Tx: 0  End of Tx: 0      After treatment:   [ ]       Patient left in no apparent distress sitting up in chair  [X]       Patient left in no apparent distress in bed  [X]       Call bell left within reach  [X]       Nursing notified  [ ]       Caregiver present  [ ]       Bed alarm activated         COMMUNICATION/EDUCATION   [X]             Compensatory speech/language/voice/comprehension techniques     CHARO Jonas  Time Calculation: 16 mins

## 2017-08-28 NOTE — PROGRESS NOTES
Problem: Falls - Risk of  Goal: *Absence of Falls  Document Caryl Fall Risk and appropriate interventions in the flowsheet.    Fall Risk Interventions:  Mobility Interventions: Patient to call before getting OOB     Mentation Interventions: Update white board     Medication Interventions: Patient to call before getting OOB, Teach patient to arise slowly     Elimination Interventions: Call light in reach, Toileting schedule/hourly rounds, Urinal in reach

## 2017-08-28 NOTE — PROGRESS NOTES
Hospitalist Progress Note    Patient: Dinah Vega MRN: [id number]  CSN: [id number]    YOB: 1965  Age: 45 y.o. Sex: male    DOA: 8/16/2017 LOS:  LOS: 12 days          Chief Complaint: secretions. Assessment/Plan         Patient Active Problem List   Diagnosis Code    Aspiration into airway T17.908A    Throat cancer ([address] C14.0    Status post insertion of percutaneous endoscopic gastrostomy (PEG) tube ([address] Z93.1    Hyponatremia E87.1    Acute respiratory distress R06.00    Hypoxia R09.02    Stridor R06.1    Respiratory depression R06.89    COPD exacerbation ([address] J44.1    Acute hypoxemic respiratory failure ([address] J96.01    Severe protein-calorie malnutrition Justa Fermin: less than 60% of standard weight) ([address] E43    Tracheostomy dependence ([address] Z93.0       Respiratory failure on admission. Throat cancer s/p trach placement. COPD exacerbation. Severe protein calorie malnutrition. Tracheostomy dependence. Anemia. Chronic pain. HTN. Tobacco use. Continues with copious secretions. Requires suctioning. Does not feel ready to safely discharge. It would appear that equipment etc is being arranged for home use e.g. Suctioning device etc.      No cardiac events. D/c Telemetry. Dispo 24-48 hrs. Subjective:    Seen and examined. Complex medical history reviewed. Follows at [facility] in [place]. Admitted here and s/p trach placement. Has PEG tub as well. C/o copious secretions. Learning about trach at this time.      Review of systems:    Constitutional: denies fevers, chills, myalgias  Respiratory: denies SOB, cough  Cardiovascular: denies chest pain, palpitations  Gastrointestinal: denies nausea, vomiting, diarrhea      Vital signs/Intake and Output:  Visit Vitals    /62    Pulse 92    Temp 97.6 °F (36.4 °C)    Resp 19    Ht 5' 7\" (1.702 m)    Wt 70.6 kg (155 lb 10.3 oz)    SpO2 98%    BMI 24.38 kg/m2     Current Shift:     Last three shifts:  08/26 1901 - 08/28 0700  In: 920 [P.O.:480]  Out: 500 [Urine:500]    Exam:    General: Well developed, alert, NAD, OX3  Head/Neck: Trach in place. CVS:Regular rate and rhythm, no M/R/G, S1/S2 heard, no thrill  Lungs:Decreased air movement. Rhonchi present. Coughs and produced sputum. A few faint wheezes. Abdomen: PEG tube in place. Extremities: No C/C/E, pulses palpable 2+                  Labs: Results:       Chemistry No results for input(s): GLU, NA, K, CL, CO2, BUN, CREA, CA, AGAP, BUCR, TBIL, GPT, AP, TP, ALB, GLOB, AGRAT in the last 72 hours. CBC w/Diff No results for input(s): WBC, RBC, HGB, HCT, PLT, GRANS, LYMPH, EOS, HGBEXT, HCTEXT, PLTEXT in the last 72 hours. Cardiac Enzymes No results for input(s): CPK, CKND1, CHUCKY in the last 72 hours. No lab exists for component: CKRMB, TROIP   Coagulation No results for input(s): PTP, INR, APTT in the last 72 hours. No lab exists for component: INREXT    Lipid Panel No results found for: CHOL, CHOLPOCT, CHOLX, CHLST, CHOLV, 669893, HDL, LDL, LDLC, DLDLP, 590176, VLDLC, VLDL, TGLX, TRIGL, TRIGP, TGLPOCT, CHHD, CHHDX   BNP No results for input(s): BNPP in the last 72 hours. Liver Enzymes No results for input(s): TP, ALB, TBIL, AP, SGOT, GPT in the last 72 hours.     No lab exists for component: DBIL   Thyroid Studies No results found for: T4, T3U, TSH, TSHEXT     Procedures/imaging: see electronic medical records for all procedures/Xrays and details which were not copied into this note but were reviewed prior to creation of Delores Horne MD

## 2017-08-29 ENCOUNTER — HOME HEALTH ADMISSION (OUTPATIENT)
Dept: HOME HEALTH SERVICES | Facility: HOME HEALTH | Age: 52
End: 2017-08-29

## 2017-08-29 VITALS
OXYGEN SATURATION: 96 % | WEIGHT: 163.14 LBS | RESPIRATION RATE: 19 BRPM | DIASTOLIC BLOOD PRESSURE: 50 MMHG | HEART RATE: 93 BPM | TEMPERATURE: 97.5 F | BODY MASS INDEX: 25.61 KG/M2 | HEIGHT: 67 IN | SYSTOLIC BLOOD PRESSURE: 94 MMHG

## 2017-08-29 PROCEDURE — 77030018846 HC SOL IRR STRL H20 ICUM -A

## 2017-08-29 PROCEDURE — 74011250637 HC RX REV CODE- 250/637: Performed by: HOSPITALIST

## 2017-08-29 PROCEDURE — 74011250637 HC RX REV CODE- 250/637: Performed by: INTERNAL MEDICINE

## 2017-08-29 PROCEDURE — 74011250636 HC RX REV CODE- 250/636: Performed by: INTERNAL MEDICINE

## 2017-08-29 PROCEDURE — 74011250637 HC RX REV CODE- 250/637: Performed by: FAMILY MEDICINE

## 2017-08-29 PROCEDURE — 97116 GAIT TRAINING THERAPY: CPT

## 2017-08-29 RX ORDER — CLONIDINE 0.2 MG/24H
1 PATCH, EXTENDED RELEASE TRANSDERMAL
Qty: 4 PATCH | Refills: 0 | Status: SHIPPED | OUTPATIENT
Start: 2017-08-29

## 2017-08-29 RX ORDER — GUAIFENESIN 100 MG/5ML
100 SOLUTION ORAL
Qty: 200 ML | Refills: 0 | Status: SHIPPED | OUTPATIENT
Start: 2017-08-29

## 2017-08-29 RX ORDER — OXYCODONE HYDROCHLORIDE 10 MG/1
10 TABLET ORAL
Qty: 30 TAB | Refills: 0 | Status: SHIPPED | OUTPATIENT
Start: 2017-08-29 | End: 2018-06-25

## 2017-08-29 RX ORDER — AMOXICILLIN AND CLAVULANATE POTASSIUM 400; 57 MG/5ML; MG/5ML
400 POWDER, FOR SUSPENSION ORAL EVERY 12 HOURS
Qty: 70 ML | Refills: 0 | Status: ON HOLD | OUTPATIENT
Start: 2017-08-29 | End: 2017-11-18

## 2017-08-29 RX ORDER — FENTANYL 50 UG/1
1 PATCH TRANSDERMAL
Qty: 5 PATCH | Refills: 0 | Status: SHIPPED | OUTPATIENT
Start: 2017-08-29 | End: 2018-01-09

## 2017-08-29 RX ORDER — LISINOPRIL 5 MG/1
5 TABLET ORAL EVERY 12 HOURS
Qty: 30 TAB | Refills: 0 | Status: SHIPPED | OUTPATIENT
Start: 2017-08-29

## 2017-08-29 RX ORDER — FENTANYL 100 UG/H
1 PATCH TRANSDERMAL
Qty: 5 PATCH | Refills: 0 | Status: SHIPPED | OUTPATIENT
Start: 2017-08-29

## 2017-08-29 RX ADMIN — Medication 10 ML: at 06:34

## 2017-08-29 RX ADMIN — HEPARIN SODIUM 5000 UNITS: 5000 INJECTION, SOLUTION INTRAVENOUS; SUBCUTANEOUS at 06:34

## 2017-08-29 RX ADMIN — FAMOTIDINE 20 MG: 20 TABLET ORAL at 08:19

## 2017-08-29 RX ADMIN — POTASSIUM CHLORIDE 20 MEQ: 20 SOLUTION ORAL at 08:18

## 2017-08-29 RX ADMIN — AMOXICILLIN AND CLAVULANATE POTASSIUM 400 MG: 400; 57 POWDER, FOR SUSPENSION ORAL at 09:30

## 2017-08-29 RX ADMIN — Medication 100 MG: at 08:19

## 2017-08-29 RX ADMIN — HYDROCODONE BITARTRATE AND ACETAMINOPHEN 5 MG: 7.5; 325 TABLET ORAL at 08:18

## 2017-08-29 RX ADMIN — MULTIPLE VITAMIN LIQUID 30 ML: LIQUID at 08:19

## 2017-08-29 RX ADMIN — OXYCODONE HYDROCHLORIDE 10 MG: 5 TABLET ORAL at 08:19

## 2017-08-29 NOTE — PROGRESS NOTES
Spoke with  planning for discharge today, cm spoke with patient at bedside,pt unable to afford $500-600 month for trach supplies/equipment,after speaking with cm supervisor pt was offered 1st month trach expense equipment paid by [employer], pt has agreed to pay $100 month after 1st month,patient SO plans to visit around 12 noon today for Trach education,patient will also receive [address] including PT,OT,Trach care and education,cm will remain available,cm will notify staff when all paperwork finalized regarding trach equipment for today's discharge,please confirm with cm prior to pt leaving. Care Management Interventions  PCP Verified by CM: Yes  Palliative Care Consult (Criteria: CHF and RRAT>21): No  Reason for No Palliative Care Consult:  Other (see comment)  Mode of Transport at Discharge: Self (friend,SO)  Transition of Care Consult (CM Consult): [address]: Yes  Discharge Durable Medical Equipment: No  Health Maintenance Reviewed: Yes  Physical Therapy Consult: Yes  Occupational Therapy Consult: Yes  Speech Therapy Consult: Yes  Current Support Network: Relative's Home (mother,girlfriend)  Confirm Follow Up Transport: Self  Plan discussed with Pt/Family/Caregiver: Yes  Freedom of Choice Offered:  (n/a angella)  Discharge Location  Discharge Placement: Home with home health

## 2017-08-29 NOTE — OP NOTES
[address] [place] [address]  OPERATIVE REPORT    Name:  Doreen Simpson  MR#:  [id number]  :  1965  Account #:  [de-identified]  Date of Adm:  2017  Date of Surgery:  2017      PREOPERATIVE DIAGNOSES  1. Acute respiratory failure. 2. Stridor. POSTOPERATIVE DIAGNOSES  1. Acute respiratory failure. 2. Stridor. PROCEDURES PERFORMED: Tracheotomy. SURGEON: Timbo Robles MD    ANESTHESIA: General.    ESTIMATED BLOOD LOSS: 10 mL. SPECIMENS REMOVED: No specimens. FINDINGS: Normal trachea. COMPLICATIONS: None. IMPLANTS: A #8 cuffed Shiley tracheotomy tube. DESCRIPTION OF PROCEDURE: The patient was brought to the  operating room and placed on the operating table in a supine position. Once adequate general endotracheal anesthesia had been induced,  the head rest and shoulder roll were placed. The horizontal skin  incision was marked 2 fingerbreadths above the sternal notch. The  area was then infiltrated with 1% lidocaine with epinephrine 1:100,000. Next, the anterior neck was prepped and draped in a sterile fashion. The incision was made using a #15 blade and carried down through  the subcutaneous tissues. Bleeding was controlled using 4-0 silk ties  and unipolar cautery. Dissection was then carried through the strap  muscles to the thyroid isthmus. The cricoid cartilage was palpated and  located. Cautery was used to dissect down to the cricoid cartilage. The  thyroid isthmus was identified, clamped and suture ligated using 3-0  silk ties. This allowed great visualization of the second and third  tracheal rings. A #11 blade was used to start the longitudinal tracheal  incision. This was completed using Ballesteros scissors. Once this was  completed, the previously tested #8 cuffed Shiley trach tube with obturator  in place was passed into the trachea after the endotracheal tube had  been pulled back to allow visualization of the posterior tracheal wall.   The obturator was removed and the inner cannula was placed. The  tube was then re-suctioned. The trach tube flange was then sutured to  the skin using interrupted 4-0 Ethilon sutures. Amaury Leaf ties were then  placed and secured around the neck. Once this was completed, the  procedure was ended. The patient was then taken to the recovery  room in stable condition.         Ced FridayMD Sue / Lilli.Sejal  D:  08/29/2017   12:22  T:  08/29/2017   12:52  Job #:  [id number]

## 2017-08-29 NOTE — PROGRESS NOTES
Problem: Mobility Impaired (Adult and Pediatric)  Goal: *Acute Goals and Plan of Care (Insert Text)  Physical Therapy Goals LT/ST  Initiated 8/21/2017 and to be accomplished within 3-5 day(s)  1. Patient will move from supine <> sit with S in prep for out of bed activity and change of position. 2. Patient will perform sit<> stand with S with LRAD in prep for transfers/ambulation. 3. Patient will transfer from bed <> chair with S with LRAD for time up in chair for completion of ADL activity. 4. Patient will ambulate 150 feet with LRAD/S for improved functional mobility/safe discharge. .      Outcome: Resolved/Met Date Met:  08/29/17  PHYSICAL THERAPY TREATMENT/DISCHARGE     Patient: Eugenia Brown (3 y.o. male)  Date: 8/29/2017  Diagnosis: COPD exacerbation ([facility]  ACUTE HYPOXEMIC RESPIRATORY FAILURE  respiratory stridor Acute hypoxemic respiratory failure ([facility])  Procedure(s) (LRB):  TRACHEOSTOMY (N/A) 9 Days Post-Op  Precautions: Fall  Chart, physical therapy assessment, plan of care and goals were reviewed. ASSESSMENT:  Pt has met all acute care PT goals at this time for safe return to home with HHPT. Pt has been ambulating the hallways of the hospital with the use of a Rw. During gait training pt ambulated 650+ feet with Rw use, Mod I demonstrating decreased sushant and decreased step clearance. Educated pt on energy conservation techniques and safety at home due to increased falls risk; pt verbalized understanding. Recommend HHPT and RW use at time of discharge. Progression toward goals:  [X]      Goals met  [ ]      Improving appropriately and progressing toward goals  [ ]      Improving slowly and progressing toward goals  [ ]      Not making progress toward goals and plan of care will be adjusted       PLAN:  Patient will be discharged from physical therapy at this time.   Rationale for discharge:  [X] Goals Achieved  [ ] Carliss Purple  [ ] Patient not participating in therapy  [ ] Other:  Discharge Recommendations:  Home Health  Further Equipment Recommendations for Discharge:  rolling walker       SUBJECTIVE:   Patient stated I am doing better.       OBJECTIVE DATA SUMMARY:   Critical Behavior:  Neurologic State: Alert, Appropriate for age  Orientation Level: Appropriate for age, Oriented X4  Cognition: Appropriate decision making, Appropriate for age attention/concentration, Appropriate safety awareness, Follows commands  Safety/Judgement: Awareness of environment, Fall prevention, Good awareness of safety precautions, Insight into deficits  Functional Mobility Training:  Bed Mobility:  Rolling: Supervision  Supine to Sit: Supervision  Sit to Supine: Supervision  Scooting: Supervision   Transfers:  Sit to Stand: Supervision  Stand to Sit: Supervision  Balance:  Sitting: Intact  Standing: Intact; With support  Ambulation/Gait Training:  Distance (ft): 650 Feet (ft)  Assistive Device: Walker, rolling;Gait belt  Ambulation - Level of Assistance: Modified independent   Gait Abnormalities: Decreased step clearance   Speed/Vickie: Pace decreased (<100 feet/min)  Step Length: Right shortened;Left shortened  Pain:  Pain Scale 1: Numeric (0 - 10)  Pain Intensity 1: 0  Pain Location 1: Neck; Throat  Pain Orientation 1: Anterior   Pain Intervention(s) 1: Medication (see MAR)  Activity Tolerance:   Fair+/Good  Please refer to the flowsheet for vital signs taken during this treatment.   After treatment:   [ ] Patient left in no apparent distress sitting up in chair  [X] Patient left in no apparent distress in bed  [X] Call bell left within reach  [X] Nursing notified  [ ] Caregiver present  [ ] Bed alarm activated     Federico Severance Rosier PT, DPT      Time Calculation: 8 mins

## 2017-08-29 NOTE — PROGRESS NOTES
RESPIRATORY NOTE:  Pt and friend (SO) has been educated and has demonstrated  basics of trach care and suctioning using sterile technique. Pt's friend who is a nurse understands and says she feels quite comfortable.

## 2017-08-29 NOTE — PROGRESS NOTES
Problem: Falls - Risk of  Goal: *Absence of Falls  Document Caryl Fall Risk and appropriate interventions in the flowsheet.    Outcome: Progressing Towards Goal  Fall Risk Interventions:  Mobility Interventions: Patient to call before getting OOB, Utilize walker, cane, or other assitive device     Mentation Interventions: Update white board     Medication Interventions: Patient to call before getting OOB, Teach patient to arise slowly     Elimination Interventions: Call light in reach, Patient to call for help with toileting needs, Toileting schedule/hourly rounds, Urinal in reach

## 2017-08-29 NOTE — OP NOTES
[address] [place] [address]  OPERATIVE REPORT    Name:  Malika Izquierdo  MR#:  [id number]  :  1965  Account #:  [de-identified]  Date of Adm:  2017  Date of Surgery:  2017      PREOPERATIVE DIAGNOSES  1. Acute respiratory failure. 2. Laryngeal cancer. POSTOPERATIVE DIAGNOSES  1. Acute respiratory failure. 2. Laryngeal cancer. PROCEDURES PERFORMED:  Direct laryngoscopy, with extubation. SURGEON:  Gomez Marin M.D.    ESTIMATED BLOOD LOSS:  None. SPECIMENS REMOVED:  Tracheal swabs were sent for aerobic  Gram-stain and aerobic cultures and sensitivities. ANESTHESIA:  General.    FINDINGS:  The patient's laryngeal mucosa was mildly erythematous  and edematous. There were very viscous secretions. There were  several white lesions along the right false vocal cord, which were sent  for aerobic culture and sensitivity. There appeared to be an excellent  glottic airway once below the supraglottis. COMPLICATIONS:  None. IMPLANTS:  None. DESCRIPTION OF PROCEDURE:  The patient was brought to the  operating room and placed on the operating table in the supine  position. A shoulder roll was placed. The table was turned, and two  dampened 4 x 4's were placed over the edentulous palate. An anterior  commissure laryngoscope was used to visualize the glottis. The  endotracheal tube which was in place was followed along the tongue  base to visualize the epiglottis. It was then advanced to visualize the  glottis, and there appeared to be an excellent glottic airway. There did  not appear to be any recurrent tumor. Once this had been completed,  the anterior commissure laryngoscope was removed. The patient was  then extubated and watched. He was awake and appeared to be  breathing quite comfortably. Oxygen saturations were excellent. Once  this had been completed, the patient was transported back to the  intensive care unit in stable condition.         Temple MD Aldo Gay Or / Henry W Tristen Corneoj  D:  08/29/2017   12:16  T:  08/29/2017   12:38  Job #:  [id number]

## 2017-08-29 NOTE — DISCHARGE SUMMARY
Discharge Summary    Patient: Manuel Faustin MRN: [id number]  CSN: [id number]    YOB: 1965  Age: 45 y.o. Sex: male    DOA: 8/16/2017 LOS:  LOS: 13 days   Discharge Date:      Primary Care Provider:  Sriram Tan MD    Admission Diagnoses: COPD exacerbation ([place] [address].)  ACUTE HYPOXEMIC RESPIRATORY FAILURE  respiratory stridor    Discharge Diagnoses:    Problem List as of 8/29/2017  Date Reviewed: 8/20/2017          Codes Class Noted - Resolved    Tracheostomy dependence ([place] [address].) ICD-10-CM: Z93.0  ICD-9-CM: V44.0  8/25/2017 - Present        Severe protein-calorie malnutrition V[place] Gens: less than 60% of standard weight) ([place] [address].) ICD-10-CM: E43  ICD-9-CM: 262  8/18/2017 - Present        COPD exacerbation ([place] [address].) ICD-10-CM: J44.1  ICD-9-CM: 491.21  8/16/2017 - Present        * (Principal)Acute hypoxemic respiratory failure ([place] [address].) ICD-10-CM: J96.01  ICD-9-CM: 518.81  8/16/2017 - Present        Acute respiratory distress ICD-10-CM: R06.00  ICD-9-CM: 518.82  8/3/2017 - Present        Hypoxia ICD-10-CM: R09.02  ICD-9-CM: 799.02  8/3/2017 - Present        Stridor ICD-10-CM: R06.1  ICD-9-CM: 786.1  8/3/2017 - Present        Respiratory depression ICD-10-CM: R06.89  ICD-9-CM: 786.09  8/3/2017 - Present        Aspiration into airway ICD-10-CM: T17.908A  ICD-9-CM: 934.9  7/30/2017 - Present        Throat cancer ([place] [address].) ICD-10-CM: C14.0  ICD-9-CM: 149.0  7/30/2017 - Present        Status post insertion of percutaneous endoscopic gastrostomy (PEG) tube ([facility].) ICD-10-CM: Z93.1  ICD-9-CM: V44.1  7/30/2017 - Present        Hyponatremia ICD-10-CM: E87.1  ICD-9-CM: 276.1  7/30/2017 - Present              Discharge Medications:     Current Discharge Medication List      START taking these medications    Details   amoxicillin-clavulanate (AUGMENTIN) 400-57 mg/5 mL suspension 5 mL by Per G Tube route every twelve (12) hours.   Qty: 70 mL, Refills: 0      cloNIDine (CATAPRES) 0.2 mg/24 hr patch 1 Patch by TransDERmal route every seven (7) days.  Qty: 4 Patch, Refills: 0      fentaNYL (DURAGESIC) 100 mcg/hr PATCH 1 Patch by TransDERmal route every seventy-two (72) hours. Max Daily Amount: 1 Patch. Qty: 5 Patch, Refills: 0      fentaNYL (DURAGESIC) 50 mcg/hr PATCH 1 Patch by TransDERmal route every seventy-two (72) hours. Max Daily Amount: 1 Patch. Qty: 5 Patch, Refills: 0      guaiFENesin (ROBITUSSIN) 100 mg/5 mL liquid Take 5 mL by mouth every four (4) hours as needed for Cough. Qty: 200 mL, Refills: 0      lisinopril (PRINIVIL, ZESTRIL) 5 mg tablet Take 1 Tab by mouth every twelve (12) hours. Qty: 30 Tab, Refills: 0         CONTINUE these medications which have CHANGED    Details   oxyCODONE IR (ROXICODONE) 10 mg tab immediate release tablet 1 Tab by Per G Tube route every six (6) hours as needed. Max Daily Amount: 40 mg.  Qty: 30 Tab, Refills: 0         CONTINUE these medications which have NOT CHANGED    Details   magic mouthwash (FIRST-MOUTHWASH BLM) 876--40 mg/30 mL mwsh oral suspension Take 10 mL by mouth every four (4) hours as needed. albuterol-ipratropium (DUO-NEB) 2.5 mg-0.5 mg/3 ml nebu 3 mL by Nebulization route every four (4) hours as needed. Qty: 30 Nebule, Refills: 1      multivitamin, tx-iron-ca-min (THERA-M W/ IRON) 9 mg iron-400 mcg tab tablet Take 1 Tab by mouth daily. Qty: 30 Tab, Refills: 1      nicotine (NICODERM CQ) 14 mg/24 hr patch 1 Patch by TransDERmal route daily for 30 days. Qty: 30 Patch, Refills: 1      Nebulizer Accessories kit Use nebs Q4H PRN  Qty: 1 Kit, Refills: 0             Discharge Condition: Good    Procedures : Tracheostomy placement. Consults: Otolaryngology, Pulmonology / Critical Care.       PHYSICAL EXAM  Visit Vitals    BP 94/50 (BP 1 Location: Right arm, BP Patient Position: At rest;Sitting)    Pulse 93    Temp 97.5 °F (36.4 °C)    Resp 19    Ht 5' 7\" (1.702 m)    Wt 74 kg (163 lb 2.3 oz)    SpO2 96%    BMI 25.55 kg/m2     General: Awake, cooperative, no acute distress    HEENT: Tracheostomy. Lungs:  CTA Bilaterally. No Wheezing/Rhonchi/Rales. Heart:  Regular  rhythm,  No murmur, No Rubs, No Gallops  Abdomen: Soft, Non distended, Non tender. +Bowel sounds,   Extremities: No c/c/e  Psych:   Not anxious or agitated. Neurologic:  Somewhat anxious. Admission HPI :    The patient came via ambulance  to the emergency room in acute respiratory distress. There was  difficulty intubating him. The ER physician was called to the  ambulance. Ultimately, the intensivist came in, as well as Anesthesia  to the emergency room for a difficult intubation. It was successful,  however, and he is actually awake on the ventilator at this point in  time. He is a gentleman who is 45years old and has a history of throat  cancer. He received radiation treatment through [facility]. He is on chronic narcotic therapy, which is prescribed to him  by Dr. Kaci Woodson. He had recently been in the hospital twice, initially for  aspiration pneumonitis and hyponatremia with dehydration. He is  insisting on going home after a short stay because he had to take care  of his elderly mother, but ended up coming back to the hospital with  worsening shortness of breath and COPD exacerbation. Extensive  work went into getting him on the right tube feedings and making  arrangements so that he could get these free through a pharmacy in  [place]. He was set up with a nebulizer machine, appropriate  medications for his illness, and close followup. Unfortunately, the  patient continues to smoke cigarettes.       Hospital Course :      Admitted to the hospital.  ICU level care initially. ENT was consulted for stridor and difficult intubation. Upper airway obstruction noted. History of stage III laryngeal SCC s/p XRT/chemotherapy followed at [facility] in [place] noted. Direct lanyngoscopy completed on 8/18/17. On admission there was concern for a possible aspiration event. He was treated with antibiotics here. Copd was thought to be in exacerbation as well. He received the customary treatment that included steroids and neb treatments. After extubation some tightness, secretions and stridor persisted. ENT decided to pursue elective tracheostomy. On 8/20/17 tracheostomy completed without any trouble. After this acute phase of care Mr. Susi Bruno ws transferred out of the icu to the telemetry floor. Has received teaching regarding the care and maintenance of his trach. Has probably been ready for discharge for several days. Has remained in hospital as efforts were made to obtain supplies and ensure a safe discharge. Today doing well. Still having some secretions. He is doing well managing these with suction. At this time he is ready for dc. Activity: Activity as tolerated    Diet: PEG feeding. Follow-up: Follow up with Dr. Frantz Wilson on discharge. F/u with ENT Dr. Brian Nath. Disposition: Home Health    Minutes spent on discharge: 79      Labs: Results:       Chemistry No results for input(s): GLU, NA, K, CL, CO2, BUN, CREA, CA, AGAP, BUCR, TBIL, GPT, AP, TP, ALB, GLOB, AGRAT in the last 72 hours. CBC w/Diff No results for input(s): WBC, RBC, HGB, HCT, PLT, GRANS, LYMPH, EOS, HGBEXT, HCTEXT, PLTEXT in the last 72 hours. Cardiac Enzymes No results for input(s): CPK, CKND1, CHUCKY in the last 72 hours. No lab exists for component: CKRMB, TROIP   Coagulation No results for input(s): PTP, INR, APTT in the last 72 hours. No lab exists for component: INREXT    Lipid Panel No results found for: CHOL, CHOLPOCT, CHOLX, CHLST, CHOLV, 075411, HDL, LDL, LDLC, DLDLP, 518032, VLDLC, VLDL, TGLX, TRIGL, TRIGP, TGLPOCT, CHHD, CHHDX   BNP No results for input(s): BNPP in the last 72 hours. Liver Enzymes No results for input(s): TP, ALB, TBIL, AP, SGOT, GPT in the last 72 hours.     No lab exists for component: DBIL   Thyroid Studies No results found for: T4, T3U, TSH, TSHEXT         Significant Diagnostic Studies: Ct Neck Soft Tissue W Cont    Result Date: 8/17/2017  CT neck History: Neck mass, history of throat cancer with prior radiation therapy Comparison: CT neck 8/3/2017 Technique: Multiple axial CT images of the neck were obtained extending from the skull base to the upper thorax after administration of IV contrast. Coronal and sagittal reformations were performed. One or more dose reduction techniques were used on this CT: automated exposure control, adjustment of the mAs and/or kVp according to patient's size, and iterative reconstruction techniques. The specific techniques utilized on this CT exam have been documented in the patient's electronic medical record. Findings: Endotracheal tube has been placed during the interval. Some layering hyperdense oral secretions are seen within the oropharynx and hypopharynx. Tip of endotracheal tube is in the tracheal airway at about the level of the clavicular heads. Endotracheal tube and secretions does limit evaluation of the laryngeal, hypopharyngeal, and oropharyngeal regions. No discrete suspicious masslike lesion is seen. There is suggestion of hypodense thickening of the mucosa of the oropharynx and hypopharynx similar to prior study. Thickening of the platysma is present with mild fatty stranding across multiple fascial planes, nonspecific but suspect posttreatment changes. These findings show no short-term interval change. There is no prior more remote study for comparison. No adenopathy is present based on size criteria. There are a few scattered very small subcentimeter anterior and posterior cervical chain nodes. No cystic/necrotic nodes are identified. The parotid and submandibular glands are unremarkable. Mild atherosclerotic calcifications are present particularly along the carotid bifurcations additional atheromatous plaque is also seen. The thyroid gland is unremarkable.  Degenerative changes are scattered throughout the cervical and visualized thoracic spine. Poor dentition is present with majority of teeth absent. Visualized paranasal sinuses and mastoid air cells are clear. Changes of centrilobular and paraseptal emphysema are seen in the bilateral lung apices. No consolidation seen in the lung apices. IMPRESSION: 1. Interval placement of endotracheal tube. Expected layering mucus secretions within the oropharynx and hypopharynx. 2. No discrete masslike lesion seen to correlate with history of throat cancer. Stable diffuse mildly edematous mucosal thickening and soft tissue stranding likely reflecting posttreatment changes. No more remote comparison study. 3. No adenopathy based on size criteria. No cystic/necrotic nodes. 4. Emphysema seen in bilateral lung apices without consolidation. Ct Neck Soft Tissue W Cont    Result Date: 8/3/2017  CT neck History: Pulsatile mass, throat cancer, difficulty swallowing Comparison: No prior study Technique: Multiple axial CT images of the neck were obtained extending from the skull base to the upper thorax after administration of IV contrast. Coronal and sagittal reformations were performed. One or more dose reduction techniques were used on this CT: automated exposure control, adjustment of the mAs and/or kVp according to patient's size, and iterative reconstruction techniques. The specific techniques utilized on this CT exam have been documented in the patient's electronic medical record. Findings: A discrete focal masslike lesion is not identified. There is mucosal thickening identified along the epiglottis and the aryepiglottic folds in the hypopharynx with overall edematous appearance. There is mild amount of stranding/hypodensity seen along the preepiglottic fat likely representing postradiation changes given history. This mucosal thickening and edematous changes extends inferiorly towards the supraglottic region.  No discrete focal laryngeal masslike lesion is identified although again overall edematous appearance seen. No subglottic lesion is identified. No adenopathy is present. No cystic/necrotic nodes are identified. Atherosclerotic calcifications are present along the bilateral carotid siphons. Poor dentition is present with numerous absent teeth. The thyroid gland is unremarkable. Degenerative changes are seen in the spine. 3 mm nodular density is present anteriorly right upper lobe axial image 6. Paraseptal and centrilobular emphysematous changes are present in the bilateral lung apices. IMPRESSION: 1. No discrete masslike lesion is identified to correlate with history of pulsatile mass. 2. Diffuse mucosal thickening and edematous changes in the hypopharyngeal and laryngeal regions most likely representing postradiation changes given history. No definite discrete masslike lesion is seen to suggest residual/recurrent tumor. No prior comparison study. No adenopathy based on size criteria. 3. Bilateral carotid bifurcation atherosclerotic calcifications. Xr Chest Port    Result Date: 8/20/2017  EXAM: Portable upright chest, one view INDICATION: Postop tracheostomy. Difficulty breathing with audible wheezing COMPARISON: 8/18/2017 _______________ FINDINGS: Interval tracheostomy placement with its tip projecting at the trachea. Cardiac silhouette and pulmonary vascularity are normal. Lungs are clear. Costophrenic angles are sharp. Old, healed left rib fracture deformity is redemonstrated. No cardiopulmonary changes. _______________     IMPRESSION: Tracheostomy in situ. No active disease. Xr Chest Port    Result Date: 8/18/2017  PORTABLE CHEST AT 0555 HOURS: Indication: Respiratory failure. Technique:  Portable, erect AP view. Comparison:  08/17/2017 Findings:  Endotracheal tube is approximately 5.5 cm above the reji. Lungs are adequately expanded. No focal consolidation, pulmonary edema or pneumothorax. Cardiac silhouette is within normal limits.  Chronic, healed left posterior seventh rib fracture. IMPRESSION: 1. Stable position of endotracheal tube. No acute cardiopulmonary disease. Xr Chest Port    Result Date: 8/17/2017  History: Intubation, tube placement, COPD Comparison 8/16/2017 FINDINGS: AP portable upright chest obtained. Stable and satisfactory position endotracheal tube. Mediastinal and cardiac silhouettes unremarkable. Lungs remain clear with no pleural effusion or pneumothorax. IMPRESSION: No evidence of active cardiopulmonary disease or significant interval change. Xr Chest Port    Result Date: 8/16/2017  EXAM: Chest x-ray single AP view INDICATION: ET tube placement. Shortness of breath. COMPARISON: 8/3/2017 _____________ FINDINGS: Tip of endotracheal tube is 6 cm above the reji The lungs are clear. The cardiomediastinal silhouette is within normal limits. No pleural effusion. No acute osseus abnormality. _____________     IMPRESSION: Tip of endotracheal tube is 6 cm above the reji. No acute cardiopulmonary disease. Xr Chest Port    Result Date: 8/3/2017  --------------------------------------------------------------------------- <<<<<<<<<           [facility]           >>>>>>>>> --------------------------------------------------------------------------- CLINICAL HISTORY:  Respiratory distress. COMPARISON EXAMINATIONS:  July 30, 2017. ---  SINGLE FRONTAL VIEW OF THE CHEST  --- The lungs and pleural spaces are clear. The mediastinum is unremarkable in appearance. No significant osseous abnormalities are identified.  --------------    Impression: -------------- No active pulmonary disease. Xr Chest Port    Result Date: 7/31/2017  History: Short of breath Comparison: 8/7/2012. Exam performed AP portable at 1945. Findings: The lungs appear clear. The cardiac silhouette and pulmonary vascularity appear within normal limits. No evidence for pneumothorax or pleural effusion.      Impression: No acute cardiopulmonary disease. No results found for this or any previous visit.         CC: Verena Call MD

## 2017-08-29 NOTE — DISCHARGE INSTRUCTIONS
Shortness of Breath: Care Instructions  Your Care Instructions  Shortness of breath has many causes. Sometimes conditions such as anxiety can lead to shortness of breath. Some people get mild shortness of breath when they exercise. Trouble breathing also can be a symptom of a serious problem, such as asthma, lung disease, emphysema, heart problems, and pneumonia. If your shortness of breath continues, you may need tests and treatment. Watch for any changes in your breathing and other symptoms. Follow-up care is a key part of your treatment and safety. Be sure to make and go to all appointments, and call your doctor if you are having problems. Its also a good idea to know your test results and keep a list of the medicines you take. How can you care for yourself at home? · Do not smoke or allow others to smoke around you. If you need help quitting, talk to your doctor about stop-smoking programs and medicines. These can increase your chances of quitting for good. · Get plenty of rest and sleep. · Take your medicines exactly as prescribed. Call your doctor if you think you are having a problem with your medicine. · Find healthy ways to deal with stress. ¨ Exercise daily. ¨ Get plenty of sleep. ¨ Eat regularly and well. When should you call for help? Call 911 anytime you think you may need emergency care. For example, call if:  · You have severe shortness of breath. · You have symptoms of a heart attack. These may include:  ¨ Chest pain or pressure, or a strange feeling in the chest.  ¨ Sweating. ¨ Shortness of breath. ¨ Nausea or vomiting. ¨ Pain, pressure, or a strange feeling in the back, neck, jaw, or upper belly or in one or both shoulders or arms. ¨ Lightheadedness or sudden weakness. ¨ A fast or irregular heartbeat. After you call 911, the  may tell you to chew 1 adult-strength or 2 to 4 low-dose aspirin. Wait for an ambulance. Do not try to drive yourself.   Call your doctor now or seek immediate medical care if:  · Your shortness of breath gets worse or you start to wheeze. Wheezing is a high-pitched sound when you breathe. · You wake up at night out of breath or have to prop your head up on several pillows to breathe. · You are short of breath after only light activity or while at rest.  Watch closely for changes in your health, and be sure to contact your doctor if:  · You do not get better over the next 1 to 2 days. Where can you learn more? Go to http://justine-serena.info/. Enter S780 in the search box to learn more about \"Shortness of Breath: Care Instructions. \"  Current as of: March 25, 2017  Content Version: 11.3  © 8948-0549 [employer]. Care instructions adapted under license by [employer] (which disclaims liability or warranty for this information). If you have questions about a medical condition or this instruction, always ask your healthcare professional. [place] [address] any warranty or liability for your use of this information. Patient armband removed and shredded. Digital Health Dialog Activation    Thank you for requesting access to [employer]. Please follow the instructions below to securely access and download your online medical record. [employer] allows you to send messages to your doctor, view your test results, renew your prescriptions, schedule appointments, and more. How Do I Sign Up? 1. In your internet browser, go to [email or web address]  2. Click on the First Time User? Click Here link in the Sign In box. You will be redirect to the New Member Sign Up page. 3. Enter your [employer] Access Code exactly as it appears below. You will not need to use this code after youve completed the sign-up process. If you do not sign up before the expiration date, you must request a new code.     [employer] Access Code: [email or web address]  Expires: 10/30/2017  1:10 PM (This is the date your [employer] access code will )    4. Enter the last four digits of your Social Security Number (xxxx) and Date of Birth (mm/dd/yyyy) as indicated and click Submit. You will be taken to the next sign-up page. 5. Create a [employer] ID. This will be your [employer] login ID and cannot be changed, so think of one that is secure and easy to remember. 6. Create a [employer] password. You can change your password at any time. 7. Enter your Password Reset Question and Answer. This can be used at a later time if you forget your password. 8. Enter your e-mail address. You will receive e-mail notification when new information is available in [address] 9. Click Sign Up. You can now view and download portions of your medical record. 10. Click the Download Summary menu link to download a portable copy of your medical information. Additional Information    If you have questions, please visit the Frequently Asked Questions section of the [employer] website at [email or web address] Walk-in/[employer]/. Remember, [employer] is NOT to be used for urgent needs. For medical emergencies, dial 911. DISCHARGE SUMMARY from Nurse    The following personal items are in your possession at time of discharge:    Dental Appliances: None  Visual Aid: None     Home Medications: None  Jewelry: None  Clothing: With patient                PATIENT INSTRUCTIONS:    After general anesthesia or intravenous sedation, for 24 hours or while taking prescription Narcotics:  · Limit your activities  · Do not drive and operate hazardous machinery  · Do not make important personal or business decisions  · Do  not drink alcoholic beverages  · If you have not urinated within 8 hours after discharge, please contact your surgeon on call.     Report the following to your surgeon:  · Excessive pain, swelling, redness or odor of or around the surgical area  · Temperature over 100.5  · Nausea and vomiting lasting longer than 4 hours or if unable to take medications  · Any signs of decreased circulation or nerve impairment to extremity: change in color, persistent  numbness, tingling, coldness or increase pain  · Any questions        What to do at Home:  Recommended activity: Activity as tolerated      *  Please give a list of your current medications to your Primary Care Provider. *  Please update this list whenever your medications are discontinued, doses are      changed, or new medications (including over-the-counter products) are added. *  Please carry medication information at all times in case of emergency situations. These are general instructions for a healthy lifestyle:    No smoking/ No tobacco products/ Avoid exposure to second hand smoke    Surgeon General's Warning:  Quitting smoking now greatly reduces serious risk to your health. Obesity, smoking, and sedentary lifestyle greatly increases your risk for illness    A healthy diet, regular physical exercise & weight monitoring are important for maintaining a healthy lifestyle    You may be retaining fluid if you have a history of heart failure or if you experience any of the following symptoms:  Weight gain of 3 pounds or more overnight or 5 pounds in a week, increased swelling in our hands or feet or shortness of breath while lying flat in bed. Please call your doctor as soon as you notice any of these symptoms; do not wait until your next office visit. Recognize signs and symptoms of STROKE:    F-face looks uneven    A-arms unable to move or move unevenly    S-speech slurred or non-existent    T-time-call 911 as soon as signs and symptoms begin-DO NOT go       Back to bed or wait to see if you get better-TIME IS BRAIN. Warning Signs of HEART ATTACK     Call 911 if you have these symptoms:   Chest discomfort. Most heart attacks involve discomfort in the center of the chest that lasts more than a few minutes, or that goes away and comes back.  It can feel like uncomfortable pressure, squeezing, fullness, or pain.   Discomfort in other areas of the upper body. Symptoms can include pain or discomfort in one or both arms, the back, neck, jaw, or stomach.  Shortness of breath with or without chest discomfort.  Other signs may include breaking out in a cold sweat, nausea, or lightheadedness. Don't wait more than five minutes to call 911 - MINUTES MATTER! Fast action can save your life. Calling 911 is almost always the fastest way to get lifesaving treatment. Emergency Medical Services staff can begin treatment when they arrive -- up to an hour sooner than if someone gets to the hospital by car. The discharge information has been reviewed with the patient. The patient verbalized understanding. Discharge medications reviewed with the patient and appropriate educational materials and side effects teaching were provided.

## 2017-08-29 NOTE — PROGRESS NOTES
1935: Assumed patient care, he was alert and oriented to person, place, time and situation. Respiratory status was stable on room air. Kalia Broach was patent. Dressing was clean dry and intact. Peg tube was patent. Dressing was clean, dry and intact Vital signs were stable. MEWS score was a one. Patient denied any nausea vomiting dizziness or anxiety. White board was updated and explained. Bed was locked and in lowest position. Call bell, water and personal belongings were within reach. Patient had no questions, comments or concerns after bedside shift report. 0700: Patient had an uneventful shift. Respiratory status, vital signs and MEWS score remained stable. Patient was resting quietly with no signs of distress noted. Bed locked and in lowest position. Call bell water and personal belongings were within reach. Patient had no questions, comments or concerns after bedside shift report.

## 2017-08-29 NOTE — PROGRESS NOTES
CM has made arrangements with [facility] for Purchase of of suction and humidifying machines and to assist. Pt is aware he will incur all monthly costs after this initial 30 days. He is also aware we will not be able to further assist him with any needs should he return to the hospital. Here is a breakdown of total costs to initialize care in sending this patient home. CM has confirmed with [facility] Valencia Mckeon) that they will have technician to his home around Kady Him is aware that patient has dc from building.       NEBULIZER- 50PSI COMPRESSOR SALE  9999 PRIVATE PAY 1 $540.60 $540.60   [id number] EA - TRACH MASK, ADLT W/O TUBING SALE  9999 PRIVATE PAY 2 $2.30 $4.60   [id number] EA - RAYA-FLEX II AEROSOL TUBE 100' SALE  9999 PRIVATE PAY 1 $16.46 $16.46   [id number] EA - AEROSOL DRAIN BAG/Y ADPT 750CC SALE  9999 PRIVATE PAY 2 $1.08 $2.16   [id number] EA - WATER STERILE BTL 1000ML SALE  9999 PRIVATE PAY 12 $3.16 $37.92   [id number] EA - NEB W/O DELIVERY MECH 500ML SALE  9999 PRIVATE PAY 2 $3.40 $6.80   [id number] EA - THERMOVENT TRACH HEAT/MOIST SALE  9999 PRIVATE PAY 31 $6.19 $191.89    SUCTION MACHINE SALE  9999 PRIVATE PAY 1 $519.84 $519.84   [id number] EA - AIRLIFE SUCTION CATH KIT 14FR SALE  9999 PRIVATE PAY 93 $2.49 $231.57   [id number] EA - MEDI-VAC SUCTION HAND YANKAUER SALE  9999 PRIVATE PAY 4 $4.16 $16.64   [id number] EA - VACU-AIDE QSU SUCT CAN 800ML SALE  9999 PRIVATE PAY 2 $8.15 $16.30   [id number] EA - MEDI-VAC SUCT TUBING 1/4IN 6FT SALE  9999 PRIVATE PAY 2 $4.26 $8.52   [id number] EA - KIT TRACH CARE LTX GLV SALE  9999 PRIVATE PAY 31 $5.17 $160.27   [id number] EA - TRACH TUBE CUFFED ADULT 6 SALE  9999 PRIVATE PAY 1 $52.36 $52.36   [id number] EA - TRACH YULIANA 6.4MM IDX10.8MM OD SALE  9999 PRIVATE PAY 31 $7.29 $225.99

## 2017-08-30 ENCOUNTER — HOME CARE VISIT (OUTPATIENT)
Dept: HOME HEALTH SERVICES | Facility: HOME HEALTH | Age: 52
End: 2017-08-30

## 2017-09-01 ENCOUNTER — HOSPITAL ENCOUNTER (EMERGENCY)
Age: 52
Discharge: HOME | End: 2017-09-01
Attending: FAMILY MEDICINE
Payer: SELF-PAY

## 2017-09-01 ENCOUNTER — APPOINTMENT (OUTPATIENT)
Dept: GENERAL RADIOLOGY | Age: 52
End: 2017-09-01
Attending: FAMILY MEDICINE
Payer: SELF-PAY

## 2017-09-01 VITALS
TEMPERATURE: 97.9 F | RESPIRATION RATE: 14 BRPM | WEIGHT: 142.9 LBS | DIASTOLIC BLOOD PRESSURE: 70 MMHG | HEIGHT: 65 IN | BODY MASS INDEX: 23.81 KG/M2 | SYSTOLIC BLOOD PRESSURE: 122 MMHG | HEART RATE: 87 BPM | OXYGEN SATURATION: 98 %

## 2017-09-01 DIAGNOSIS — J95.03 TRACHEOSTOMY MALFUNCTION (HCC): Primary | ICD-10-CM

## 2017-09-01 DIAGNOSIS — F41.1 ANXIETY STATE: ICD-10-CM

## 2017-09-01 LAB
ANION GAP SERPL CALC-SCNC: 3 MMOL/L (ref 3–18)
BASOPHILS # BLD: 0 K/UL (ref 0–0.06)
BASOPHILS NFR BLD: 0 % (ref 0–2)
BNP SERPL-MCNC: 83 PG/ML (ref 0–900)
BUN SERPL-MCNC: 21 MG/DL (ref 7–18)
BUN/CREAT SERPL: 25 (ref 12–20)
CALCIUM SERPL-MCNC: 9.3 MG/DL (ref 8.5–10.1)
CHLORIDE SERPL-SCNC: 95 MMOL/L (ref 100–108)
CK MB CFR SERPL CALC: 4 % (ref 0–4)
CK MB SERPL-MCNC: 1 NG/ML (ref 5–25)
CK SERPL-CCNC: 25 U/L (ref 39–308)
CO2 SERPL-SCNC: 34 MMOL/L (ref 21–32)
CREAT SERPL-MCNC: 0.83 MG/DL (ref 0.6–1.3)
DIFFERENTIAL METHOD BLD: ABNORMAL
EOSINOPHIL # BLD: 0.2 K/UL (ref 0–0.4)
EOSINOPHIL NFR BLD: 3 % (ref 0–5)
ERYTHROCYTE [DISTWIDTH] IN BLOOD BY AUTOMATED COUNT: 13.6 % (ref 11.6–14.5)
GLUCOSE SERPL-MCNC: 117 MG/DL (ref 74–99)
HCT VFR BLD AUTO: 30.1 % (ref 36–48)
HGB BLD-MCNC: 10.1 G/DL (ref 13–16)
LYMPHOCYTES # BLD: 0.3 K/UL (ref 0.9–3.6)
LYMPHOCYTES NFR BLD: 6 % (ref 21–52)
MCH RBC QN AUTO: 33.2 PG (ref 24–34)
MCHC RBC AUTO-ENTMCNC: 33.6 G/DL (ref 31–37)
MCV RBC AUTO: 99 FL (ref 74–97)
MONOCYTES # BLD: 0.7 K/UL (ref 0.05–1.2)
MONOCYTES NFR BLD: 14 % (ref 3–10)
NEUTS SEG # BLD: 4.3 K/UL (ref 1.8–8)
NEUTS SEG NFR BLD: 77 % (ref 40–73)
PLATELET # BLD AUTO: 132 K/UL (ref 135–420)
PMV BLD AUTO: 10.5 FL (ref 9.2–11.8)
POTASSIUM SERPL-SCNC: 4.4 MMOL/L (ref 3.5–5.5)
RBC # BLD AUTO: 3.04 M/UL (ref 4.7–5.5)
SODIUM SERPL-SCNC: 132 MMOL/L (ref 136–145)
TROPONIN I SERPL-MCNC: <0.02 NG/ML (ref 0–0.06)
WBC # BLD AUTO: 5.5 K/UL (ref 4.6–13.2)

## 2017-09-01 PROCEDURE — 96375 TX/PRO/DX INJ NEW DRUG ADDON: CPT

## 2017-09-01 PROCEDURE — 96374 THER/PROPH/DIAG INJ IV PUSH: CPT

## 2017-09-01 PROCEDURE — 94640 AIRWAY INHALATION TREATMENT: CPT

## 2017-09-01 PROCEDURE — 77030013140 HC MSK NEB VYRM -A

## 2017-09-01 PROCEDURE — 94762 N-INVAS EAR/PLS OXIMTRY CONT: CPT

## 2017-09-01 PROCEDURE — 96361 HYDRATE IV INFUSION ADD-ON: CPT

## 2017-09-01 PROCEDURE — 74011250636 HC RX REV CODE- 250/636: Performed by: FAMILY MEDICINE

## 2017-09-01 PROCEDURE — 82550 ASSAY OF CK (CPK): CPT | Performed by: FAMILY MEDICINE

## 2017-09-01 PROCEDURE — 99285 EMERGENCY DEPT VISIT HI MDM: CPT

## 2017-09-01 PROCEDURE — 85025 COMPLETE CBC W/AUTO DIFF WBC: CPT | Performed by: FAMILY MEDICINE

## 2017-09-01 PROCEDURE — 71010 XR CHEST PORT: CPT

## 2017-09-01 PROCEDURE — 80048 BASIC METABOLIC PNL TOTAL CA: CPT | Performed by: FAMILY MEDICINE

## 2017-09-01 PROCEDURE — 74011000250 HC RX REV CODE- 250: Performed by: FAMILY MEDICINE

## 2017-09-01 PROCEDURE — 83880 ASSAY OF NATRIURETIC PEPTIDE: CPT | Performed by: FAMILY MEDICINE

## 2017-09-01 PROCEDURE — 93005 ELECTROCARDIOGRAM TRACING: CPT

## 2017-09-01 RX ORDER — LORAZEPAM 2 MG/ML
1 INJECTION INTRAMUSCULAR ONCE
Status: COMPLETED | OUTPATIENT
Start: 2017-09-01 | End: 2017-09-01

## 2017-09-01 RX ORDER — ALBUTEROL SULFATE 0.83 MG/ML
5 SOLUTION RESPIRATORY (INHALATION) ONCE
Status: COMPLETED | OUTPATIENT
Start: 2017-09-01 | End: 2017-09-01

## 2017-09-01 RX ADMIN — SODIUM CHLORIDE 1000 ML: 900 INJECTION, SOLUTION INTRAVENOUS at 11:15

## 2017-09-01 RX ADMIN — METHYLPREDNISOLONE SODIUM SUCCINATE 125 MG: 125 INJECTION, POWDER, FOR SOLUTION INTRAMUSCULAR; INTRAVENOUS at 11:39

## 2017-09-01 RX ADMIN — ALBUTEROL SULFATE 5 MG: 2.5 SOLUTION RESPIRATORY (INHALATION) at 11:44

## 2017-09-01 RX ADMIN — LORAZEPAM 1 MG: 2 INJECTION INTRAMUSCULAR; INTRAVENOUS at 11:39

## 2017-09-01 RX ADMIN — ALBUTEROL SULFATE 5 MG: 2.5 SOLUTION RESPIRATORY (INHALATION) at 14:17

## 2017-09-01 NOTE — ED TRIAGE NOTES
Patient with shortness of breath. Patient with history of new trach. Patient states that he feels more short of breath than when he was in the hospital. Sepsis Screening completed    (  )Patient meets SIRS criteria. ( x )Patient does not meet SIRS criteria.       SIRS Criteria is achieved when two or more of the following are present   Temperature < 96.8°F (36°C) or > 100.9°F (38.3°C)   Heart Rate > 90 beats per minute   Respiratory Rate > 20 breaths per minute   WBC count > 12,000 or <4,000 or > 10% bands

## 2017-09-01 NOTE — ED PROVIDER NOTES
[address]  EMERGENCY DEPARTMENT HISTORY AND PHYSICAL EXAM       Date: 9/1/2017   Patient Name: Taylor Gonzalez   YOB: 1965  Medical Record Number: [id number]    History of Presenting Illness     Chief Complaint   Patient presents with    Shortness of Breath        History Provided By:  Patient and EMS    Additional History:   11:13 AM  Taylor Gonzalez is a 45 y.o. male with PMHx of throat CA (currently in remission), COPD, and HTN presenting to the ED C/O SOB, onset this AM. Associated sxs include wheezing (which he notes is normal but louder than usual), stridor, and chest congestion. Pt states that he was evaluated here on 8/20/17 for a tracheotomy and was discharged home 2 days ago, and notes wheezing louder and more SOB since discharged. Per EMS, lungs sounds are clear and O2 is 97%, breathing improved slightly when given O2 en route. Pt has a nicotine patch and a fentanyl patch. Notes second hand smoke exposure at home. Currently on abx. Denies CP, fever, n/v, leg swelling, palpitations, current chemotherapy, tobacco use (quit), or any other symptoms or complaints. Primary Care Provider: Homar Flaherty MD   Specialist:    Past History     Past Medical History:   Past Medical History:   Diagnosis Date    Chronic obstructive pulmonary disease ([facility] [address]     acute hypoxic respiratory failure 8/16    Hypertension     Ill-defined condition     peg tube in place    Ill-defined condition     chemo    S/P radiation therapy     Throat cancer ([facility] [address]     Tobacco abuse         Past Surgical History:   Past Surgical History:   Procedure Laterality Date    HX HEENT      sinus surgery        Family History:   History reviewed. No pertinent family history.      Social History:   Social History   Substance Use Topics    Smoking status: Current Every Day Smoker     Packs/day: 0.50    Smokeless tobacco: Never Used    Alcohol use No        Allergies:   No Known Allergies     Review of Systems   Review of Systems   Constitutional: Negative for fever. HENT: Positive for congestion (chest). Respiratory: Positive for shortness of breath, wheezing and stridor. Cardiovascular: Negative for chest pain, palpitations and leg swelling. Gastrointestinal: Negative for nausea and vomiting. All other systems reviewed and are negative. Physical Exam  Vitals:    09/01/17 1200 09/01/17 1230 09/01/17 1245 09/01/17 1300   BP: 100/55 103/55 109/63 122/70   Pulse: 78 74 78 87   Resp: 18 23 23 14   Temp:       SpO2: 99% 94% 92% 98%   Weight:       Height:           Physical Exam   Nursing note and vitals reviewed. Vital signs and nursing notes reviewed    CONSTITUTIONAL: Alert, in no apparent distress; well-developed; well-nourished. Middle aged male. HEAD:  Normocephalic, atraumatic  EYES: PERRL; EOM's intact. ENTM: Nose: no rhinorrhea; Throat: no erythema or exudate, mucous membranes moist, missing some teeth  Neck:  No JVD, supple without lymphadenopathy  RESP: Equal breath sounds, trach in place, noisy upper airway breathing, some faint wheezes bilaterally, PEG tube in place  MDM:   CV: S1 and S2 WNL; No murmurs, gallops or rubs. GI: Normal bowel sounds, abdomen soft and non-tender. No masses or organomegaly. : No costo-vertebral angle tenderness. BACK:  Non-tender  UPPER EXT:  Normal inspection  LOWER EXT: No edema, no calf tenderness. Distal pulses intact. NEURO: CN intact, reflexes 2/4 and sym, strength 5/5 and sym, sensation intact. SKIN: No rashes; Normal for age and stage, fentanyl and nicotine patch  PSYCH:  Alert and oriented, slightly anxious.     Vital signs and nursing notes reviewed      Diagnostic Study Results     Labs -      Recent Results (from the past 12 hour(s))   CBC WITH AUTOMATED DIFF    Collection Time: 09/01/17 11:22 AM   Result Value Ref Range    WBC 5.5 4.6 - 13.2 K/uL    RBC 3.04 (L) 4.70 - 5.50 M/uL    HGB 10.1 (L) 13.0 - 16.0 g/dL    HCT 30.1 (L) 36.0 - 48.0 %    MCV 99.0 (H) 74.0 - 97.0 FL    MCH 33.2 24.0 - 34.0 PG    MCHC 33.6 31.0 - 37.0 g/dL    RDW 13.6 11.6 - 14.5 %    PLATELET 406 (L) 240 - 420 K/uL    MPV 10.5 9.2 - 11.8 FL    NEUTROPHILS 77 (H) 40 - 73 %    LYMPHOCYTES 6 (L) 21 - 52 %    MONOCYTES 14 (H) 3 - 10 %    EOSINOPHILS 3 0 - 5 %    BASOPHILS 0 0 - 2 %    ABS. NEUTROPHILS 4.3 1.8 - 8.0 K/UL    ABS. LYMPHOCYTES 0.3 (L) 0.9 - 3.6 K/UL    ABS. MONOCYTES 0.7 0.05 - 1.2 K/UL    ABS. EOSINOPHILS 0.2 0.0 - 0.4 K/UL    ABS.  BASOPHILS 0.0 0.0 - 0.06 K/UL    DF AUTOMATED     METABOLIC PANEL, BASIC    Collection Time: 09/01/17 11:22 AM   Result Value Ref Range    Sodium 132 (L) 136 - 145 mmol/L    Potassium 4.4 3.5 - 5.5 mmol/L    Chloride 95 (L) 100 - 108 mmol/L    CO2 34 (H) 21 - 32 mmol/L    Anion gap 3 3.0 - 18 mmol/L    Glucose 117 (H) 74 - 99 mg/dL    BUN 21 (H) 7.0 - 18 MG/DL    Creatinine 0.83 0.6 - 1.3 MG/DL    BUN/Creatinine ratio 25 (H) 12 - 20      GFR est AA >60 >60 ml/min/1.73m2    GFR est non-AA >60 >60 ml/min/1.73m2    Calcium 9.3 8.5 - 10.1 MG/DL   NT-PRO BNP    Collection Time: 09/01/17 11:22 AM   Result Value Ref Range    NT pro-BNP 83 0 - 900 PG/ML   CARDIAC PANEL,(CK, CKMB & TROPONIN)    Collection Time: 09/01/17 11:22 AM   Result Value Ref Range    CK 25 (L) 39 - 308 U/L    CK - MB 1.0 <3.6 ng/ml    CK-MB Index 4.0 0.0 - 4.0 %    Troponin-I, Qt. <0.02 0.00 - 0.06 NG/ML   EKG, 12 LEAD, INITIAL    Collection Time: 09/01/17 11:23 AM   Result Value Ref Range    Ventricular Rate 88 BPM    Atrial Rate 88 BPM    P-R Interval 130 ms    QRS Duration 76 ms    Q-T Interval 346 ms    QTC Calculation (Bezet) 418 ms    Calculated P Axis 73 degrees    Calculated R Axis 59 degrees    Calculated T Axis 65 degrees    Diagnosis       Sinus rhythm with occasional premature ventricular complexes  Otherwise normal ECG  When compared with ECG of 16-AUG-2017 09:30,  premature ventricular complexes are now present  Nonspecific T wave abnormality no longer evident in Lateral leads         Radiologic Studies -  XR CHEST PORT   Final Result   IMPRESSION:     No acute pulmonary findings   As read by the radiologist.        Medical Decision Making     Vital Signs-Reviewed the patient's vital signs. Patient Vitals for the past 12 hrs:   Temp Pulse Resp BP SpO2   09/01/17 1300 - 87 14 122/70 98 %   09/01/17 1245 - 78 23 109/63 92 %   09/01/17 1230 - 74 23 103/55 94 %   09/01/17 1200 - 78 18 100/55 99 %   09/01/17 1147 - - - - 100 %   09/01/17 1145 - 81 17 104/64 99 %   09/01/17 1130 - 87 16 114/80 100 %   09/01/17 1118 97.9 °F (36.6 °C) 91 20 116/66 98 %   09/01/17 1115 - 90 16 116/66 98 %       Pulse Oximetry Analysis - Normal 97% on PEG tube. No intervention needed. Cardiac Monitor:   Rate: 84 bpm  Rhythm: Normal Sinus Rhythm     EKG interpretation: (Preliminary)  11:23 AM  88 bpm, sinus rhythm with occasional premature ventricular complexes, QTc 418 ms  EKG read by Taryn Rivera MD at 11:25 AM       INITIAL CLINICAL IMPRESSION and PLANS:  The patient presents with the primary complaint(s) of: SOB. The presentation, to include historical aspects and clinical findings are consistent with the DX of mucous plug. However, other possible DX's to consider as primary, associated with, or exacerbated by include:    1. Dislodged trach  2. Tracheal stinosis    Considering the above, my initial management plan to evaluate and therapeutic interventions include the following and as noted in the orders:    1. Labs: CBC, BMP, NT-Pro BNP, cardiac panel  2. Imaging: CXR      Old Medical Records: Nursing notes. ED Course:     11:13 AM Initial assessment performed. CONSULT NOTE:   12:46 PM  Taryn Rivera MD spoke with Tina Díaz MD  Specialty: ENT  Discussed pt's hx, disposition, and available diagnostic and imaging results over the telephone. Reviewed care plans. Consulting physician agrees with plans as outlined.  Said that Dr. Pamela Jack, ENT, will come for consult and wants a number 6 ready at bedside. Written by DONAL Alberts, as dictated by Bret Sandifer, MD.    12:54 PM Stridor is a little worse. Respiratory at bedside. Awaiting Dr. Patricia Mathew. 1:14 PM Dr. Patricai Mathew is at bedside. Removed number 6 and suctioned airway. Stridor resolved, possibly due to trachea being partial dislodged. Pt will follow up with Dr Francisco Randolph at [facility]NT.     1:25 PM Will give pt another breathing treatment, will discharge home after. Medications Given in the ED:  Medications   albuterol (PROVENTIL VENTOLIN) nebulizer solution 5 mg (not administered)   sodium chloride 0.9 % bolus infusion 1,000 mL (1,000 mL IntraVENous New Bag 9/1/17 1115)   albuterol (PROVENTIL VENTOLIN) nebulizer solution 5 mg (5 mg Nebulization Given 9/1/17 1144)   methylPREDNISolone (PF) (SOLU-MEDROL) injection 125 mg (125 mg IntraVENous Given 9/1/17 1139)   LORazepam (ATIVAN) injection 1 mg (1 mg IntraVENous Given 9/1/17 1139)        Discharge Note:  1:16 PM  Patients results have been reviewed with them. Patient and/or family have verbally conveyed their understanding and agreement of the patient's signs, symptoms, diagnosis, treatment and prognosis and additionally agree to follow up as recommended or return to the Emergency Room should their condition change prior to their follow-up appointment. Patient verbally agrees with the care-plan and verbally conveys that all of their questions have been answered. Discharge instructions have also been provided to the patient with some educational information regarding their diagnosis as well a list of reasons why they would want to return to the ER prior to their follow-up appointment should their condition change. Diagnosis   Clinical Impression:   1. Tracheostomy malfunction [facility])         Discussion:  Patient presented with stridor and difficulty breathing. Pt was anxious.  Had a tracheotomy done 10 days ago for upper airway obstruction due to hx of CA. CXR and labs were normal. ENT consulted and came to ER, and was able to readjust trach that had become dislodged. Pts stridor resolved and sats are back to normal.    Follow-up Information     Follow up With Details Comments Contact Info    Liz Torres MD Schedule an appointment as soon as possible for a visit in 2 days for ENT follow up [address] [place] [address],[place] [address]  [phone]      [facility] EMERGENCY DEPT  As needed, If symptoms worsen [address]  [phone]          Current Discharge Medication List              _______________________________   Attestations:     SCRIBE ATTESTATION:  This note is prepared by Debbie Frazier, acting as Scribe for Cassidy Rodriguez MD on 11:12 AM on 9/1/2017 . PROVIDER ATTESTATION:  Cassidy Rodriguez MD: The scribe's documentation has been prepared under my direction and personally reviewed by me in its entirety.  I confirm that the note above accurately reflects all work, treatment, procedures, and medical decision making performed by me.   _______________________________

## 2017-09-01 NOTE — DISCHARGE INSTRUCTIONS
Tracheostomy: What to Expect at [address] [place] [address]  After surgery, your neck may be sore, and you may have trouble swallowing for a few days. It may take 2 to 3 days to get used to breathing through the tracheostomy (trach) tube. You can expect to feel better each day, but it may take at least 2 weeks to adjust to living with your trach (say \"trayk\"). At first, it may be hard to make sounds or to speak. Your doctor, nurses, respiratory therapists, and speech therapists can help you learn to talk with your trach tube or with other speaking devices. When you speak, your voice may sound deeper and scratchier than normal.  Your trach tube may be sewn or tied to your skin. If you have stitches, the doctor will remove them about 1 week after your surgery. He or she may also take out your original tube and put in a new tube 5 to 10 days after surgery. Taking good care of your trach is very important. It can prevent infections and help keep you breathing easily. This care sheet gives you a general idea about how long it will take for you to recover. But each person recovers at a different pace. Follow the steps below to get better as quickly as possible. How can you care for yourself at home? Activity  · Rest when you feel tired. Getting enough sleep will help you recover. · You may need to take at least 2 weeks off work. It depends on the type of work you do, your employer, your ability to speak, how you feel, and other health problems you may have. Some people are not able to return to their previous job. · Try to walk each day. Start out by walking a little more than you did the day before. Bit by bit, increase the amount you walk. Walking boosts blood flow and helps prevent pneumonia and constipation. · Avoid strenuous activities, such as biking, jogging, weight lifting, and aerobic exercise, for 6 weeks.   · You may drive when you are no longer taking prescription pain medicine and can quickly move your foot from the gas pedal to the brake. · You may take a bath in shallow water. Do not splash water into your trach. · You may take a shower. Aim the shower head at your lower body or back. Cover the tube so that no water gets in but you can still breathe. · Do not swim. Diet  · You should be able to eat without problems. If food or liquid gets into your tracheostomy tube, suction it out right away. Sit up while you eat. · If your stomach is upset, try bland, low-fat foods like plain rice, broiled chicken, toast, and yogurt. · Drink plenty of fluids (unless your doctor tells you not to). · You may notice that your bowel movements are not regular right after your surgery. This is common. Try to avoid constipation and straining with bowel movements. You may want to take a fiber supplement every day. If you have not had a bowel movement after a couple of days, ask your doctor about taking a mild laxative. Medicines  · Your doctor will tell you if and when you can restart your medicines. He or she will also give you instructions about taking any new medicines. · If you take blood thinners, such as warfarin (Coumadin), clopidogrel (Plavix), or aspirin, be sure to talk to your doctor. He or she will tell you if and when to start taking those medicines again. Make sure that you understand exactly what your doctor wants you to do. · Be safe with medicines. Take pain medicines exactly as directed. ¨ If the doctor gave you a prescription medicine for pain, take it as prescribed. ¨ If you are not taking a prescription pain medicine, take an over-the-counter medicine such as acetaminophen (Tylenol), ibuprofen (Advil, Motrin), or naproxen (Aleve). Read and follow all instructions on the label. ¨ Do not take two or more pain medicines at the same time unless the doctor told you to. Many pain medicines contain acetaminophen, which is Tylenol. Too much acetaminophen (Tylenol) can be harmful.   · If you think your pain medicine is making you sick to your stomach:  ¨ Take your medicine after meals (unless your doctor tells you not to). ¨ Ask your doctor for a different pain medicine. · If your doctor prescribed antibiotics, take them as directed. Do not stop taking them just because you feel better. You need to take the full course of antibiotics. Incision, stoma, and tube care  Your doctor or nurse will give you instructions on how to take care of your trach. This will include how to suction your trach, how to clean the opening in your neck (stoma), and how to clean and replace your trach's inner tube (inner cannula). Be sure to follow all of your doctor's instructions closely. And remember to have your emergency supplies ready and available wherever you are. · Suctioning  Always have suction supplies ready, including a fully charged suction machine. Suction as often as needed, but at least 3 or 4 times a day. For example, two of the times could be before you go to bed and when you wake up in the morning. You will need suction catheters, a suction machine, saline fluid, a small cup, and a mirror. These should be delivered to your home before you arrive there. Here are the steps to take:  1. Wash your hands with soap and water for at least 30 seconds. 2. Pour saline fluid into the cup. 3. Connect a catheter to the suction machine tubing. Dip the catheter tip into the saline fluid. Insert the wet catheter into the trach. Push the tube in gently, about 3 to 4 inches, until you feel it hit something. 4. Slowly pull the catheter out of the trach, rolling it back and forth between your fingers, with your thumb over the control valve (this turns the suction on). Do not keep the suction on for more than 10 seconds at a time. 5. Wait about 30 seconds, and repeat inserting and pulling out the tube until all the mucus has been removed.  Then suction the saline left in the cup.  6. Catheters can be used again if you clean them properly. Ask your doctor how to do this. Throw away used catheters if your doctor tells you to. · Stoma care  Clean and dry the stoma 3 times a day. Apply ointment to keep the skin healthy. Do not let crust form on the skin at the stoma. You will need hydrogen peroxide, tap or sterile water, 8 or 10 cotton swabs, 2 small cups, a dry cloth, a mirror, and ointment for the skin. Follow these steps:  1. Wash your hands with soap and water for at least 30 seconds. 2. Fill one cup with equal amounts of water and hydrogen peroxide. Put 3 or 4 cotton swabs in the cup. Fill the other cup with water, and put 3 or 4 swabs in that cup.  3. Clean and remove dried mucus around the stoma with the cotton swabs in the peroxide cup. Then clean off any peroxide with the swabs in the water cup.  4. Dry your skin with the cloth. If your doctor tells you to use skin ointment for your stoma, apply it with the remaining swabs. 5. Wash your hands again. · Cleaning the inner cannula  If you are not using the disposable type of cannula, clean and replace the inner cannula 2 or 3 times each day. You will need 2 small bowls, a small brush, hydrogen peroxide, water, and a mirror. To clean the inner cannula:  1. Wash your hands with soap and water for at least 30 seconds. 2. Pour equal amounts of water and hydrogen peroxide into one bowl. Pour a small amount of water in the other bowl. 3. Unlock the inner cannula, and remove it by gently pulling it out and down. Put this into the peroxide bowl to soak. Clean the inside and outside of the cannula with the brush. 4. Rinse the cannula under tap water, then let it soak in the bowl of water. Shake the cannula out, and slide it gently back into the outer cannula. Turn it to lock it in place. Make sure it is locked in place and you cannot pull it out. 5. Wash your hands again. Other instructions  · Wear clothes that are loose around your neck.   · If you are outside, wear a loose covering over your trach, such as a scarf or other cloth, but avoid clothing with loose fibers. Covering your trach prevents dust, dirt, and bacteria from getting into it. You can also use special trach \"bibs\" to cover your trach and to protect your clothing from mucus when you cough. You can buy trach bibs at a medical supply store. · Try not to breathe in anything that might irritate your trach. This includes small bits of food, smoke, powders, aerosol sprays, and dust.  · Keep the air in your home moist with a room vaporizer or humidifier. · If you have thick mucus plugging your trach tube, ask your doctor about squirting a small amount of saline down your trach to help clear the tube. · Carry an extra cannula with you at all times, in case your tube becomes blocked. Follow-up care is a key part of your treatment and safety. Be sure to make and go to all appointments, and call your doctor if you are having problems. It's also a good idea to know your test results and keep a list of the medicines you take. When should you call for help? Call 911 anytime you think you may need emergency care. For example, call if:  · You passed out (lost consciousness). · You have severe trouble breathing, and coughing and suctioning do not help. · Your trach falls out and you cannot get it back in, and you are not able to breathe. Call your doctor now or seek immediate medical care if:  · Your trach falls out and you cannot get it back in, but you can still breathe. · You have trouble breathing after suctioning. · Your skin around your trach (stoma) has signs of infection, such as:  ¨ Increased pain, swelling, warmth, or redness. ¨ Red streaks leading from the area. ¨ Pus draining from the area. ¨ A fever. · You have pain that does not get better after you take pain medicine. · You are sick to your stomach and cannot drink fluids. · Your secretions change.   Watch closely for any changes in your health, and be sure to contact your doctor if:  · You do not get better as expected. Where can you learn more? Go to http://justine-serena.info/. Enter W258 in the search box to learn more about \"Tracheostomy: What to Expect at Home. \"  Current as of: January 4, 2017  Content Version: 11.3  © 7718-4091 [employer]. Care instructions adapted under license by [employer] (which disclaims liability or warranty for this information). If you have questions about a medical condition or this instruction, always ask your healthcare professional. [address] any warranty or liability for your use of this information.

## 2017-09-01 NOTE — CONSULTS
Ears/Nose/Throat Consult    Subjective:     Date of Consultation:  September 1, 2017    Referring Physician: ER    History of Present Illness:   Patient is a 45 y.o.  male who is being seen for stridor. He is S/P tracheotomy 8-20-17. Had first trach change one week ago. Seemed SOB yesterday and about 10 this AM, he started to have difficulty breathing  And stridor. His wife states that when he awoke this AM, one of the trach ties was off. Presented to the ER and suction catheter could not be passed. Patient Active Problem List    Diagnosis Date Noted    Tracheostomy dependence ([facility] 08/25/2017    Severe protein-calorie malnutrition Jinger Hessmer: less than 60% of standard weight) ([facility] 08/18/2017    COPD exacerbation ([facility] [address] [facility] 08/16/2017    Acute hypoxemic respiratory failure ([facility] 08/16/2017    Acute respiratory distress 08/03/2017    Hypoxia 08/03/2017    Stridor 08/03/2017    Respiratory depression 08/03/2017    Aspiration into airway 07/30/2017    Throat cancer ([facility] [facility] 07/30/2017    Status post insertion of percutaneous endoscopic gastrostomy (PEG) tube ([facility] 07/30/2017    Hyponatremia 07/30/2017     Past Medical History:   Diagnosis Date    Chronic obstructive pulmonary disease (HCC)     acute hypoxic respiratory failure 8/16    Hypertension     Ill-defined condition     peg tube in place    Ill-defined condition     chemo    S/P radiation therapy     Throat cancer ([facility]     Tobacco abuse       History reviewed. No pertinent family history. Social History   Substance Use Topics    Smoking status: Current Every Day Smoker     Packs/day: 0.50    Smokeless tobacco: Never Used    Alcohol use No     Past Surgical History:   Procedure Laterality Date    HX HEENT      sinus surgery      No current facility-administered medications for this encounter.       Current Outpatient Prescriptions   Medication Sig    amoxicillin-clavulanate (AUGMENTIN) 400-57 mg/5 mL suspension 5 mL by Per G Tube route every twelve (12) hours.  cloNIDine (CATAPRES) 0.2 mg/24 hr patch 1 Patch by TransDERmal route every seven (7) days.  fentaNYL (DURAGESIC) 100 mcg/hr PATCH 1 Patch by TransDERmal route every seventy-two (72) hours. Max Daily Amount: 1 Patch.  fentaNYL (DURAGESIC) 50 mcg/hr PATCH 1 Patch by TransDERmal route every seventy-two (72) hours. Max Daily Amount: 1 Patch.  guaiFENesin (ROBITUSSIN) 100 mg/5 mL liquid Take 5 mL by mouth every four (4) hours as needed for Cough.  lisinopril (PRINIVIL, ZESTRIL) 5 mg tablet Take 1 Tab by mouth every twelve (12) hours.  oxyCODONE IR (ROXICODONE) 10 mg tab immediate release tablet 1 Tab by Per G Tube route every six (6) hours as needed. Max Daily Amount: 40 mg.    magic mouthwash (FIRST-MOUTHWASH BLM) 864--40 mg/30 mL mwsh oral suspension Take 10 mL by mouth every four (4) hours as needed.  albuterol-ipratropium (DUO-NEB) 2.5 mg-0.5 mg/3 ml nebu 3 mL by Nebulization route every four (4) hours as needed.  multivitamin, tx-iron-ca-min (THERA-M W/ IRON) 9 mg iron-400 mcg tab tablet Take 1 Tab by mouth daily.  Nebulizer Accessories kit Use nebs Q4H PRN      No Known Allergies     Review of Systems:  A comprehensive review of systems was negative.      Objective:     Patient Vitals for the past 8 hrs:   BP Temp Pulse Resp SpO2 Height Weight   17 1300 122/70 - 87 14 98 % - -   17 1245 109/63 - 78 23 92 % - -   17 1230 103/55 - 74 23 94 % - -   17 1200 100/55 - 78 18 99 % - -   17 1147 - - - - 100 % - -   17 1145 104/64 - 81 17 99 % - -   17 1130 114/80 - 87 16 100 % - -   17 1118 116/66 97.9 °F (36.6 °C) 91 20 98 % 5' 5\" (1.651 m) 64.8 kg (142 lb 14.4 oz)   17 1115 116/66 - 90 16 98 % - -     Temp (24hrs), Av.9 °F (36.6 °C), Min:97.9 °F (36.6 °C), Max:97.9 °F (36.6 °C)         Physical Exam:   ENT exam normal, no neck nodes or sinus tenderness, trach tube removed and his stridor stopped, trachea suctioned and no obstruction, inner cannula clean,  #6 Shiley tube replaced without difficulty. There were no additional components assessed. Assessment:     Acute respiratory distress secondary to dislodged trach tube    Plan:     Discussedc need for ENT follow-up and monthly trach tube changes. His wife will call Dr. Monica Stephenson to schedule follow-up. Discussed that the trach collar should just allow 2 fingers underneath it. Discussed that if he develops stridor, remove the inner cannula first and check for mucus plug. If still having stridor, remove the entire trach tube and get to the ER. Patient and wife verbalized understanding.       Signed By: Juno العلي MD     September 1, 2017

## 2017-09-04 LAB
ATRIAL RATE: 88 BPM
CALCULATED P AXIS, ECG09: 73 DEGREES
CALCULATED R AXIS, ECG10: 59 DEGREES
CALCULATED T AXIS, ECG11: 65 DEGREES
DIAGNOSIS, 93000: NORMAL
P-R INTERVAL, ECG05: 130 MS
Q-T INTERVAL, ECG07: 346 MS
QRS DURATION, ECG06: 76 MS
QTC CALCULATION (BEZET), ECG08: 418 MS
VENTRICULAR RATE, ECG03: 88 BPM

## 2017-09-26 NOTE — ANESTHESIA POSTPROCEDURE EVALUATION
Post-Anesthesia Evaluation & Assessment    BP: 151/85  HR: 107   Temp: 36.7C  RR: 14  O2 sat: 100%    Nausea/Vomiting: Controlled. Post-operative hydration adequate. Pain Scale 1: Numeric (0 - 10) (08/29/17 1100)  Pain Intensity 1: 0 (08/29/17 1100)   Managed    Pain score at or below stated goal level. Mental status & Level of consciousness: alert and oriented x 3    Neurological status: moves all extremities, sensation grossly intact    Pulmonary status: airway patent, adequate oxygenation. Complications related to anesthesia: none    Patient has met all PACU discharge requirements.       Leonides Albarran DO

## 2017-10-04 NOTE — PROGRESS NOTES
Call received from patient,stated he's been trying to reorder trach supplies thru [address] patient but was told agency is not familiar with his financial agreement regarding ordering supplies,after conversation with patient cm received call from [facility] Patient spoke with Jenney Sever cm was informed that agency did receive call from patient attempting to reorder his supplies thru medicaid but agency was informed by medicaid that his insurance is inactive and patient wanted [facility] patient to [facility] to see if hospital would pay for it,cm informed [facility] patient that [facility] paid in full patient first month of trach supplies when he was last discharged from facility,patient did agree to make own payments afterwards, while patient was an inpatient [facility] has attempted several time to assist pt with applying with medicaid pt denied assistance. cm informed wife and patient that they are now responsible financially for all trach cost.

## 2017-11-15 ENCOUNTER — HOSPITAL ENCOUNTER (INPATIENT)
Age: 52
LOS: 3 days | Discharge: HOME HEALTH | DRG: 177 | End: 2017-11-18
Attending: EMERGENCY MEDICINE | Admitting: INTERNAL MEDICINE
Payer: SELF-PAY

## 2017-11-15 ENCOUNTER — APPOINTMENT (OUTPATIENT)
Dept: GENERAL RADIOLOGY | Age: 52
DRG: 177 | End: 2017-11-15
Attending: EMERGENCY MEDICINE
Payer: SELF-PAY

## 2017-11-15 DIAGNOSIS — R11.2 NON-INTRACTABLE VOMITING WITH NAUSEA, UNSPECIFIED VOMITING TYPE: ICD-10-CM

## 2017-11-15 DIAGNOSIS — Z93.0 TRACHEOSTOMY DEPENDENCE (HCC): ICD-10-CM

## 2017-11-15 DIAGNOSIS — J69.0 ASPIRATION PNEUMONIA OF RIGHT LOWER LOBE DUE TO VOMIT (HCC): Primary | ICD-10-CM

## 2017-11-15 LAB
ALBUMIN SERPL-MCNC: 3.3 G/DL (ref 3.4–5)
ALBUMIN/GLOB SERPL: 0.9 {RATIO} (ref 0.8–1.7)
ALP SERPL-CCNC: 126 U/L (ref 45–117)
ALT SERPL-CCNC: 19 U/L (ref 16–61)
ANION GAP SERPL CALC-SCNC: 12 MMOL/L (ref 3–18)
AST SERPL-CCNC: 26 U/L (ref 15–37)
BASOPHILS # BLD: 0 K/UL (ref 0–0.1)
BASOPHILS NFR BLD: 0 % (ref 0–3)
BILIRUB SERPL-MCNC: 0.2 MG/DL (ref 0.2–1)
BNP SERPL-MCNC: 81 PG/ML (ref 0–900)
BUN SERPL-MCNC: 5 MG/DL (ref 7–18)
BUN/CREAT SERPL: 9 (ref 12–20)
CALCIUM SERPL-MCNC: 9.6 MG/DL (ref 8.5–10.1)
CHLORIDE SERPL-SCNC: 98 MMOL/L (ref 100–108)
CO2 SERPL-SCNC: 30 MMOL/L (ref 21–32)
CREAT SERPL-MCNC: 0.57 MG/DL (ref 0.6–1.3)
DIFFERENTIAL METHOD BLD: ABNORMAL
EOSINOPHIL # BLD: 0.1 K/UL (ref 0–0.4)
EOSINOPHIL NFR BLD: 2 % (ref 0–5)
ERYTHROCYTE [DISTWIDTH] IN BLOOD BY AUTOMATED COUNT: 17 % (ref 11.6–14.5)
GLOBULIN SER CALC-MCNC: 3.8 G/DL (ref 2–4)
GLUCOSE SERPL-MCNC: 105 MG/DL (ref 74–99)
HCT VFR BLD AUTO: 33 % (ref 36–48)
HGB BLD-MCNC: 11.2 G/DL (ref 13–16)
LACTATE SERPL-SCNC: 1.8 MMOL/L (ref 0.4–2)
LYMPHOCYTES # BLD: 0.4 K/UL (ref 0.8–3.5)
LYMPHOCYTES NFR BLD: 6 % (ref 20–51)
MCH RBC QN AUTO: 33.7 PG (ref 24–34)
MCHC RBC AUTO-ENTMCNC: 33.9 G/DL (ref 31–37)
MCV RBC AUTO: 99.4 FL (ref 74–97)
MONOCYTES # BLD: 0.3 K/UL (ref 0–1)
MONOCYTES NFR BLD: 5 % (ref 2–9)
NEUTS BAND NFR BLD MANUAL: 6 % (ref 0–5)
NEUTS SEG # BLD: 4.9 K/UL (ref 1.8–8)
NEUTS SEG NFR BLD: 81 % (ref 42–75)
PLATELET # BLD AUTO: 202 K/UL (ref 135–420)
PMV BLD AUTO: 9 FL (ref 9.2–11.8)
POTASSIUM SERPL-SCNC: 4.4 MMOL/L (ref 3.5–5.5)
PROT SERPL-MCNC: 7.1 G/DL (ref 6.4–8.2)
RBC # BLD AUTO: 3.32 M/UL (ref 4.7–5.5)
RBC MORPH BLD: ABNORMAL
RBC MORPH BLD: ABNORMAL
SODIUM SERPL-SCNC: 140 MMOL/L (ref 136–145)
TROPONIN I SERPL-MCNC: <0.02 NG/ML (ref 0–0.06)
WBC # BLD AUTO: 6 K/UL (ref 4.6–13.2)

## 2017-11-15 PROCEDURE — 83605 ASSAY OF LACTIC ACID: CPT | Performed by: EMERGENCY MEDICINE

## 2017-11-15 PROCEDURE — 96365 THER/PROPH/DIAG IV INF INIT: CPT

## 2017-11-15 PROCEDURE — 99285 EMERGENCY DEPT VISIT HI MDM: CPT

## 2017-11-15 PROCEDURE — 74011000250 HC RX REV CODE- 250: Performed by: EMERGENCY MEDICINE

## 2017-11-15 PROCEDURE — 74011250637 HC RX REV CODE- 250/637: Performed by: EMERGENCY MEDICINE

## 2017-11-15 PROCEDURE — 96368 THER/DIAG CONCURRENT INF: CPT

## 2017-11-15 PROCEDURE — 74011250636 HC RX REV CODE- 250/636: Performed by: EMERGENCY MEDICINE

## 2017-11-15 PROCEDURE — 80053 COMPREHEN METABOLIC PANEL: CPT | Performed by: EMERGENCY MEDICINE

## 2017-11-15 PROCEDURE — 85025 COMPLETE CBC W/AUTO DIFF WBC: CPT | Performed by: EMERGENCY MEDICINE

## 2017-11-15 PROCEDURE — 96366 THER/PROPH/DIAG IV INF ADDON: CPT

## 2017-11-15 PROCEDURE — 74011000258 HC RX REV CODE- 258: Performed by: EMERGENCY MEDICINE

## 2017-11-15 PROCEDURE — 71010 XR CHEST PORT: CPT

## 2017-11-15 PROCEDURE — 65660000000 HC RM CCU STEPDOWN

## 2017-11-15 PROCEDURE — 93005 ELECTROCARDIOGRAM TRACING: CPT

## 2017-11-15 PROCEDURE — 74011000258 HC RX REV CODE- 258: Performed by: INTERNAL MEDICINE

## 2017-11-15 PROCEDURE — 87040 BLOOD CULTURE FOR BACTERIA: CPT | Performed by: EMERGENCY MEDICINE

## 2017-11-15 PROCEDURE — 84484 ASSAY OF TROPONIN QUANT: CPT | Performed by: EMERGENCY MEDICINE

## 2017-11-15 PROCEDURE — 74011250637 HC RX REV CODE- 250/637: Performed by: INTERNAL MEDICINE

## 2017-11-15 PROCEDURE — 83880 ASSAY OF NATRIURETIC PEPTIDE: CPT | Performed by: EMERGENCY MEDICINE

## 2017-11-15 PROCEDURE — 74011250636 HC RX REV CODE- 250/636: Performed by: INTERNAL MEDICINE

## 2017-11-15 RX ORDER — DEXTROSE MONOHYDRATE AND SODIUM CHLORIDE 5; .9 G/100ML; G/100ML
75 INJECTION, SOLUTION INTRAVENOUS CONTINUOUS
Status: DISCONTINUED | OUTPATIENT
Start: 2017-11-15 | End: 2017-11-17

## 2017-11-15 RX ORDER — FENTANYL 100 UG/H
1 PATCH TRANSDERMAL
Status: DISCONTINUED | OUTPATIENT
Start: 2017-11-15 | End: 2017-11-18 | Stop reason: HOSPADM

## 2017-11-15 RX ORDER — FENTANYL 50 UG/1
1 PATCH TRANSDERMAL
Status: DISCONTINUED | OUTPATIENT
Start: 2017-11-15 | End: 2017-11-18 | Stop reason: HOSPADM

## 2017-11-15 RX ORDER — SODIUM CHLORIDE 0.9 % (FLUSH) 0.9 %
5-10 SYRINGE (ML) INJECTION AS NEEDED
Status: DISCONTINUED | OUTPATIENT
Start: 2017-11-15 | End: 2017-11-18 | Stop reason: HOSPADM

## 2017-11-15 RX ORDER — LISINOPRIL 5 MG/1
5 TABLET ORAL EVERY 12 HOURS
Status: DISCONTINUED | OUTPATIENT
Start: 2017-11-15 | End: 2017-11-18 | Stop reason: HOSPADM

## 2017-11-15 RX ORDER — ALPRAZOLAM 0.5 MG/1
1 TABLET ORAL ONCE
Status: COMPLETED | OUTPATIENT
Start: 2017-11-15 | End: 2017-11-15

## 2017-11-15 RX ORDER — CLONIDINE 0.2 MG/24H
1 PATCH, EXTENDED RELEASE TRANSDERMAL
Status: DISCONTINUED | OUTPATIENT
Start: 2017-11-15 | End: 2017-11-18 | Stop reason: HOSPADM

## 2017-11-15 RX ORDER — HYDROCODONE BITARTRATE AND ACETAMINOPHEN 7.5; 325 MG/15ML; MG/15ML
10 SOLUTION ORAL ONCE
Status: COMPLETED | OUTPATIENT
Start: 2017-11-15 | End: 2017-11-15

## 2017-11-15 RX ORDER — OXYCODONE HYDROCHLORIDE 5 MG/1
10 TABLET ORAL
Status: DISCONTINUED | OUTPATIENT
Start: 2017-11-15 | End: 2017-11-18 | Stop reason: HOSPADM

## 2017-11-15 RX ORDER — LEVOFLOXACIN 5 MG/ML
750 INJECTION, SOLUTION INTRAVENOUS
Status: COMPLETED | OUTPATIENT
Start: 2017-11-15 | End: 2017-11-15

## 2017-11-15 RX ORDER — HEPARIN SODIUM 5000 [USP'U]/ML
5000 INJECTION, SOLUTION INTRAVENOUS; SUBCUTANEOUS EVERY 8 HOURS
Status: DISCONTINUED | OUTPATIENT
Start: 2017-11-15 | End: 2017-11-18 | Stop reason: HOSPADM

## 2017-11-15 RX ORDER — GUAIFENESIN 100 MG/5ML
100 SOLUTION ORAL
Status: DISCONTINUED | OUTPATIENT
Start: 2017-11-15 | End: 2017-11-18 | Stop reason: HOSPADM

## 2017-11-15 RX ORDER — IPRATROPIUM BROMIDE AND ALBUTEROL SULFATE 2.5; .5 MG/3ML; MG/3ML
3 SOLUTION RESPIRATORY (INHALATION)
Status: DISCONTINUED | OUTPATIENT
Start: 2017-11-15 | End: 2017-11-18 | Stop reason: HOSPADM

## 2017-11-15 RX ADMIN — HYDROCODONE BITARTRATE AND ACETAMINOPHEN 10 MG: 7.5; 325 SOLUTION ORAL at 16:35

## 2017-11-15 RX ADMIN — PROMETHAZINE HYDROCHLORIDE 25 MG: 25 INJECTION, SOLUTION INTRAMUSCULAR; INTRAVENOUS at 16:35

## 2017-11-15 RX ADMIN — ALPRAZOLAM 1 MG: 0.5 TABLET ORAL at 22:46

## 2017-11-15 RX ADMIN — SODIUM CHLORIDE 3.38 G: 900 INJECTION INTRAVENOUS at 22:10

## 2017-11-15 RX ADMIN — SODIUM CHLORIDE 600 MG: 900 INJECTION, SOLUTION INTRAVENOUS at 19:46

## 2017-11-15 RX ADMIN — LISINOPRIL 5 MG: 5 TABLET ORAL at 22:09

## 2017-11-15 RX ADMIN — HEPARIN SODIUM 5000 UNITS: 5000 INJECTION, SOLUTION INTRAVENOUS; SUBCUTANEOUS at 22:10

## 2017-11-15 RX ADMIN — LEVOFLOXACIN 750 MG: 5 INJECTION, SOLUTION INTRAVENOUS at 16:30

## 2017-11-15 RX ADMIN — SODIUM CHLORIDE 2000 ML: 900 INJECTION, SOLUTION INTRAVENOUS at 16:13

## 2017-11-15 RX ADMIN — SODIUM CHLORIDE 1000 ML: 900 INJECTION, SOLUTION INTRAVENOUS at 21:18

## 2017-11-15 RX ADMIN — OXYCODONE HYDROCHLORIDE 10 MG: 5 TABLET ORAL at 22:10

## 2017-11-15 RX ADMIN — DEXTROSE MONOHYDRATE AND SODIUM CHLORIDE 75 ML/HR: 5; .9 INJECTION, SOLUTION INTRAVENOUS at 23:39

## 2017-11-15 NOTE — IP AVS SNAPSHOT
[address] [address] 
[phone] Patient: Marlon Miller MRN: [id number] :1965 About your hospitalization You were admitted on:  November 15, 2017 You last received care in the:  [address] [place] [address] You were discharged on:  2017 Why you were hospitalized Your primary diagnosis was:  Acute Respiratory Distress Your diagnoses also included:  Aspiration Pneumonia (Hcc), Aspiration Into Airway, Throat Cancer (Hcc), Status Post Insertion Of Percutaneous Endoscopic Gastrostomy (Peg) Tube (Hcc), Hypoxia, Severe Protein-Calorie Malnutrition Theola Russell: Less Than 60% Of Standard Weight) (Hcc), Tracheostomy Dependence (Hcc) Things You Need To Do (next 8 weeks)  Follow up with Abundio Davenport MD  
  
Phone:  [phone] Where:  [address] [facility], [place] [id number] Discharge Orders None A check roxy indicates which time of day the medication should be taken. My Medications TAKE these medications as instructed Instructions Each Dose to Equal  
 Morning Noon Evening Bedtime  
 albuterol-ipratropium 2.5 mg-0.5 mg/3 ml Nebu Commonly known as:  Marilin Short Notes to Patient:  Take as directed 3 mL by Nebulization route every four (4) hours as needed. 3 mL  
    
   
   
   
  
 amoxicillin-clavulanate 400-57 mg/5 mL suspension Commonly known as:  AUGMENTIN Your next dose is:  17 9 pm 
  
   
 5 mL by Per G Tube route every twelve (12) hours. 400 mg  
    
  
   
   
   
  
  
 cloNIDine 0.2 mg/24 hr patch Commonly known as:  CATAPRES  
   
 1 Patch by TransDERmal route every seven (7) days. 1 Patch * fentaNYL 100 mcg/hr PATCH Commonly known as:  Cordelia Mohair Your next dose is:  17 9pm  
   
 1 Patch by TransDERmal route every seventy-two (72) hours.  Max Daily Amount: 1 Patch. 1 Patch * fentaNYL 50 mcg/hr PATCH Commonly known as:  Jocelin Sandoval Your next dose is:  11/18/17 9pm 
  
   
 1 Patch by TransDERmal route every seventy-two (72) hours. Max Daily Amount: 1 Patch. 1 Patch  
    
   
   
   
  
  
 guaiFENesin 100 mg/5 mL liquid Commonly known as:  ROBITUSSIN Your next dose is: Take as directed Take 5 mL by mouth every four (4) hours as needed for Cough. 100 mg  
    
   
   
   
  
 lisinopril 5 mg tablet Commonly known as:  Nya Hanh Your next dose is:  11/18/17 9pm 
  
   
 Take 1 Tab by mouth every twelve (12) hours. 5 mg  
    
  
   
   
   
  
  
 magic mouthwash 159--40 mg/30 mL Mwsh oral suspension Commonly known as:  FIRST-MOUTHWASH BLM Your next dose is: Take as directed Take 10 mL by mouth every four (4) hours as needed. 10 mL  
    
   
   
   
  
 multivitamin, tx-iron-ca-min 9 mg iron-400 mcg Tab tablet Commonly known as:  THERA-M w/ IRON Your next dose is:  11/19/17 am 
  
   
 Take 1 Tab by mouth daily. 1 Tab Nebulizer Accessories Kit Your next dose is: Take as directed Use nebs Q4H PRN  
     
   
   
   
  
 oxyCODONE IR 10 mg Tab immediate release tablet Commonly known as:  Charles Hendrickson Your last dose was:  11/18/17 730 am 
  
Your next dose is: Take as directed Take after 130 pm 11/18/17 1 Tab by Per G Tube route every six (6) hours as needed. Max Daily Amount: 40 mg.  
 10 mg  
    
   
   
   
  
 predniSONE 20 mg tablet Commonly known as:  Wes Wayne Your next dose is:  11/19/17 12 pm 
  
   
 Take 1 Tab by mouth daily (with lunch). 20 mg  
    
   
   
   
  
 * Notice: This list has 2 medication(s) that are the same as other medications prescribed for you. Read the directions carefully, and ask your doctor or other care provider to review them with you. Where to Get Your Medications Information on where to get these meds will be given to you by the nurse or doctor. ! Ask your nurse or doctor about these medications  
  amoxicillin-clavulanate 400-57 mg/5 mL suspension  
 predniSONE 20 mg tablet Discharge Instructions DISCHARGE SUMMARY from Nurse PATIENT INSTRUCTIONS: 
 
What to do at Home: 
 
Recommended activity: Activity as tolerated. please follow up with Primary care physician. *  Please give a list of your current medications to your Primary Care Provider. *  Please update this list whenever your medications are discontinued, doses are 
    changed, or new medications (including over-the-counter products) are added. *  Please carry medication information at all times in case of emergency situations. These are general instructions for a healthy lifestyle: No smoking/ No tobacco products/ Avoid exposure to second hand smoke Surgeon General's Warning:  Quitting smoking now greatly reduces serious risk to your health. Obesity, smoking, and sedentary lifestyle greatly increases your risk for illness A healthy diet, regular physical exercise & weight monitoring are important for maintaining a healthy lifestyle You may be retaining fluid if you have a history of heart failure or if you experience any of the following symptoms:  Weight gain of 3 pounds or more overnight or 5 pounds in a week, increased swelling in our hands or feet or shortness of breath while lying flat in bed. Please call your doctor as soon as you notice any of these symptoms; do not wait until your next office visit. Recognize signs and symptoms of STROKE: 
 
F-face looks uneven A-arms unable to move or move unevenly S-speech slurred or non-existent T-time-call 911 as soon as signs and symptoms begin-DO NOT go Back to bed or wait to see if you get better-TIME IS BRAIN.  
 
Warning Signs of HEART ATTACK  
 
 Call 911 if you have these symptoms: 
? Chest discomfort. Most heart attacks involve discomfort in the center of the chest that lasts more than a few minutes, or that goes away and comes back. It can feel like uncomfortable pressure, squeezing, fullness, or pain. ? Discomfort in other areas of the upper body. Symptoms can include pain or discomfort in one or both arms, the back, neck, jaw, or stomach. ? Shortness of breath with or without chest discomfort. ? Other signs may include breaking out in a cold sweat, nausea, or lightheadedness. Don't wait more than five minutes to call [address]! Fast action can save your life. Calling 911 is almost always the fastest way to get lifesaving treatment. Emergency Medical Services staff can begin treatment when they arrive  up to an hour sooner than if someone gets to the hospital by car. The discharge information has been reviewed with the patient. The patient verbalized understanding. Discharge medications reviewed with the patient and appropriate educational materials and side effects teaching were provided. Patient armband removed and shredded 
 
___________________________________________________________________________________________________________________________________ Educabiliahart Announcement We are excited to announce that we are making your provider's discharge notes available to you in cisimple. You will see these notes when they are completed and signed by the physician that discharged you from your recent hospital stay. If you have any questions or concerns about any information you see in [facility], please call the Health Information Department where you were seen or reach out to your Primary Care Provider for more information about your plan of care. Introducing [facility]!    
 [employer] introduces cisimple patient portal. Now you can access parts of your medical record, email your doctor's office, and request medication refills online. 1. In your internet browser, go to [email or web address] 2. Click on the First Time User? Click Here link in the Sign In box. You will see the New Member Sign Up page. 3. Enter your [employer] Access Code exactly as it appears below. You will not need to use this code after youve completed the sign-up process. If you do not sign up before the expiration date, you must request a new code. · [employer] Access Code: [email or web address] Expires: 2/10/2018  3:01 PM 
 
4. Enter the last four digits of your Social Security Number (xxxx) and Date of Birth (mm/dd/yyyy) as indicated and click Submit. You will be taken to the next sign-up page. 5. Create a [employer] ID. This will be your [employer] login ID and cannot be changed, so think of one that is secure and easy to remember. 6. Create a [employer] password. You can change your password at any time. 7. Enter your Password Reset Question and Answer. This can be used at a later time if you forget your password. 8. Enter your e-mail address. You will receive e-mail notification when new information is available in [address] 9. Click Sign Up. You can now view and download portions of your medical record. 10. Click the Download Summary menu link to download a portable copy of your medical information. If you have questions, please visit the Frequently Asked Questions section of the [employer] website. Remember, [employer] is NOT to be used for urgent needs. For medical emergencies, dial 911. Now available from your iPhone and Android! Unresulted Labs-Please follow up with your PCP about these lab tests Order Current Status CULTURE, BLOOD Preliminary result Providers Seen During Your Hospitalization Provider Specialty Primary office phone Heri Johnson MD Emergency Medicine [phone] Beba Mabry MD Internal Medicine [phone] Immunizations Administered for This Admission Name Date Influenza Vaccine (Quad) PF 11/18/2017 Your Primary Care Physician (PCP) Primary Care Physician Office Phone Office Fax Jono Briggs [phone] You are allergic to the following No active allergies Recent Documentation Height Weight BMI Smoking Status 1.702 m 73 kg 25.22 kg/m2 Current Every Day Smoker Emergency Contacts Name Discharge Info Relation Home [employer] [address] [place] CAREGIVER [3] Spouse [3]   [phone] Alexia Castano N/A  AT THIS TIME [6] Parent [1] [phone] Patient Belongings The following personal items are in your possession at time of discharge: 
  Dental Appliances: None  Visual Aid: Glasses      Home Medications: None   Jewelry: None  Clothing: At bedside    Other Valuables: None Discharge Instructions Attachments/References ASPIRATION PNEUMONIA (ENGLISH) Patient Handouts Aspiration Pneumonia: Care Instructions Your Care Instructions Aspiration pneumonia is an inflammation of the lungs. It may occur after you breathe in large amounts of foreign material, such as food, liquid, vomit, or mucus. Aspiration may happen because of a health problem that makes it hard to swallow. These problems include stroke or seizure. Pneumonia makes it hard to breathe. Follow-up care is a key part of your treatment and safety. Be sure to make and go to all appointments, and call your doctor if you are having problems. It's also a good idea to know your test results and keep a list of the medicines you take. How can you care for yourself at home? To help with swallowing · You may need to do exercises to train your muscles to work together to help you swallow.  You may also need to learn how to position your body or how to put food in your mouth to be able to swallow better. · You may need to change the foods you eat. Your doctor may tell you to eat certain foods and liquids to make swallowing easier. · You may need to change how you prepare foods. For example, you may need to add thickeners to some liquids, or puree certain foods in a . To help with pneumonia · Take your antibiotics as directed. Do not stop taking them just because you feel better. You need to take the full course of antibiotics. · Take your medicines exactly as prescribed. For example, your doctor may have given you medicine that makes breathing easier. Call your doctor if you think you are having a problem with your medicine. · Get plenty of rest and sleep. You may feel weak and tired for a while, but your energy level will improve with time. · Take care of your cough so you can rest. A cough that brings up mucus from your lungs is common with pneumonia. It is one way your body gets rid of the infection. But if coughing keeps you from resting or causes severe fatigue and chest-wall pain, talk to your doctor. He or she may suggest that you take a medicine to reduce the cough. · Use a humidifier to increase the moisture in the air. Dry air makes coughing worse. Follow the instructions for cleaning the machine. · Do not smoke, and avoid others' smoke. Smoke will make your cough last longer. If you need help quitting, talk to your doctor about stop-smoking programs and medicines. These can increase your chances of quitting for good. · Take an over-the-counter pain medicine, such as acetaminophen (Tylenol), ibuprofen (Advil, Motrin), or naproxen (Aleve) to help reduce fever and reduce chest pain caused by coughing. Read and follow all instructions on the label. · Do not take two or more pain medicines at the same time unless the doctor told you to.  Many pain medicines have acetaminophen, which is Tylenol. Too much acetaminophen (Tylenol) can be harmful. When should you call for help? Call 911 anytime you think you may need emergency care. For example, call if: 
? · You have severe trouble breathing. ?Call your doctor now or seek immediate medical care if: 
? · You have a new or higher fever. ? · You have new or worse trouble breathing. ? · You cough up blood. ? · You are dizzy or lightheaded, or you feel like you may faint. ? Watch closely for changes in your health, and be sure to contact your doctor if: 
? · You do not get better as expected. ? · You are coughing more deeply or more often. Where can you learn more? Go to http://justine-serena.info/. Enter 52036 [id number] in the search box to learn more about \"Aspiration Pneumonia: Care Instructions. \" Current as of: May 12, 2017 Content Version: 11.4 © 6963-3909 [employer]. Care instructions adapted under license by [employer] (which disclaims liability or warranty for this information). If you have questions about a medical condition or this instruction, always ask your healthcare professional. [address] any warranty or liability for your use of this information. Please provide this summary of care documentation to your next provider. Signatures-by signing, you are acknowledging that this After Visit Summary has been reviewed with you and you have received a copy. Patient Signature:  ____________________________________________________________ Date:  ____________________________________________________________  
  
Sue Weems Provider Signature:  ____________________________________________________________ Date:  ____________________________________________________________

## 2017-11-15 NOTE — ED PROVIDER NOTES
[address]  EMERGENCY DEPARTMENT HISTORY AND PHYSICAL EXAM       Date: 11/15/2017   Patient Name: Pati Robert   YOB: 1965  Medical Record Number: [id number]    History of Presenting Illness     Chief Complaint   Patient presents with    Vomiting        History Provided By:  patient    Additional History: 4:04 PM  Pati Robert is a 45 y.o. male who presents to the emergency department C/O vomiting onset this morning. Coughing productive. Hx of same with past aspirations. SOB worse with exertion. Productive cough. Associated sx include throat pain, nausea, and chronic ear pain. NKDA. PMHx of throat cancer. New tumor was thought to be found but it was actually just narrowing trachea found. Pt given emergency trach in the ICU. Pt frequently gets aspiration PNA for which he is normally hospitalized for. Pt is on Cipro abx tx. No sick contacts reported. Pt lives with his fiance and son. PMHx of HTN, COPD, tobacco abuse. Pt denies fever, leg swelling, and any other associated signs or sx. Primary Care Provider: Shanta Blanco MD   Specialist:    Past History     Past Medical History:   Past Medical History:   Diagnosis Date    Chronic obstructive pulmonary disease ([facility]     acute hypoxic respiratory failure 8/16    Hypertension     Ill-defined condition     peg tube in place    Ill-defined condition     chemo    S/P radiation therapy     Throat cancer ([facility] [facility]     Tobacco abuse         Past Surgical History:   Past Surgical History:   Procedure Laterality Date    HX HEENT      sinus surgery        Family History:   History reviewed. No pertinent family history. Social History:   Social History   Substance Use Topics    Smoking status: Current Every Day Smoker     Packs/day: 0.50    Smokeless tobacco: Never Used    Alcohol use No        Allergies:   No Known Allergies     Review of Systems   Review of Systems   Constitutional: Negative for fever.    HENT: Positive for ear pain. Cardiovascular: Negative for leg swelling. Gastrointestinal: Positive for nausea and vomiting. Musculoskeletal:        (+) Throat pain   All other systems reviewed and are negative. Physical Exam  Vitals:    11/15/17 1600 11/15/17 1601   BP: (!) 157/105 (!) 157/105   Pulse: (!) 129 (!) 131   Resp: 11 15   Temp:  99.9 °F (37.7 °C)   SpO2: 98% 99%   Weight:  65.8 kg (145 lb)   Height:  5' 7\" (1.702 m)       Physical Exam   Nursing note and vitals reviewed. Vital signs and nursing notes reviewed    CONSTITUTIONAL: Alert, in no apparent distress; well-developed; well-nourished. HEAD:  Normocephalic, atraumatic. Trach and peg on neck. EYES: PERRL; EOM's intact. ENTM: Nose: no rhinorrhea; Throat: no erythema or exudate, mucous membranes moist  Neck:  No JVD, supple without lymphadenopathy  RESP: Expiratory wheezing and rhonchi on the base on the right. CV: S1 and S2 WNL; No murmurs, gallops or rubs. GI: Normal bowel sounds, abdomen soft and non-tender. No masses or organomegaly. : No costo-vertebral angle tenderness. BACK:  Non-tender  UPPER EXT:  Normal inspection  LOWER EXT: No edema, no calf tenderness. Distal pulses intact. NEURO: CN intact, reflexes 2/4 and sym, strength 5/5 and sym, sensation intact. SKIN: No rashes; Normal for age and stage. PSYCH:  Alert and oriented, normal affect.          Diagnostic Study Results     Labs -      Recent Results (from the past 12 hour(s))   LACTIC ACID    Collection Time: 11/15/17  4:08 PM   Result Value Ref Range    Lactic acid 1.8 0.4 - 2.0 MMOL/L   CBC WITH AUTOMATED DIFF    Collection Time: 11/15/17  4:08 PM   Result Value Ref Range    WBC 6.0 4.6 - 13.2 K/uL    RBC 3.32 (L) 4.70 - 5.50 M/uL    HGB 11.2 (L) 13.0 - 16.0 g/dL    HCT 33.0 (L) 36.0 - 48.0 %    MCV 99.4 (H) 74.0 - 97.0 FL    MCH 33.7 24.0 - 34.0 PG    MCHC 33.9 31.0 - 37.0 g/dL    RDW 17.0 (H) 11.6 - 14.5 %    PLATELET 963 235 - 112 K/uL    MPV 9.0 (L) 9.2 - 11.8 FL NEUTROPHILS 81 (H) 42 - 75 %    BAND NEUTROPHILS 6 (H) 0 - 5 %    LYMPHOCYTES 6 (L) 20 - 51 %    MONOCYTES 5 2 - 9 %    EOSINOPHILS 2 0 - 5 %    BASOPHILS 0 0 - 3 %    ABS. NEUTROPHILS 4.9 1.8 - 8.0 K/UL    ABS. LYMPHOCYTES 0.4 (L) 0.8 - 3.5 K/UL    ABS. MONOCYTES 0.3 0 - 1.0 K/UL    ABS. EOSINOPHILS 0.1 0.0 - 0.4 K/UL    ABS. BASOPHILS 0.0 0.0 - 0.1 K/UL    RBC COMMENTS ANISOCYTOSIS  1+        RBC COMMENTS MACROCYTOSIS  1+        DF MANUAL     EKG, 12 LEAD, INITIAL    Collection Time: 11/15/17  4:19 PM   Result Value Ref Range    Ventricular Rate 132 BPM    Atrial Rate 132 BPM    P-R Interval 138 ms    QRS Duration 60 ms    Q-T Interval 288 ms    QTC Calculation (Bezet) 426 ms    Calculated P Axis 70 degrees    Calculated R Axis 59 degrees    Calculated T Axis 66 degrees    Diagnosis       Sinus tachycardia with frequent premature ventricular complexes  Anteroseptal infarct , age undetermined  Abnormal ECG  When compared with ECG of 01-SEP-2017 11:23,  Vent. rate has increased BY  44 BPM  Anteroseptal infarct is now present  ST now depressed in Anterior leads  T wave inversion now evident in Anterior leads     METABOLIC PANEL, COMPREHENSIVE    Collection Time: 11/15/17  4:42 PM   Result Value Ref Range    Sodium 140 136 - 145 mmol/L    Potassium 4.4 3.5 - 5.5 mmol/L    Chloride 98 (L) 100 - 108 mmol/L    CO2 30 21 - 32 mmol/L    Anion gap 12 3.0 - 18 mmol/L    Glucose 105 (H) 74 - 99 mg/dL    BUN 5 (L) 7.0 - 18 MG/DL    Creatinine 0.57 (L) 0.6 - 1.3 MG/DL    BUN/Creatinine ratio 9 (L) 12 - 20      GFR est AA >60 >60 ml/min/1.73m2    GFR est non-AA >60 >60 ml/min/1.73m2    Calcium 9.6 8.5 - 10.1 MG/DL    Bilirubin, total 0.2 0.2 - 1.0 MG/DL    ALT (SGPT) 19 16 - 61 U/L    AST (SGOT) 26 15 - 37 U/L    Alk.  phosphatase 126 (H) 45 - 117 U/L    Protein, total 7.1 6.4 - 8.2 g/dL    Albumin 3.3 (L) 3.4 - 5.0 g/dL    Globulin 3.8 2.0 - 4.0 g/dL    A-G Ratio 0.9 0.8 - 1.7     NT-PRO BNP    Collection Time: 11/15/17  4:42 PM   Result Value Ref Range    NT pro-BNP 81 0 - 900 PG/ML   TROPONIN I    Collection Time: 11/15/17  4:42 PM   Result Value Ref Range    Troponin-I, Qt. <0.02 0.00 - 0.06 NG/ML       Radiologic Studies -  The following have been ordered and reviewed:  XR CHEST PORT    (Results Pending)     6:47 PM  RADIOLOGY FINDINGS  Chest X-ray shows Right lower lobe infiltrate. Pending review by Radiologist  Recorded by Joseph Orona ED Scribe, as dictated by Janie Rodriguez MD       Medical Decision Making   I am the first provider for this patient. I reviewed the vital signs, available nursing notes, past medical history, past surgical history, family history and social history. Vital Signs-Reviewed the patient's vital signs. Patient Vitals for the past 12 hrs:   Temp Pulse Resp BP SpO2   11/15/17 1601 99.9 °F (37.7 °C) (!) 131 15 (!) 157/105 99 %   11/15/17 1600 - (!) 129 11 (!) 157/105 98 %       Pulse Oximetry Analysis - Normal 99% on room air. Cardiac Monitor:   Rate: 132 bpm  Rhythm: Sinus Tachycardia     EKG interpretation: (Preliminary)  Rhythm: Sinus tachycardia. Rate (approx.): 132 bpm; Negative STEMI. EKG read by Janie Rodriguez MD at 4:19 PM    Old Medical Records: Nursing notes. Provider Notes: DDx: Aspiration PNA with most likely sepsis. Procedures:   Procedures    ED Course:  4:04 PM  Initial assessment performed. The patients presenting problems have been discussed, and they are in agreement with the care plan formulated and outlined with them. I have encouraged them to ask questions as they arise throughout their visit.     SEPSIS ASSESSMENT NOTE:   7:14 PM  Siobhan Peralta MD has seen and assessed the patient as follows:    Capillary Refill:normal/brisk  Cardiopulmonary Evaluation:   Lung Sounds: crackles   Cardiac Sounds: regular  Peripheral Pulses: present  Skin Exam: skin unremarkable and warm    Visit Vitals    BP (!) 157/105 (BP 1 Location: Right arm, BP Patient Position: At rest)    Pulse (!) 131    Temp 99.9 °F (37.7 °C)    Resp 15    Ht 5' 7\" (1.702 m)    Wt 65.8 kg (145 lb)    SpO2 99%    BMI 22.71 kg/m2       Written by Leigh Thompson ED Scribe, as dictated by Karon Grace MD       7:09 PM Discussed patient's history, exam, and available diagnostics results with Iona Alpers, MD, internal medicine, who agree with admitting pt.     7:09 PM  Patient is being admitted to the hospital by Iona Alpers, MD. The results of their tests and reasons for their admission have been discussed with them and/or available family. They convey agreement and understanding for the need to be admitted and for their admission diagnosis. CONDITIONS ON ADMISSION:  Sepsis is present at the time of admission. Deep Vein Thrombosis is not present at the time of admission. Thrombosis is not present at the time of admission. Urinary Tract Infection is not present at the time of admission. Pneumonia is not present at the time of admission. MRSA is not present at the time of admission. Wound infection is not present at the time of admission. Pressure Ulcer is not present at the time of admission. Medications Given in the ED:  Medications   sodium chloride (NS) flush 5-10 mL (not administered)   clindamycin phosphate (CLEOCIN) 600 mg in 0.9% sodium chloride (MBP/ADV) 100 mL ADV (not administered)   sodium chloride 0.9 % bolus infusion 1,000 mL (not administered)   sodium chloride 0.9 % bolus infusion 2,000 mL (2,000 mL IntraVENous New Bag 11/15/17 1613)   levoFLOXacin (LEVAQUIN) 750 mg in D5W IVPB (750 mg IntraVENous New Bag 11/15/17 1630)   promethazine (PHENERGAN) 25 mg in 0.9% sodium chloride 50 mL IVPB (0 mg IntraVENous IV Completed 11/15/17 1703)   HYDROcodone-acetaminophen (HYCET) 0.5-21.7 mg/mL oral solution 10 mg (10 mg Oral Given 11/15/17 1635)         Critical Care Time:     Diagnosis   Clinical Impression:   1. Aspiration pneumonia of right lower lobe due to vomit ([address]    2.  Tracheostomy dependence ([facility]    3. Non-intractable vomiting with nausea, unspecified vomiting type         Discussion:  Treated for sepsis per protocol for aspiration pna. No distress. Will admit since failed outpt treatment.    _______________________________   Attestations: This note is prepared by Ying Holloway, acting as a Scribe for Randa Nguyễn MD on 4:01 PM on 11/15/2017. Randa Nguyễn MD: The scribe's documentation has been prepared under my direction and personally reviewed by me in its entirety.   _______________________________

## 2017-11-16 LAB
ALBUMIN SERPL-MCNC: 2.9 G/DL (ref 3.4–5)
ALBUMIN/GLOB SERPL: 0.9 {RATIO} (ref 0.8–1.7)
ALP SERPL-CCNC: 102 U/L (ref 45–117)
ALT SERPL-CCNC: 14 U/L (ref 16–61)
ANION GAP SERPL CALC-SCNC: 9 MMOL/L (ref 3–18)
AST SERPL-CCNC: 16 U/L (ref 15–37)
ATRIAL RATE: 132 BPM
BASOPHILS # BLD: 0 K/UL (ref 0–0.06)
BASOPHILS NFR BLD: 0 % (ref 0–2)
BILIRUB SERPL-MCNC: 0.4 MG/DL (ref 0.2–1)
BUN SERPL-MCNC: 7 MG/DL (ref 7–18)
BUN/CREAT SERPL: 10 (ref 12–20)
CALCIUM SERPL-MCNC: 9 MG/DL (ref 8.5–10.1)
CALCULATED P AXIS, ECG09: 70 DEGREES
CALCULATED R AXIS, ECG10: 59 DEGREES
CALCULATED T AXIS, ECG11: 66 DEGREES
CHLORIDE SERPL-SCNC: 102 MMOL/L (ref 100–108)
CO2 SERPL-SCNC: 32 MMOL/L (ref 21–32)
CREAT SERPL-MCNC: 0.68 MG/DL (ref 0.6–1.3)
DIAGNOSIS, 93000: NORMAL
DIFFERENTIAL METHOD BLD: ABNORMAL
EOSINOPHIL # BLD: 0 K/UL (ref 0–0.4)
EOSINOPHIL NFR BLD: 1 % (ref 0–5)
ERYTHROCYTE [DISTWIDTH] IN BLOOD BY AUTOMATED COUNT: 17.3 % (ref 11.6–14.5)
GLOBULIN SER CALC-MCNC: 3.3 G/DL (ref 2–4)
GLUCOSE SERPL-MCNC: 91 MG/DL (ref 74–99)
HCT VFR BLD AUTO: 30.7 % (ref 36–48)
HGB BLD-MCNC: 10.1 G/DL (ref 13–16)
LYMPHOCYTES # BLD: 0.6 K/UL (ref 0.9–3.6)
LYMPHOCYTES NFR BLD: 15 % (ref 21–52)
MAGNESIUM SERPL-MCNC: 1.7 MG/DL (ref 1.6–2.6)
MCH RBC QN AUTO: 33.7 PG (ref 24–34)
MCHC RBC AUTO-ENTMCNC: 32.9 G/DL (ref 31–37)
MCV RBC AUTO: 102.3 FL (ref 74–97)
MONOCYTES # BLD: 0.5 K/UL (ref 0.05–1.2)
MONOCYTES NFR BLD: 15 % (ref 3–10)
NEUTS SEG # BLD: 2.5 K/UL (ref 1.8–8)
NEUTS SEG NFR BLD: 69 % (ref 40–73)
P-R INTERVAL, ECG05: 138 MS
PHOSPHATE SERPL-MCNC: 3.5 MG/DL (ref 2.5–4.9)
PLATELET # BLD AUTO: 172 K/UL (ref 135–420)
PMV BLD AUTO: 9.1 FL (ref 9.2–11.8)
POTASSIUM SERPL-SCNC: 4.1 MMOL/L (ref 3.5–5.5)
PROT SERPL-MCNC: 6.2 G/DL (ref 6.4–8.2)
Q-T INTERVAL, ECG07: 288 MS
QRS DURATION, ECG06: 60 MS
QTC CALCULATION (BEZET), ECG08: 426 MS
RBC # BLD AUTO: 3 M/UL (ref 4.7–5.5)
SODIUM SERPL-SCNC: 143 MMOL/L (ref 136–145)
VENTRICULAR RATE, ECG03: 132 BPM
WBC # BLD AUTO: 3.6 K/UL (ref 4.6–13.2)

## 2017-11-16 PROCEDURE — 80053 COMPREHEN METABOLIC PANEL: CPT | Performed by: INTERNAL MEDICINE

## 2017-11-16 PROCEDURE — 74011250637 HC RX REV CODE- 250/637: Performed by: INTERNAL MEDICINE

## 2017-11-16 PROCEDURE — 74011250636 HC RX REV CODE- 250/636: Performed by: HOSPITALIST

## 2017-11-16 PROCEDURE — 74011250636 HC RX REV CODE- 250/636: Performed by: INTERNAL MEDICINE

## 2017-11-16 PROCEDURE — 65660000000 HC RM CCU STEPDOWN

## 2017-11-16 PROCEDURE — 84100 ASSAY OF PHOSPHORUS: CPT | Performed by: INTERNAL MEDICINE

## 2017-11-16 PROCEDURE — 85025 COMPLETE CBC W/AUTO DIFF WBC: CPT | Performed by: INTERNAL MEDICINE

## 2017-11-16 PROCEDURE — 36415 COLL VENOUS BLD VENIPUNCTURE: CPT | Performed by: INTERNAL MEDICINE

## 2017-11-16 PROCEDURE — 74011000258 HC RX REV CODE- 258: Performed by: INTERNAL MEDICINE

## 2017-11-16 PROCEDURE — 83735 ASSAY OF MAGNESIUM: CPT | Performed by: INTERNAL MEDICINE

## 2017-11-16 RX ORDER — LEVOFLOXACIN 5 MG/ML
750 INJECTION, SOLUTION INTRAVENOUS EVERY 24 HOURS
Status: DISCONTINUED | OUTPATIENT
Start: 2017-11-16 | End: 2017-11-18 | Stop reason: HOSPADM

## 2017-11-16 RX ORDER — ALPRAZOLAM 0.5 MG/1
1 TABLET ORAL
Status: DISCONTINUED | OUTPATIENT
Start: 2017-11-17 | End: 2017-11-17

## 2017-11-16 RX ADMIN — HEPARIN SODIUM 5000 UNITS: 5000 INJECTION, SOLUTION INTRAVENOUS; SUBCUTANEOUS at 06:37

## 2017-11-16 RX ADMIN — PIPERACILLIN AND TAZOBACTAM 3.38 G: 3; .375 INJECTION, POWDER, LYOPHILIZED, FOR SOLUTION INTRAVENOUS; PARENTERAL at 13:12

## 2017-11-16 RX ADMIN — PIPERACILLIN AND TAZOBACTAM 3.38 G: 3; .375 INJECTION, POWDER, LYOPHILIZED, FOR SOLUTION INTRAVENOUS; PARENTERAL at 21:12

## 2017-11-16 RX ADMIN — OXYCODONE HYDROCHLORIDE 10 MG: 5 TABLET ORAL at 06:36

## 2017-11-16 RX ADMIN — OXYCODONE HYDROCHLORIDE 10 MG: 5 TABLET ORAL at 14:47

## 2017-11-16 RX ADMIN — SODIUM CHLORIDE 1000 MG: 900 INJECTION, SOLUTION INTRAVENOUS at 10:21

## 2017-11-16 RX ADMIN — SODIUM CHLORIDE 3.38 G: 900 INJECTION INTRAVENOUS at 06:25

## 2017-11-16 RX ADMIN — MULTIPLE VITAMINS W/ MINERALS TAB 1 TABLET: TAB at 10:22

## 2017-11-16 RX ADMIN — LEVOFLOXACIN 750 MG: 5 INJECTION, SOLUTION INTRAVENOUS at 16:23

## 2017-11-16 RX ADMIN — HEPARIN SODIUM 5000 UNITS: 5000 INJECTION, SOLUTION INTRAVENOUS; SUBCUTANEOUS at 14:47

## 2017-11-16 RX ADMIN — DEXTROSE MONOHYDRATE AND SODIUM CHLORIDE 75 ML/HR: 5; .9 INJECTION, SOLUTION INTRAVENOUS at 21:03

## 2017-11-16 RX ADMIN — LISINOPRIL 5 MG: 5 TABLET ORAL at 21:11

## 2017-11-16 RX ADMIN — LISINOPRIL 5 MG: 5 TABLET ORAL at 10:22

## 2017-11-16 RX ADMIN — HEPARIN SODIUM 5000 UNITS: 5000 INJECTION, SOLUTION INTRAVENOUS; SUBCUTANEOUS at 21:04

## 2017-11-16 RX ADMIN — GUAIFENESIN 100 MG: 200 SOLUTION ORAL at 06:37

## 2017-11-16 RX ADMIN — OXYCODONE HYDROCHLORIDE 10 MG: 5 TABLET ORAL at 21:05

## 2017-11-16 RX ADMIN — VANCOMYCIN HYDROCHLORIDE 1750 MG: 10 INJECTION, POWDER, LYOPHILIZED, FOR SOLUTION INTRAVENOUS at 00:00

## 2017-11-16 NOTE — PROGRESS NOTES
Pharmacy Dosing Services:   Pharmacist Renal Dosing Progress Note for Zosyn    The following medication: Zosyn was automatically dose-adjusted per THE [facility] P&T Committee Protocol, with respect to renal function. Consult provided for this   45 y.o. , male , for the indication of HAP/aspiration pneumonia. Pt Weight:   Wt Readings from Last 1 Encounters:   11/15/17 65.8 kg (145 lb)         Previous Regimen  Zosyn 3.375 gm IV every 8 hours   Serum Creatinine Lab Results   Component Value Date/Time    Creatinine 0.68 11/16/2017 03:16 AM       Creatinine Clearance Estimated Creatinine Clearance: 115.6 mL/min (based on Cr of 0.68). BUN Lab Results   Component Value Date/Time    BUN 7 11/16/2017 03:16 AM       Dosage changed to:  Zosyn 3.375 gm IV every 6 hours    Pharmacy to continue to monitor patient daily. Will make dosage adjustments based upon changing renal function.   Signed Hernán Phillips PHARMD. Contact information: [phone]

## 2017-11-16 NOTE — H&P
History & Physical    Patient: Piyush Galindo MRN: [id number]  CSN: [id number]    YOB: 1965  Age: 45 y.o. Sex: male      DOA: 11/15/2017  Primary Care Provider:  Nguyen Daly MD      Assessment/Plan     Patient Active Problem List   Diagnosis Code    Aspiration into airway T17.908A    Throat cancer ([address] C14.0    Status post insertion of percutaneous endoscopic gastrostomy (PEG) tube ([address] Z93.1    Hyponatremia E87.1    Acute respiratory distress R06.03    Hypoxia R09.02    Stridor R06.1    Respiratory depression R06.89    COPD exacerbation ([address] J44.1    Acute hypoxemic respiratory failure ([address] J96.01    Severe protein-calorie malnutrition Suha Box: less than 60% of standard weight) ([address] E43    Tracheostomy dependence ([address] Z93.0    Aspiration pneumonia ([address] J69.0       1. Acute Resp Distress with hypoxia on RA in mid 80s   2. PNA, likely from aspiration   3. Hx of Tracheotomy, due to hx of throat cancer with radiation/scar  4. Chronic Resp failure, on trach   5. Dysphagia, peg in place   6. COPD  FULL CODE     -admitted for further care   -sputum cultures, cont IV vanc and zosyn for now   -duonebs prn, pain control   -supplemental O2, suction prn   -cont home medications   -nutrition team consult for peg tube management   -keep him npo   -aspiration precaution   -supportive care     Estimated length of stay : 2-3 days     CC: sob       HPI:     Piyush Galindo is a 45 y.o. male who came to the hospital with complaints of sob. Patient states he has been sob for few weeks now but over the course of past few days it has progressively became worst, he think he caught a bug last week. Since then he has had producitve greenish mucus. She has trach and peg in place, due to hx of throat cancer leading to scaring from radiation and surgery. He states he is completely NPO, and doesn't feed himself via mouth.  He has had aspiration pna's in the past.   No other complaints today, in the ED he was noted to be hypoxic on RA. CXR was clear but there were concners of aspiration pna. When I saw him he did not have other complaints. He was comfortable. Past Medical History:   Diagnosis Date    Chronic obstructive pulmonary disease ([facility] [address]     acute hypoxic respiratory failure 8/16    Hypertension     Ill-defined condition     peg tube in place    Ill-defined condition     chemo    S/P radiation therapy     Throat cancer ([facility] [address]     Tobacco abuse        Past Surgical History:   Procedure Laterality Date    HX HEENT      sinus surgery       History reviewed. No pertinent family history. Social History     Social History    Marital status:      Spouse name: N/A    Number of children: N/A    Years of education: N/A     Social History Main Topics    Smoking status: Current Every Day Smoker     Packs/day: 0.50    Smokeless tobacco: Never Used    Alcohol use No    Drug use: No    Sexual activity: Not Asked     Other Topics Concern    None     Social History Narrative       Prior to Admission medications    Medication Sig Start Date End Date Taking? Authorizing Provider   amoxicillin-clavulanate (AUGMENTIN) 400-57 mg/5 mL suspension 5 mL by Per G Tube route every twelve (12) hours. 8/29/17   Berkley Bryson MD   cloNIDine (CATAPRES) 0.2 mg/24 hr patch 1 Patch by TransDERmal route every seven (7) days. 8/29/17   Berkley Bryson MD   fentaNYL (DURAGESIC) 100 mcg/hr PATCH 1 Patch by TransDERmal route every seventy-two (72) hours. Max Daily Amount: 1 Patch. 8/29/17   Berkley Bryson MD   fentaNYL (DURAGESIC) 50 mcg/hr PATCH 1 Patch by TransDERmal route every seventy-two (72) hours. Max Daily Amount: 1 Patch. 8/29/17   Berkley Bryson MD   guaiFENesin (ROBITUSSIN) 100 mg/5 mL liquid Take 5 mL by mouth every four (4) hours as needed for Cough. 8/29/17   Berkley Bryson MD   lisinopril (PRINIVIL, ZESTRIL) 5 mg tablet Take 1 Tab by mouth every twelve (12) hours.  8/29/17   Shannan Gao Rolando Yao MD   oxyCODONE IR (ROXICODONE) 10 mg tab immediate release tablet 1 Tab by Per G Tube route every six (6) hours as needed. Max Daily Amount: 40 mg. 17   Bahman Desai MD   magic mouthwash (FIRST-MOUTHWASH BLM) 429--35 mg/30 mL mwsh oral suspension Take 10 mL by mouth every four (4) hours as needed. Historical Provider   albuterol-ipratropium (DUO-NEB) 2.5 mg-0.5 mg/3 ml nebu 3 mL by Nebulization route every four (4) hours as needed. 17   Erin Light MD   multivitamin, tx-iron-ca-min (THERA-M W/ IRON) 9 mg iron-400 mcg tab tablet Take 1 Tab by mouth daily. 17   Erin Light MD   Nebulizer Accessories kit Use nebs Q4H PRN 17   Erin Light MD       No Known Allergies    Review of Systems  Gen: No fever, chills, malaise, weight loss/gain. Heent: No headache, rhinorrhea, epistaxis, ear pain, hearing loss, sinus pain, neck pain/stiffness, sore throat. Heart: No chest pain, palpitations  Resp: No cough, hemoptysis, wheezing   GI: No nausea, vomiting, diarrhea, constipation, melena or hematochezia. : No urinary obstruction, dysuria or hematuria. Derm: No rash, new skin lesion or pruritis. Musc/skeletal: no bone or joint complains. Vasc: No edema, cyanosis or claudication. Endo: No heat/cold intolerance, no polyuria,polydipsia or polyphagia. Neuro: No unilateral weakness, numbness, tingling. No seizures. Heme: No easy bruising or bleeding. Physical Exam:     Physical Exam:  Visit Vitals    /90 (BP 1 Location: Right arm, BP Patient Position: At rest)    Pulse (!) 119    Temp 98.8 °F (37.1 °C)    Resp 24    Ht 5' 7\" (1.702 m)    Wt 65.8 kg (145 lb)    SpO2 97%    BMI 22.71 kg/m2      O2 Device: Room air    Temp (24hrs), Av.1 °F (37.3 °C), Min:98.6 °F (37 °C), Max:99.9 °F (37.7 °C)             General:  Awake, cooperative, no distress. Head:  Normocephalic, without obvious abnormality, atraumatic.    Eyes:  Conjunctivae/corneas clear, sclera anicteric, PERRL, EOMs intact. Nose: Nares normal. No drainage or sinus tenderness. Throat: Lips, mucosa, and tongue normal. + trach in place    Neck: Supple, symmetrical, trachea midline, no adenopathy. Lungs:   Diffuse coarse BS       Heart:  Regular rate and rhythm, S1, S2 normal, no murmur, click, rub or gallop. Abdomen: + peg in place, Soft, non-tender. Bowel sounds normal. No masses,  No organomegaly. Extremities: Extremities normal, atraumatic, no cyanosis or edema. Capillary refill normal.   Pulses: 2+ and symmetric all extremities. Skin: Skin color pink/pale/mottled/flushed, turgor normal. No rashes or lesions   Neurologic: CNII-XII intact. No focal motor or sensory deficit. Labs Reviewed:      Recent Results (from the past 24 hour(s))   LACTIC ACID    Collection Time: 11/15/17  4:08 PM   Result Value Ref Range    Lactic acid 1.8 0.4 - 2.0 MMOL/L   CBC WITH AUTOMATED DIFF    Collection Time: 11/15/17  4:08 PM   Result Value Ref Range    WBC 6.0 4.6 - 13.2 K/uL    RBC 3.32 (L) 4.70 - 5.50 M/uL    HGB 11.2 (L) 13.0 - 16.0 g/dL    HCT 33.0 (L) 36.0 - 48.0 %    MCV 99.4 (H) 74.0 - 97.0 FL    MCH 33.7 24.0 - 34.0 PG    MCHC 33.9 31.0 - 37.0 g/dL    RDW 17.0 (H) 11.6 - 14.5 %    PLATELET 415 273 - 899 K/uL    MPV 9.0 (L) 9.2 - 11.8 FL    NEUTROPHILS 81 (H) 42 - 75 %    BAND NEUTROPHILS 6 (H) 0 - 5 %    LYMPHOCYTES 6 (L) 20 - 51 %    MONOCYTES 5 2 - 9 %    EOSINOPHILS 2 0 - 5 %    BASOPHILS 0 0 - 3 %    ABS. NEUTROPHILS 4.9 1.8 - 8.0 K/UL    ABS. LYMPHOCYTES 0.4 (L) 0.8 - 3.5 K/UL    ABS. MONOCYTES 0.3 0 - 1.0 K/UL    ABS. EOSINOPHILS 0.1 0.0 - 0.4 K/UL    ABS.  BASOPHILS 0.0 0.0 - 0.1 K/UL    RBC COMMENTS ANISOCYTOSIS  1+        RBC COMMENTS MACROCYTOSIS  1+        DF MANUAL     EKG, 12 LEAD, INITIAL    Collection Time: 11/15/17  4:19 PM   Result Value Ref Range    Ventricular Rate 132 BPM    Atrial Rate 132 BPM    P-R Interval 138 ms    QRS Duration 60 ms    Q-T Interval 288 ms QTC Calculation (Bezet) 426 ms    Calculated P Axis 70 degrees    Calculated R Axis 59 degrees    Calculated T Axis 66 degrees    Diagnosis       Sinus tachycardia with frequent premature ventricular complexes  Anteroseptal infarct , age undetermined  Abnormal ECG  When compared with ECG of 01-SEP-2017 11:23,  Vent. rate has increased BY  44 BPM  Anteroseptal infarct is now present  ST now depressed in Anterior leads  T wave inversion now evident in Anterior leads     METABOLIC PANEL, COMPREHENSIVE    Collection Time: 11/15/17  4:42 PM   Result Value Ref Range    Sodium 140 136 - 145 mmol/L    Potassium 4.4 3.5 - 5.5 mmol/L    Chloride 98 (L) 100 - 108 mmol/L    CO2 30 21 - 32 mmol/L    Anion gap 12 3.0 - 18 mmol/L    Glucose 105 (H) 74 - 99 mg/dL    BUN 5 (L) 7.0 - 18 MG/DL    Creatinine 0.57 (L) 0.6 - 1.3 MG/DL    BUN/Creatinine ratio 9 (L) 12 - 20      GFR est AA >60 >60 ml/min/1.73m2    GFR est non-AA >60 >60 ml/min/1.73m2    Calcium 9.6 8.5 - 10.1 MG/DL    Bilirubin, total 0.2 0.2 - 1.0 MG/DL    ALT (SGPT) 19 16 - 61 U/L    AST (SGOT) 26 15 - 37 U/L    Alk. phosphatase 126 (H) 45 - 117 U/L    Protein, total 7.1 6.4 - 8.2 g/dL    Albumin 3.3 (L) 3.4 - 5.0 g/dL    Globulin 3.8 2.0 - 4.0 g/dL    A-G Ratio 0.9 0.8 - 1.7     NT-PRO BNP    Collection Time: 11/15/17  4:42 PM   Result Value Ref Range    NT pro-BNP 81 0 - 900 PG/ML   TROPONIN I    Collection Time: 11/15/17  4:42 PM   Result Value Ref Range    Troponin-I, Qt. <0.02 0.00 - 0.06 NG/ML       Procedures/imaging: see electronic medical records for all procedures/Xrays and details which were not copied into this note but were reviewed prior to creation of Plan    50 minutes of  care time spent in the direct evaluation and treatment of this high risk patient.    CC: Ranulfo Keller MD

## 2017-11-16 NOTE — PROGRESS NOTES
Readmission Risk Assessment:     Moderate Risk and MSSP/Good Help ACO patients    RRAT Score:  13-20    Initial Assessment: Casimiro Benites is a 45 y.o. male who presents to the emergency department C/O vomiting onset this morning. Patient was admitted for acute respiratory distress     Emergency Contact:  See face sheet    Pertinent Medical Hx:     COPD, throat cancer, tobacco use radiation, PEG tube,     PCP/Specialists: Michelle Walden      Community Services:       DME:          Moderate Risk Care Transition Plan:  1. Evaluate for [place] or H2H, SNF, acute rehab, community care coordination of resources. 2. Involve patient/caregiver in assessment, planning, education and implement of intervention. 3. CM daily patient care huddles/interdisciplinary rounds. 4. PCP/Specialist appointment within 5  7 days made prior to discharge. 5. Facilitate transportation and logistics for follow-up appointments. 6. Medication reconciliation [address]  7. Formal handoff between hospital provider and post-acute provider to transition patient  Handoff to [facility] Nurse Navigator or PCP practice. Chart Reviewed. Noted patient admitted to the hospital for further medical management. Care Management to follow up with patient and/or family for transition of care needs prn.

## 2017-11-16 NOTE — PROGRESS NOTES
2210- PRN Roxicodone administered for pain. Pt can have ice chips as per Dr Juan Lock order  2300- Pt resting, no complaints at this time.

## 2017-11-16 NOTE — ROUTINE PROCESS
TRANSFER - IN REPORT:    Verbal report received from Salomón Dean RN(name) on Lyndsay Mcdermott  being received from ED(unit) for routine progression of care      Report consisted of patients Situation, Background, Assessment and   Recommendations(SBAR). Information from the following report(s) SBAR, Kardex and ED Summary was reviewed with the receiving nurse. Opportunity for questions and clarification was provided. Assessment completed upon patients arrival to unit and care assumed.

## 2017-11-16 NOTE — PROGRESS NOTES
5208:  Assumed care for patient, received bedside report from Kaity Orantes RN. Patient had no complaints of pain or discomfort at the time. PEG dsg clean, dry, and intact. Trach in place, dsg clean, dry, and intact. Whiteboard updated, bed at the lowest position with call bell within reach. 1941:  Bedside and Verbal shift change report given to Kailey Collazo RN (oncoming nurse) by Angelic Jolly RN   (offgoing nurse). Report included the following information SBAR, Kardex, Procedure Summary, MAR and Med Rec Status. Rhythm Sinus tach.

## 2017-11-16 NOTE — PROGRESS NOTES
Pharmacy Dosing Services: Vancomycin    Consult for Vancomycin Dosing by Pharmacy by Dr. Keegan Boogie provided for this 45y.o. year old male , for indication of HAP. Day of Therapy 1    Ht Readings from Last 1 Encounters:   11/15/17 170.2 cm (67\")        Wt Readings from Last 1 Encounters:   11/15/17 65.8 kg (145 lb)        Other Current Antibiotics Pip/cong 3.375 gm IV q 8 hr   Significant Cultures Pending from micro lab   Serum Creatinine Lab Results   Component Value Date/Time    Creatinine 0.57 11/15/2017 04:42 PM      Creatinine Clearance Estimated Creatinine Clearance: 137.9 mL/min (based on Cr of 0.57). BUN Lab Results   Component Value Date/Time    BUN 5 11/15/2017 04:42 PM      WBC Lab Results   Component Value Date/Time    WBC 6.0 11/15/2017 04:08 PM      H/H Lab Results   Component Value Date/Time    HGB 11.2 11/15/2017 04:08 PM      Platelets Lab Results   Component Value Date/Time    PLATELET 489 07/67/5865 04:08 PM      Temp 99.2 °F (37.3 °C)     Start Vancomycin therapy, with loading dose of 1750 (mg) at 0000/ 11/16/2017(time/date). Follow with maintenance dose of 1000(mg) at 0800/ 11/16/2017 (time/date), every 8 hours (frequency). Dose calculated to approximate a therapeutic trough of 15-20mcg/mL. Pharmacy to follow daily and will make changes to dose and/or frequency based on clinical status. Estimated Pharmacokinetic Parameters (based on population kinetics)  Vd: 46 L (0.7 L/kg)   Yusuf: 0.1 hr-1 (T1/2 = 6.9 hrs)     Dosing Recommendations   Vancomycin dose: 1000 mg IV Q8hrs (infused over 1 hr)   Estimated peak: 37.5 mcg/mL   Estimated trough: 18.6 mcg/mL   Estimated AUC:TAYLA: 650 mcg*hr/mL (assumed TAYLA 1 mcg/mL)     A/P:   1. Recommend vancomycin 1000 mg IV Q8hrs (15 mg/kg)   2. Consider a vancomycin trough level prior to the 4th dose. 3. Please monitor renal function (urine output, BUN/SCr). Dose adjustments may be necessary with a significant change in renal function.    4. vancomycin loading dose of 1750 mg to facilitate rapid attainment of target trough serum vancomycin levels.                   Pharmacist  MAE DangD

## 2017-11-16 NOTE — WOUND CARE
Pt assessed by wound care during monthly prevalence. Pt has a Hermelindo score of 21. Pt denies pain during assessment. Skin intact , wound care will continue to monitor during this hospitalization.

## 2017-11-16 NOTE — PROGRESS NOTES
Hospitalist Progress Note    Patient: Lyndsay Mcdermott MRN: [id number]  CSN: [id number]    YOB: 1965  Age: 45 y.o. Sex: male    DOA: 11/15/2017 LOS:  LOS: 1 day          Chief Complaint: Ac hypoxia and COPD exac    Assessment/Plan        1. Acute Resp Distress with hypoxia on RA in mid 80s -resolved-sats normal on RA  2. PNA, likely from aspiration -has chronic peg, no infiltrate on CXR-continue zosyn, add levaquin, stop vanco for now  3. Hx of Tracheotomy, due to hx of throat cancer with radiation/scar  4. Chronic Resp failure, on trach -not on home 02  5. Dysphagia, peg in place -feeds self 5 X a day with 2 alphonse HN  6.  COPD, advanced with acute exacerbation-steroids IV and nebs PRN, sputum cx sent    Anticipate 1-2 days more in hospital  DVT prophylaxis            Disposition :1-2 days home  Patient Active Problem List   Diagnosis Code    Aspiration into airway T17.908A    Th[facility] ([facility] C14.0    Status post insertion of percutaneous endoscopic gastrostomy (PEG) tube ([facility] Z93.1    Hyponatremia E87.1    Acute respiratory distress R06.03    Hypoxia R09.02    Stridor R06.1    Respiratory depression R06.89    COPD exacerbation ([facility] J44.1    Acute hypoxemic respiratory failure (HCC) J96.01    Severe protein-calorie malnutrition (Deepthi Files: less than 60% of standard weight) ([facility] E43    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    Aspiration pneumonia (HCC) J69.0       Subjective:    Coughing up phlegm  breathing better this am  Feeding self thru peg  Denies CP or inc SOB    Review of systems:    Constitutional: denies fevers, chills, myalgias    Cardiovascular: denies chest pain, palpitations  Gastrointestinal: denies nausea, vomiting, diarrhea      Vital signs/Intake and Output:  Visit Vitals    /70 (BP 1 Location: Right arm, BP Patient Position: At rest)    Pulse (!) 103    Temp 98.6 °F (37 °C)    Resp 20    Ht 5' 7\" (1.702 m)    Wt 65.8 kg (145 lb)    SpO2 98%    BMI 22.71 kg/m2     Current Shift:  11/16 0701 - 11/16 1900  In: -   Out: 650 [Urine:650]  Last three shifts:  11/14 1901 - 11/16 0700  In: 0   Out: 600 [Urine:600]    Exam:    General: Well developed, alert, NAD, OX3, trcah  Head/Neck: NCAT, supple, No masses, No lymphadenopathy  CVS:Regular rate and rhythm, no M/R/G, S1/S2 heard, no thrill  Lungs:Coarse BS but no wheezes or rhonchi  Abdomen: Soft, Nontender, No distention, Normal Bowel sounds, No hepatomegaly  Extremities: No C/C/E, pulses palpable 2+  Neuro:grossly normal , follows commands  Psych:appropriate                Labs: Results:       Chemistry Recent Labs      11/16/17   0316  11/15/17   1642   GLU  91  105*   NA  143  140   K  4.1  4.4   CL  102  98*   CO2  32  30   BUN  7  5*   CREA  0.68  0.57*   CA  9.0  9.6   AGAP  9  12   BUCR  10*  9*   AP  102  126*   TP  6.2*  7.1   ALB  2.9*  3.3*   GLOB  3.3  3.8   AGRAT  0.9  0.9      CBC w/Diff Recent Labs      11/16/17   0316  11/15/17   1608   WBC  3.6*  6.0   RBC  3.00*  3.32*   HGB  10.1*  11.2*   HCT  30.7*  33.0*   PLT  172  202   GRANS  69  81*   LYMPH  15*  6*   EOS  1  2      Cardiac Enzymes No results for input(s): CPK, CKND1, CHUCKY in the last 72 hours. No lab exists for component: CKRMB, TROIP   Coagulation No results for input(s): PTP, INR, APTT in the last 72 hours. No lab exists for component: INREXT    Lipid Panel No results found for: CHOL, CHOLPOCT, CHOLX, CHLST, CHOLV, 155777, HDL, LDL, LDLC, DLDLP, 238423, VLDLC, VLDL, TGLX, TRIGL, TRIGP, TGLPOCT, CHHD, CHHDX   BNP No results for input(s): BNPP in the last 72 hours.    Liver Enzymes Recent Labs      11/16/17 0316   TP  6.2*   ALB  2.9*   AP  102   SGOT  16      Thyroid Studies No results found for: T4, T3U, TSH, TSHEXT     Procedures/imaging: see electronic medical records for all procedures/Xrays and details which were not copied into this note but were reviewed prior to creation of Evan Myers MD

## 2017-11-16 NOTE — ROUTINE PROCESS
Verbal and bedside Shift changed report given to Chun Lambert RN (oncoming RN) on Pt. Condition. Report consisted of patients Situation, History, Activities, intake/output,  Background, Assessment and Recommendations(SBAR). Information from the following report(s) Kardex, order Summary, Lab results and MAR was reviewed with the receiving nurse. Opportunity for questions and clarification was provided.

## 2017-11-16 NOTE — ROUTINE PROCESS
TRANSFER - OUT REPORT:    Verbal report given to Rebeka Ludwig RN (name) on [facility]  being transferred to [facility](unit) for routine progression of care       Report consisted of patients Situation, Background, Assessment and   Recommendations(SBAR). Information from the following report(s) SBAR, Kardex, ED Summary, Procedure Summary, Intake/Output and MAR was reviewed with the receiving nurse. Lines:   Peripheral IV 11/15/17 Left Antecubital (Active)   Site Assessment Clean, dry, & intact 11/15/2017  5:23 PM   Phlebitis Assessment 0 11/15/2017  5:23 PM   Infiltration Assessment 0 11/15/2017  5:23 PM   Dressing Status Clean, dry, & intact 11/15/2017  5:23 PM   Dressing Type Transparent 11/15/2017  5:23 PM   Hub Color/Line Status Pink 11/15/2017  5:23 PM        Opportunity for questions and clarification was provided.       Patient transported with:   [facility] [employer] [facility]

## 2017-11-16 NOTE — ROUTINE PROCESS
Bedside and Verbal shift change report given to [address] [place] [address] Nikhil (oncoming nurse) by Shukri Hinkle RN (offgoing nurse). Report included the following information SBAR and Kardex.

## 2017-11-16 NOTE — ROUTINE PROCESS
5588 Bedside and Verbal shift change  Received from Gaston Hedrick RN (outgoing nurse), to JESUS MANUEL De (oncoming)  Pt. Is AOX 4. IV Patent and infusing well, Pt. denies  pain at this time. Report included the following information SBAR, Kardex, Procedure Summary, Intake/Output, MAR, Recent Lab Results, and  Cardiac Rhythm @ NSR. Will resume care and monitor Pt. Condition. Pt. Educated on call bell when in need of help and assistance. Pt. verbalized understanding. Pt. Head to toe Assessment Done and documented. 0000 Pt. Made no complaints. Resting comfortably in bed.    0100  Pt. Resting in bed comfortably, no sing of distress. 0300  Pt. Made no complaints. 0500  Pt. in bed eyes closed, eyes closed, easily awaken. 0600  Pt. Able to rest well throughout the shift. 6827 given pain meds per [facility] for pain management. 0730  Pt. Denies pain at this time.

## 2017-11-16 NOTE — PROGRESS NOTES
INITIAL NUTRITION ASSESSMENT     RECOMMENDATIONS/PLAN:   Order Phos and Mg labs  Recc 5 cans of Two Sheldon to provide 2370kcal, 99g PRO, 830ml H20, 259g CHO, 107g Fat  Recc 130ml Free H20 Flushes before and after each can  Monitor labs/lytes, tube feed tolerance, skin integrity, wt, fluid status, BM    Adult Malnutrition Criteria:     Nutrition assessment was completed by RDN and the patient was found to meet the following malnutrition criteria established by ASPEN/AND:    Adult Malnutrition Guidelines:  SEVERE PROTEIN CALORIE MALNUTRITION IN THE CONTEXT OF CHRONIC ILLNESS  Weight Loss:  >5% x 1 month or >7.5% x 3 months or >10% x 6 months or >20% x 12 months  Muscle Mass: Severe Depletion      Clara S Edinson  11/16/17    REASON FOR ASSESSMENT:   [x]  MD Consult:      [x] Management of Tube Feeding   [x]  RN Referral:    [x] MST score >/=2  Malnutrition Screening Tool (MST):  Recently Lost Weight Without Trying: Unsure     Eating Poorly Due to Decreased Appetite: Yes  MST Score: 3    [x] Enteral/Parenteral Nutrition PTA     NUTRITION ASSESSMENT:   Client History: 45 yrs old Male admitted with sob w/ noted to be hypoxic in ED. Pt is known to nutrition staff  Recc starting 5 can TwoCal at home regimen via PEG Tube. PMHx: COPD, acute hypoxic respiratory failure, HTN, peg tube, chemo, s/p radiation therapy, throat cancer, tobacco abuse    Cultural/Oriental orthodox Food Preferences: None Identified    FOOD/NUTRITION HISTORY  Diet History: Pt is completely NPO. Pt has peg tube which provides all nutritional needs.     Food Allergies:  [x] NKFA     Pertinent PTA Medications: MVI    NUTRITION INTAKE   Diet Order:  NPO      Average PO Intake:       Patient Vitals for the past 100 hrs:   % Diet Eaten   11/16/17 0636 0 %      Pertinent Medications:  [x] Reviewed; MVI    Insulin:  [] SSI  [] Pre-meal   []  Basal   [] Drip  [x] None  Pt expected to meet estimated nutrient needs through next review:          []  Yes     [x] No; NPO does not meet estimated needs  ANTHROPOMETRICS  Height: 5' 7\" (170.2 cm)       Weight: 65.8 kg (145 lb)    BMI: 22.7 kg/m^2  -  normal weight (18.5%-24.9% BMI)        Weight change: pt was 163# on 8/16/17 currently pt is 145#  Which is a -11.04% wt loss in 3 months which is significant                                   Comparison to Reference Standards:  IBW: 148 lbs      %IBW: 98%      AdjBW: n/a    NUTRITION-FOCUSED PHYSICAL ASSESSMENT  Skin: No pressure injury noted . GI: No BM noted     BIOCHEMICAL DATA & MEDICAL TESTS  Pertinent Labs:  [x] Reviewed;      NUTRITION PRESCRIPTION  Calories: 2854-5802 kcal/day based on 30-35kcal/kg  Protein:  g/day based on 1.5-2.0 g/kg  Fluid: 2889-7934 ml/day based on 1 kcal/ml      NUTRITION DIAGNOSES:   1. Increased energy needs related to SOB and COPD as evidence by -11.04% wt loss in 3 months. NUTRITION INTERVENTIONS:   INTERVENTIONS:        GOALS:  1. Formula-Recc 5 cans of Two Sheldon to provide 2370kcal, 99g PRO, 830ml H20, 259g CHO, 107g Fat 1. Meet tube feed estimated needs by next review 3 days             LEARNING NEEDS (Diet, Supplementation, Food/Nutrient-Drug Interaction):   [x] None Identified  [] Inpatient education provided/documented    [] Identified and patient:  [] Declined     [] Was not appropriate/indicated  NUTRITION MONITORING /EVALUATION:   Adjust EN/PN as appropriate  Monitor wt  Monitor renal labs, electrolytes, fluid status    [] Participated in Interdisciplinary Rounds  [x] Interdisciplinary Care Plan Reviewed/Documented  DISCHARGE NUTRITION RECOMMENDATIONS ADDRESSED:     [x] Yes- recommended  5 cans of Two Sheldon  diet     NUTRITION RISK:     [x]  At risk                     []  Not currently at risk     Will follow-up per policy.   Mel Carlton RD  PAGER:  [phone]

## 2017-11-17 LAB
ANION GAP SERPL CALC-SCNC: 8 MMOL/L (ref 3–18)
BUN SERPL-MCNC: 11 MG/DL (ref 7–18)
BUN/CREAT SERPL: 14 (ref 12–20)
CALCIUM SERPL-MCNC: 8.8 MG/DL (ref 8.5–10.1)
CHLORIDE SERPL-SCNC: 103 MMOL/L (ref 100–108)
CO2 SERPL-SCNC: 30 MMOL/L (ref 21–32)
CREAT SERPL-MCNC: 0.77 MG/DL (ref 0.6–1.3)
ERYTHROCYTE [DISTWIDTH] IN BLOOD BY AUTOMATED COUNT: 17.5 % (ref 11.6–14.5)
GLUCOSE SERPL-MCNC: 91 MG/DL (ref 74–99)
HCT VFR BLD AUTO: 28.2 % (ref 36–48)
HGB BLD-MCNC: 9.2 G/DL (ref 13–16)
MAGNESIUM SERPL-MCNC: 1.7 MG/DL (ref 1.6–2.6)
MCH RBC QN AUTO: 34.2 PG (ref 24–34)
MCHC RBC AUTO-ENTMCNC: 32.6 G/DL (ref 31–37)
MCV RBC AUTO: 104.8 FL (ref 74–97)
PHOSPHATE SERPL-MCNC: 3.9 MG/DL (ref 2.5–4.9)
PLATELET # BLD AUTO: 128 K/UL (ref 135–420)
PMV BLD AUTO: 9.3 FL (ref 9.2–11.8)
POTASSIUM SERPL-SCNC: 3.9 MMOL/L (ref 3.5–5.5)
RBC # BLD AUTO: 2.69 M/UL (ref 4.7–5.5)
SODIUM SERPL-SCNC: 141 MMOL/L (ref 136–145)
WBC # BLD AUTO: 2.7 K/UL (ref 4.6–13.2)

## 2017-11-17 PROCEDURE — 74011250636 HC RX REV CODE- 250/636: Performed by: INTERNAL MEDICINE

## 2017-11-17 PROCEDURE — 74011000258 HC RX REV CODE- 258: Performed by: INTERNAL MEDICINE

## 2017-11-17 PROCEDURE — 80048 BASIC METABOLIC PNL TOTAL CA: CPT | Performed by: INTERNAL MEDICINE

## 2017-11-17 PROCEDURE — 74011250637 HC RX REV CODE- 250/637: Performed by: INTERNAL MEDICINE

## 2017-11-17 PROCEDURE — 65660000000 HC RM CCU STEPDOWN

## 2017-11-17 PROCEDURE — 85027 COMPLETE CBC AUTOMATED: CPT | Performed by: INTERNAL MEDICINE

## 2017-11-17 PROCEDURE — 83735 ASSAY OF MAGNESIUM: CPT | Performed by: INTERNAL MEDICINE

## 2017-11-17 PROCEDURE — 74011250636 HC RX REV CODE- 250/636: Performed by: HOSPITALIST

## 2017-11-17 PROCEDURE — 84100 ASSAY OF PHOSPHORUS: CPT | Performed by: INTERNAL MEDICINE

## 2017-11-17 PROCEDURE — 36415 COLL VENOUS BLD VENIPUNCTURE: CPT | Performed by: INTERNAL MEDICINE

## 2017-11-17 PROCEDURE — 74011636637 HC RX REV CODE- 636/637: Performed by: HOSPITALIST

## 2017-11-17 RX ORDER — PREDNISONE 20 MG/1
40 TABLET ORAL
Status: DISCONTINUED | OUTPATIENT
Start: 2017-11-17 | End: 2017-11-18 | Stop reason: HOSPADM

## 2017-11-17 RX ORDER — ALPRAZOLAM 0.5 MG/1
0.5 TABLET ORAL
Status: COMPLETED | OUTPATIENT
Start: 2017-11-17 | End: 2017-11-17

## 2017-11-17 RX ADMIN — OXYCODONE HYDROCHLORIDE 10 MG: 5 TABLET ORAL at 19:18

## 2017-11-17 RX ADMIN — HEPARIN SODIUM 5000 UNITS: 5000 INJECTION, SOLUTION INTRAVENOUS; SUBCUTANEOUS at 16:29

## 2017-11-17 RX ADMIN — ALPRAZOLAM 0.5 MG: 0.5 TABLET ORAL at 21:10

## 2017-11-17 RX ADMIN — OXYCODONE HYDROCHLORIDE 10 MG: 5 TABLET ORAL at 05:40

## 2017-11-17 RX ADMIN — HEPARIN SODIUM 5000 UNITS: 5000 INJECTION, SOLUTION INTRAVENOUS; SUBCUTANEOUS at 05:40

## 2017-11-17 RX ADMIN — OXYCODONE HYDROCHLORIDE 10 MG: 5 TABLET ORAL at 12:58

## 2017-11-17 RX ADMIN — PREDNISONE 40 MG: 20 TABLET ORAL at 12:58

## 2017-11-17 RX ADMIN — LISINOPRIL 5 MG: 5 TABLET ORAL at 21:10

## 2017-11-17 RX ADMIN — PIPERACILLIN AND TAZOBACTAM 3.38 G: 3; .375 INJECTION, POWDER, LYOPHILIZED, FOR SOLUTION INTRAVENOUS; PARENTERAL at 06:41

## 2017-11-17 RX ADMIN — PIPERACILLIN AND TAZOBACTAM 3.38 G: 3; .375 INJECTION, POWDER, LYOPHILIZED, FOR SOLUTION INTRAVENOUS; PARENTERAL at 19:38

## 2017-11-17 RX ADMIN — HEPARIN SODIUM 5000 UNITS: 5000 INJECTION, SOLUTION INTRAVENOUS; SUBCUTANEOUS at 21:10

## 2017-11-17 RX ADMIN — ALPRAZOLAM 0.5 MG: 0.5 TABLET ORAL at 00:24

## 2017-11-17 RX ADMIN — MULTIPLE VITAMINS W/ MINERALS TAB 1 TABLET: TAB at 09:24

## 2017-11-17 RX ADMIN — PIPERACILLIN AND TAZOBACTAM 3.38 G: 3; .375 INJECTION, POWDER, LYOPHILIZED, FOR SOLUTION INTRAVENOUS; PARENTERAL at 00:24

## 2017-11-17 RX ADMIN — LEVOFLOXACIN 750 MG: 5 INJECTION, SOLUTION INTRAVENOUS at 16:29

## 2017-11-17 RX ADMIN — LISINOPRIL 5 MG: 5 TABLET ORAL at 09:24

## 2017-11-17 NOTE — CDMP QUERY
Please clarify if this patient is being treated/managed for:    =>Severe Protein Calorie Malnutrition as noted by Dietitian  =>Other Explanation of clinical findings  =>Unable to Determine (no explanation of clinical findings)    The medical record reflects the following:    Risk:Chronically ill pt with tracheostomy    Clinical Indicators:Dietitian note states--\"Nutrition assessment was completed by RDN and the patient was found to meet the following malnutrition criteria established by ASPEN/AND:     Adult Malnutrition Guidelines:  SEVERE PROTEIN CALORIE MALNUTRITION IN THE CONTEXT OF CHRONIC ILLNESS  Weight Loss:  >5% x 1 month or >7.5% x 3 months or >10% x 6 months or >20% x 12 months  Muscle Mass: Severe Depletion\"  Weight change: pt was 163# on 8/16/17 currently pt is 145#  Which is a -11.04% wt loss in 3 months which is significant  \"    Treatment: Order Phos and Mg labs  Recc 5 cans of Two Sheldon to provide 2370kcal, 99g PRO, 830ml H20, 259g CHO, 107g Fat  Recc 130ml Free H20 Flushes before and after each can    Please clarify and document your clinical opinion in the progress notes and discharge summary including the definitive and/or presumptive diagnosis, (suspected or probable), related to the above clinical findings. Please include clinical findings supporting your diagnosis. If you DECLINE this query or would like to communicate with [facility], please utilize the \"[facility] message box\" at the TOP of the Progress Note on the right.       Thank you,  Janet Crews RN [facility]  [phone]

## 2017-11-17 NOTE — PROGRESS NOTES
2600 [facility] care of patient from See Velasco RN    641 Assessment completed, patient is alert and oriented x's 4, no c/o pain. NSR on the monitor with a HR in the 80's. Lung fields are clear throughout on RA, trach remain intact, productive cough noted with 02 sat sustained at 100%. Patient received scheduled medications via peg tube without any complications. Patient is currently sitting up on side of bed watching television, no s/s of any distress, will continue to monitor. 1130  IDR/SLIDR Summary          Patient: Shaun Levine MRN: [id number]    Age: 45 y.o. YOB: 1965 Room/Bed: [facility]/   Admit Diagnosis: Aspiration pneumonia (HCC)  Principal Diagnosis: Acute respiratory distress   Goals: decrease respiratory distress  Readmission: NO  Quality Measure: Not applicable  VTE Prophylaxis: Chemical  Influenza Vaccine screening completed? YES  Pneumococcal Vaccine screening completed? YES  Mobility needs: No   Nutrition plan:No  Consults:P.T, O.T., Speech, Respiratory and Case Management    Financial concerns:Yes  Escalated to CM? YES  RRAT Score:    Interventions:H2H  Testing due for pt today? NO  LOS: 2 days Expected length of stay 1 days  Discharge plan: home   PCP: Yogesh Eli MD  Transportation needs: No    Days before discharge:one day until discharge   Discharge disposition: Home    Signed:     Lisa Anthony RN  11/17/2017  12:06 PM    [id number] Scheduled medications and PRN pain medications crushed and administered via peg tube. Patient had 120cc residuals, no s/s of any N/V or gastric distention. Patient is currently sitting up on side of bed, no s/s of any distress, will continue to monitor. 1654 Scheduled medications administered as ordered without any complications. Patient is currently sitting up on side of bed, no s/s of any distress, will continue to monitor.     1923 Bedside and Verbal shift change report given to Darliss Angelucci sheppard, Rn (oncoming nurse) by Fatou Luz RN (offgoing nurse). Report included the following information SBAR.

## 2017-11-17 NOTE — PROGRESS NOTES
Hospitalist Progress Note    Patient: Gavi Gray MRN: [id number]  CSN: [id number]    YOB: 1965  Age: 45 y.o. Sex: male    DOA: 11/15/2017 LOS:  LOS: 2 days          Chief Complaint:    Acute COPD exacerbation      Assessment/Plan     1. Acute Resp Distress with hypoxia on RA in mid 80s -resolved-sats normal on RA  2. Early pneumonia/acute bronchitis-has chronic peg, no infiltrate on CXR-continue zosyn, and levaquin  3. Hx of Tracheotomy, due to hx of throat cancer with radiation/scar  4. Chronic Resp failure, on trach -not on home 02  5. Dysphagia, peg in place -feeds self 5 X a day with 2 alphonse HN  6.  COPD, advanced with acute exacerbation-steroids IV and nebs PRN, sputum cx sent  7.sev prot alphonse malnutrition    Wean steroids IV  Continue IV antibiotics  DVT prophylaxis  Continue tube feedings 5 X a day    Consider possible discharge tomorrow if doing well  He is not usually on 02 at home    Disposition: home 1-2 days      Patient Active Problem List   Diagnosis Code    Aspiration into airway T17.908A    Throat cancer (HCC) C14.0    Status post insertion of percutaneous endoscopic gastrostomy (PEG) tube ([address] Z93.1    Hyponatremia E87.1    Acute respiratory distress R06.03    Hypoxia R09.02    Stridor R06.1    Respiratory depression R06.89    COPD exacerbation ([address] J44.1    Acute hypoxemic respiratory failure (HCC) J96.01    Severe protein-calorie malnutrition (Garnetta Chick: less than 60% of standard weight) ([address] E43    Tracheostomy dependence ([address] Z93.0    Aspiration pneumonia ([address] J69.0       Subjective:    Denies CP, SOB  Feeling better  Although weak on feet    Review of systems:    Constitutional: denies fevers, chills, myalgias  Respiratory: has cough prod of yellow sputum  Cardiovascular: denies chest pain, palpitations  Gastrointestinal: denies nausea, vomiting, diarrhea      Vital signs/Intake and Output:  Visit Vitals    /76 (BP 1 Location: Right arm, BP Patient Position: At rest)    Pulse 86    Temp 97.9 °F (36.6 °C)    Resp 20    Ht 5' 7\" (1.702 m)    Wt 72.1 kg (159 lb)    SpO2 100%    BMI 24.9 kg/m2     Current Shift:     Last three shifts:  11/15 1901 - 11/17 0700  In: 0   Out: 1500 [Urine:1500]    Exam:    General: Well developed, alert, NAD, OX3, trach  Head/Neck: NCAT, supple, No masses, No lymphadenopathy  CVS:Regular rate and rhythm, no M/R/G, S1/S2 heard, no thrill  Lungs:Clear to auscultation bilaterally, no wheezes, rhonchi, or rales  Abdomen: Soft, Nontender, peg tube, no distention, Normal Bowel sounds, No hepatomegaly  Extremities: No C/C/E, pulses palpable 2+  Skin:normal texture and turgor, no rashes, no lesions  Neuro:grossly normal , follows commands  Psych:appropriate                Labs: Results:       Chemistry Recent Labs      11/17/17   0519  11/16/17   0316  11/15/17   1642   GLU  91  91  105*   NA  141  143  140   K  3.9  4.1  4.4   CL  103  102  98*   CO2  30  32  30   BUN  11  7  5*   CREA  0.77  0.68  0.57*   CA  8.8  9.0  9.6   AGAP  8  9  12   BUCR  14  10*  9*   AP   --   102  126*   TP   --   6.2*  7.1   ALB   --   2.9*  3.3*   GLOB   --   3.3  3.8   AGRAT   --   0.9  0.9      CBC w/Diff Recent Labs      11/17/17   0519  11/16/17   0316  11/15/17   1608   WBC  2.7*  3.6*  6.0   RBC  2.69*  3.00*  3.32*   HGB  9.2*  10.1*  11.2*   HCT  28.2*  30.7*  33.0*   PLT  128*  172  202   GRANS   --   69  81*   LYMPH   --   15*  6*   EOS   --   1  2      Cardiac Enzymes No results for input(s): CPK, CKND1, CHUCKY in the last 72 hours. No lab exists for component: CKRMB, TROIP   Coagulation No results for input(s): PTP, INR, APTT in the last 72 hours. No lab exists for component: INREXT    Lipid Panel No results found for: CHOL, CHOLPOCT, CHOLX, CHLST, CHOLV, 982374, HDL, LDL, LDLC, DLDLP, 740534, VLDLC, VLDL, TGLX, TRIGL, TRIGP, TGLPOCT, CHHD, CHHDX   BNP No results for input(s): BNPP in the last 72 hours.    Liver Enzymes Recent Labs 11/16/17   0316   TP  6.2*   ALB  2.9*   AP  102   SGOT  16      Thyroid Studies No results found for: T4, T3U, TSH, TSHEXT     Procedures/imaging: see electronic medical records for all procedures/Xrays and details which were not copied into this note but were reviewed prior to creation of Evan Myers MD

## 2017-11-17 NOTE — PROGRESS NOTES
21:00 Assessment completed. Lungs remain coarse bilat. Freq sx with [place] orally. Peg tube is patent with tube feedings per pt. Offers 0 c/o CP, pressure, or SOB. Resting quietly on the side of the bed. 23:00 Shift assessment complete. See nsg flow sheet for details. 02:50 Reassessed with 0 changes noted. Resting quietly in bed with eyes closed between cares x for voiding per urinal w/o difficulty    07:40 Bedside and Verbal shift change report given to Juany Elliott RN (oncoming nurse) by Amelia Russell RN (offgoing nurse). Report included the following information SBAR.

## 2017-11-17 NOTE — PROGRESS NOTES
D/c plan anticipate home  Met with patient during IDR's. Patient informed cm he was waiting on [facility] to follow up with his medicaid. Nereyda Devonamilcar ari HU informed cm that pateints medicaid is not active however she was going to make contact with his community . Patient has home oxygen and nebulizer per patient at home  Care Management Interventions  PCP Verified by CM: Yes  Transition of Care Consult (CM Consult): Discharge Planning  Current Support Network:  Other  Confirm Follow Up Transport: Family  Plan discussed with Pt/Family/Caregiver: Yes  Freedom of Choice Offered: Yes  Discharge Location  Discharge Placement: Home with family assistance

## 2017-11-17 NOTE — PROGRESS NOTES
NUTRITION FOLLOW-UP    RECOMMENDATIONS/PLAN:   - continue w/ POC  Monitor labs/lytes, tube feed tolerance, skin integrity, wt, fluid status, BM     Adult Malnutrition Criteria:      Nutrition assessment was completed by RDN and the patient was found to meet the following malnutrition criteria established by ASPEN/AND:     Adult Malnutrition Guidelines:  SEVERE PROTEIN CALORIE MALNUTRITION IN THE CONTEXT OF CHRONIC ILLNESS  Weight Loss:  >5% x 1 month or >7.5% x 3 months or >10% x 6 months or >20% x 12 months  Muscle Mass: Severe Depletion        Gray Manzanares  11/17/17  NUTRITION ASSESSMENT:   Client Update: 45 yrs old Male with sob w/ noted to be hypoxic in ED. FOOD/NUTRITION INTAKE   Diet Order:  Tube Feed- NPO   Supplements: n/a   Food Allergies: NKFA/  Average PO Intake:      Patient Vitals for the past 100 hrs:   % Diet Eaten   11/16/17 0636 0 %      Pertinent Medications:  [x] Reviewed; MVI  Electrolyte Replacement Protocol: []K []Mg []PO4  Insulin:  []SSI  []Pre-meal   []Basal    []Drip  []None  Cultural/[employer] Food Preferences: None Identified    BIOCHEMICAL DATA & MEDICAL TESTS  Pertinent Labs:  [x] Reviewed; ANTHROPOMETRICS  Height: 5' 7\" (170.2 cm)       Weight: 72.1 kg (159 lb)         BMI: 24.9 kg/m^2 normal weight (18.5%-24.9% BMI)   Adm Weight: 145 lbs                Weight change: +14lbs ? Accuracy of initial wt. Adjusted Body Weight: n/a     NUTRITION-FOCUSED PHYSICAL ASSESSMENT  Skin: No pressure injury noted       GI: +BM 11/16/17    NUTRITION PRESCRIPTION  Calories: 6999-2365 kcal/day based on 30-35kcal/kg  Protein:  g/day based on 1.5-2.0 g/kg  Fluid: 8427-2467 ml/day based on 1 kcal/ml                    NUTRITION DIAGNOSES:   1. Increased energy needs related to SOB and COPD as evidence by -11.04% wt loss in 3 months. NUTRITION INTERVENTIONS:   INTERVENTIONS:        GOALS:  1. Other:continue w/ POC 1.  Continue to meet goal rate for TwoCal  by next review 3 days LEARNING NEEDS (Diet, Supplementation, Food/Nutrient-Drug Interaction):   [x] None Identified   [] Education provided/documented      Identified and patient: [] Declined   [] Was not appropriate/indicated        NUTRITION MONITORING /EVALUATION:   Adjust EN/PN as appropriate  Monitor wt  Monitor renal labs, electrolytes, fluid status     Previous Recommendations Implemented: yes        Previous Goals Met:  yes -Pt is receiving goal rate for TwoCal per nurse       [] Participated in Interdisciplinary Rounds    [x] Interdisciplinary Care Plan Reviewed  DISCHARGE NUTRITION RECOMMENDATIONS ADDRESSED:     [x] Yes- recommended 5 cans of Two [place]   diet     NUTRITION RISK:           [x] At risk                        [] Not currently at risk        Will follow-up per policy.   Jai Simmons, RD  PAGER:  [phone]

## 2017-11-17 NOTE — PROGRESS NOTES
Problem: Falls - Risk of  Goal: *Absence of Falls  Document Caryl Fall Risk and appropriate interventions in the flowsheet.    Outcome: Progressing Towards Goal  Fall Risk Interventions:  Mobility Interventions: Patient to call before getting OOB         Medication Interventions: Assess postural VS orthostatic hypotension, Patient to call before getting OOB, Teach patient to arise slowly    Elimination Interventions: Call light in reach, Patient to call for help with toileting needs

## 2017-11-18 VITALS
TEMPERATURE: 97.7 F | HEART RATE: 90 BPM | BODY MASS INDEX: 25.27 KG/M2 | DIASTOLIC BLOOD PRESSURE: 71 MMHG | OXYGEN SATURATION: 100 % | WEIGHT: 161 LBS | HEIGHT: 67 IN | SYSTOLIC BLOOD PRESSURE: 140 MMHG | RESPIRATION RATE: 12 BRPM

## 2017-11-18 LAB
MAGNESIUM SERPL-MCNC: 1.9 MG/DL (ref 1.6–2.6)
PHOSPHATE SERPL-MCNC: 4 MG/DL (ref 2.5–4.9)

## 2017-11-18 PROCEDURE — 74011000258 HC RX REV CODE- 258: Performed by: INTERNAL MEDICINE

## 2017-11-18 PROCEDURE — 74011250637 HC RX REV CODE- 250/637: Performed by: INTERNAL MEDICINE

## 2017-11-18 PROCEDURE — 83735 ASSAY OF MAGNESIUM: CPT | Performed by: INTERNAL MEDICINE

## 2017-11-18 PROCEDURE — 36415 COLL VENOUS BLD VENIPUNCTURE: CPT | Performed by: INTERNAL MEDICINE

## 2017-11-18 PROCEDURE — 74011250636 HC RX REV CODE- 250/636: Performed by: INTERNAL MEDICINE

## 2017-11-18 PROCEDURE — 90686 IIV4 VACC NO PRSV 0.5 ML IM: CPT | Performed by: INTERNAL MEDICINE

## 2017-11-18 PROCEDURE — 90471 IMMUNIZATION ADMIN: CPT

## 2017-11-18 PROCEDURE — 84100 ASSAY OF PHOSPHORUS: CPT | Performed by: INTERNAL MEDICINE

## 2017-11-18 PROCEDURE — 74011636637 HC RX REV CODE- 636/637: Performed by: HOSPITALIST

## 2017-11-18 RX ORDER — AMOXICILLIN AND CLAVULANATE POTASSIUM 400; 57 MG/5ML; MG/5ML
400 POWDER, FOR SUSPENSION ORAL EVERY 12 HOURS
Qty: 70 ML | Refills: 0 | Status: SHIPPED | OUTPATIENT
Start: 2017-11-18 | End: 2018-01-09

## 2017-11-18 RX ORDER — PREDNISONE 20 MG/1
20 TABLET ORAL
Qty: 5 TAB | Refills: 0 | Status: SHIPPED | OUTPATIENT
Start: 2017-11-18 | End: 2018-01-09

## 2017-11-18 RX ADMIN — PREDNISONE 40 MG: 20 TABLET ORAL at 10:56

## 2017-11-18 RX ADMIN — MULTIPLE VITAMINS W/ MINERALS TAB 1 TABLET: TAB at 09:09

## 2017-11-18 RX ADMIN — INFLUENZA VIRUS VACCINE 0.5 ML: 15; 15; 15; 15 SUSPENSION INTRAMUSCULAR at 10:57

## 2017-11-18 RX ADMIN — OXYCODONE HYDROCHLORIDE 10 MG: 5 TABLET ORAL at 01:09

## 2017-11-18 RX ADMIN — OXYCODONE HYDROCHLORIDE 10 MG: 5 TABLET ORAL at 07:31

## 2017-11-18 RX ADMIN — PIPERACILLIN AND TAZOBACTAM 3.38 G: 3; .375 INJECTION, POWDER, LYOPHILIZED, FOR SOLUTION INTRAVENOUS; PARENTERAL at 06:52

## 2017-11-18 RX ADMIN — HEPARIN SODIUM 5000 UNITS: 5000 INJECTION, SOLUTION INTRAVENOUS; SUBCUTANEOUS at 06:52

## 2017-11-18 RX ADMIN — PIPERACILLIN AND TAZOBACTAM 3.38 G: 3; .375 INJECTION, POWDER, LYOPHILIZED, FOR SOLUTION INTRAVENOUS; PARENTERAL at 01:09

## 2017-11-18 RX ADMIN — LISINOPRIL 5 MG: 5 TABLET ORAL at 09:09

## 2017-11-18 NOTE — ROUTINE PROCESS
Bedside and Verbal shift change report given to Tyler Del Rio (oncoming nurses) by Leidy Handy (offgoing nurse). Report given with SBAR, Kardex, Intake/Output, MAR and Recent Results.

## 2017-11-18 NOTE — DISCHARGE INSTRUCTIONS
DISCHARGE SUMMARY from Nurse    PATIENT INSTRUCTIONS:    What to do at Home:    Recommended activity: Activity as tolerated. please follow up with Primary care physician. *  Please give a list of your current medications to your Primary Care Provider. *  Please update this list whenever your medications are discontinued, doses are      changed, or new medications (including over-the-counter products) are added. *  Please carry medication information at all times in case of emergency situations. These are general instructions for a healthy lifestyle:    No smoking/ No tobacco products/ Avoid exposure to second hand smoke  Surgeon General's Warning:  Quitting smoking now greatly reduces serious risk to your health. Obesity, smoking, and sedentary lifestyle greatly increases your risk for illness    A healthy diet, regular physical exercise & weight monitoring are important for maintaining a healthy lifestyle    You may be retaining fluid if you have a history of heart failure or if you experience any of the following symptoms:  Weight gain of 3 pounds or more overnight or 5 pounds in a week, increased swelling in our hands or feet or shortness of breath while lying flat in bed. Please call your doctor as soon as you notice any of these symptoms; do not wait until your next office visit. Recognize signs and symptoms of STROKE:    F-face looks uneven    A-arms unable to move or move unevenly    S-speech slurred or non-existent    T-time-call 911 as soon as signs and symptoms begin-DO NOT go       Back to bed or wait to see if you get better-TIME IS BRAIN. Warning Signs of HEART ATTACK     Call 911 if you have these symptoms:   Chest discomfort. Most heart attacks involve discomfort in the center of the chest that lasts more than a few minutes, or that goes away and comes back. It can feel like uncomfortable pressure, squeezing, fullness, or pain.    Discomfort in other areas of the upper body. Symptoms can include pain or discomfort in one or both arms, the back, neck, jaw, or stomach.  Shortness of breath with or without chest discomfort.  Other signs may include breaking out in a cold sweat, nausea, or lightheadedness. Don't wait more than five minutes to call 911 - MINUTES MATTER! Fast action can save your life. Calling 911 is almost always the fastest way to get lifesaving treatment. Emergency Medical Services staff can begin treatment when they arrive -- up to an hour sooner than if someone gets to the hospital by car. The discharge information has been reviewed with the patient. The patient verbalized understanding. Discharge medications reviewed with the patient and appropriate educational materials and side effects teaching were provided.     Patient armband removed and shredded    ___________________________________________________________________________________________________________________________________

## 2017-11-18 NOTE — PROGRESS NOTES
PT order acknowledged. Patient up, dressed, and ambulating hallway in anticipation of discharge. Independent and at baseline for all functional mobility - full assessment not completed as Nursing in process of discharge at this time - patient declined further need. Cleared for d/c from PT standpoint.

## 2017-11-18 NOTE — PROGRESS NOTES
0730 Assumed care of patient from [facility]. Patient alert, no signs of distress, call bell within reach.

## 2017-11-18 NOTE — PROGRESS NOTES
1915: Received report from ARIANNE Butcher RN. Pt sitting up watching tv. NAD. 1940: Assessment complete, see flow sheets. Pt requesting \"sleeping medicine\" states \" the nurse last night was able to get me xanax, can you get me some of that tonight? \"  Paged on call hospitalist.   2030: Spoke with Dr. Lor Lyles regarding pt request for sleeping medicine, new orders received for xanax 0.5 mg, see MAR. Bedside and Verbal shift change report given to ANNALISA Santos RN (oncoming nurse) by Urbano Barth RN   (offgoing nurse). Report included the following information SBAR, Intake/Output, MAR, Recent Results, Cardiac Rhythm NSR and Alarm Parameters .

## 2017-11-18 NOTE — PROGRESS NOTES
[id number] Shift Summary: Pt rested well overnight with no complaints. No new clinical concerns noted.

## 2017-11-18 NOTE — ROUTINE PROCESS
Dual AVS reviewed with Shae. All medications reviewed individually with patient. Opportunities for questions and concerns provided. Patient discharged via (mode of transport ie. Car, ambulance or air transport) car with friend  Patient's arm band appropriately discarded. Duplicate fentanyl patch ordered one for 100 mcg and 50 mcg. Patient on this at home.

## 2017-11-18 NOTE — DISCHARGE SUMMARY
Discharge Summary    Patient: Janit Lombard MRN: [id number]  CSN: [id number]    YOB: 1965  Age: 45 y.o. Sex: male    DOA: 11/15/2017 LOS:  LOS: 3 days   Discharge Date:      Primary Care Provider:  Smith Colby MD    Admission Diagnoses: Aspiration pneumonia [facility])    Discharge Diagnoses:    Problem List as of 11/18/2017  Date Reviewed: 11/15/2017          Codes Class Noted - Resolved    Aspiration pneumonia ([facility] ICD-10-CM: J69.0  ICD-9-CM: 507.0  11/15/2017 - Present        Tracheostomy dependence ([facility] ICD-10-CM: Z93.0  ICD-9-CM: V44.0  8/25/2017 - Present        Severe protein-calorie malnutrition Ruth Cockayne: less than 60% of standard weight) ([facility] ICD-10-CM: E43  ICD-9-CM: 262  8/18/2017 - Present        COPD exacerbation ([facility] ICD-10-CM: J44.1  ICD-9-CM: 491.21  8/16/2017 - Present        Acute hypoxemic respiratory failure ([facility] ICD-10-CM: J96.01  ICD-9-CM: 518.81  8/16/2017 - Present        * (Principal)Acute respiratory distress ICD-10-CM: R06.03  ICD-9-CM: 518.82  8/3/2017 - Present        Hypoxia ICD-10-CM: R09.02  ICD-9-CM: 799.02  8/3/2017 - Present        Stridor ICD-10-CM: R06.1  ICD-9-CM: 786.1  8/3/2017 - Present        Respiratory depression ICD-10-CM: R06.89  ICD-9-CM: 786.09  8/3/2017 - Present        Aspiration into airway ICD-10-CM: T17.908A  ICD-9-CM: 934.9  7/30/2017 - Present        Throat cancer ([facility] ICD-10-CM: C14.0  ICD-9-CM: 149.0  7/30/2017 - Present        Status post insertion of percutaneous endoscopic gastrostomy (PEG) tube ([facility] ICD-10-CM: Z93.1  ICD-9-CM: V44.1  7/30/2017 - Present        Hyponatremia ICD-10-CM: E87.1  ICD-9-CM: 276.1  7/30/2017 - Present              Discharge Medications:     Current Discharge Medication List      START taking these medications    Details   predniSONE (DELTASONE) 20 mg tablet Take 1 Tab by mouth daily (with lunch).   Qty: 5 Tab, Refills: 0         CONTINUE these medications which have CHANGED    Details   amoxicillin-clavulanate (AUGMENTIN) 400-57 mg/5 mL suspension 5 mL by Per G Tube route every twelve (12) hours. Qty: 70 mL, Refills: 0         CONTINUE these medications which have NOT CHANGED    Details   cloNIDine (CATAPRES) 0.2 mg/24 hr patch 1 Patch by TransDERmal route every seven (7) days. Qty: 4 Patch, Refills: 0      fentaNYL (DURAGESIC) 100 mcg/hr PATCH 1 Patch by TransDERmal route every seventy-two (72) hours. Max Daily Amount: 1 Patch. Qty: 5 Patch, Refills: 0      fentaNYL (DURAGESIC) 50 mcg/hr PATCH 1 Patch by TransDERmal route every seventy-two (72) hours. Max Daily Amount: 1 Patch. Qty: 5 Patch, Refills: 0      guaiFENesin (ROBITUSSIN) 100 mg/5 mL liquid Take 5 mL by mouth every four (4) hours as needed for Cough. Qty: 200 mL, Refills: 0      lisinopril (PRINIVIL, ZESTRIL) 5 mg tablet Take 1 Tab by mouth every twelve (12) hours. Qty: 30 Tab, Refills: 0      oxyCODONE IR (ROXICODONE) 10 mg tab immediate release tablet 1 Tab by Per G Tube route every six (6) hours as needed. Max Daily Amount: 40 mg.  Qty: 30 Tab, Refills: 0      magic mouthwash (FIRST-MOUTHWASH BLM) 184--40 mg/30 mL mwsh oral suspension Take 10 mL by mouth every four (4) hours as needed. albuterol-ipratropium (DUO-NEB) 2.5 mg-0.5 mg/3 ml nebu 3 mL by Nebulization route every four (4) hours as needed. Qty: 30 Nebule, Refills: 1      multivitamin, tx-iron-ca-min (THERA-M W/ IRON) 9 mg iron-400 mcg tab tablet Take 1 Tab by mouth daily. Qty: 30 Tab, Refills: 1      Nebulizer Accessories kit Use nebs Q4H PRN  Qty: 1 Kit, Refills: 0             Discharge Condition: Fair      PHYSICAL EXAM   Visit Vitals    /70 (BP 1 Location: Left arm, BP Patient Position: At rest;Supine; Head of bed elevated (Comment degrees))    Pulse 73    Temp 97.9 °F (36.6 °C)    Resp 14    Ht 5' 7\" (1.702 m)    Wt 73 kg (161 lb)    SpO2 100%    BMI 25.22 kg/m2     General: Awake, cooperative, no acute distress    HEENT: NC, Atraumatic.   PERRLA, EOMI. + trach in place  Lungs:  Decreased BS b/l  Heart:  Regular  rhythm,  No murmur, No Rubs, No Gallops  Abdomen: Soft, PEG in place, Non tender. +Bowel sounds,   Extremities: No c/c/e  Psych:   Not anxious or agitated. Neurologic:  No acute neurological deficits. Admission HPI :   Lee Esteves is a 45 y.o. male who came to the hospital with complaints of sob. Patient states he has been sob for few weeks now but over the course of past few days it has progressively became worst, he think he caught a bug last week. Since then he has had producitve greenish mucus. She has trach and peg in place, due to hx of throat cancer leading to scaring from radiation and surgery. He states he is completely NPO, and doesn't feed himself via mouth. He has had aspiration pna's in the past.   No other complaints today, in the ED he was noted to be hypoxic on RA. CXR was clear but there were concners of aspiration pna. Hospital Course :    Acute Resp Distress with hypoxia on RA in mid 80s. resolved-sats normal on RA after the treatment    Early pneumonia/acute bronchitis: Pt was placed with IV vanco and Zosyn first, then vanco was switched to Levaquin. no infiltrate on repeat CXR, respiratory status is back to his baseline. Will go home with 7 day course of Augmentin. Hx of Tracheotomy, due to hx of throat cancer with radiation/scar: been seen and f/u by ENT. Chronic Resp failure, on trach -not on home 02    Dysphagia, peg in place: 2nd to hx of throat Ca. feeds self 5 X a day with 2 alphonse HN    COPD, advanced with acute exacerbation: Improving. Pt was placed with O2, steroids IV and nebs PRN, sputum cx sent/pending result. He will go home with 5 days of PO steroids.      sev prot alphonse malnutrition: PEG feeding       Activity: Activity as tolerated    Diet: Cardiac Diet     Follow-up: with PCP within 1 wk    Disposition: Home with home health    Minutes spent on discharge: 40 min      Labs: Results: Chemistry Recent Labs      11/17/17   0519  11/16/17   0316  11/15/17   1642   GLU  91  91  105*   NA  141  143  140   K  3.9  4.1  4.4   CL  103  102  98*   CO2  30  32  30   BUN  11  7  5*   CREA  0.77  0.68  0.57*   CA  8.8  9.0  9.6   AGAP  8  9  12   BUCR  14  10*  9*   AP   --   102  126*   TP   --   6.2*  7.1   ALB   --   2.9*  3.3*   GLOB   --   3.3  3.8   AGRAT   --   0.9  0.9      CBC w/Diff Recent Labs      11/17/17   0519  11/16/17   0316  11/15/17   1608   WBC  2.7*  3.6*  6.0   RBC  2.69*  3.00*  3.32*   HGB  9.2*  10.1*  11.2*   HCT  28.2*  30.7*  33.0*   PLT  128*  172  202   GRANS   --   69  81*   LYMPH   --   15*  6*   EOS   --   1  2      Cardiac Enzymes No results for input(s): CPK, CKND1, CHUCKY in the last 72 hours. No lab exists for component: CKRMB, TROIP   Coagulation No results for input(s): PTP, INR, APTT in the last 72 hours. No lab exists for component: INREXT    Lipid Panel No results found for: CHOL, CHOLPOCT, CHOLX, CHLST, CHOLV, 800881, HDL, LDL, LDLC, DLDLP, 392468, VLDLC, VLDL, TGLX, TRIGL, TRIGP, TGLPOCT, CHHD, CHHDX   BNP No results for input(s): BNPP in the last 72 hours. Liver Enzymes Recent Labs      11/16/17 0316   TP  6.2*   ALB  2.9*   AP  102   SGOT  16      Thyroid Studies No results found for: T4, T3U, TSH, TSHEXT         Significant Diagnostic Studies: Xr Chest Port    Result Date: 11/16/2017  AP portable chest, 11/15/2017 at 1652 hours: Comparison 9/1/2017. INDICATION: Sepsis. Concern for pneumonia. The inferior left CP angle is excluded. Visualized lung fields are clear. The heart and vascularity are normal. Tracheostomy tube is unchanged from the previous exam.     IMPRESSION: No evidence of active disease. No results found for this or any previous visit.         CC: Pat Potter MD

## 2017-11-21 LAB
BACTERIA SPEC CULT: NORMAL
SERVICE CMNT-IMP: NORMAL

## 2017-12-18 ENCOUNTER — HOSPITAL ENCOUNTER (EMERGENCY)
Age: 52
Discharge: HOME | End: 2017-12-18
Attending: EMERGENCY MEDICINE
Payer: SELF-PAY

## 2017-12-18 ENCOUNTER — APPOINTMENT (OUTPATIENT)
Dept: GENERAL RADIOLOGY | Age: 52
End: 2017-12-18
Attending: EMERGENCY MEDICINE
Payer: SELF-PAY

## 2017-12-18 VITALS
HEART RATE: 92 BPM | DIASTOLIC BLOOD PRESSURE: 102 MMHG | TEMPERATURE: 98 F | SYSTOLIC BLOOD PRESSURE: 126 MMHG | HEIGHT: 68 IN | RESPIRATION RATE: 18 BRPM | BODY MASS INDEX: 22.73 KG/M2 | WEIGHT: 150 LBS | OXYGEN SATURATION: 99 %

## 2017-12-18 DIAGNOSIS — R07.0 THROAT PAIN IN ADULT: Primary | ICD-10-CM

## 2017-12-18 DIAGNOSIS — G89.29 OTHER CHRONIC PAIN: ICD-10-CM

## 2017-12-18 DIAGNOSIS — F11.93 OPIATE WITHDRAWAL (HCC): ICD-10-CM

## 2017-12-18 PROCEDURE — 71010 XR CHEST PORT: CPT

## 2017-12-18 PROCEDURE — 96372 THER/PROPH/DIAG INJ SC/IM: CPT

## 2017-12-18 PROCEDURE — 74011250636 HC RX REV CODE- 250/636: Performed by: EMERGENCY MEDICINE

## 2017-12-18 PROCEDURE — 99283 EMERGENCY DEPT VISIT LOW MDM: CPT

## 2017-12-18 PROCEDURE — 74011250637 HC RX REV CODE- 250/637: Performed by: EMERGENCY MEDICINE

## 2017-12-18 RX ORDER — OXYCODONE AND ACETAMINOPHEN 5; 325 MG/1; MG/1
1 TABLET ORAL
Qty: 7 TAB | Refills: 0 | Status: SHIPPED | OUTPATIENT
Start: 2017-12-18 | End: 2018-01-09

## 2017-12-18 RX ORDER — KETOROLAC TROMETHAMINE 30 MG/ML
60 INJECTION, SOLUTION INTRAMUSCULAR; INTRAVENOUS
Status: COMPLETED | OUTPATIENT
Start: 2017-12-18 | End: 2017-12-18

## 2017-12-18 RX ORDER — OXYCODONE AND ACETAMINOPHEN 5; 325 MG/1; MG/1
2 TABLET ORAL
Status: COMPLETED | OUTPATIENT
Start: 2017-12-18 | End: 2017-12-18

## 2017-12-18 RX ADMIN — KETOROLAC TROMETHAMINE 60 MG: 30 INJECTION, SOLUTION INTRAMUSCULAR at 22:14

## 2017-12-18 RX ADMIN — OXYCODONE HYDROCHLORIDE AND ACETAMINOPHEN 2 TABLET: 5; 325 TABLET ORAL at 22:16

## 2017-12-18 NOTE — Clinical Note
Tylenol 1000 mg every 6 hours and ibuprofen 600 mg every 6 hours for pain. For cough take Robitussin DM over the counter medicine. Return if breathing does not resolve with inhaler or returning sxs.

## 2017-12-19 NOTE — ED PROVIDER NOTES
EMERGENCY DEPARTMENT HISTORY AND PHYSICAL EXAM    Date: 12/18/2017  Patient Name: Dilan Mcclelland    History of Presenting Illness     Chief Complaint   Patient presents with    Other         History Provided By: Patient    Chief Complaint: Sore throat secondary to trach displacement  Duration: 2 Weeks  Timing:  Acute  Location: Throat  Severity: 10 out of 10  Modifying Factors: None  Associated Symptoms: chills and throat swelling    Additional History (Context):   9:47 PM  Dilan Mcclelland is a 45 y.o. male with PMHX throat CA, HTN, COPD who presents via EMS to the emergency department C/O sore throat secondary to trach displacement (rated 10/10), onset 2 weeks ago and again tonight. Associated sxs include chills and throat swelling. Pt has trach and PEG in place, due to hx of throat cancer. Pt states that his trach came out 2 weeks ago and again last night. Notes that his girlfriend is a nurse and put it back in place. He also reports that he ran out of Percocet this AM. States that his trach was initially placed a couple months ago. He was admitted here on 11/15/17 for PNA and placed on Augmentin; took last dose today. Pt denies tobacco/EtOH/illicit drug use, fever, and any other sxs or complaints. PCP: Ángel Conroy MD    Current Outpatient Prescriptions   Medication Sig Dispense Refill    oxyCODONE-acetaminophen (PERCOCET) 5-325 mg per tablet Take 1 Tab by mouth every four (4) hours as needed for Pain. Max Daily Amount: 6 Tabs. 7 Tab 0    predniSONE (DELTASONE) 20 mg tablet Take 1 Tab by mouth daily (with lunch). 5 Tab 0    amoxicillin-clavulanate (AUGMENTIN) 400-57 mg/5 mL suspension 5 mL by Per G Tube route every twelve (12) hours. 70 mL 0    cloNIDine (CATAPRES) 0.2 mg/24 hr patch 1 Patch by TransDERmal route every seven (7) days. 4 Patch 0    fentaNYL (DURAGESIC) 100 mcg/hr PATCH 1 Patch by TransDERmal route every seventy-two (72) hours. Max Daily Amount: 1 Patch.  5 Patch 0    fentaNYL (DURAGESIC) 50 mcg/hr PATCH 1 Patch by TransDERmal route every seventy-two (72) hours. Max Daily Amount: 1 Patch. 5 Patch 0    guaiFENesin (ROBITUSSIN) 100 mg/5 mL liquid Take 5 mL by mouth every four (4) hours as needed for Cough. 200 mL 0    lisinopril (PRINIVIL, ZESTRIL) 5 mg tablet Take 1 Tab by mouth every twelve (12) hours. 30 Tab 0    oxyCODONE IR (ROXICODONE) 10 mg tab immediate release tablet 1 Tab by Per G Tube route every six (6) hours as needed. Max Daily Amount: 40 mg. 30 Tab 0    magic mouthwash (FIRST-MOUTHWASH BLM) 922--40 mg/30 mL mwsh oral suspension Take 10 mL by mouth every four (4) hours as needed.  albuterol-ipratropium (DUO-NEB) 2.5 mg-0.5 mg/3 ml nebu 3 mL by Nebulization route every four (4) hours as needed. 30 Nebule 1    multivitamin, tx-iron-ca-min (THERA-M W/ IRON) 9 mg iron-400 mcg tab tablet Take 1 Tab by mouth daily. 30 Tab 1    Nebulizer Accessories kit Use nebs Q4H PRN 1 Kit 0       Past History     Past Medical History:  Past Medical History:   Diagnosis Date    Chronic obstructive pulmonary disease ([facility] [address]     acute hypoxic respiratory failure 8/16    Hypertension     Ill-defined condition     peg tube in place    Ill-defined condition     chemo    S/P radiation therapy     Throat cancer ([facility] [address]     Tobacco abuse        Past Surgical History:  Past Surgical History:   Procedure Laterality Date    HX HEENT      sinus surgery       Family History:  History reviewed. No pertinent family history. Social History:  Social History   Substance Use Topics    Smoking status: Former Smoker     Packs/day: 0.50    Smokeless tobacco: Never Used    Alcohol use No       Allergies:  No Known Allergies      Review of Systems   Review of Systems   Constitutional: Positive for chills. Negative for fever. HENT: Positive for sore throat. All other systems reviewed and are negative.       Physical Exam     Vitals:    12/18/17 2151 12/18/17 2258   BP: 130/73 (!) 126/102   Pulse: (!) 101 92   Resp: 16 18   Temp: 98 °F (36.7 °C)    SpO2: 99% 99%   Weight: 68 kg (150 lb)    Height: 5' 8\" (1.727 m)      Physical Exam   Nursing note and vitals reviewed. Constitutional: Alert. Well appearing, no acute distress  Head: Normocephalic, Atraumatic  Eyes: Pupils are equal, round, and reactive to light, EOMI  ENT: Moist mucous membranes, oropharynx clear. Tracheostomy in place with normal sounding breath sounds, skin and area around tracheostomy is normal in appearance, no edema or bleeding, no lymphadenopathy. Neck: Supple, non-tender  Cardiovascular: Regular rate and rhythm, no murmurs, rubs, or gallops  Chest: Normal work of breathing and chest excursion bilaterally  Lungs: Scattered expiratory wheezing, no prolonged expiratory phase or crackles  Abdomen: Soft, non tender, non distended, normoactive bowel sounds  Back: No evidence of trauma or deformity. No CVA Tenderness. Extremities: No evidence of trauma or deformity, no LE edema  Skin: Warm and dry  Neuro: Alert and appropriate, facial movement symmetric, normal speech, strength and sensation full and symmetric bilaterally, normal gait, normal coordination  Psychiatric: Normal mood and affect    Diagnostic Study Results     Labs -   No results found for this or any previous visit (from the past 12 hour(s)). Radiologic Studies -    XR CHEST PORT    (Results Pending)   10:21 PM  RADIOLOGY FINDINGS  Chest X-ray shows tracheostomy in place, no subcutaneous emphysema, infiltrate resolved compared to prior XR, no PTX  Pending review by Radiologist  Recorded by Leo Cody ED Scribe, as dictated by Christian Purcell MD    CT Results  (Last 48 hours)    None        CXR Results  (Last 48 hours)    None            Medical Decision Making   I am the first provider for this patient. I reviewed the vital signs, available nursing notes, past medical history, past surgical history, family history and social history.     Vital Signs-Reviewed the patient's vital signs. Pulse Oximetry Analysis - 99% on RA     Records Reviewed: Nursing Notes    Provider Notes (Medical Decision Making):     Procedures:  Procedures    ED Course:   9:47 PM   Initial assessment performed. The patients presenting problems have been discussed, and they are in agreement with the care plan formulated and outlined with them. I have encouraged them to ask questions as they arise throughout their visit. 10:19 PM  Feels a little better with pain medication. Diagnosis and Disposition       DISCHARGE NOTE:  10:20 PM  Aleksandr Castano's  results have been reviewed with him. He has been counseled regarding his diagnosis, treatment, and plan. He verbally conveys understanding and agreement of the signs, symptoms, diagnosis, treatment and prognosis and additionally agrees to follow up as discussed. He also agrees with the care-plan and conveys that all of his questions have been answered. I have also provided discharge instructions for him that include: educational information regarding their diagnosis and treatment, and list of reasons why they would want to return to the ED prior to their follow-up appointment, should his condition change. He has been provided with education for proper emergency department utilization. Discussion:  45 y.o. male presents for throat pain likely secondary to tracheostomy dislodgement yesterday with replacement by his girlfriend as well as running out of pain medications earlier today. He appears to be having sxs of mild opiate withdrawal and no evidence on physical exam of airway obstruction, skin or other ENT infection. CXR confirms normal placement of tracheostomy tube and resolution of prior PNA. I stated I would provide him with a small number of Percocet, however, future prescriptions will have to be provided from his regular doctor. Return precautions provided. CLINICAL IMPRESSION:    1. Throat pain in adult    2. Other chronic pain    3.  Opiate withdrawal ([facility] [address]        PLAN:  1. D/C Home  2. Discharge Medication List as of 12/18/2017 10:43 PM        3. Follow-up Information     Follow up With Details Comments Contact Info    [facility] Schedule an appointment as soon as possible for a visit in 2 days for follow up at [facility] [id number] [facility], [place], [address] [place] [address] Road [facility]  [phone]    Your oncologist Schedule an appointment as soon as possible for a visit in 2 days for oncology follow up     [facility] EMERGENCY DEPT  As needed, If symptoms worsen [address] [id number]  [phone]        _______________________________    Attestations: This note is prepared by Liana Moya, acting as Scribe for Simran aKur MD.    Simran Kaur MD:  The scribe's documentation has been prepared under my direction and personally reviewed by me in its entirety.   I confirm that the note above accurately reflects all work, treatment, procedures, and medical decision making performed by me.  _______________________________

## 2017-12-19 NOTE — DISCHARGE INSTRUCTIONS
Sore Throat: Care Instructions  Your Care Instructions    Infection by bacteria or a virus causes most sore throats. Cigarette smoke, dry air, air pollution, allergies, and yelling can also cause a sore throat. Sore throats can be painful and annoying. Fortunately, most sore throats go away on their own. If you have a bacterial infection, your doctor may prescribe antibiotics. Follow-up care is a key part of your treatment and safety. Be sure to make and go to all appointments, and call your doctor if you are having problems. It's also a good idea to know your test results and keep a list of the medicines you take. How can you care for yourself at home? · If your doctor prescribed antibiotics, take them as directed. Do not stop taking them just because you feel better. You need to take the full course of antibiotics. · Gargle with warm salt water once an hour to help reduce swelling and relieve discomfort. Use 1 teaspoon of salt mixed in 1 cup of warm water. · Take an over-the-counter pain medicine, such as acetaminophen (Tylenol), ibuprofen (Advil, Motrin), or naproxen (Aleve). Read and follow all instructions on the label. · Be careful when taking over-the-counter cold or flu medicines and Tylenol at the same time. Many of these medicines have acetaminophen, which is Tylenol. Read the labels to make sure that you are not taking more than the recommended dose. Too much acetaminophen (Tylenol) can be harmful. · Drink plenty of fluids. Fluids may help soothe an irritated throat. Hot fluids, such as tea or soup, may help decrease throat pain. · Use over-the-counter throat lozenges to soothe pain. Regular cough drops or hard candy may also help. These should not be given to young children because of the risk of choking. · Do not smoke or allow others to smoke around you. If you need help quitting, talk to your doctor about stop-smoking programs and medicines.  These can increase your chances of quitting for good. · Use a vaporizer or humidifier to add moisture to your bedroom. Follow the directions for cleaning the machine. When should you call for help? Call your doctor now or seek immediate medical care if:  ? · You have new or worse trouble swallowing. ? · Your sore throat gets much worse on one side. ? Watch closely for changes in your health, and be sure to contact your doctor if you do not get better as expected. Where can you learn more? Go to http://justine-serena.info/. Enter [id number] [id number] in the search box to learn more about \"Sore Throat: Care Instructions. \"  Current as of: May 12, 2017  Content Version: 11.4  © 1896-3925 [employer]. Care instructions adapted under license by [employer] (which disclaims liability or warranty for this information). If you have questions about a medical condition or this instruction, always ask your healthcare professional. [place] [address] any warranty or liability for your use of this information. Chronic Pain: Care Instructions  Your Care Instructions    Chronic pain is pain that lasts a long time (months or even years) and may or may not have a clear cause. It is different from acute pain, which usually does have a clear cause-like an injury or illness-and gets better over time. Chronic pain:  · Lasts over time but may vary from day to day. · Does not go away despite efforts to end it. · May disrupt your sleep and lead to fatigue. · May cause depression or anxiety. · May make your muscles tense, causing more pain. · Can disrupt your work, hobbies, home life, and relationships with friends and family. Chronic pain is a very real condition. It is not just in your head. Treatment can help and usually includes several methods used together, such as medicines, physical therapy, exercise, and other treatments. Learning how to relax and changing negative thought patterns can also help you cope.   Chronic pain is complex. Taking an active role in your treatment will help you better manage your pain. Tell your doctor if you have trouble dealing with your pain. You may have to try several things before you find what works best for you. Follow-up care is a key part of your treatment and safety. Be sure to make and go to all appointments, and call your doctor if you are having problems. It's also a good idea to know your test results and keep a list of the medicines you take. How can you care for yourself at home? · Pace yourself. Break up large jobs into smaller tasks. Save harder tasks for days when you have less pain, or go back and forth between hard tasks and easier ones. Take rest breaks. · Relax, and reduce stress. Relaxation techniques such as deep breathing or meditation can help. · Keep moving. Gentle, daily exercise can help reduce pain over the long run. Try low- or no-impact exercises such as walking, swimming, and stationary biking. Do stretches to stay flexible. · Try heat, cold packs, and massage. · Get enough sleep. Chronic pain can make you tired and drain your energy. Talk with your doctor if you have trouble sleeping because of pain. · Think positive. Your thoughts can affect your pain level. Do things that you enjoy to distract yourself when you have pain instead of focusing on the pain. See a movie, read a book, listen to music, or spend time with a friend. · If you think you are depressed, talk to your doctor about treatment. · Keep a daily pain diary. Record how your moods, thoughts, sleep patterns, activities, and medicine affect your pain. You may find that your pain is worse during or after certain activities or when you are feeling a certain emotion. Having a record of your pain can help you and your doctor find the best ways to treat your pain. · Take pain medicines exactly as directed. ¨ If the doctor gave you a prescription medicine for pain, take it as prescribed.   ¨ If you are not taking a prescription pain medicine, ask your doctor if you can take an over-the-counter medicine. Reducing constipation caused by pain medicine  · Include fruits, vegetables, beans, and whole grains in your diet each day. These foods are high in fiber. · Drink plenty of fluids, enough so that your urine is light yellow or clear like water. If you have kidney, heart, or liver disease and have to limit fluids, talk with your doctor before you increase the amount of fluids you drink. · If your doctor recommends it, get more exercise. Walking is a good choice. Bit by bit, increase the amount you walk every day. Try for at least 30 minutes on most days of the week. · Schedule time each day for a bowel movement. A daily routine may help. Take your time and do not strain when having a bowel movement. When should you call for help? Call your doctor now or seek immediate medical care if:  ? · Your pain gets worse or is out of control. ? · You feel down or blue, or you do not enjoy things like you once did. You may be depressed, which is common in people with chronic pain. Depression can be treated. ? · You have vomiting or cramps for more than 2 hours. ? Watch closely for changes in your health, and be sure to contact your doctor if:  ? · You cannot sleep because of pain. ? · You are very worried or anxious about your pain. ? · You have trouble taking your pain medicine. ? · You have any concerns about your pain medicine. ? · You have trouble with bowel movements, such as:  ¨ No bowel movement in 3 days. ¨ Blood in the anal area, in your stool, or on the toilet paper. ¨ Diarrhea for more than 24 hours. Where can you learn more? Go to http://justine-serena.info/. Enter N004 in the search box to learn more about \"Chronic Pain: Care Instructions. \"  Current as of: October 14, 2016  Content Version: 11.4  © 8900-7843 Healthwise[employer].  Care instructions adapted under license by [employer] (which disclaims liability or warranty for this information). If you have questions about a medical condition or this instruction, always ask your healthcare professional. [address] any warranty or liability for your use of this information. Opioid Withdrawal: Care Instructions  Your Care Instructions  Opioids are strong pain medicines. Examples include hydrocodone, oxycodone, fentanyl, and morphine. Heroin is an example of an illegal opioid. Your body gets used to this type of drug if you take it all the time. This is called being dependent on the drug. And when you stop taking it, you go through withdrawal.  Withdrawal symptoms can include nausea, sweating, chills, diarrhea, stomach cramps, and muscle aches. Withdrawal can last up to several weeks, depending on which drug you took. You may feel very ill, but you are probably not in medical danger. Withdrawal isn't easy, but there are things you can do to help you cope with the symptoms. You will feel a little bit better each day as your body adjusts and heals itself. Follow-up care is a key part of your treatment and safety. Be sure to make and go to all appointments, and call your doctor if you are having problems. It's also a good idea to know your test results and keep a list of the medicines you take. How can you care for yourself at home? · Your doctor may give you medicine to help you feel better. Read and follow all instructions on the label. · To help get through withdrawal, you can also:  ¨ Get plenty of rest.  ¨ Drink plenty of fluids. ¨ Stay active, but don't tire yourself. ¨ Eat a healthy diet. · Do not drink alcohol or take illegal drugs. · Talk to your doctor about drug treatment programs to help you stay drug-free. · Talk to your doctor or pharmacist about having a naloxone rescue kit on hand. When should you call for help? Call 911 anytime you think you may need emergency care. For example, call if:  ? · You feel you cannot stop from hurting yourself or someone else. ?Call your doctor now or seek immediate medical care if:  ? · You have new or worse withdrawal symptoms that you can't manage at home, such as:  ¨ Stomach cramps. ¨ Vomiting. ¨ Diarrhea. ¨ Muscle aches. ¨ Sweating. ? Watch closely for changes in your health, and be sure to contact your doctor if:  ? · You do not get better as expected. Where can you learn more? Go to http://justine-serena.info/. Enter E242 in the search box to learn more about \"Opioid Withdrawal: Care Instructions. \"  Current as of: November 3, 2016  Content Version: 11.4  © 7338-7545 Healthwise, [employer]. Care instructions adapted under license by [employer] (which disclaims liability or warranty for this information). If you have questions about a medical condition or this instruction, always ask your healthcare professional. [place] [address] any warranty or liability for your use of this information.

## 2017-12-19 NOTE — ED TRIAGE NOTES
Arrived via EMS to be evaluated for reported trachea dislodgement 2 weeks ago and then again last p.m. Reported that spouse placed trachea back. Patient reports increased pain and swelling since last p.m. Patient speaking in complete sentences with no s/s distress noted. Patient reports out of \"Percocet\" since this a.m. Patient has PEG tube in place with no complaints at this time. Sepsis Screening completed    (  )Patient meets SIRS criteria. ( x )Patient does not meet SIRS criteria.       SIRS Criteria is achieved when two or more of the following are present   Temperature < 96.8°F (36°C) or > 100.9°F (38.3°C)   Heart Rate > 90 beats per minute   Respiratory Rate > 20 breaths per minute   WBC count > 12,000 or <4,000 or > 10% bands

## 2018-01-04 ENCOUNTER — HOSPITAL ENCOUNTER (INPATIENT)
Age: 53
LOS: 4 days | Discharge: HOME HEALTH | DRG: 177 | End: 2018-01-09
Attending: EMERGENCY MEDICINE | Admitting: HOSPITALIST
Payer: SELF-PAY

## 2018-01-04 DIAGNOSIS — T17.908A ASPIRATION INTO AIRWAY, INITIAL ENCOUNTER: Primary | ICD-10-CM

## 2018-01-04 DIAGNOSIS — Z93.0 TRACHEOSTOMY DEPENDENCE (HCC): ICD-10-CM

## 2018-01-04 DIAGNOSIS — J44.1 COPD EXACERBATION (HCC): ICD-10-CM

## 2018-01-04 DIAGNOSIS — Z85.819 HISTORY OF THROAT CANCER: ICD-10-CM

## 2018-01-04 PROCEDURE — 80307 DRUG TEST PRSMV CHEM ANLYZR: CPT | Performed by: EMERGENCY MEDICINE

## 2018-01-04 PROCEDURE — 94640 AIRWAY INHALATION TREATMENT: CPT

## 2018-01-04 PROCEDURE — 74011000250 HC RX REV CODE- 250: Performed by: EMERGENCY MEDICINE

## 2018-01-04 PROCEDURE — 85025 COMPLETE CBC W/AUTO DIFF WBC: CPT | Performed by: EMERGENCY MEDICINE

## 2018-01-04 PROCEDURE — 83605 ASSAY OF LACTIC ACID: CPT | Performed by: EMERGENCY MEDICINE

## 2018-01-04 PROCEDURE — 83880 ASSAY OF NATRIURETIC PEPTIDE: CPT | Performed by: EMERGENCY MEDICINE

## 2018-01-04 PROCEDURE — 82550 ASSAY OF CK (CPK): CPT | Performed by: EMERGENCY MEDICINE

## 2018-01-04 PROCEDURE — 74011000250 HC RX REV CODE- 250

## 2018-01-04 PROCEDURE — 84132 ASSAY OF SERUM POTASSIUM: CPT | Performed by: EMERGENCY MEDICINE

## 2018-01-04 PROCEDURE — 87040 BLOOD CULTURE FOR BACTERIA: CPT | Performed by: EMERGENCY MEDICINE

## 2018-01-04 PROCEDURE — 83735 ASSAY OF MAGNESIUM: CPT | Performed by: EMERGENCY MEDICINE

## 2018-01-04 PROCEDURE — 99285 EMERGENCY DEPT VISIT HI MDM: CPT

## 2018-01-04 PROCEDURE — 77030029684 HC NEB SM VOL KT MONA -A

## 2018-01-04 PROCEDURE — 96360 HYDRATION IV INFUSION INIT: CPT

## 2018-01-04 RX ORDER — IPRATROPIUM BROMIDE AND ALBUTEROL SULFATE 2.5; .5 MG/3ML; MG/3ML
3 SOLUTION RESPIRATORY (INHALATION)
Status: COMPLETED | OUTPATIENT
Start: 2018-01-04 | End: 2018-01-04

## 2018-01-04 RX ORDER — IPRATROPIUM BROMIDE AND ALBUTEROL SULFATE 2.5; .5 MG/3ML; MG/3ML
SOLUTION RESPIRATORY (INHALATION)
Status: COMPLETED
Start: 2018-01-04 | End: 2018-01-04

## 2018-01-04 RX ORDER — IPRATROPIUM BROMIDE AND ALBUTEROL SULFATE 2.5; .5 MG/3ML; MG/3ML
SOLUTION RESPIRATORY (INHALATION)
Status: DISPENSED
Start: 2018-01-04 | End: 2018-01-05

## 2018-01-04 RX ADMIN — IPRATROPIUM BROMIDE AND ALBUTEROL SULFATE 3 ML: .5; 3 SOLUTION RESPIRATORY (INHALATION) at 23:45

## 2018-01-04 RX ADMIN — IPRATROPIUM BROMIDE AND ALBUTEROL SULFATE 3 ML: .5; 3 SOLUTION RESPIRATORY (INHALATION) at 23:59

## 2018-01-04 RX ADMIN — IPRATROPIUM BROMIDE AND ALBUTEROL SULFATE 3 ML: 2.5; .5 SOLUTION RESPIRATORY (INHALATION) at 23:45

## 2018-01-04 NOTE — IP AVS SNAPSHOT
[address] [place] [id number] 
[phone] Patient: Aidee Ann MRN: [id number] :1965 A check roxy indicates which time of day the medication should be taken. My Medications START taking these medications Instructions Each Dose to Equal  
 Morning Noon Evening Bedtime  
 arformoterol 15 mcg/2 mL Nebu neb solution Commonly known as:  Cristy Gains Your last dose was: Your next dose is:    
   
   
 2 mL by Nebulization route two (2) times a day. 15 mcg  
    
   
   
   
  
 budesonide 0.5 mg/2 mL Nbsp Commonly known as:  PULMICORT Your last dose was: Your next dose is:    
   
   
 2 mL by Nebulization route two (2) times a day. 500 mcg  
    
   
   
   
  
 ipratropium 0.02 % Soln Commonly known as:  ATROVENT Your last dose was: Your next dose is:    
   
   
 2.5 mL by Nebulization route four (4) times daily. 0.5 mg  
    
   
   
   
  
 levoFLOXacin 750 mg tablet Commonly known as:  Angelika Melendez Your last dose was: Your next dose is: Take 1 Tab by mouth daily for 5 days. 750 mg CHANGE how you take these medications Instructions Each Dose to Equal  
 Morning Noon Evening Bedtime  
 fentaNYL 100 mcg/hr PATCH Commonly known as:  Arnaldo Deluna What changed:  Another medication with the same name was removed. Continue taking this medication, and follow the directions you see here. Your last dose was: Your next dose is:    
   
   
 1 Patch by TransDERmal route every seventy-two (72) hours. Max Daily Amount: 1 Patch. 1 Patch  
    
   
   
   
  
 predniSONE 10 mg tablet Commonly known as:  Rochelle Acosta What changed:   
- medication strength 
- how much to take 
- how to take this - when to take this 
- additional instructions Your last dose was: Your next dose is: Prednisone 10mg tabs: p.o. 4 tabs daily for 3 days then drop to  3 tabs daily for 3 days then drop to  2 tabs daily for 3 days then drop to  1 tab daily for 3 days then stop. Dispense 30 tabs CONTINUE taking these medications Instructions Each Dose to Equal  
 Morning Noon Evening Bedtime  
 albuterol-ipratropium 2.5 mg-0.5 mg/3 ml Nebu Commonly known as:  Steve Saldaña Your last dose was: Your next dose is:    
   
   
 3 mL by Nebulization route every four (4) hours as needed. 3 mL  
    
   
   
   
  
 cloNIDine 0.2 mg/24 hr patch Commonly known as:  CATAPRES Your last dose was: Your next dose is:    
   
   
 1 Patch by TransDERmal route every seven (7) days. 1 Patch  
    
   
   
   
  
 guaiFENesin 100 mg/5 mL liquid Commonly known as:  ROBITUSSIN Your last dose was: Your next dose is: Take 5 mL by mouth every four (4) hours as needed for Cough. 100 mg  
    
   
   
   
  
 lisinopril 5 mg tablet Commonly known as:  [place] [employer] Your last dose was: Your next dose is: Take 1 Tab by mouth every twelve (12) hours. 5 mg  
    
   
   
   
  
 magic mouthwash 791--40 mg/30 mL Mwsh oral suspension Commonly known as:  FIRST-MOUTHWASH BLM Your last dose was: Your next dose is: Take 10 mL by mouth every four (4) hours as needed. 10 mL  
    
   
   
   
  
 multivitamin, tx-iron-ca-min 9 mg iron-400 mcg Tab tablet Commonly known as:  THERA-M w/ IRON Your last dose was: Your next dose is: Take 1 Tab by mouth daily. 1 Tab Nebulizer Accessories Kit Your last dose was: Your next dose is:    
   
   
 Use nebs Q4H PRN  
     
   
   
   
  
 oxyCODONE IR 10 mg Tab immediate release tablet Commonly known as:  Pushpa Rachel Your last dose was: Your next dose is: 1 Tab by Per G Tube route every six (6) hours as needed. Max Daily Amount: 40 mg.  
 10 mg  
    
   
   
   
  
  
STOP taking these medications   
 amoxicillin-clavulanate 400-57 mg/5 mL suspension Commonly known as:  AUGMENTIN  
   
  
 oxyCODONE-acetaminophen 5-325 mg per tablet Commonly known as:  PERCOCET Where to Get Your Medications These medications were sent to [address] [place] [address], [address] [place] [address],[address] [place] [address], [address] [place] [address],[facility] [place] Phone:  [phone] arformoterol 15 mcg/2 mL Nebu neb solution  
 budesonide 0.5 mg/2 mL Nbsp  
 ipratropium 0.02 % Soln  
 levoFLOXacin 750 mg tablet Information on where to get these meds will be given to you by the nurse or doctor. ! Ask your nurse or doctor about these medications  
  predniSONE 10 mg tablet

## 2018-01-04 NOTE — IP AVS SNAPSHOT
[address] [address] 
[phone] Patient: Jesus Taylor MRN: [id number] :1965 About your hospitalization You were admitted on:  2018 You last received care in the:  [address] [place] [address] You were discharged on:  2018 Why you were hospitalized Your primary diagnosis was:  Acute Tracheitis Your diagnoses also included:  Acute Hypoxemic Respiratory Failure (Hcc), Aspiration Pneumonia (Hcc), Tracheostomy Dependence (Hcc) Follow-up Information Follow up With Details Comments Contact Info Mehul Springer MD On 2018 Follow-up appointment @ 10:30 a.m. This is physician's first available appointment. [address] [facility] [phone] Damon Nolasco MD On 2018 Follow-up appointment @ 1:45 p.m. This is physician's first available appointment. [place] [address] 
[phone] [address] [place] [address],[address] to continue managing your healthcare needs. [phone] Discharge Orders None A check roxy indicates which time of day the medication should be taken. My Medications START taking these medications Instructions Each Dose to Equal  
 Morning Noon Evening Bedtime  
 arformoterol 15 mcg/2 mL Nebu neb solution Commonly known as:  Ana Paul Your last dose was: Your next dose is:    
   
   
 2 mL by Nebulization route two (2) times a day. 15 mcg  
    
   
   
   
  
 budesonide 0.5 mg/2 mL Nbsp Commonly known as:  PULMICORT Your last dose was: Your next dose is:    
   
   
 2 mL by Nebulization route two (2) times a day. 500 mcg  
    
   
   
   
  
 ipratropium 0.02 % Soln Commonly known as:  ATROVENT Your last dose was: Your next dose is:    
   
   
 2.5 mL by Nebulization route four (4) times daily. 0.5 mg  
    
   
   
   
  
 levoFLOXacin 750 mg tablet Commonly known as:  Juan Ricci Your last dose was: Your next dose is: Take 1 Tab by mouth daily for 5 days. 750 mg CHANGE how you take these medications Instructions Each Dose to Equal  
 Morning Noon Evening Bedtime  
 fentaNYL 100 mcg/hr PATCH Commonly known as:  Brent Washington What changed:  Another medication with the same name was removed. Continue taking this medication, and follow the directions you see here. Your last dose was: Your next dose is:    
   
   
 1 Patch by TransDERmal route every seventy-two (72) hours. Max Daily Amount: 1 Patch. 1 Patch  
    
   
   
   
  
 predniSONE 10 mg tablet Commonly known as:  Eugenio Wiley What changed:   
- medication strength 
- how much to take 
- how to take this - when to take this 
- additional instructions Your last dose was: Your next dose is:    
   
   
 Prednisone 10mg tabs: p.o. 4 tabs daily for 3 days then drop to  3 tabs daily for 3 days then drop to  2 tabs daily for 3 days then drop to  1 tab daily for 3 days then stop. Dispense 30 tabs CONTINUE taking these medications Instructions Each Dose to Equal  
 Morning Noon Evening Bedtime  
 albuterol-ipratropium 2.5 mg-0.5 mg/3 ml Nebu Commonly known as:  Misti Mention Your last dose was: Your next dose is:    
   
   
 3 mL by Nebulization route every four (4) hours as needed. 3 mL  
    
   
   
   
  
 cloNIDine 0.2 mg/24 hr patch Commonly known as:  CATAPRES Your last dose was: Your next dose is:    
   
   
 1 Patch by TransDERmal route every seven (7) days. 1 Patch  
    
   
   
   
  
 guaiFENesin 100 mg/5 mL liquid Commonly known as:  ROBITUSSIN Your last dose was: Your next dose is: Take 5 mL by mouth every four (4) hours as needed for Cough.   
 100 mg  
 lisinopril 5 mg tablet Commonly known as:  Jerrell Calloway Your last dose was: Your next dose is: Take 1 Tab by mouth every twelve (12) hours. 5 mg  
    
   
   
   
  
 magic mouthwash 639--40 mg/30 mL Mwsh oral suspension Commonly known as:  FIRST-MOUTHWASH BLM Your last dose was: Your next dose is: Take 10 mL by mouth every four (4) hours as needed. 10 mL  
    
   
   
   
  
 multivitamin, tx-iron-ca-min 9 mg iron-400 mcg Tab tablet Commonly known as:  THERA-M w/ IRON Your last dose was: Your next dose is: Take 1 Tab by mouth daily. 1 Tab Nebulizer Accessories Kit Your last dose was: Your next dose is:    
   
   
 Use nebs Q4H PRN  
     
   
   
   
  
 oxyCODONE IR 10 mg Tab immediate release tablet Commonly known as:  Louann Bean Your last dose was: Your next dose is:    
   
   
 1 Tab by Per G Tube route every six (6) hours as needed. Max Daily Amount: 40 mg.  
 10 mg  
    
   
   
   
  
  
STOP taking these medications   
 amoxicillin-clavulanate 400-57 mg/5 mL suspension Commonly known as:  AUGMENTIN  
   
  
 oxyCODONE-acetaminophen 5-325 mg per tablet Commonly known as:  PERCOCET Where to Get Your Medications These medications were sent to [address] [place] [address], [address] [place] [address], [address] [place] [address],[facility] Phone:  [phone] arformoterol 15 mcg/2 mL Nebu neb solution  
 budesonide 0.5 mg/2 mL Nbsp  
 ipratropium 0.02 % Soln  
 levoFLOXacin 750 mg tablet Information on where to get these meds will be given to you by the nurse or doctor. ! Ask your nurse or doctor about these medications  
  predniSONE 10 mg tablet Discharge Instructions DISCHARGE SUMMARY from Nurse PATIENT INSTRUCTIONS: 
 
 
 Recommended activity: Activity as tolerated. *  Please give a list of your current medications to your Primary Care Provider. *  Please update this list whenever your medications are discontinued, doses are 
    changed, or new medications (including over-the-counter products) are added. *  Please carry medication information at all times in case of emergency situations. These are general instructions for a healthy lifestyle: No smoking/ No tobacco products/ Avoid exposure to second hand smoke Surgeon General's Warning:  Quitting smoking now greatly reduces serious risk to your health. Obesity, smoking, and sedentary lifestyle greatly increases your risk for illness A healthy diet, regular physical exercise & weight monitoring are important for maintaining a healthy lifestyle You may be retaining fluid if you have a history of heart failure or if you experience any of the following symptoms:  Weight gain of 3 pounds or more overnight or 5 pounds in a week, increased swelling in our hands or feet or shortness of breath while lying flat in bed. Please call your doctor as soon as you notice any of these symptoms; do not wait until your next office visit. Recognize signs and symptoms of STROKE: 
 
F-face looks uneven A-arms unable to move or move unevenly S-speech slurred or non-existent T-time-call 911 as soon as signs and symptoms begin-DO NOT go Back to bed or wait to see if you get better-TIME IS BRAIN. Warning Signs of HEART ATTACK Call 911 if you have these symptoms: 
? Chest discomfort. Most heart attacks involve discomfort in the center of the chest that lasts more than a few minutes, or that goes away and comes back. It can feel like uncomfortable pressure, squeezing, fullness, or pain. ? Discomfort in other areas of the upper body. Symptoms can include pain or discomfort in one or both arms, the back, neck, jaw, or stomach. ? Shortness of breath with or without chest discomfort. ? Other signs may include breaking out in a cold sweat, nausea, or lightheadedness. Don't wait more than five minutes to call [address]! Fast action can save your life. Calling 911 is almost always the fastest way to get lifesaving treatment. Emergency Medical Services staff can begin treatment when they arrive  up to an hour sooner than if someone gets to the hospital by car. The discharge information has been reviewed with the {PATIENT PARENT GUARDIAN:06357}. The {PATIENT PARENT GUARDIAN:59172} verbalized understanding. Discharge medications reviewed with the {Dishcarge meds reviewed NQBR:62327} and appropriate educational materials and side effects teaching were provided. ___________________________________________________________________________________________________________________________________ Chronic Obstructive Pulmonary Disease (COPD): Care Instructions Your Care Instructions Chronic obstructive pulmonary disease (COPD) is a general term for a group of lung diseases, including emphysema and chronic bronchitis. People with COPD have decreased airflow in and out of the lungs, which makes it hard to breathe. The airways also can get clogged with thick mucus. Cigarette smoking is a major cause of COPD. Although there is no cure for COPD, you can slow its progress. Following your treatment plan and taking care of yourself can help you feel better and live longer. Follow-up care is a key part of your treatment and safety. Be sure to make and go to all appointments, and call your doctor if you are having problems. It's also a good idea to know your test results and keep a list of the medicines you take. How can you care for yourself at home? ?Staying healthy ? · Do not smoke. This is the most important step you can take to prevent more damage to your lungs.  If you need help quitting, talk to your doctor about stop-smoking programs and medicines. These can increase your chances of quitting for good. ? · Avoid colds and flu. Get a pneumococcal vaccine shot. If you have had one before, ask your doctor whether you need a second dose. Get the flu vaccine every fall. If you must be around people with colds or the flu, wash your hands often. ? · Avoid secondhand smoke, air pollution, and high altitudes. Also avoid cold, dry air and hot, humid air. Stay at home with your windows closed when air pollution is bad. ?Medicines and oxygen therapy ? · Take your medicines exactly as prescribed. Call your doctor if you think you are having a problem with your medicine. ? · You may be taking medicines such as: ¨ Bronchodilators. These help open your airways and make breathing easier. Bronchodilators are either short-acting (work for 6 to 9 hours) or long-acting (work for 24 hours). You inhale most bronchodilators, so they start to act quickly. Always carry your quick-relief inhaler with you in case you need it while you are away from home. ¨ Corticosteroids (prednisone, budesonide). These reduce airway inflammation. They come in pill or inhaled form. You must take these medicines every day for them to work well. ? · A spacer may help you get more inhaled medicine to your lungs. Ask your doctor or pharmacist if a spacer is right for you. If it is, ask how to use it properly. ? · Do not take any vitamins, over-the-counter medicine, or herbal products without talking to your doctor first.  
? · If your doctor prescribed antibiotics, take them as directed. Do not stop taking them just because you feel better. You need to take the full course of antibiotics. ? · Oxygen therapy boosts the amount of oxygen in your blood and helps you breathe easier. Use the flow rate your doctor has recommended, and do not change it without talking to your doctor first.  
Activity ? · Get regular exercise. Walking is an easy way to get exercise. Start out slowly, and walk a little more each day. ? · Pay attention to your breathing. You are exercising too hard if you cannot talk while you are exercising. ? · Take short rest breaks when doing household chores and other activities. ? · Learn breathing methods-such as breathing through pursed lips-to help you become less short of breath. ? · If your doctor has not set you up with a pulmonary rehabilitation program, talk to him or her about whether rehab is right for you. Rehab includes exercise programs, education about your disease and how to manage it, help with diet and other changes, and emotional support. Diet ? · Eat regular, healthy meals. Use bronchodilators about 1 hour before you eat to make it easier to eat. Eat several small meals instead of three large ones. Drink beverages at the end of the meal. Avoid foods that are hard to chew. ? · Eat foods that contain protein so that you do not lose muscle mass. ? · Talk with your doctor if you gain too much weight or if you lose weight without trying. ?Mental health ? · Talk to your family, friends, or a therapist about your feelings. It is normal to feel frightened, angry, hopeless, helpless, and even guilty. Talking openly about bad feelings can help you cope. If these feelings last, talk to your doctor. When should you call for help? Call 911 anytime you think you may need emergency care. For example, call if: 
? · You have severe trouble breathing. ?Call your doctor now or seek immediate medical care if: 
? · You have new or worse trouble breathing. ? · You cough up blood. ? · You have a fever. ? Watch closely for changes in your health, and be sure to contact your doctor if: 
? · You cough more deeply or more often, especially if you notice more mucus or a change in the color of your mucus. ? · You have new or worse swelling in your legs or belly. ? · You are not getting better as expected. Where can you learn more? Go to http://justine-serena.info/. Sariah Lozano in the search box to learn more about \"Chronic Obstructive Pulmonary Disease (COPD): Care Instructions. \" Current as of: May 12, 2017 Content Version: 11.4 © 4924-6832 [employer]. Care instructions adapted under license by [employer] (which disclaims liability or warranty for this information). If you have questions about a medical condition or this instruction, always ask your healthcare professional. [address] any warranty or liability for your use of this information. Learning About Saving Energy When You Have a Chronic Condition Introduction Everyday tasks can be tiring when you have COPD, heart failure, or another long-term (chronic) condition. You may feel at times that you've lost your ability to live your life. But learning to conserve, or save, your energy can help you be less tired. Conserving your energy means finding ways of doing daily activities with as little effort as possible. With some small changes in the way you do things, you can get your tasks done more easily. Some treatments are available that might help. Pulmonary rehabilitation can teach you ways to breathe easier. Cardiac rehabilitation can help make your heart stronger. You also may want to see an occupational or physical therapist. The therapist can give you more tips on building strength and moving with less effort. What can you do to conserve your energy? Planning · Make a list of what you have to do every day. Group the tasks by location. · Do all the chores in one part of your house around the same time. · Go out for errands or do chores at the time of day when you have the most energy. · Plan rest periods into your day. Getting things done · Sit down as often as you can when you get dressed, do chores, or cook. · Use a cart with wheels to roll items, such as laundry, from one room to another. · Push or slide boxes or other large items instead of lifting them. Reaching and bending · Put things you use the most on shelves that are at the level of your waist or shoulder. · Use long-handled grabbers or other tools to reach items on a high shelf or to  things off the floor. Use long-handled dusters when you clean the house. · Use a raised toilet seat to avoid bending too far to sit or stand up. Eating · Eat several small meals instead of three larger meals. · If you get too tired to eat much, try to choose healthy foods that have more calories. Have a yogurt-and-fruit smoothie for breakfast. Put avocado on a sandwich. Or add cheese or peanut butter to snacks. · If you don't feel very hungry, try to eat first and drink water or other fluids later, after a meal. This can help keep you from losing weight. Sip small amounts of fluids if you need to drink while you eat. Having sex · Choose the time of day when you have more energy. · A acvj-kl-pupu position for sex can be less tiring. Sometimes you may want to focus more on caressing. Watch closely for changes in your health, and be sure to contact your doctor if you have any problems. Where can you learn more? Go to http://justine-serena.info/. Enter H190 in the search box to learn more about \"Learning About Saving Energy When You Have a Chronic Condition. \" Current as of: May 12, 2017 Content Version: 11.4 © 6741-2115 [employer]. Care instructions adapted under license by [employer] (which disclaims liability or warranty for this information). If you have questions about a medical condition or this instruction, always ask your healthcare professional. [place] [address] any warranty or liability for your use of this information. Introducing [address]! Bette Nolasco introduces [employer] patient portal. Now you can access parts of your medical record, email your doctor's office, and request medication refills online. 1. In your internet browser, go to [email or web address]://[employer] Trove/[employer] 2. Click on the First Time User? Click Here link in the Sign In box. You will see the New Member Sign Up page. 3. Enter your [employer] Access Code exactly as it appears below. You will not need to use this code after youve completed the sign-up process. If you do not sign up before the expiration date, you must request a new code. · [employer] Access Code: [email or web address] Expires: 2/10/2018  3:01 PM 
 
4. Enter the last four digits of your Social Security Number (xxxx) and Date of Birth (mm/dd/yyyy) as indicated and click Submit. You will be taken to the next sign-up page. 5. Create a [employer] ID. This will be your [employer] login ID and cannot be changed, so think of one that is secure and easy to remember. 6. Create a [employer] password. You can change your password at any time. 7. Enter your Password Reset Question and Answer. This can be used at a later time if you forget your password. 8. Enter your e-mail address. You will receive e-mail notification when new information is available in [address] 9. Click Sign Up. You can now view and download portions of your medical record. 10. Click the Download Summary menu link to download a portable copy of your medical information. If you have questions, please visit the Frequently Asked Questions section of the [employer] website. Remember, [employer] is NOT to be used for urgent needs. For medical emergencies, dial 911. Now available from your iPhone and Android! Unresulted Labs-Please follow up with your PCP about these lab tests Order Current Status CULTURE, BLOOD Preliminary result Providers Seen During Your Hospitalization Provider Specialty Primary office phone Eris Bolanos MD Emergency Medicine [phone] Lois Arrington MD [facility] [phone] Your Primary Care Physician (PCP) Primary Care Physician Office Phone Office Fax Lian Payment [phone] You are allergic to the following No active allergies Recent Documentation Weight BMI Smoking Status 74.5 kg 24.97 kg/m2 Former Smoker Emergency Contacts Name Discharge Info Relation Home Work Mobile [address] CAREGIVER [3] Spouse [3]   [phone] Patient Belongings The following personal items are in your possession at time of discharge: 
  Dental Appliances: None  Visual Aid: Glasses, With patient      Home Medications: None   Jewelry: None  Clothing: At bedside    Other Valuables: Cell Phone Discharge Instructions Attachments/References TRACHEOSTOMY (ENGLISH) Patient Handouts Your Tracheostomy: Care Instructions Your Care Instructions A tracheostomy is a surgical opening through the neck into the windpipe (trachea). The opening is also called a stoma. A tracheostomy helps you breathe if you have a lung or nerve problem, an infection, or trouble handling secretions. Taking good care of a tracheostomy is very important. It can prevent infections and help keep you breathing easily. Follow-up care is a key part of your treatment and safety. Be sure to make and go to all appointments, and call your doctor if you are having problems. It's also a good idea to know your test results and keep a list of the medicines you take. How can you care for yourself at home? General tips Your doctor or nurse will give you instructions about how to take care of your tracheostomy (trach). This will include how to suction your trach, how to clean the opening in your neck (stoma), and how to clean and replace the trach's inner tube (inner cannula).  Be sure to follow all of these instructions closely. · Have your emergency supplies ready and available wherever you are. · Always wash your hands before and after caring for your tracheostomy. · Keep the air in your home moist with a room humidifier. · Eat while sitting up. If any food gets into the tube, suction it out right away. · Wear clothing that is loose around your neck, and avoid clothing with loose fibers. · In a bath or shower, avoid getting water into the tracheostomy. Cover the tube so that no water gets in but you can still breathe. You can also shower with your back to the water. · Do not swim. · Replace the tube serrano if it gets wet or damaged. If it is not damaged, it can be washed and dried and used again. · Your tracheostomy, or \"trach\" (say \"trayk\"), has three parts: the outer cannula, the inner cannula, and the obturator. The inner cannula is the piece that you will remove and clean. Suctioning Always have suction supplies ready, including a fully charged suction machine. Suction the trach 3 to 4 times a day, or more if needed. For example, two of the times could be before you go to bed and when you wake up in the morning. You will need suction catheters, a suction machine, saline fluid, a small cup, and a mirror. Here are the steps to take: 
1. Wash your hands with soap and water for at least 30 seconds. 2. Pour saline fluid into the cup. 3. Connect a catheter to the suction machine tubing. Dip the catheter tip into the saline fluid. Insert the wet catheter into the trach. Push the tube in gently, about 3 to 4 inches, until you feel it hit something. 4. Slowly pull the catheter out of the trach, rolling it back and forth between your fingers, with your thumb over the control valve (this turns the suction on). Do not keep the suction on for more than 10 seconds at a time.  
5. Wait about 30 seconds and repeat inserting and pulling out the tube until all the mucus has been removed. Then suction the saline left in the cup. 
6. Throw away the used catheter, and wash your hands again. Stoma care The opening in your neck is called a stoma. To care for your stoma, clean and dry it 3 times a day. Do not let crust form on the skin at the stoma. You will need hydrogen peroxide, tap or sterile water, 8 or 10 cotton swabs, 2 small cups, a dry cloth, a mirror, and ointment for the skin. Follow these steps: 
1. Wash your hands with soap and water for at least 30 seconds. 2. Fill one cup with half water and half hydrogen peroxide. Put 3 or 4 cotton swabs in the cup. Fill the other cup with water and put 3 or 4 swabs in that cup. 
3. Clean and remove dried mucus around the stoma with the cotton swabs in the peroxide cup. Then clean off any peroxide with the swabs in the water cup. 
4. Dry your skin with the cloth. Apply ointment with the remaining swabs. 5. Wash your hands again. Cleaning the inner cannula For consideration if PI is enhanced A cannula is the tube that fits into the stoma. Clean and replace the inner cannula 2 or 3 times each day. You will need 2 small bowls, a small brush, hydrogen peroxide, water, and a mirror. To clean the inner cannula: 
1. Wash your hands with soap and water for at least 30 seconds. 2. Pour half water and half hydrogen peroxide into one bowl. Pour a small amount of water in the other bowl. 3. Unlock the inner cannula and remove it by gently pulling it out and down. Put this into the peroxide bowl to soak. Clean the inside and outside of the cannula with the brush. 4. Rinse the cannula under tap water, then let it soak in the bowl of water. Shake the cannula out and slide it gently back into the outer cannula. Turn it to lock it in place. Make sure it is locked in place and you cannot pull it out. 5. Wash your hands again. When should you call for help? Call 911 anytime you think you may need emergency care. For example, call if: 
? · You have severe trouble breathing, and coughing or suctioning does not help. ? · Your trach falls out and you cannot get it back in. ?Call your doctor now or seek immediate medical care if: 
? · You have trouble breathing after suctioning. ? · You have signs of infection, such as: 
¨ Increased pain, swelling, warmth, or redness in the stoma. ¨ Red streaks leading from the stoma. ¨ Pus draining from the stoma. ¨ A fever. ? · Your secretions change. ? Watch closely for changes in your health, and be sure to contact your doctor if you have any problems. Where can you learn more? Go to http://justine-serena.info/. Enter Q350 in the search box to learn more about \"Your Tracheostomy: Care Instructions. \" Current as of: May 12, 2017 Content Version: 11.4 © 8750-4939 [employer]. Care instructions adapted under license by [employer] (which disclaims liability or warranty for this information). If you have questions about a medical condition or this instruction, always ask your healthcare professional. [address] any warranty or liability for your use of this information. Please provide this summary of care documentation to your next provider. Signatures-by signing, you are acknowledging that this After Visit Summary has been reviewed with you and you have received a copy. Patient Signature:  ____________________________________________________________ Date:  ____________________________________________________________  
  
Mary Espinoza Provider Signature:  ____________________________________________________________ Date:  ____________________________________________________________

## 2018-01-05 ENCOUNTER — APPOINTMENT (OUTPATIENT)
Dept: GENERAL RADIOLOGY | Age: 53
DRG: 177 | End: 2018-01-05
Attending: EMERGENCY MEDICINE
Payer: SELF-PAY

## 2018-01-05 PROBLEM — J04.10 ACUTE TRACHEITIS: Status: ACTIVE | Noted: 2018-01-05

## 2018-01-05 LAB
ALBUMIN SERPL-MCNC: 3.3 G/DL (ref 3.4–5)
ALBUMIN/GLOB SERPL: 0.9 {RATIO} (ref 0.8–1.7)
ALP SERPL-CCNC: 114 U/L (ref 45–117)
ALT SERPL-CCNC: 18 U/L (ref 16–61)
ANION GAP SERPL CALC-SCNC: 12 MMOL/L (ref 3–18)
AST SERPL-CCNC: 20 U/L (ref 15–37)
BASOPHILS # BLD: 0 K/UL (ref 0–0.06)
BASOPHILS # BLD: 0 K/UL (ref 0–0.06)
BASOPHILS NFR BLD: 0 % (ref 0–2)
BASOPHILS NFR BLD: 0 % (ref 0–2)
BILIRUB SERPL-MCNC: 0.2 MG/DL (ref 0.2–1)
BNP SERPL-MCNC: 54 PG/ML (ref 0–900)
BUN SERPL-MCNC: 5 MG/DL (ref 7–18)
BUN/CREAT SERPL: 8 (ref 12–20)
CALCIUM SERPL-MCNC: 8.9 MG/DL (ref 8.5–10.1)
CHLORIDE SERPL-SCNC: 91 MMOL/L (ref 100–108)
CK MB CFR SERPL CALC: 2.1 % (ref 0–4)
CK MB SERPL-MCNC: 2.5 NG/ML (ref 5–25)
CK SERPL-CCNC: 117 U/L (ref 39–308)
CO2 SERPL-SCNC: 30 MMOL/L (ref 21–32)
CREAT SERPL-MCNC: 0.62 MG/DL (ref 0.6–1.3)
DIFFERENTIAL METHOD BLD: ABNORMAL
DIFFERENTIAL METHOD BLD: ABNORMAL
EOSINOPHIL # BLD: 0 K/UL (ref 0–0.4)
EOSINOPHIL # BLD: 0.2 K/UL (ref 0–0.4)
EOSINOPHIL NFR BLD: 0 % (ref 0–5)
EOSINOPHIL NFR BLD: 4 % (ref 0–5)
ERYTHROCYTE [DISTWIDTH] IN BLOOD BY AUTOMATED COUNT: 13.9 % (ref 11.6–14.5)
ERYTHROCYTE [DISTWIDTH] IN BLOOD BY AUTOMATED COUNT: 14.3 % (ref 11.6–14.5)
ETHANOL SERPL-MCNC: 168 MG/DL (ref 0–3)
GLOBULIN SER CALC-MCNC: 3.6 G/DL (ref 2–4)
GLUCOSE BLD STRIP.AUTO-MCNC: 159 MG/DL (ref 70–110)
GLUCOSE SERPL-MCNC: 102 MG/DL (ref 74–99)
HCT VFR BLD AUTO: 31.7 % (ref 36–48)
HCT VFR BLD AUTO: 32.2 % (ref 36–48)
HGB BLD-MCNC: 11.1 G/DL (ref 13–16)
HGB BLD-MCNC: 11.2 G/DL (ref 13–16)
LACTATE SERPL-SCNC: 1.6 MMOL/L (ref 0.4–2)
LYMPHOCYTES # BLD: 0.2 K/UL (ref 0.9–3.6)
LYMPHOCYTES # BLD: 0.4 K/UL (ref 0.9–3.6)
LYMPHOCYTES NFR BLD: 6 % (ref 21–52)
LYMPHOCYTES NFR BLD: 7 % (ref 21–52)
MAGNESIUM SERPL-MCNC: 2 MG/DL (ref 1.6–2.6)
MAGNESIUM SERPL-MCNC: 2 MG/DL (ref 1.6–2.6)
MCH RBC QN AUTO: 33.8 PG (ref 24–34)
MCH RBC QN AUTO: 34.3 PG (ref 24–34)
MCHC RBC AUTO-ENTMCNC: 34.5 G/DL (ref 31–37)
MCHC RBC AUTO-ENTMCNC: 35.3 G/DL (ref 31–37)
MCV RBC AUTO: 96.9 FL (ref 74–97)
MCV RBC AUTO: 98.2 FL (ref 74–97)
MONOCYTES # BLD: 0 K/UL (ref 0.05–1.2)
MONOCYTES # BLD: 0.6 K/UL (ref 0.05–1.2)
MONOCYTES NFR BLD: 1 % (ref 3–10)
MONOCYTES NFR BLD: 11 % (ref 3–10)
NEUTS SEG # BLD: 3.8 K/UL (ref 1.8–8)
NEUTS SEG # BLD: 4.5 K/UL (ref 1.8–8)
NEUTS SEG NFR BLD: 78 % (ref 40–73)
NEUTS SEG NFR BLD: 93 % (ref 40–73)
PHOSPHATE SERPL-MCNC: 3.8 MG/DL (ref 2.5–4.9)
PLATELET # BLD AUTO: 257 K/UL (ref 135–420)
PLATELET # BLD AUTO: 290 K/UL (ref 135–420)
PMV BLD AUTO: 8.7 FL (ref 9.2–11.8)
PMV BLD AUTO: 8.9 FL (ref 9.2–11.8)
POTASSIUM SERPL-SCNC: 3.5 MMOL/L (ref 3.5–5.5)
POTASSIUM SERPL-SCNC: 3.8 MMOL/L (ref 3.5–5.5)
POTASSIUM SERPL-SCNC: 3.9 MMOL/L (ref 3.5–5.5)
PROT SERPL-MCNC: 6.9 G/DL (ref 6.4–8.2)
RBC # BLD AUTO: 3.27 M/UL (ref 4.7–5.5)
RBC # BLD AUTO: 3.28 M/UL (ref 4.7–5.5)
RBC MORPH BLD: ABNORMAL
SODIUM SERPL-SCNC: 133 MMOL/L (ref 136–145)
TROPONIN I SERPL-MCNC: <0.02 NG/ML (ref 0–0.06)
TSH SERPL DL<=0.05 MIU/L-ACNC: 0.32 UIU/ML (ref 0.36–3.74)
WBC # BLD AUTO: 4 K/UL (ref 4.6–13.2)
WBC # BLD AUTO: 5.7 K/UL (ref 4.6–13.2)

## 2018-01-05 PROCEDURE — 77010033678 HC OXYGEN DAILY

## 2018-01-05 PROCEDURE — 74011000250 HC RX REV CODE- 250: Performed by: EMERGENCY MEDICINE

## 2018-01-05 PROCEDURE — 84132 ASSAY OF SERUM POTASSIUM: CPT | Performed by: INTERNAL MEDICINE

## 2018-01-05 PROCEDURE — 82962 GLUCOSE BLOOD TEST: CPT

## 2018-01-05 PROCEDURE — 65610000006 HC RM INTENSIVE CARE

## 2018-01-05 PROCEDURE — 74011250637 HC RX REV CODE- 250/637: Performed by: INTERNAL MEDICINE

## 2018-01-05 PROCEDURE — 87077 CULTURE AEROBIC IDENTIFY: CPT | Performed by: HOSPITALIST

## 2018-01-05 PROCEDURE — C9113 INJ PANTOPRAZOLE SODIUM, VIA: HCPCS | Performed by: HOSPITALIST

## 2018-01-05 PROCEDURE — 96360 HYDRATION IV INFUSION INIT: CPT

## 2018-01-05 PROCEDURE — 74011250636 HC RX REV CODE- 250/636: Performed by: HOSPITALIST

## 2018-01-05 PROCEDURE — 71045 X-RAY EXAM CHEST 1 VIEW: CPT

## 2018-01-05 PROCEDURE — 74011000258 HC RX REV CODE- 258: Performed by: HOSPITALIST

## 2018-01-05 PROCEDURE — 92607 EX FOR SPEECH DEVICE RX 1HR: CPT

## 2018-01-05 PROCEDURE — 74011250636 HC RX REV CODE- 250/636: Performed by: EMERGENCY MEDICINE

## 2018-01-05 PROCEDURE — 36415 COLL VENOUS BLD VENIPUNCTURE: CPT | Performed by: INTERNAL MEDICINE

## 2018-01-05 PROCEDURE — 74011000250 HC RX REV CODE- 250: Performed by: INTERNAL MEDICINE

## 2018-01-05 PROCEDURE — 84100 ASSAY OF PHOSPHORUS: CPT | Performed by: INTERNAL MEDICINE

## 2018-01-05 PROCEDURE — 94640 AIRWAY INHALATION TREATMENT: CPT

## 2018-01-05 PROCEDURE — 87186 SC STD MICRODIL/AGAR DIL: CPT | Performed by: HOSPITALIST

## 2018-01-05 PROCEDURE — 74011000258 HC RX REV CODE- 258: Performed by: EMERGENCY MEDICINE

## 2018-01-05 PROCEDURE — 74011250637 HC RX REV CODE- 250/637: Performed by: HOSPITALIST

## 2018-01-05 PROCEDURE — 92610 EVALUATE SWALLOWING FUNCTION: CPT

## 2018-01-05 PROCEDURE — 87070 CULTURE OTHR SPECIMN AEROBIC: CPT | Performed by: HOSPITALIST

## 2018-01-05 PROCEDURE — 93005 ELECTROCARDIOGRAM TRACING: CPT

## 2018-01-05 PROCEDURE — 84443 ASSAY THYROID STIM HORMONE: CPT | Performed by: INTERNAL MEDICINE

## 2018-01-05 PROCEDURE — 83735 ASSAY OF MAGNESIUM: CPT | Performed by: INTERNAL MEDICINE

## 2018-01-05 PROCEDURE — 74011000250 HC RX REV CODE- 250: Performed by: HOSPITALIST

## 2018-01-05 RX ORDER — IPRATROPIUM BROMIDE AND ALBUTEROL SULFATE 2.5; .5 MG/3ML; MG/3ML
3 SOLUTION RESPIRATORY (INHALATION)
Status: DISCONTINUED | OUTPATIENT
Start: 2018-01-05 | End: 2018-01-09

## 2018-01-05 RX ORDER — POTASSIUM CHLORIDE 20MEQ/15ML
10 LIQUID (ML) ORAL
Status: COMPLETED | OUTPATIENT
Start: 2018-01-05 | End: 2018-01-05

## 2018-01-05 RX ORDER — SODIUM CHLORIDE 0.9 % (FLUSH) 0.9 %
5-10 SYRINGE (ML) INJECTION AS NEEDED
Status: DISCONTINUED | OUTPATIENT
Start: 2018-01-05 | End: 2018-01-09 | Stop reason: HOSPADM

## 2018-01-05 RX ORDER — PANTOPRAZOLE SODIUM 40 MG/1
40 TABLET, DELAYED RELEASE ORAL DAILY
Status: DISCONTINUED | OUTPATIENT
Start: 2018-01-05 | End: 2018-01-05

## 2018-01-05 RX ORDER — GUAIFENESIN 600 MG/1
600 TABLET, EXTENDED RELEASE ORAL EVERY 12 HOURS
Status: DISCONTINUED | OUTPATIENT
Start: 2018-01-05 | End: 2018-01-05

## 2018-01-05 RX ORDER — LORAZEPAM 1 MG/1
2 TABLET ORAL
Status: DISCONTINUED | OUTPATIENT
Start: 2018-01-05 | End: 2018-01-09 | Stop reason: HOSPADM

## 2018-01-05 RX ORDER — POTASSIUM CHLORIDE 20MEQ/15ML
40 LIQUID (ML) ORAL
Status: COMPLETED | OUTPATIENT
Start: 2018-01-05 | End: 2018-01-05

## 2018-01-05 RX ORDER — LORAZEPAM 2 MG/ML
3 INJECTION INTRAMUSCULAR
Status: DISCONTINUED | OUTPATIENT
Start: 2018-01-05 | End: 2018-01-09 | Stop reason: HOSPADM

## 2018-01-05 RX ORDER — OXYCODONE HYDROCHLORIDE 5 MG/1
10 TABLET ORAL
Status: DISCONTINUED | OUTPATIENT
Start: 2018-01-05 | End: 2018-01-09 | Stop reason: HOSPADM

## 2018-01-05 RX ORDER — SODIUM CHLORIDE 0.9 % (FLUSH) 0.9 %
5-10 SYRINGE (ML) INJECTION EVERY 8 HOURS
Status: DISCONTINUED | OUTPATIENT
Start: 2018-01-05 | End: 2018-01-07

## 2018-01-05 RX ORDER — GUAIFENESIN 100 MG/5ML
400 SOLUTION ORAL
Status: DISCONTINUED | OUTPATIENT
Start: 2018-01-05 | End: 2018-01-09 | Stop reason: HOSPADM

## 2018-01-05 RX ORDER — LORAZEPAM 1 MG/1
1 TABLET ORAL
Status: DISCONTINUED | OUTPATIENT
Start: 2018-01-05 | End: 2018-01-09 | Stop reason: HOSPADM

## 2018-01-05 RX ORDER — CLONIDINE 0.2 MG/24H
1 PATCH, EXTENDED RELEASE TRANSDERMAL
Status: DISCONTINUED | OUTPATIENT
Start: 2018-01-05 | End: 2018-01-09 | Stop reason: HOSPADM

## 2018-01-05 RX ORDER — ASPIRIN 325 MG/1
100 TABLET, FILM COATED ORAL DAILY
Status: DISCONTINUED | OUTPATIENT
Start: 2018-01-05 | End: 2018-01-09 | Stop reason: HOSPADM

## 2018-01-05 RX ORDER — FOLIC ACID 1 MG/1
1 TABLET ORAL DAILY
Status: DISCONTINUED | OUTPATIENT
Start: 2018-01-05 | End: 2018-01-09 | Stop reason: HOSPADM

## 2018-01-05 RX ORDER — ENOXAPARIN SODIUM 100 MG/ML
40 INJECTION SUBCUTANEOUS EVERY 24 HOURS
Status: DISCONTINUED | OUTPATIENT
Start: 2018-01-05 | End: 2018-01-09 | Stop reason: HOSPADM

## 2018-01-05 RX ORDER — LORAZEPAM 2 MG/ML
1 INJECTION INTRAMUSCULAR
Status: DISCONTINUED | OUTPATIENT
Start: 2018-01-05 | End: 2018-01-09 | Stop reason: HOSPADM

## 2018-01-05 RX ORDER — LORAZEPAM 2 MG/ML
2 INJECTION INTRAMUSCULAR
Status: DISCONTINUED | OUTPATIENT
Start: 2018-01-05 | End: 2018-01-09 | Stop reason: HOSPADM

## 2018-01-05 RX ORDER — GUAIFENESIN 100 MG/5ML
400 SOLUTION ORAL
Status: DISCONTINUED | OUTPATIENT
Start: 2018-01-05 | End: 2018-01-05

## 2018-01-05 RX ORDER — SODIUM CHLORIDE 9 MG/ML
75 INJECTION, SOLUTION INTRAVENOUS CONTINUOUS
Status: DISCONTINUED | OUTPATIENT
Start: 2018-01-05 | End: 2018-01-06

## 2018-01-05 RX ORDER — IPRATROPIUM BROMIDE AND ALBUTEROL SULFATE 2.5; .5 MG/3ML; MG/3ML
3 SOLUTION RESPIRATORY (INHALATION)
Status: DISCONTINUED | OUTPATIENT
Start: 2018-01-05 | End: 2018-01-09 | Stop reason: HOSPADM

## 2018-01-05 RX ORDER — LISINOPRIL 5 MG/1
5 TABLET ORAL EVERY 12 HOURS
Status: DISCONTINUED | OUTPATIENT
Start: 2018-01-05 | End: 2018-01-09 | Stop reason: HOSPADM

## 2018-01-05 RX ORDER — SODIUM CHLORIDE 0.9 % (FLUSH) 0.9 %
5-10 SYRINGE (ML) INJECTION EVERY 8 HOURS
Status: DISCONTINUED | OUTPATIENT
Start: 2018-01-05 | End: 2018-01-09 | Stop reason: HOSPADM

## 2018-01-05 RX ADMIN — SODIUM CHLORIDE 600 MG: 900 INJECTION, SOLUTION INTRAVENOUS at 02:53

## 2018-01-05 RX ADMIN — PIPERACILLIN SODIUM AND TAZOBACTAM SODIUM 3.38 G: 36; 4.5 INJECTION, POWDER, FOR SOLUTION INTRAVENOUS at 14:55

## 2018-01-05 RX ADMIN — Medication 10 ML: at 07:28

## 2018-01-05 RX ADMIN — Medication 10 ML: at 14:59

## 2018-01-05 RX ADMIN — OXYCODONE HYDROCHLORIDE 10 MG: 5 TABLET ORAL at 14:55

## 2018-01-05 RX ADMIN — METHYLPREDNISOLONE SODIUM SUCCINATE 40 MG: 125 INJECTION, POWDER, FOR SOLUTION INTRAMUSCULAR; INTRAVENOUS at 14:54

## 2018-01-05 RX ADMIN — POTASSIUM CHLORIDE 10 MEQ: 20 SOLUTION ORAL at 20:01

## 2018-01-05 RX ADMIN — METHYLPREDNISOLONE SODIUM SUCCINATE 40 MG: 125 INJECTION, POWDER, FOR SOLUTION INTRAMUSCULAR; INTRAVENOUS at 03:18

## 2018-01-05 RX ADMIN — ACETAMINOPHEN 650 MG: 325 SOLUTION ORAL at 05:09

## 2018-01-05 RX ADMIN — LORAZEPAM 1 MG: 2 INJECTION INTRAMUSCULAR at 17:26

## 2018-01-05 RX ADMIN — SODIUM CHLORIDE 500 ML: 900 INJECTION, SOLUTION INTRAVENOUS at 00:00

## 2018-01-05 RX ADMIN — IPRATROPIUM BROMIDE AND ALBUTEROL SULFATE 3 ML: .5; 3 SOLUTION RESPIRATORY (INHALATION) at 11:06

## 2018-01-05 RX ADMIN — FOLIC ACID 1 MG: 1 TABLET ORAL at 09:51

## 2018-01-05 RX ADMIN — PIPERACILLIN SODIUM AND TAZOBACTAM SODIUM 3.38 G: 36; 4.5 INJECTION, POWDER, FOR SOLUTION INTRAVENOUS at 09:25

## 2018-01-05 RX ADMIN — IPRATROPIUM BROMIDE AND ALBUTEROL SULFATE 3 ML: .5; 3 SOLUTION RESPIRATORY (INHALATION) at 19:54

## 2018-01-05 RX ADMIN — IPRATROPIUM BROMIDE AND ALBUTEROL SULFATE 3 ML: .5; 3 SOLUTION RESPIRATORY (INHALATION) at 15:40

## 2018-01-05 RX ADMIN — ENOXAPARIN SODIUM 40 MG: 40 INJECTION SUBCUTANEOUS at 03:18

## 2018-01-05 RX ADMIN — Medication 10 ML: at 22:55

## 2018-01-05 RX ADMIN — LORAZEPAM 1 MG: 2 INJECTION INTRAMUSCULAR at 22:55

## 2018-01-05 RX ADMIN — PIPERACILLIN SODIUM AND TAZOBACTAM SODIUM 3.38 G: 36; 4.5 INJECTION, POWDER, FOR SOLUTION INTRAVENOUS at 03:59

## 2018-01-05 RX ADMIN — OXYCODONE HYDROCHLORIDE 10 MG: 5 TABLET ORAL at 07:53

## 2018-01-05 RX ADMIN — LISINOPRIL 5 MG: 5 TABLET ORAL at 20:05

## 2018-01-05 RX ADMIN — POTASSIUM CHLORIDE 40 MEQ: 20 SOLUTION ORAL at 14:54

## 2018-01-05 RX ADMIN — Medication 100 MG: at 09:51

## 2018-01-05 RX ADMIN — LORAZEPAM 1 MG: 2 INJECTION INTRAMUSCULAR at 11:59

## 2018-01-05 RX ADMIN — SODIUM CHLORIDE 40 MG: 9 INJECTION INTRAMUSCULAR; INTRAVENOUS; SUBCUTANEOUS at 11:17

## 2018-01-05 RX ADMIN — SODIUM CHLORIDE 75 ML/HR: 900 INJECTION, SOLUTION INTRAVENOUS at 05:10

## 2018-01-05 RX ADMIN — LISINOPRIL 5 MG: 5 TABLET ORAL at 09:51

## 2018-01-05 RX ADMIN — PIPERACILLIN SODIUM AND TAZOBACTAM SODIUM 3.38 G: 36; 4.5 INJECTION, POWDER, FOR SOLUTION INTRAVENOUS at 20:01

## 2018-01-05 RX ADMIN — GUAIFENESIN 400 MG: 200 SOLUTION ORAL at 05:09

## 2018-01-05 RX ADMIN — OXYCODONE HYDROCHLORIDE 10 MG: 5 TABLET ORAL at 21:03

## 2018-01-05 NOTE — ED TRIAGE NOTES
Reports a Hx of Throat CA, noted increase Cough and SOB, significant cough on presentation to the ED

## 2018-01-05 NOTE — CONSULTS
[facility]  Pulmonary, Critical Care, and Sleep Medicine    Initial Patient Consult    Name: Evi Payne MRN: [id number]   : 1965 Hospital: [facility] [place]   Date: 2018  Room: [facility]/     Subjective: This patient has been seen and evaluated at the request of Dr. Eleanor Ribeiro for patient on chronic trach tube due to laryngeal cancer. Patient ENT physician is Dr Rabia Andrew. Patient was diagnosed with throat cancer about 1 year ago, treated with surgery, radiation, chemotherapy at [facility]. Patient has chronic trach tube-currently 6cfs changed 2 mths ago per patient. He has chronic peg tube, but says was cleared for pudding about 2 weeks ago. Patient actively smokes orally, alcoholic. He called EMS yesterday due to shortness of breath; was having cough symptoms. His o2 sats was low which improved after suctioning of thick secretions from his trach tube by EMS. Patient CXR shows no focal infiltrates or consolidations. Patient currently in icu, awake, alert  He has trach tube; on room air  He is able to phonate by occluding his trach tube; PMV lost in ER yesterday  Hx of productive sputum via trach tube and blood clots; no bree bleeding    SH-active smoker orally; history of ETOH use, reportedly drank 2 beers yesterday       Past Medical History:   Diagnosis Date    Chronic obstructive pulmonary disease [address] [address]     acute hypoxic respiratory failure     Hypertension     Ill-defined condition     peg tube in place    Ill-defined condition     chemo    S/P radiation therapy     Throat cancer ([facility] [address]     Tobacco abuse       Past Surgical History:   Procedure Laterality Date    HX HEENT      sinus surgery      Prior to Admission medications    Medication Sig Start Date End Date Taking? Authorizing Provider   oxyCODONE-acetaminophen (PERCOCET) 5-325 mg per tablet Take 1 Tab by mouth every four (4) hours as needed for Pain. Max Daily Amount: 6 Tabs.  17   Mignon Sainz MD predniSONE (DELTASONE) 20 mg tablet Take 1 Tab by mouth daily (with lunch). 11/18/17   Deandra Chapman MD   amoxicillin-clavulanate (AUGMENTIN) 400-57 mg/5 mL suspension 5 mL by Per G Tube route every twelve (12) hours. 11/18/17   Deandra Chapman MD   cloNIDine (CATAPRES) 0.2 mg/24 hr patch 1 Patch by TransDERmal route every seven (7) days. 8/29/17   Nimco Staples MD   fentaNYL (DURAGESIC) 100 mcg/hr PATCH 1 Patch by TransDERmal route every seventy-two (72) hours. Max Daily Amount: 1 Patch. 8/29/17   Nimco Staples MD   fentaNYL (DURAGESIC) 50 mcg/hr PATCH 1 Patch by TransDERmal route every seventy-two (72) hours. Max Daily Amount: 1 Patch. 8/29/17   Nimco Staples MD   guaiFENesin (ROBITUSSIN) 100 mg/5 mL liquid Take 5 mL by mouth every four (4) hours as needed for Cough. 8/29/17   Nimco Staples MD   lisinopril (PRINIVIL, ZESTRIL) 5 mg tablet Take 1 Tab by mouth every twelve (12) hours. 8/29/17   Nimco Staples MD   oxyCODONE IR (ROXICODONE) 10 mg tab immediate release tablet 1 Tab by Per G Tube route every six (6) hours as needed. Max Daily Amount: 40 mg. 8/29/17   Nimco Staples MD   magic mouthwash (FIRST-MOUTHWASH [facility]) 403--33 mg/30 mL mwsh oral suspension Take 10 mL by mouth every four (4) hours as needed. Historical Provider   albuterol-ipratropium (DUO-NEB) 2.5 mg-0.5 mg/3 ml nebu 3 mL by Nebulization route every four (4) hours as needed. 8/1/17   Marj Arias MD   multivitamin, tx-iron-ca-min (THERA-M W/ IRON) 9 mg iron-400 mcg tab tablet Take 1 Tab by mouth daily. 8/1/17   Marj Arias MD   Nebulizer Accessories kit Use nebs Q4H PRN 8/1/17   Marj Arias MD     No Known Allergies   Social History   Substance Use Topics    Smoking status: Former Smoker     Packs/day: 0.50    Smokeless tobacco: Never Used    Alcohol use No      History reviewed. No pertinent family history.  No hx of COPD    Current Facility-Administered Medications   Medication Dose Route Frequency    cloNIDine (CATAPRES) 0.2 mg/24 hr patch 1 Patch  1 Patch TransDERmal Q7D    lisinopril (PRINIVIL, ZESTRIL) tablet 5 mg  5 mg Oral Q12H    sodium chloride (NS) flush 5-10 mL  5-10 mL IntraVENous Q8H    enoxaparin (LOVENOX) injection 40 mg  40 mg SubCUTAneous Q24H    methylPREDNISolone (PF) (SOLU-MEDROL) injection 40 mg  40 mg IntraVENous Q12H    piperacillin-tazobactam (ZOSYN) 3.375 g in 0.9% sodium chloride 100 mL IVPB  3.375 g IntraVENous Q6H    Thiamine Mononitrate (B-1) tablet 100 mg  100 mg Oral DAILY    folic acid (FOLVITE) tablet 1 mg  1 mg Oral DAILY    sodium chloride (NS) flush 5-10 mL  5-10 mL IntraVENous Q8H    0.9% sodium chloride infusion  75 mL/hr IntraVENous CONTINUOUS    pantoprazole (PROTONIX) 40 mg in sodium chloride 0.9 % 10 mL injection  40 mg IntraVENous DAILY       Review of Systems:  HEENT: No epistaxis, denies difficulty in swallowing  Respiratory: as above  Cardiovascular: no chest pain, no palpitations  Gastrointestinal: no abd pain, no vomiting, no diarrhea  Genitourinary: No urinary symptoms  Integument/breast: No ulcers  Musculoskeletal: chronic pains  Neuro no depressionlogical: No focal weakness, no seizures, no headaches  Constitutional: + fever, no chills, + weight loss, no night sweats     Objective:   Vital Signs:    Visit Vitals    /80    Pulse (!) 109    Temp 98.6 °F (37 °C)    Resp 15    SpO2 96%       O2 Device: Room air       Temp (24hrs), Av.7 °F (37.6 °C), Min:98.6 °F (37 °C), Max:100.4 °F (38 °C)       Intake/Output:   Last shift:         Last 3 shifts:  1901 -  0700  In: 225 [I.V.:225]  Out: 1950 [Urine:1950]    Intake/Output Summary (Last 24 hours) at 18 1008  Last data filed at 18 0650   Gross per 24 hour   Intake              225 ml   Output             1950 ml   Net            -1725 ml      Physical Exam:   General: comfortable; on room air  Neck: No adenopathy or thyroid swelling; trach midline-no bleeding or discharge  CVS: S1S2 no murmurs  RS: Mod AE bilaterally, mild bilateral wheezing, no crackles  Abd: soft, non tender, no hepatosplenomegaly, no distension or guarding or rigidity, bowel sounds heard  Neuro: non focal, awake, alert  Extrm: no leg edema or clubbing or cyanosis  Skin: no rash  Lines/Tubes:  PIVs  Trach tube    Data review:     Recent Results (from the past 12 hour(s))   CBC WITH AUTOMATED DIFF    Collection Time: 01/04/18 11:40 PM   Result Value Ref Range    WBC 5.7 4.6 - 13.2 K/uL    RBC 3.27 (L) 4.70 - 5.50 M/uL    HGB 11.2 (L) 13.0 - 16.0 g/dL    HCT 31.7 (L) 36.0 - 48.0 %    MCV 96.9 74.0 - 97.0 FL    MCH 34.3 (H) 24.0 - 34.0 PG    MCHC 35.3 31.0 - 37.0 g/dL    RDW 13.9 11.6 - 14.5 %    PLATELET 009 459 - 375 K/uL    MPV 8.7 (L) 9.2 - 11.8 FL    NEUTROPHILS 78 (H) 40 - 73 %    LYMPHOCYTES 7 (L) 21 - 52 %    MONOCYTES 11 (H) 3 - 10 %    EOSINOPHILS 4 0 - 5 %    BASOPHILS 0 0 - 2 %    ABS. NEUTROPHILS 4.5 1.8 - 8.0 K/UL    ABS. LYMPHOCYTES 0.4 (L) 0.9 - 3.6 K/UL    ABS. MONOCYTES 0.6 0.05 - 1.2 K/UL    ABS. EOSINOPHILS 0.2 0.0 - 0.4 K/UL    ABS. BASOPHILS 0.0 0.0 - 0.06 K/UL    DF AUTOMATED     METABOLIC PANEL, COMPREHENSIVE    Collection Time: 01/04/18 11:40 PM   Result Value Ref Range    Sodium 133 (L) 136 - 145 mmol/L    Potassium 3.8 3.5 - 5.5 mmol/L    Chloride 91 (L) 100 - 108 mmol/L    CO2 30 21 - 32 mmol/L    Anion gap 12 3.0 - 18 mmol/L    Glucose 102 (H) 74 - 99 mg/dL    BUN 5 (L) 7.0 - 18 MG/DL    Creatinine 0.62 0.6 - 1.3 MG/DL    BUN/Creatinine ratio 8 (L) 12 - 20      GFR est AA >60 >60 ml/min/1.73m2    GFR est non-AA >60 >60 ml/min/1.73m2    Calcium 8.9 8.5 - 10.1 MG/DL    Bilirubin, total 0.2 0.2 - 1.0 MG/DL    ALT (SGPT) 18 16 - 61 U/L    AST (SGOT) 20 15 - 37 U/L    Alk.  phosphatase 114 45 - 117 U/L    Protein, total 6.9 6.4 - 8.2 g/dL    Albumin 3.3 (L) 3.4 - 5.0 g/dL    Globulin 3.6 2.0 - 4.0 g/dL    A-G Ratio 0.9 0.8 - 1.7     CARDIAC PANEL,(CK, CKMB & TROPONIN)    Collection Time: 01/04/18 11:40 PM Result Value Ref Range     39 - 308 U/L    CK - MB 2.5 <3.6 ng/ml    CK-MB Index 2.1 0.0 - 4.0 %    Troponin-I, Qt. <0.02 0.00 - 0.06 NG/ML   NT-PRO BNP    Collection Time: 01/04/18 11:40 PM   Result Value Ref Range    NT pro-BNP 54 0 - 900 PG/ML   MAGNESIUM    Collection Time: 01/04/18 11:40 PM   Result Value Ref Range    Magnesium 2.0 1.6 - 2.6 mg/dL   ETHYL ALCOHOL    Collection Time: 01/04/18 11:40 PM   Result Value Ref Range    ALCOHOL(ETHYL),SERUM 168 (H) 0 - 3 MG/DL   LACTIC ACID    Collection Time: 01/04/18 11:40 PM   Result Value Ref Range    Lactic acid 1.6 0.4 - 2.0 MMOL/L   CULTURE, BLOOD    Collection Time: 01/04/18 11:40 PM   Result Value Ref Range    Special Requests: NO SPECIAL REQUESTS      Culture result: NO GROWTH AFTER 5 HOURS     EKG, 12 LEAD, INITIAL    Collection Time: 01/05/18 12:07 AM   Result Value Ref Range    Ventricular Rate 121 BPM    Atrial Rate 121 BPM    P-R Interval 136 ms    QRS Duration 78 ms    Q-T Interval 316 ms    QTC Calculation (Bezet) 448 ms    Calculated P Axis 48 degrees    Calculated R Axis 26 degrees    Calculated T Axis 46 degrees    Diagnosis       Sinus tachycardia  Otherwise normal ECG  When compared with ECG of 15-NOV-2017 16:19,  premature ventricular complexes are no longer present  Criteria for Anteroseptal infarct are no longer present  ST no longer depressed in Anterior leads  T wave inversion no longer evident in Anterior leads          Ref.  Range 1/4/2018 23:40   ALCOHOL(ETHYL),SERUM Latest Ref Range: 0 - 3 MG/ (H)       Imaging:  I have personally reviewed the patients radiographs and have reviewed the reports:  CXR 1/4-trach tube; no focal infiltrates or consolidations     IMPRESSION:   · Acute resp failure - hypoxemic - due to mucus plugging  · Acute bronchitis/tracheitis   · Chronic trach tube  · Laryngeal cancer hx-s/p surgery, chemo-radiation  · Wheezing-COPD  · Chronic pain throat  · Active smoker  · Alcoholism   · Chronic peg tube RECOMMENDATIONS:   · Pulm: Duonebs qid; iv steroids; RT - Pmv daytime; Trach care and suctioning as needed  · Cardiac: stable; BP meds  · ID: currently on clindamicin and zosyn; follow resp cxs  · Renal: watch IOs; replace lytes as needed  · GI: SLP eval for swallowing; nutrition consult for bolus peg tube feeding; PPI proph  · Neuro: stable; no active issues  · Endo: tsh; poc glucose  · Fluids:  NS 75/hr  · Proph:  DVt and GI proph  · Smoking and alcohol cessation discussed  · CIWA protocol; thiamine, folic acid  · D/w patient   · Thank you for the consult Dr Gaby Elliott complexity decision making was performed in this consultation and evaluation of this patient        Casimiro Cha MD

## 2018-01-05 NOTE — PROGRESS NOTES
0730- Bedside and Verbal shift change report given to Noe Pennington RN (oncoming nurse) by Corey Mariee RN (offgoing nurse). Report included the following information SBAR, Kardex, Intake/Output, MAR, Recent Results and Cardiac Rhythm ST.    0800- Initial shift assessment complete. Patient denies SOB, reports 10/10 pain to throat. Medicated per MAR. Patient CIWA 5, last drink at 8pm Thursday night. Patient reports drinking 2 beers a day. 0- Spoke with Dr Felton De, received orders to d/c multivitamin tablet and protonix tablet. Patient to get 40mg IV pantoprazole daily. 1200- Reassessment complete. CIWA 8. No other changes to previous. 1600- Reassessment complete. No changes to previous. [facility] Dr Felton De that Dr Soraida Apodaca put in for TF but her notes say NPO. She said to feed per [facility]'s orders. Stephen Chawla, gave 1mg Ativan IV per MAR. Patient has PMV now. 1930-Bedside and Verbal shift change report given to Bela Fernandez RN (oncoming nurse) by Noe Pennington RN (offgoing nurse). Report included the following information SBAR, Kardex, Intake/Output, MAR and Cardiac Rhythm ST with PVC's.

## 2018-01-05 NOTE — H&P
History & Physical    Patient: Venus South MRN: [id number]  CSN: [id number]    YOB: 1965  Age: 45 y.o. Sex: male      DOA: 1/4/2018  Primary Care Provider:  Mehul Springer MD      Assessment/Plan     Patient Active Problem List   Diagnosis Code    Throat cancer [facility]) C14.0    Status post insertion of percutaneous endoscopic gastrostomy (PEG) tube ([address] Z93.1    Hyponatremia E87.1    COPD exacerbation ([address] J44.1    Acute hypoxemic respiratory failure ([address] J96.01    Severe protein-calorie malnutrition Lucas Lev: less than 60% of standard weight) ([address] E43    Tracheostomy dependence ([address] Z93.0    Aspiration pneumonia ([address] J69.0    Acute tracheitis J04.10       Admit to ICU    Acute respiratory failure - secondary to aspiration pneumonia and mucous plugging. Also COPD contributing. Patient now breathing much better after suctioning and removing mucous plugs and blood clots. Trach dependent. ENT consulted by ER. PEG tube dependent. Possible aspiration pneumonia - start on zosyn. COPD exacerbation - started on solumedrol, inhaled bronchodilators. Pulmonary consult. HTN - continue home medications. Hyponatremia - likely secondary to alcohol use. Follow sodium levels. Continued alcohol use - start on MVT, folic acid, thiamine. WA protocol. DVT prophylaxis    Estimated length of stay : 2-3    CC: SOB       HPI:     Venus South is a 45 y.o. male who has past history of COPD, HTN, laryngeal ca, s/p trach and PEG is brought to ER by EMS with complains of SOB. Patient reports that he has been feeling weak and having cough for over a week. Since yesterday he started feeling more SOB. EMS was called and patient found to have thick secretions from his tracheostomy tube. He was suctioned by EMS. Initial oxygen sats were 85% on RA and improved to 98% after suctioning. Associated symptoms include diaphoresis, flushing.  He has history of ETOH use, he reports he drank 2 beers yesterday. Unfortunately continues to smoke. In ER he was again suctioned deep, with resp therapist, thick mucous and blood clots removed from tracheostomy site and from deep suctioning. CXR unremarkable, official read pending. He is still tachycardic     Past Medical History:   Diagnosis Date    Chronic obstructive pulmonary disease ([facility]     acute hypoxic respiratory failure 8/16    Hypertension     Ill-defined condition     peg tube in place    Ill-defined condition     chemo    S/P radiation therapy     Throat cancer ([facility]     Tobacco abuse        Past Surgical History:   Procedure Laterality Date    HX HEENT      sinus surgery       History reviewed. No pertinent family history. Social History     Social History    Marital status: COMMON LAW     Spouse name: N/A    Number of children: N/A    Years of education: N/A     Social History Main Topics    Smoking status: Former Smoker     Packs/day: 0.50    Smokeless tobacco: Never Used    Alcohol use No    Drug use: No    Sexual activity: Not Asked     Other Topics Concern    None     Social History Narrative       Prior to Admission medications    Medication Sig Start Date End Date Taking? Authorizing Provider   oxyCODONE-acetaminophen (PERCOCET) 5-325 mg per tablet Take 1 Tab by mouth every four (4) hours as needed for Pain. Max Daily Amount: 6 Tabs. 12/18/17   Steph Lr MD   predniSONE (DELTASONE) 20 mg tablet Take 1 Tab by mouth daily (with lunch). 11/18/17   Melvin Tucker MD   amoxicillin-clavulanate (AUGMENTIN) 400-57 mg/5 mL suspension 5 mL by Per G Tube route every twelve (12) hours. 11/18/17   Melvin Tuckre MD   cloNIDine (CATAPRES) 0.2 mg/24 hr patch 1 Patch by TransDERmal route every seven (7) days. 8/29/17   Ezekiel Pineda MD   fentaNYL (DURAGESIC) 100 mcg/hr PATCH 1 Patch by TransDERmal route every seventy-two (72) hours.  Max Daily Amount: 1 Patch. 8/29/17   Ezekiel Pineda MD   fentaNYL (DURAGESIC) 50 mcg/hr PATCH 1 Patch by TransDERmal route every seventy-two (72) hours. Max Daily Amount: 1 Patch. 8/29/17   Jess Plata MD   guaiFENesin (ROBITUSSIN) 100 mg/5 mL liquid Take 5 mL by mouth every four (4) hours as needed for Cough. 8/29/17   Jess Plata MD   lisinopril (PRINIVIL, ZESTRIL) 5 mg tablet Take 1 Tab by mouth every twelve (12) hours. 8/29/17   Jess Plata MD   oxyCODONE IR (ROXICODONE) 10 mg tab immediate release tablet 1 Tab by Per G Tube route every six (6) hours as needed. Max Daily Amount: 40 mg. 8/29/17   Jess Plata MD   magic mouthwash (FIRST-MOUTHWASH [facility]) 370--91 mg/30 mL mwsh oral suspension Take 10 mL by mouth every four (4) hours as needed. Historical Provider   albuterol-ipratropium (DUO-NEB) 2.5 mg-0.5 mg/3 ml nebu 3 mL by Nebulization route every four (4) hours as needed. 8/1/17   Reji Rogers MD   multivitamin, tx-iron-ca-min (THERA-M W/ IRON) 9 mg iron-400 mcg tab tablet Take 1 Tab by mouth daily. 8/1/17   Reji Rogers MD   Nebulizer Accessories kit Use nebs Q4H PRN 8/1/17   Reji Rogers MD       No Known Allergies    Review of Systems  Gen: No fever, chills, malaise, weight loss/gain. See above  Heent: No headache, rhinorrhea, epistaxis, ear pain, hearing loss, sinus pain, neck pain/stiffness, sore throat. Heart: No chest pain, palpitations, RONQUILLO, pnd, or orthopnea. Resp: see above   GI: No nausea, vomiting, diarrhea, constipation, melena or hematochezia. : No urinary obstruction, dysuria or hematuria. Derm: No rash, new skin lesion or pruritis. Musc/skeletal: no bone or joint complains. Vasc: No edema, cyanosis or claudication. Endo: No heat/cold intolerance, no polyuria,polydipsia or polyphagia. Neuro: No unilateral weakness, numbness, tingling. No seizures. Heme: No easy bruising or bleeding.           Physical Exam:     Physical Exam:  Visit Vitals    /82    Pulse (!) 129    Temp 100.4 °F (38 °C)    Resp 21    SpO2 100%      O2 Device: Room air    Temp (24hrs), Av.1 °F (37.8 °C), Min:99.8 °F (37.7 °C), Max:100.4 °F (38 °C)    1901 -  0700  In: -   Out: 650 [Urine:650]        General:  Awake, cooperative, no distress. Head:  Normocephalic, without obvious abnormality, atraumatic. Eyes:  Conjunctivae/corneas clear, sclera anicteric, PERRL, EOMs intact. Nose: Nares normal. No drainage or sinus tenderness. Throat: Tracheostomy, poor oral hygiene. Neck: Supple, symmetrical, trachea midline, no adenopathy. Lungs:   Coarse breath sounds to auscultation bilaterally. Heart:  tachy, S1, S2 normal, no murmur, click, rub or gallop. Abdomen: Soft, non-tender. Bowel sounds normal. No masses,  No organomegaly. PEG in place   Extremities: Extremities normal, atraumatic, no cyanosis or edema. Capillary refill normal.   Pulses: 2+ and symmetric all extremities. Skin: Skin color pink, turgor normal. No rashes or lesions   Neurologic: CNII-XII intact. No focal motor or sensory deficit.        Labs Reviewed:    CMP:   Lab Results   Component Value Date/Time     (L) 2018 11:40 PM    K 3.8 2018 11:40 PM    CL 91 (L) 2018 11:40 PM    CO2 30 2018 11:40 PM    AGAP 12 2018 11:40 PM     (H) 2018 11:40 PM    BUN 5 (L) 2018 11:40 PM    CREA 0.62 2018 11:40 PM    GFRAA >60 2018 11:40 PM    GFRNA >60 2018 11:40 PM    CA 8.9 2018 11:40 PM    MG 2.0 2018 11:40 PM    ALB 3.3 (L) 2018 11:40 PM    TP 6.9 2018 11:40 PM    GLOB 3.6 2018 11:40 PM    AGRAT 0.9 2018 11:40 PM    SGOT 20 2018 11:40 PM    ALT 18 2018 11:40 PM     CBC:   Lab Results   Component Value Date/Time    WBC 5.7 2018 11:40 PM    HGB 11.2 (L) 2018 11:40 PM    HCT 31.7 (L) 2018 11:40 PM     2018 11:40 PM     All Cardiac Markers in the last 24 hours:   Lab Results   Component Value Date/Time     2018 11:40 PM    CKMB 2.5 01/04/2018 11:40 PM    CKND1 2.1 01/04/2018 11:40 PM    TROIQ <0.02 01/04/2018 11:40 PM     ABG: No results found for: PH, PHI, PCO2, PCO2I, PO2, PO2I, HCO3, HCO3I, FIO2, FIO2I      Procedures/imaging: see electronic medical records for all procedures/Xrays and details which were not copied into this note but were reviewed prior to creation of Plan        CC: Shahla Benitez MD

## 2018-01-05 NOTE — PROGRESS NOTES
Called to bedside for pt with #6 cuffless shiley trach, in resp distress. Upon arrival, physician had removed occluded inner cannula. SXN'd for large amount of thick, tan blood tinged, pluggy sputum. 35% Trach collar set up, two Duoneb aerosal tx's administered, trach care done to include changing of inner cannula, drain sponge, and trach tie. Increased secretions noted.

## 2018-01-05 NOTE — ED PROVIDER NOTES
EMERGENCY DEPARTMENT HISTORY AND PHYSICAL EXAM    Date: 1/4/2018  Patient Name: Petra Tobias    History of Presenting Illness     Chief Complaint   Patient presents with    Shortness of Breath         History Provided By: Patient and EMS    Chief Complaint: SOB  Duration: 1 day  Timing:  Worsening  Location: Throat and chest  Associated Symptoms: cough and choking    Additional History (Context):   11:28 PM  Petra Tobias is a 45 y.o. male with PMHX throat CA with trach in place who presents via EMS to the emergency department C/O constant SOB, onset 1 day ago. Associated sxs include cough and chocking that is significant upon arrival. Per EMS, pt was suctioned by wife ARMANI with bloody sputum. Sats were 85% upon EMS arrival which improved to 98% after O2. EMS notes pt had trigeminal PVC's at heart rate of 130's. Pt is followed by ENT here. Pt has been treated with chemotherapy and radiation therapy. Other PMHX includes HTN, COPD, and tobacco abuse. Endorses EtOH use. Pt denies any other sxs or complaints.      PCP: Juancarlos Copeland MD    Current Facility-Administered Medications   Medication Dose Route Frequency Provider Last Rate Last Dose    sodium chloride (NS) flush 5-10 mL  5-10 mL IntraVENous Q8H Dougie Barrientos MD        sodium chloride (NS) flush 5-10 mL  5-10 mL IntraVENous PRN Dougie Barrientos MD        enoxaparin (LOVENOX) injection 40 mg  40 mg SubCUTAneous Q24H Dougie Barrientos MD   40 mg at 01/05/18 0318    pantoprazole (PROTONIX) tablet 40 mg  40 mg Oral DAILY Dougie Barrientos MD        methylPREDNISolone (PF) (SOLU-MEDROL) injection 40 mg  40 mg IntraVENous Q12H Dougie Barrientos MD   40 mg at 01/05/18 0318    piperacillin-tazobactam (ZOSYN) 3.375 g in 0.9% sodium chloride 100 mL IVPB  3.375 g IntraVENous Jean Carlos Shabazz MD   3.375 g at 01/05/18 0359    Thiamine Mononitrate (B-1) tablet 100 mg  100 mg Oral DAILY Dougie Barrientos MD        folic acid (FOLVITE) tablet 1 mg  1 mg Oral DAILY Jona Rodriguez MD        sodium chloride (NS) flush 5-10 mL  5-10 mL IntraVENous Q8H Jona Rodirguez MD        sodium chloride (NS) flush 5-10 mL  5-10 mL IntraVENous PRN Jona Rodriguez MD        LORazepam (ATIVAN) tablet 1 mg  1 mg Oral Q1H PRN Jona Rodriguez MD        Or    LORazepam (ATIVAN) injection 1 mg  1 mg IntraVENous Q1H PRN Jona Rodriguez MD        LORazepam (ATIVAN) tablet 2 mg  2 mg Oral Q1H PRN Jona Rodriguez MD        Or    LORazepam (ATIVAN) injection 2 mg  2 mg IntraVENous Q1H PRN Jona Rodriguez MD        LORazepam (ATIVAN) injection 3 mg  3 mg IntraVENous Q15MIN PRN Jona Rodriguez MD        acetaminophen (TYLENOL) solution 650 mg  650 mg Per G Tube Q6H PRN Jona Rodriguez MD   650 mg at 01/05/18 2344    0.9% sodium chloride infusion  75 mL/hr IntraVENous CONTINUOUS Jona Rodriguez MD 75 mL/hr at 01/05/18 0510 75 mL/hr at 01/05/18 0510    guaiFENesin (ROBITUSSIN) 100 mg/5 mL oral liquid 400 mg  400 mg Per G Tube Q6H PRN Jona Rodriguez MD   400 mg at 01/05/18 0509       Past History     Past Medical History:  Past Medical History:   Diagnosis Date    Chronic obstructive pulmonary disease ([facility] [address]     acute hypoxic respiratory failure 8/16    Hypertension     Ill-defined condition     peg tube in place    Ill-defined condition     chemo    S/P radiation therapy     Throat cancer ([facility] [address]     Tobacco abuse        Past Surgical History:  Past Surgical History:   Procedure Laterality Date    HX HEENT      sinus surgery       Family History:  History reviewed. No pertinent family history. Social History:  Social History   Substance Use Topics    Smoking status: Former Smoker     Packs/day: 0.50    Smokeless tobacco: Never Used    Alcohol use No       Allergies:  No Known Allergies      Review of Systems   Review of Systems   Constitutional: Negative for chills, diaphoresis, fever and unexpected weight change.    HENT: Negative for congestion, drooling, ear pain, rhinorrhea, sore throat, tinnitus and trouble swallowing. Eyes: Negative for photophobia, pain, redness and visual disturbance. Respiratory: Positive for cough, choking and shortness of breath. Negative for chest tightness, wheezing and stridor. Cardiovascular: Negative for chest pain, palpitations and leg swelling. Gastrointestinal: Negative for abdominal distention, abdominal pain, anal bleeding, blood in stool, constipation, diarrhea, nausea and vomiting. Genitourinary: Negative for difficulty urinating, dysuria, flank pain, frequency, hematuria and urgency. Musculoskeletal: Negative for arthralgias, back pain and neck pain. Skin: Negative for color change, rash and wound. Neurological: Negative for dizziness, seizures, syncope, speech difficulty, light-headedness and headaches. Hematological: Does not bruise/bleed easily. Psychiatric/Behavioral: Negative for agitation, behavioral problems, hallucinations, self-injury and suicidal ideas. The patient is not hyperactive. Physical Exam     Vitals:    01/05/18 0200 01/05/18 0215 01/05/18 0230 01/05/18 0400   BP: 138/69 109/84 105/82 113/65   Pulse: (!) 134 (!) 130 (!) 129 (!) 127   Resp: 19 18 21 23   Temp:  100.4 °F (38 °C)     SpO2: 93% 100% 100% 98%     Physical Exam   Constitutional: He is oriented to person, place, and time. He appears well-developed and well-nourished. No distress. anx uncomf on arrival  Red skin ansd tears but no diaphorses. Breathing impr almost imm with removal of Passey miur valve  Clogged with blood and purulent mucous. Trach site sunction by our RT colleagues. HENT:   Head: Normocephalic and atraumatic. Right Ear: External ear normal.   Left Ear: External ear normal.   Mouth/Throat: Oropharynx is clear and moist. Mucous membranes are dry. No oropharyngeal exudate. Eyes: EOM are normal. Pupils are equal, round, and reactive to light. Right conjunctiva is injected. Left conjunctiva is injected.  No scleral icterus. No pallor   Neck: Normal range of motion. Neck supple. No JVD present. No tracheal deviation present. No thyromegaly present. Cardiovascular: Regular rhythm and normal heart sounds. Tachycardia present. Pulmonary/Chest: Effort normal. No stridor. No respiratory distress. He has wheezes (both apices) in the right upper field and the left upper field. He has rhonchi (right posterior). Abdominal: Soft. Bowel sounds are normal. He exhibits no distension. There is no tenderness. There is no rebound and no guarding. PEG tube to abdomen with dried green secretions. No peripheral erythema   Musculoskeletal: Normal range of motion. He exhibits no edema or tenderness. No soft tissue injuries   Lymphadenopathy:     He has no cervical adenopathy. Neurological: He is alert and oriented to person, place, and time. He has normal reflexes. No cranial nerve deficit. Coordination normal.   Skin: Skin is warm and dry. No rash noted. He is not diaphoretic. No erythema. Some decreased turgor   Psychiatric: He has a normal mood and affect. His behavior is normal. Judgment and thought content normal.   Nursing note and vitals reviewed. Diagnostic Study Results     Labs -     Recent Results (from the past 12 hour(s))   CBC WITH AUTOMATED DIFF    Collection Time: 01/04/18 11:40 PM   Result Value Ref Range    WBC 5.7 4.6 - 13.2 K/uL    RBC 3.27 (L) 4.70 - 5.50 M/uL    HGB 11.2 (L) 13.0 - 16.0 g/dL    HCT 31.7 (L) 36.0 - 48.0 %    MCV 96.9 74.0 - 97.0 FL    MCH 34.3 (H) 24.0 - 34.0 PG    MCHC 35.3 31.0 - 37.0 g/dL    RDW 13.9 11.6 - 14.5 %    PLATELET 745 506 - 320 K/uL    MPV 8.7 (L) 9.2 - 11.8 FL    NEUTROPHILS 78 (H) 40 - 73 %    LYMPHOCYTES 7 (L) 21 - 52 %    MONOCYTES 11 (H) 3 - 10 %    EOSINOPHILS 4 0 - 5 %    BASOPHILS 0 0 - 2 %    ABS. NEUTROPHILS 4.5 1.8 - 8.0 K/UL    ABS. LYMPHOCYTES 0.4 (L) 0.9 - 3.6 K/UL    ABS. MONOCYTES 0.6 0.05 - 1.2 K/UL    ABS. EOSINOPHILS 0.2 0.0 - 0.4 K/UL    ABS. BASOPHILS 0.0 0.0 - 0.06 K/UL    DF AUTOMATED     METABOLIC PANEL, COMPREHENSIVE    Collection Time: 01/04/18 11:40 PM   Result Value Ref Range    Sodium 133 (L) 136 - 145 mmol/L    Potassium 3.8 3.5 - 5.5 mmol/L    Chloride 91 (L) 100 - 108 mmol/L    CO2 30 21 - 32 mmol/L    Anion gap 12 3.0 - 18 mmol/L    Glucose 102 (H) 74 - 99 mg/dL    BUN 5 (L) 7.0 - 18 MG/DL    Creatinine 0.62 0.6 - 1.3 MG/DL    BUN/Creatinine ratio 8 (L) 12 - 20      GFR est AA >60 >60 ml/min/1.73m2    GFR est non-AA >60 >60 ml/min/1.73m2    Calcium 8.9 8.5 - 10.1 MG/DL    Bilirubin, total 0.2 0.2 - 1.0 MG/DL    ALT (SGPT) 18 16 - 61 U/L    AST (SGOT) 20 15 - 37 U/L    Alk.  phosphatase 114 45 - 117 U/L    Protein, total 6.9 6.4 - 8.2 g/dL    Albumin 3.3 (L) 3.4 - 5.0 g/dL    Globulin 3.6 2.0 - 4.0 g/dL    A-G Ratio 0.9 0.8 - 1.7     CARDIAC PANEL,(CK, CKMB & TROPONIN)    Collection Time: 01/04/18 11:40 PM   Result Value Ref Range     39 - 308 U/L    CK - MB 2.5 <3.6 ng/ml    CK-MB Index 2.1 0.0 - 4.0 %    Troponin-I, Qt. <0.02 0.00 - 0.06 NG/ML   NT-PRO BNP    Collection Time: 01/04/18 11:40 PM   Result Value Ref Range    NT pro-BNP 54 0 - 900 PG/ML   MAGNESIUM    Collection Time: 01/04/18 11:40 PM   Result Value Ref Range    Magnesium 2.0 1.6 - 2.6 mg/dL   ETHYL ALCOHOL    Collection Time: 01/04/18 11:40 PM   Result Value Ref Range    ALCOHOL(ETHYL),SERUM 168 (H) 0 - 3 MG/DL   LACTIC ACID    Collection Time: 01/04/18 11:40 PM   Result Value Ref Range    Lactic acid 1.6 0.4 - 2.0 MMOL/L   EKG, 12 LEAD, INITIAL    Collection Time: 01/05/18 12:07 AM   Result Value Ref Range    Ventricular Rate 121 BPM    Atrial Rate 121 BPM    P-R Interval 136 ms    QRS Duration 78 ms    Q-T Interval 316 ms    QTC Calculation (Bezet) 448 ms    Calculated P Axis 48 degrees    Calculated R Axis 26 degrees    Calculated T Axis 46 degrees    Diagnosis       Sinus tachycardia  Otherwise normal ECG  When compared with ECG of 15-NOV-2017 16:19,  premature ventricular complexes are no longer present  Criteria for Anteroseptal infarct are no longer present  ST no longer depressed in Anterior leads  T wave inversion no longer evident in Anterior leads         Radiologic Studies -    XR CHEST PORT    (Results Pending)     1:22 AM  RADIOLOGY FINDINGS  Chest X-ray shows trach site with good placement; no grossly visible aspiration or PNA process  Pending review by Radiologist  Recorded by David Vicente ED Scribe, as dictated by Jameel Blankenship. Viv Dubon MD    CT Results  (Last 48 hours)    None        CXR Results  (Last 48 hours)    None            Medical Decision Making   I am the first provider for this patient. I reviewed the vital signs, available nursing notes, past medical history, past surgical history, family history and social history. Vital Signs-Reviewed the patient's vital signs. Pulse Oximetry Analysis - 97% on RA. Cardiac Monitor:  Rate: 127 bpm  Rhythm: Sinus tachycardia    EKG interpretation: (EMS)  11:55 PM  NSR at 80 bpm. No ST segments. EKG read by Jameel Blankenship. Viv Dubon MD at 12:00 AM     EKG interpretation: (Preliminary)  12:19 AM  NSR with minor sinus arrhythmia at 69 bpm. No ST segments. EKG read by Jameel Blankenship. Viv Dubon MD at 12:00 AM     Records Reviewed: Nursing Notes    Provider Notes (Medical Decision Making):   MDM: Immediate crisis was airway obstruction with mucoid material, probably a combination of infection and aspiration. Trach tube cleaned and bronchodilators given with significant improvement. Started fluids and ordered sepsis labs. Strongly consider aspiration abx if needed    Procedures:  Procedures    ED Course:   11:28 PM   Initial assessment performed. The patients presenting problems have been discussed, and they are in agreement with the care plan formulated and outlined with them. I have encouraged them to ask questions as they arise throughout their visit. CONSULT NOTE:   2:16 AM  Jameel Blankenship.  Viv Dubon MD spoke with Jimmy Barrow MD   Specialty: Hospitalist  Discussed pt's hx, disposition, and available diagnostic and imaging results. Reviewed care plans. Consulting physician agrees with plans as outlined. Agrees to admit. Recommends consult to ENT. Written by DONAL Kim, as dictated by Nguyễn Garcia MD.    CONSULT NOTE:   2:28 AM  Nguyễn Garcia MD spoke with Jayson Alvarado MD   Specialty: ENT  Discussed pt's hx, disposition, and available diagnostic and imaging results. Reviewed care plans. Consulting physician agrees with plans as outlined. Recommends to add Mucinex/Robitussin and no further abx. Written by DONAL Kime, as dictated by Nguyễn Garcia MD.      Diagnosis and Disposition       Critical Care Time: 120 minutes  2:27 AM  I have spent 120 minutes of critical care time involved in lab review, consultations with specialist, family decision-making, and documentation. During this entire length of time I was immediately available to the patient. Critical Care: The reason for providing this level of medical care for this critically ill patient was due a critical illness that impaired one or more vital organ systems such that there was a high probability of imminent or life threatening deterioration in the patients condition. This care involved high complexity decision making to assess, manipulate, and support vital system functions, to treat this degree vital organ system failure and to prevent further life threatening deterioration of the patients condition. Core Measures:  For Hospitalized Patients:    1. Hospitalization Decision Time:  The decision to hospitalize the patient was made by Nguyễn Garcia MD at 2:45 AM on 1/4/2018    2.  Aspirin: Aspirin was not given because the patient did not present with a stroke at the time of their Emergency Department evaluation    2:45 AM  Patient is being admitted to the hospital by Jimmy Barrow MD. The results of their tests and reasons for their admission have been discussed with them and/or available family. They convey agreement and understanding for the need to be admitted and for their admission diagnosis. CONDITIONS ON ADMISSION:  Sepsis is not present at the time of admission. Deep Vein Thrombosis is not present at the time of admission. Thrombosis is not present at the time of admission. Urinary Tract Infection is not present at the time of admission. Pneumonia is not present at the time of admission. MRSA is not present at the time of admission. Wound infection is not present at the time of admission. Pressure Ulcer is not present at the time of admission. CLINICAL IMPRESSION:    1. Aspiration into airway, initial encounter    2. Tracheostomy dependence ([facility] [address]    3. COPD exacerbation ([facility] [address]    4. History of throat cancer        PLAN: ADMIT TO ICU  _____________________    Attestations: This note is prepared by Jorge Garcia, acting as Scribe for Bernabe Li. Araceli Faye MD.    Bernabe Li. Araceli Faye MD:  The scribe's documentation has been prepared under my direction and personally reviewed by me in its entirety.   I confirm that the note above accurately reflects all work, treatment, procedures, and medical decision making performed by me.  _______________________________

## 2018-01-05 NOTE — PROGRESS NOTES
Problem: Voice Impaired (Adult)  Goal: *Acute Goals and Plan of Care (Insert Text)  The patient will:   1) independently recall and comply with PMV precautions with 100% accuracy. 2) clearly produce 5-7 words with coordinated breathing given Belinda. 3) independently don/doff PMV with 100% accuracy. 4) complete glottic closure exercises with 100% accuracy given maxA. 5) complete diaphragmatic breathing exercises with 100% accuracy given Belinda. Speech LAnguage Pathology Passy leodan evaluation    Patient: Elizabeth Shane (14 y.o. male)  Date: 1/5/2018  Primary Diagnosis: Acute tracheitis  Precautions: Aspiration. ASSESSMENT :  Based on the objective data described below, the patient presents with severe voice impairment/aphonia 2/2 throat CA; improved to moderate/severe voice disorder with utilization of PMV. Patient's voice c/b as hoarse and breathy. Education and training provided re: don/off PMV, cleaning instructions, purpose of PMV, etc. All questions answered. Patient has tolerated and utilized PMV prior to hospitalization, however due to new onset and h/o recurrent aspiration pneumonia the patient presents with SOB and incoordinated breathing. Vital signs insignificantly changed upon placement of the valve. Without PMV - SPO2 100% on room air, RR 19, and Pulse 120. With PMV - SPO2 97% on room air, RR 18 and Pulse 118. Raymond Rivera concerned with the patient's use of PMV at nighttime while sleeping. PMV instructions recommend utilizing the PMV while sleeping, thus this was re-iterated to the patient. The patient verbalized understanding. Will f/u for breathing trials, tolerance of PMV, etc. Notified Jean-Pierre Rose RN of PMV placement while SLP exited the room. Patient will benefit from skilled intervention to address the above impairments.   Patients rehabilitation potential is considered to be Fair  Factors which may influence rehabilitation potential include:   [x]              Other: Motivation/Compliance     PLAN :  Recommendations and Planned Interventions:  As above  Frequency/Duration: Patient will be followed by speech-language pathology 1-2 times per day/3-5 days per week to address goals. Discharge Recommendations: To Be Determined     SUBJECTIVE:   Patient stated Aba Cuellar lost it over there. OBJECTIVE:     Past Medical History:   Diagnosis Date    Chronic obstructive pulmonary disease ([facility]     acute hypoxic respiratory failure 8/16    Hypertension     Ill-defined condition     peg tube in place    Ill-defined condition     chemo    S/P radiation therapy     Throat cancer ([address]     Tobacco abuse      Past Surgical History:   Procedure Laterality Date    HX HEENT      sinus surgery     Prior Level of Function/Home Situation: Smoker. Drinker. Mental Status:  Neurologic State: Alert  Orientation Level: Oriented X4  Cognition: Follows commands  Perception: Appears intact  Perseveration: No perseveration noted  Safety/Judgement: Insight into deficits  Motor Speech:  Oral-Motor Structure/Motor Speech  Labial: No impairment  Dentition: Poor; Other (comment) (2 teeth)  Oral Hygiene: poor  Lingual: Decreased strength  Velum: No impairment  Mandible: No impairment  Language Comprehension and Expression:  Auditory Comprehension  Response to Basic Yes/No Questions (%): 100 %  Response to Complex Yes/No Questions (%) : 100 %  One-Step Basic Commands (%): 100 %  Two-Step Basic Commands (%): 100 %  Three-Step Basic Commands (%): 100 %  Voice:  Vocal Quality: Hoarse;Low volume;Phonation breaks;Breathy  Vocal Intensity: Too soft     01/05/18 1500   Tracheostomy Data/Eval   Trach Size/Status #6 ;Uncuffed   Date of Placement 08/25/17   Passy-Elizabeth Placement SLP;Patient   On Ventilator No   Mental Status   Neurologic State Alert   Orientation Level Oriented X4   Cognition Follows commands   Perception Appears intact   Perseveration No perseveration noted   Safety/Judgement Insight into deficits Evidence of Comprehension   Meaningful Eye Movement/Gaze Yes   Response to Basic Yes/No Questions (%) 100 %   Response to Complex Yes/No Questions (%)  100 %   One-Step Basic Commands (%) 100 %   Two-Step Basic Commands (%) 100 %   Three-Step Basic Commands (%) 100 %   Cough  Weak   PMV Trial    Application Patient   Tolerance Decreased O2 saturation; Increased work of breathing   Vocal Quality Hoarse;Low volume;Phonation breaks;Breathy   Vocal Intensity Too soft   Duration (minutes) 21 minutes   Oxygen Therapy   O2 Sat (%) 100 %   Pulse via Oximetry 120 beats per minute  (baseline this date)   O2 Device Room air   Airway Clearance   Suction Oral;Trach   Suction Device Yankauer   Sputum Method Obtained Oral   Treatment Diagnosis   Treatment Diagnosis oropharyngeal dysphagia   Treatment Diagnosis 2 voice impaired     SPEECH GCODE GCODEVoice:  Voice Current Status CN= 100%   Voice Goal Status CK= 40-59%      The severity rating is based on the following outcomes:    KARELY Noms 1  Clinical judgement    PAIN:  Start of Eval: 0  End of Eval: 0     After treatment:   []              Patient left in no apparent distress sitting up in chair  [x]              Patient left in no apparent distress in bed  [x]              Call bell left within reach  [x]              Nursing notified  []              Caregiver present  []              Bed alarm activated    COMMUNICATION/EDUCATION:   [x] Patient/family have participated as able in goal setting and plan of care. [x]  Patient/family agree to work toward stated goals and plan of care. []  Patient understands intent and goals of therapy, but is neutral about his/her participation. []  Patient is unable to participate in goal setting and plan of care.     Thank you for this referral.  Dixie Haynes, SLP  Time Calculation: 8 mins

## 2018-01-05 NOTE — PROGRESS NOTES
Problem: Dysphagia (Adult)  Goal: *Acute Goals and Plan of Care (Insert Text)  Recommendations:  Diet: NPO; TF recommendations   Meds: Via TF/IV  Aspiration Precautions  Oral Care TID    Goals:  Patient will:  1. Tolerate PO trials with 0 s/s overt distress in 4/5 trials  2. Utilize compensatory swallow strategies/maneuvers (decrease bite/sip, size/rate, alt. liq/sol) with min cues in 4/5 trials  3. Perform oral-motor/laryngeal exercises to increase oropharyngeal swallow function with min cues  4. Complete an objective swallow study (i.e., MBSS) to assess swallow integrity, r/o aspiration, and determine of safest LRD, min A      Speech LAnguage Pathology bedside swallow evaluation    Patient: Lon Tiwari (15 y.o. male)  Date: 1/5/2018  Primary Diagnosis: Acute tracheitis  Precautions: Aspiration. ASSESSMENT :  Based on the objective data described below, the patient presents with moderate oral and moderate/severe pharyngeal dysphagia c/w poor dentition, delayed/reduced hyolaryngeal elevation, and prolonged NPO (immobilization) without dysphagia therapy. MBS completed 11/2017 indicating flash penetration of pudding and deep penetration of honey thick liquids without aspiration 2/2 to reduced laryngeal elevation with good clearance of pharyngeal residues. The patient states that he's had pudding a couple times, but does not eat. He was recommended SLP Swallow Therapy, but declines as he believed that he didn't need it. Discussed rationale for swallow therapy with success in willing to have the patient participate. Oral care completed with yaunker suction. SLP established exercises of effortful swallow with sponge/toothette dipped in water; 2 of 3 trials without event. PO trials of pudding not initiated as the MD recommends strict NPO and d/c TF at this time as d/w Keren Reeder RN. Recommend f/u MBS . Patient will benefit from skilled intervention to address the above impairments.   Patients rehabilitation potential is considered to be Fair  Factors which may influence rehabilitation potential include:   [x]            Other: Motivation/Compliance     PLAN :  Recommendations and Planned Interventions:  As above. Frequency/Duration: Patient will be followed by speech-language pathology 1-2 times per day/3-5 days per week to address goals. Discharge Recommendations: To Be Determined     SUBJECTIVE:   Patient stated I ate pudding a couple times. OBJECTIVE:     Past Medical History:   Diagnosis Date    [facility]     acute hypoxic respiratory failure 8/16    Hypertension     Ill-defined condition     peg tube in place    Ill-defined condition     chemo    S/P radiation therapy     Th[facility]     Tobacco abuse      Past Surgical History:   Procedure Laterality Date    HX HEENT      sinus surgery     Prior Level of Function/Home Situation: per chart  Diet prior to admission: NPO with TF; cleared for pudding (suspect pleasure feeds)  Current Diet: Strict NPO with ice chips    Cognitive and Communication Status:  Neurologic State: Alert  Orientation Level: Oriented X4  Cognition: Follows commands  Perception: Appears intact  Perseveration: No perseveration noted  Safety/Judgement: Insight into deficits  Oral Assessment:  Oral Assessment  Labial: No impairment  Dentition: Poor; Other (comment) (2 teeth)  Oral Hygiene: poor  Lingual: Decreased strength  Velum: No impairment  Mandible: No impairment  Gag Reflex:  (not assessed)  P.O. Trials:  Patient Position: 70HOB  Vocal quality prior to P.O.: Hoarse;Low volume;Phonation breaks;Breathy  Consistency Presented: Ice chips; Thin liquid  How Presented:  Other (comment);SLP-fed/presented;Spoon (toothette sponge)  Bolus Acceptance: No impairment  Bolus Formation/Control: Impaired  Type of Impairment: Delayed  Initiation of Swallow: Delayed (# of seconds)  Laryngeal Elevation: Decreased  Aspiration Signs/Symptoms: Clear throat  Pharyngeal Phase Characteristics: Poor endurance  Effective Modifications: Small sips and bites; Other (comment) (sponge)  Cues for Modifications: Maximal       Oral Phase Severity: Mild-moderate  Pharyngeal Phase Severity : Severe    GCODESwallowing:  Swallow Current Status CM= 80-99%   Swallow Goal Status CK= 40-59%    The severity rating is based on the following outcomes:  KARELY Noms Swallow Level 3   Clinical Judgement    PAIN:  Start of Eval: 0  End of Eval: 0     After treatment:   []            Patient left in no apparent distress sitting up in chair  [x]            Patient left in no apparent distress in bed  [x]            Call bell left within reach  [x]            Nursing notified  []            Family present  []            Caregiver present  []            Bed alarm activated    COMMUNICATION/EDUCATION:   [x]            Aspiration precautions; swallow safety; compensatory techniques. [x]            Patient/family have participated as able in goal setting and plan of care. [x]            Patient/family agree to work toward stated goals and plan of care. []            Patient understands intent and goals of therapy; neutral about participation. []            Patient unable to participate in goal setting/plan of care; educ ongoing with interdisciplinary staff  [x]         Posted safety precautions in patient's room.     Thank you for this referral.  CHARO Quintero  Time Calculation: 8 mins

## 2018-01-05 NOTE — PROGRESS NOTES
Readmission Risk Assessment:     Moderate Risk and MSSP/Good Help ACO patients    RRAT Score:  13-20    Initial Assessment: Chart reviewed and noted pt currently in ICU for SOB with tracheitis. Pt is well know to this CM. We previously purchased for his home use- his entire Nebulizer and suctioning equipment in order to go home from his August admission. At that time, Pt planned to continue smoking, No home Oxygen was needed. Pt was to continue purchasing his monthly supplies and was aware. Pt had declined to apply for Medicaid and then decided he would. I have contacted to [place] to discuss where he is in the Medicaid process. Per Juju Owen, he needs to sign one form which she will go and have signed and submit back to [facility] for a decision. Juju Owen will go and speak with him and follow up on if he sent his paperwork that was requested. CM will continue to follow and remains available for assistance. No anticipated dc over this weekend. Emergency Contact:  Spouse- [facility] [place] [phone]    Pertinent Medical Hx:     COPD, HTN, peg tube, chemo, throat Ca, tobacco abuse, ETOh abuse    PCP/Specialists: [facility]:       DME:     Suctioning, nebulizer     Moderate Risk Care Transition Plan:  1. Evaluate for [place] or H2H, SNF, acute rehab, community care coordination of resources. 2. Involve patient/caregiver in assessment, planning, education and implement of intervention. 3. CM daily patient care huddles/interdisciplinary rounds. 4. PCP/Specialist appointment within 5  7 days made prior to discharge. 5. Facilitate transportation and logistics for follow-up appointments. 6. Medication reconciliation [id number]  7. Formal handoff between hospital provider and post-acute provider to transition patient  Handoff to [facility] Nurse Navigator or PCP practice.

## 2018-01-05 NOTE — PROGRESS NOTES
Hospitalist Progress Note    Patient: Lurdes Emmanuel MRN: [id number]  CSN: [id number]    YOB: 1965  Age: 45 y.o.   Sex: male    DOA: 1/4/2018 LOS:  LOS: 0 days          Seen on rounds this am  He is awake and talking  Admits to continued smoking and drinking despite having laryngeal ca and a trach/peg, advanced COPD, and recurrent admissions for asp opneumonia and COPD exacerbations  Labs reviewed  Remain in ICU  Appropriate orders in place  NPO strict  Peg feedings once stable  Raúl Waterman MD

## 2018-01-05 NOTE — PROGRESS NOTES
INITIAL NUTRITION ASSESSMENT     RECOMMENDATIONS/PLAN:   Recc twocal 5 cans daily to provide: 2370kcal, 99g Protein, 830ml free water, 259g CHO  Recc additional 150ml Free H20 Flushes before and after each can   Monitor labs/lytes, tube feed tolerance, skin integrity, wt, fluid status, BM  Adult Malnutrition Criteria:     Nutrition assessment was completed by RDN and the patient was found to meet the following malnutrition criteria established by ASPEN/AND:    Adult Malnutrition Guidelines:  SEVERE PROTEIN CALORIE MALNUTRITION IN THE CONTEXT OF CHRONIC ILLNESS  Weight Loss:  >5% x 1 month or >7.5% x 3 months or >10% x 6 months or >20% x 12 months  Muscle Mass: Severe Depletion  Clara PETERS Edinson  01/05/18    REASON FOR ASSESSMENT:   [x]  MD Consult:    [x] Management of Tube Feeding   [x] ICU admission  NUTRITION ASSESSMENT:   Client History: 45 yrs old Male admitted with SOB w/ acute tracheitis. Pt is known to nutrition department. PMHx: COPD, HTN, peg tube, chemo, s/p radiation therpay, throat cacner, tobacco abuse, hx etoh abuse  Cultural/Latter-day Food Preferences: None Identified    FOOD/NUTRITION HISTORY  Diet History: Pt has PEG tube and uses Two [place] at home. Per nurse pt is still consuming alcohol via peg tube. Food Allergies:  [x] NKFA    Pertinent PTA Medications: deltasone, MVI      NUTRITION INTAKE   Diet Order:  NPO      Average PO Intake:       No data found. Pertinent Medications:  [x] Reviewed; folic acid, methylprednisolone, protonix,  Thiamine   Electrolyte Replacement Protocol: [x]K  [x]Mg  [x]PO4    Insulin:  [] SSI  [] Pre-meal   []  Basal   [] Drip  [] None  Pt expected to meet estimated nutrient needs through next review:          []  Yes     [x] No; NPO does not meet estimated needs ANTHROPOMETRICS  Height:   5'7      Weight: 74.5 kg (164 lb 3.9 oz)    BMI:   25.7kg/m^2  -  overweight (25.0%-29.9% BMI)        Weight change: pt was 159# on 11/17/17 currently pt is 164#.  Pt was noted to lose 40-50lbs since March 2017 which is an approx -21.90% wt loss which is significant                                  Comparison to Reference Standards:  IBW: 148 lbs      %IBW: 111%      AdjBW: n/a    NUTRITION-FOCUSED PHYSICAL ASSESSMENT  Skin: No pressure injury noted . GI: No BM noted    BIOCHEMICAL DATA & MEDICAL TESTS  Pertinent Labs:  [x] Reviewed;      NUTRITION PRESCRIPTION  Calories: 2185 kcal/day based on miffilin x 1.3 x1.1  Protein: 102-116 g/day based on 1.4-1.6 g/kg  CHO: 273 g/day based on 50% of total energy  Fluid: 2185 ml/day based on 1 kcal/ml      NUTRITION DIAGNOSES:   1. Increased energy needs related to SOB as evidence by approx -21.90% since March 2017    NUTRITION INTERVENTIONS:   INTERVENTIONS:        GOALS:  1. EN:twocal 5 cans daily to provide: 2370kcal, 99g Protein, 830ml free water, 259g CHO   . Meet estimated needs via tube feeds throughout the next 3 days             LEARNING NEEDS (Diet, Supplementation, Food/Nutrient-Drug Interaction):   [x] None Identified  [] Inpatient education provided/documented    [] Identified and patient:  [] Declined     [] Was not appropriate/indicated  NUTRITION MONITORING /EVALUATION:   Adjust EN/PN as appropriate  Monitor wt  Monitor renal labs, electrolytes, fluid status    [] Participated in Interdisciplinary Rounds  [x] [address] [place] [address] Reviewed/Documented  DISCHARGE NUTRITION RECOMMENDATIONS ADDRESSED:      [x] To be determined closer to discharge    NUTRITION RISK:     [x]  At risk                     []  Not currently at risk     Will follow-up per policy.   Pierce Guadarrama RD  PAGER:  [phone]

## 2018-01-05 NOTE — PROGRESS NOTES
[facility]. [facility] bed [facility].    0345-Patient received in ICU bed 4 with Bette Almaraz RN from ED. Oriented to room and placed on ICU monitors. Pt is a&o x4 and deferred admission database at this time. Peg tube site noted to have reddened/irritated area around site. Cleansed with NS and 4x4 gauze applied to site. 0400-Temp slightly elevated at 99.8F (100.4F in ED per report), Remains tachycardic 120-130's. While giving routine meds, patient unable to swallow pills or thin liquids. Notified Dr. Paolo Diallo and orders received for Tylenol PRN for pain/fever and to DC Mucenex and change to liquid Robitussin and to start IV fluids at 75ml/hr *See mar/kardex. 0500-Pt continues to have moist/productive cough and is tachycardic/complains of discomfort. PRN Robitussin and Tylenol given at this time. Suctioned trach with sterile cath and Sputum culture sent to lab.

## 2018-01-06 LAB
ANION GAP SERPL CALC-SCNC: 10 MMOL/L (ref 3–18)
BUN SERPL-MCNC: 12 MG/DL (ref 7–18)
BUN/CREAT SERPL: 14 (ref 12–20)
CALCIUM SERPL-MCNC: 8.9 MG/DL (ref 8.5–10.1)
CHLORIDE SERPL-SCNC: 104 MMOL/L (ref 100–108)
CO2 SERPL-SCNC: 27 MMOL/L (ref 21–32)
CREAT SERPL-MCNC: 0.88 MG/DL (ref 0.6–1.3)
ERYTHROCYTE [DISTWIDTH] IN BLOOD BY AUTOMATED COUNT: 14.6 % (ref 11.6–14.5)
GLUCOSE BLD STRIP.AUTO-MCNC: 153 MG/DL (ref 70–110)
GLUCOSE BLD STRIP.AUTO-MCNC: 164 MG/DL (ref 70–110)
GLUCOSE BLD STRIP.AUTO-MCNC: 187 MG/DL (ref 70–110)
GLUCOSE BLD STRIP.AUTO-MCNC: 200 MG/DL (ref 70–110)
GLUCOSE BLD STRIP.AUTO-MCNC: 99 MG/DL (ref 70–110)
GLUCOSE SERPL-MCNC: 185 MG/DL (ref 74–99)
HCT VFR BLD AUTO: 31.9 % (ref 36–48)
HGB BLD-MCNC: 10.7 G/DL (ref 13–16)
MAGNESIUM SERPL-MCNC: 2.1 MG/DL (ref 1.6–2.6)
MCH RBC QN AUTO: 33.9 PG (ref 24–34)
MCHC RBC AUTO-ENTMCNC: 33.5 G/DL (ref 31–37)
MCV RBC AUTO: 100.9 FL (ref 74–97)
PHOSPHATE SERPL-MCNC: 2.6 MG/DL (ref 2.5–4.9)
PHOSPHATE SERPL-MCNC: 2.7 MG/DL (ref 2.5–4.9)
PLATELET # BLD AUTO: 231 K/UL (ref 135–420)
PMV BLD AUTO: 8.9 FL (ref 9.2–11.8)
POTASSIUM SERPL-SCNC: 3.6 MMOL/L (ref 3.5–5.5)
POTASSIUM SERPL-SCNC: 4.2 MMOL/L (ref 3.5–5.5)
POTASSIUM SERPL-SCNC: 4.5 MMOL/L (ref 3.5–5.5)
RBC # BLD AUTO: 3.16 M/UL (ref 4.7–5.5)
SODIUM SERPL-SCNC: 141 MMOL/L (ref 136–145)
WBC # BLD AUTO: 4.9 K/UL (ref 4.6–13.2)

## 2018-01-06 PROCEDURE — C9113 INJ PANTOPRAZOLE SODIUM, VIA: HCPCS | Performed by: HOSPITALIST

## 2018-01-06 PROCEDURE — 83735 ASSAY OF MAGNESIUM: CPT | Performed by: HOSPITALIST

## 2018-01-06 PROCEDURE — 82962 GLUCOSE BLOOD TEST: CPT

## 2018-01-06 PROCEDURE — 74011250636 HC RX REV CODE- 250/636: Performed by: HOSPITALIST

## 2018-01-06 PROCEDURE — 74011000250 HC RX REV CODE- 250: Performed by: INTERNAL MEDICINE

## 2018-01-06 PROCEDURE — 74011250637 HC RX REV CODE- 250/637: Performed by: HOSPITALIST

## 2018-01-06 PROCEDURE — 85027 COMPLETE CBC AUTOMATED: CPT | Performed by: HOSPITALIST

## 2018-01-06 PROCEDURE — 36415 COLL VENOUS BLD VENIPUNCTURE: CPT | Performed by: HOSPITALIST

## 2018-01-06 PROCEDURE — 84132 ASSAY OF SERUM POTASSIUM: CPT | Performed by: HOSPITALIST

## 2018-01-06 PROCEDURE — 74011250636 HC RX REV CODE- 250/636: Performed by: INTERNAL MEDICINE

## 2018-01-06 PROCEDURE — 74011250636 HC RX REV CODE- 250/636: Performed by: EMERGENCY MEDICINE

## 2018-01-06 PROCEDURE — 84100 ASSAY OF PHOSPHORUS: CPT | Performed by: HOSPITALIST

## 2018-01-06 PROCEDURE — 65610000006 HC RM INTENSIVE CARE

## 2018-01-06 PROCEDURE — 74011636637 HC RX REV CODE- 636/637: Performed by: INTERNAL MEDICINE

## 2018-01-06 PROCEDURE — 74011000258 HC RX REV CODE- 258: Performed by: HOSPITALIST

## 2018-01-06 PROCEDURE — 74011000250 HC RX REV CODE- 250: Performed by: HOSPITALIST

## 2018-01-06 PROCEDURE — 94640 AIRWAY INHALATION TREATMENT: CPT

## 2018-01-06 PROCEDURE — 77010033678 HC OXYGEN DAILY

## 2018-01-06 RX ORDER — VANCOMYCIN HCL IN 5 % DEXTROSE 1.25 G/25
1250 PLASTIC BAG, INJECTION (ML) INTRAVENOUS EVERY 12 HOURS
Status: DISCONTINUED | OUTPATIENT
Start: 2018-01-07 | End: 2018-01-07

## 2018-01-06 RX ORDER — VANCOMYCIN/0.9 % SOD CHLORIDE 1.5G/250ML
1500 PLASTIC BAG, INJECTION (ML) INTRAVENOUS ONCE
Status: COMPLETED | OUTPATIENT
Start: 2018-01-07 | End: 2018-01-07

## 2018-01-06 RX ORDER — SODIUM,POTASSIUM PHOSPHATES 280-250MG
2 POWDER IN PACKET (EA) ORAL
Status: COMPLETED | OUTPATIENT
Start: 2018-01-06 | End: 2018-01-06

## 2018-01-06 RX ORDER — DEXTROSE 50 % IN WATER (D50W) INTRAVENOUS SYRINGE
25-50 AS NEEDED
Status: DISCONTINUED | OUTPATIENT
Start: 2018-01-06 | End: 2018-01-09 | Stop reason: HOSPADM

## 2018-01-06 RX ORDER — MAGNESIUM SULFATE 100 %
4 CRYSTALS MISCELLANEOUS AS NEEDED
Status: DISCONTINUED | OUTPATIENT
Start: 2018-01-06 | End: 2018-01-09 | Stop reason: HOSPADM

## 2018-01-06 RX ORDER — INSULIN LISPRO 100 [IU]/ML
INJECTION, SOLUTION INTRAVENOUS; SUBCUTANEOUS
Status: DISCONTINUED | OUTPATIENT
Start: 2018-01-06 | End: 2018-01-09 | Stop reason: HOSPADM

## 2018-01-06 RX ORDER — POTASSIUM CHLORIDE 20MEQ/15ML
40 LIQUID (ML) ORAL
Status: COMPLETED | OUTPATIENT
Start: 2018-01-06 | End: 2018-01-06

## 2018-01-06 RX ADMIN — PIPERACILLIN SODIUM AND TAZOBACTAM SODIUM 3.38 G: 36; 4.5 INJECTION, POWDER, FOR SOLUTION INTRAVENOUS at 20:16

## 2018-01-06 RX ADMIN — IPRATROPIUM BROMIDE AND ALBUTEROL SULFATE 3 ML: .5; 3 SOLUTION RESPIRATORY (INHALATION) at 19:59

## 2018-01-06 RX ADMIN — POTASSIUM CHLORIDE 40 MEQ: 20 SOLUTION ORAL at 05:42

## 2018-01-06 RX ADMIN — METHYLPREDNISOLONE SODIUM SUCCINATE 30 MG: 40 INJECTION, POWDER, FOR SOLUTION INTRAMUSCULAR; INTRAVENOUS at 16:21

## 2018-01-06 RX ADMIN — Medication 10 ML: at 21:58

## 2018-01-06 RX ADMIN — Medication 10 ML: at 05:43

## 2018-01-06 RX ADMIN — INSULIN LISPRO 2 UNITS: 100 INJECTION, SOLUTION INTRAVENOUS; SUBCUTANEOUS at 21:59

## 2018-01-06 RX ADMIN — IPRATROPIUM BROMIDE AND ALBUTEROL SULFATE 3 ML: .5; 3 SOLUTION RESPIRATORY (INHALATION) at 15:52

## 2018-01-06 RX ADMIN — LISINOPRIL 5 MG: 5 TABLET ORAL at 01:11

## 2018-01-06 RX ADMIN — LORAZEPAM 1 MG: 2 INJECTION INTRAMUSCULAR at 23:46

## 2018-01-06 RX ADMIN — INSULIN LISPRO 4 UNITS: 100 INJECTION, SOLUTION INTRAVENOUS; SUBCUTANEOUS at 16:21

## 2018-01-06 RX ADMIN — PIPERACILLIN SODIUM AND TAZOBACTAM SODIUM 3.38 G: 36; 4.5 INJECTION, POWDER, FOR SOLUTION INTRAVENOUS at 14:01

## 2018-01-06 RX ADMIN — FOLIC ACID 1 MG: 1 TABLET ORAL at 08:58

## 2018-01-06 RX ADMIN — PIPERACILLIN SODIUM AND TAZOBACTAM SODIUM 3.38 G: 36; 4.5 INJECTION, POWDER, FOR SOLUTION INTRAVENOUS at 03:19

## 2018-01-06 RX ADMIN — IPRATROPIUM BROMIDE AND ALBUTEROL SULFATE 3 ML: .5; 3 SOLUTION RESPIRATORY (INHALATION) at 07:18

## 2018-01-06 RX ADMIN — METHYLPREDNISOLONE SODIUM SUCCINATE 40 MG: 125 INJECTION, POWDER, FOR SOLUTION INTRAMUSCULAR; INTRAVENOUS at 03:19

## 2018-01-06 RX ADMIN — OXYCODONE HYDROCHLORIDE 10 MG: 5 TABLET ORAL at 15:17

## 2018-01-06 RX ADMIN — IPRATROPIUM BROMIDE AND ALBUTEROL SULFATE 3 ML: .5; 3 SOLUTION RESPIRATORY (INHALATION) at 11:29

## 2018-01-06 RX ADMIN — Medication 10 ML: at 13:10

## 2018-01-06 RX ADMIN — PIPERACILLIN SODIUM AND TAZOBACTAM SODIUM 3.38 G: 36; 4.5 INJECTION, POWDER, FOR SOLUTION INTRAVENOUS at 09:21

## 2018-01-06 RX ADMIN — LORAZEPAM 2 MG: 1 TABLET ORAL at 18:09

## 2018-01-06 RX ADMIN — VANCOMYCIN HYDROCHLORIDE 1500 MG: 10 INJECTION, POWDER, LYOPHILIZED, FOR SOLUTION INTRAVENOUS at 23:46

## 2018-01-06 RX ADMIN — OXYCODONE HYDROCHLORIDE 10 MG: 5 TABLET ORAL at 21:58

## 2018-01-06 RX ADMIN — POTASSIUM & SODIUM PHOSPHATES POWDER PACK 280-160-250 MG 2 PACKET: 280-160-250 PACK at 05:42

## 2018-01-06 RX ADMIN — LISINOPRIL 5 MG: 5 TABLET ORAL at 20:16

## 2018-01-06 RX ADMIN — Medication 100 MG: at 08:58

## 2018-01-06 RX ADMIN — LORAZEPAM 2 MG: 1 TABLET ORAL at 10:16

## 2018-01-06 RX ADMIN — SODIUM CHLORIDE 40 MG: 9 INJECTION INTRAMUSCULAR; INTRAVENOUS; SUBCUTANEOUS at 09:00

## 2018-01-06 RX ADMIN — SODIUM CHLORIDE 75 ML/HR: 900 INJECTION, SOLUTION INTRAVENOUS at 03:38

## 2018-01-06 RX ADMIN — OXYCODONE HYDROCHLORIDE 10 MG: 5 TABLET ORAL at 03:18

## 2018-01-06 RX ADMIN — Medication 10 ML: at 13:09

## 2018-01-06 RX ADMIN — ENOXAPARIN SODIUM 40 MG: 40 INJECTION SUBCUTANEOUS at 03:18

## 2018-01-06 RX ADMIN — OXYCODONE HYDROCHLORIDE 10 MG: 5 TABLET ORAL at 08:57

## 2018-01-06 NOTE — PROGRESS NOTES
Initial assessment completed. AAOX4. C/o of trach site & to give prn med. Anxious & on ciwa protocol. Trach & using yankuer to suction. Monitor shows low sinus tach. Afebrile. 1000- VS stable. OOB to commode & had moderate soft greenish bm w/ urine. .    1200- Assessment no changed. VS stable. Dr Xenia Mcginnis visited w/ new orders. Monior no changed. Afebrile To start on contact isolation for sputum staph aureus. Afebrile. 1400- Voided  In fair amount. Suction for same secretions. 1600- Assessment no changed. Pain medication given prn w/ relief. Ciwa protocol being observed. Afebrile. Monitor remained low sinus tach. Self suctioning using yankuer . Trach care & peg tube care done. Site around area redden w/  drainage & clean & changed dressing. Afebrile. 1800- Condition no changed during th shift. Waiting for uncapped trach # 6 to be ordered. Ciwa protocol being done. . No furher c/o of trach site pain. - Bedside and Verbal shift change report given to  [address] (oncoming nurse) by  Val Ballesteros RN (offgoing nurse). Report included the following information SBAR, Kardex, ED Summary, Intake/Output, MAR, Accordion and Recent Results.

## 2018-01-06 NOTE — CONSULTS
[id number]  : 1965  ACCOUNT #: [de-identified]   DATE OF SERVICE: 2018    REASON FOR ADMISSION:  Tracheitis. HISTORY OF PRESENT ILLNESS:  The patient is a 64-year-old male who was admitted on 2018. The patient has had a past medical history significant for what appears to be a supraglottic carcinoma, which was [place] with radiation and [place] [facility] Patient is being followed by Dr. Monique Fournier. The patient did have an emergent tracheotomy performed due to significant laryngeal edema from his radiation and chemo on 2017. The patient has been doing well, has been followed up by Dr. Arcelia Walker, does have a pet CT scan scheduled. The patient, however, is noted to have some difficulties with tracheitis as well as some mucus plugging. Apparently was seen in the emergency room. This was adequately cleared; however, the patient placed on antibiotics as well as high humidification. ENT consultation requested for the above. 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 He has been very difficult to decannulate secondary to laryngeal edema. Again, he is being followed by Dr. Arcelia Walker. The patient does have a history of tobacco use. He is still smoking, still drinking alcohol at this point. ENT consultation is requested for evaluation. MEDICATIONS:  Include Tylenol, DuoNeb, Catapres. The patient is presently on CIWA protocol, hence is utilizing Ativan. The patient also on Roxicodone, Protonix. PAST MEDICAL HISTORY:  Includes the above forementioned squamous cell carcinoma. He has also had a history of aspiration pneumonia. ALLERGIES:  DENIED. REVIEW OF SYSTEMS:  Noncontributory.     PHYSICAL EXAMINATION:  Reveals a well-developed, well-nourished 64-year-old male with obvious tremors. Intraoral exam reveals no evidence of any mucopurulent discharge. The intranasal exam reveals a moderate septal deviation to the left side. Neck exam reveals a #6 Shiley trach in place. This does appear to be under reasonable condition at this point. Flexible fiberoptic nasolaryngoscopy was performed. The patient's nasopharynx, oropharynx, hypopharynx, and larynx were all inspected. The patient does have significant amount of laryngeal edema. This is especially noted on the lingual surface of the epiglottis with retrodisplacement of the epiglottis, partially obliterating visualization of the larynx. I cannot really visualize the vocal cords themselves. No rbee tumor was seen; however, the exam is very difficult due to the bulky epiglottis, which does appear to be obstructing the larynx. In any event, this certainly indicates necessitation for continuation of tracheotomy. A tracheoscopy was performed. The patient is having no difficulties with any occlusion whatsoever. There does appear to be a significant tracheitis of the tracheal walls. I see no evidence of any other significant findings. Review of patient's records reveal that he did have a #8 Shiley trach tube, which was placed at the time of the original tracheostomy in 08/2017. I am uncertain why this was downsized to a #6. In any event, no other significant findings are noted. IMPRESSION:  1. Tracheitis. PLAN:  The patient probably needing a change of trach to a #6 or possibly a #8. This may decrease the potential for mucous plugging. I strongly have recommended to patient that he continue with significant humidification. He does appear to be fairly dry. He admits to a very poor p.o. intake. He does have a PEG tube; however, states he is certainly not taking the cans of nutrition necessary nor is he utilizing any supplemental water. This certainly is increasing his difficulty.     I have recommended to the patient that he utilize his recommended 5 cans of TwoCal and also to take at least 5 bottles of water per day through the feeding tube. The patient also advised to discontinue all his alcohol intake, which he still continued to utilize. This discussed at length with the patient who understands and aware. Would recommend the patient follow up with Dr. Laurie Jackson as soon as he is discharged for further eval and recommendations regarding his trach and laryngeal carcinoma. If necessary, we will change trach, if one available. One was not available today at bedside. Please contact me if any difficulties arise. Thank you for the consultation.       Ann Purcell MD       PM / [place]  D: 01/06/2018 07:52     T: 01/06/2018 11:48  JOB #: [id number]

## 2018-01-06 NOTE — PROGRESS NOTES
Hospitalist Progress Note    Patient: Mavis Albert MRN: [id number]  CSN: [id number]    YOB: 1965  Age: 45 y.o. Sex: male    DOA: 1/4/2018 LOS:  LOS: 1 day                Assessment/Plan     Patient Active Problem List   Diagnosis Code    Throat cancer ([facility] C14.0    Status post insertion of percutaneous endoscopic gastrostomy (PEG) tube ([facility] Z93.1    Hyponatremia E87.1    COPD exacerbation ([facility] J44.1    Acute hypoxemic respiratory failure ([address] J96.01    Severe protein-calorie malnutrition Jason Sprinkles: less than 60% of standard weight) ([facility] E43    Tracheostomy dependence ([facility]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 Also COPD contributing. Feeling better, improving slowly   Trach dependent    Acute bronchitis/tracheitis     Possible aspiration pneumonia - start on zosyn.      COPD exacerbation - started on solumedrol, inhaled bronchodilators.      Pulmonary following.     HTN - continue home medications.     Hyponatremia - likely secondary to alcohol use. improved.     Continued alcohol use -  on MVT, folic acid, thiamine. CIWA protocol. Still with tremors. PEG dependent.     DVT prophylaxis    Disposition : 2-3 days    Review of systems  General: No fevers or chills. Cardiovascular: No chest pain or pressure. No palpitations. Pulmonary:see above   Gastrointestinal: No nausea, vomiting. Physical Exam:  General: Awake, cooperative, no acute distress    HEENT: NC, Atraumatic. PERRLA, anicteric sclerae. Trach in place  Lungs: Exp wheezes bilaterally. Heart:  Regular  rhythm,  No murmur, No Rubs, No Gallops  Abdomen: Soft, Non distended, Non tender.  +Bowel sounds, PEG in place  Extremities: No c/c/e  Psych:   Not anxious or agitated. Neurologic:  No acute neurological deficit. Vital signs/Intake and Output:  Visit Vitals    /68    Pulse 100    Temp 97.8 °F (36.6 °C)    Resp 14    Wt 74.5 kg (164 lb 3.9 oz)    SpO2 98%    BMI 24.97 kg/m2     Current Shift:     Last three shifts:  01/04 1901 - 01/06 0700  In: 1299 [I.V.:225]  Out: 2550 [Urine:2550]            Labs: Results:       Chemistry Recent Labs      01/06/18   0406  01/06/18   0010  01/05/18   1910   01/04/18   2340   GLU  185*   --    --    --   102*   NA  141   --    --    --   133*   K  3.6  4.5  3.9   < >  3.8   CL  104   --    --    --   91*   CO2  27   --    --    --   30   BUN  12   --    --    --   5*   CREA  0.88   --    --    --   0.62   CA  8.9   --    --    --   8.9   AGAP  10   --    --    --   12   BUCR  14   --    --    --   8*   AP   --    --    --    --   114   TP   --    --    --    --   6.9   ALB   --    --    --    --   3.3*   GLOB   --    --    --    --   3.6   AGRAT   --    --    --    --   0.9    < > = values in this interval not displayed. CBC w/Diff Recent Labs      01/06/18   0406  01/05/18   1015  01/04/18   2340   WBC  4.9  4.0*  5.7   RBC  3.16*  3.28*  3.27*   HGB  10.7*  11.1*  11.2*   HCT  31.9*  32.2*  31.7*   PLT  231  257  290   GRANS   --   93*  78*   LYMPH   --   6*  7*   EOS   --   0  4      Cardiac Enzymes Recent Labs      01/04/18   2340   CPK  117   CKND1  2.1      Coagulation No results for input(s): PTP, INR, APTT in the last 72 hours. No lab exists for component: INREXT    Lipid Panel No results found for: CHOL, CHOLPOCT, CHOLX, CHLST, CHOLV, 524331, HDL, LDL, LDLC, DLDLP, 118988, VLDLC, VLDL, TGLX, TRIGL, TRIGP, TGLPOCT, CHHD, CHHDX   BNP No results for input(s): BNPP in the last 72 hours.    Liver Enzymes Recent Labs      01/04/18   2340   TP  6.9   ALB  3.3*   AP  114   SGOT  20      Thyroid Studies Lab Results   Component Value Date/Time    TSH 0.32 01/05/2018 10:15 AM        Procedures/imaging: see electronic medical records for all procedures/Xrays and details which were not copied into this note but were reviewed prior to creation of Plan

## 2018-01-06 NOTE — PROGRESS NOTES
@6160 pt taken over awake alert oriented x4 watching TV, denies pain at present. Remains on room air trach present. Pt is orally suctioning himself when needed. Strong cough thick fluid observed from trach. Vital signs stable but HR fluctuates . Assessment done and charted in appropriate flow sheets. Nursing management continues. @2936 Nursing supervisor brought tube feed as requested and tube feed was administered bolus as per orders pre-ceeded and proceeded by 150 mls water. Pt tolerated well and remains in high fowlers position at this time watching TV . Pt was reminded that he is NPO he verbalized that he wets mouth with water or ice and then he suctions it , he does not swallow it. Nursing observation continues. @3556 pt verbalized that his throat is in pain analgesia administered as prescribed care continues. @2255 pt CIWA score elevated , medication administered per protocol . Pt verbalized that he is no longer in pain . Nursing management continues. @0100 pt asleep in no obvious distress nursing management continues. @3624 pt indicated that his throat hurts analgesia administered, Tube feeds administered and was well tolerated , care continues. @0500 pt resting comfortably analgesia effective observation continues. @0750 Bedside and Verbal shift change report given to 6 [facility] (oncoming nurse) by Angie Corrales (offgoing nurse). Report included the following information SBAR, Kardex, Intake/Output, MAR, Recent Results and Med Rec Status.    Alarm parameters reviewed, on and audible Appropriate for patient clinical condition

## 2018-01-06 NOTE — CONSULTS
[facility]  Pulmonary, Critical Care, and Sleep Medicine    Name: Ifeanyi Cristobal MRN: [id number]   : 1965 Hospital: [facility] [place]   Date: 2018  Room: 104/     Subjective:     Patient with chronic trach tube due to laryngeal cancer. Patient ENT physician is Dr. Lorenzo Zhong. Patient was diagnosed with throat cancer about 1 year ago, treated with surgery, radiation, chemotherapy at [facility]. The patient has hx of emergent tracheotomy performed due to significant laryngeal edema from his radiation and chemo in Aug 2017. Patient has chronic trach tube-currently 6cfs changed 2 mths ago per patient. He has chronic peg tube, but says was cleared for pudding about 2 weeks ago. Patient actively smokes orally, alcoholic. Patient called EMS with c/o shortness of breath and cough symptoms. His o2 sats was low which improved after suctioning of thick secretions from his trach tube by EMS.     18  Patient awake, alert  Has PMV, able to talk  Cough +  No chest pains or hemoptysis.      SH-active smoker orally; history of ETOH use, reportedly drank 2 beers yesterday    No Known Allergies   Social History   Substance Use Topics    Smoking status: Former Smoker     Packs/day: 0.50    Smokeless tobacco: Never Used    Alcohol use No        Current Facility-Administered Medications   Medication Dose Route Frequency    cloNIDine (CATAPRES) 0.2 mg/24 hr patch 1 Patch  1 Patch TransDERmal Q7D    lisinopril (PRINIVIL, ZESTRIL) tablet 5 mg  5 mg Oral Q12H    sodium chloride (NS) flush 5-10 mL  5-10 mL IntraVENous Q8H    enoxaparin (LOVENOX) injection 40 mg  40 mg SubCUTAneous Q24H    methylPREDNISolone (PF) (SOLU-MEDROL) injection 40 mg  40 mg IntraVENous Q12H    piperacillin-tazobactam (ZOSYN) 3.375 g in 0.9% sodium chloride 100 mL IVPB  3.375 g IntraVENous Q6H    Thiamine Mononitrate (B-1) tablet 100 mg  100 mg Oral DAILY    folic acid (FOLVITE) tablet 1 mg  1 mg Oral DAILY    sodium chloride (NS) flush 5-10 mL  5-10 mL IntraVENous Q8H    0.9% sodium chloride infusion  75 mL/hr IntraVENous CONTINUOUS    pantoprazole (PROTONIX) 40 mg in sodium chloride 0.9 % 10 mL injection  40 mg IntraVENous DAILY    albuterol-ipratropium (DUO-NEB) 2.5 MG-0.5 MG/3 ML  3 mL Nebulization QID RT       Review of Systems:  HEENT: No epistaxis, denies difficulty in swallowing  Respiratory: as above  Cardiovascular: no chest pain, no palpitations  Gastrointestinal: no abd pain, no vomiting, no diarrhea  Genitourinary: No urinary symptoms  Integument/breast: No ulcers  Musculoskeletal: chronic pains  Neuro no depressionlogical: No focal weakness, no seizures, no headaches  Constitutional: + fever, no chills, + weight loss, no night sweats     Objective:   Vital Signs:    Visit Vitals    /70    Pulse (!) 103    Temp 98 °F (36.7 °C)    Resp 16    Wt 74.5 kg (164 lb 3.9 oz)    SpO2 98%    BMI 24.97 kg/m2       O2 Device: Room air   O2 Flow Rate (L/min): 14 l/min   Temp (24hrs), Av °F (36.7 °C), Min:97.8 °F (36.6 °C), Max:98.4 °F (36.9 °C)       Intake/Output:   Last shift:      701 - 1900  In: 660   Out: 775 [Urine:775]  Last 3 shifts: 1901 -  07  In: 1299 [I.V.:225]  Out: 2850 [Urine:2850]    Intake/Output Summary (Last 24 hours) at 18 1154  Last data filed at 18 0800   Gross per 24 hour   Intake             1734 ml   Output             1675 ml   Net               59 ml      Physical Exam:   General: comfortable; on room air  Neck: No adenopathy or thyroid swelling; trach midline-no bleeding or discharge  CVS: S1S2 no murmurs  RS: Mod AE bilaterally, no wheezing, no crackles  Abd: soft, non tender, no hepatosplenomegaly, no distension or guarding or rigidity, bowel sounds heard  Neuro: non focal, awake, alert  Extrm: no leg edema or clubbing or cyanosis  Skin: no rash  Lines/Tubes:  PIVs  Trach tube  Peg tube    Data review:     Recent Results (from the past 12 hour(s))   POTASSIUM    Collection Time: 01/06/18 12:10 AM   Result Value Ref Range    Potassium 4.5 3.5 - 5.5 mmol/L   GLUCOSE, POC    Collection Time: 01/06/18 12:13 AM   Result Value Ref Range    Glucose (POC) 99 70 - 028 mg/dL   METABOLIC PANEL, BASIC    Collection Time: 01/06/18  4:06 AM   Result Value Ref Range    Sodium 141 136 - 145 mmol/L    Potassium 3.6 3.5 - 5.5 mmol/L    Chloride 104 100 - 108 mmol/L    CO2 27 21 - 32 mmol/L    Anion gap 10 3.0 - 18 mmol/L    Glucose 185 (H) 74 - 99 mg/dL    BUN 12 7.0 - 18 MG/DL    Creatinine 0.88 0.6 - 1.3 MG/DL    BUN/Creatinine ratio 14 12 - 20      GFR est AA >60 >60 ml/min/1.73m2    GFR est non-AA >60 >60 ml/min/1.73m2    Calcium 8.9 8.5 - 10.1 MG/DL   CBC W/O DIFF    Collection Time: 01/06/18  4:06 AM   Result Value Ref Range    WBC 4.9 4.6 - 13.2 K/uL    RBC 3.16 (L) 4.70 - 5.50 M/uL    HGB 10.7 (L) 13.0 - 16.0 g/dL    HCT 31.9 (L) 36.0 - 48.0 %    .9 (H) 74.0 - 97.0 FL    MCH 33.9 24.0 - 34.0 PG    MCHC 33.5 31.0 - 37.0 g/dL    RDW 14.6 (H) 11.6 - 14.5 %    PLATELET 946 499 - 680 K/uL    MPV 8.9 (L) 9.2 - 11.8 FL   PHOSPHORUS    Collection Time: 01/06/18  4:06 AM   Result Value Ref Range    Phosphorus 2.6 2.5 - 4.9 MG/DL   MAGNESIUM    Collection Time: 01/06/18  4:06 AM   Result Value Ref Range    Magnesium 2.1 1.6 - 2.6 mg/dL   GLUCOSE, POC    Collection Time: 01/06/18  6:15 AM   Result Value Ref Range    Glucose (POC) 164 (H) 70 - 110 mg/dL   PHOSPHORUS    Collection Time: 01/06/18 10:00 AM   Result Value Ref Range    Phosphorus 2.7 2.5 - 4.9 MG/DL   POTASSIUM    Collection Time: 01/06/18 10:00 AM   Result Value Ref Range    Potassium 4.2 3.5 - 5.5 mmol/L        Ref.  Range 1/4/2018 23:40   ALCOHOL(ETHYL),SERUM Latest Ref Range: 0 - 3 MG/ (H)       Imaging:  I have personally reviewed the patients radiographs and have reviewed the reports:  CXR 1/5-trach tube; no focal infiltrates or consolidations     IMPRESSION:   · Acute resp failure - hypoxemic - due to mucus plugging  · Acute bronchitis/tracheitis   · Chronic trach tube  · Laryngeal cancer hx-s/p surgery, chemo-radiation  · Wheezing-COPD  · Chronic pain throat  · Active smoker  · Alcoholism   · Chronic peg tube      RECOMMENDATIONS:   · Pulm: Duonebs qid; iv steroids-decreased solumedrol iv 30 mg q12; Pmv daytime; Trach care and suctioning as needed  · Cardiac: stable; BP meds  · ID: currently on clindamicin and zosyn; follow resp cxs-staph aureus; empiric contact isolation-continue current antibiotics-afebrile, no leucocytosis  · Renal: watch IOs; replace lytes as needed  · GI: SLP eval for swallowing; nutrition consult for bolus peg tube feeding; PPI proph  · Neuro: stable; no active issues  · Endo: poc glucose; SSI  · Fluids: oral/enteral hydration  · Proph:  DVt and GI proph  · Smoking and alcohol cessation discussed  · CIWA protocol; thiamine, folic acid  · D/w patient   · ENT on case; change trach tube when available     Mod complexity decision making was performed in this consultation and evaluation of this patient        Lorna Flores MD

## 2018-01-07 ENCOUNTER — APPOINTMENT (OUTPATIENT)
Dept: GENERAL RADIOLOGY | Age: 53
DRG: 177 | End: 2018-01-07
Attending: INTERNAL MEDICINE
Payer: SELF-PAY

## 2018-01-07 LAB
ANION GAP SERPL CALC-SCNC: 9 MMOL/L (ref 3–18)
ATRIAL RATE: 121 BPM
BACTERIA SPEC CULT: ABNORMAL
BACTERIA SPEC CULT: ABNORMAL
BUN SERPL-MCNC: 17 MG/DL (ref 7–18)
BUN/CREAT SERPL: 20 (ref 12–20)
CALCIUM SERPL-MCNC: 8.7 MG/DL (ref 8.5–10.1)
CALCULATED P AXIS, ECG09: 48 DEGREES
CALCULATED R AXIS, ECG10: 26 DEGREES
CALCULATED T AXIS, ECG11: 46 DEGREES
CHLORIDE SERPL-SCNC: 102 MMOL/L (ref 100–108)
CO2 SERPL-SCNC: 26 MMOL/L (ref 21–32)
CREAT SERPL-MCNC: 0.83 MG/DL (ref 0.6–1.3)
DIAGNOSIS, 93000: NORMAL
ERYTHROCYTE [DISTWIDTH] IN BLOOD BY AUTOMATED COUNT: 14.8 % (ref 11.6–14.5)
GLUCOSE BLD STRIP.AUTO-MCNC: 101 MG/DL (ref 70–110)
GLUCOSE BLD STRIP.AUTO-MCNC: 129 MG/DL (ref 70–110)
GLUCOSE BLD STRIP.AUTO-MCNC: 183 MG/DL (ref 70–110)
GLUCOSE BLD STRIP.AUTO-MCNC: 233 MG/DL (ref 70–110)
GLUCOSE SERPL-MCNC: 182 MG/DL (ref 74–99)
GRAM STN SPEC: ABNORMAL
HCT VFR BLD AUTO: 29 % (ref 36–48)
HGB BLD-MCNC: 9.8 G/DL (ref 13–16)
MAGNESIUM SERPL-MCNC: 1.7 MG/DL (ref 1.6–2.6)
MAGNESIUM SERPL-MCNC: 2.1 MG/DL (ref 1.6–2.6)
MCH RBC QN AUTO: 34.1 PG (ref 24–34)
MCHC RBC AUTO-ENTMCNC: 33.8 G/DL (ref 31–37)
MCV RBC AUTO: 101 FL (ref 74–97)
P-R INTERVAL, ECG05: 136 MS
PHOSPHATE SERPL-MCNC: 2.2 MG/DL (ref 2.5–4.9)
PHOSPHATE SERPL-MCNC: 2.8 MG/DL (ref 2.5–4.9)
PLATELET # BLD AUTO: 196 K/UL (ref 135–420)
PMV BLD AUTO: 9 FL (ref 9.2–11.8)
POTASSIUM SERPL-SCNC: 4.1 MMOL/L (ref 3.5–5.5)
Q-T INTERVAL, ECG07: 316 MS
QRS DURATION, ECG06: 78 MS
QTC CALCULATION (BEZET), ECG08: 448 MS
RBC # BLD AUTO: 2.87 M/UL (ref 4.7–5.5)
SERVICE CMNT-IMP: ABNORMAL
SODIUM SERPL-SCNC: 137 MMOL/L (ref 136–145)
VENTRICULAR RATE, ECG03: 121 BPM
WBC # BLD AUTO: 6.1 K/UL (ref 4.6–13.2)

## 2018-01-07 PROCEDURE — 65610000006 HC RM INTENSIVE CARE

## 2018-01-07 PROCEDURE — 77010033678 HC OXYGEN DAILY

## 2018-01-07 PROCEDURE — 83735 ASSAY OF MAGNESIUM: CPT | Performed by: HOSPITALIST

## 2018-01-07 PROCEDURE — 83735 ASSAY OF MAGNESIUM: CPT | Performed by: INTERNAL MEDICINE

## 2018-01-07 PROCEDURE — 82962 GLUCOSE BLOOD TEST: CPT

## 2018-01-07 PROCEDURE — 71045 X-RAY EXAM CHEST 1 VIEW: CPT

## 2018-01-07 PROCEDURE — 74011250637 HC RX REV CODE- 250/637: Performed by: HOSPITALIST

## 2018-01-07 PROCEDURE — 84100 ASSAY OF PHOSPHORUS: CPT | Performed by: INTERNAL MEDICINE

## 2018-01-07 PROCEDURE — 85027 COMPLETE CBC AUTOMATED: CPT | Performed by: HOSPITALIST

## 2018-01-07 PROCEDURE — 74011000258 HC RX REV CODE- 258: Performed by: HOSPITALIST

## 2018-01-07 PROCEDURE — 77030018836 HC SOL IRR NACL ICUM -A

## 2018-01-07 PROCEDURE — 84100 ASSAY OF PHOSPHORUS: CPT | Performed by: HOSPITALIST

## 2018-01-07 PROCEDURE — 74011250636 HC RX REV CODE- 250/636: Performed by: HOSPITALIST

## 2018-01-07 PROCEDURE — A4217 STERILE WATER/SALINE, 500 ML: HCPCS

## 2018-01-07 PROCEDURE — 80048 BASIC METABOLIC PNL TOTAL CA: CPT | Performed by: HOSPITALIST

## 2018-01-07 PROCEDURE — 74011250636 HC RX REV CODE- 250/636: Performed by: FAMILY MEDICINE

## 2018-01-07 PROCEDURE — 36415 COLL VENOUS BLD VENIPUNCTURE: CPT | Performed by: HOSPITALIST

## 2018-01-07 PROCEDURE — 94640 AIRWAY INHALATION TREATMENT: CPT

## 2018-01-07 PROCEDURE — 74011250636 HC RX REV CODE- 250/636: Performed by: INTERNAL MEDICINE

## 2018-01-07 PROCEDURE — 74011636637 HC RX REV CODE- 636/637: Performed by: INTERNAL MEDICINE

## 2018-01-07 PROCEDURE — 74011000250 HC RX REV CODE- 250: Performed by: INTERNAL MEDICINE

## 2018-01-07 PROCEDURE — 74011250637 HC RX REV CODE- 250/637: Performed by: FAMILY MEDICINE

## 2018-01-07 RX ORDER — SODIUM,POTASSIUM PHOSPHATES 280-250MG
2 POWDER IN PACKET (EA) ORAL
Status: COMPLETED | OUTPATIENT
Start: 2018-01-07 | End: 2018-01-07

## 2018-01-07 RX ORDER — BUDESONIDE 0.5 MG/2ML
500 INHALANT ORAL
Status: DISCONTINUED | OUTPATIENT
Start: 2018-01-07 | End: 2018-01-09 | Stop reason: HOSPADM

## 2018-01-07 RX ORDER — FAMOTIDINE 20 MG/1
20 TABLET, FILM COATED ORAL 2 TIMES DAILY
Status: DISCONTINUED | OUTPATIENT
Start: 2018-01-07 | End: 2018-01-09 | Stop reason: HOSPADM

## 2018-01-07 RX ORDER — LORAZEPAM 2 MG/ML
1 INJECTION INTRAMUSCULAR
Status: DISCONTINUED | OUTPATIENT
Start: 2018-01-07 | End: 2018-01-09 | Stop reason: HOSPADM

## 2018-01-07 RX ORDER — MAGNESIUM SULFATE 1 G/100ML
1 INJECTION INTRAVENOUS
Status: COMPLETED | OUTPATIENT
Start: 2018-01-07 | End: 2018-01-07

## 2018-01-07 RX ORDER — LEVOFLOXACIN 5 MG/ML
750 INJECTION, SOLUTION INTRAVENOUS EVERY 24 HOURS
Status: DISCONTINUED | OUTPATIENT
Start: 2018-01-07 | End: 2018-01-09 | Stop reason: HOSPADM

## 2018-01-07 RX ORDER — OXYCODONE AND ACETAMINOPHEN 5; 325 MG/1; MG/1
1 TABLET ORAL
Status: DISCONTINUED | OUTPATIENT
Start: 2018-01-07 | End: 2018-01-09 | Stop reason: HOSPADM

## 2018-01-07 RX ADMIN — FOLIC ACID 1 MG: 1 TABLET ORAL at 09:36

## 2018-01-07 RX ADMIN — LORAZEPAM 1 MG: 2 INJECTION INTRAMUSCULAR at 18:00

## 2018-01-07 RX ADMIN — IPRATROPIUM BROMIDE AND ALBUTEROL SULFATE 3 ML: .5; 3 SOLUTION RESPIRATORY (INHALATION) at 11:26

## 2018-01-07 RX ADMIN — FAMOTIDINE 20 MG: 20 TABLET ORAL at 09:36

## 2018-01-07 RX ADMIN — METHYLPREDNISOLONE SODIUM SUCCINATE 30 MG: 40 INJECTION, POWDER, FOR SOLUTION INTRAMUSCULAR; INTRAVENOUS at 03:17

## 2018-01-07 RX ADMIN — METHYLPREDNISOLONE SODIUM SUCCINATE 30 MG: 40 INJECTION, POWDER, FOR SOLUTION INTRAMUSCULAR; INTRAVENOUS at 16:53

## 2018-01-07 RX ADMIN — POTASSIUM & SODIUM PHOSPHATES POWDER PACK 280-160-250 MG 2 PACKET: 280-160-250 PACK at 11:36

## 2018-01-07 RX ADMIN — ENOXAPARIN SODIUM 40 MG: 40 INJECTION SUBCUTANEOUS at 03:17

## 2018-01-07 RX ADMIN — OXYCODONE HYDROCHLORIDE 10 MG: 5 TABLET ORAL at 09:48

## 2018-01-07 RX ADMIN — BUDESONIDE 500 MCG: 0.5 INHALANT RESPIRATORY (INHALATION) at 20:32

## 2018-01-07 RX ADMIN — PIPERACILLIN SODIUM AND TAZOBACTAM SODIUM 3.38 G: 36; 4.5 INJECTION, POWDER, FOR SOLUTION INTRAVENOUS at 03:17

## 2018-01-07 RX ADMIN — LEVOFLOXACIN 750 MG: 5 INJECTION, SOLUTION INTRAVENOUS at 13:49

## 2018-01-07 RX ADMIN — OXYCODONE HYDROCHLORIDE 10 MG: 5 TABLET ORAL at 16:57

## 2018-01-07 RX ADMIN — INSULIN LISPRO 4 UNITS: 100 INJECTION, SOLUTION INTRAVENOUS; SUBCUTANEOUS at 11:47

## 2018-01-07 RX ADMIN — IPRATROPIUM BROMIDE AND ALBUTEROL SULFATE 3 ML: .5; 3 SOLUTION RESPIRATORY (INHALATION) at 07:46

## 2018-01-07 RX ADMIN — FAMOTIDINE 20 MG: 20 TABLET ORAL at 20:44

## 2018-01-07 RX ADMIN — Medication 10 ML: at 06:09

## 2018-01-07 RX ADMIN — VANCOMYCIN HYDROCHLORIDE 1250 MG: 10 INJECTION, POWDER, LYOPHILIZED, FOR SOLUTION INTRAVENOUS at 11:34

## 2018-01-07 RX ADMIN — OXYCODONE HYDROCHLORIDE AND ACETAMINOPHEN 1 TABLET: 5; 325 TABLET ORAL at 20:44

## 2018-01-07 RX ADMIN — INSULIN LISPRO 2 UNITS: 100 INJECTION, SOLUTION INTRAVENOUS; SUBCUTANEOUS at 06:38

## 2018-01-07 RX ADMIN — Medication 100 MG: at 09:35

## 2018-01-07 RX ADMIN — LISINOPRIL 5 MG: 5 TABLET ORAL at 20:44

## 2018-01-07 RX ADMIN — PIPERACILLIN SODIUM AND TAZOBACTAM SODIUM 3.38 G: 36; 4.5 INJECTION, POWDER, FOR SOLUTION INTRAVENOUS at 08:26

## 2018-01-07 RX ADMIN — OXYCODONE HYDROCHLORIDE 10 MG: 5 TABLET ORAL at 23:12

## 2018-01-07 RX ADMIN — IPRATROPIUM BROMIDE AND ALBUTEROL SULFATE 3 ML: .5; 3 SOLUTION RESPIRATORY (INHALATION) at 15:40

## 2018-01-07 RX ADMIN — POTASSIUM & SODIUM PHOSPHATES POWDER PACK 280-160-250 MG 2 PACKET: 280-160-250 PACK at 07:56

## 2018-01-07 RX ADMIN — OXYCODONE HYDROCHLORIDE 10 MG: 5 TABLET ORAL at 03:53

## 2018-01-07 RX ADMIN — IPRATROPIUM BROMIDE AND ALBUTEROL SULFATE 3 ML: .5; 3 SOLUTION RESPIRATORY (INHALATION) at 20:32

## 2018-01-07 RX ADMIN — Medication 10 ML: at 14:00

## 2018-01-07 RX ADMIN — POTASSIUM & SODIUM PHOSPHATES POWDER PACK 280-160-250 MG 2 PACKET: 280-160-250 PACK at 09:37

## 2018-01-07 RX ADMIN — Medication 10 ML: at 23:12

## 2018-01-07 RX ADMIN — LISINOPRIL 5 MG: 5 TABLET ORAL at 09:35

## 2018-01-07 RX ADMIN — MAGNESIUM SULFATE HEPTAHYDRATE 1 G: 1 INJECTION, SOLUTION INTRAVENOUS at 07:56

## 2018-01-07 RX ADMIN — LORAZEPAM 1 MG: 1 TABLET ORAL at 11:55

## 2018-01-07 NOTE — PROGRESS NOTES
PT evaluation attempted. Patient declined PT evaluation at this time due to pain. Agreeable to therapy tomorrow. Will follow up as patient's schedule permits.     Ashley Bey PT

## 2018-01-07 NOTE — PROGRESS NOTES
Hospitalist Progress Note    Patient: Solange Conte MRN: [id number]  CSN: [id number]    YOB: 1965  Age: 45 y.o. Sex: male    DOA: 1/4/2018 LOS:  LOS: 2 days          Chief Complaint:     Ac resp failure      Assessment/Plan     Acute on chronic resp failure-weaned to NC 02, nebs routine, IV steroids weaning  COPD exacerbation-as above, continue treatment, slowly improving  Tracheobronchitis-staph on cx-continue vanco, await sensitivites, continue zosyn also  Laryngeal cancer with trach and peg-seen by ENT with recs for now-continue mgmt  Chronic dysphagia-all feeds thru peg-2 alphonse HN 5 cans a day with water-he was NOT using water at home-he states he just does not know why  etoh abuse-monitor for etoh w/d, CIWA in place  Continued tobacco abuse-counselling recurrent as on prior admissions  Hyperglycemia with steroids-SSI PRN    PT consult  Should be able to tx from ICU soon  DVT proph-lovenox    Disposition :3-4 days-home with [facility]  Patient Active Problem List   Diagnosis Code    Throat cancer ([facility] C14.0    Status post insertion of percutaneous endoscopic gastrostomy (PEG) tube ([facility] Z93.1    Hyponatremia E87.1    COPD exacerbation ([address] J44.1    Acute hypoxemic respiratory failure ([address] J96.01    Severe protein-calorie malnutrition (Galina Bookbinder: less than 60% of standard weight) ([address] E43    Tracheostomy dependence ([facility] Z93.0    Aspiration pneumonia ([facility] J69.0    Acute tracheitis J04.10       Subjective:    Denies new issue  Still with mucus from trach but less  No CP or worsened SOB overnight    Review of systems:    Constitutional: denies fevers, chills, myalgias    Cardiovascular: denies chest pain, palpitations  Gastrointestinal: denies nausea, vomiting, diarrhea      Vital signs/Intake and Output:  Visit Vitals    /74    Pulse 94    Temp 98.1 °F (36.7 °C)    Resp 29    Wt 74.5 kg (164 lb 3.9 oz)    SpO2 100%    BMI 24.97 kg/m2     Current Shift:  01/06 1901 - 01/07 0700  In: 537   Out: -   Last three shifts:  01/05 0701 - 01/06 1900  In: 2334   Out: 2475 [Urine:2475]    Exam:    General:  alert, NAD, OX3  Head/Neck: NCAT, supple, No masses, No lymphadenopathy, trach and paci in place  CVS:Regular rate and rhythm, no M/R/G, S1/S2 heard, no thrill  Lungs:Coarse but no wheezes, few rhonchi  Abdomen: Soft, Nontender, peg site clear, No distention, Normal Bowel sounds, No hepatomegaly  Extremities: No C/C/E, pulses palpable 2+  Skin:normal texture and turgor, no rashes, no lesions  Neuro:grossly normal , follows commands  Psych:appropriate                Labs: Results:       Chemistry Recent Labs      01/06/18   1000  01/06/18   0406  01/06/18   0010   01/04/18   2340   GLU   --   185*   --    --   102*   NA   --   141   --    --   133*   K  4.2  3.6  4.5   < >  3.8   CL   --   104   --    --   91*   CO2   --   27   --    --   30   BUN   --   12   --    --   5*   CREA   --   0.88   --    --   0.62   CA   --   8.9   --    --   8.9   AGAP   --   10   --    --   12   BUCR   --   14   --    --   8*   AP   --    --    --    --   114   TP   --    --    --    --   6.9   ALB   --    --    --    --   3.3*   GLOB   --    --    --    --   3.6   AGRAT   --    --    --    --   0.9    < > = values in this interval not displayed. CBC w/Diff Recent Labs      01/07/18   0623  01/06/18   0406  01/05/18   1015  01/04/18   2340   WBC  6.1  4.9  4.0*  5.7   RBC  2.87*  3.16*  3.28*  3.27*   HGB  9.8*  10.7*  11.1*  11.2*   HCT  29.0*  31.9*  32.2*  31.7*   PLT  196  231  257  290   GRANS   --    --   93*  78*   LYMPH   --    --   6*  7*   EOS   --    --   0  4      Cardiac Enzymes Recent Labs      01/04/18   2340   CPK  117   CKND1  2.1      Coagulation No results for input(s): PTP, INR, APTT in the last 72 hours.     No lab exists for component: INREXT    Lipid Panel No results found for: CHOL, CHOLPOCT, CHOLX, CHLST, CHOLV, 570297, HDL, LDL, LDLC, DLDLP, 302870, VLDLC, VLDL, TGLX, TRIGL, TRIGP, TGLPOCT, CHHD, CHHDX   BNP No results for input(s): BNPP in the last 72 hours.    Liver Enzymes Recent Labs      01/04/18   2340   TP  6.9   ALB  3.3*   AP  114   SGOT  20      Thyroid Studies Lab Results   Component Value Date/Time    TSH 0.32 01/05/2018 10:15 AM        Procedures/imaging: see electronic medical records for all procedures/Xrays and details which were not copied into this note but were reviewed prior to creation of Sky Little MD

## 2018-01-07 NOTE — PROGRESS NOTES
Problem: Falls - Risk of  Goal: *Absence of Falls  Document Caryl Fall Risk and appropriate interventions in the flowsheet.    Outcome: Progressing Towards Goal  Fall Risk Interventions:  Mobility Interventions: Bed/chair exit alarm, Assess mobility with egress test, Patient to call before getting OOB, Strengthening exercises (ROM-active/passive)         Medication Interventions: Patient to call before getting OOB    Elimination Interventions: Call light in reach, Patient to call for help with toileting needs, Toileting schedule/hourly rounds

## 2018-01-07 NOTE — PROGRESS NOTES
[facility]  Pulmonary, Critical Care, and Sleep Medicine    Name: Roxy Ortiz MRN: [id number]   : 1965 Hospital: [facility] [place]   Date: 2018  Room: 104/01     Subjective:     Patient with chronic trach tube due to laryngeal cancer. Patient ENT physician is Dr. Florencio Eubanks. Patient was diagnosed with throat cancer about 1 year ago, treated with surgery, radiation, chemotherapy [facility] [place] [facility]. The patient has hx of emergent tracheotomy performed due to significant laryngeal edema from his radiation and chemo in Aug 2017. Patient has chronic trach tube-currently 6cfs changed 2 mths ago per patient. He has chronic peg tube, but says was cleared for pudding about 2 weeks ago. Patient actively smokes orally, alcoholic. Patient called EMS with c/o shortness of breath and cough symptoms. His o2 sats was low which improved after suctioning of thick secretions from his trach tube by EMS.     18  Patient awake, alert  Has PMV, able to talk  Cough + mostly non-productive  No chest pains or hemoptysis.      SH-active smoker orally; history of ETOH use, reportedly drank 2 beers yesterday    No Known Allergies   Social History   Substance Use Topics    Smoking status: Former Smoker     Packs/day: 0.50    Smokeless tobacco: Never Used    Alcohol use No        Current Facility-Administered Medications   Medication Dose Route Frequency    famotidine (PEPCID) tablet 20 mg  20 mg Per G Tube BID    potassium, sodium phosphates (NEUTRA-PHOS) packet 2 Packet  2 Packet Per G Tube Q2H    methylPREDNISolone (PF) (SOLU-MEDROL) injection 30 mg  30 mg IntraVENous Q12H    insulin lispro (HUMALOG) injection   SubCUTAneous AC&HS    vancomycin (VANCOCIN) 1250 mg in D5W 250 mL infusion  1,250 mg IntraVENous Q12H    Vancomycin pharmacy to dose  1 Each Other Rx Dosing/Monitoring    cloNIDine (CATAPRES) 0.2 mg/24 hr patch 1 Patch  1 Patch TransDERmal Q7D    lisinopril (Deborah Grebe) tablet 5 mg  5 mg Oral Q12H    sodium chloride (NS) flush 5-10 mL  5-10 mL IntraVENous Q8H    enoxaparin (LOVENOX) injection 40 mg  40 mg SubCUTAneous Q24H    piperacillin-tazobactam (ZOSYN) 3.375 g in 0.9% sodium chloride 100 mL IVPB  3.375 g IntraVENous Q6H    Thiamine Mononitrate (B-1) tablet 100 mg  100 mg Oral DAILY    folic acid (FOLVITE) tablet 1 mg  1 mg Oral DAILY    sodium chloride (NS) flush 5-10 mL  5-10 mL IntraVENous Q8H    albuterol-ipratropium (DUO-NEB) 2.5 MG-0.5 MG/3 ML  3 mL Nebulization QID RT       Review of Systems:  HEENT: No epistaxis, denies difficulty in swallowing  Respiratory: as above  Cardiovascular: no chest pain, no palpitations  Gastrointestinal: no abd pain, no vomiting, no diarrhea  Genitourinary: No urinary symptoms  Integument/breast: No ulcers  Musculoskeletal: chronic pains  Neuro no depressionlogical: No focal weakness, no seizures, no headaches  Constitutional: no fever, no chills, + weight loss, no night sweats     Objective:   Vital Signs:    Visit Vitals    /77    Pulse (!) 105    Temp 97.8 °F (36.6 °C)    Resp 16    Wt 74.5 kg (164 lb 3.9 oz)    SpO2 98%    BMI 24.97 kg/m2       O2 Device: Room air (trach)   O2 Flow Rate (L/min): 14 l/min   Temp (24hrs), Av.1 °F (36.7 °C), Min:97.8 °F (36.6 °C), Max:98.3 °F (36.8 °C)       Intake/Output:   Last shift:      701 - 1900  In: 400 [I.V.:100]  Out: 425 [Urine:425]  Last 3 shifts: 1901 -  07  In: 3408   Out: 2475 [Urine:2475]    Intake/Output Summary (Last 24 hours) at 18 1132  Last data filed at 18 1123   Gross per 24 hour   Intake             2074 ml   Output             1225 ml   Net              849 ml      Physical Exam:   General: comfortable; on room air  Neck: No adenopathy or thyroid swelling; trach tube midline 6 cfs -no bleeding or discharge  CVS: S1S2 no murmurs  RS: Mod AE bilaterally, mild bilateral wheezing, no crackles  Abd: soft, non tender, no hepatosplenomegaly, no distension or guarding or rigidity, bowel sounds heard  Neuro: non focal, awake, alert  Extrm: no leg edema or clubbing or cyanosis  Skin: no rash  Lines/Tubes:  PIVs  Trach tube  Peg tube    Data review:     Recent Results (from the past 12 hour(s))   METABOLIC PANEL, BASIC    Collection Time: 01/07/18  6:23 AM   Result Value Ref Range    Sodium 137 136 - 145 mmol/L    Potassium 4.1 3.5 - 5.5 mmol/L    Chloride 102 100 - 108 mmol/L    CO2 26 21 - 32 mmol/L    Anion gap 9 3.0 - 18 mmol/L    Glucose 182 (H) 74 - 99 mg/dL    BUN 17 7.0 - 18 MG/DL    Creatinine 0.83 0.6 - 1.3 MG/DL    BUN/Creatinine ratio 20 12 - 20      GFR est AA >60 >60 ml/min/1.73m2    GFR est non-AA >60 >60 ml/min/1.73m2    Calcium 8.7 8.5 - 10.1 MG/DL   CBC W/O DIFF    Collection Time: 01/07/18  6:23 AM   Result Value Ref Range    WBC 6.1 4.6 - 13.2 K/uL    RBC 2.87 (L) 4.70 - 5.50 M/uL    HGB 9.8 (L) 13.0 - 16.0 g/dL    HCT 29.0 (L) 36.0 - 48.0 %    .0 (H) 74.0 - 97.0 FL    MCH 34.1 (H) 24.0 - 34.0 PG    MCHC 33.8 31.0 - 37.0 g/dL    RDW 14.8 (H) 11.6 - 14.5 %    PLATELET 770 209 - 849 K/uL    MPV 9.0 (L) 9.2 - 11.8 FL   MAGNESIUM    Collection Time: 01/07/18  6:23 AM   Result Value Ref Range    Magnesium 1.7 1.6 - 2.6 mg/dL   PHOSPHORUS    Collection Time: 01/07/18  6:23 AM   Result Value Ref Range    Phosphorus 2.2 (L) 2.5 - 4.9 MG/DL   GLUCOSE, POC    Collection Time: 01/07/18  6:23 AM   Result Value Ref Range    Glucose (POC) 183 (H) 70 - 110 mg/dL   GLUCOSE, POC    Collection Time: 01/07/18 11:05 AM   Result Value Ref Range    Glucose (POC) 233 (H) 70 - 110 mg/dL        Ref.  Range 1/4/2018 23:40   ALCOHOL(ETHYL),SERUM Latest Ref Range: 0 - 3 MG/ (H)       Imaging:  I have personally reviewed the patients radiographs and have reviewed the reports:  CXR 1/7-trach tube; no focal infiltrates or consolidations; mildly hyperinflated lungs     IMPRESSION:   · Acute resp failure - hypoxemic - due to mucus plugging  · Acute bronchitis/tracheitis - MSSA  · Chronic trach tube  · Laryngeal cancer hx-s/p surgery, chemo-radiation  · Wheezing-COPD  · Chronic pain throat  · Active smoker  · Alcoholism   · Chronic peg tube      RECOMMENDATIONS:   · Pulm: Duonebs qid; added budesonide 0.5 mg nebs bid; iv steroids-solumedrol iv 30 mg q12; Pmv daytime; Trach care and suctioning as needed  · Cardiac: stable; BP meds  · ID: currently on clindamicin and zosyn; trach resp cxs-MSSA resistant to clinda and sens to levaquin; changed antibiotics to [id number] days; dc contact isolation  · Renal: watch IOs; replace lytes as needed  · GI: SLP eval for swallowing; nutrition consult for bolus peg tube feeding; PPI proph  · Neuro: stable; no active issues  · Endo: poc glucose; SSI  · Fluids: oral/enteral hydration  · Proph:  DVt and GI proph  · Smoking and alcohol cessation discussed  · CIWA protocol; thiamine, folic acid  · Replace mag and phos  · D/w patient   · ENT on case; change trach tube when available  · Patient has no diop or central line     Mod complexity decision making was performed in this consultation and evaluation of this patient        Emiliano Grajeda MD

## 2018-01-07 NOTE — PROGRESS NOTES
@1910 pt taken over awake alert oriented x4 watching TV , denies pain at present. Passy leodan valve in place on trach. Remains on room air spo2 95%  Vital signs fluctuates. Pt in no obvious distress. Assessment done and charted in relevant flow sheets. Nursing management continues. @8275 up to bedside commode steady gait , no new changes care continues. @6110 pt complaint of pain to throat analgesia administered as prescribed . Nursing observation continues. @7245 pt verbalized that he is no longer in pain , vital signs stable care continues. @2346 pt CIWA score elevated , ativan administered as prescribed nursing management continues. @0200 pt asleep easily aroused denies pain when awake , flacc 0 at present. Vital signs stable. Nursing management continues. @4420 pt complaint of pain analgesia administered care continues. @6629 pt denies pain care continues. @0600 pt watching TV in no obvious distress care continues. @5743 management continues no changes. @5727 pt complaint of pain analgesia administered as prescribed ,observation continues. @6339 pt verbalized that he is no longer in pain care continues.

## 2018-01-07 NOTE — PROGRESS NOTES
Received report and transfer of trust completed with Antonio Soto RN. Reviewed SBAR, MAR, results and plan. 1015 completed report and updated by Antonio Soto. assumed care of patient. Repositioned and bathed after assisting with bedside commode. Moves well with assist.  1200 tube feeding delivered. In no distress. Scoring on CIWA scale and medicated per protocol. Patient very agitated. 1330 completed trach care. Changed out inner cannula and ties done by respiratory. Large thick secretion coughed across room. 1600 remains comfortable, watching tv. Assisted to reposition. 1700 reports pain. 10/10. Medicated with Oxycodone. States he usually takes percocet and his oxy at home. 1730 tube feeding completed. Tolerated well. States he feels the tube feeding is \"just running through him\" today. Assisted to bedside commode. Liquid stool, no smell or mucous noted. Call to Dr Bk Blakely about patients concern about medication. Discussed plan and orders noted. 1800 medicated with Ativan for anxiety. Not scoring high enough for CIWA but patient reports anxiousness and irritability. 1915 Report and transfer of trust completed with Antonio Soto RN.

## 2018-01-07 NOTE — PROGRESS NOTES
Pharmacy Dosing Services: Vancomycin    Consult for Vancomycin Dosing by Pharmacy by Dr. Miguel Angel Whalen provided for this 45y.o. year old male , for indication of CAP. Day of Therapy 1    Ht Readings from Last 1 Encounters:   12/18/17 172.7 cm (68\")        Wt Readings from Last 1 Encounters:   01/05/18 74.5 kg (164 lb 3.9 oz)        Other Current Antibiotics   Significant Cultures   Serum Creatinine   Creatinine Clearance   BUN   WBC   H/H   Platelets   Temp     Start Vancomycin therapy, with loading dose of 1500 (mg) at 2300/ 01/06/2018(time/date). Follow with maintenance dose of 1250(mg) at 1100/01/07/2018 (time/date), every 12 hours (frequency). Dose calculated to approximate a therapeutic trough of 15-20mcg/mL. Pharmacy to follow daily and will make changes to dose and/or frequency based on clinical status. Estimated Pharmacokinetic Parameters (based on population kinetics)  Vd: 52 L (0.7 L/kg)   Yusuf: 0.071 hr-1 (T1/2 = 9.8 hrs)     Dosing Recommendations   Vancomycin dose: 1250 mg IV Q12hrs (infused over 1 hr)   Estimated peak: 40.3 mcg/mL   Estimated trough: 18.5 mcg/mL   Estimated AUC:TAYLA: 674 mcg*hr/mL (assumed TAYLA 1 mcg/mL)     A/P:   1. Recommend vancomycin 1250 mg IV Q12hrs (17 mg/kg)   2. Consider a vancomycin trough level prior to the 4th dose. 3. Please monitor renal function (urine output, BUN/SCr). Dose adjustments may be necessary with a significant change in renal function. 4.  vancomycin loading dose of 1500mg to facilitate rapid attainment of target trough serum vancomycin levels.       Pharmacist Cecilia Flaherty, PHARMD

## 2018-01-07 NOTE — PROGRESS NOTES
Problem: Falls - Risk of  Goal: *Absence of Falls  Document Caryl Fall Risk and appropriate interventions in the flowsheet.    Outcome: Progressing Towards Goal  Fall Risk Interventions:  Mobility Interventions: Patient to call before getting OOB         Medication Interventions: Evaluate medications/consider consulting pharmacy, Patient to call before getting OOB    Elimination Interventions: Call light in reach

## 2018-01-08 LAB
ANION GAP SERPL CALC-SCNC: 9 MMOL/L (ref 3–18)
BUN SERPL-MCNC: 25 MG/DL (ref 7–18)
BUN/CREAT SERPL: 28 (ref 12–20)
CALCIUM SERPL-MCNC: 9.1 MG/DL (ref 8.5–10.1)
CHLORIDE SERPL-SCNC: 101 MMOL/L (ref 100–108)
CO2 SERPL-SCNC: 27 MMOL/L (ref 21–32)
CREAT SERPL-MCNC: 0.88 MG/DL (ref 0.6–1.3)
ERYTHROCYTE [DISTWIDTH] IN BLOOD BY AUTOMATED COUNT: 14.8 % (ref 11.6–14.5)
EST. AVERAGE GLUCOSE BLD GHB EST-MCNC: 123 MG/DL
GLUCOSE BLD STRIP.AUTO-MCNC: 116 MG/DL (ref 70–110)
GLUCOSE BLD STRIP.AUTO-MCNC: 136 MG/DL (ref 70–110)
GLUCOSE BLD STRIP.AUTO-MCNC: 162 MG/DL (ref 70–110)
GLUCOSE BLD STRIP.AUTO-MCNC: 169 MG/DL (ref 70–110)
GLUCOSE SERPL-MCNC: 116 MG/DL (ref 74–99)
HBA1C MFR BLD: 5.9 % (ref 4.5–5.6)
HCT VFR BLD AUTO: 31 % (ref 36–48)
HGB BLD-MCNC: 10.5 G/DL (ref 13–16)
MAGNESIUM SERPL-MCNC: 2.1 MG/DL (ref 1.6–2.6)
MCH RBC QN AUTO: 34.3 PG (ref 24–34)
MCHC RBC AUTO-ENTMCNC: 33.9 G/DL (ref 31–37)
MCV RBC AUTO: 101.3 FL (ref 74–97)
PHOSPHATE SERPL-MCNC: 3 MG/DL (ref 2.5–4.9)
PLATELET # BLD AUTO: 194 K/UL (ref 135–420)
PMV BLD AUTO: 8.9 FL (ref 9.2–11.8)
POTASSIUM SERPL-SCNC: 4.6 MMOL/L (ref 3.5–5.5)
RBC # BLD AUTO: 3.06 M/UL (ref 4.7–5.5)
SODIUM SERPL-SCNC: 137 MMOL/L (ref 136–145)
WBC # BLD AUTO: 5.2 K/UL (ref 4.6–13.2)

## 2018-01-08 PROCEDURE — 74011250637 HC RX REV CODE- 250/637: Performed by: FAMILY MEDICINE

## 2018-01-08 PROCEDURE — 74011250636 HC RX REV CODE- 250/636: Performed by: INTERNAL MEDICINE

## 2018-01-08 PROCEDURE — 74011250637 HC RX REV CODE- 250/637: Performed by: HOSPITALIST

## 2018-01-08 PROCEDURE — 74011636637 HC RX REV CODE- 636/637: Performed by: INTERNAL MEDICINE

## 2018-01-08 PROCEDURE — 97116 GAIT TRAINING THERAPY: CPT

## 2018-01-08 PROCEDURE — 36415 COLL VENOUS BLD VENIPUNCTURE: CPT | Performed by: INTERNAL MEDICINE

## 2018-01-08 PROCEDURE — 94760 N-INVAS EAR/PLS OXIMETRY 1: CPT

## 2018-01-08 PROCEDURE — 94640 AIRWAY INHALATION TREATMENT: CPT

## 2018-01-08 PROCEDURE — 92507 TX SP LANG VOICE COMM INDIV: CPT

## 2018-01-08 PROCEDURE — 80048 BASIC METABOLIC PNL TOTAL CA: CPT | Performed by: INTERNAL MEDICINE

## 2018-01-08 PROCEDURE — 92526 ORAL FUNCTION THERAPY: CPT

## 2018-01-08 PROCEDURE — 74011250636 HC RX REV CODE- 250/636: Performed by: HOSPITALIST

## 2018-01-08 PROCEDURE — 82962 GLUCOSE BLOOD TEST: CPT

## 2018-01-08 PROCEDURE — 83735 ASSAY OF MAGNESIUM: CPT | Performed by: INTERNAL MEDICINE

## 2018-01-08 PROCEDURE — 85027 COMPLETE CBC AUTOMATED: CPT | Performed by: INTERNAL MEDICINE

## 2018-01-08 PROCEDURE — 74011250636 HC RX REV CODE- 250/636: Performed by: FAMILY MEDICINE

## 2018-01-08 PROCEDURE — 84100 ASSAY OF PHOSPHORUS: CPT | Performed by: INTERNAL MEDICINE

## 2018-01-08 PROCEDURE — 77030027138 HC INCENT SPIROMETER -A

## 2018-01-08 PROCEDURE — 74011000250 HC RX REV CODE- 250: Performed by: INTERNAL MEDICINE

## 2018-01-08 PROCEDURE — 97161 PT EVAL LOW COMPLEX 20 MIN: CPT

## 2018-01-08 PROCEDURE — 65660000000 HC RM CCU STEPDOWN

## 2018-01-08 PROCEDURE — 83036 HEMOGLOBIN GLYCOSYLATED A1C: CPT | Performed by: HOSPITALIST

## 2018-01-08 RX ADMIN — OXYCODONE HYDROCHLORIDE AND ACETAMINOPHEN 1 TABLET: 5; 325 TABLET ORAL at 22:32

## 2018-01-08 RX ADMIN — FAMOTIDINE 20 MG: 20 TABLET ORAL at 22:32

## 2018-01-08 RX ADMIN — IPRATROPIUM BROMIDE AND ALBUTEROL SULFATE 3 ML: .5; 3 SOLUTION RESPIRATORY (INHALATION) at 11:31

## 2018-01-08 RX ADMIN — Medication 100 MG: at 08:50

## 2018-01-08 RX ADMIN — OXYCODONE HYDROCHLORIDE 10 MG: 5 TABLET ORAL at 06:20

## 2018-01-08 RX ADMIN — FAMOTIDINE 20 MG: 20 TABLET ORAL at 08:50

## 2018-01-08 RX ADMIN — LORAZEPAM 1 MG: 2 INJECTION INTRAMUSCULAR at 01:26

## 2018-01-08 RX ADMIN — LORAZEPAM 1 MG: 2 INJECTION INTRAMUSCULAR at 09:30

## 2018-01-08 RX ADMIN — Medication 5 ML: at 22:00

## 2018-01-08 RX ADMIN — OXYCODONE HYDROCHLORIDE 10 MG: 5 TABLET ORAL at 12:44

## 2018-01-08 RX ADMIN — IPRATROPIUM BROMIDE AND ALBUTEROL SULFATE 3 ML: .5; 3 SOLUTION RESPIRATORY (INHALATION) at 15:31

## 2018-01-08 RX ADMIN — FOLIC ACID 1 MG: 1 TABLET ORAL at 08:50

## 2018-01-08 RX ADMIN — BUDESONIDE 500 MCG: 0.5 INHALANT RESPIRATORY (INHALATION) at 20:00

## 2018-01-08 RX ADMIN — Medication 10 ML: at 05:11

## 2018-01-08 RX ADMIN — OXYCODONE HYDROCHLORIDE AND ACETAMINOPHEN 1 TABLET: 5; 325 TABLET ORAL at 16:04

## 2018-01-08 RX ADMIN — IPRATROPIUM BROMIDE AND ALBUTEROL SULFATE 3 ML: .5; 3 SOLUTION RESPIRATORY (INHALATION) at 20:00

## 2018-01-08 RX ADMIN — LISINOPRIL 5 MG: 5 TABLET ORAL at 22:32

## 2018-01-08 RX ADMIN — LISINOPRIL 5 MG: 5 TABLET ORAL at 08:50

## 2018-01-08 RX ADMIN — INSULIN LISPRO 2 UNITS: 100 INJECTION, SOLUTION INTRAVENOUS; SUBCUTANEOUS at 18:08

## 2018-01-08 RX ADMIN — LORAZEPAM 1 MG: 2 INJECTION INTRAMUSCULAR at 18:00

## 2018-01-08 RX ADMIN — ENOXAPARIN SODIUM 40 MG: 40 INJECTION SUBCUTANEOUS at 03:05

## 2018-01-08 RX ADMIN — BUDESONIDE 500 MCG: 0.5 INHALANT RESPIRATORY (INHALATION) at 07:09

## 2018-01-08 RX ADMIN — OXYCODONE HYDROCHLORIDE 10 MG: 5 TABLET ORAL at 19:20

## 2018-01-08 RX ADMIN — METHYLPREDNISOLONE SODIUM SUCCINATE 30 MG: 40 INJECTION, POWDER, FOR SOLUTION INTRAMUSCULAR; INTRAVENOUS at 16:04

## 2018-01-08 RX ADMIN — LEVOFLOXACIN 750 MG: 5 INJECTION, SOLUTION INTRAVENOUS at 12:40

## 2018-01-08 RX ADMIN — LORAZEPAM 1 MG: 2 INJECTION INTRAMUSCULAR at 22:32

## 2018-01-08 RX ADMIN — OXYCODONE HYDROCHLORIDE AND ACETAMINOPHEN 1 TABLET: 5; 325 TABLET ORAL at 03:12

## 2018-01-08 RX ADMIN — LORAZEPAM 1 MG: 2 INJECTION INTRAMUSCULAR at 13:55

## 2018-01-08 RX ADMIN — Medication 10 ML: at 13:57

## 2018-01-08 RX ADMIN — METHYLPREDNISOLONE SODIUM SUCCINATE 30 MG: 40 INJECTION, POWDER, FOR SOLUTION INTRAMUSCULAR; INTRAVENOUS at 03:05

## 2018-01-08 RX ADMIN — INSULIN LISPRO 2 UNITS: 100 INJECTION, SOLUTION INTRAVENOUS; SUBCUTANEOUS at 02:00

## 2018-01-08 RX ADMIN — IPRATROPIUM BROMIDE AND ALBUTEROL SULFATE 3 ML: .5; 3 SOLUTION RESPIRATORY (INHALATION) at 07:09

## 2018-01-08 NOTE — PROGRESS NOTES
Problem: Mobility Impaired (Adult and Pediatric)  Goal: *Acute Goals and Plan of Care (Insert Text)  Physical Therapy Goals  Initiated 1/8/2018 and to be accomplished within 7 day(s)  1. Patient will move from supine to sit and sit to supine  in bed with supervision/set-up. 2.  Patient will transfer from bed to chair and chair to bed with supervision/set-up using the least restrictive device. 3.  Patient will perform sit to stand with supervision/set-up. 4.  Patient will ambulate with supervision/set-up for >150 feet with the least restrictive device. 5.  Patient will ascend/descend 4 stairs with handrail(s) with minimal assistance/contact guard assist for safe home entry. physical Therapy EVALUATION    Patient: Nelly Rao (18 y.o. male)  Date: 1/8/2018  Primary Diagnosis: Acute tracheitis  Precautions:   Fall    ASSESSMENT :  Based on the objective data described below, Patient present to PT with decreased functional mobility with regard to bed mobility, transfers, gait, balance, weakness, and overall tolerance for activity. Pt very motivated to participate with PT at this time. He required supervision level assist with increased time required. During gait training pt ambulated 110 feet with HHA/Jared with decreased step clearance and path deviations. Pt's SpO2 on RA in bed was 98%; during gait training pt's SpO2 dropped to 89% with SOB noted. Left pt in bed with all needs met, call bell within reach and SpO2 at 96% on RA. Recommend HHPT at time of discharge. Patient will benefit from skilled intervention to address the above impairments.   Patients rehabilitation potential is considered to be Good  Factors which may influence rehabilitation potential include:   []         None noted  []         Mental ability/status  [x]         Medical condition  []         Home/family situation and support systems  []         Safety awareness  []         Pain tolerance/management  []         Other:      PLAN :  Recommendations and Planned Interventions:  [x]           Bed Mobility Training             [x]    Neuromuscular Re-Education  [x]           Transfer Training                   []    Orthotic/Prosthetic Training  [x]           Gait Training                          []    Modalities  [x]           Therapeutic Exercises          []    Edema Management/Control  [x]           Therapeutic Activities            [x]    Patient and Family Training/Education  []           Other (comment):    Frequency/Duration: Patient will be followed by physical therapy 1-2 times per day/4-7 days per week to address goals. Discharge Recommendations: Home Health  Further Equipment Recommendations for Discharge: TBD     SUBJECTIVE:   Patient stated I am ready to get out of this bed.     OBJECTIVE DATA SUMMARY:     Past Medical History:   Diagnosis Date    Chronic obstructive pulmonary disease ([facility] [address]     acute hypoxic respiratory failure 8/16    Hypertension     Ill-defined condition     peg tube in place    Ill-defined condition     chemo    S/P radiation therapy     [facility]     Tobacco abuse      Past Surgical History:   Procedure Laterality Date    HX HEENT      sinus surgery     Barriers to Learning/Limitations: yes;  sensory deficits-vision/hearing/speech and physical  Compensate with: visual, verbal, tactile, kinesthetic cues/model  Prior Level of Function/Home Situation: Pt stated he required some assistance with ADLs and was a limited household ambulator   Critical Behavior:  Neurologic State: Alert  Orientation Level: Oriented X4  Cognition: Appropriate decision making; Appropriate for age attention/concentration; Follows commands; Appropriate safety awareness  Safety/Judgement: Awareness of environment; Fall prevention;Good awareness of safety precautions; Insight into deficits  Psychosocial  Patient Behaviors: Calm; Cooperative; Talkative  Skin Condition/Temp: Dry;Warm   Skin Integumentary  Skin Color: Appropriate for ethnicity  Skin Condition/Temp: Dry;Warm   Strength:    Strength: Generally decreased, functional   Tone & Sensation:   Tone: Normal   Sensation: Impaired   Range Of Motion:  AROM: Generally decreased, functional   PROM: Within functional limits   Functional Mobility:  Bed Mobility:  Rolling: Supervision  Supine to Sit: Supervision  Sit to Supine: Supervision; Additional time  Scooting: Supervision  Transfers:  Sit to Stand: Contact guard assistance; Additional time  Stand to Sit: Contact guard assistance; Additional time  Balance:   Sitting: Intact  Standing: Impaired; With support  Standing - Static: Fair  Standing - Dynamic : Fair  Ambulation/Gait Training:  Distance (ft): 110 Feet (ft)  Assistive Device: Gait belt  Ambulation - Level of Assistance: Minimal assistance   Gait Description (WDL): Exceptions to WDL  Gait Abnormalities: Decreased step clearance;Shuffling gait; Path deviations    Base of Support: Widened  Stance: Weight shift  Speed/Vickie: Slow  Step Length: Right shortened;Left shortened  Swing Pattern: Right asymmetrical;Left asymmetrical   Interventions: Visual/Demos; Verbal cues; Tactile cues; Safety awareness training  Pain:  Pain Scale 1: Numeric (0 - 10)  Pain Intensity 1: 0  Pain Location 1: Throat  Pain Orientation 1: Anterior  Pain Description 1: Aching  Pain Intervention(s) 1: Medication (see MAR)  Activity Tolerance:   Fair  Please refer to the flowsheet for vital signs taken during this treatment. After treatment:   []         Patient left in no apparent distress sitting up in chair  [x]         Patient left in no apparent distress in bed  [x]         Call bell left within reach  [x]         Nursing notified  []         Caregiver present  []         Bed alarm activated    COMMUNICATION/EDUCATION:   [x]         Fall prevention education was provided and the patient/caregiver indicated understanding. [x]         Patient/family have participated as able in goal setting and plan of care.   [x] Patient/family agree to work toward stated goals and plan of care. []         Patient understands intent and goals of therapy, but is neutral about his/her participation. []         Patient is unable to participate in goal setting and plan of care.     Thank you for this referral.  Bailee Wilkes PT, DPT       Time Calculation: 23 mins

## 2018-01-08 NOTE — PROGRESS NOTES
Problem: Falls - Risk of  Goal: *Absence of Falls  Document Caryl Fall Risk and appropriate interventions in the flowsheet.    Outcome: Progressing Towards Goal  Fall Risk Interventions:  Mobility Interventions: Bed/chair exit alarm         Medication Interventions: Bed/chair exit alarm    Elimination Interventions: Bed/chair exit alarm, Call light in reach

## 2018-01-08 NOTE — PROGRESS NOTES
Received report and transfer of trust completed with Tigre Long RN. Patient comfortable at this time. Reviewed SBAR, results MAR and plan. 0800 assessment as noted. Patient in good spirits, reports feeling somewhat improved. Respirations easy. Awaiting trach for replacement. Tube feed bolus completed with I can 2cal and h20 to follow. Tolerated well. 0930 requesting pain medication. Well aware of time schedule and asking for meds on time. 1000 patient states he has not had to jump up to commode after feeding like he did yesterday. Patient less agitated, medication spread out through day to offset anxiety. Tolerating this well. IDR rounds completed at 11a. Discussed status and needs. Aware of trach replacement delivery today so will change when arriving. Speech to evaluate and PT on board for   1200 assisted with tube feed bolus. Patient reports pain and oxy given via feeding tube at 1255.   1355 patient reports anxiety, med with ativan 1mg. Resting in bed. 1430 tracheostomy replacements arrived earlier and changed with Dr Dionicio Boxer . No distress. Tolerated well. Dressing and ties also changed. 1645 medicated with percocet per order. Wife in at bedside and updated on status. 1800 medicated patient per request for anxiety speech eval completed earlier and exercises completed. 1830 call for report to tele. 1900Nurse unable to take report. 1930 report to [facility]. reveiwed SBAR, MAR, and plan.

## 2018-01-08 NOTE — PROGRESS NOTES
Interdisciplinary Rounds (IDR) / REHAB:    Occupational Therapy: MD deferred  Physical Therapy: evaluation ordered and attempted  Speech Therapy: following/on caseload    LEIDY Chavira/GIN

## 2018-01-08 NOTE — PROGRESS NOTES
[facility]                  Pulmonary, Critical Care, and Sleep Medicine     [facility] at [address] [facility]   [address] [facility], [address] [place] [address], [address] [place], [place] [address], [address] [place] [address], [place], [address]   Phone: [phone]. Fax: [phone] Phone: [phone]. Fax: [phone]     Name: Vishal Birch MRN: [id number]   : 1965 Hospital: [facility] [place]   Date: 2018  Room: [facility]/     Subjective:     Patient with chronic trach tube due to laryngeal cancer. Patient ENT physician is Dr. Lucia Howard. Patient was diagnosed with throat cancer about 1 year ago, treated with surgery, radiation, chemotherapy at [facility]. The patient has hx of emergent tracheotomy performed due to significant laryngeal edema from his radiation and chemo in Aug 2017. Patient has chronic trach tube-currently 6cfs changed 2 mths ago per patient. He has chronic peg tube, but says was cleared for pudding about 2 weeks ago. Patient actively smokes orally, alcoholic. Patient called EMS with c/o shortness of breath and cough symptoms. His o2 sats was low which improved after suctioning of thick secretions from his trach tube by EMS.     18  Patient awake, alert  Has PMV, able to talk  Cough + mostly non-productive  No chest pains or hemoptysis.      18:  Pt sitting on bed edge, doing well, feeling better  No fever chills   Cough improving, mild thick yellow expectoration with occasional blood tinged sputum  Denies any fever chills cp dyspnea  On RA resting spo2 100%  No leg edema   No overnight events  Trach still waiting to arrive to be changed    SH-active smoker orally; history of ETOH use, reportedly drank 2 beers PTA    No Known Allergies   Social History   Substance Use Topics    Smoking status: Former Smoker     Packs/day: 0.50    Smokeless tobacco: Never Used    Alcohol use No        Current Facility-Administered Medications   Medication Dose Route Frequency    famotidine (PEPCID) tablet 20 mg  20 mg Per G Tube BID    levoFLOXacin (LEVAQUIN) 750 mg in D5W IVPB  750 mg IntraVENous Q24H    budesonide (PULMICORT) 500 mcg/2 ml nebulizer suspension  500 mcg Nebulization BID RT    methylPREDNISolone (PF) (SOLU-MEDROL) injection 30 mg  30 mg IntraVENous Q12H    insulin lispro (HUMALOG) injection   SubCUTAneous AC&HS    cloNIDine (CATAPRES) 0.2 mg/24 hr patch 1 Patch  1 Patch TransDERmal Q7D    lisinopril (PRINIVIL, ZESTRIL) tablet 5 mg  5 mg Oral Q12H    enoxaparin (LOVENOX) injection 40 mg  40 mg SubCUTAneous Q24H    Thiamine Mononitrate (B-1) tablet 100 mg  100 mg Oral DAILY    folic acid (FOLVITE) tablet 1 mg  1 mg Oral DAILY    sodium chloride (NS) flush 5-10 mL  5-10 mL IntraVENous Q8H    albuterol-ipratropium (DUO-NEB) 2.5 MG-0.5 MG/3 ML  3 mL Nebulization QID RT       Review of Systems:  HEENT: No epistaxis, denies difficulty in swallowing  Respiratory: as above  Cardiovascular: no chest pain, no palpitations  Gastrointestinal: no abd pain, no vomiting, no diarrhea  Genitourinary: No urinary symptoms  Integument/breast: No ulcers  Musculoskeletal: chronic pains  Neuro no depressionlogical: No focal weakness, no seizures, no headaches  Constitutional: no fever, no chills, + weight loss, no night sweats     Objective:   Vital Signs:    Visit Vitals    /86    Pulse 86    Temp 97.7 °F (36.5 °C)    Resp 18    Wt 74.5 kg (164 lb 3.9 oz)    SpO2 100%    BMI 24.97 kg/m2       O2 Device: Room air   O2 Flow Rate (L/min): 14 l/min   Temp (24hrs), Av.8 °F (36.6 °C), Min:97.7 °F (36.5 °C), Max:98 °F (36.7 °C)       Intake/Output:   Last shift:      701 -  1900  In: 480 [P.O.:480]  Out: 225 [Urine:225]  Last 3 shifts: 1901 -  0700  In: 2698 [I.V.:250]  Out: 1375 [Urine:1375]    Intake/Output Summary (Last 24 hours) at 18 1017  Last data filed at 18 3351   Gross per 24 hour   Intake             1704 ml   Output             1600 ml   Net              104 ml      Physical Exam:   General: comfortable; on room air, sitting in bed. Neck: No adenopathy or thyroid swelling; trach tube midline 6 cfs -no bleeding or discharge  CVS: S1S2 no murmurs  RS: Mod AE bilaterally, mild bilateral expiratory wheezing, no crackles. No rhonchi.    Abd: soft, non tender, no hepatosplenomegaly, no distension or guarding or rigidity, bowel sounds heard  Neuro: non focal, awake, alert  Extrm: no leg edema or clubbing or cyanosis  Skin: no rash  Lines/Tubes:  PIVs  Trach tube   Peg tube    Data review:     Recent Results (from the past 12 hour(s))   GLUCOSE, POC    Collection Time: 01/07/18 11:07 PM   Result Value Ref Range    Glucose (POC) 129 (H) 70 - 110 mg/dL   CBC W/O DIFF    Collection Time: 01/08/18  6:09 AM   Result Value Ref Range    WBC 5.2 4.6 - 13.2 K/uL    RBC 3.06 (L) 4.70 - 5.50 M/uL    HGB 10.5 (L) 13.0 - 16.0 g/dL    HCT 31.0 (L) 36.0 - 48.0 %    .3 (H) 74.0 - 97.0 FL    MCH 34.3 (H) 24.0 - 34.0 PG    MCHC 33.9 31.0 - 37.0 g/dL    RDW 14.8 (H) 11.6 - 14.5 %    PLATELET 603 250 - 681 K/uL    MPV 8.9 (L) 9.2 - 00.0 FL   METABOLIC PANEL, BASIC    Collection Time: 01/08/18  6:09 AM   Result Value Ref Range    Sodium 137 136 - 145 mmol/L    Potassium 4.6 3.5 - 5.5 mmol/L    Chloride 101 100 - 108 mmol/L    CO2 27 21 - 32 mmol/L    Anion gap 9 3.0 - 18 mmol/L    Glucose 116 (H) 74 - 99 mg/dL    BUN 25 (H) 7.0 - 18 MG/DL    Creatinine 0.88 0.6 - 1.3 MG/DL    BUN/Creatinine ratio 28 (H) 12 - 20      GFR est AA >60 >60 ml/min/1.73m2    GFR est non-AA >60 >60 ml/min/1.73m2    Calcium 9.1 8.5 - 10.1 MG/DL   MAGNESIUM    Collection Time: 01/08/18  6:09 AM   Result Value Ref Range    Magnesium 2.1 1.6 - 2.6 mg/dL   PHOSPHORUS    Collection Time: 01/08/18  6:09 AM   Result Value Ref Range    Phosphorus 3.0 2.5 - 4.9 MG/DL   GLUCOSE, POC    Collection Time: 01/08/18  6:21 AM Result Value Ref Range    Glucose (POC) 116 (H) 70 - 110 mg/dL        Ref. Range 1/4/2018 23:40   ALCOHOL(ETHYL),SERUM Latest Ref Range: 0 - 3 MG/ (H)       Imaging:  I have personally reviewed the patients radiographs and have reviewed the reports:    Results from Hospital Encounter encounter on 01/04/18   XR CHEST PORT   Narrative ---------------------------------------------------------------------------  <<<<<<<<< [facility] >>>>>>>>>   ---------------------------------------------------------------------------    CLINICAL HISTORY:  Cough. COMPARISON EXAMINATIONS:  January 5, 2018. ---  SINGLE FRONTAL VIEW OF THE CHEST  ---    There appears to be minimal atelectatic change at the right lung base. The lungs  and pleural spaces are otherwise clear. The mediastinum is unremarkable in  appearance. A tracheostomy is noted in place. No significant osseous  abnormalities are identified.        --------------       Impression Impression:  Minimal atelectatic change at the right base similar to prior. No new focal consolidation or pleural effusion. No significant change. IMPRESSION:   · Acute resp failure - hypoxemic - due to mucus plugging  · Acute bronchitis/tracheitis - MSSA  · Chronic trach tube  · Laryngeal cancer hx-s/p surgery, chemo-radiation  · Wheezing-COPD  · Chronic pain throat  · Active smoker  · Alcoholism   · Chronic peg tube      RECOMMENDATIONS:   · Pulm: Duonebs qid; budesonide 0.5 mg nebs bid; iv steroids-solumedrol iv 30 mg q12; PMV daytime; Trach care and suctioning as needed. Change to new tracheostomy tube when available.    · Cardiac: stable; BP meds  · ID: trach resp cxs-MSSA resistant to clinda and sens to levaquin; changed antibiotics to [id number] days since 1/7/18  · Renal: watch IOs; replace lytes as needed  · GI: SLP eval for swallowing; nutrition consult for bolus peg tube feeding; PPI proph  · Neuro: stable; no active issues  · Endo: poc glucose; SSI  · Fluids: oral/enteral hydration  · Proph:  DVT Px: lovenox. GI Px: pepcid. · Smoking and alcohol cessation discussed again today  · CIWA protocol; thiamine, folic acid. No sign of acute withdrawals. · Replace mag and phos as needed  · D/w patient, RN, MDR, Dr. Zack Arevalo  · ENT on case; change trach tube when available  · Patient has no diop or central line  · Likely discharge home in 1-2 days.       Mod complexity decision making was performed in this consultation and evaluation of this patient        Sonya Smith MD

## 2018-01-08 NOTE — DIABETES MGMT
GLYCEMIC CONTROL & NUTRITION:    - Discussed in rounds, no documented h/o DM  - noted steroids currently on board, POCT + Humalog corrective coverage orders in place, has been requiring coverage  - TF via peg, Clinical RD on board as well  - A1C added for screening purposes, last on file 5.6%    Recent Glucose Results:   Lab Results   Component Value Date/Time     (H) 01/08/2018 06:09 AM    GLUCPOC 162 (H) 01/08/2018 11:37 AM    GLUCPOC 116 (H) 01/08/2018 06:21 AM    GLUCPOC 129 (H) 01/07/2018 11:07 PM       HARISH Hill, MPH, RD, CDE

## 2018-01-08 NOTE — PROGRESS NOTES
conducted a Follow up consultation and Spiritual Assessment for Roxy Ortiz, who is a 45 y.o.,male. Patient was talkative this afternoon. He stated he is feeling much better now. Waiting to get his trach tube changed. Patient's mother and sister have  in the las couple of years and he has responsibilities including many bills that are worrying him. Discussed the need for patient to have an Advance Medical Directive. He will consider. The  provided the following Interventions:  Continued the relationship of care and support. Listened empathically. Offered assurance of continued prayer on patients behalf. Chart reviewed. The following outcomes were achieved:  Patient expressed gratitude for Spiritual Care visit. Patient was reviewed in ICU Interdisciplinary Rounds. Assessment:  There are no further spiritual or Worship issues which require Spiritual Care Services intervention at this time. Plan:  Chaplains will continue to follow and will provide pastoral care as needed or requested.  recommends bedside caregivers page the  on duty if patient shows signs of acute spiritual or emotional distress. [address], [place].    Board Certified   [phone] - Office

## 2018-01-08 NOTE — PROGRESS NOTES
NUTRITION FOLLOW-UP    RECOMMENDATIONS/PLAN:   - Continue w/ POC  Monitor labs/lytes, PO intakes, skin integrity, wt, fluid status, BM  Adult Malnutrition Criteria:      Nutrition assessment was completed by RDN and the patient was found to meet the following malnutrition criteria established by ASPEN/AND:     Adult Malnutrition Guidelines:  SEVERE PROTEIN CALORIE MALNUTRITION IN THE CONTEXT OF CHRONIC ILLNESS  Weight Loss:  >5% x 1 month or >7.5% x 3 months or >10% x 6 months or >20% x 12 months  Muscle Mass: Severe Depletion  Clara Neves  01/08/18  NUTRITION ASSESSMENT:   Client Update: 45 yrs old Male with  SOB w/ acute tracheitis. Pt is known to nutrition department. FOOD/NUTRITION INTAKE   Diet Order:  Two Sheldon 5 Cans   Supplements:n/a   Food Allergies: NKFA  Average PO Intake:      No data found. Pertinent Medications:  [x] Reviewed; pepcid, folvite, methylprednisolone, thiamine   Electrolyte Replacement Protocol: []K []Mg []PO4  Insulin:  []SSI  [x]Pre-meal   []Basal    []Drip  []None  Cultural/Restoration Food Preferences: None Identified    BIOCHEMICAL DATA & MEDICAL TESTS  Pertinent Labs:  [x] Reviewed; ANTHROPOMETRICS  Height:    5'7     Weight: 74.5 kg (164 lb 3.9 oz)         BMI:  25.7 kg/m^2 overweight (25.0%-29.9% BMI)   Adm Weight: 164 lbs                Weight change: 0  Adjusted Body Weight: n/a     NUTRITION-FOCUSED PHYSICAL ASSESSMENT  Skin: No pressure injury      GI: +BM 1/8/18    NUTRITION PRESCRIPTION  Calories: 2185 kcal/day based on miffilin x 1.3 x1.1  Protein: 102-116 g/day based on 1.4-1.6 g/kg  CHO: 273 g/day based on 50% of total energy  Fluid: 2185 ml/day based on 1 kcal/ml      NUTRITION DIAGNOSES:   1. Increased energy needs related to SOB as evidence by approx -21.90% since March 2017       NUTRITION INTERVENTIONS:   INTERVENTIONS:        GOALS:  1. Other:continue w. POC 1.  Continue to meet estimated needs per EN by next review 3 days     LEARNING NEEDS (Diet, Supplementation, Food/Nutrient-Drug Interaction):   [x] None Identified   [] Education provided/documented      Identified and patient: [] Declined   [] Was not appropriate/indicated        NUTRITION MONITORING /EVALUATION:   Adjust EN/PN as appropriate  Monitor wt  Monitor renal labs, electrolytes, fluid status     Previous Recommendations Implemented: yes        Previous Goals Met:  yes -      [] Participated in Interdisciplinary Rounds    [x] Interdisciplinary Care Plan Reviewed  DISCHARGE NUTRITION RECOMMENDATIONS ADDRESSED:     [x] Yes- recommended Two Sheldon 5 Cans diet     NUTRITION RISK:           [x] At risk                        [] Not currently at risk        Will follow-up per policy.   Cecile Wilkinson, RD  PAGER:  [phone]

## 2018-01-08 NOTE — PROGRESS NOTES
Hospitalist Progress Note    Patient: Petra Tobias MRN: [id number]  CSN: [id number]    YOB: 1965  Age: 45 y.o. Sex: male    DOA: 1/4/2018 LOS:  LOS: 3 days                Assessment/Plan     Patient Active Problem List   Diagnosis Code    Throat cancer ([facility] C14.0    Status post insertion of percutaneous endoscopic gastrostomy (PEG) tube ([facility] Z93.1    Hyponatremia E87.1    COPD exacerbation ([facility] J44.1    Acute hypoxemic respiratory failure ([facility] J96.01    Severe protein-calorie malnutrition Michaelyn Held: less than 60% of standard weight) ([facility] E43    Tracheostomy dependence ([facility] Z93.0    Aspiration pneumonia ([facility] J69.0    Acute tracheitis J04.10        44 yo male with history of laryhgeal ca, trach and PEG dependent admitted for SOB    Feeling much better. Acute respiratory failure - secondary to aspiration pneumonia and mucous plugging. Also COPD contributing. Feeling better, improving slowly   Trach dependent    Acute bronchitis/tracheitis     resp cultures with growth of MSSA,     Antibiotics - levaquin.      COPD exacerbation - on solumedrol, inhaled steroids/bronchodilators.      Pulmonary following.     HTN - continue home medications. On clonidine patch, lisinopril.     Hyponatremia - likely secondary to alcohol use. improved.     Continued alcohol use -  on MVT, folic acid, thiamine. CIWA protocol. PEG dependent.     DVT prophylaxis    Disposition :  1-2 days. Transfer to medical    Review of systems  General: No fevers or chills. Cardiovascular: No chest pain or pressure. No palpitations. Pulmonary:see above   Gastrointestinal: No nausea, vomiting. Physical Exam:  General: Awake, cooperative, no acute distress    HEENT: NC, Atraumatic. PERRLA, anicteric sclerae. Trach in place  Lungs: Exp wheezes bilaterally.   Heart:  Regular  rhythm,  No murmur, No Rubs, No Gallops  Abdomen: Soft, Non distended, Non tender.  +Bowel sounds, PEG in place  Extremities: No c/c/e  Psych:   Not anxious or agitated. Neurologic:  No acute neurological deficit. Vital signs/Intake and Output:  Visit Vitals    /86    Pulse 86    Temp 98 °F (36.7 °C)    Resp 18    Wt 74.5 kg (164 lb 3.9 oz)    SpO2 100%    BMI 24.97 kg/m2     Current Shift:     Last three shifts:  01/06 1901 - 01/08 0700  In: 2698 [I.V.:250]  Out: 1375 [MYHPQ:5849]            Labs: Results:       Chemistry Recent Labs      01/08/18   0609  01/07/18   0623  01/06/18   1000  01/06/18   0406   GLU  116*  182*   --   185*   NA  137  137   --   141   K  4.6  4.1  4.2  3.6   CL  101  102   --   104   CO2  27  26   --   27   BUN  25*  17   --   12   CREA  0.88  0.83   --   0.88   CA  9.1  8.7   --   8.9   AGAP  9  9   --   10   BUCR  28*  20   --   14      CBC w/Diff Recent Labs      01/08/18   0609  01/07/18   0623  01/06/18   0406  01/05/18   1015   WBC  5.2  6.1  4.9  4.0*   RBC  3.06*  2.87*  3.16*  3.28*   HGB  10.5*  9.8*  10.7*  11.1*   HCT  31.0*  29.0*  31.9*  32.2*   PLT  194  196  231  257   GRANS   --    --    --   93*   LYMPH   --    --    --   6*   EOS   --    --    --   0      Cardiac Enzymes No results for input(s): CPK, CKND1, CHUCKY in the last 72 hours. No lab exists for component: CKRMB, TROIP   Coagulation No results for input(s): PTP, INR, APTT in the last 72 hours. No lab exists for component: INREXT, INREXT    Lipid Panel No results found for: CHOL, CHOLPOCT, CHOLX, CHLST, CHOLV, 314527, HDL, LDL, LDLC, DLDLP, 395753, VLDLC, VLDL, TGLX, TRIGL, TRIGP, TGLPOCT, CHHD, CHHDX   BNP No results for input(s): BNPP in the last 72 hours. Liver Enzymes No results for input(s): TP, ALB, TBIL, AP, SGOT, GPT in the last 72 hours.     No lab exists for component: DBIL   Thyroid Studies Lab Results   Component Value Date/Time    TSH 0.32 01/05/2018 10:15 AM        Procedures/imaging: see electronic medical records for all procedures/Xrays and details which were not copied into this note but were reviewed prior to creation of Plan

## 2018-01-08 NOTE — PROGRESS NOTES
@3148 pt taken over awake alert oriented x4 watching TV denies pain at present. Vital signs stable HR fluctuates . Remains on room air via trach. Passy miur valve in situ . Assessment done and documented in relevant flow sheets. Nursing management continues. @5298 pt complaint of pain to throat analgesia administered as prescribed. Nursing observation continues. @7798 pt verbalized that he is no longer in pain. Vital signs stable. Pt in no obvious distress care continues. @8934 pt expressed that he was in pain ,his throat hurts , analgesia administered vital signs WNL . Pt up to bedside commode independently, steady gait observed . Nursing management continues. @4144 pt scored high on CIWA ativan administered as prescribed per protocol observation continues. @5826 pt complaint of discomfort and soreness to throat medication administered as prescribed care continues. @0340 pt asleep intermittently in no distress care continues. @0500 no changes . @1244 Analgesia administered as prescribed as pt complaint of pain in throat. Nursing management continues. @2945 Bedside and Verbal shift change report given to HEBER Nuno (oncoming nurse) by Nika Boyd (offgoing nurse). Report included the following information SBAR, Kardex, Intake/Output, MAR, Recent Results and Med Rec Status.    Alarm parameters reviewed, on and audible Appropriate for patient clinical condition

## 2018-01-08 NOTE — PROGRESS NOTES
Tracheostomy tube change at bedside with RT at bedside. New trach 6DFS cuffless Sheiley tube. No difficulties. Pt tolerated tube change well. Transfer to floor.      Edwar Gibson MD 1/8/2018 2:27 PM

## 2018-01-08 NOTE — PALLIATIVE CARE
Piotr spoke with Jewel Martins from [employer] informed cm that patient did sign SAM form regarding medicaid at this time approval pending.

## 2018-01-09 ENCOUNTER — HOME HEALTH ADMISSION (OUTPATIENT)
Dept: HOME HEALTH SERVICES | Facility: HOME HEALTH | Age: 53
End: 2018-01-09
Payer: MEDICAID

## 2018-01-09 VITALS
OXYGEN SATURATION: 99 % | RESPIRATION RATE: 15 BRPM | WEIGHT: 164.24 LBS | DIASTOLIC BLOOD PRESSURE: 70 MMHG | BODY MASS INDEX: 24.97 KG/M2 | TEMPERATURE: 97.6 F | HEART RATE: 94 BPM | SYSTOLIC BLOOD PRESSURE: 128 MMHG

## 2018-01-09 LAB
ANION GAP SERPL CALC-SCNC: 10 MMOL/L (ref 3–18)
BUN SERPL-MCNC: 21 MG/DL (ref 7–18)
BUN/CREAT SERPL: 27 (ref 12–20)
CALCIUM SERPL-MCNC: 9.2 MG/DL (ref 8.5–10.1)
CHLORIDE SERPL-SCNC: 103 MMOL/L (ref 100–108)
CO2 SERPL-SCNC: 25 MMOL/L (ref 21–32)
CREAT SERPL-MCNC: 0.78 MG/DL (ref 0.6–1.3)
ERYTHROCYTE [DISTWIDTH] IN BLOOD BY AUTOMATED COUNT: 14.8 % (ref 11.6–14.5)
GLUCOSE BLD STRIP.AUTO-MCNC: 117 MG/DL (ref 70–110)
GLUCOSE BLD STRIP.AUTO-MCNC: 137 MG/DL (ref 70–110)
GLUCOSE SERPL-MCNC: 119 MG/DL (ref 74–99)
HCT VFR BLD AUTO: 28.5 % (ref 36–48)
HGB BLD-MCNC: 9.7 G/DL (ref 13–16)
MAGNESIUM SERPL-MCNC: 2 MG/DL (ref 1.6–2.6)
MCH RBC QN AUTO: 34.5 PG (ref 24–34)
MCHC RBC AUTO-ENTMCNC: 34 G/DL (ref 31–37)
MCV RBC AUTO: 101.4 FL (ref 74–97)
PHOSPHATE SERPL-MCNC: 4.2 MG/DL (ref 2.5–4.9)
PLATELET # BLD AUTO: 175 K/UL (ref 135–420)
PMV BLD AUTO: 9.1 FL (ref 9.2–11.8)
POTASSIUM SERPL-SCNC: 4 MMOL/L (ref 3.5–5.5)
RBC # BLD AUTO: 2.81 M/UL (ref 4.7–5.5)
SODIUM SERPL-SCNC: 138 MMOL/L (ref 136–145)
WBC # BLD AUTO: 4.4 K/UL (ref 4.6–13.2)

## 2018-01-09 PROCEDURE — 83735 ASSAY OF MAGNESIUM: CPT | Performed by: INTERNAL MEDICINE

## 2018-01-09 PROCEDURE — 85027 COMPLETE CBC AUTOMATED: CPT | Performed by: INTERNAL MEDICINE

## 2018-01-09 PROCEDURE — 74011250637 HC RX REV CODE- 250/637: Performed by: HOSPITALIST

## 2018-01-09 PROCEDURE — 36415 COLL VENOUS BLD VENIPUNCTURE: CPT | Performed by: INTERNAL MEDICINE

## 2018-01-09 PROCEDURE — 74011250636 HC RX REV CODE- 250/636: Performed by: INTERNAL MEDICINE

## 2018-01-09 PROCEDURE — 74011250636 HC RX REV CODE- 250/636: Performed by: FAMILY MEDICINE

## 2018-01-09 PROCEDURE — 74011250636 HC RX REV CODE- 250/636: Performed by: HOSPITALIST

## 2018-01-09 PROCEDURE — 74011000250 HC RX REV CODE- 250: Performed by: INTERNAL MEDICINE

## 2018-01-09 PROCEDURE — 92526 ORAL FUNCTION THERAPY: CPT

## 2018-01-09 PROCEDURE — 97116 GAIT TRAINING THERAPY: CPT

## 2018-01-09 PROCEDURE — 94760 N-INVAS EAR/PLS OXIMETRY 1: CPT

## 2018-01-09 PROCEDURE — 84100 ASSAY OF PHOSPHORUS: CPT | Performed by: INTERNAL MEDICINE

## 2018-01-09 PROCEDURE — 74011250637 HC RX REV CODE- 250/637: Performed by: FAMILY MEDICINE

## 2018-01-09 PROCEDURE — 82962 GLUCOSE BLOOD TEST: CPT

## 2018-01-09 PROCEDURE — 80048 BASIC METABOLIC PNL TOTAL CA: CPT | Performed by: INTERNAL MEDICINE

## 2018-01-09 PROCEDURE — 94640 AIRWAY INHALATION TREATMENT: CPT

## 2018-01-09 RX ORDER — PREDNISONE 10 MG/1
TABLET ORAL
Qty: 30 TAB | Refills: 0 | Status: SHIPPED | OUTPATIENT
Start: 2018-01-09 | End: 2019-01-01

## 2018-01-09 RX ORDER — IPRATROPIUM BROMIDE 0.5 MG/2.5ML
0.5 SOLUTION RESPIRATORY (INHALATION) 4 TIMES DAILY
Qty: 120 VIAL | Refills: 2 | Status: SHIPPED | OUTPATIENT
Start: 2018-01-09

## 2018-01-09 RX ORDER — ARFORMOTEROL TARTRATE 15 UG/2ML
15 SOLUTION RESPIRATORY (INHALATION)
Status: DISCONTINUED | OUTPATIENT
Start: 2018-01-09 | End: 2018-01-09 | Stop reason: HOSPADM

## 2018-01-09 RX ORDER — LEVOFLOXACIN 750 MG/1
750 TABLET ORAL DAILY
Qty: 5 TAB | Refills: 0 | Status: SHIPPED | OUTPATIENT
Start: 2018-01-09 | End: 2018-01-14

## 2018-01-09 RX ORDER — IPRATROPIUM BROMIDE 0.5 MG/2.5ML
0.5 SOLUTION RESPIRATORY (INHALATION)
Status: DISCONTINUED | OUTPATIENT
Start: 2018-01-09 | End: 2018-01-09 | Stop reason: HOSPADM

## 2018-01-09 RX ORDER — BUDESONIDE 0.5 MG/2ML
500 INHALANT ORAL 2 TIMES DAILY
Qty: 60 EACH | Refills: 2 | Status: SHIPPED | OUTPATIENT
Start: 2018-01-09

## 2018-01-09 RX ORDER — ARFORMOTEROL TARTRATE 15 UG/2ML
15 SOLUTION RESPIRATORY (INHALATION) 2 TIMES DAILY
Qty: 60 VIAL | Refills: 2 | Status: SHIPPED | OUTPATIENT
Start: 2018-01-09

## 2018-01-09 RX ADMIN — METHYLPREDNISOLONE SODIUM SUCCINATE 30 MG: 40 INJECTION, POWDER, FOR SOLUTION INTRAMUSCULAR; INTRAVENOUS at 14:05

## 2018-01-09 RX ADMIN — LEVOFLOXACIN 750 MG: 5 INJECTION, SOLUTION INTRAVENOUS at 11:07

## 2018-01-09 RX ADMIN — LISINOPRIL 5 MG: 5 TABLET ORAL at 08:21

## 2018-01-09 RX ADMIN — Medication 10 ML: at 14:05

## 2018-01-09 RX ADMIN — IPRATROPIUM BROMIDE AND ALBUTEROL SULFATE 3 ML: .5; 3 SOLUTION RESPIRATORY (INHALATION) at 11:13

## 2018-01-09 RX ADMIN — ENOXAPARIN SODIUM 40 MG: 40 INJECTION SUBCUTANEOUS at 02:58

## 2018-01-09 RX ADMIN — IPRATROPIUM BROMIDE AND ALBUTEROL SULFATE 3 ML: .5; 3 SOLUTION RESPIRATORY (INHALATION) at 07:08

## 2018-01-09 RX ADMIN — METHYLPREDNISOLONE SODIUM SUCCINATE 30 MG: 40 INJECTION, POWDER, FOR SOLUTION INTRAMUSCULAR; INTRAVENOUS at 02:58

## 2018-01-09 RX ADMIN — FOLIC ACID 1 MG: 1 TABLET ORAL at 08:21

## 2018-01-09 RX ADMIN — LORAZEPAM 1 MG: 2 INJECTION INTRAMUSCULAR at 07:06

## 2018-01-09 RX ADMIN — OXYCODONE HYDROCHLORIDE AND ACETAMINOPHEN 1 TABLET: 5; 325 TABLET ORAL at 12:49

## 2018-01-09 RX ADMIN — BUDESONIDE 500 MCG: 0.5 INHALANT RESPIRATORY (INHALATION) at 07:08

## 2018-01-09 RX ADMIN — LORAZEPAM 1 MG: 2 INJECTION INTRAMUSCULAR at 11:06

## 2018-01-09 RX ADMIN — OXYCODONE HYDROCHLORIDE AND ACETAMINOPHEN 1 TABLET: 5; 325 TABLET ORAL at 06:51

## 2018-01-09 RX ADMIN — Medication 100 MG: at 08:21

## 2018-01-09 RX ADMIN — Medication 10 ML: at 07:06

## 2018-01-09 RX ADMIN — OXYCODONE HYDROCHLORIDE 10 MG: 5 TABLET ORAL at 14:05

## 2018-01-09 RX ADMIN — OXYCODONE HYDROCHLORIDE 10 MG: 5 TABLET ORAL at 08:21

## 2018-01-09 RX ADMIN — FAMOTIDINE 20 MG: 20 TABLET ORAL at 08:21

## 2018-01-09 RX ADMIN — OXYCODONE HYDROCHLORIDE 10 MG: 5 TABLET ORAL at 01:27

## 2018-01-09 RX ADMIN — LORAZEPAM 1 MG: 2 INJECTION INTRAMUSCULAR at 02:57

## 2018-01-09 NOTE — PROGRESS NOTES
[facility]                  Pulmonary, Critical Care, and Sleep Medicine     [facility] at [address] [facility]   [address] [facility], [address] [place] [address], [address] [place] [address], [place], [address]   Phone: [phone]. Fax: [phone] Phone: [phone]. Fax: [phone]     Name: Nubia Herbert MRN: [id number]   : 1965 Hospital: [facility] [place]   Date: 2018  Room: [address] [place] [address] Progress Note    Subjective:     Patient with chronic trach tube due to laryngeal cancer. Patient ENT physician is Dr. Hardy Ellsworth. Patient was diagnosed with throat cancer about 1 year ago, treated with surgery, radiation, chemotherapy at [facility] [place]. The patient has hx of emergent tracheotomy performed due to significant laryngeal edema from his radiation and chemo in Aug 2017. Patient has chronic trach tube-currently 6cfs changed 2 mths ago per patient. He has chronic peg tube, but says was cleared for pudding about 2 weeks ago. Patient actively smokes orally, alcoholic. Patient called EMS with c/o shortness of breath and cough symptoms. His o2 sats was low which improved after suctioning of thick secretions from his trach tube by EMS.     18:  Transferred out of ICU on 18  Doing well. No  Overnight events. Mild cough with clear expectoration. No hemoptysis. No fever chills sob at rest.   Walking inside room without lowe or wheezing. On RA spo2 98%  Trach with PMV valve.      SH-active smoker orally; history of ETOH use, reportedly drank 2 beers PTA    No Known Allergies   Social History   Substance Use Topics    Smoking status: Former Smoker     Packs/day: 0.50    Smokeless tobacco: Never Used    Alcohol use No        Current Facility-Administered Medications   Medication Dose Route Frequency    famotidine (PEPCID) tablet 20 mg  20 mg Per G Tube BID    levoFLOXacin (LEVAQUIN) 750 mg in D5W IVPB  750 mg IntraVENous Q24H    budesonide (PULMICORT) 500 mcg/2 ml nebulizer suspension  500 mcg Nebulization BID RT    methylPREDNISolone (PF) (SOLU-MEDROL) injection 30 mg  30 mg IntraVENous Q12H    insulin lispro (HUMALOG) injection   SubCUTAneous AC&HS    cloNIDine (CATAPRES) 0.2 mg/24 hr patch 1 Patch  1 Patch TransDERmal Q7D    lisinopril (PRINIVIL, ZESTRIL) tablet 5 mg  5 mg Oral Q12H    enoxaparin (LOVENOX) injection 40 mg  40 mg SubCUTAneous Q24H    Thiamine Mononitrate (B-1) tablet 100 mg  100 mg Oral DAILY    folic acid (FOLVITE) tablet 1 mg  1 mg Oral DAILY    sodium chloride (NS) flush 5-10 mL  5-10 mL IntraVENous Q8H    albuterol-ipratropium (DUO-NEB) 2.5 MG-0.5 MG/3 ML  3 mL Nebulization QID RT       Review of Systems:  HEENT: No epistaxis, denies difficulty in swallowing  Respiratory: as above  Cardiovascular: no chest pain, no palpitations  Gastrointestinal: no abd pain, no vomiting, no diarrhea  Genitourinary: No urinary symptoms  Integument/breast: No ulcers  Musculoskeletal: chronic pains  Neuro no depressionlogical: No focal weakness, no seizures, no headaches  Constitutional: no fever, no chills, + weight loss, no night sweats     Objective:   Vital Signs:    Visit Vitals    /70 (BP 1 Location: Left arm, BP Patient Position: At rest)    Pulse 94    Temp 97.6 °F (36.4 °C)    Resp 15    Wt 74.5 kg (164 lb 3.9 oz)    SpO2 99%    BMI 24.97 kg/m2       O2 Device: Room air   O2 Flow Rate (L/min): 14 l/min   Temp (24hrs), Av.8 °F (36.6 °C), Min:97.4 °F (36.3 °C), Max:98.2 °F (36.8 °C)       Intake/Output:   Last shift:      701 - 1900  In: 300   Out: -   Last 3 shifts: 1901 -  0700  In: 2204 [P. O.:980; I.V.:150]  Out: 1425 [Urine:1425]    Intake/Output Summary (Last 24 hours) at 18 1146  Last data filed at 18 0830   Gross per 24 hour   Intake              700 ml   Output              200 ml   Net              500 ml Physical Exam:   General: comfortable; on room air, sitting in bed. Neck: No adenopathy or thyroid swelling; trach tube midline 6 cfs -no bleeding or discharge  CVS: S1S2 no murmurs  RS: Mod AE bilaterally, no wheezing, no crackles. No rhonchi. No prolonged expiration. Abd: soft, non tender, no hepatosplenomegaly, no distension or guarding or rigidity, bowel sounds heard  Neuro: non focal, awake, alert  Extrm: no leg edema or clubbing or cyanosis  Skin: no rash  Lines/Tubes:  PIVs  Trach tube   Peg tube    Data review:     Recent Results (from the past 12 hour(s))   CBC W/O DIFF    Collection Time: 01/09/18  4:55 AM   Result Value Ref Range    WBC 4.4 (L) 4.6 - 13.2 K/uL    RBC 2.81 (L) 4.70 - 5.50 M/uL    HGB 9.7 (L) 13.0 - 16.0 g/dL    HCT 28.5 (L) 36.0 - 48.0 %    .4 (H) 74.0 - 97.0 FL    MCH 34.5 (H) 24.0 - 34.0 PG    MCHC 34.0 31.0 - 37.0 g/dL    RDW 14.8 (H) 11.6 - 14.5 %    PLATELET 622 468 - 480 K/uL    MPV 9.1 (L) 9.2 - 24.5 FL   METABOLIC PANEL, BASIC    Collection Time: 01/09/18  4:55 AM   Result Value Ref Range    Sodium 138 136 - 145 mmol/L    Potassium 4.0 3.5 - 5.5 mmol/L    Chloride 103 100 - 108 mmol/L    CO2 25 21 - 32 mmol/L    Anion gap 10 3.0 - 18 mmol/L    Glucose 119 (H) 74 - 99 mg/dL    BUN 21 (H) 7.0 - 18 MG/DL    Creatinine 0.78 0.6 - 1.3 MG/DL    BUN/Creatinine ratio 27 (H) 12 - 20      GFR est AA >60 >60 ml/min/1.73m2    GFR est non-AA >60 >60 ml/min/1.73m2    Calcium 9.2 8.5 - 10.1 MG/DL   PHOSPHORUS    Collection Time: 01/09/18  4:55 AM   Result Value Ref Range    Phosphorus 4.2 2.5 - 4.9 MG/DL   MAGNESIUM    Collection Time: 01/09/18  4:55 AM   Result Value Ref Range    Magnesium 2.0 1.6 - 2.6 mg/dL   GLUCOSE, POC    Collection Time: 01/09/18  7:09 AM   Result Value Ref Range    Glucose (POC) 117 (H) 70 - 110 mg/dL        Ref.  Range 1/4/2018 23:40   ALCOHOL(ETHYL),SERUM Latest Ref Range: 0 - 3 MG/ (H)       Imaging:  I have personally reviewed the patients radiographs and have reviewed the reports:    Results from Hospital Encounter encounter on 01/04/18   XR CHEST PORT   Narrative ---------------------------------------------------------------------------  <<<<<<<<< [facility] >>>>>>>>>   ---------------------------------------------------------------------------    CLINICAL HISTORY:  Cough. COMPARISON EXAMINATIONS:  January 5, 2018. ---  SINGLE FRONTAL VIEW OF THE CHEST  ---    There appears to be minimal atelectatic change at the right lung base. The lungs  and pleural spaces are otherwise clear. The mediastinum is unremarkable in  appearance. A tracheostomy is noted in place. No significant osseous  abnormalities are identified.        --------------       Impression Impression:  Minimal atelectatic change at the right base similar to prior. No new focal consolidation or pleural effusion. No significant change. IMPRESSION:   · Acute resp failure - hypoxemic - due to mucus plugging. Acute respiratory failure resolved. · Acute bronchitis/tracheitis - MSSA  · Chronic trach tube, changed trach on 1/8/18  · Laryngeal cancer hx-s/p surgery, chemo-radiation  · Wheezing-COPD  · Chronic pain throat  · Active smoker  · Alcoholism   · Chronic peg tube      RECOMMENDATIONS:   · Pulm: patient is ready for discharge home. Change pulmonary medications to probana nik, pulmicort bid, atrovent qid and duoneb prn. Change to po steroids and taper over next 5 days. Trach changed on 1/8/18. C/w f/u with ENT on discharge for trach care. Strongly advised and encouraged to quit smoking. · Cardiac: stable; BP meds  · ID: trach resp cxs-MSSA resistant to clinda and sens to levaquin; changed antibiotics to [id number] days since 1/7/18  Other issues being managed by primary team and respective consultants. · D/w patient, pharmacist, Dr. Kevin Alcantara. · Patient has no diop or central line  · OK to discharge from pulmonary standpoint.       Mod complexity decision making was performed in this consultation and evaluation of this patient        Donya Null MD 1/9/2018

## 2018-01-09 NOTE — DISCHARGE SUMMARY
Discharge Summary    Patient: Oleg Alford MRN: [id number]  CSN: [id number]    YOB: 1965  Age: 45 y.o. Sex: male    DOA: 1/4/2018 LOS:  LOS: 4 days   Discharge Date: 1/9/2018     Primary Care Provider:  Addie Munguia MD    Admission Diagnoses: Acute tracheitis    Discharge Diagnoses:    Problem List as of 1/9/2018  Date Reviewed: 11/15/2017          Codes Class Noted - Resolved    * (Principal)Acute tracheitis ICD-10-CM: J04.10  ICD-9-CM: 464.10  1/5/2018 - Present        Aspiration pneumonia ([facility] ICD-10-CM: J69.0  ICD-9-CM: 507.0  11/15/2017 - Present        Tracheostomy dependence ([facility] ICD-10-CM: Z93.0  ICD-9-CM: V44.0  8/25/2017 - Present        Severe protein-calorie malnutrition Lili Ply: less than 60% of standard weight) ([facility] ICD-10-CM: E43  ICD-9-CM: 262  8/18/2017 - Present        COPD exacerbation ([facility] ICD-10-CM: J44.1  ICD-9-CM: 491.21  8/16/2017 - Present        Acute hypoxemic respiratory failure ([facility] ICD-10-CM: J96.01  ICD-9-CM: 518.81  8/16/2017 - Present        Throat cancer ([facility] ICD-10-CM: C14.0  ICD-9-CM: 149.0  7/30/2017 - Present        Status post insertion of percutaneous endoscopic gastrostomy (PEG) tube [facility]) ICD-10-CM: Z93.1  ICD-9-CM: V44.1  7/30/2017 - Present        Hyponatremia ICD-10-CM: E87.1  ICD-9-CM: 276.1  7/30/2017 - Present              Discharge Medications:     Discharge Medication List as of 1/9/2018  2:38 PM      START taking these medications    Details   arformoterol (BROVANA) 15 mcg/2 mL nebu neb solution 2 mL by Nebulization route two (2) times a day., Normal, Disp-60 Vial, R-2      budesonide (PULMICORT) 0.5 mg/2 mL nbsp 2 mL by Nebulization route two (2) times a day., Normal, Disp-60 Each, R-2      ipratropium (ATROVENT) 0.02 % soln 2.5 mL by Nebulization route four (4) times daily. , Normal, Disp-120 Vial, R-2      levoFLOXacin (LEVAQUIN) 750 mg tablet Take 1 Tab by mouth daily for 5 days. , Normal, Disp-5 Tab, R-0         CONTINUE these medications which have CHANGED    Details   predniSONE (DELTASONE) 10 mg tablet Prednisone 10mg tabs: p.o.  4 tabs daily for 3 days then drop to   3 tabs daily for 3 days then drop to   2 tabs daily for 3 days then drop to   1 tab daily for 3 days then stop. Dispense 30 tabs, Print, Disp-30 Tab, R-0         CONTINUE these medications which have NOT CHANGED    Details   cloNIDine (CATAPRES) 0.2 mg/24 hr patch 1 Patch by TransDERmal route every seven (7) days. , Print, Disp-4 Patch, R-0      fentaNYL (DURAGESIC) 100 mcg/hr PATCH 1 Patch by TransDERmal route every seventy-two (72) hours. Max Daily Amount: 1 Patch., Print, Disp-5 Patch, R-0      guaiFENesin (ROBITUSSIN) 100 mg/5 mL liquid Take 5 mL by mouth every four (4) hours as needed for Cough. , Print, Disp-200 mL, R-0      lisinopril (PRINIVIL, ZESTRIL) 5 mg tablet Take 1 Tab by mouth every twelve (12) hours. , Print, Disp-30 Tab, R-0      oxyCODONE IR (ROXICODONE) 10 mg tab immediate release tablet 1 Tab by Per G Tube route every six (6) hours as needed. Max Daily Amount: 40 mg., Print, Disp-30 Tab, R-0      magic mouthwash (FIRST-MOUTHWASH BLM) 549--40 mg/30 mL mwsh oral suspension Take 10 mL by mouth every four (4) hours as needed., Historical Med      albuterol-ipratropium (DUO-NEB) 2.5 mg-0.5 mg/3 ml nebu 3 mL by Nebulization route every four (4) hours as needed. , Print, Disp-30 Nebule, R-1      multivitamin, tx-iron-ca-min (THERA-M W/ IRON) 9 mg iron-400 mcg tab tablet Take 1 Tab by mouth daily. , Print, Disp-30 Tab, R-1      Nebulizer Accessories kit Use nebs Q4H PRN, Print, Disp-1 Kit, R-0         STOP taking these medications       oxyCODONE-acetaminophen (PERCOCET) 5-325 mg per tablet Comments:   Reason for Stopping:         amoxicillin-clavulanate (AUGMENTIN) 400-57 mg/5 mL suspension Comments:   Reason for Stopping:         fentaNYL (DURAGESIC) 50 mcg/hr PATCH Comments:   Reason for Stopping:               Discharge Condition: Good    Procedures : trach change on 01/08/2018    Consults: Pulmonary/Critical Care      PHYSICAL EXAM   Visit Vitals    /70 (BP 1 Location: Left arm, BP Patient Position: At rest)    Pulse 94    Temp 97.6 °F (36.4 °C)    Resp 15    Wt 74.5 kg (164 lb 3.9 oz)    SpO2 99%    BMI 24.97 kg/m2     General: Awake, cooperative, no acute distress    HEENT: NC, Atraumatic. PERRLA, EOMI. Anicteric sclerae. Trach in place  Lungs:  CTA Bilaterally. No Wheezing/Rhonchi/Rales. Heart:  Regular  rhythm,  No murmur, No Rubs, No Gallops  Abdomen: Soft, Non distended, Non tender. +Bowel sounds,   Extremities: No c/c/e  Psych:   Not anxious or agitated. Neurologic:  No acute neurological deficits. Admission HPI :   Gini Cespedes is a 45 y.o. male who has past history of COPD, HTN, laryngeal ca, s/p trach and PEG is brought to ER by EMS with complains of SOB. Patient reports that he has been feeling weak and having cough for over a week. Since yesterday he started feeling more SOB. EMS was called and patient found to have thick secretions from his tracheostomy tube. He was suctioned by EMS. Initial oxygen sats were 85% on RA and improved to 98% after suctioning. Associated symptoms include diaphoresis, flushing. He has history of ETOH use, he reports he drank 2 beers yesterday. Unfortunately continues to smoke. In ER he was again suctioned deep, with resp therapist, thick mucous and blood clots removed from tracheostomy site and from deep suctioning. CXR unremarkable, official read pending. Hospital Course :   Patient initially admitted to ICU. Acute respiratory failure - secondary to COPD exacerbation, aspiration pneumonia and mucous plugging. He was seen and followed by pulmonary critical care. His respiratory status improved and he is now at his baseline. Trach dependent. He is strongly advised to stop smoking     Acute bronchitis/tracheitis - seen by ENT, recommended trach change.  He had trach changed by pulmonary on 01/08/2018. He will need follow up with ENT.     Pneumonia - resp cultures with growth of MSSA,    Antibiotics - levaquin, complete 10 day course.       COPD exacerbation - started on solumedrol, inhaled steroids/bronchodilators. Pulmonary recommended discharging on pulmicort, brovana and atrovent, duo neb as needed. Will also discharge on prednisone taper dose.      HTN - continued home medications. On clonidine patch, lisinopril.      Continued alcohol use -  started on MVT, folic acid, thiamine. CIWA protocol. He drinks beers regularly from peg tube. He is strongly advised to stop alcohol. Activity: Activity as tolerated    Diet: Per PEG    Follow-up: PCP, pulmonary, ENT    Disposition: home     Minutes spent on discharge: 45       Labs: Results:       Chemistry Recent Labs      01/09/18 0455 01/08/18   0609  01/07/18   0623   GLU  119*  116*  182*   NA  138  137  137   K  4.0  4.6  4.1   CL  103  101  102   CO2  25  27  26   BUN  21*  25*  17   CREA  0.78  0.88  0.83   CA  9.2  9.1  8.7   AGAP  10  9  9   BUCR  27*  28*  20      CBC w/Diff Recent Labs      01/09/18 0455 01/08/18   0609  01/07/18   0623   WBC  4.4*  5.2  6.1   RBC  2.81*  3.06*  2.87*   HGB  9.7*  10.5*  9.8*   HCT  28.5*  31.0*  29.0*   PLT  175  194  196      Cardiac Enzymes No results for input(s): CPK, CKND1, CHUCKY in the last 72 hours. No lab exists for component: CKRMB, TROIP   Coagulation No results for input(s): PTP, INR, APTT in the last 72 hours. No lab exists for component: INREXT    Lipid Panel No results found for: CHOL, CHOLPOCT, CHOLX, CHLST, CHOLV, 364568, HDL, LDL, LDLC, DLDLP, 541115, VLDLC, VLDL, TGLX, TRIGL, TRIGP, TGLPOCT, CHHD, CHHDX   BNP No results for input(s): BNPP in the last 72 hours. Liver Enzymes No results for input(s): TP, ALB, TBIL, AP, SGOT, GPT in the last 72 hours.     No lab exists for component: DBIL   Thyroid Studies Lab Results   Component Value Date/Time TSH 0.32 01/05/2018 10:15 AM            Significant Diagnostic Studies: Xr Chest Port    Result Date: 1/7/2018  --------------------------------------------------------------------------- <<<<<<<<<           [facility]           >>>>>>>>> --------------------------------------------------------------------------- CLINICAL HISTORY:  Cough. COMPARISON EXAMINATIONS:  January 5, 2018. ---  SINGLE FRONTAL VIEW OF THE CHEST  --- There appears to be minimal atelectatic change at the right lung base. The lungs and pleural spaces are otherwise clear. The mediastinum is unremarkable in appearance. A tracheostomy is noted in place. No significant osseous abnormalities are identified.  --------------    Impression: Minimal atelectatic change at the right base similar to prior. No new focal consolidation or pleural effusion. No significant change. Xr Chest Port    Result Date: 1/5/2018  AP portable chest 1/4/2018 at 2354 hours: INDICATION: Shortness of breath. Comparison 12/18/2017. Decreased lung volumes. Potential minimal right basilar atelectasis or infiltrate. Otherwise lungs are clear. No large effusion. The heart is stable. No congestive heart failure. Tracheostomy tube is unchanged in good position. IMPRESSION: Hypoinflation. Possible early right basilar process, atelectasis/infiltrate. Follow-up frontal and lateral suggested when clinically feasible. No other evidence of active disease. Xr Chest Port    Result Date: 12/19/2017  EXAM: CHEST X-RAY  Technique:  Frontal chest. History: Pain and swelling to tracheostomy since last night, tracheostomy replaced 2 weeks ago, dislodged the preceding evening requiring replacement by spouse Comparison: 11/15/2017 _______________ FINDINGS: -Tracheostomy tip is in satisfactory position 6.5 cm above the reji. The trachea is midline. The cardiomediastinal silhouette is within normal limits.   The lungs are grossly clear without evidence of focal consolidation, effusion, or pneumothorax. Intact osseous structures. _______________     IMPRESSION: 1. Satisfactory position of tracheostomy tip. No acute cardiopulmonary process. No results found for this or any previous visit.         CC: Leif Morocho MD

## 2018-01-09 NOTE — PROGRESS NOTES
Chart reviewed. Spoke with Roseanna Palacio from [facility]. Roseanna Palacio will see patient to speak with him about obtaining insurance. CM will cont to follow. Robert Sullivan. Pt admitted for hospital for acute tracheitis. Pt has PMH of COPD, trach. Pt to discharge today. Pt offered [facility] for HH. Pt chose [id number] [facility] [facility]. Referral sent to [address] [facility] liaisonMagui. Referral given to CMS for assistance. CM will cont to follow. [phone] and spoke with patient regarding obtaining his prescriptions at discharge. Pt stated he would be able to purchase them. Readmission Risk Assessment:     Moderate Risk and MSSP/Good Help ACO patients    RRAT Score:  13-20    Initial Assessment:  See above     Emergency Contact:              Kely Sena Spouse   [phone]             Pertinent Medical Hx:     See above    PCP/Specialists:  PCP MD Charlene ROMEO. 76.:       DME:      Has walker at home for use    Moderate Risk Care Transition Plan:  1. Evaluate for [place] or H2H, SNF, acute rehab, community care coordination of resources. 2. Involve patient/caregiver in assessment, planning, education and implement of intervention. 3. CM daily patient care huddles/interdisciplinary rounds. 4. PCP/Specialist appointment within 5  7 days made prior to discharge. 5. Facilitate transportation and logistics for follow-up appointments. 6. Medication reconciliation [id number]  7. Formal handoff between hospital provider and post-acute provider to transition patient  Handoff to [id number] Nurse Navigator or PCP practice. Care Management Interventions  PCP Verified by CM:  Yes  Transition of Care Consult (CM Consult): Discharge Planning  Health Maintenance Reviewed: Yes  Speech Therapy Consult: Yes  Discharge Location  Discharge Placement: Home

## 2018-01-09 NOTE — PROGRESS NOTES
Shift Summary:  Patient spent uneventful shift. No issues noted. See flow sheet and MAR. Patient medicated for anxiety and pain throughout shift  Per PRN orders. See MAR.

## 2018-01-09 NOTE — PROGRESS NOTES
0730 Assumed care of pt from [facility]. Pt resting quietly sitting up in bed , no signs of distress, call bell within reach. Shift Summary- Shift uneventful. Pt rested throughout shift. Given PRN Ativan for CIWA 8 at 1130, and given PRN Norco and Percocet for pain in his throat and trach, PEG tube CDI, Pt ambulated within his room, did not complain of shortness of breath.  Did not want to have his can feedings, states that he wanted to \"wait off\"

## 2018-01-09 NOTE — ROUTINE PROCESS
Dual AVS reviewed with Marianne Lord RN. All medications reviewed individually with patient. Opportunities for questions and concerns provided. Patient discharged via (mode of transport ie. Car, ambulance or air transport) car. Patient's arm band appropriately discarded.

## 2018-01-09 NOTE — ROUTINE PROCESS
TRANSFER - IN REPORT:    Verbal report received from MANAN Rodrigues RN(name) on Lurdes Lien  being received from ICU(unit) for routine progression of care      Report consisted of patients Situation, Background, Assessment and   Recommendations(SBAR). Information from the following report(s) SBAR, Kardex, Intake/Output, MAR and Recent Results was reviewed with the receiving nurse. Opportunity for questions and clarification was provided.

## 2018-01-09 NOTE — PROGRESS NOTES
Problem: Dysphagia (Adult)  Goal: *Acute Goals and Plan of Care (Insert Text)  Recommendations:  Diet: NPO; TF recommendations   Snacks - 4 oz puree 3x daily  Meds: Via TF/IV  Aspiration Precautions  Oral Care TID    Goals:  Patient will:  1. Tolerate PO trials with 0 s/s overt distress in 4/5 trials  2. Utilize compensatory swallow strategies/maneuvers (decrease bite/sip, size/rate, alt. liq/sol) with min cues in 4/5 trials  3. Perform oral-motor/laryngeal exercises to increase oropharyngeal swallow function with min cues  4. Complete an objective swallow study (i.e., MBSS) to assess swallow integrity, r/o aspiration, and determine of safest LRD, min A        Outcome: Progressing Towards Goal  Speech language pathology dysphagia treatment    Patient: Damien Seay (1 y.o. male)  Date: 1/9/2018  Diagnosis: Acute tracheitis Acute tracheitis  Precautions: Aspiration. Fall  PLOF:NPO with tube feeds; pleasure feeds of pudding. ASSESSMENT:  Swallowing precautions, exercises, diet recommendations discussed and provided on a handout to the patient. All questions and concerned addressed. Exercises not initiated as the patient was preparing to d/c home. Recommend:   Sit upright during PO. Spoon bites of puree x3 daily. Double Swallow. Effortful Swallow. Exercises: Bertrum Beckford & Effortful Swallow  3 sets of 10 daily for 4 weeks    Progression toward goals:  [x]         Improving appropriately and progressing toward goals  []         Improving slowly and progressing toward goals  []         Not making progress toward goals and plan of care will be adjusted     PLAN:  Recommendations and Planned Interventions:  As above. Patient continues to benefit from skilled intervention to address the above impairments. Continue treatment per established plan of care.   Discharge Recommendations:  Outpatient vs Home Health      SUBJECTIVE:   Patient stated I was hoping you'd come in today and give me a handout. OBJECTIVE:   Cognitive and Communication Status:  Neurologic State: Alert  Orientation Level: Oriented X4  Cognition: Appropriate decision making, Appropriate for age attention/concentration, Appropriate safety awareness, Follows commands  Perception: Appears intact  Perseveration: No perseveration noted  Safety/Judgement: Insight into deficits  Dysphagia Treatment:  Oral Assessment:  Oral Assessment  Labial: No impairment  Dentition: Poor, Other (comment) (2 teeth)  Oral Hygiene: poor  Lingual: Decreased strength  Velum: No impairment  Mandible: No impairment  Gag Reflex:  (not assessed)  P.O.  Trials:   Patient Position: EOB   Vocal quality prior to P.O.: Breathy, Hoarse, Low volume, Phonation breaks   Consistency Presented: Honey thick liquid, Puree   How Presented: Spoon, SLP-fed/presented       Bolus Acceptance: No impairment   Bolus Formation/Control: Impaired   Type of Impairment: Delayed   Propulsion: Delayed (# of seconds)   Oral Residue: None   Initiation of Swallow: Delayed (# of seconds)   Laryngeal Elevation: Decreased   Aspiration Signs/Symptoms: None   Pharyngeal Phase Characteristics: Poor endurance   Effective Modifications: Double swallow, Spoon, Small sips and bites   Cues for Modifications: Maximal     Oral Phase Severity: Mild-moderate   Pharyngeal Phase Severity : Moderate-severe        PAIN:  Start of Tx: 0  End of Tx: 0     After treatment:   []              Patient left in no apparent distress sitting up in chair  [x]              Patient left in no apparent distress in bed  [x]              Call bell left within reach  []              Nursing notified  []              Family present  []              Caregiver present  []              Bed alarm activated      COMMUNICATION/EDUCATION:   [x] Aspiration precautions; swallow safety; compensatory techniques  []        Patient unable to participate in education; education ongoing with staff  [x]  Posted safety precautions in patient's room.  [x] Oral-motor/laryngeal strengthening exercises handout      Vish Jorgensen, SLP  Time Calculation: 9 mins

## 2018-01-09 NOTE — PROGRESS NOTES
Problem: Dysphagia (Adult)  Goal: *Acute Goals and Plan of Care (Insert Text)  Recommendations:  Diet: NPO; TF recommendations   Snacks - 4 oz puree 3x daily  Meds: Via TF/IV  Aspiration Precautions  Oral Care TID    Goals:  Patient will:  1. Tolerate PO trials with 0 s/s overt distress in 4/5 trials  2. Utilize compensatory swallow strategies/maneuvers (decrease bite/sip, size/rate, alt. liq/sol) with min cues in 4/5 trials  3. Perform oral-motor/laryngeal exercises to increase oropharyngeal swallow function with min cues  4. Complete an objective swallow study (i.e., MBSS) to assess swallow integrity, r/o aspiration, and determine of safest LRD, min A       Outcome: Progressing Towards Goal  Speech language pathology dysphagia treatment    Patient: Jaya Sherman (12 y.o. male)  Date: 1/8/2018  Diagnosis: Acute tracheitis Acute tracheitis  Precautions: Aspiration. Fall    ASSESSMENT:  PMV placed and patient tolerating prior to trials. PO trials of pureed solids and honey thick liquids administered as the patient did not aspirate these consistencies during his most recent MBS 11/2017. S/sx of penetration was strong cough per [facility]. Completed 10 reps of effortful swallow exercises & 5 repetitions of the [employer]. Tolerated 10 1/4 tsp bites of pureed, 10 5ml bites of pureed, 10 1/4 tsp sips of HTL, and 10 5 ml sips of HTL without overt s/sx of aspiration given maximum cues for correct positioning, effortful swallows and double swallow technique. SLP administered PO trials by spoon to control rate and size. Education provided; patient verbalized/demonstrated understanding. D/w AZAM Adams. Total Swallows - 50 swallows this session. Decreased endurance noted as patient requested for breaks between each set of 10 PO trials. Recommend 4 oz pureed snack 3x daily when seated in chair or upright. Remain upright >30 minutes.      Progression toward goals:  [x]         Improving appropriately and progressing toward goals  []         Improving slowly and progressing toward goals  []         Not making progress toward goals and plan of care will be adjusted     PLAN:  Recommendations and Planned Interventions:  As above. Patient continues to benefit from skilled intervention to address the above impairments. Continue treatment per established plan of care. Discharge Recommendations:  Outpatient     SUBJECTIVE:   Patient stated That's the most I've done since I got this trach. OBJECTIVE:   Cognitive and Communication Status:  Neurologic State: Alert  Orientation Level: Oriented X4  Cognition: Appropriate decision making, Appropriate for age attention/concentration, Appropriate safety awareness, Follows commands  Perception: Appears intact  Perseveration: No perseveration noted  Safety/Judgement: Awareness of environment, Insight into deficits  Dysphagia Treatment:  Oral Assessment:  Oral Assessment  Labial: No impairment  Dentition: Poor, Other (comment) (2 teeth)  Oral Hygiene: poor  Lingual: Decreased strength  Velum: No impairment  Mandible: No impairment  Gag Reflex:  (not assessed)  P.O.  Trials:   Patient Position: EOB   Vocal quality prior to P.O.: Breathy, Hoarse, Low volume, Phonation breaks   Consistency Presented: Honey thick liquid, Puree   How Presented: Spoon, SLP-fed/presented   Bolus Acceptance: No impairment   Bolus Formation/Control: Impaired   Type of Impairment: Delayed   Propulsion: Delayed (# of seconds)   Oral Residue: None   Initiation of Swallow: Delayed (# of seconds)   Laryngeal Elevation: Decreased   Aspiration Signs/Symptoms: None   Pharyngeal Phase Characteristics: Poor endurance   Effective Modifications: Double swallow, Spoon, Small sips and bites   Cues for Modifications: Maximal   Oral Phase Severity: Mild-moderate   Pharyngeal Phase Severity : Moderate-severe      Exercises:  Laryngeal Exercises:  Mendelsohn Maneuver: Yes  Sets : 1  Reps : 5  Effortful Swallow: Yes  Sets : 2  Reps : 5    PAIN:  Start of Tx: 0  End of Tx: 0     After treatment:   []              Patient left in no apparent distress sitting up in chair  [x]              Patient left in no apparent distress in bed  [x]              Call bell left within reach  [x]              Nursing notified  []              Family present  []              Caregiver present  []              Bed alarm activated      COMMUNICATION/EDUCATION:   [x] Aspiration precautions; swallow safety; compensatory techniques  [x]        Patient unable to participate in education; education ongoing with staff  [x]  Posted safety precautions in patient's room.   [x] Oral-motor/laryngeal strengthening exercises      Malena López V, SLP  Time Calculation: 39 mins

## 2018-01-09 NOTE — ROUTINE PROCESS
Bedside and Verbal shift change report given to Charlette Bloom RN (oncoming nurse) by JESUS MANUEL Funez RN (offgoing nurse). Report included the following information SBAR, Kardex, Intake/Output, MAR and Recent Results.

## 2018-01-09 NOTE — DISCHARGE INSTRUCTIONS
DISCHARGE SUMMARY from Nurse    PATIENT INSTRUCTIONS:      Recommended activity: Activity as tolerated. *  Please give a list of your current medications to your Primary Care Provider. *  Please update this list whenever your medications are discontinued, doses are      changed, or new medications (including over-the-counter products) are added. *  Please carry medication information at all times in case of emergency situations. These are general instructions for a healthy lifestyle:    No smoking/ No tobacco products/ Avoid exposure to second hand smoke  Surgeon General's Warning:  Quitting smoking now greatly reduces serious risk to your health. Obesity, smoking, and sedentary lifestyle greatly increases your risk for illness    A healthy diet, regular physical exercise & weight monitoring are important for maintaining a healthy lifestyle    You may be retaining fluid if you have a history of heart failure or if you experience any of the following symptoms:  Weight gain of 3 pounds or more overnight or 5 pounds in a week, increased swelling in our hands or feet or shortness of breath while lying flat in bed. Please call your doctor as soon as you notice any of these symptoms; do not wait until your next office visit. Recognize signs and symptoms of STROKE:    F-face looks uneven    A-arms unable to move or move unevenly    S-speech slurred or non-existent    T-time-call 911 as soon as signs and symptoms begin-DO NOT go       Back to bed or wait to see if you get better-TIME IS BRAIN. Warning Signs of HEART ATTACK     Call 911 if you have these symptoms:   Chest discomfort. Most heart attacks involve discomfort in the center of the chest that lasts more than a few minutes, or that goes away and comes back. It can feel like uncomfortable pressure, squeezing, fullness, or pain.  Discomfort in other areas of the upper body.  Symptoms can include pain or discomfort in one or both arms, the back, neck, jaw, or stomach.  Shortness of breath with or without chest discomfort.  Other signs may include breaking out in a cold sweat, nausea, or lightheadedness. Don't wait more than five minutes to call 911 - MINUTES MATTER! Fast action can save your life. Calling 911 is almost always the fastest way to get lifesaving treatment. Emergency Medical Services staff can begin treatment when they arrive -- up to an hour sooner than if someone gets to the hospital by car. The discharge information has been reviewed with the patient. The patient verbalized understanding. Discharge medications reviewed with the patient and appropriate educational materials and side effects teaching were provided. ___________________________________________________________________________________________________________________________________     Chronic Obstructive Pulmonary Disease (COPD): Care Instructions  Your Care Instructions    Chronic obstructive pulmonary disease (COPD) is a general term for a group of lung diseases, including emphysema and chronic bronchitis. People with COPD have decreased airflow in and out of the lungs, which makes it hard to breathe. The airways also can get clogged with thick mucus. Cigarette smoking is a major cause of COPD. Although there is no cure for COPD, you can slow its progress. Following your treatment plan and taking care of yourself can help you feel better and live longer. Follow-up care is a key part of your treatment and safety. Be sure to make and go to all appointments, and call your doctor if you are having problems. It's also a good idea to know your test results and keep a list of the medicines you take. How can you care for yourself at home? ?Staying healthy  ? · Do not smoke. This is the most important step you can take to prevent more damage to your lungs. If you need help quitting, talk to your doctor about stop-smoking programs and medicines.  These can increase your chances of quitting for good. ? · Avoid colds and flu. Get a pneumococcal vaccine shot. If you have had one before, ask your doctor whether you need a second dose. Get the flu vaccine every fall. If you must be around people with colds or the flu, wash your hands often. ? · Avoid secondhand smoke, air pollution, and high altitudes. Also avoid cold, dry air and hot, humid air. Stay at home with your windows closed when air pollution is bad. ?Medicines and oxygen therapy  ? · Take your medicines exactly as prescribed. Call your doctor if you think you are having a problem with your medicine. ? · You may be taking medicines such as:  ¨ Bronchodilators. These help open your airways and make breathing easier. Bronchodilators are either short-acting (work for 6 to 9 hours) or long-acting (work for 24 hours). You inhale most bronchodilators, so they start to act quickly. Always carry your quick-relief inhaler with you in case you need it while you are away from home. ¨ Corticosteroids (prednisone, budesonide). These reduce airway inflammation. They come in pill or inhaled form. You must take these medicines every day for them to work well. ? · A spacer may help you get more inhaled medicine to your lungs. Ask your doctor or pharmacist if a spacer is right for you. If it is, ask how to use it properly. ? · Do not take any vitamins, over-the-counter medicine, or herbal products without talking to your doctor first.   ? · If your doctor prescribed antibiotics, take them as directed. Do not stop taking them just because you feel better. You need to take the full course of antibiotics. ? · Oxygen therapy boosts the amount of oxygen in your blood and helps you breathe easier. Use the flow rate your doctor has recommended, and do not change it without talking to your doctor first.   Activity  ? · Get regular exercise. Walking is an easy way to get exercise. Start out slowly, and walk a little more each day. ? · Pay attention to your breathing. You are exercising too hard if you cannot talk while you are exercising. ? · Take short rest breaks when doing household chores and other activities. ? · Learn breathing methods-such as breathing through pursed lips-to help you become less short of breath. ? · If your doctor has not set you up with a pulmonary rehabilitation program, talk to him or her about whether rehab is right for you. Rehab includes exercise programs, education about your disease and how to manage it, help with diet and other changes, and emotional support. Diet  ? · Eat regular, healthy meals. Use bronchodilators about 1 hour before you eat to make it easier to eat. Eat several small meals instead of three large ones. Drink beverages at the end of the meal. Avoid foods that are hard to chew. ? · Eat foods that contain protein so that you do not lose muscle mass. ? · Talk with your doctor if you gain too much weight or if you lose weight without trying. ?Mental health  ? · Talk to your family, friends, or a therapist about your feelings. It is normal to feel frightened, angry, hopeless, helpless, and even guilty. Talking openly about bad feelings can help you cope. If these feelings last, talk to your doctor. When should you call for help? Call 911 anytime you think you may need emergency care. For example, call if:  ? · You have severe trouble breathing. ?Call your doctor now or seek immediate medical care if:  ? · You have new or worse trouble breathing. ? · You cough up blood. ? · You have a fever. ? Watch closely for changes in your health, and be sure to contact your doctor if:  ? · You cough more deeply or more often, especially if you notice more mucus or a change in the color of your mucus. ? · You have new or worse swelling in your legs or belly. ? · You are not getting better as expected. Where can you learn more?   Go to http://stan.info/. Zofia Gauthier in the search box to learn more about \"Chronic Obstructive Pulmonary Disease (COPD): Care Instructions. \"  Current as of: May 12, 2017  Content Version: 11.4  © 3510-9988 [employer]. Care instructions adapted under license by [employer] (which disclaims liability or warranty for this information). If you have questions about a medical condition or this instruction, always ask your healthcare professional. [address] any warranty or liability for your use of this information. Learning About Saving Energy When You Have a Chronic Condition  Introduction    Everyday tasks can be tiring when you have COPD, heart failure, or another long-term (chronic) condition. You may feel at times that you've lost your ability to live your life. But learning to conserve, or save, your energy can help you be less tired. Conserving your energy means finding ways of doing daily activities with as little effort as possible. With some small changes in the way you do things, you can get your tasks done more easily. Some treatments are available that might help. Pulmonary rehabilitation can teach you ways to breathe easier. Cardiac rehabilitation can help make your heart stronger. You also may want to see an occupational or physical therapist. The therapist can give you more tips on building strength and moving with less effort. What can you do to conserve your energy? Planning  · Make a list of what you have to do every day. Group the tasks by location. · Do all the chores in one part of your house around the same time. · Go out for errands or do chores at the time of day when you have the most energy. · Plan rest periods into your day. Getting things done  · Sit down as often as you can when you get dressed, do chores, or cook. · Use a cart with wheels to roll items, such as laundry, from one room to another.   · Push or slide boxes or other large items instead of lifting them. Reaching and bending  · Put things you use the most on shelves that are at the level of your waist or shoulder. · Use long-handled grabbers or other tools to reach items on a high shelf or to  things off the floor. Use long-handled dusters when you clean the house. · Use a raised toilet seat to avoid bending too far to sit or stand up. Eating  · Eat several small meals instead of three larger meals. · If you get too tired to eat much, try to choose healthy foods that have more calories. Have a yogurt-and-fruit smoothie for breakfast. Put avocado on a sandwich. Or add cheese or peanut butter to snacks. · If you don't feel very hungry, try to eat first and drink water or other fluids later, after a meal. This can help keep you from losing weight. Sip small amounts of fluids if you need to drink while you eat. Having sex  · Choose the time of day when you have more energy. · A [id number] position for sex can be less tiring. Sometimes you may want to focus more on caressing. Watch closely for changes in your health, and be sure to contact your doctor if you have any problems. Where can you learn more? Go to http://justine-serena.info/. Enter H190 in the search box to learn more about \"Learning About Saving Energy When You Have a Chronic Condition. \"  Current as of: May 12, 2017  Content Version: 11.4  © 9994-9392 [employer]. Care instructions adapted under license by "[employer] (which disclaims liability or warranty for this information). If you have questions about a medical condition or this instruction, always ask your healthcare professional. [place] [address] any warranty or liability for your use of this information.

## 2018-01-09 NOTE — PROGRESS NOTES
Problem: Mobility Impaired (Adult and Pediatric)  Goal: *Acute Goals and Plan of Care (Insert Text)  Physical Therapy Goals  Initiated 1/8/2018 and to be accomplished within 7 day(s)  1. Patient will move from supine to sit and sit to supine  in bed with supervision/set-up. 2.  Patient will transfer from bed to chair and chair to bed with supervision/set-up using the least restrictive device. 3.  Patient will perform sit to stand with supervision/set-up. 4.  Patient will ambulate with supervision/set-up for >150 feet with the least restrictive device. 5.  Patient will ascend/descend 4 stairs with handrail(s) with minimal assistance/contact guard assist for safe home entry. Outcome: Progressing Towards Goal  physical Therapy TREATMENT/DISCHARGE    Patient: Roxy Ortiz (79 y.o. male)  Date: 1/9/2018  Diagnosis: Acute tracheitis Acute tracheitis       Precautions: Fall  Chart, physical therapy assessment, plan of care and goals were reviewed. ASSESSMENT:  Pt is very mobile and not requiring assistance for activities need for home. All goals have been met and Pt is cleared from this level of PT  Progression toward goals:  [x]      Goals met  []      Improving appropriately and progressing toward goals  []      Improving slowly and progressing toward goals  []      Not making progress toward goals and plan of care will be adjusted     PLAN:  Patient will be discharged from physical therapy at this time. Rationale for discharge:  [x] Goals Achieved  [] [address]  [] Patient not participating in therapy  [] Other:  Discharge Recommendations:  [address] or OP PT/SLP  Further Equipment Recommendations for Discharge:  N/A     SUBJECTIVE:   Patient stated I've got to get home. My mother passed away and I have to handle to arrangements.     OBJECTIVE DATA SUMMARY:   Critical Behavior:  Neurologic State: Alert, Eyes open spontaneously  Orientation Level: Oriented X4  Cognition: Appropriate decision making, Appropriate for age attention/concentration, Appropriate safety awareness, Follows commands  Safety/Judgement: Awareness of environment, Insight into deficits  Functional Mobility Training:  Bed Mobility:  Rolling: Supervision  Supine to Sit: Supervision  Sit to Supine: Supervision  Scooting: Supervision  Transfers:  Sit to Stand: Supervision  Stand to Sit: Supervision  Balance:  Sitting: Intact  Standing: Impaired; With support  Standing - Static: Good  Standing - Dynamic : Fair  Ambulation/Gait Training:  Distance (ft): 220 Feet (ft)  Assistive Device: Gait belt  Ambulation - Level of Assistance: Minimal assistance  Gait Abnormalities: Decreased step clearance;Shuffling gait; Path deviations  Base of Support: Widened  Stance: Weight shift  Speed/Vickie: Slow  Step Length: Right shortened;Left shortened  Stairs:  Number of Stairs Trained: 5 (U/D)   Rail Use: None (HHA)  Pain:  Pain Scale 1: Numeric (0 - 10)  Pain Intensity 1: 10  Pain Location 1: Throat  Pain Orientation 1: Lateral  Pain Description 1: Aching  Pain Intervention(s) 1: Medication (see MAR)  Activity Tolerance:   Good  Please refer to the flowsheet for vital signs taken during this treatment.   After treatment:   [] Patient left in no apparent distress sitting up in chair  [x] Patient left in no apparent distress in bed  [x] Call bell left within reach  [x] Nursing notified  [] Caregiver present  [] Bed alarm activated  Siobhan Souza PTA   Time Calculation: 24 mins

## 2018-01-10 ENCOUNTER — HOME CARE VISIT (OUTPATIENT)
Dept: SCHEDULING | Facility: HOME HEALTH | Age: 53
End: 2018-01-10
Payer: MEDICAID

## 2018-01-10 ENCOUNTER — HOME CARE VISIT (OUTPATIENT)
Dept: HOME HEALTH SERVICES | Facility: HOME HEALTH | Age: 53
End: 2018-01-10

## 2018-01-10 PROCEDURE — 400013 HH SOC

## 2018-01-10 PROCEDURE — G0299 HHS/HOSPICE OF RN EA 15 MIN: HCPCS

## 2018-01-11 ENCOUNTER — HOME CARE VISIT (OUTPATIENT)
Dept: SCHEDULING | Facility: HOME HEALTH | Age: 53
End: 2018-01-11
Payer: MEDICAID

## 2018-01-11 ENCOUNTER — HOME CARE VISIT (OUTPATIENT)
Dept: HOME HEALTH SERVICES | Facility: HOME HEALTH | Age: 53
End: 2018-01-11
Payer: MEDICAID

## 2018-01-11 ENCOUNTER — HOME CARE VISIT (OUTPATIENT)
Dept: HOME HEALTH SERVICES | Facility: HOME HEALTH | Age: 53
End: 2018-01-11

## 2018-01-11 VITALS
DIASTOLIC BLOOD PRESSURE: 88 MMHG | OXYGEN SATURATION: 97 % | RESPIRATION RATE: 16 BRPM | TEMPERATURE: 97.5 F | SYSTOLIC BLOOD PRESSURE: 138 MMHG | HEART RATE: 100 BPM

## 2018-01-11 LAB
BACTERIA SPEC CULT: NORMAL
SERVICE CMNT-IMP: NORMAL

## 2018-01-11 PROCEDURE — G0152 HHCP-SERV OF OT,EA 15 MIN: HCPCS

## 2018-01-12 ENCOUNTER — HOME CARE VISIT (OUTPATIENT)
Dept: SCHEDULING | Facility: HOME HEALTH | Age: 53
End: 2018-01-12
Payer: MEDICAID

## 2018-01-12 ENCOUNTER — HOME CARE VISIT (OUTPATIENT)
Dept: HOME HEALTH SERVICES | Facility: HOME HEALTH | Age: 53
End: 2018-01-12
Payer: MEDICAID

## 2018-01-12 VITALS
TEMPERATURE: 98 F | DIASTOLIC BLOOD PRESSURE: 80 MMHG | SYSTOLIC BLOOD PRESSURE: 120 MMHG | RESPIRATION RATE: 18 BRPM | HEART RATE: 99 BPM

## 2018-01-12 VITALS
DIASTOLIC BLOOD PRESSURE: 70 MMHG | OXYGEN SATURATION: 98 % | SYSTOLIC BLOOD PRESSURE: 120 MMHG | HEART RATE: 90 BPM | RESPIRATION RATE: 16 BRPM | TEMPERATURE: 97.5 F

## 2018-01-12 VITALS — OXYGEN SATURATION: 96 % | HEART RATE: 88 BPM | SYSTOLIC BLOOD PRESSURE: 122 MMHG | DIASTOLIC BLOOD PRESSURE: 78 MMHG

## 2018-01-12 PROCEDURE — G0151 HHCP-SERV OF PT,EA 15 MIN: HCPCS

## 2018-01-12 PROCEDURE — G0299 HHS/HOSPICE OF RN EA 15 MIN: HCPCS

## 2018-01-13 ENCOUNTER — HOME CARE VISIT (OUTPATIENT)
Dept: HOME HEALTH SERVICES | Facility: HOME HEALTH | Age: 53
End: 2018-01-13
Payer: MEDICAID

## 2018-01-13 PROCEDURE — G0153 HHCP-SVS OF S/L PATH,EA 15MN: HCPCS

## 2018-01-16 ENCOUNTER — HOME CARE VISIT (OUTPATIENT)
Dept: SCHEDULING | Facility: HOME HEALTH | Age: 53
End: 2018-01-16
Payer: MEDICAID

## 2018-01-16 ENCOUNTER — HOME CARE VISIT (OUTPATIENT)
Dept: HOME HEALTH SERVICES | Facility: HOME HEALTH | Age: 53
End: 2018-01-16
Payer: MEDICAID

## 2018-01-16 VITALS — TEMPERATURE: 97.5 F | RESPIRATION RATE: 16 BRPM | HEART RATE: 113 BPM | OXYGEN SATURATION: 98 %

## 2018-01-16 PROCEDURE — G0155 HHCP-SVS OF CSW,EA 15 MIN: HCPCS

## 2018-01-16 PROCEDURE — G0153 HHCP-SVS OF S/L PATH,EA 15MN: HCPCS

## 2018-01-17 ENCOUNTER — HOME CARE VISIT (OUTPATIENT)
Dept: SCHEDULING | Facility: HOME HEALTH | Age: 53
End: 2018-01-17
Payer: MEDICAID

## 2018-01-17 PROCEDURE — G0157 HHC PT ASSISTANT EA 15: HCPCS

## 2018-01-17 PROCEDURE — G0152 HHCP-SERV OF OT,EA 15 MIN: HCPCS

## 2018-01-18 ENCOUNTER — HOME CARE VISIT (OUTPATIENT)
Dept: HOME HEALTH SERVICES | Facility: HOME HEALTH | Age: 53
End: 2018-01-18
Payer: MEDICAID

## 2018-01-18 ENCOUNTER — HOME CARE VISIT (OUTPATIENT)
Dept: SCHEDULING | Facility: HOME HEALTH | Age: 53
End: 2018-01-18
Payer: MEDICAID

## 2018-01-18 VITALS
DIASTOLIC BLOOD PRESSURE: 75 MMHG | SYSTOLIC BLOOD PRESSURE: 115 MMHG | HEART RATE: 110 BPM | OXYGEN SATURATION: 99 % | TEMPERATURE: 98.2 F

## 2018-01-18 PROCEDURE — G0299 HHS/HOSPICE OF RN EA 15 MIN: HCPCS

## 2018-01-18 PROCEDURE — G0153 HHCP-SVS OF S/L PATH,EA 15MN: HCPCS

## 2018-01-18 PROCEDURE — G0157 HHC PT ASSISTANT EA 15: HCPCS

## 2018-01-19 ENCOUNTER — HOME CARE VISIT (OUTPATIENT)
Dept: SCHEDULING | Facility: HOME HEALTH | Age: 53
End: 2018-01-19
Payer: MEDICAID

## 2018-01-19 VITALS
DIASTOLIC BLOOD PRESSURE: 78 MMHG | OXYGEN SATURATION: 98 % | RESPIRATION RATE: 16 BRPM | SYSTOLIC BLOOD PRESSURE: 130 MMHG | HEART RATE: 119 BPM | TEMPERATURE: 97.5 F

## 2018-01-22 ENCOUNTER — HOME CARE VISIT (OUTPATIENT)
Dept: HOME HEALTH SERVICES | Facility: HOME HEALTH | Age: 53
End: 2018-01-22
Payer: MEDICAID

## 2018-01-23 ENCOUNTER — HOME CARE VISIT (OUTPATIENT)
Dept: HOME HEALTH SERVICES | Facility: HOME HEALTH | Age: 53
End: 2018-01-23
Payer: MEDICAID

## 2018-01-23 ENCOUNTER — HOME CARE VISIT (OUTPATIENT)
Dept: SCHEDULING | Facility: HOME HEALTH | Age: 53
End: 2018-01-23
Payer: MEDICAID

## 2018-01-25 ENCOUNTER — HOME CARE VISIT (OUTPATIENT)
Dept: HOME HEALTH SERVICES | Facility: HOME HEALTH | Age: 53
End: 2018-01-25
Payer: MEDICAID

## 2018-01-25 VITALS — OXYGEN SATURATION: 94 %

## 2018-01-27 ENCOUNTER — HOME CARE VISIT (OUTPATIENT)
Dept: SCHEDULING | Facility: HOME HEALTH | Age: 53
End: 2018-01-27
Payer: MEDICAID

## 2018-01-27 PROCEDURE — G0153 HHCP-SVS OF S/L PATH,EA 15MN: HCPCS

## 2018-06-25 ENCOUNTER — HOSPITAL ENCOUNTER (EMERGENCY)
Age: 53
Discharge: HOME | End: 2018-06-25
Attending: EMERGENCY MEDICINE
Payer: MEDICAID

## 2018-06-25 ENCOUNTER — APPOINTMENT (OUTPATIENT)
Dept: GENERAL RADIOLOGY | Age: 53
End: 2018-06-25
Attending: EMERGENCY MEDICINE
Payer: MEDICAID

## 2018-06-25 ENCOUNTER — APPOINTMENT (OUTPATIENT)
Dept: VASCULAR SURGERY | Age: 53
End: 2018-06-25
Attending: EMERGENCY MEDICINE
Payer: MEDICAID

## 2018-06-25 VITALS
WEIGHT: 150 LBS | BODY MASS INDEX: 22.73 KG/M2 | OXYGEN SATURATION: 97 % | HEIGHT: 68 IN | DIASTOLIC BLOOD PRESSURE: 76 MMHG | RESPIRATION RATE: 22 BRPM | TEMPERATURE: 98.2 F | HEART RATE: 98 BPM | SYSTOLIC BLOOD PRESSURE: 125 MMHG

## 2018-06-25 DIAGNOSIS — R06.02 SOB (SHORTNESS OF BREATH): ICD-10-CM

## 2018-06-25 DIAGNOSIS — G89.29 OTHER CHRONIC PAIN: Primary | ICD-10-CM

## 2018-06-25 LAB
ALBUMIN SERPL-MCNC: 3.1 G/DL (ref 3.4–5)
ALBUMIN/GLOB SERPL: 0.8 {RATIO} (ref 0.8–1.7)
ALP SERPL-CCNC: 119 U/L (ref 45–117)
ALT SERPL-CCNC: 26 U/L (ref 16–61)
ANION GAP SERPL CALC-SCNC: 9 MMOL/L (ref 3–18)
AST SERPL-CCNC: 27 U/L (ref 15–37)
BASOPHILS # BLD: 0 K/UL (ref 0–0.06)
BASOPHILS NFR BLD: 0 % (ref 0–2)
BILIRUB SERPL-MCNC: 0.2 MG/DL (ref 0.2–1)
BUN SERPL-MCNC: 5 MG/DL (ref 7–18)
BUN/CREAT SERPL: 8 (ref 12–20)
CALCIUM SERPL-MCNC: 8.4 MG/DL (ref 8.5–10.1)
CHLORIDE SERPL-SCNC: 94 MMOL/L (ref 100–108)
CK MB CFR SERPL CALC: NORMAL % (ref 0–4)
CK MB SERPL-MCNC: <1 NG/ML (ref 5–25)
CK SERPL-CCNC: 47 U/L (ref 39–308)
CO2 SERPL-SCNC: 31 MMOL/L (ref 21–32)
CREAT SERPL-MCNC: 0.62 MG/DL (ref 0.6–1.3)
DIFFERENTIAL METHOD BLD: ABNORMAL
EOSINOPHIL # BLD: 0.1 K/UL (ref 0–0.4)
EOSINOPHIL NFR BLD: 2 % (ref 0–5)
ERYTHROCYTE [DISTWIDTH] IN BLOOD BY AUTOMATED COUNT: 13.8 % (ref 11.6–14.5)
GLOBULIN SER CALC-MCNC: 4.1 G/DL (ref 2–4)
GLUCOSE SERPL-MCNC: 101 MG/DL (ref 74–99)
HCT VFR BLD AUTO: 36.4 % (ref 36–48)
HGB BLD-MCNC: 12.6 G/DL (ref 13–16)
LYMPHOCYTES # BLD: 0.7 K/UL (ref 0.9–3.6)
LYMPHOCYTES NFR BLD: 13 % (ref 21–52)
MCH RBC QN AUTO: 33.3 PG (ref 24–34)
MCHC RBC AUTO-ENTMCNC: 34.6 G/DL (ref 31–37)
MCV RBC AUTO: 96.3 FL (ref 74–97)
MONOCYTES # BLD: 0.7 K/UL (ref 0.05–1.2)
MONOCYTES NFR BLD: 13 % (ref 3–10)
NEUTS SEG # BLD: 3.7 K/UL (ref 1.8–8)
NEUTS SEG NFR BLD: 72 % (ref 40–73)
PLATELET # BLD AUTO: 213 K/UL (ref 135–420)
PMV BLD AUTO: 9.3 FL (ref 9.2–11.8)
POTASSIUM SERPL-SCNC: 3.7 MMOL/L (ref 3.5–5.5)
PROT SERPL-MCNC: 7.2 G/DL (ref 6.4–8.2)
RBC # BLD AUTO: 3.78 M/UL (ref 4.7–5.5)
SODIUM SERPL-SCNC: 134 MMOL/L (ref 136–145)
TROPONIN I SERPL-MCNC: <0.02 NG/ML (ref 0–0.06)
WBC # BLD AUTO: 5.2 K/UL (ref 4.6–13.2)

## 2018-06-25 PROCEDURE — 82550 ASSAY OF CK (CPK): CPT | Performed by: EMERGENCY MEDICINE

## 2018-06-25 PROCEDURE — 74011250637 HC RX REV CODE- 250/637: Performed by: EMERGENCY MEDICINE

## 2018-06-25 PROCEDURE — 80053 COMPREHEN METABOLIC PANEL: CPT | Performed by: EMERGENCY MEDICINE

## 2018-06-25 PROCEDURE — 71045 X-RAY EXAM CHEST 1 VIEW: CPT

## 2018-06-25 PROCEDURE — 85025 COMPLETE CBC W/AUTO DIFF WBC: CPT | Performed by: EMERGENCY MEDICINE

## 2018-06-25 PROCEDURE — 99284 EMERGENCY DEPT VISIT MOD MDM: CPT

## 2018-06-25 PROCEDURE — 94762 N-INVAS EAR/PLS OXIMTRY CONT: CPT

## 2018-06-25 PROCEDURE — 93005 ELECTROCARDIOGRAM TRACING: CPT

## 2018-06-25 RX ORDER — OXYCODONE AND ACETAMINOPHEN 5; 325 MG/1; MG/1
1 TABLET ORAL
Qty: 12 TAB | Refills: 0 | Status: SHIPPED | OUTPATIENT
Start: 2018-06-25 | End: 2019-01-01

## 2018-06-25 RX ORDER — OXYCODONE AND ACETAMINOPHEN 5; 325 MG/1; MG/1
2 TABLET ORAL
Status: COMPLETED | OUTPATIENT
Start: 2018-06-25 | End: 2018-06-25

## 2018-06-25 RX ORDER — NALOXONE HYDROCHLORIDE 4 MG/.1ML
SPRAY NASAL
Qty: 2 EACH | Refills: 0 | Status: SHIPPED | OUTPATIENT
Start: 2018-06-25

## 2018-06-25 RX ADMIN — OXYCODONE HYDROCHLORIDE AND ACETAMINOPHEN 2 TABLET: 5; 325 TABLET ORAL at 20:01

## 2018-06-25 NOTE — ED TRIAGE NOTES
Pt arrives via ems stretcher with c\o aspiration x 2 days ago and having SOB since, pt with trach and peg tube, pt is able to make needs known speaking in complete sentences, pt in nad at this time

## 2018-06-25 NOTE — ED PROVIDER NOTES
EMERGENCY DEPARTMENT HISTORY AND PHYSICAL EXAM    Date: 6/25/2018  Patient Name: Eugenia Brown    History of Presenting Illness     Chief Complaint   Patient presents with    Shortness of Breath         History Provided By: Patient    Chief Complaint: LE pain and weakness  Duration: \"a few days\"    Timing:  Worsening  Location: bilateral  Modifying Factors: No medication taken for symptoms. Associated Symptoms: denies any other associated signs or symptoms    Additional History (Context):   7:29 PM  Eugenia Brown is a 45 y.o. male with PMHX of Throat Cancer followed by [facility] who presents to the emergency department C/O bilateral LE pain and weakness worsening \"a few days ago\". Reports he ran out of Percocet yesterday. Wife believes he may have aspirated due to increase SOB and cough. Per patient, recent CT scan showed another tumor. Denies tobacco usage. Pt denies any other Sx or complaints. PCP: Zen Meza MD    Current Outpatient Prescriptions   Medication Sig Dispense Refill    oxyCODONE-acetaminophen (PERCOCET) 5-325 mg per tablet Take 1 Tab by mouth every four (4) hours as needed for Pain for up to 12 doses. Max Daily Amount: 6 Tabs. 12 Tab 0    naloxone (NARCAN) 4 mg/actuation nasal spray Use 1 spray intranasally into 1 nostril. Use a new Narcan nasal spray for subsequent doses and administer into alternating nostrils. May repeat every 2 to 3 minutes as needed. 2 Each 0    PARoxetine (PAXIL) 10 mg tablet 10 mg by Per G Tube route daily.  budesonide (PULMICORT) 0.5 mg/2 mL nbsp 500 mcg by Nebulization route two (2) times a day.  arformoterol (BROVANA) 15 mcg/2 mL nebu neb solution 2 mL by Nebulization route two (2) times a day. 60 Vial 2    budesonide (PULMICORT) 0.5 mg/2 mL nbsp 2 mL by Nebulization route two (2) times a day. 60 Each 2    ipratropium (ATROVENT) 0.02 % soln 2.5 mL by Nebulization route four (4) times daily.  120 Vial 2    predniSONE (DELTASONE) 10 mg tablet Prednisone 10mg tabs: p.o.  4 tabs daily for 3 days then drop to   3 tabs daily for 3 days then drop to   2 tabs daily for 3 days then drop to   1 tab daily for 3 days then stop. Dispense 30 tabs 30 Tab 0    cloNIDine (CATAPRES) 0.2 mg/24 hr patch 1 Patch by TransDERmal route every seven (7) days. (Patient not taking: Reported on 1/17/2018) 4 Patch 0    fentaNYL (DURAGESIC) 100 mcg/hr PATCH 1 Patch by TransDERmal route every seventy-two (72) hours. Max Daily Amount: 1 Patch. 5 Patch 0    guaiFENesin (ROBITUSSIN) 100 mg/5 mL liquid Take 5 mL by mouth every four (4) hours as needed for Cough. 200 mL 0    lisinopril (PRINIVIL, ZESTRIL) 5 mg tablet Take 1 Tab by mouth every twelve (12) hours. 30 Tab 0    magic mouthwash (FIRST-MOUTHWASH BLM) 286--40 mg/30 mL mwsh oral suspension Take 10 mL by mouth every four (4) hours as needed.  albuterol-ipratropium (DUO-NEB) 2.5 mg-0.5 mg/3 ml nebu 3 mL by Nebulization route every four (4) hours as needed. (Patient taking differently: 3 mL by Nebulization route every four (4) hours as needed (wheezing). ) 30 Nebule 1    multivitamin, tx-iron-ca-min (THERA-M W/ IRON) 9 mg iron-400 mcg tab tablet Take 1 Tab by mouth daily. 30 Tab 1    Nebulizer Accessories kit Use nebs Q4H PRN 1 Kit 0       Past History     Past Medical History:  Past Medical History:   Diagnosis Date    Chronic obstructive pulmonary disease ([facility] [address]     acute hypoxic respiratory failure 8/16    Hypertension     Ill-defined condition     peg tube in place    Ill-defined condition     chemo    S/P radiation therapy     Throat cancer ([facility] [address]     Tobacco abuse        Past Surgical History:  Past Surgical History:   Procedure Laterality Date    HX HEENT      sinus surgery       Family History:  History reviewed. No pertinent family history.     Social History:  Social History   Substance Use Topics    Smoking status: Former Smoker     Packs/day: 0.50    Smokeless tobacco: Never Used    Alcohol use No Allergies:  No Known Allergies      Review of Systems   Review of Systems   Respiratory: Positive for cough and shortness of breath. Musculoskeletal: Positive for myalgias (BLE pain). Neurological: Positive for weakness (BLE). All other systems reviewed and are negative. Physical Exam     Vitals:    06/25/18 1948   BP: 125/76   Pulse: 98   Resp: 22   Temp: 98.2 °F (36.8 °C)   SpO2: 97%   Weight: 68 kg (150 lb)   Height: 5' 8\" (1.727 m)     Physical Exam   Constitutional: Chronically ill appearing.  Mild distress   Head: Normocephalic, Atraumatic  Eyes: EOMI  Neck: Supple Trach in place  Cardiovascular: Regular rate and rhythm, no murmurs, rubs, or gallops  Chest: Tachypneic with increase work of breathing  Lungs: Diminished breath sounds bilaterally  Abdomen: Soft, non tender, non distended  Back: No evidence of trauma or deformity  Extremities: No evidence of trauma or deformity, no LE edema, diffuse LE tenderness  Skin: Warm and dry, normal cap refill  Neuro: Alert and appropriate  Psychiatric: Normal mood and affect    Diagnostic Study Results     Labs -     Recent Results (from the past 12 hour(s))   EKG, 12 LEAD, INITIAL    Collection Time: 06/25/18  7:43 PM   Result Value Ref Range    Ventricular Rate 98 BPM    Atrial Rate 98 BPM    P-R Interval 146 ms    QRS Duration 78 ms    Q-T Interval 354 ms    QTC Calculation (Bezet) 451 ms    Calculated P Axis 48 degrees    Calculated R Axis 63 degrees    Calculated T Axis 70 degrees    Diagnosis       Normal sinus rhythm  Normal ECG  When compared with ECG of 05-JAN-2018 00:07,  No significant change was found     CBC WITH AUTOMATED DIFF    Collection Time: 06/25/18  7:53 PM   Result Value Ref Range    WBC 5.2 4.6 - 13.2 K/uL    RBC 3.78 (L) 4.70 - 5.50 M/uL    HGB 12.6 (L) 13.0 - 16.0 g/dL    HCT 36.4 36.0 - 48.0 %    MCV 96.3 74.0 - 97.0 FL    MCH 33.3 24.0 - 34.0 PG    MCHC 34.6 31.0 - 37.0 g/dL    RDW 13.8 11.6 - 14.5 %    PLATELET 866 135 - 420 K/uL    MPV 9.3 9.2 - 11.8 FL    NEUTROPHILS 72 40 - 73 %    LYMPHOCYTES 13 (L) 21 - 52 %    MONOCYTES 13 (H) 3 - 10 %    EOSINOPHILS 2 0 - 5 %    BASOPHILS 0 0 - 2 %    ABS. NEUTROPHILS 3.7 1.8 - 8.0 K/UL    ABS. LYMPHOCYTES 0.7 (L) 0.9 - 3.6 K/UL    ABS. MONOCYTES 0.7 0.05 - 1.2 K/UL    ABS. EOSINOPHILS 0.1 0.0 - 0.4 K/UL    ABS. BASOPHILS 0.0 0.0 - 0.06 K/UL    DF AUTOMATED     METABOLIC PANEL, COMPREHENSIVE    Collection Time: 06/25/18  7:53 PM   Result Value Ref Range    Sodium 134 (L) 136 - 145 mmol/L    Potassium 3.7 3.5 - 5.5 mmol/L    Chloride 94 (L) 100 - 108 mmol/L    CO2 31 21 - 32 mmol/L    Anion gap 9 3.0 - 18 mmol/L    Glucose 101 (H) 74 - 99 mg/dL    BUN 5 (L) 7.0 - 18 MG/DL    Creatinine 0.62 0.6 - 1.3 MG/DL    BUN/Creatinine ratio 8 (L) 12 - 20      GFR est AA >60 >60 ml/min/1.73m2    GFR est non-AA >60 >60 ml/min/1.73m2    Calcium 8.4 (L) 8.5 - 10.1 MG/DL    Bilirubin, total 0.2 0.2 - 1.0 MG/DL    ALT (SGPT) 26 16 - 61 U/L    AST (SGOT) 27 15 - 37 U/L    Alk.  phosphatase 119 (H) 45 - 117 U/L    Protein, total 7.2 6.4 - 8.2 g/dL    Albumin 3.1 (L) 3.4 - 5.0 g/dL    Globulin 4.1 (H) 2.0 - 4.0 g/dL    A-G Ratio 0.8 0.8 - 1.7     CARDIAC PANEL,(CK, CKMB & TROPONIN)    Collection Time: 06/25/18  7:53 PM   Result Value Ref Range    CK 47 39 - 308 U/L    CK - MB <1.0 <3.6 ng/ml    CK-MB Index  0.0 - 4.0 %     CALCULATION NOT PERFORMED WHEN RESULT IS BELOW LINEAR LIMIT    Troponin-I, Qt. <0.02 0.00 - 0.06 NG/ML       Radiologic Studies -   XR CHEST PORT    (Results Pending)     9:28 PM  RADIOLOGY FINDINGS  Chest X-ray shows NAP  Pending review by Radiologist  Recorded by Ashvin Dempsey ED Scribe, as dictated by Avril Saravia MD    CT Results  (Last 48 hours)    None        CXR Results  (Last 48 hours)    None          Medications given in the ED-  Medications   oxyCODONE-acetaminophen (PERCOCET) 5-325 mg per tablet 2 Tab (2 Tabs Oral Given 6/25/18 2001)         Medical Decision Making   I am the first provider for this patient. I reviewed the vital signs, available nursing notes, past medical history, past surgical history, family history and social history. Vital Signs-Reviewed the patient's vital signs. Pulse Oximetry Analysis - 97% on RA     EKG interpretation: (Preliminary)  98 bpm. NSR. QRS duration 78 ms. EKG read by Giuseppe Robins MD at 7:45 PM     Records Reviewed: Nursing Notes and Old Medical Records    Provider Notes (Medical Decision Making):     Procedures:  Procedures    ED Course:   7:29 PM Initial assessment performed. The patients presenting problems have been discussed, and they are in agreement with the care plan formulated and outlined with them. I have encouraged them to ask questions as they arise throughout their visit. Diagnosis and Disposition       DISCHARGE NOTE:  10:37 PM  Emerald Castano's  results have been reviewed with him. He has been counseled regarding his diagnosis, treatment, and plan. He verbally conveys understanding and agreement of the signs, symptoms, diagnosis, treatment and prognosis and additionally agrees to follow up as discussed. He also agrees with the care-plan and conveys that all of his questions have been answered. I have also provided discharge instructions for him that include: educational information regarding their diagnosis and treatment, and list of reasons why they would want to return to the ED prior to their follow-up appointment, should his condition change. He has been provided with education for proper emergency department utilization. Discussion: 45year old male presenting four acute exacerbation of chronic pain in setting of running out of medication, and chronic SOB. No acute abnormalities identify. Encouraged pt to remain in ED for Doppler of BLE but pt declines. Understands risk and benefits as does wife. Understands they can return at any time.  Will discharge with short course of pain medication, PCP follow up, and return precautions. CLINICAL IMPRESSION:    1. Other chronic pain    2. SOB (shortness of breath)        PLAN:  1. D/C Home  2. Discharge Medication List as of 6/25/2018 10:38 PM      START taking these medications    Details   oxyCODONE-acetaminophen (PERCOCET) 5-325 mg per tablet Take 1 Tab by mouth every four (4) hours as needed for Pain for up to 12 doses. Max Daily Amount: 6 Tabs., Print, Disp-12 Tab, R-0      naloxone (NARCAN) 4 mg/actuation nasal spray Use 1 spray intranasally into 1 nostril. Use a new Narcan nasal spray for subsequent doses and administer into alternating nostrils. May repeat every 2 to 3 minutes as needed. , Print, Disp-2 Each, R-0         CONTINUE these medications which have NOT CHANGED    Details   PARoxetine (PAXIL) 10 mg tablet 10 mg by Per G Tube route daily. , Historical Med      !! budesonide (PULMICORT) 0.5 mg/2 mL nbsp 500 mcg by Nebulization route two (2) times a day., Historical Med      arformoterol (BROVANA) 15 mcg/2 mL nebu neb solution 2 mL by Nebulization route two (2) times a day., Normal, Disp-60 Vial, R-2      !! budesonide (PULMICORT) 0.5 mg/2 mL nbsp 2 mL by Nebulization route two (2) times a day., Normal, Disp-60 Each, R-2      ipratropium (ATROVENT) 0.02 % soln 2.5 mL by Nebulization route four (4) times daily. , Normal, Disp-120 Vial, R-2      predniSONE (DELTASONE) 10 mg tablet Prednisone 10mg tabs: p.o.  4 tabs daily for 3 days then drop to   3 tabs daily for 3 days then drop to   2 tabs daily for 3 days then drop to   1 tab daily for 3 days then stop. Dispense 30 tabs, Print, Disp-30 Tab, R-0      cloNIDine (CATAPRES) 0.2 mg/24 hr patch 1 Patch by TransDERmal route every seven (7) days. , Print, Disp-4 Patch, R-0      fentaNYL (DURAGESIC) 100 mcg/hr PATCH 1 Patch by TransDERmal route every seventy-two (72) hours.  Max Daily Amount: 1 Patch., Print, Disp-5 Patch, R-0      guaiFENesin (ROBITUSSIN) 100 mg/5 mL liquid Take 5 mL by mouth every four (4) hours as needed for Cough. , Print, Disp-200 mL, R-0      lisinopril (PRINIVIL, ZESTRIL) 5 mg tablet Take 1 Tab by mouth every twelve (12) hours. , Print, Disp-30 Tab, R-0      magic mouthwash (FIRST-MOUTHWASH BLM) 174--40 mg/30 mL mwsh oral suspension Take 10 mL by mouth every four (4) hours as needed., Historical Med      albuterol-ipratropium (DUO-NEB) 2.5 mg-0.5 mg/3 ml nebu 3 mL by Nebulization route every four (4) hours as needed. , Print, Disp-30 Nebule, R-1      multivitamin, tx-iron-ca-min (THERA-M W/ IRON) 9 mg iron-400 mcg tab tablet Take 1 Tab by mouth daily. , Print, Disp-30 Tab, R-1      Nebulizer Accessories kit Use nebs Q4H PRN, Print, Disp-1 Kit, R-0       !! - Potential duplicate medications found. Please discuss with provider. STOP taking these medications       oxyCODONE IR (ROXICODONE) 10 mg tab immediate release tablet Comments:   Reason for Stopping:         oxyCODONE IR (ROXICODONE) 10 mg tab immediate release tablet Comments:   Reason for Stopping:             3.   Follow-up Information     Follow up With Details Comments Contact Info    Radha Carter MD Schedule an appointment as soon as possible for a visit For primary care follow up [address] [place] [address]      THE [facility] EMERGENCY DEPT Go to As needed, as symptoms worsen [address] [facility] [address] [phone]        _______________________________    Attestations: This note is prepared by Rosas Alvarez, acting as Scribe for Jaleel Arora MD.    Jaleel Arora MD:  The scribe's documentation has been prepared under my direction and personally reviewed by me in its entirety.   I confirm that the note above accurately reflects all work, treatment, procedures, and medical decision making performed by me.  _______________________________

## 2018-06-26 NOTE — DISCHARGE INSTRUCTIONS
Chronic Pain: Care Instructions  Your Care Instructions    Chronic pain is pain that lasts a long time (months or even years) and may or may not have a clear cause. It is different from acute pain, which usually does have a clear cause-like an injury or illness-and gets better over time. Chronic pain:  · Lasts over time but may vary from day to day. · Does not go away despite efforts to end it. · May disrupt your sleep and lead to fatigue. · May cause depression or anxiety. · May make your muscles tense, causing more pain. · Can disrupt your work, hobbies, home life, and relationships with friends and family. Chronic pain is a very real condition. It is not just in your head. Treatment can help and usually includes several methods used together, such as medicines, physical therapy, exercise, and other treatments. Learning how to relax and changing negative thought patterns can also help you cope. Chronic pain is complex. Taking an active role in your treatment will help you better manage your pain. Tell your doctor if you have trouble dealing with your pain. You may have to try several things before you find what works best for you. Follow-up care is a key part of your treatment and safety. Be sure to make and go to all appointments, and call your doctor if you are having problems. It's also a good idea to know your test results and keep a list of the medicines you take. How can you care for yourself at home? · Pace yourself. Break up large jobs into smaller tasks. Save harder tasks for days when you have less pain, or go back and forth between hard tasks and easier ones. Take rest breaks. · Relax, and reduce stress. Relaxation techniques such as deep breathing or meditation can help. · Keep moving. Gentle, daily exercise can help reduce pain over the long run. Try low- or no-impact exercises such as walking, swimming, and stationary biking. Do stretches to stay flexible.   · Try heat, cold packs, and massage. · Get enough sleep. Chronic pain can make you tired and drain your energy. Talk with your doctor if you have trouble sleeping because of pain. · Think positive. Your thoughts can affect your pain level. Do things that you enjoy to distract yourself when you have pain instead of focusing on the pain. See a movie, read a book, listen to music, or spend time with a friend. · If you think you are depressed, talk to your doctor about treatment. · Keep a daily pain diary. Record how your moods, thoughts, sleep patterns, activities, and medicine affect your pain. You may find that your pain is worse during or after certain activities or when you are feeling a certain emotion. Having a record of your pain can help you and your doctor find the best ways to treat your pain. · Take pain medicines exactly as directed. ¨ If the doctor gave you a prescription medicine for pain, take it as prescribed. ¨ If you are not taking a prescription pain medicine, ask your doctor if you can take an over-the-counter medicine. Reducing constipation caused by pain medicine  · Include fruits, vegetables, beans, and whole grains in your diet each day. These foods are high in fiber. · Drink plenty of fluids, enough so that your urine is light yellow or clear like water. If you have kidney, heart, or liver disease and have to limit fluids, talk with your doctor before you increase the amount of fluids you drink. · If your doctor recommends it, get more exercise. Walking is a good choice. Bit by bit, increase the amount you walk every day. Try for at least 30 minutes on most days of the week. · Schedule time each day for a bowel movement. A daily routine may help. Take your time and do not strain when having a bowel movement. When should you call for help? Call your doctor now or seek immediate medical care if:  ? · Your pain gets worse or is out of control.    ? · You feel down or blue, or you do not enjoy things like you once did. You may be depressed, which is common in people with chronic pain. Depression can be treated. ? · You have vomiting or cramps for more than 2 hours. ? Watch closely for changes in your health, and be sure to contact your doctor if:  ? · You cannot sleep because of pain. ? · You are very worried or anxious about your pain. ? · You have trouble taking your pain medicine. ? · You have any concerns about your pain medicine. ? · You have trouble with bowel movements, such as:  ¨ No bowel movement in 3 days. ¨ Blood in the anal area, in your stool, or on the toilet paper. ¨ Diarrhea for more than 24 hours. Where can you learn more? Go to http://justine-serena.info/. Enter N004 in the search box to learn more about \"Chronic Pain: Care Instructions. \"  Current as of: October 14, 2016  Content Version: 11.4  © 3884-7224 [employer]. Care instructions adapted under license by [employer] (which disclaims liability or warranty for this information). If you have questions about a medical condition or this instruction, always ask your healthcare professional. [place] [address] any warranty or liability for your use of this information. Shortness of Breath: Care Instructions  Your Care Instructions  Shortness of breath has many causes. Sometimes conditions such as anxiety can lead to shortness of breath. Some people get mild shortness of breath when they exercise. Trouble breathing also can be a symptom of a serious problem, such as asthma, lung disease, emphysema, heart problems, and pneumonia. If your shortness of breath continues, you may need tests and treatment. Watch for any changes in your breathing and other symptoms. Follow-up care is a key part of your treatment and safety. Be sure to make and go to all appointments, and call your doctor if you are having problems.  It's also a good idea to know your test results and keep a list of the medicines you take. How can you care for yourself at home? · Do not smoke or allow others to smoke around you. If you need help quitting, talk to your doctor about stop-smoking programs and medicines. These can increase your chances of quitting for good. · Get plenty of rest and sleep. · Take your medicines exactly as prescribed. Call your doctor if you think you are having a problem with your medicine. · Find healthy ways to deal with stress. ¨ Exercise daily. ¨ Get plenty of sleep. ¨ Eat regularly and well. When should you call for help? Call 911 anytime you think you may need emergency care. For example, call if:  ? · You have severe shortness of breath. ? · You have symptoms of a heart attack. These may include:  ¨ Chest pain or pressure, or a strange feeling in the chest.  ¨ Sweating. ¨ Shortness of breath. ¨ Nausea or vomiting. ¨ Pain, pressure, or a strange feeling in the back, neck, jaw, or upper belly or in one or both shoulders or arms. ¨ Lightheadedness or sudden weakness. ¨ A fast or irregular heartbeat. After you call 911, the  may tell you to chew 1 adult-strength or 2 to 4 low-dose aspirin. Wait for an ambulance. Do not try to drive yourself. ?Call your doctor now or seek immediate medical care if:  ? · Your shortness of breath gets worse or you start to wheeze. Wheezing is a high-pitched sound when you breathe. ? · You wake up at night out of breath or have to prop your head up on several pillows to breathe. ? · You are short of breath after only light activity or while at rest.   ? Watch closely for changes in your health, and be sure to contact your doctor if:  ? · You do not get better over the next 1 to 2 days. Where can you learn more? Go to http://justine-serena.info/. Enter S780 in the search box to learn more about \"Shortness of Breath: Care Instructions. \"  Current as of:  May 12, 2017  Content Version: 11.4  © 5616-4566 [employer]. Care instructions adapted under license by [employer] (which disclaims liability or warranty for this information). If you have questions about a medical condition or this instruction, always ask your healthcare professional. [address] any warranty or liability for your use of this information.

## 2018-07-02 LAB
ATRIAL RATE: 98 BPM
CALCULATED P AXIS, ECG09: 48 DEGREES
CALCULATED R AXIS, ECG10: 63 DEGREES
CALCULATED T AXIS, ECG11: 70 DEGREES
DIAGNOSIS, 93000: NORMAL
P-R INTERVAL, ECG05: 146 MS
Q-T INTERVAL, ECG07: 354 MS
QRS DURATION, ECG06: 78 MS
QTC CALCULATION (BEZET), ECG08: 451 MS
VENTRICULAR RATE, ECG03: 98 BPM

## 2018-08-13 ENCOUNTER — HOSPITAL ENCOUNTER (EMERGENCY)
Age: 53
Discharge: HOME | End: 2018-08-14
Attending: EMERGENCY MEDICINE
Payer: SELF-PAY

## 2018-08-13 DIAGNOSIS — S01.01XA LACERATION OF SCALP, INITIAL ENCOUNTER: ICD-10-CM

## 2018-08-13 DIAGNOSIS — S09.90XA CLOSED HEAD INJURY, INITIAL ENCOUNTER: Primary | ICD-10-CM

## 2018-08-13 DIAGNOSIS — S16.1XXA STRAIN OF NECK MUSCLE, INITIAL ENCOUNTER: ICD-10-CM

## 2018-08-13 DIAGNOSIS — M47.812 DJD (DEGENERATIVE JOINT DISEASE), CERVICAL: ICD-10-CM

## 2018-08-13 DIAGNOSIS — F10.10 ALCOHOL ABUSE: ICD-10-CM

## 2018-08-13 PROCEDURE — 77030008460 HC STPLR SKN PRECIS 3M -A

## 2018-08-13 PROCEDURE — 75810000293 HC SIMP/SUPERF WND  RPR

## 2018-08-13 PROCEDURE — 99284 EMERGENCY DEPT VISIT MOD MDM: CPT

## 2018-08-13 PROCEDURE — 77030018836 HC SOL IRR NACL ICUM -A

## 2018-08-14 ENCOUNTER — APPOINTMENT (OUTPATIENT)
Dept: CT IMAGING | Age: 53
End: 2018-08-14
Attending: EMERGENCY MEDICINE
Payer: SELF-PAY

## 2018-08-14 VITALS
TEMPERATURE: 97.6 F | SYSTOLIC BLOOD PRESSURE: 155 MMHG | RESPIRATION RATE: 18 BRPM | WEIGHT: 155 LBS | DIASTOLIC BLOOD PRESSURE: 85 MMHG | HEIGHT: 67 IN | OXYGEN SATURATION: 95 % | HEART RATE: 90 BPM | BODY MASS INDEX: 24.33 KG/M2

## 2018-08-14 PROCEDURE — 72125 CT NECK SPINE W/O DYE: CPT

## 2018-08-14 PROCEDURE — 70450 CT HEAD/BRAIN W/O DYE: CPT

## 2018-08-14 PROCEDURE — 74011250636 HC RX REV CODE- 250/636: Performed by: EMERGENCY MEDICINE

## 2018-08-14 RX ORDER — HYDROCODONE BITARTRATE AND ACETAMINOPHEN 5; 325 MG/1; MG/1
1 TABLET ORAL
Qty: 12 TAB | Refills: 0 | Status: SHIPPED | OUTPATIENT
Start: 2018-08-14 | End: 2018-08-17

## 2018-08-14 RX ORDER — LIDOCAINE HYDROCHLORIDE 10 MG/ML
20 INJECTION, SOLUTION EPIDURAL; INFILTRATION; INTRACAUDAL; PERINEURAL ONCE
Status: DISCONTINUED | OUTPATIENT
Start: 2018-08-14 | End: 2018-08-14

## 2018-08-14 RX ORDER — LIDOCAINE HYDROCHLORIDE 10 MG/ML
20 INJECTION INFILTRATION; PERINEURAL ONCE
Status: COMPLETED | OUTPATIENT
Start: 2018-08-14 | End: 2018-08-14

## 2018-08-14 RX ADMIN — LIDOCAINE HYDROCHLORIDE 20 ML: 10 INJECTION, SOLUTION INFILTRATION; PERINEURAL at 00:09

## 2018-08-14 NOTE — DISCHARGE INSTRUCTIONS
Learning About a Closed Head Injury  What is a closed head injury? A closed head injury happens when your head gets hit hard. The strong force of the blow causes your brain to shake in your skull. This movement can cause the brain to bruise, swell, or tear. Sometimes nerves or blood vessels also get damaged. This can cause bleeding in or around the brain. A concussion is a type of closed head injury. What are the symptoms? If you have a mild concussion, you may have a mild headache or feel \"not quite right. \" These symptoms are common. They usually go away over a few days to 4 weeks. But sometimes after a concussion, you feel like you can't function as well as before the injury. And you have new symptoms. This is called postconcussive syndrome. You may:  · Find it harder to solve problems, think, concentrate, or remember. · Have headaches. · Have changes in your sleep patterns, such as not being able to sleep or sleeping all the time. · Have changes in your personality. · Not be interested in your usual activities. · Feel angry or anxious without a clear reason. · Lose your sense of taste or smell. · Be dizzy, lightheaded, or unsteady. It may be hard to stand or walk. How is a closed head injury treated? Any person who may have a concussion needs to see a doctor. Some people have to stay in the hospital to be watched. Others can go home safely. If you go home, follow your doctor's instructions. He or she will tell you if you need someone to watch you closely for the next 24 hours or longer. Rest is the best treatment. Get plenty of sleep at night. And try to rest during the day. · Avoid activities that are physically or mentally demanding. These include housework, exercise, and schoolwork. And don't play video games, send text messages, or use the computer. You may need to change your school or work schedule to be able to avoid these activities.   · Ask your doctor when it's okay to drive, ride a bike, or operate machinery. · Take an over-the-counter pain medicine, such as acetaminophen (Tylenol), ibuprofen (Advil, Motrin), or naproxen (Aleve). Be safe with medicines. Read and follow all instructions on the label. · Check with your doctor before you use any other medicines for pain. · Do not drink alcohol or use illegal drugs. They can slow recovery. They can also increase your risk of getting a second head injury. Follow-up care is a key part of your treatment and safety. Be sure to make and go to all appointments, and call your doctor if you are having problems. It's also a good idea to know your test results and keep a list of the medicines you take. Where can you learn more? Go to http://justine-serena.info/. Enter E235 in the search box to learn more about \"Learning About a Closed Head Injury. \"  Current as of: October 9, 2017  Content Version: 11.7  © 1773-3232 [employer]. Care instructions adapted under license by [employer] (which disclaims liability or warranty for this information). If you have questions about a medical condition or this instruction, always ask your healthcare professional. [place] [address] any warranty or liability for your use of this information. Cuts: Care Instructions  Your Care Instructions  A cut can happen anywhere on your body. Stitches, staples, skin adhesives, or pieces of tape called Steri-Strips are sometimes used to keep the edges of a cut together and help it heal. Steri-Strips can be used by themselves or with stitches or staples. Sometimes cuts are left open. If the cut went deep and through the skin, the doctor may have closed the cut in two layers. A deeper layer of stitches brings the deep part of the cut together. These stitches will dissolve and don't need to be removed.  The upper layer closure, which could be stitches, staples, Steri-Strips, or adhesive, is what you see on the cut. A cut is often covered by a bandage. The doctor has checked you carefully, but problems can develop later. If you notice any problems or new symptoms, get medical treatment right away. Follow-up care is a key part of your treatment and safety. Be sure to make and go to all appointments, and call your doctor if you are having problems. It's also a good idea to know your test results and keep a list of the medicines you take. How can you care for yourself at home? If a cut is open or closed  · Prop up the sore area on a pillow anytime you sit or lie down during the next 3 days. Try to keep it above the level of your heart. This will help reduce swelling. · Keep the cut dry for the first 24 to 48 hours. After this, you can shower if your doctor okays it. Pat the cut dry. · Don't soak the cut, such as in a bathtub. Your doctor will tell you when it's safe to get the cut wet. · After the first 24 to 48 hours, clean the cut with soap and water 2 times a day unless your doctor gives you different instructions. ¨ Don't use hydrogen peroxide or alcohol, which can slow healing. ¨ You may cover the cut with a thin layer of petroleum jelly and a nonstick bandage. ¨ If the doctor put a bandage over the cut, put on a new bandage after cleaning the cut or if the bandage gets wet or dirty. · Avoid any activity that could cause your cut to reopen. · Be safe with medicines. Read and follow all instructions on the label. ¨ If the doctor gave you a prescription medicine for pain, take it as prescribed. ¨ If you are not taking a prescription pain medicine, ask your doctor if you can take an over-the-counter medicine. If the cut is closed with stitches, staples, or Steri-Strips  · Follow the above instructions for open or closed cuts. · Do not remove the stitches or staples on your own. Your doctor will tell you when to come back to have the stitches or staples removed.   · Leave Steri-Strips on until they fall off.  If the cut is closed with a skin adhesive  · Follow the above instructions for open or closed cuts. · Leave the skin adhesive on your skin until it falls off on its own. This may take 5 to 10 days. · Do not scratch, rub, or pick at the adhesive. · Do not put the sticky part of a bandage directly on the adhesive. · Do not put any kind of ointment, cream, or lotion over the area. This can make the adhesive fall off too soon. Do not use hydrogen peroxide or alcohol, which can slow healing. When should you call for help? Call your doctor now or seek immediate medical care if:    · You have new pain, or your pain gets worse.     · The skin near the cut is cold or pale or changes color.     · You have tingling, weakness, or numbness near the cut.     · The cut starts to bleed, and blood soaks through the bandage. Oozing small amounts of blood is normal.     · You have trouble moving the area near the cut.     · You have symptoms of infection, such as:  ¨ Increased pain, swelling, warmth, or redness around the cut. ¨ Red streaks leading from the cut. ¨ Pus draining from the cut. ¨ A fever.    Watch closely for changes in your health, and be sure to contact your doctor if:    · The cut reopens.     · You do not get better as expected. Where can you learn more? Go to http://justine-serena.info/. Enter M735 in the search box to learn more about \"Cuts: Care Instructions. \"  Current as of: November 20, 2017  Content Version: 11.7  © 8958-7991 [employer]. Care instructions adapted under license by [employer] (which disclaims liability or warranty for this information). If you have questions about a medical condition or this instruction, always ask your healthcare professional. [address] any warranty or liability for your use of this information.          Neck Strain: Care Instructions  Your Care Instructions    You have strained the muscles and ligaments in your neck. A sudden, awkward movement can strain the neck. This often occurs with falls or car accidents or during certain sports. Everyday activities like working on a computer or sleeping can also cause neck strain if they force you to hold your neck in an awkward position for a long time. It is common for neck pain to get worse for a day or two after an injury, but it should start to feel better after that. You may have more pain and stiffness for several days before it gets better. This is expected. It may take a few weeks or longer for it to heal completely. Good home treatment can help you get better faster and avoid future neck problems. Follow-up care is a key part of your treatment and safety. Be sure to make and go to all appointments, and call your doctor if you are having problems. It's also a good idea to know your test results and keep a list of the medicines you take. How can you care for yourself at home? · If you were given a neck brace (cervical collar) to limit neck motion, wear it as instructed for as many days as your doctor tells you to. Do not wear it longer than you were told to. Wearing a brace for too long can make neck stiffness worse and weaken the neck muscles. · You can try using heat or ice to see if it helps. ¨ Try using a heating pad on a low or medium setting for 15 to 20 minutes every 2 to 3 hours. Try a warm shower in place of one session with the heating pad. You can also buy single-use heat wraps that last up to 8 hours. ¨ You can also try an ice pack for 10 to 15 minutes every 2 to 3 hours. · Take pain medicines exactly as directed. ¨ If the doctor gave you a prescription medicine for pain, take it as prescribed. ¨ If you are not taking a prescription pain medicine, ask your doctor if you can take an over-the-counter medicine. · Gently rub the area to relieve pain and help with blood flow. Do not massage the area if it hurts to do so.   · Do not do anything that makes the pain worse. Take it easy for a couple of days. You can do your usual activities if they do not hurt your neck or put it at risk for more stress or injury. · Try sleeping on a special neck pillow. Place it under your neck, not under your head. Placing a tightly rolled-up towel under your neck while you sleep will also work. If you use a neck pillow or rolled towel, do not use your regular pillow at the same time. · To prevent future neck pain, do exercises to stretch and strengthen your neck and back. Learn how to use good posture, safe lifting techniques, and proper body mechanics. When should you call for help? Call 911 anytime you think you may need emergency care. For example, call if:    · You are unable to move an arm or a leg at all.   [facility] your doctor now or seek immediate medical care if:    · You have new or worse symptoms in your arms, legs, chest, belly, or buttocks. Symptoms may include:  ¨ Numbness or tingling. ¨ Weakness. ¨ Pain.     · You lose bladder or bowel control.    Watch closely for changes in your health, and be sure to contact your doctor if:    · You are not getting better as expected. Where can you learn more? Go to http://justine-serena.info/. Enter M253 in the search box to learn more about \"Neck Strain: Care Instructions. \"  Current as of: November 29, 2017  Content Version: 11.7  © 0356-6775 [employer]. Care instructions adapted under license by [employer] (which disclaims liability or warranty for this information). If you have questions about a medical condition or this instruction, always ask your healthcare professional. [place] [address] any warranty or liability for your use of this information. Acute Alcohol Intoxication: Care Instructions  Your Care Instructions    You have had treatment to help your body rid itself of alcohol. Too much alcohol upsets the body's fluid balance. Your doctor may have given you fluids and vitamins. For some people, drinking too much alcohol is a one-time event. For others, it is an ongoing problem. In either case, it is serious. It can be life-threatening. Follow-up care is a key part of your treatment and safety. Be sure to make and go to all appointments, and call your doctor if you are having problems. It's also a good idea to know your test results and keep a list of the medicines you take. How can you care for yourself at home? · Do not drink and drive. · Be safe with medicines. Take your medicines exactly as prescribed. Call your doctor if you think you are having a problem with your medicine. · Your doctor may have prescribed disulfiram (Antabuse). Do not drink any alcohol while you are taking this medicine. You may have severe or even life-threatening side effects from even small amounts of alcohol. · If you were given medicine to prevent nausea, be sure to take it exactly as prescribed. · Before you take any medicine, tell your doctor if:  ¨ You have had a bad reaction to any medicines in the past.  ¨ You are taking other medicines, including over-the-counter ones, or have other health problems. ¨ You are or could be pregnant. · Be prepared to have some symptoms of withdrawal in the next few days. · Drink plenty of liquids in the next few days. · Seek help if you need it to stop drinking. Getting counseling and joining a support group can help you stay sober. Try a support group such as Alcoholics Anonymous. · Avoid alcohol when you take medicines. It can react with many medicines and cause serious problems. When should you call for help? Call 911 anytime you think you may need emergency care.  For example, call if:    · You feel confused and are seeing things that are not there.     · You are thinking about killing yourself or hurting others.     · You have a seizure.     · You vomit blood or what looks like coffee grounds.    Call your doctor now or seek immediate medical care if:    · You have trembling, restlessness, sweating, and other withdrawal symptoms that are new or that get worse.     · Your withdrawal symptoms come back after not bothering you for days or weeks.     · You can't stop vomiting.    Watch closely for changes in your health, and be sure to contact your doctor if:    · You need help to stop drinking. Where can you learn more? Go to http://justine-serena.info/. Enter T102 in the search box to learn more about \"Acute Alcohol Intoxication: Care Instructions. \"  Current as of: October 9, 2017  Content Version: 11.7  © 6879-6168 [employer]. Care instructions adapted under license by [employer] (which disclaims liability or warranty for this information). If you have questions about a medical condition or this instruction, always ask your healthcare professional. [address] any warranty or liability for your use of this information.

## 2018-08-14 NOTE — ED PROVIDER NOTES
EMERGENCY DEPARTMENT HISTORY AND PHYSICAL EXAM    Date: 8/13/2018  Patient Name: Juanjose Chicas    History of Presenting Illness     Chief Complaint   Patient presents with    Head Injury         History Provided By: Patient    Chief Complaint: Fall  Duration: just PTA  Timing:  Acute  Location: Head  Quality: Laceration  Severity: Mild  Modifying Factors: No modifying factors  Associated Symptoms: laceration to top of head, neck pain    Additional History (Context):   11:58 PM  Juanjose Chicas is a 52 y.o. male with PMHX of HTN, COPD, Throat cancer, tracheostomy who presents via EMS to the emergency department s/p fall onset just PTA. Associated sxs include laceration to top of head, neck pain. Pt states he hit his head on the corner of the door. Pt states that his tetanus is UTD. Pt denies syncopal epsisode, fever, chills, any other sxs or complaints. PCP: Ophelia Dejesus MD    Current Outpatient Prescriptions   Medication Sig Dispense Refill    HYDROcodone-acetaminophen (NORCO) 5-325 mg per tablet Take 1 Tab by mouth every six (6) hours as needed for Pain for up to 3 days. Max Daily Amount: 4 Tabs. 12 Tab 0    oxyCODONE-acetaminophen (PERCOCET) 5-325 mg per tablet Take 1 Tab by mouth every four (4) hours as needed for Pain for up to 12 doses. Max Daily Amount: 6 Tabs. 12 Tab 0    naloxone (NARCAN) 4 mg/actuation nasal spray Use 1 spray intranasally into 1 nostril. Use a new Narcan nasal spray for subsequent doses and administer into alternating nostrils. May repeat every 2 to 3 minutes as needed. 2 Each 0    PARoxetine (PAXIL) 10 mg tablet 10 mg by Per G Tube route daily.  budesonide (PULMICORT) 0.5 mg/2 mL nbsp 500 mcg by Nebulization route two (2) times a day.  arformoterol (BROVANA) 15 mcg/2 mL nebu neb solution 2 mL by Nebulization route two (2) times a day. 60 Vial 2    budesonide (PULMICORT) 0.5 mg/2 mL nbsp 2 mL by Nebulization route two (2) times a day.  60 Each 2    ipratropium (ATROVENT) 0.02 % soln 2.5 mL by Nebulization route four (4) times daily. 120 Vial 2    predniSONE (DELTASONE) 10 mg tablet Prednisone 10mg tabs: p.o.  4 tabs daily for 3 days then drop to   3 tabs daily for 3 days then drop to   2 tabs daily for 3 days then drop to   1 tab daily for 3 days then stop. Dispense 30 tabs 30 Tab 0    cloNIDine (CATAPRES) 0.2 mg/24 hr patch 1 Patch by TransDERmal route every seven (7) days. (Patient not taking: Reported on 1/17/2018) 4 Patch 0    fentaNYL (DURAGESIC) 100 mcg/hr PATCH 1 Patch by TransDERmal route every seventy-two (72) hours. Max Daily Amount: 1 Patch. 5 Patch 0    guaiFENesin (ROBITUSSIN) 100 mg/5 mL liquid Take 5 mL by mouth every four (4) hours as needed for Cough. 200 mL 0    lisinopril (PRINIVIL, ZESTRIL) 5 mg tablet Take 1 Tab by mouth every twelve (12) hours. 30 Tab 0    magic mouthwash (FIRST-MOUTHWASH BLM) 812--40 mg/30 mL mwsh oral suspension Take 10 mL by mouth every four (4) hours as needed.  albuterol-ipratropium (DUO-NEB) 2.5 mg-0.5 mg/3 ml nebu 3 mL by Nebulization route every four (4) hours as needed. (Patient taking differently: 3 mL by Nebulization route every four (4) hours as needed (wheezing). ) 30 Nebule 1    multivitamin, tx-iron-ca-min (THERA-M W/ IRON) 9 mg iron-400 mcg tab tablet Take 1 Tab by mouth daily. 30 Tab 1    Nebulizer Accessories kit Use nebs Q4H PRN 1 Kit 0       Past History     Past Medical History:  Past Medical History:   Diagnosis Date    Chronic obstructive pulmonary disease ([facility] [address]     acute hypoxic respiratory failure 8/16    Hypertension     Ill-defined condition     peg tube in place    Ill-defined condition     chemo    S/P radiation therapy     Throat cancer ([facility] [address]     Tobacco abuse        Past Surgical History:  Past Surgical History:   Procedure Laterality Date    HX GI      peg tub    HX HEENT      sinus surgery    HX TRACHEOSTOMY         Family History:  History reviewed.  No pertinent family history. Social History:  Social History   Substance Use Topics    Smoking status: Former Smoker     Packs/day: 0.50    Smokeless tobacco: Never Used    Alcohol use No       Allergies:  No Known Allergies      Review of Systems   Review of Systems   Constitutional: Negative for chills and fever. Musculoskeletal: Positive for neck pain (posterior). Skin: Positive for wound (laceration to top of head). Neurological: Negative for syncope. All other systems reviewed and are negative. Physical Exam     Vitals:    08/14/18 0001 08/14/18 0130   BP: 155/85    Pulse: 90    Resp: 18    Temp: 97.6 °F (36.4 °C) 97.6 °F (36.4 °C)   SpO2: 95%    Weight: 70.3 kg (155 lb)    Height: 5' 7\" (1.702 m)      Physical Exam   Constitutional: He is oriented to person, place, and time. He appears well-developed and well-nourished. Inebriated, EtOH on breath   HENT:   Head: Normocephalic and atraumatic. Right Ear: External ear normal.   Left Ear: External ear normal.   Nose: Nose normal.   Mouth/Throat: Oropharynx is clear and moist.   Eyes: Conjunctivae and EOM are normal. Pupils are equal, round, and reactive to light. Neck: Normal range of motion. Neck supple. No JVD present. No tracheal deviation present. No thyromegaly present. Midline posterior cervical tenderness  Anterior tracheostomy intact   Cardiovascular: Normal rate, regular rhythm, normal heart sounds and intact distal pulses. Exam reveals no gallop and no friction rub. No murmur heard. Pulmonary/Chest: Effort normal and breath sounds normal. No stridor. No respiratory distress. He has no wheezes. He has no rales. Abdominal: Soft. Bowel sounds are normal. He exhibits no distension and no mass. There is no tenderness. There is no rebound and no guarding. PEG tube in abdomen, non-tender at site   Musculoskeletal: Normal range of motion. Neurological: He is alert and oriented to person, place, and time. He has normal reflexes.  No cranial nerve deficit. He exhibits normal muscle tone. CN II-XII intact, no facial droop or asymmetry; No pronator drift; finger-nose-finger intact; good  and equal strength 5/5 bilateral upper and lower extremities; DTRs: 2+ upper and lower extremities, symmetric bilaterally; sensation is intact to light touch and position sense upper and lower extremities symmetric bilaterally;  Gait normal   Skin: Skin is warm and dry. Laceration (2-2.5 cm to scalp with dried blood) noted. No rash noted. Psychiatric: He has a normal mood and affect. His behavior is normal.   Nursing note and vitals reviewed. Diagnostic Study Results     Labs -   No results found for this or any previous visit (from the past 12 hour(s)). Radiologic Studies -   CT SPINE CERV WO CONT   Final Result      CT HEAD WO CONT   Final Result        CT Results  (Last 48 hours)               08/14/18 0050  CT HEAD WO CONT Final result    Impression:  IMPRESSION:       No acute traumatic injury identified. Chronic findings of atrophy and deep white matter changes. No sign of hemorrhage, mass effect or recent stroke of a major vascular   distribution. Narrative:  CT scan of the head without IV contrast mild periventricular low-attenuation is   seen. Though nonspecific this is often associated with chronic small vessel   ischemic change. Otherwise, there are no  significant areas of abnormal   parenchymal attenuation. The visualized portions of the paranasal sinuses and mastoid air cells are   clear. The visualized bones are intact. No evidence of acute traumatic injury to   the brain or cranium           08/14/18 0050  CT SPINE CERV WO CONT Final result    Impression:  Impression:       No evidence of acute fracture or subluxation involving the cervical spine. Multilevel degenerative changes as described. Endotracheal tube has tip extending near the level of the clavicular heads. Correlate for desired placement. Narrative:  CT scan of the cervical spine without contrast       History: Trauma, neck pain, fell and hit head with laceration to the left dome   of the head. Images: 507       Comparison: Head CT of the same day       Technique: Serial axial images were obtained from the foramen magnum to the   thoracic inlet without contrast. Sagittal and coronal reformations were obtained   from the source images. One or more dose reduction techniques were used on this   CT: automated exposure control, adjustment of the mAs and/or kVp according to   patient's size, and iterative reconstruction techniques. The specific techniques   utilized on this CT exam have been documented in the patient's electronic   medical record. Findings: Vertebral alignment and articulation are within normal limits. Vertebral body and disc space heights are well-maintained. There is no   prevertebral soft tissue swelling. No fractures are identified. There is no   evidence of spondylolisthesis. Spondylosis is identified particularly at C5/6       The spinal canal  fairly well maintained throughout the cervical region. Facet   and ligamentum flavum hypertrophy which results in neural foraminal narrowing   particularly at C3/4 and C6/7 and C7/T1 The visualized portions of the posterior   fossa are unremarkable. An endotracheal tube is in place with the tip extending   to the level of the clavicular heads. Correlate for desired placement. CXR Results  (Last 48 hours)    None          Medications given in the ED-  Medications   lidocaine (XYLOCAINE) 10 mg/mL (1 %) injection 20 mL (20 mL SubCUTAneous Given by Provider 8/14/18 0009)         Medical Decision Making   I am the first provider for this patient. I reviewed the vital signs, available nursing notes, past medical history, past surgical history, family history and social history. Vital Signs-Reviewed the patient's vital signs.     Pulse Oximetry Analysis - 95% on RA Records Reviewed: Nursing Notes    Provider Notes (Medical Decision Making):   INITIAL CLINICAL IMPRESSION and PLANS:  The patient presents with the primary complaint(s) of: Fall. The presentation, to include historical aspects and clinical findings are consistent with the DX of dislocation. However, other possible DX's to consider as primary, associated with, or exacerbated by include:    1. Fracture  2. Intracranial hemorrhage     Procedures:  Wound Repair  Date/Time: 8/14/2018 1:26 AM  Performed by: attendingPreparation: skin prepped with ChloraPrep  Location details: scalp  Wound length:2.6 - 7.5 cm  Anesthesia: local infiltration    Anesthesia:  Local Anesthetic: lidocaine 1% without epinephrine  Anesthetic total: 20 mL  Foreign bodies: no foreign bodies  Irrigation solution: saline (300 cc)  Irrigation method: 18 guage pressure stream.  Debridement: none  Skin closure: staples  Number of sutures: 9  Technique: simple and interrupted  Approximation: close  Dressing: antibiotic ointment  Patient tolerance: Patient tolerated the procedure well with no immediate complications  My total time at bedside, performing this procedure was 1-15 minutes. ED Course:   11:58 PM Initial assessment performed. The patients presenting problems have been discussed, and they are in agreement with the care plan formulated and outlined with them. I have encouraged them to ask questions as they arise throughout their visit. Diagnosis and Disposition       DISCHARGE NOTE:  01:29 AM  Lakisha Castano's  results have been reviewed with him. He has been counseled regarding his diagnosis, treatment, and plan. He verbally conveys understanding and agreement of the signs, symptoms, diagnosis, treatment and prognosis and additionally agrees to follow up as discussed. He also agrees with the care-plan and conveys that all of his questions have been answered.   I have also provided discharge instructions for him that include: educational information regarding their diagnosis and treatment, and list of reasons why they would want to return to the ED prior to their follow-up appointment, should his condition change. He has been provided with education for proper emergency department utilization. CLINICAL IMPRESSION:    1. Closed head injury, initial encounter    2. Strain of neck muscle, initial encounter    3. DJD (degenerative joint disease), cervical    4. Laceration of scalp, initial encounter    5. Alcohol abuse        Discussion:  Patient was stable in the ED with normal neurologic exam. CT head and C-spine unremarkable for fracture, dislocation or ICH. Patient tolerated staple ligation of scalp laceration and will follow-up with PCP for wound check and staple removal.    PLAN:  1. D/C Home  2. Discharge Medication List as of 8/14/2018  1:29 AM      START taking these medications    Details   HYDROcodone-acetaminophen (NORCO) 5-325 mg per tablet Take 1 Tab by mouth every six (6) hours as needed for Pain for up to 3 days. Max Daily Amount: 4 Tabs., Print, Disp-12 Tab, R-0         CONTINUE these medications which have NOT CHANGED    Details   oxyCODONE-acetaminophen (PERCOCET) 5-325 mg per tablet Take 1 Tab by mouth every four (4) hours as needed for Pain for up to 12 doses. Max Daily Amount: 6 Tabs., Print, Disp-12 Tab, R-0      naloxone (NARCAN) 4 mg/actuation nasal spray Use 1 spray intranasally into 1 nostril. Use a new Narcan nasal spray for subsequent doses and administer into alternating nostrils. May repeat every 2 to 3 minutes as needed. , Print, Disp-2 Each, R-0      PARoxetine (PAXIL) 10 mg tablet 10 mg by Per G Tube route daily. , Historical Med      !! budesonide (PULMICORT) 0.5 mg/2 mL nbsp 500 mcg by Nebulization route two (2) times a day., Historical Med      arformoterol (BROVANA) 15 mcg/2 mL nebu neb solution 2 mL by Nebulization route two (2) times a day., Normal, Disp-60 Vial, R-2      !! budesonide (PULMICORT) 0.5 mg/2 mL nbsp 2 mL by Nebulization route two (2) times a day., Normal, Disp-60 Each, R-2      ipratropium (ATROVENT) 0.02 % soln 2.5 mL by Nebulization route four (4) times daily. , Normal, Disp-120 Vial, R-2      predniSONE (DELTASONE) 10 mg tablet Prednisone 10mg tabs: p.o.  4 tabs daily for 3 days then drop to   3 tabs daily for 3 days then drop to   2 tabs daily for 3 days then drop to   1 tab daily for 3 days then stop. Dispense 30 tabs, Print, Disp-30 Tab, R-0      cloNIDine (CATAPRES) 0.2 mg/24 hr patch 1 Patch by TransDERmal route every seven (7) days. , Print, Disp-4 Patch, R-0      fentaNYL (DURAGESIC) 100 mcg/hr PATCH 1 Patch by TransDERmal route every seventy-two (72) hours. Max Daily Amount: 1 Patch., Print, Disp-5 Patch, R-0      guaiFENesin (ROBITUSSIN) 100 mg/5 mL liquid Take 5 mL by mouth every four (4) hours as needed for Cough. , Print, Disp-200 mL, R-0      lisinopril (PRINIVIL, ZESTRIL) 5 mg tablet Take 1 Tab by mouth every twelve (12) hours. , Print, Disp-30 Tab, R-0      magic mouthwash (FIRST-MOUTHWASH BLM) 407--40 mg/30 mL mwsh oral suspension Take 10 mL by mouth every four (4) hours as needed., Historical Med      albuterol-ipratropium (DUO-NEB) 2.5 mg-0.5 mg/3 ml nebu 3 mL by Nebulization route every four (4) hours as needed. , Print, Disp-30 Nebule, R-1      multivitamin, tx-iron-ca-min (THERA-M W/ IRON) 9 mg iron-400 mcg tab tablet Take 1 Tab by mouth daily. , Print, Disp-30 Tab, R-1      Nebulizer Accessories kit Use nebs Q4H PRN, Print, Disp-1 Kit, R-0       !! - Potential duplicate medications found. Please discuss with provider.         3.   Follow-up Information     Follow up With Details Comments [address]    [facility] EMERGENC[facility] DEPT In 10 days For stable removal [address]    [facility] EMERGENCY DEPT Go to As needed, if symptoms worsen [address] Nikhil Olivera MD Schedule an appointment as soon as possible for a visit in 2 days For wound re-check [address]  [phone]          _______________________________    Attestations: This note is prepared by [facility], acting as Scribe for Campos Montes MD.    Campos Montes MD:  The scribe's documentation has been prepared under my direction and personally reviewed by me in its entirety.   I confirm that the note above accurately reflects all work, treatment, procedures, and medical decision making performed by me.  _______________________________

## 2018-08-14 NOTE — ED NOTES
Notified pharmacy, Annia Torres, to inform that tried to pull the Lidocaine from the pyxis as ordered and prompts insufficient quantity told _ ok.

## 2018-08-14 NOTE — ED NOTES
Pt presents to ED via EMS. Pt has a 1 in laceration to top of head- dry blood observed.  + ETOH. Pt stating \"I just fell. \" Pt reports to hit his head over the door facing. Pt A/O x 4. Pt saying that his neck \"always hurts. \"  Pt turning his head without difficulty. Dr Sheila Khoury at bedside speaking with pt. NAD observed.

## 2018-08-14 NOTE — ED NOTES
[place] NASCIMENTO has informed DR Shellie Martinez that pt is walking towards the front saying he was leaving. Dr Shellie Martinez has convinced pt to come back for treatment.

## 2018-12-16 NOTE — PROGRESS NOTES
Problem: Voice Impaired (Adult)  Goal: *Acute Goals and Plan of Care (Insert Text)  The patient will:   1) independently recall and comply with PMV precautions with 100% accuracy. - met  2) clearly produce 5-7 words with coordinated breathing given Belinda. - met  3) independently don/doff PMV with 100% accuracy. - met  4) complete glottic closure exercises with 100% accuracy given maxA. - met  5) complete diaphragmatic breathing exercises with 100% accuracy given Belinda. - met  Outcome: Resolved/Met Date Met: 01/08/18  Speech language pathology treatment/discharge    Patient: Concepción Lloyd (65 y.o. male)  Date: 1/8/2018  Diagnosis: Acute tracheitis Acute tracheitis    ASSESSMENT:  Patient met all goals today; see above. Continues to tolerate PMV wear daily with 0 changes in vitals. Patient additionally reports adherence to PMV precautions, such as removing while sleeping. Produced strong cough with resistance push/cough. Average 500 on incentive spirometer. Able to effective communicate wants and needs to various communication partners. Education provided; no there other communication goals to be addressed as vocal quality to remain impaired due to COPD, h/o smoking, and PNA. Progression toward goals:  []       Improving appropriately and progressing toward goals  []       Improving slowly and progressing toward goals  [x]       Goals met     PLAN:  Signing off  Discharge Recommendations:  Outpatient     SUBJECTIVE:   Patient stated Tam[place]atasha Hines changed my trach today.     OBJECTIVE:   Mental Status:  Neurologic State: Alert  Orientation Level: Oriented X4  Cognition: Appropriate decision making, Appropriate for age attention/concentration, Appropriate safety awareness, Follows commands  Perception: Appears intact  Perseveration: No perseveration noted  Safety/Judgement: Awareness of environment, Insight into deficits  Treatment & Interventions:  Voice Exercises  PAIN:  Start of Tx: 0  End of Tx: 0     After treatment:   [] Patient left in no apparent distress sitting up in chair  [x]       Patient left in no apparent distress in bed  [x]       Call bell left within reach  [x]       Nursing notified  []       Caregiver present  []       Bed alarm activated      COMMUNICATION/EDUCATION:   [x]             Compensatory speech/language/comprehension techniques    Annetta Fernandez, SLP  12 minutes Appropriate/Calm

## 2019-01-01 ENCOUNTER — APPOINTMENT (OUTPATIENT)
Dept: GENERAL RADIOLOGY | Age: 54
End: 2019-01-01
Attending: EMERGENCY MEDICINE
Payer: MEDICARE

## 2019-01-01 ENCOUNTER — HOME CARE VISIT (OUTPATIENT)
Dept: HOME HEALTH SERVICES | Facility: HOME HEALTH | Age: 54
End: 2019-01-01

## 2019-01-01 ENCOUNTER — HOSPITAL ENCOUNTER (EMERGENCY)
Age: 54
Discharge: HOME | End: 2019-05-19
Attending: EMERGENCY MEDICINE
Payer: MEDICARE

## 2019-01-01 ENCOUNTER — APPOINTMENT (OUTPATIENT)
Dept: CT IMAGING | Age: 54
DRG: 871 | End: 2019-01-01
Attending: INTERNAL MEDICINE
Payer: MEDICARE

## 2019-01-01 ENCOUNTER — HOSPITAL ENCOUNTER (EMERGENCY)
Age: 54
Discharge: HOME | End: 2019-01-14
Attending: EMERGENCY MEDICINE
Payer: SELF-PAY

## 2019-01-01 ENCOUNTER — APPOINTMENT (OUTPATIENT)
Dept: GENERAL RADIOLOGY | Age: 54
End: 2019-01-01
Attending: PHYSICIAN ASSISTANT
Payer: MEDICARE

## 2019-01-01 ENCOUNTER — HOME CARE VISIT (OUTPATIENT)
Dept: SCHEDULING | Facility: HOME HEALTH | Age: 54
End: 2019-01-01

## 2019-01-01 ENCOUNTER — HOSPITAL ENCOUNTER (INPATIENT)
Age: 54
LOS: 5 days | Discharge: HOME HEALTH | DRG: 871 | End: 2019-07-18
Attending: EMERGENCY MEDICINE | Admitting: INTERNAL MEDICINE
Payer: MEDICARE

## 2019-01-01 ENCOUNTER — APPOINTMENT (OUTPATIENT)
Dept: GENERAL RADIOLOGY | Age: 54
DRG: 871 | End: 2019-01-01
Attending: EMERGENCY MEDICINE
Payer: MEDICARE

## 2019-01-01 ENCOUNTER — HOSPITAL ENCOUNTER (EMERGENCY)
Age: 54
Discharge: HOME | End: 2019-12-03
Attending: EMERGENCY MEDICINE
Payer: MEDICARE

## 2019-01-01 ENCOUNTER — HOSPITAL ENCOUNTER (EMERGENCY)
Age: 54
Discharge: HOME | End: 2019-08-16
Attending: EMERGENCY MEDICINE
Payer: MEDICARE

## 2019-01-01 ENCOUNTER — APPOINTMENT (OUTPATIENT)
Dept: CT IMAGING | Age: 54
End: 2019-01-01
Attending: EMERGENCY MEDICINE
Payer: MEDICARE

## 2019-01-01 ENCOUNTER — APPOINTMENT (OUTPATIENT)
Dept: NON INVASIVE DIAGNOSTICS | Age: 54
DRG: 871 | End: 2019-01-01
Attending: INTERNAL MEDICINE
Payer: MEDICARE

## 2019-01-01 ENCOUNTER — APPOINTMENT (OUTPATIENT)
Dept: GENERAL RADIOLOGY | Age: 54
End: 2019-01-01
Attending: EMERGENCY MEDICINE
Payer: SELF-PAY

## 2019-01-01 ENCOUNTER — HOSPITAL ENCOUNTER (EMERGENCY)
Age: 54
Discharge: OTHER INSTITUTION | End: 2019-10-10
Attending: EMERGENCY MEDICINE
Payer: MEDICARE

## 2019-01-01 ENCOUNTER — HOME HEALTH ADMISSION (OUTPATIENT)
Dept: HOME HEALTH SERVICES | Facility: HOME HEALTH | Age: 54
End: 2019-01-01
Payer: MEDICARE

## 2019-01-01 VITALS
HEART RATE: 97 BPM | RESPIRATION RATE: 18 BRPM | OXYGEN SATURATION: 99 % | HEIGHT: 67 IN | DIASTOLIC BLOOD PRESSURE: 70 MMHG | WEIGHT: 130 LBS | TEMPERATURE: 97.7 F | BODY MASS INDEX: 20.4 KG/M2 | SYSTOLIC BLOOD PRESSURE: 94 MMHG

## 2019-01-01 VITALS
OXYGEN SATURATION: 98 % | BODY MASS INDEX: 18.01 KG/M2 | TEMPERATURE: 98.1 F | WEIGHT: 133 LBS | DIASTOLIC BLOOD PRESSURE: 77 MMHG | RESPIRATION RATE: 18 BRPM | HEART RATE: 81 BPM | HEIGHT: 72 IN | SYSTOLIC BLOOD PRESSURE: 119 MMHG

## 2019-01-01 VITALS
TEMPERATURE: 98.2 F | DIASTOLIC BLOOD PRESSURE: 65 MMHG | OXYGEN SATURATION: 97 % | HEART RATE: 82 BPM | RESPIRATION RATE: 18 BRPM | SYSTOLIC BLOOD PRESSURE: 115 MMHG

## 2019-01-01 VITALS
RESPIRATION RATE: 17 BRPM | HEART RATE: 129 BPM | DIASTOLIC BLOOD PRESSURE: 76 MMHG | OXYGEN SATURATION: 100 % | WEIGHT: 126 LBS | TEMPERATURE: 99 F | BODY MASS INDEX: 19.16 KG/M2 | SYSTOLIC BLOOD PRESSURE: 99 MMHG

## 2019-01-01 VITALS
RESPIRATION RATE: 23 BRPM | TEMPERATURE: 97.5 F | HEIGHT: 67 IN | DIASTOLIC BLOOD PRESSURE: 78 MMHG | SYSTOLIC BLOOD PRESSURE: 100 MMHG | OXYGEN SATURATION: 94 % | HEART RATE: 130 BPM | BODY MASS INDEX: 24.28 KG/M2

## 2019-01-01 VITALS
DIASTOLIC BLOOD PRESSURE: 58 MMHG | HEART RATE: 101 BPM | TEMPERATURE: 98.3 F | RESPIRATION RATE: 16 BRPM | WEIGHT: 155 LBS | HEIGHT: 67 IN | OXYGEN SATURATION: 100 % | BODY MASS INDEX: 24.33 KG/M2 | SYSTOLIC BLOOD PRESSURE: 108 MMHG

## 2019-01-01 VITALS
SYSTOLIC BLOOD PRESSURE: 132 MMHG | HEIGHT: 68 IN | WEIGHT: 124 LBS | RESPIRATION RATE: 22 BRPM | OXYGEN SATURATION: 95 % | TEMPERATURE: 98.4 F | DIASTOLIC BLOOD PRESSURE: 84 MMHG | BODY MASS INDEX: 18.79 KG/M2 | HEART RATE: 94 BPM

## 2019-01-01 DIAGNOSIS — J43.9 PULMONARY EMPHYSEMA, UNSPECIFIED EMPHYSEMA TYPE (HCC): ICD-10-CM

## 2019-01-01 DIAGNOSIS — Z93.0 TRACHEOSTOMY DEPENDENCE (HCC): ICD-10-CM

## 2019-01-01 DIAGNOSIS — C14.0 THROAT CANCER (HCC): ICD-10-CM

## 2019-01-01 DIAGNOSIS — C32.9 LARYNGEAL CARCINOMA (HCC): ICD-10-CM

## 2019-01-01 DIAGNOSIS — R04.2 HEMOPTYSIS: Primary | ICD-10-CM

## 2019-01-01 DIAGNOSIS — Z71.89 ADVANCED CARE PLANNING/COUNSELING DISCUSSION: ICD-10-CM

## 2019-01-01 DIAGNOSIS — A41.9 SEPSIS, DUE TO UNSPECIFIED ORGANISM: ICD-10-CM

## 2019-01-01 DIAGNOSIS — Z43.1 PEG (PERCUTANEOUS ENDOSCOPIC GASTROSTOMY) ADJUSTMENT/REPLACEMENT/REMOVAL (HCC): Primary | ICD-10-CM

## 2019-01-01 DIAGNOSIS — R53.83 FATIGUE, UNSPECIFIED TYPE: ICD-10-CM

## 2019-01-01 DIAGNOSIS — F11.90 OPIATE USE: ICD-10-CM

## 2019-01-01 DIAGNOSIS — E87.6 HYPOKALEMIA: ICD-10-CM

## 2019-01-01 DIAGNOSIS — R06.02 SOB (SHORTNESS OF BREATH): ICD-10-CM

## 2019-01-01 DIAGNOSIS — G89.29 OTHER CHRONIC PAIN: ICD-10-CM

## 2019-01-01 DIAGNOSIS — J95.01 TRACHEOSTOMY HEMORRHAGE (HCC): ICD-10-CM

## 2019-01-01 DIAGNOSIS — F10.920 ALCOHOLIC INTOXICATION WITHOUT COMPLICATION (HCC): ICD-10-CM

## 2019-01-01 DIAGNOSIS — Z93.0 TRACHEOSTOMY DEPENDENT (HCC): ICD-10-CM

## 2019-01-01 DIAGNOSIS — R07.9 CHEST PAIN, UNSPECIFIED TYPE: Primary | ICD-10-CM

## 2019-01-01 DIAGNOSIS — A41.9 SEPSIS, DUE TO UNSPECIFIED ORGANISM, UNSPECIFIED WHETHER ACUTE ORGAN DYSFUNCTION PRESENT (HCC): ICD-10-CM

## 2019-01-01 DIAGNOSIS — R00.0 SINUS TACHYCARDIA: Primary | ICD-10-CM

## 2019-01-01 DIAGNOSIS — E86.0 DEHYDRATION: Primary | ICD-10-CM

## 2019-01-01 DIAGNOSIS — K92.0 HEMATEMESIS WITH NAUSEA: Primary | ICD-10-CM

## 2019-01-01 DIAGNOSIS — J04.10 ACUTE TRACHEITIS WITHOUT AIRWAY OBSTRUCTION: ICD-10-CM

## 2019-01-01 DIAGNOSIS — J69.0 ASPIRATION PNEUMONIA OF RIGHT LOWER LOBE DUE TO VOMIT (HCC): ICD-10-CM

## 2019-01-01 DIAGNOSIS — G89.4 CHRONIC PAIN SYNDROME: ICD-10-CM

## 2019-01-01 LAB
ABO + RH BLD: NORMAL
ABO + RH BLD: NORMAL
ACTH PLAS-MCNC: 8.8 PG/ML (ref 7.2–63.3)
ALBUMIN SERPL-MCNC: 1.7 G/DL (ref 3.4–5)
ALBUMIN SERPL-MCNC: 1.8 G/DL (ref 3.4–5)
ALBUMIN SERPL-MCNC: 2.1 G/DL (ref 3.4–5)
ALBUMIN SERPL-MCNC: 2.1 G/DL (ref 3.4–5)
ALBUMIN SERPL-MCNC: 2.4 G/DL (ref 3.4–5)
ALBUMIN SERPL-MCNC: 2.4 G/DL (ref 3.4–5)
ALBUMIN SERPL-MCNC: 2.6 G/DL (ref 3.4–5)
ALBUMIN/GLOB SERPL: 0.5 {RATIO} (ref 0.8–1.7)
ALBUMIN/GLOB SERPL: 0.5 {RATIO} (ref 0.8–1.7)
ALBUMIN/GLOB SERPL: 0.6 {RATIO} (ref 0.8–1.7)
ALBUMIN/GLOB SERPL: 0.7 {RATIO} (ref 0.8–1.7)
ALBUMIN/GLOB SERPL: 0.7 {RATIO} (ref 0.8–1.7)
ALBUMIN/GLOB SERPL: 0.9 {RATIO} (ref 0.8–1.7)
ALBUMIN/GLOB SERPL: 1 {RATIO} (ref 0.8–1.7)
ALP SERPL-CCNC: 105 U/L (ref 45–117)
ALP SERPL-CCNC: 152 U/L (ref 45–117)
ALP SERPL-CCNC: 160 U/L (ref 45–117)
ALP SERPL-CCNC: 189 U/L (ref 45–117)
ALP SERPL-CCNC: 221 U/L (ref 45–117)
ALP SERPL-CCNC: 375 U/L (ref 45–117)
ALP SERPL-CCNC: 69 U/L (ref 45–117)
ALT SERPL-CCNC: 20 U/L (ref 16–61)
ALT SERPL-CCNC: 26 U/L (ref 16–61)
ALT SERPL-CCNC: 32 U/L (ref 16–61)
ALT SERPL-CCNC: 36 U/L (ref 16–61)
ALT SERPL-CCNC: 39 U/L (ref 16–61)
ALT SERPL-CCNC: 67 U/L (ref 16–61)
ALT SERPL-CCNC: 96 U/L (ref 16–61)
AMORPH CRY URNS QL MICRO: ABNORMAL
AMPHET UR QL SCN: NEGATIVE
AMPHET UR QL SCN: NEGATIVE
ANION GAP BLD CALC-SCNC: 17 MMOL/L (ref 10–20)
ANION GAP SERPL CALC-SCNC: 11 MMOL/L (ref 3–18)
ANION GAP SERPL CALC-SCNC: 12 MMOL/L (ref 3–18)
ANION GAP SERPL CALC-SCNC: 13 MMOL/L (ref 3–18)
ANION GAP SERPL CALC-SCNC: 15 MMOL/L (ref 3–18)
ANION GAP SERPL CALC-SCNC: 6 MMOL/L (ref 3–18)
ANION GAP SERPL CALC-SCNC: 7 MMOL/L (ref 3–18)
ANION GAP SERPL CALC-SCNC: 8 MMOL/L (ref 3–18)
ANION GAP SERPL CALC-SCNC: 9 MMOL/L (ref 3–18)
ANION GAP SERPL CALC-SCNC: 9 MMOL/L (ref 3–18)
APAP SERPL-MCNC: <2 UG/ML (ref 10–30)
APAP SERPL-MCNC: <2 UG/ML (ref 10–30)
APPEARANCE UR: ABNORMAL
APPEARANCE UR: CLEAR
APTT PPP: 32 SEC (ref 23–36.4)
ARTERIAL PATENCY WRIST A: ABNORMAL
ARTERIAL PATENCY WRIST A: YES
AST SERPL-CCNC: 125 U/L (ref 10–38)
AST SERPL-CCNC: 31 U/L (ref 15–37)
AST SERPL-CCNC: 33 U/L (ref 15–37)
AST SERPL-CCNC: 36 U/L (ref 15–37)
AST SERPL-CCNC: 40 U/L (ref 15–37)
AST SERPL-CCNC: 54 U/L (ref 10–38)
AST SERPL-CCNC: 81 U/L (ref 15–37)
ATRIAL RATE: 125 BPM
ATRIAL RATE: 129 BPM
ATRIAL RATE: 129 BPM
ATRIAL RATE: 163 BPM
ATRIAL RATE: 441 BPM
ATRIAL RATE: 79 BPM
BACTERIA SPEC CULT: NORMAL
BACTERIA SPEC CULT: NORMAL
BACTERIA URNS QL MICRO: NEGATIVE /HPF
BACTERIA URNS QL MICRO: NEGATIVE /HPF
BARBITURATES UR QL SCN: NEGATIVE
BARBITURATES UR QL SCN: NEGATIVE
BASE DEFICIT BLD-SCNC: 4 MMOL/L
BASE EXCESS BLDV CALC-SCNC: 6 MMOL/L
BASOPHILS # BLD: 0 K/UL (ref 0–0.1)
BASOPHILS NFR BLD: 0 % (ref 0–2)
BDY SITE: ABNORMAL
BDY SITE: ABNORMAL
BENZODIAZ UR QL: NEGATIVE
BENZODIAZ UR QL: NEGATIVE
BILIRUB SERPL-MCNC: 0.1 MG/DL (ref 0.2–1)
BILIRUB SERPL-MCNC: 0.2 MG/DL (ref 0.2–1)
BILIRUB SERPL-MCNC: 0.3 MG/DL (ref 0.2–1)
BILIRUB SERPL-MCNC: 0.4 MG/DL (ref 0.2–1)
BILIRUB SERPL-MCNC: 0.5 MG/DL (ref 0.2–1)
BILIRUB SERPL-MCNC: 0.5 MG/DL (ref 0.2–1)
BILIRUB SERPL-MCNC: 1 MG/DL (ref 0.2–1)
BILIRUB UR QL: NEGATIVE
BILIRUB UR QL: NEGATIVE
BLD PROD TYP BPU: NORMAL
BLOOD GROUP ANTIBODIES SERPL: NORMAL
BLOOD GROUP ANTIBODIES SERPL: NORMAL
BNP SERPL-MCNC: 213 PG/ML (ref 0–900)
BODY TEMPERATURE: 37
BPU ID: NORMAL
BUN BLD-MCNC: 63 MG/DL (ref 7–18)
BUN SERPL-MCNC: 19 MG/DL (ref 7–18)
BUN SERPL-MCNC: 2 MG/DL (ref 7–18)
BUN SERPL-MCNC: 23 MG/DL (ref 7–18)
BUN SERPL-MCNC: 24 MG/DL (ref 7–18)
BUN SERPL-MCNC: 3 MG/DL (ref 7–18)
BUN SERPL-MCNC: 6 MG/DL (ref 7–18)
BUN SERPL-MCNC: 7 MG/DL (ref 7–18)
BUN SERPL-MCNC: 71 MG/DL (ref 7–18)
BUN SERPL-MCNC: 78 MG/DL (ref 7–18)
BUN SERPL-MCNC: 9 MG/DL (ref 7–18)
BUN SERPL-MCNC: 9 MG/DL (ref 7–18)
BUN/CREAT SERPL: 10 (ref 12–20)
BUN/CREAT SERPL: 11 (ref 12–20)
BUN/CREAT SERPL: 15 (ref 12–20)
BUN/CREAT SERPL: 16 (ref 12–20)
BUN/CREAT SERPL: 28 (ref 12–20)
BUN/CREAT SERPL: 33 (ref 12–20)
BUN/CREAT SERPL: 38 (ref 12–20)
BUN/CREAT SERPL: 4 (ref 12–20)
BUN/CREAT SERPL: 48 (ref 12–20)
BUN/CREAT SERPL: 6 (ref 12–20)
BUN/CREAT SERPL: 62 (ref 12–20)
CA-I BLD-MCNC: 1.03 MMOL/L (ref 1.12–1.32)
CA-I SERPL-SCNC: 1 MMOL/L (ref 1.12–1.32)
CA-I SERPL-SCNC: 1.16 MMOL/L (ref 1.12–1.32)
CALCIUM SERPL-MCNC: 7.2 MG/DL (ref 8.5–10.1)
CALCIUM SERPL-MCNC: 7.5 MG/DL (ref 8.5–10.1)
CALCIUM SERPL-MCNC: 7.6 MG/DL (ref 8.5–10.1)
CALCIUM SERPL-MCNC: 7.6 MG/DL (ref 8.5–10.1)
CALCIUM SERPL-MCNC: 7.8 MG/DL (ref 8.5–10.1)
CALCIUM SERPL-MCNC: 7.9 MG/DL (ref 8.5–10.1)
CALCIUM SERPL-MCNC: 8.1 MG/DL (ref 8.5–10.1)
CALCIUM SERPL-MCNC: 8.2 MG/DL (ref 8.5–10.1)
CALCIUM SERPL-MCNC: 8.3 MG/DL (ref 8.5–10.1)
CALCIUM SERPL-MCNC: 8.3 MG/DL (ref 8.5–10.1)
CALCIUM SERPL-MCNC: 8.4 MG/DL (ref 8.5–10.1)
CALCULATED P AXIS, ECG09: 62 DEGREES
CALCULATED P AXIS, ECG09: 69 DEGREES
CALCULATED P AXIS, ECG09: 73 DEGREES
CALCULATED P AXIS, ECG09: 76 DEGREES
CALCULATED R AXIS, ECG10: 47 DEGREES
CALCULATED R AXIS, ECG10: 48 DEGREES
CALCULATED R AXIS, ECG10: 60 DEGREES
CALCULATED R AXIS, ECG10: 66 DEGREES
CALCULATED R AXIS, ECG10: 70 DEGREES
CALCULATED R AXIS, ECG10: 71 DEGREES
CALCULATED T AXIS, ECG11: -105 DEGREES
CALCULATED T AXIS, ECG11: 31 DEGREES
CALCULATED T AXIS, ECG11: 60 DEGREES
CALCULATED T AXIS, ECG11: 64 DEGREES
CALCULATED T AXIS, ECG11: 67 DEGREES
CALCULATED T AXIS, ECG11: 77 DEGREES
CALLED TO:,BCALL1: NORMAL
CALLED TO:,BCALL2: NORMAL
CANNABINOIDS UR QL SCN: NEGATIVE
CANNABINOIDS UR QL SCN: NEGATIVE
CHLORIDE BLD-SCNC: 100 MMOL/L (ref 100–108)
CHLORIDE SERPL-SCNC: 102 MMOL/L (ref 100–111)
CHLORIDE SERPL-SCNC: 103 MMOL/L (ref 100–108)
CHLORIDE SERPL-SCNC: 103 MMOL/L (ref 100–111)
CHLORIDE SERPL-SCNC: 105 MMOL/L (ref 100–108)
CHLORIDE SERPL-SCNC: 108 MMOL/L (ref 100–108)
CHLORIDE SERPL-SCNC: 78 MMOL/L (ref 100–108)
CHLORIDE SERPL-SCNC: 94 MMOL/L (ref 100–111)
CHLORIDE SERPL-SCNC: 95 MMOL/L (ref 100–108)
CK MB CFR SERPL CALC: 1.6 % (ref 0–4)
CK MB CFR SERPL CALC: 1.7 % (ref 0–4)
CK MB CFR SERPL CALC: 1.8 % (ref 0–4)
CK MB CFR SERPL CALC: 4.4 % (ref 0–4)
CK MB CFR SERPL CALC: 5.9 % (ref 0–4)
CK MB CFR SERPL CALC: 7.1 % (ref 0–4)
CK MB CFR SERPL CALC: ABNORMAL % (ref 0–4)
CK MB SERPL-MCNC: 2.3 NG/ML (ref 5–25)
CK MB SERPL-MCNC: 2.4 NG/ML (ref 5–25)
CK MB SERPL-MCNC: 2.7 NG/ML (ref 5–25)
CK MB SERPL-MCNC: 2.8 NG/ML (ref 5–25)
CK MB SERPL-MCNC: 2.9 NG/ML (ref 5–25)
CK MB SERPL-MCNC: 3.2 NG/ML (ref 5–25)
CK MB SERPL-MCNC: <1 NG/ML (ref 5–25)
CK SERPL-CCNC: 175 U/L (ref 39–308)
CK SERPL-CCNC: 176 U/L (ref 39–308)
CK SERPL-CCNC: 180 U/L (ref 39–308)
CK SERPL-CCNC: 33 U/L (ref 39–308)
CK SERPL-CCNC: 34 U/L (ref 39–308)
CK SERPL-CCNC: 46 U/L (ref 39–308)
CK SERPL-CCNC: 52 U/L (ref 39–308)
CO2 BLD-SCNC: 24 MMOL/L (ref 19–24)
CO2 SERPL-SCNC: 22 MMOL/L (ref 21–32)
CO2 SERPL-SCNC: 24 MMOL/L (ref 21–32)
CO2 SERPL-SCNC: 25 MMOL/L (ref 21–32)
CO2 SERPL-SCNC: 27 MMOL/L (ref 21–32)
CO2 SERPL-SCNC: 27 MMOL/L (ref 21–32)
CO2 SERPL-SCNC: 28 MMOL/L (ref 21–32)
CO2 SERPL-SCNC: 29 MMOL/L (ref 21–32)
CO2 SERPL-SCNC: 29 MMOL/L (ref 21–32)
CO2 SERPL-SCNC: 30 MMOL/L (ref 21–32)
CO2 SERPL-SCNC: 30 MMOL/L (ref 21–32)
CO2 SERPL-SCNC: 32 MMOL/L (ref 21–32)
COCAINE UR QL SCN: NEGATIVE
COCAINE UR QL SCN: POSITIVE
COLOR UR: YELLOW
COLOR UR: YELLOW
CORTIS 1H P CHAL SERPL-MCNC: >150 UG/DL
CORTIS 1H P CHAL SERPL-MCNC: NORMAL UG/DL
CORTIS 30M P CHAL SERPL-MCNC: >150 UG/DL
CORTIS 30M P CHAL SERPL-MCNC: NORMAL UG/DL
CORTIS BS SERPL-MCNC: NORMAL UG/DL
CORTIS SERPL-MCNC: 40.2 UG/DL
CREAT SERPL-MCNC: 0.47 MG/DL (ref 0.6–1.3)
CREAT SERPL-MCNC: 0.54 MG/DL (ref 0.6–1.3)
CREAT SERPL-MCNC: 0.58 MG/DL (ref 0.6–1.3)
CREAT SERPL-MCNC: 0.59 MG/DL (ref 0.6–1.3)
CREAT SERPL-MCNC: 0.6 MG/DL (ref 0.6–1.3)
CREAT SERPL-MCNC: 0.64 MG/DL (ref 0.6–1.3)
CREAT SERPL-MCNC: 0.64 MG/DL (ref 0.6–1.3)
CREAT SERPL-MCNC: 0.67 MG/DL (ref 0.6–1.3)
CREAT SERPL-MCNC: 0.69 MG/DL (ref 0.6–1.3)
CREAT SERPL-MCNC: 1.15 MG/DL (ref 0.6–1.3)
CREAT SERPL-MCNC: 1.64 MG/DL (ref 0.6–1.3)
CREAT UR-MCNC: 1.1 MG/DL (ref 0.6–1.3)
CROSSMATCH RESULT,%XM: NORMAL
DIAGNOSIS, 93000: NORMAL
DIFFERENTIAL METHOD BLD: ABNORMAL
ECHO AO ROOT DIAM: 3.62 CM
ECHO AV AREA PEAK VELOCITY: 3.1 CM2
ECHO AV AREA VTI: 3 CM2
ECHO AV AREA/BSA PEAK VELOCITY: 1.8 CM2/M2
ECHO AV AREA/BSA VTI: 1.7 CM2/M2
ECHO AV MEAN GRADIENT: 11.1 MMHG
ECHO AV PEAK GRADIENT: 18.3 MMHG
ECHO AV PEAK VELOCITY: 214.06 CM/S
ECHO AV VTI: 39.74 CM
ECHO IVC PROX: 1.56 CM
ECHO LA MAJOR AXIS: 3.3 CM
ECHO LA VOL 2C: 60.84 ML (ref 18–58)
ECHO LA VOL 4C: 14.5 ML (ref 18–58)
ECHO LA VOL BP: 35.75 ML (ref 18–58)
ECHO LA VOL/BSA BIPLANE: 20.02 ML/M2 (ref 16–28)
ECHO LA VOLUME INDEX A2C: 34.07 ML/M2 (ref 16–28)
ECHO LA VOLUME INDEX A4C: 8.12 ML/M2 (ref 16–28)
ECHO LV E' LATERAL VELOCITY: 12 CM/S
ECHO LV E' SEPTAL VELOCITY: 10 CM/S
ECHO LV EDV A2C: 55.5 ML
ECHO LV EDV A4C: 68.3 ML
ECHO LV EDV BP: 64 ML (ref 67–155)
ECHO LV EDV INDEX A4C: 38.3 ML/M2
ECHO LV EDV INDEX BP: 35.8 ML/M2
ECHO LV EDV NDEX A2C: 31.1 ML/M2
ECHO LV EJECTION FRACTION A2C: 73 %
ECHO LV EJECTION FRACTION A4C: 75 %
ECHO LV EJECTION FRACTION BIPLANE: 80.9 % (ref 55–100)
ECHO LV ESV A2C: 15 ML
ECHO LV ESV A4C: 17.2 ML
ECHO LV ESV BP: 12.2 ML (ref 22–58)
ECHO LV ESV INDEX A2C: 8.4 ML/M2
ECHO LV ESV INDEX A4C: 9.6 ML/M2
ECHO LV ESV INDEX BP: 6.8 ML/M2
ECHO LV INTERNAL DIMENSION DIASTOLIC: 4.12 CM (ref 4.2–5.9)
ECHO LV INTERNAL DIMENSION SYSTOLIC: 2.22 CM
ECHO LV IVSD: 1.21 CM (ref 0.6–1)
ECHO LV MASS 2D: 178.3 G (ref 88–224)
ECHO LV MASS INDEX 2D: 99.9 G/M2 (ref 49–115)
ECHO LV POSTERIOR WALL DIASTOLIC: 1.01 CM (ref 0.6–1)
ECHO LVOT DIAM: 2.08 CM
ECHO LVOT PEAK GRADIENT: 15.2 MMHG
ECHO LVOT PEAK VELOCITY: 194.77 CM/S
ECHO LVOT VTI: 35.27 CM
ECHO MV A VELOCITY: 78.83 CM/S
ECHO MV AREA PHT: 4.3 CM2
ECHO MV E DECELERATION TIME (DT): 174.7 MS
ECHO MV E VELOCITY: 150.55 CM/S
ECHO MV E/A RATIO: 1.91
ECHO MV E/E' LATERAL: 12.55
ECHO MV E/E' RATIO (AVERAGED): 13.8
ECHO MV E/E' SEPTAL: 15.06
ECHO MV PRESSURE HALF TIME (PHT): 50.7 MS
ECHO PV MAX VELOCITY: 114.04 CM/S
ECHO PV PEAK GRADIENT: 5.2 MMHG
ECHO RA AREA 4C: 15.02 CM2
ECHO RV INTERNAL DIMENSION: 3.25 CM
ECHO TRICUSPID ANNULAR PEAK SYSTOLIC VELOCITY: 2.5 CM/S
EOSINOPHIL # BLD: 0 K/UL (ref 0–0.4)
EOSINOPHIL # BLD: 0.1 K/UL (ref 0–0.4)
EOSINOPHIL NFR BLD: 0 % (ref 0–5)
EOSINOPHIL NFR BLD: 2 % (ref 0–5)
EPITH CASTS URNS QL MICRO: ABNORMAL /LPF (ref 0–5)
EPITH CASTS URNS QL MICRO: NORMAL /LPF (ref 0–5)
ERYTHROCYTE [DISTWIDTH] IN BLOOD BY AUTOMATED COUNT: 13.7 % (ref 11.6–14.5)
ERYTHROCYTE [DISTWIDTH] IN BLOOD BY AUTOMATED COUNT: 15.5 % (ref 11.6–14.5)
ERYTHROCYTE [DISTWIDTH] IN BLOOD BY AUTOMATED COUNT: 15.6 % (ref 11.6–14.5)
ERYTHROCYTE [DISTWIDTH] IN BLOOD BY AUTOMATED COUNT: 16.5 % (ref 11.6–14.5)
ERYTHROCYTE [DISTWIDTH] IN BLOOD BY AUTOMATED COUNT: 16.6 % (ref 11.6–14.5)
ERYTHROCYTE [DISTWIDTH] IN BLOOD BY AUTOMATED COUNT: 16.8 % (ref 11.6–14.5)
ERYTHROCYTE [DISTWIDTH] IN BLOOD BY AUTOMATED COUNT: 17.2 % (ref 11.6–14.5)
ERYTHROCYTE [DISTWIDTH] IN BLOOD BY AUTOMATED COUNT: 18.9 % (ref 11.6–14.5)
ERYTHROCYTE [DISTWIDTH] IN BLOOD BY AUTOMATED COUNT: 19.5 % (ref 11.6–14.5)
ERYTHROCYTE [DISTWIDTH] IN BLOOD BY AUTOMATED COUNT: 20 % (ref 11.6–14.5)
ERYTHROCYTE [DISTWIDTH] IN BLOOD BY AUTOMATED COUNT: 20.1 % (ref 11.6–14.5)
EST. AVERAGE GLUCOSE BLD GHB EST-MCNC: NORMAL MG/DL
ETHANOL SERPL-MCNC: 5 MG/DL (ref 0–3)
ETHANOL SERPL-MCNC: 87 MG/DL (ref 0–3)
ETHANOL SERPL-MCNC: 94 MG/DL (ref 0–3)
FLUAV AG NPH QL IA: NEGATIVE
FLUBV AG NOSE QL IA: NEGATIVE
GAS FLOW.O2 O2 DELIVERY SYS: ABNORMAL L/MIN
GAS FLOW.O2 O2 DELIVERY SYS: ABNORMAL L/MIN
GAS FLOW.O2 SETTING OXYMISER: 3 L/M
GLOBULIN SER CALC-MCNC: 2.3 G/DL (ref 2–4)
GLOBULIN SER CALC-MCNC: 2.5 G/DL (ref 2–4)
GLOBULIN SER CALC-MCNC: 3 G/DL (ref 2–4)
GLOBULIN SER CALC-MCNC: 3.2 G/DL (ref 2–4)
GLOBULIN SER CALC-MCNC: 3.7 G/DL (ref 2–4)
GLOBULIN SER CALC-MCNC: 3.8 G/DL (ref 2–4)
GLOBULIN SER CALC-MCNC: 3.9 G/DL (ref 2–4)
GLUCOSE BLD STRIP.AUTO-MCNC: 100 MG/DL (ref 70–110)
GLUCOSE BLD STRIP.AUTO-MCNC: 115 MG/DL (ref 70–110)
GLUCOSE BLD STRIP.AUTO-MCNC: 121 MG/DL (ref 70–110)
GLUCOSE BLD STRIP.AUTO-MCNC: 123 MG/DL (ref 70–110)
GLUCOSE BLD STRIP.AUTO-MCNC: 125 MG/DL (ref 70–110)
GLUCOSE BLD STRIP.AUTO-MCNC: 126 MG/DL (ref 70–110)
GLUCOSE BLD STRIP.AUTO-MCNC: 128 MG/DL (ref 70–110)
GLUCOSE BLD STRIP.AUTO-MCNC: 128 MG/DL (ref 70–110)
GLUCOSE BLD STRIP.AUTO-MCNC: 132 MG/DL (ref 70–110)
GLUCOSE BLD STRIP.AUTO-MCNC: 135 MG/DL (ref 70–110)
GLUCOSE BLD STRIP.AUTO-MCNC: 138 MG/DL (ref 70–110)
GLUCOSE BLD STRIP.AUTO-MCNC: 150 MG/DL (ref 70–110)
GLUCOSE BLD STRIP.AUTO-MCNC: 156 MG/DL (ref 70–110)
GLUCOSE BLD STRIP.AUTO-MCNC: 157 MG/DL (ref 70–110)
GLUCOSE BLD STRIP.AUTO-MCNC: 161 MG/DL (ref 70–110)
GLUCOSE BLD STRIP.AUTO-MCNC: 174 MG/DL (ref 70–110)
GLUCOSE BLD STRIP.AUTO-MCNC: 185 MG/DL (ref 70–110)
GLUCOSE BLD STRIP.AUTO-MCNC: 191 MG/DL (ref 70–110)
GLUCOSE BLD STRIP.AUTO-MCNC: 195 MG/DL (ref 70–110)
GLUCOSE BLD STRIP.AUTO-MCNC: 89 MG/DL (ref 70–110)
GLUCOSE BLD STRIP.AUTO-MCNC: 89 MG/DL (ref 74–106)
GLUCOSE BLD STRIP.AUTO-MCNC: 95 MG/DL (ref 70–110)
GLUCOSE BLD STRIP.AUTO-MCNC: 96 MG/DL (ref 70–110)
GLUCOSE SERPL-MCNC: 101 MG/DL (ref 74–99)
GLUCOSE SERPL-MCNC: 102 MG/DL (ref 74–99)
GLUCOSE SERPL-MCNC: 107 MG/DL (ref 74–99)
GLUCOSE SERPL-MCNC: 108 MG/DL (ref 74–99)
GLUCOSE SERPL-MCNC: 112 MG/DL (ref 74–99)
GLUCOSE SERPL-MCNC: 112 MG/DL (ref 74–99)
GLUCOSE SERPL-MCNC: 124 MG/DL (ref 74–99)
GLUCOSE SERPL-MCNC: 143 MG/DL (ref 74–99)
GLUCOSE SERPL-MCNC: 78 MG/DL (ref 74–99)
GLUCOSE SERPL-MCNC: 93 MG/DL (ref 74–99)
GLUCOSE SERPL-MCNC: 98 MG/DL (ref 74–99)
GLUCOSE UR STRIP.AUTO-MCNC: NEGATIVE MG/DL
GLUCOSE UR STRIP.AUTO-MCNC: NEGATIVE MG/DL
HBA1C MFR BLD: 4.7 % (ref 4.2–5.6)
HCO3 BLD-SCNC: 21.9 MMOL/L (ref 22–26)
HCO3 BLDV-SCNC: 31.1 MMOL/L (ref 23–28)
HCT VFR BLD AUTO: 20 % (ref 36–48)
HCT VFR BLD AUTO: 20.3 % (ref 36–48)
HCT VFR BLD AUTO: 20.5 % (ref 36–48)
HCT VFR BLD AUTO: 21.6 % (ref 36–48)
HCT VFR BLD AUTO: 21.7 % (ref 36–48)
HCT VFR BLD AUTO: 21.8 % (ref 36–48)
HCT VFR BLD AUTO: 21.8 % (ref 36–48)
HCT VFR BLD AUTO: 22.1 % (ref 36–48)
HCT VFR BLD AUTO: 22.5 % (ref 36–48)
HCT VFR BLD AUTO: 23.3 % (ref 36–48)
HCT VFR BLD AUTO: 23.7 % (ref 36–48)
HCT VFR BLD AUTO: 24 % (ref 36–48)
HCT VFR BLD AUTO: 24 % (ref 36–48)
HCT VFR BLD AUTO: 25.3 % (ref 36–48)
HCT VFR BLD AUTO: 28.8 % (ref 36–48)
HCT VFR BLD AUTO: 29.8 % (ref 36–48)
HCT VFR BLD AUTO: 33.1 % (ref 36–48)
HCT VFR BLD AUTO: 35.8 % (ref 36–48)
HCT VFR BLD AUTO: 36 % (ref 36–48)
HCT VFR BLD AUTO: 44.2 % (ref 36–48)
HCT VFR BLD CALC: 23 % (ref 36–49)
HDSCOM,HDSCOM: ABNORMAL
HDSCOM,HDSCOM: NORMAL
HGB BLD-MCNC: 10.4 G/DL (ref 13–16)
HGB BLD-MCNC: 11 G/DL (ref 13–16)
HGB BLD-MCNC: 12 G/DL (ref 13–16)
HGB BLD-MCNC: 12 G/DL (ref 13–16)
HGB BLD-MCNC: 15.4 G/DL (ref 13–16)
HGB BLD-MCNC: 6.8 G/DL (ref 13–16)
HGB BLD-MCNC: 7 G/DL (ref 13–16)
HGB BLD-MCNC: 7.1 G/DL (ref 13–16)
HGB BLD-MCNC: 7.2 G/DL (ref 13–16)
HGB BLD-MCNC: 7.3 G/DL (ref 13–16)
HGB BLD-MCNC: 7.5 G/DL (ref 13–16)
HGB BLD-MCNC: 7.6 G/DL (ref 13–16)
HGB BLD-MCNC: 7.7 G/DL (ref 13–16)
HGB BLD-MCNC: 7.8 G/DL (ref 12–16)
HGB BLD-MCNC: 7.9 G/DL (ref 13–16)
HGB BLD-MCNC: 8 G/DL (ref 13–16)
HGB BLD-MCNC: 8.1 G/DL (ref 13–16)
HGB BLD-MCNC: 8.6 G/DL (ref 13–16)
HGB BLD-MCNC: 9.6 G/DL (ref 13–16)
HGB UR QL STRIP: ABNORMAL
HGB UR QL STRIP: NEGATIVE
HYALINE CASTS URNS QL MICRO: ABNORMAL /LPF (ref 0–2)
INR PPP: 0.9 (ref 0.8–1.2)
INR PPP: 1 (ref 0.8–1.2)
INR PPP: 1 (ref 0.8–1.2)
KETONES UR QL STRIP.AUTO: ABNORMAL MG/DL
KETONES UR QL STRIP.AUTO: NEGATIVE MG/DL
LACTATE BLD-SCNC: 3.53 MMOL/L (ref 0.4–2)
LACTATE BLD-SCNC: 3.94 MMOL/L (ref 0.4–2)
LACTATE BLD-SCNC: 4.97 MMOL/L (ref 0.4–2)
LACTATE SERPL-SCNC: 1.5 MMOL/L (ref 0.4–2)
LACTATE SERPL-SCNC: 2.2 MMOL/L (ref 0.4–2)
LACTATE SERPL-SCNC: 2.9 MMOL/L (ref 0.4–2)
LEUKOCYTE ESTERASE UR QL STRIP.AUTO: ABNORMAL
LEUKOCYTE ESTERASE UR QL STRIP.AUTO: NEGATIVE
LIPASE SERPL-CCNC: 34 U/L (ref 73–393)
LYMPHOCYTES # BLD: 0.1 K/UL (ref 0.9–3.6)
LYMPHOCYTES # BLD: 0.2 K/UL (ref 0.9–3.6)
LYMPHOCYTES # BLD: 0.2 K/UL (ref 0.9–3.6)
LYMPHOCYTES # BLD: 0.3 K/UL (ref 0.9–3.6)
LYMPHOCYTES # BLD: 0.4 K/UL (ref 0.9–3.6)
LYMPHOCYTES # BLD: 0.6 K/UL (ref 0.9–3.6)
LYMPHOCYTES # BLD: 0.6 K/UL (ref 0.9–3.6)
LYMPHOCYTES # BLD: 0.7 K/UL (ref 0.9–3.6)
LYMPHOCYTES # BLD: 0.9 K/UL (ref 0.9–3.6)
LYMPHOCYTES NFR BLD: 1 % (ref 21–52)
LYMPHOCYTES NFR BLD: 10 % (ref 21–52)
LYMPHOCYTES NFR BLD: 16 % (ref 21–52)
LYMPHOCYTES NFR BLD: 3 % (ref 21–52)
LYMPHOCYTES NFR BLD: 3 % (ref 21–52)
LYMPHOCYTES NFR BLD: 5 % (ref 21–52)
LYMPHOCYTES NFR BLD: 8 % (ref 21–52)
LYMPHOCYTES NFR BLD: 9 % (ref 21–52)
MAGNESIUM SERPL-MCNC: 1.3 MG/DL (ref 1.6–2.6)
MAGNESIUM SERPL-MCNC: 1.5 MG/DL (ref 1.6–2.6)
MAGNESIUM SERPL-MCNC: 1.6 MG/DL (ref 1.6–2.6)
MAGNESIUM SERPL-MCNC: 1.6 MG/DL (ref 1.6–2.6)
MAGNESIUM SERPL-MCNC: 1.8 MG/DL (ref 1.6–2.6)
MAGNESIUM SERPL-MCNC: 1.8 MG/DL (ref 1.6–2.6)
MAGNESIUM SERPL-MCNC: 2.3 MG/DL (ref 1.6–2.6)
MCH RBC QN AUTO: 31.9 PG (ref 24–34)
MCH RBC QN AUTO: 32.4 PG (ref 24–34)
MCH RBC QN AUTO: 32.7 PG (ref 24–34)
MCH RBC QN AUTO: 33.5 PG (ref 24–34)
MCH RBC QN AUTO: 33.6 PG (ref 24–34)
MCH RBC QN AUTO: 33.8 PG (ref 24–34)
MCH RBC QN AUTO: 34.4 PG (ref 24–34)
MCH RBC QN AUTO: 34.4 PG (ref 24–34)
MCH RBC QN AUTO: 34.9 PG (ref 24–34)
MCHC RBC AUTO-ENTMCNC: 33.2 G/DL (ref 31–37)
MCHC RBC AUTO-ENTMCNC: 33.3 G/DL (ref 31–37)
MCHC RBC AUTO-ENTMCNC: 33.5 G/DL (ref 31–37)
MCHC RBC AUTO-ENTMCNC: 34 G/DL (ref 31–37)
MCHC RBC AUTO-ENTMCNC: 34.4 G/DL (ref 31–37)
MCHC RBC AUTO-ENTMCNC: 34.8 G/DL (ref 31–37)
MCHC RBC AUTO-ENTMCNC: 34.8 G/DL (ref 31–37)
MCHC RBC AUTO-ENTMCNC: 34.9 G/DL (ref 31–37)
MCV RBC AUTO: 100.8 FL (ref 74–97)
MCV RBC AUTO: 101.8 FL (ref 74–97)
MCV RBC AUTO: 102.6 FL (ref 74–97)
MCV RBC AUTO: 104.7 FL (ref 74–97)
MCV RBC AUTO: 92.8 FL (ref 74–97)
MCV RBC AUTO: 93.2 FL (ref 74–97)
MCV RBC AUTO: 93.7 FL (ref 74–97)
MCV RBC AUTO: 95.6 FL (ref 74–97)
MCV RBC AUTO: 96.1 FL (ref 74–97)
MCV RBC AUTO: 98.1 FL (ref 74–97)
MCV RBC AUTO: 98.7 FL (ref 74–97)
METHADONE UR QL: NEGATIVE
METHADONE UR QL: NEGATIVE
MONOCYTES # BLD: 0.2 K/UL (ref 0.05–1.2)
MONOCYTES # BLD: 0.3 K/UL (ref 0.05–1.2)
MONOCYTES # BLD: 0.5 K/UL (ref 0.05–1.2)
MONOCYTES # BLD: 0.6 K/UL (ref 0.05–1.2)
MONOCYTES # BLD: 0.7 K/UL (ref 0.05–1.2)
MONOCYTES # BLD: 0.9 K/UL (ref 0.05–1.2)
MONOCYTES # BLD: 0.9 K/UL (ref 0.05–1.2)
MONOCYTES # BLD: 1.1 K/UL (ref 0.05–1.2)
MONOCYTES NFR BLD: 10 % (ref 3–10)
MONOCYTES NFR BLD: 12 % (ref 3–10)
MONOCYTES NFR BLD: 13 % (ref 3–10)
MONOCYTES NFR BLD: 4 % (ref 3–10)
MONOCYTES NFR BLD: 6 % (ref 3–10)
MONOCYTES NFR BLD: 7 % (ref 3–10)
MONOCYTES NFR BLD: 8 % (ref 3–10)
MONOCYTES NFR BLD: 9 % (ref 3–10)
MONOCYTES NFR BLD: 9 % (ref 3–10)
MUCOUS THREADS URNS QL MICRO: ABNORMAL /LPF
NEUTS SEG # BLD: 11 K/UL (ref 1.8–8)
NEUTS SEG # BLD: 11.1 K/UL (ref 1.8–8)
NEUTS SEG # BLD: 2.6 K/UL (ref 1.8–8)
NEUTS SEG # BLD: 2.9 K/UL (ref 1.8–8)
NEUTS SEG # BLD: 3.1 K/UL (ref 1.8–8)
NEUTS SEG # BLD: 3.8 K/UL (ref 1.8–8)
NEUTS SEG # BLD: 5.1 K/UL (ref 1.8–8)
NEUTS SEG # BLD: 6.5 K/UL (ref 1.8–8)
NEUTS SEG # BLD: 7.2 K/UL (ref 1.8–8)
NEUTS SEG # BLD: 8.7 K/UL (ref 1.8–8)
NEUTS SEG # BLD: 9.7 K/UL (ref 1.8–8)
NEUTS SEG NFR BLD: 70 % (ref 40–73)
NEUTS SEG NFR BLD: 79 % (ref 40–73)
NEUTS SEG NFR BLD: 81 % (ref 40–73)
NEUTS SEG NFR BLD: 82 % (ref 40–73)
NEUTS SEG NFR BLD: 86 % (ref 40–73)
NEUTS SEG NFR BLD: 87 % (ref 40–73)
NEUTS SEG NFR BLD: 88 % (ref 40–73)
NEUTS SEG NFR BLD: 89 % (ref 40–73)
NEUTS SEG NFR BLD: 89 % (ref 40–73)
NEUTS SEG NFR BLD: 90 % (ref 40–73)
NEUTS SEG NFR BLD: 93 % (ref 40–73)
NITRITE UR QL STRIP.AUTO: NEGATIVE
NITRITE UR QL STRIP.AUTO: NEGATIVE
O2/TOTAL GAS SETTING VFR VENT: 32 %
O2/TOTAL GAS SETTING VFR VENT: 50 %
OPIATES UR QL: NEGATIVE
OPIATES UR QL: POSITIVE
P-R INTERVAL, ECG05: 122 MS
P-R INTERVAL, ECG05: 134 MS
P-R INTERVAL, ECG05: 134 MS
P-R INTERVAL, ECG05: 142 MS
PCO2 BLD: 39.3 MMHG (ref 35–45)
PCO2 BLDV: 52.6 MMHG (ref 41–51)
PCP UR QL: NEGATIVE
PCP UR QL: NEGATIVE
PH BLD: 7.35 [PH] (ref 7.35–7.45)
PH BLDV: 7.38 [PH] (ref 7.32–7.42)
PH UR STRIP: 5 [PH] (ref 5–8)
PH UR STRIP: 6.5 [PH] (ref 5–8)
PHOSPHATE SERPL-MCNC: 1.1 MG/DL (ref 2.5–4.9)
PHOSPHATE SERPL-MCNC: 1.2 MG/DL (ref 2.5–4.9)
PHOSPHATE SERPL-MCNC: 1.5 MG/DL (ref 2.5–4.9)
PHOSPHATE SERPL-MCNC: 1.6 MG/DL (ref 2.5–4.9)
PLATELET # BLD AUTO: 123 K/UL (ref 135–420)
PLATELET # BLD AUTO: 144 K/UL (ref 135–420)
PLATELET # BLD AUTO: 167 K/UL (ref 135–420)
PLATELET # BLD AUTO: 215 K/UL (ref 135–420)
PLATELET # BLD AUTO: 243 K/UL (ref 135–420)
PLATELET # BLD AUTO: 244 K/UL (ref 135–420)
PLATELET # BLD AUTO: 259 K/UL (ref 135–420)
PLATELET # BLD AUTO: 280 K/UL (ref 135–420)
PLATELET # BLD AUTO: 344 K/UL (ref 135–420)
PLATELET # BLD AUTO: 386 K/UL (ref 135–420)
PLATELET # BLD AUTO: 95 K/UL (ref 135–420)
PMV BLD AUTO: 10 FL (ref 9.2–11.8)
PMV BLD AUTO: 9.2 FL (ref 9.2–11.8)
PMV BLD AUTO: 9.5 FL (ref 9.2–11.8)
PMV BLD AUTO: 9.5 FL (ref 9.2–11.8)
PMV BLD AUTO: 9.6 FL (ref 9.2–11.8)
PMV BLD AUTO: 9.7 FL (ref 9.2–11.8)
PMV BLD AUTO: 9.8 FL (ref 9.2–11.8)
PO2 BLD: 149 MMHG (ref 80–100)
PO2 BLDV: 87 MMHG (ref 25–40)
POTASSIUM BLD-SCNC: 4.9 MMOL/L (ref 3.5–5.5)
POTASSIUM SERPL-SCNC: 2.2 MMOL/L (ref 3.5–5.5)
POTASSIUM SERPL-SCNC: 2.2 MMOL/L (ref 3.5–5.5)
POTASSIUM SERPL-SCNC: 2.3 MMOL/L (ref 3.5–5.5)
POTASSIUM SERPL-SCNC: 2.4 MMOL/L (ref 3.5–5.5)
POTASSIUM SERPL-SCNC: 2.4 MMOL/L (ref 3.5–5.5)
POTASSIUM SERPL-SCNC: 2.5 MMOL/L (ref 3.5–5.5)
POTASSIUM SERPL-SCNC: 2.8 MMOL/L (ref 3.5–5.5)
POTASSIUM SERPL-SCNC: 3.1 MMOL/L (ref 3.5–5.5)
POTASSIUM SERPL-SCNC: 3.3 MMOL/L (ref 3.5–5.5)
POTASSIUM SERPL-SCNC: 3.3 MMOL/L (ref 3.5–5.5)
POTASSIUM SERPL-SCNC: 3.9 MMOL/L (ref 3.5–5.5)
POTASSIUM SERPL-SCNC: 4.3 MMOL/L (ref 3.5–5.5)
POTASSIUM SERPL-SCNC: 4.4 MMOL/L (ref 3.5–5.5)
POTASSIUM SERPL-SCNC: 4.6 MMOL/L (ref 3.5–5.5)
POTASSIUM SERPL-SCNC: 5.9 MMOL/L (ref 3.5–5.5)
PROT SERPL-MCNC: 4.2 G/DL (ref 6.4–8.2)
PROT SERPL-MCNC: 4.7 G/DL (ref 6.4–8.2)
PROT SERPL-MCNC: 5.3 G/DL (ref 6.4–8.2)
PROT SERPL-MCNC: 5.5 G/DL (ref 6.4–8.2)
PROT SERPL-MCNC: 5.6 G/DL (ref 6.4–8.2)
PROT SERPL-MCNC: 6 G/DL (ref 6.4–8.2)
PROT SERPL-MCNC: 6.2 G/DL (ref 6.4–8.2)
PROT UR STRIP-MCNC: ABNORMAL MG/DL
PROT UR STRIP-MCNC: NEGATIVE MG/DL
PROTHROMBIN TIME: 11.7 SEC (ref 11.5–15.2)
PROTHROMBIN TIME: 12.4 SEC (ref 11.5–15.2)
PROTHROMBIN TIME: 12.5 SEC (ref 11.5–15.2)
Q-T INTERVAL, ECG07: 162 MS
Q-T INTERVAL, ECG07: 288 MS
Q-T INTERVAL, ECG07: 290 MS
Q-T INTERVAL, ECG07: 304 MS
Q-T INTERVAL, ECG07: 306 MS
Q-T INTERVAL, ECG07: 408 MS
QRS DURATION, ECG06: 100 MS
QRS DURATION, ECG06: 56 MS
QRS DURATION, ECG06: 62 MS
QRS DURATION, ECG06: 64 MS
QRS DURATION, ECG06: 72 MS
QRS DURATION, ECG06: 84 MS
QTC CALCULATION (BEZET), ECG08: 300 MS
QTC CALCULATION (BEZET), ECG08: 441 MS
QTC CALCULATION (BEZET), ECG08: 445 MS
QTC CALCULATION (BEZET), ECG08: 464 MS
QTC CALCULATION (BEZET), ECG08: 467 MS
QTC CALCULATION (BEZET), ECG08: 474 MS
RBC # BLD AUTO: 2.26 M/UL (ref 4.7–5.5)
RBC # BLD AUTO: 2.35 M/UL (ref 4.7–5.5)
RBC # BLD AUTO: 2.38 M/UL (ref 4.7–5.5)
RBC # BLD AUTO: 2.5 M/UL (ref 4.7–5.5)
RBC # BLD AUTO: 2.7 M/UL (ref 4.7–5.5)
RBC # BLD AUTO: 2.75 M/UL (ref 4.7–5.5)
RBC # BLD AUTO: 3.02 M/UL (ref 4.7–5.5)
RBC # BLD AUTO: 3.25 M/UL (ref 4.7–5.5)
RBC # BLD AUTO: 3.49 M/UL (ref 4.7–5.5)
RBC # BLD AUTO: 3.67 M/UL (ref 4.7–5.5)
RBC # BLD AUTO: 4.6 M/UL (ref 4.7–5.5)
RBC #/AREA URNS HPF: NEGATIVE /HPF (ref 0–5)
RBC #/AREA URNS HPF: NEGATIVE /HPF (ref 0–5)
SALICYLATES SERPL-MCNC: <1.7 MG/DL (ref 2.8–20)
SALICYLATES SERPL-MCNC: <1.7 MG/DL (ref 2.8–20)
SAO2 % BLD: 99 % (ref 92–97)
SAO2 % BLDV: 96 % (ref 65–88)
SERVICE CMNT-IMP: ABNORMAL
SERVICE CMNT-IMP: ABNORMAL
SERVICE CMNT-IMP: NORMAL
SERVICE CMNT-IMP: NORMAL
SODIUM BLD-SCNC: 135 MMOL/L (ref 136–145)
SODIUM SERPL-SCNC: 121 MMOL/L (ref 136–145)
SODIUM SERPL-SCNC: 133 MMOL/L (ref 136–145)
SODIUM SERPL-SCNC: 134 MMOL/L (ref 136–145)
SODIUM SERPL-SCNC: 136 MMOL/L (ref 136–145)
SODIUM SERPL-SCNC: 138 MMOL/L (ref 136–145)
SODIUM SERPL-SCNC: 140 MMOL/L (ref 136–145)
SODIUM SERPL-SCNC: 141 MMOL/L (ref 136–145)
SODIUM SERPL-SCNC: 141 MMOL/L (ref 136–145)
SODIUM SERPL-SCNC: 142 MMOL/L (ref 136–145)
SP GR UR REFRACTOMETRY: 1.01 (ref 1–1.03)
SP GR UR REFRACTOMETRY: 1.02 (ref 1–1.03)
SPECIMEN EXP DATE BLD: NORMAL
SPECIMEN EXP DATE BLD: NORMAL
SPECIMEN TYPE: ABNORMAL
SPECIMEN TYPE: ABNORMAL
STATUS OF UNIT,%ST: NORMAL
TOTAL RESP. RATE, ITRR: 14
TOTAL RESP. RATE, ITRR: 20
TROPONIN I SERPL-MCNC: 0.03 NG/ML (ref 0–0.04)
TROPONIN I SERPL-MCNC: 0.06 NG/ML (ref 0–0.04)
TROPONIN I SERPL-MCNC: 0.35 NG/ML (ref 0–0.04)
TROPONIN I SERPL-MCNC: 0.51 NG/ML (ref 0–0.04)
TROPONIN I SERPL-MCNC: 0.55 NG/ML (ref 0–0.04)
TROPONIN I SERPL-MCNC: 0.66 NG/ML (ref 0–0.04)
TROPONIN I SERPL-MCNC: <0.02 NG/ML (ref 0–0.04)
TROPONIN I SERPL-MCNC: <0.02 NG/ML (ref 0–0.04)
TSH SERPL DL<=0.05 MIU/L-ACNC: 0.77 UIU/ML (ref 0.36–3.74)
UNIT DIVISION, %UDIV: 0
UROBILINOGEN UR QL STRIP.AUTO: 0.2 EU/DL (ref 0.2–1)
UROBILINOGEN UR QL STRIP.AUTO: 1 EU/DL (ref 0.2–1)
VENTRICULAR RATE, ECG03: 125 BPM
VENTRICULAR RATE, ECG03: 129 BPM
VENTRICULAR RATE, ECG03: 154 BPM
VENTRICULAR RATE, ECG03: 163 BPM
VENTRICULAR RATE, ECG03: 207 BPM
VENTRICULAR RATE, ECG03: 79 BPM
WBC # BLD AUTO: 10.9 K/UL (ref 4.6–13.2)
WBC # BLD AUTO: 12.4 K/UL (ref 4.6–13.2)
WBC # BLD AUTO: 12.8 K/UL (ref 4.6–13.2)
WBC # BLD AUTO: 3 K/UL (ref 4.6–13.2)
WBC # BLD AUTO: 3.7 K/UL (ref 4.6–13.2)
WBC # BLD AUTO: 4.5 K/UL (ref 4.6–13.2)
WBC # BLD AUTO: 4.6 K/UL (ref 4.6–13.2)
WBC # BLD AUTO: 5.5 K/UL (ref 4.6–13.2)
WBC # BLD AUTO: 7.4 K/UL (ref 4.6–13.2)
WBC # BLD AUTO: 8.9 K/UL (ref 4.6–13.2)
WBC # BLD AUTO: 9.7 K/UL (ref 4.6–13.2)
WBC URNS QL MICRO: ABNORMAL /HPF (ref 0–5)
WBC URNS QL MICRO: NORMAL /HPF (ref 0–5)

## 2019-01-01 PROCEDURE — 74011000250 HC RX REV CODE- 250: Performed by: INTERNAL MEDICINE

## 2019-01-01 PROCEDURE — 86923 COMPATIBILITY TEST ELECTRIC: CPT

## 2019-01-01 PROCEDURE — 83605 ASSAY OF LACTIC ACID: CPT

## 2019-01-01 PROCEDURE — 74011250637 HC RX REV CODE- 250/637: Performed by: EMERGENCY MEDICINE

## 2019-01-01 PROCEDURE — 77030018798 HC PMP KT ENTRL FED COVD -A

## 2019-01-01 PROCEDURE — 74011636637 HC RX REV CODE- 636/637: Performed by: INTERNAL MEDICINE

## 2019-01-01 PROCEDURE — 82550 ASSAY OF CK (CPK): CPT

## 2019-01-01 PROCEDURE — 85025 COMPLETE CBC W/AUTO DIFF WBC: CPT

## 2019-01-01 PROCEDURE — 80053 COMPREHEN METABOLIC PANEL: CPT

## 2019-01-01 PROCEDURE — 96365 THER/PROPH/DIAG IV INF INIT: CPT

## 2019-01-01 PROCEDURE — 82803 BLOOD GASES ANY COMBINATION: CPT

## 2019-01-01 PROCEDURE — 81001 URINALYSIS AUTO W/SCOPE: CPT

## 2019-01-01 PROCEDURE — 74011250636 HC RX REV CODE- 250/636: Performed by: INTERNAL MEDICINE

## 2019-01-01 PROCEDURE — 82962 GLUCOSE BLOOD TEST: CPT

## 2019-01-01 PROCEDURE — 74011250637 HC RX REV CODE- 250/637: Performed by: INTERNAL MEDICINE

## 2019-01-01 PROCEDURE — 83690 ASSAY OF LIPASE: CPT

## 2019-01-01 PROCEDURE — 83735 ASSAY OF MAGNESIUM: CPT

## 2019-01-01 PROCEDURE — 77030020887 HC CRM SKN PROT DIMTH S&N -A

## 2019-01-01 PROCEDURE — 76040000008: Performed by: INTERNAL MEDICINE

## 2019-01-01 PROCEDURE — 97116 GAIT TRAINING THERAPY: CPT

## 2019-01-01 PROCEDURE — 36592 COLLECT BLOOD FROM PICC: CPT

## 2019-01-01 PROCEDURE — 75810000109 HC GASTRO TUBE CHANGE

## 2019-01-01 PROCEDURE — 76450000000

## 2019-01-01 PROCEDURE — 99285 EMERGENCY DEPT VISIT HI MDM: CPT

## 2019-01-01 PROCEDURE — 71045 X-RAY EXAM CHEST 1 VIEW: CPT

## 2019-01-01 PROCEDURE — 96361 HYDRATE IV INFUSION ADD-ON: CPT

## 2019-01-01 PROCEDURE — 84100 ASSAY OF PHOSPHORUS: CPT

## 2019-01-01 PROCEDURE — 74011000258 HC RX REV CODE- 258: Performed by: EMERGENCY MEDICINE

## 2019-01-01 PROCEDURE — 96366 THER/PROPH/DIAG IV INF ADDON: CPT

## 2019-01-01 PROCEDURE — 86900 BLOOD TYPING SEROLOGIC ABO: CPT

## 2019-01-01 PROCEDURE — 82533 TOTAL CORTISOL: CPT

## 2019-01-01 PROCEDURE — 74011000250 HC RX REV CODE- 250: Performed by: EMERGENCY MEDICINE

## 2019-01-01 PROCEDURE — 74011250636 HC RX REV CODE- 250/636: Performed by: EMERGENCY MEDICINE

## 2019-01-01 PROCEDURE — C1751 CATH, INF, PER/CENT/MIDLINE: HCPCS

## 2019-01-01 PROCEDURE — C9113 INJ PANTOPRAZOLE SODIUM, VIA: HCPCS | Performed by: INTERNAL MEDICINE

## 2019-01-01 PROCEDURE — 65660000000 HC RM CCU STEPDOWN

## 2019-01-01 PROCEDURE — 80048 BASIC METABOLIC PNL TOTAL CA: CPT

## 2019-01-01 PROCEDURE — 36415 COLL VENOUS BLD VENIPUNCTURE: CPT

## 2019-01-01 PROCEDURE — 0DJ08ZZ INSPECTION OF UPPER INTESTINAL TRACT, VIA NATURAL OR ARTIFICIAL OPENING ENDOSCOPIC: ICD-10-PCS | Performed by: INTERNAL MEDICINE

## 2019-01-01 PROCEDURE — 94640 AIRWAY INHALATION TREATMENT: CPT

## 2019-01-01 PROCEDURE — 74011250636 HC RX REV CODE- 250/636

## 2019-01-01 PROCEDURE — G0500 MOD SEDAT ENDO SERVICE >5YRS: HCPCS | Performed by: INTERNAL MEDICINE

## 2019-01-01 PROCEDURE — 77030005514 HC CATH URETH FOL14 BARD -A

## 2019-01-01 PROCEDURE — 85018 HEMOGLOBIN: CPT

## 2019-01-01 PROCEDURE — P9047 ALBUMIN (HUMAN), 25%, 50ML: HCPCS | Performed by: INTERNAL MEDICINE

## 2019-01-01 PROCEDURE — 77030037875 HC DRSG MEPILEX <16IN BORD MOLN -A

## 2019-01-01 PROCEDURE — 71275 CT ANGIOGRAPHY CHEST: CPT

## 2019-01-01 PROCEDURE — 97162 PT EVAL MOD COMPLEX 30 MIN: CPT

## 2019-01-01 PROCEDURE — 96374 THER/PROPH/DIAG INJ IV PUSH: CPT

## 2019-01-01 PROCEDURE — 74011000258 HC RX REV CODE- 258: Performed by: INTERNAL MEDICINE

## 2019-01-01 PROCEDURE — 77010033711 HC HIGH FLOW OXYGEN

## 2019-01-01 PROCEDURE — 74011250636 HC RX REV CODE- 250/636: Performed by: HOSPITALIST

## 2019-01-01 PROCEDURE — 80307 DRUG TEST PRSMV CHEM ANLYZR: CPT

## 2019-01-01 PROCEDURE — 96375 TX/PRO/DX INJ NEW DRUG ADDON: CPT

## 2019-01-01 PROCEDURE — 94760 N-INVAS EAR/PLS OXIMETRY 1: CPT

## 2019-01-01 PROCEDURE — P9016 RBC LEUKOCYTES REDUCED: HCPCS

## 2019-01-01 PROCEDURE — 65610000006 HC RM INTENSIVE CARE

## 2019-01-01 PROCEDURE — 36430 TRANSFUSION BLD/BLD COMPNT: CPT

## 2019-01-01 PROCEDURE — 84443 ASSAY THYROID STIM HORMONE: CPT

## 2019-01-01 PROCEDURE — 65270000029 HC RM PRIVATE

## 2019-01-01 PROCEDURE — 77030013140 HC MSK NEB VYRM -A

## 2019-01-01 PROCEDURE — 77030029684 HC NEB SM VOL KT MONA -A

## 2019-01-01 PROCEDURE — 87040 BLOOD CULTURE FOR BACTERIA: CPT

## 2019-01-01 PROCEDURE — 82024 ASSAY OF ACTH: CPT

## 2019-01-01 PROCEDURE — 85610 PROTHROMBIN TIME: CPT

## 2019-01-01 PROCEDURE — 96360 HYDRATION IV INFUSION INIT: CPT

## 2019-01-01 PROCEDURE — 74011000258 HC RX REV CODE- 258: Performed by: HOSPITALIST

## 2019-01-01 PROCEDURE — 77030018846 HC SOL IRR STRL H20 ICUM -A

## 2019-01-01 PROCEDURE — 93306 TTE W/DOPPLER COMPLETE: CPT

## 2019-01-01 PROCEDURE — 51702 INSERT TEMP BLADDER CATH: CPT

## 2019-01-01 PROCEDURE — 93005 ELECTROCARDIOGRAM TRACING: CPT

## 2019-01-01 PROCEDURE — 77030027138 HC INCENT SPIROMETER -A

## 2019-01-01 PROCEDURE — 74011636320 HC RX REV CODE- 636/320: Performed by: EMERGENCY MEDICINE

## 2019-01-01 PROCEDURE — 87804 INFLUENZA ASSAY W/OPTIC: CPT

## 2019-01-01 PROCEDURE — 74011636320 HC RX REV CODE- 636/320: Performed by: INTERNAL MEDICINE

## 2019-01-01 PROCEDURE — 99284 EMERGENCY DEPT VISIT MOD MDM: CPT

## 2019-01-01 PROCEDURE — 97530 THERAPEUTIC ACTIVITIES: CPT

## 2019-01-01 PROCEDURE — 74018 RADEX ABDOMEN 1 VIEW: CPT

## 2019-01-01 PROCEDURE — 02HV33Z INSERTION OF INFUSION DEVICE INTO SUPERIOR VENA CAVA, PERCUTANEOUS APPROACH: ICD-10-PCS | Performed by: EMERGENCY MEDICINE

## 2019-01-01 PROCEDURE — 82330 ASSAY OF CALCIUM: CPT

## 2019-01-01 PROCEDURE — 84484 ASSAY OF TROPONIN QUANT: CPT

## 2019-01-01 PROCEDURE — 96367 TX/PROPH/DG ADDL SEQ IV INF: CPT

## 2019-01-01 PROCEDURE — 80047 BASIC METABLC PNL IONIZED CA: CPT

## 2019-01-01 PROCEDURE — G0299 HHS/HOSPICE OF RN EA 15 MIN: HCPCS

## 2019-01-01 PROCEDURE — 30233N1 TRANSFUSION OF NONAUTOLOGOUS RED BLOOD CELLS INTO PERIPHERAL VEIN, PERCUTANEOUS APPROACH: ICD-10-PCS | Performed by: INTERNAL MEDICINE

## 2019-01-01 PROCEDURE — 85730 THROMBOPLASTIN TIME PARTIAL: CPT

## 2019-01-01 PROCEDURE — 96368 THER/DIAG CONCURRENT INF: CPT

## 2019-01-01 PROCEDURE — 75810000455 HC PLCMT CENT VENOUS CATH LVL 2 5182

## 2019-01-01 PROCEDURE — 77030032490 HC SLV COMPR SCD KNE COVD -B: Performed by: INTERNAL MEDICINE

## 2019-01-01 PROCEDURE — 84132 ASSAY OF SERUM POTASSIUM: CPT

## 2019-01-01 PROCEDURE — 83880 ASSAY OF NATRIURETIC PEPTIDE: CPT

## 2019-01-01 PROCEDURE — 74011000250 HC RX REV CODE- 250

## 2019-01-01 PROCEDURE — 77010033678 HC OXYGEN DAILY

## 2019-01-01 PROCEDURE — 94761 N-INVAS EAR/PLS OXIMETRY MLT: CPT

## 2019-01-01 PROCEDURE — 97535 SELF CARE MNGMENT TRAINING: CPT

## 2019-01-01 PROCEDURE — 94762 N-INVAS EAR/PLS OXIMTRY CONT: CPT

## 2019-01-01 PROCEDURE — 97167 OT EVAL HIGH COMPLEX 60 MIN: CPT

## 2019-01-01 PROCEDURE — 77030032490 HC SLV COMPR SCD KNE COVD -B

## 2019-01-01 PROCEDURE — 83036 HEMOGLOBIN GLYCOSYLATED A1C: CPT

## 2019-01-01 PROCEDURE — 99291 CRITICAL CARE FIRST HOUR: CPT

## 2019-01-01 PROCEDURE — 36600 WITHDRAWAL OF ARTERIAL BLOOD: CPT

## 2019-01-01 RX ORDER — ALBUTEROL SULFATE 2.5 MG/.5ML
10 SOLUTION RESPIRATORY (INHALATION) ONCE
Status: COMPLETED | OUTPATIENT
Start: 2019-01-01 | End: 2019-01-01

## 2019-01-01 RX ORDER — NALOXONE HYDROCHLORIDE 0.4 MG/ML
0.4 INJECTION, SOLUTION INTRAMUSCULAR; INTRAVENOUS; SUBCUTANEOUS ONCE
Status: COMPLETED | OUTPATIENT
Start: 2019-01-01 | End: 2019-01-01

## 2019-01-01 RX ORDER — SODIUM CHLORIDE 0.9 % (FLUSH) 0.9 %
5-40 SYRINGE (ML) INJECTION AS NEEDED
Status: DISCONTINUED | OUTPATIENT
Start: 2019-01-01 | End: 2019-01-01 | Stop reason: SDUPTHER

## 2019-01-01 RX ORDER — DILTIAZEM HYDROCHLORIDE 5 MG/ML
10 INJECTION INTRAVENOUS ONCE
Status: COMPLETED | OUTPATIENT
Start: 2019-01-01 | End: 2019-01-01

## 2019-01-01 RX ORDER — POTASSIUM CHLORIDE 1.5 G/1.77G
40 POWDER, FOR SOLUTION ORAL ONCE
Status: COMPLETED | OUTPATIENT
Start: 2019-01-01 | End: 2019-01-01

## 2019-01-01 RX ORDER — OXYCODONE AND ACETAMINOPHEN 5; 325 MG/1; MG/1
1 TABLET ORAL
Status: DISCONTINUED | OUTPATIENT
Start: 2019-01-01 | End: 2019-01-01 | Stop reason: HOSPADM

## 2019-01-01 RX ORDER — OXYCODONE AND ACETAMINOPHEN 10; 325 MG/1; MG/1
TABLET ORAL
COMMUNITY
End: 2019-01-01

## 2019-01-01 RX ORDER — POTASSIUM CHLORIDE 7.45 MG/ML
10 INJECTION INTRAVENOUS
Status: COMPLETED | OUTPATIENT
Start: 2019-01-01 | End: 2019-01-01

## 2019-01-01 RX ORDER — FENTANYL CITRATE 50 UG/ML
100 INJECTION, SOLUTION INTRAMUSCULAR; INTRAVENOUS
Status: DISCONTINUED | OUTPATIENT
Start: 2019-01-01 | End: 2019-01-01 | Stop reason: HOSPADM

## 2019-01-01 RX ORDER — ASCORBIC ACID 250 MG
500 TABLET ORAL DAILY
Status: DISCONTINUED | OUTPATIENT
Start: 2019-01-01 | End: 2019-01-01 | Stop reason: HOSPADM

## 2019-01-01 RX ORDER — LEVOFLOXACIN 5 MG/ML
750 INJECTION, SOLUTION INTRAVENOUS EVERY 24 HOURS
Status: DISCONTINUED | OUTPATIENT
Start: 2019-01-01 | End: 2019-01-01

## 2019-01-01 RX ORDER — SODIUM CHLORIDE 0.9 % (FLUSH) 0.9 %
5-40 SYRINGE (ML) INJECTION AS NEEDED
Status: DISCONTINUED | OUTPATIENT
Start: 2019-01-01 | End: 2019-01-01 | Stop reason: HOSPADM

## 2019-01-01 RX ORDER — SODIUM CHLORIDE 9 MG/ML
1000 INJECTION, SOLUTION INTRAVENOUS CONTINUOUS
Status: DISPENSED | OUTPATIENT
Start: 2019-01-01 | End: 2019-01-01

## 2019-01-01 RX ORDER — SODIUM CHLORIDE 9 MG/ML
150 INJECTION, SOLUTION INTRAVENOUS CONTINUOUS
Status: DISCONTINUED | OUTPATIENT
Start: 2019-01-01 | End: 2019-01-01

## 2019-01-01 RX ORDER — POTASSIUM CHLORIDE 7.45 MG/ML
10 INJECTION INTRAVENOUS
Status: DISCONTINUED | OUTPATIENT
Start: 2019-01-01 | End: 2019-01-01

## 2019-01-01 RX ORDER — ATROPINE SULFATE 0.1 MG/ML
0.5 INJECTION INTRAVENOUS
Status: CANCELLED | OUTPATIENT
Start: 2019-01-01 | End: 2019-01-01

## 2019-01-01 RX ORDER — DIPHENHYDRAMINE HYDROCHLORIDE 50 MG/ML
50 INJECTION, SOLUTION INTRAMUSCULAR; INTRAVENOUS ONCE
Status: COMPLETED | OUTPATIENT
Start: 2019-01-01 | End: 2019-01-01

## 2019-01-01 RX ORDER — PANTOPRAZOLE SODIUM 40 MG/10ML
40 INJECTION, POWDER, LYOPHILIZED, FOR SOLUTION INTRAVENOUS EVERY 12 HOURS
Status: DISCONTINUED | OUTPATIENT
Start: 2019-01-01 | End: 2019-01-01 | Stop reason: HOSPADM

## 2019-01-01 RX ORDER — MIDAZOLAM HYDROCHLORIDE 1 MG/ML
.25-5 INJECTION, SOLUTION INTRAMUSCULAR; INTRAVENOUS
Status: DISCONTINUED | OUTPATIENT
Start: 2019-01-01 | End: 2019-01-01 | Stop reason: HOSPADM

## 2019-01-01 RX ORDER — FLUMAZENIL 0.1 MG/ML
0.2 INJECTION INTRAVENOUS
Status: DISCONTINUED | OUTPATIENT
Start: 2019-01-01 | End: 2019-01-01 | Stop reason: HOSPADM

## 2019-01-01 RX ORDER — ONDANSETRON 2 MG/ML
4 INJECTION INTRAMUSCULAR; INTRAVENOUS
Status: COMPLETED | OUTPATIENT
Start: 2019-01-01 | End: 2019-01-01

## 2019-01-01 RX ORDER — SODIUM CHLORIDE 9 MG/ML
1000 INJECTION, SOLUTION INTRAVENOUS CONTINUOUS
Status: CANCELLED | OUTPATIENT
Start: 2019-01-01

## 2019-01-01 RX ORDER — LORAZEPAM 2 MG/ML
3 INJECTION INTRAMUSCULAR
Status: DISCONTINUED | OUTPATIENT
Start: 2019-01-01 | End: 2019-01-01 | Stop reason: HOSPADM

## 2019-01-01 RX ORDER — LIDOCAINE HYDROCHLORIDE 10 MG/ML
75 INJECTION, SOLUTION EPIDURAL; INFILTRATION; INTRACAUDAL; PERINEURAL ONCE
Status: COMPLETED | OUTPATIENT
Start: 2019-01-01 | End: 2019-01-01

## 2019-01-01 RX ORDER — LORAZEPAM 2 MG/ML
2 INJECTION INTRAMUSCULAR
Status: DISCONTINUED | OUTPATIENT
Start: 2019-01-01 | End: 2019-01-01 | Stop reason: HOSPADM

## 2019-01-01 RX ORDER — OXYMETAZOLINE HCL 0.05 %
2 SPRAY, NON-AEROSOL (ML) NASAL
Status: COMPLETED | OUTPATIENT
Start: 2019-01-01 | End: 2019-01-01

## 2019-01-01 RX ORDER — LIDOCAINE HYDROCHLORIDE 40 MG/ML
SOLUTION TOPICAL ONCE
Status: COMPLETED | OUTPATIENT
Start: 2019-01-01 | End: 2019-01-01

## 2019-01-01 RX ORDER — VANCOMYCIN/0.9 % SOD CHLORIDE 1.5G/250ML
1500 PLASTIC BAG, INJECTION (ML) INTRAVENOUS ONCE
Status: COMPLETED | OUTPATIENT
Start: 2019-01-01 | End: 2019-01-01

## 2019-01-01 RX ORDER — CLINDAMYCIN PHOSPHATE 600 MG/50ML
600 INJECTION, SOLUTION INTRAVENOUS EVERY 8 HOURS
Status: DISCONTINUED | OUTPATIENT
Start: 2019-01-01 | End: 2019-01-01

## 2019-01-01 RX ORDER — SODIUM CHLORIDE, SODIUM LACTATE, POTASSIUM CHLORIDE, CALCIUM CHLORIDE 600; 310; 30; 20 MG/100ML; MG/100ML; MG/100ML; MG/100ML
1000 INJECTION, SOLUTION INTRAVENOUS ONCE
Status: COMPLETED | OUTPATIENT
Start: 2019-01-01 | End: 2019-01-01

## 2019-01-01 RX ORDER — KETOROLAC TROMETHAMINE 30 MG/ML
15 INJECTION, SOLUTION INTRAMUSCULAR; INTRAVENOUS
Status: COMPLETED | OUTPATIENT
Start: 2019-01-01 | End: 2019-01-01

## 2019-01-01 RX ORDER — ONDANSETRON 2 MG/ML
4 INJECTION INTRAMUSCULAR; INTRAVENOUS
Status: DISCONTINUED | OUTPATIENT
Start: 2019-01-01 | End: 2019-01-01 | Stop reason: HOSPADM

## 2019-01-01 RX ORDER — ALBUTEROL SULFATE 2.5 MG/.5ML
2.5 SOLUTION RESPIRATORY (INHALATION)
Status: COMPLETED | OUTPATIENT
Start: 2019-01-01 | End: 2019-01-01

## 2019-01-01 RX ORDER — LORAZEPAM 0.5 MG/1
0.5 TABLET ORAL
Status: DISCONTINUED | OUTPATIENT
Start: 2019-01-01 | End: 2019-01-01 | Stop reason: HOSPADM

## 2019-01-01 RX ORDER — RANITIDINE 150 MG/1
150 TABLET, FILM COATED ORAL
Qty: 30 TAB | Refills: 2 | Status: SHIPPED | OUTPATIENT
Start: 2019-01-01

## 2019-01-01 RX ORDER — OXYCODONE AND ACETAMINOPHEN 10; 325 MG/1; MG/1
1 TABLET ORAL 2 TIMES DAILY
COMMUNITY
End: 2019-01-01

## 2019-01-01 RX ORDER — SODIUM CHLORIDE 9 MG/ML
250 INJECTION, SOLUTION INTRAVENOUS AS NEEDED
Status: DISCONTINUED | OUTPATIENT
Start: 2019-01-01 | End: 2019-01-01 | Stop reason: HOSPADM

## 2019-01-01 RX ORDER — LORAZEPAM 1 MG/1
1 TABLET ORAL
Status: DISCONTINUED | OUTPATIENT
Start: 2019-01-01 | End: 2019-01-01 | Stop reason: HOSPADM

## 2019-01-01 RX ORDER — INSULIN LISPRO 100 [IU]/ML
INJECTION, SOLUTION INTRAVENOUS; SUBCUTANEOUS EVERY 4 HOURS
Status: DISCONTINUED | OUTPATIENT
Start: 2019-01-01 | End: 2019-01-01

## 2019-01-01 RX ORDER — IPRATROPIUM BROMIDE AND ALBUTEROL SULFATE 2.5; .5 MG/3ML; MG/3ML
3 SOLUTION RESPIRATORY (INHALATION)
Status: DISCONTINUED | OUTPATIENT
Start: 2019-01-01 | End: 2019-01-01 | Stop reason: HOSPADM

## 2019-01-01 RX ORDER — MAGNESIUM SULFATE 1 G/100ML
1 INJECTION INTRAVENOUS ONCE
Status: COMPLETED | OUTPATIENT
Start: 2019-01-01 | End: 2019-01-01

## 2019-01-01 RX ORDER — PANTOPRAZOLE SODIUM 40 MG/1
40 GRANULE, DELAYED RELEASE ORAL
Qty: 60 EACH | Refills: 2 | Status: SHIPPED | OUTPATIENT
Start: 2019-01-01

## 2019-01-01 RX ORDER — DILTIAZEM HYDROCHLORIDE 5 MG/ML
INJECTION INTRAVENOUS
Status: COMPLETED
Start: 2019-01-01 | End: 2019-01-01

## 2019-01-01 RX ORDER — DIGOXIN 0.25 MG/ML
250 INJECTION INTRAMUSCULAR; INTRAVENOUS
Status: COMPLETED | OUTPATIENT
Start: 2019-01-01 | End: 2019-01-01

## 2019-01-01 RX ORDER — INSULIN LISPRO 100 [IU]/ML
INJECTION, SOLUTION INTRAVENOUS; SUBCUTANEOUS EVERY 6 HOURS
Status: DISCONTINUED | OUTPATIENT
Start: 2019-01-01 | End: 2019-01-01 | Stop reason: HOSPADM

## 2019-01-01 RX ORDER — FAMOTIDINE 20 MG/1
20 TABLET, FILM COATED ORAL EVERY EVENING
Status: DISCONTINUED | OUTPATIENT
Start: 2019-01-01 | End: 2019-01-01 | Stop reason: HOSPADM

## 2019-01-01 RX ORDER — SODIUM CHLORIDE 9 MG/ML
150 INJECTION, SOLUTION INTRAVENOUS ONCE
Status: COMPLETED | OUTPATIENT
Start: 2019-01-01 | End: 2019-01-01

## 2019-01-01 RX ORDER — POTASSIUM CHLORIDE 20 MEQ/1
20 TABLET, EXTENDED RELEASE ORAL 3 TIMES DAILY
Qty: 9 TAB | Refills: 0 | Status: SHIPPED | OUTPATIENT
Start: 2019-01-01 | End: 2019-01-01

## 2019-01-01 RX ORDER — AMOXICILLIN AND CLAVULANATE POTASSIUM 875; 125 MG/1; MG/1
1 TABLET, FILM COATED ORAL EVERY 12 HOURS
Qty: 14 TAB | Refills: 0 | Status: SHIPPED | OUTPATIENT
Start: 2019-01-01 | End: 2019-01-01

## 2019-01-01 RX ORDER — POTASSIUM CHLORIDE 1.5 G/1.77G
40 POWDER, FOR SOLUTION ORAL EVERY 4 HOURS
Status: COMPLETED | OUTPATIENT
Start: 2019-01-01 | End: 2019-01-01

## 2019-01-01 RX ORDER — LORAZEPAM 1 MG/1
2 TABLET ORAL
Status: DISCONTINUED | OUTPATIENT
Start: 2019-01-01 | End: 2019-01-01 | Stop reason: HOSPADM

## 2019-01-01 RX ORDER — DILTIAZEM HYDROCHLORIDE 30 MG/1
30 TABLET, FILM COATED ORAL 4 TIMES DAILY
Status: DISCONTINUED | OUTPATIENT
Start: 2019-01-01 | End: 2019-01-01 | Stop reason: HOSPADM

## 2019-01-01 RX ORDER — MAGNESIUM SULFATE HEPTAHYDRATE 40 MG/ML
2 INJECTION, SOLUTION INTRAVENOUS ONCE
Status: COMPLETED | OUTPATIENT
Start: 2019-01-01 | End: 2019-01-01

## 2019-01-01 RX ORDER — EPINEPHRINE 0.1 MG/ML
1 INJECTION INTRACARDIAC; INTRAVENOUS
Status: CANCELLED | OUTPATIENT
Start: 2019-01-01 | End: 2019-01-01

## 2019-01-01 RX ORDER — SODIUM CHLORIDE 0.9 % (FLUSH) 0.9 %
5-40 SYRINGE (ML) INJECTION EVERY 8 HOURS
Status: DISCONTINUED | OUTPATIENT
Start: 2019-01-01 | End: 2019-01-01 | Stop reason: SDUPTHER

## 2019-01-01 RX ORDER — POTASSIUM CHLORIDE 7.45 MG/ML
10 INJECTION INTRAVENOUS
Status: DISPENSED | OUTPATIENT
Start: 2019-01-01 | End: 2019-01-01

## 2019-01-01 RX ORDER — POTASSIUM CHLORIDE 20 MEQ/1
40 TABLET, EXTENDED RELEASE ORAL
Status: DISCONTINUED | OUTPATIENT
Start: 2019-01-01 | End: 2019-01-01

## 2019-01-01 RX ORDER — MORPHINE SULFATE 4 MG/ML
4 INJECTION INTRAVENOUS ONCE
Status: COMPLETED | OUTPATIENT
Start: 2019-01-01 | End: 2019-01-01

## 2019-01-01 RX ORDER — SODIUM CHLORIDE 0.9 % (FLUSH) 0.9 %
5-10 SYRINGE (ML) INJECTION AS NEEDED
Status: DISCONTINUED | OUTPATIENT
Start: 2019-01-01 | End: 2019-01-01

## 2019-01-01 RX ORDER — MAGNESIUM SULFATE 100 %
4 CRYSTALS MISCELLANEOUS AS NEEDED
Status: DISCONTINUED | OUTPATIENT
Start: 2019-01-01 | End: 2019-01-01 | Stop reason: HOSPADM

## 2019-01-01 RX ORDER — LORAZEPAM 2 MG/ML
1 INJECTION INTRAMUSCULAR
Status: DISCONTINUED | OUTPATIENT
Start: 2019-01-01 | End: 2019-01-01 | Stop reason: HOSPADM

## 2019-01-01 RX ORDER — ALBUMIN HUMAN 250 G/1000ML
12.5 SOLUTION INTRAVENOUS EVERY 6 HOURS
Status: DISCONTINUED | OUTPATIENT
Start: 2019-01-01 | End: 2019-01-01 | Stop reason: HOSPADM

## 2019-01-01 RX ORDER — HYDROCORTISONE SODIUM SUCCINATE 100 MG/2ML
100 INJECTION, POWDER, FOR SOLUTION INTRAMUSCULAR; INTRAVENOUS ONCE
Status: COMPLETED | OUTPATIENT
Start: 2019-01-01 | End: 2019-01-01

## 2019-01-01 RX ORDER — DIPHENHYDRAMINE HYDROCHLORIDE 50 MG/ML
12.5 INJECTION, SOLUTION INTRAMUSCULAR; INTRAVENOUS
Status: DISCONTINUED | OUTPATIENT
Start: 2019-01-01 | End: 2019-01-01 | Stop reason: HOSPADM

## 2019-01-01 RX ORDER — DEXTROMETHORPHAN/PSEUDOEPHED 2.5-7.5/.8
1.2 DROPS ORAL
Status: CANCELLED | OUTPATIENT
Start: 2019-01-01

## 2019-01-01 RX ORDER — SODIUM CHLORIDE 0.9 % (FLUSH) 0.9 %
5-10 SYRINGE (ML) INJECTION AS NEEDED
Status: DISCONTINUED | OUTPATIENT
Start: 2019-01-01 | End: 2019-01-01 | Stop reason: HOSPADM

## 2019-01-01 RX ORDER — SODIUM CHLORIDE 0.9 % (FLUSH) 0.9 %
5-40 SYRINGE (ML) INJECTION EVERY 8 HOURS
Status: DISCONTINUED | OUTPATIENT
Start: 2019-01-01 | End: 2019-01-01 | Stop reason: HOSPADM

## 2019-01-01 RX ORDER — ALBUMIN HUMAN 50 G/1000ML
25 SOLUTION INTRAVENOUS ONCE
Status: DISCONTINUED | OUTPATIENT
Start: 2019-01-01 | End: 2019-01-01

## 2019-01-01 RX ORDER — LEVOFLOXACIN 5 MG/ML
750 INJECTION, SOLUTION INTRAVENOUS EVERY 24 HOURS
Status: DISCONTINUED | OUTPATIENT
Start: 2019-01-01 | End: 2019-01-01 | Stop reason: HOSPADM

## 2019-01-01 RX ORDER — SODIUM CHLORIDE 9 MG/ML
250 INJECTION, SOLUTION INTRAVENOUS ONCE
Status: COMPLETED | OUTPATIENT
Start: 2019-01-01 | End: 2019-01-01

## 2019-01-01 RX ORDER — NALOXONE HYDROCHLORIDE 0.4 MG/ML
0.4 INJECTION, SOLUTION INTRAMUSCULAR; INTRAVENOUS; SUBCUTANEOUS
Status: DISCONTINUED | OUTPATIENT
Start: 2019-01-01 | End: 2019-01-01 | Stop reason: HOSPADM

## 2019-01-01 RX ADMIN — PIPERACILLIN SODIUM,TAZOBACTAM SODIUM 4.5 G: 4; .5 INJECTION, POWDER, FOR SOLUTION INTRAVENOUS at 03:31

## 2019-01-01 RX ADMIN — PANTOPRAZOLE SODIUM 40 MG: 40 INJECTION, POWDER, LYOPHILIZED, FOR SOLUTION INTRAVENOUS at 20:47

## 2019-01-01 RX ADMIN — POTASSIUM CHLORIDE 10 MEQ: 10 INJECTION, SOLUTION INTRAVENOUS at 09:38

## 2019-01-01 RX ADMIN — LEVOFLOXACIN 750 MG: 5 INJECTION, SOLUTION INTRAVENOUS at 22:25

## 2019-01-01 RX ADMIN — DILTIAZEM HYDROCHLORIDE 5 MG/HR: 5 INJECTION INTRAVENOUS at 05:36

## 2019-01-01 RX ADMIN — SODIUM CHLORIDE 1000 ML: 900 INJECTION, SOLUTION INTRAVENOUS at 18:27

## 2019-01-01 RX ADMIN — POTASSIUM CHLORIDE 10 MEQ: 10 INJECTION, SOLUTION INTRAVENOUS at 23:14

## 2019-01-01 RX ADMIN — AMIODARONE HYDROCHLORIDE 1 MG/MIN: 1.8 INJECTION, SOLUTION INTRAVENOUS at 06:26

## 2019-01-01 RX ADMIN — IPRATROPIUM BROMIDE AND ALBUTEROL SULFATE 3 ML: .5; 3 SOLUTION RESPIRATORY (INHALATION) at 19:39

## 2019-01-01 RX ADMIN — SODIUM CHLORIDE 150 ML/HR: 900 INJECTION, SOLUTION INTRAVENOUS at 18:46

## 2019-01-01 RX ADMIN — IPRATROPIUM BROMIDE AND ALBUTEROL SULFATE 3 ML: .5; 3 SOLUTION RESPIRATORY (INHALATION) at 00:31

## 2019-01-01 RX ADMIN — ALBUMIN (HUMAN) 12.5 G: 0.25 INJECTION, SOLUTION INTRAVENOUS at 05:00

## 2019-01-01 RX ADMIN — DIGOXIN 250 MCG: 0.25 INJECTION INTRAMUSCULAR; INTRAVENOUS at 05:20

## 2019-01-01 RX ADMIN — LEVOFLOXACIN 750 MG: 5 INJECTION, SOLUTION INTRAVENOUS at 22:11

## 2019-01-01 RX ADMIN — LIDOCAINE HYDROCHLORIDE: 40 SOLUTION TOPICAL at 21:10

## 2019-01-01 RX ADMIN — POTASSIUM CHLORIDE 40 MEQ: 1.5 POWDER, FOR SOLUTION ORAL at 20:27

## 2019-01-01 RX ADMIN — INSULIN LISPRO 2 UNITS: 100 INJECTION, SOLUTION INTRAVENOUS; SUBCUTANEOUS at 14:00

## 2019-01-01 RX ADMIN — PIPERACILLIN SODIUM,TAZOBACTAM SODIUM 4.5 G: 4; .5 INJECTION, POWDER, FOR SOLUTION INTRAVENOUS at 01:42

## 2019-01-01 RX ADMIN — POTASSIUM BICARBONATE 25 MEQ: 25 TABLET, EFFERVESCENT ORAL at 04:00

## 2019-01-01 RX ADMIN — IPRATROPIUM BROMIDE AND ALBUTEROL SULFATE 3 ML: .5; 3 SOLUTION RESPIRATORY (INHALATION) at 04:54

## 2019-01-01 RX ADMIN — FOLIC ACID: 5 INJECTION, SOLUTION INTRAMUSCULAR; INTRAVENOUS; SUBCUTANEOUS at 08:57

## 2019-01-01 RX ADMIN — ALBUMIN (HUMAN) 12.5 G: 0.25 INJECTION, SOLUTION INTRAVENOUS at 01:15

## 2019-01-01 RX ADMIN — AMIODARONE HYDROCHLORIDE 1 MG/MIN: 1.8 INJECTION, SOLUTION INTRAVENOUS at 17:19

## 2019-01-01 RX ADMIN — POTASSIUM CHLORIDE 10 MEQ: 10 INJECTION, SOLUTION INTRAVENOUS at 06:35

## 2019-01-01 RX ADMIN — ALBUMIN (HUMAN) 12.5 G: 0.25 INJECTION, SOLUTION INTRAVENOUS at 17:39

## 2019-01-01 RX ADMIN — VANCOMYCIN HYDROCHLORIDE 1000 MG: 1 INJECTION, POWDER, LYOPHILIZED, FOR SOLUTION INTRAVENOUS at 23:43

## 2019-01-01 RX ADMIN — POTASSIUM CHLORIDE 10 MEQ: 10 INJECTION, SOLUTION INTRAVENOUS at 21:00

## 2019-01-01 RX ADMIN — Medication 10 ML: at 02:37

## 2019-01-01 RX ADMIN — OXYCODONE HYDROCHLORIDE AND ACETAMINOPHEN 1 TABLET: 5; 325 TABLET ORAL at 02:02

## 2019-01-01 RX ADMIN — POTASSIUM CHLORIDE 10 MEQ: 10 INJECTION, SOLUTION INTRAVENOUS at 20:27

## 2019-01-01 RX ADMIN — METHYLPREDNISOLONE SODIUM SUCCINATE 40 MG: 40 INJECTION, POWDER, FOR SOLUTION INTRAMUSCULAR; INTRAVENOUS at 22:23

## 2019-01-01 RX ADMIN — OXYCODONE HYDROCHLORIDE AND ACETAMINOPHEN 1 TABLET: 5; 325 TABLET ORAL at 12:57

## 2019-01-01 RX ADMIN — VANCOMYCIN HYDROCHLORIDE 1000 MG: 1 INJECTION, POWDER, LYOPHILIZED, FOR SOLUTION INTRAVENOUS at 10:41

## 2019-01-01 RX ADMIN — IPRATROPIUM BROMIDE AND ALBUTEROL SULFATE 3 ML: .5; 3 SOLUTION RESPIRATORY (INHALATION) at 11:48

## 2019-01-01 RX ADMIN — PIPERACILLIN SODIUM,TAZOBACTAM SODIUM 4.5 G: 4; .5 INJECTION, POWDER, FOR SOLUTION INTRAVENOUS at 08:57

## 2019-01-01 RX ADMIN — ALBUMIN (HUMAN) 12.5 G: 0.25 INJECTION, SOLUTION INTRAVENOUS at 05:02

## 2019-01-01 RX ADMIN — IPRATROPIUM BROMIDE AND ALBUTEROL SULFATE 3 ML: .5; 3 SOLUTION RESPIRATORY (INHALATION) at 07:57

## 2019-01-01 RX ADMIN — PIPERACILLIN AND TAZOBACTAM 4.5 G: 4; .5 INJECTION, POWDER, LYOPHILIZED, FOR SOLUTION INTRAVENOUS; PARENTERAL at 19:33

## 2019-01-01 RX ADMIN — PANTOPRAZOLE SODIUM 40 MG: 40 INJECTION, POWDER, LYOPHILIZED, FOR SOLUTION INTRAVENOUS at 03:30

## 2019-01-01 RX ADMIN — ALBUMIN (HUMAN) 12.5 G: 0.25 INJECTION, SOLUTION INTRAVENOUS at 18:45

## 2019-01-01 RX ADMIN — POTASSIUM CHLORIDE 10 MEQ: 10 INJECTION, SOLUTION INTRAVENOUS at 08:40

## 2019-01-01 RX ADMIN — VANCOMYCIN HYDROCHLORIDE 1000 MG: 1 INJECTION, POWDER, LYOPHILIZED, FOR SOLUTION INTRAVENOUS at 05:56

## 2019-01-01 RX ADMIN — IPRATROPIUM BROMIDE AND ALBUTEROL SULFATE 3 ML: .5; 3 SOLUTION RESPIRATORY (INHALATION) at 03:53

## 2019-01-01 RX ADMIN — POTASSIUM CHLORIDE 10 MEQ: 10 INJECTION, SOLUTION INTRAVENOUS at 23:42

## 2019-01-01 RX ADMIN — ALBUMIN (HUMAN) 12.5 G: 0.25 INJECTION, SOLUTION INTRAVENOUS at 01:34

## 2019-01-01 RX ADMIN — AMIODARONE HYDROCHLORIDE 150 MG: 1.5 INJECTION, SOLUTION INTRAVENOUS at 06:15

## 2019-01-01 RX ADMIN — PANTOPRAZOLE SODIUM 40 MG: 40 INJECTION, POWDER, LYOPHILIZED, FOR SOLUTION INTRAVENOUS at 20:27

## 2019-01-01 RX ADMIN — ALBUMIN (HUMAN) 12.5 G: 0.25 INJECTION, SOLUTION INTRAVENOUS at 12:49

## 2019-01-01 RX ADMIN — IPRATROPIUM BROMIDE AND ALBUTEROL SULFATE 3 ML: .5; 3 SOLUTION RESPIRATORY (INHALATION) at 03:08

## 2019-01-01 RX ADMIN — FOLIC ACID: 5 INJECTION, SOLUTION INTRAMUSCULAR; INTRAVENOUS; SUBCUTANEOUS at 12:00

## 2019-01-01 RX ADMIN — POTASSIUM CHLORIDE 10 MEQ: 10 INJECTION, SOLUTION INTRAVENOUS at 11:00

## 2019-01-01 RX ADMIN — SODIUM PHOSPHATE, MONOBASIC, MONOHYDRATE 18 MMOL: 276; 142 INJECTION, SOLUTION INTRAVENOUS at 08:13

## 2019-01-01 RX ADMIN — ALBUMIN (HUMAN) 12.5 G: 0.25 INJECTION, SOLUTION INTRAVENOUS at 06:49

## 2019-01-01 RX ADMIN — Medication 10 ML: at 06:00

## 2019-01-01 RX ADMIN — Medication 10 ML: at 06:39

## 2019-01-01 RX ADMIN — POTASSIUM CHLORIDE 10 MEQ: 10 INJECTION, SOLUTION INTRAVENOUS at 22:23

## 2019-01-01 RX ADMIN — Medication 10 ML: at 13:07

## 2019-01-01 RX ADMIN — IPRATROPIUM BROMIDE AND ALBUTEROL SULFATE 3 ML: .5; 3 SOLUTION RESPIRATORY (INHALATION) at 15:36

## 2019-01-01 RX ADMIN — ALBUMIN (HUMAN) 12.5 G: 0.25 INJECTION, SOLUTION INTRAVENOUS at 00:57

## 2019-01-01 RX ADMIN — METHYLPREDNISOLONE SODIUM SUCCINATE 40 MG: 40 INJECTION, POWDER, FOR SOLUTION INTRAMUSCULAR; INTRAVENOUS at 08:13

## 2019-01-01 RX ADMIN — AMIODARONE HYDROCHLORIDE 0.5 MG/MIN: 1.8 INJECTION, SOLUTION INTRAVENOUS at 04:23

## 2019-01-01 RX ADMIN — IPRATROPIUM BROMIDE AND ALBUTEROL SULFATE 3 ML: .5; 3 SOLUTION RESPIRATORY (INHALATION) at 07:22

## 2019-01-01 RX ADMIN — IPRATROPIUM BROMIDE AND ALBUTEROL SULFATE 3 ML: .5; 3 SOLUTION RESPIRATORY (INHALATION) at 15:33

## 2019-01-01 RX ADMIN — SODIUM CHLORIDE 150 ML/HR: 900 INJECTION, SOLUTION INTRAVENOUS at 02:36

## 2019-01-01 RX ADMIN — ONDANSETRON 4 MG: 2 INJECTION INTRAMUSCULAR; INTRAVENOUS at 20:42

## 2019-01-01 RX ADMIN — DILTIAZEM HYDROCHLORIDE 30 MG: 30 TABLET, FILM COATED ORAL at 22:37

## 2019-01-01 RX ADMIN — ALBUMIN (HUMAN) 12.5 G: 0.25 INJECTION, SOLUTION INTRAVENOUS at 12:25

## 2019-01-01 RX ADMIN — IPRATROPIUM BROMIDE AND ALBUTEROL SULFATE 3 ML: .5; 3 SOLUTION RESPIRATORY (INHALATION) at 11:35

## 2019-01-01 RX ADMIN — ALBUMIN (HUMAN) 12.5 G: 0.25 INJECTION, SOLUTION INTRAVENOUS at 11:08

## 2019-01-01 RX ADMIN — PIPERACILLIN SODIUM,TAZOBACTAM SODIUM 4.5 G: 4; .5 INJECTION, POWDER, FOR SOLUTION INTRAVENOUS at 01:50

## 2019-01-01 RX ADMIN — DIPHENHYDRAMINE HYDROCHLORIDE 12.5 MG: 50 INJECTION, SOLUTION INTRAMUSCULAR; INTRAVENOUS at 22:38

## 2019-01-01 RX ADMIN — OXYCODONE HYDROCHLORIDE AND ACETAMINOPHEN 1 TABLET: 5; 325 TABLET ORAL at 22:38

## 2019-01-01 RX ADMIN — PIPERACILLIN, TAZOBACTAM 4.5 G: 4; .5 INJECTION, POWDER, LYOPHILIZED, FOR SOLUTION INTRAVENOUS at 05:08

## 2019-01-01 RX ADMIN — ALBUTEROL SULFATE 2.5 MG: 2.5 SOLUTION RESPIRATORY (INHALATION) at 16:54

## 2019-01-01 RX ADMIN — MAGNESIUM SULFATE HEPTAHYDRATE 1 G: 1 INJECTION, SOLUTION INTRAVENOUS at 07:35

## 2019-01-01 RX ADMIN — INSULIN LISPRO 2 UNITS: 100 INJECTION, SOLUTION INTRAVENOUS; SUBCUTANEOUS at 10:48

## 2019-01-01 RX ADMIN — NOREPINEPHRINE BITARTRATE 15 MCG/MIN: 1 INJECTION, SOLUTION, CONCENTRATE INTRAVENOUS at 07:53

## 2019-01-01 RX ADMIN — PIPERACILLIN SODIUM,TAZOBACTAM SODIUM 4.5 G: 4; .5 INJECTION, POWDER, FOR SOLUTION INTRAVENOUS at 08:48

## 2019-01-01 RX ADMIN — ALBUMIN (HUMAN) 12.5 G: 0.25 INJECTION, SOLUTION INTRAVENOUS at 18:04

## 2019-01-01 RX ADMIN — DILTIAZEM HYDROCHLORIDE 10 MG: 5 INJECTION INTRAVENOUS at 05:26

## 2019-01-01 RX ADMIN — PHENYLEPHRINE HYDROCHLORIDE 10 MCG/MIN: 10 INJECTION INTRAVENOUS at 11:45

## 2019-01-01 RX ADMIN — PHENYLEPHRINE HYDROCHLORIDE 10 MCG/MIN: 10 INJECTION INTRAVENOUS at 03:47

## 2019-01-01 RX ADMIN — NOREPINEPHRINE BITARTRATE 15 MCG/MIN: 1 INJECTION, SOLUTION, CONCENTRATE INTRAVENOUS at 01:32

## 2019-01-01 RX ADMIN — IOPAMIDOL 80 ML: 755 INJECTION, SOLUTION INTRAVENOUS at 22:17

## 2019-01-01 RX ADMIN — OXYCODONE HYDROCHLORIDE AND ACETAMINOPHEN 1 TABLET: 5; 325 TABLET ORAL at 03:25

## 2019-01-01 RX ADMIN — Medication 10 ML: at 05:07

## 2019-01-01 RX ADMIN — POTASSIUM CHLORIDE 10 MEQ: 10 INJECTION, SOLUTION INTRAVENOUS at 09:27

## 2019-01-01 RX ADMIN — LEVOFLOXACIN 750 MG: 5 INJECTION, SOLUTION INTRAVENOUS at 22:26

## 2019-01-01 RX ADMIN — IPRATROPIUM BROMIDE AND ALBUTEROL SULFATE 3 ML: .5; 3 SOLUTION RESPIRATORY (INHALATION) at 16:17

## 2019-01-01 RX ADMIN — OXYCODONE HYDROCHLORIDE AND ACETAMINOPHEN 1 TABLET: 5; 325 TABLET ORAL at 08:48

## 2019-01-01 RX ADMIN — ALBUMIN (HUMAN) 12.5 G: 0.25 INJECTION, SOLUTION INTRAVENOUS at 12:50

## 2019-01-01 RX ADMIN — Medication 10 ML: at 04:54

## 2019-01-01 RX ADMIN — PIPERACILLIN SODIUM,TAZOBACTAM SODIUM 4.5 G: 4; .5 INJECTION, POWDER, FOR SOLUTION INTRAVENOUS at 17:13

## 2019-01-01 RX ADMIN — DILTIAZEM HYDROCHLORIDE 30 MG: 30 TABLET, FILM COATED ORAL at 12:49

## 2019-01-01 RX ADMIN — MORPHINE SULFATE 4 MG: 4 INJECTION INTRAVENOUS at 20:27

## 2019-01-01 RX ADMIN — CLINDAMYCIN PHOSPHATE 600 MG: 600 INJECTION, SOLUTION INTRAVENOUS at 22:26

## 2019-01-01 RX ADMIN — SODIUM CHLORIDE 1000 ML: 900 INJECTION, SOLUTION INTRAVENOUS at 03:07

## 2019-01-01 RX ADMIN — Medication 10 ML: at 00:50

## 2019-01-01 RX ADMIN — SODIUM CHLORIDE 150 ML/HR: 900 INJECTION, SOLUTION INTRAVENOUS at 05:14

## 2019-01-01 RX ADMIN — METHYLPREDNISOLONE SODIUM SUCCINATE 40 MG: 40 INJECTION, POWDER, FOR SOLUTION INTRAMUSCULAR; INTRAVENOUS at 08:48

## 2019-01-01 RX ADMIN — NOREPINEPHRINE BITARTRATE 5 MCG/MIN: 1 INJECTION, SOLUTION, CONCENTRATE INTRAVENOUS at 00:23

## 2019-01-01 RX ADMIN — SODIUM CHLORIDE 1000 ML: 900 INJECTION, SOLUTION INTRAVENOUS at 19:11

## 2019-01-01 RX ADMIN — METHYLPREDNISOLONE SODIUM SUCCINATE 40 MG: 40 INJECTION, POWDER, FOR SOLUTION INTRAMUSCULAR; INTRAVENOUS at 08:51

## 2019-01-01 RX ADMIN — OXYCODONE HYDROCHLORIDE AND ACETAMINOPHEN 1 TABLET: 5; 325 TABLET ORAL at 14:50

## 2019-01-01 RX ADMIN — PIPERACILLIN SODIUM,TAZOBACTAM SODIUM 4.5 G: 4; .5 INJECTION, POWDER, FOR SOLUTION INTRAVENOUS at 08:13

## 2019-01-01 RX ADMIN — SODIUM CHLORIDE 150 ML/HR: 900 INJECTION, SOLUTION INTRAVENOUS at 19:55

## 2019-01-01 RX ADMIN — DILTIAZEM HYDROCHLORIDE 30 MG: 30 TABLET, FILM COATED ORAL at 13:37

## 2019-01-01 RX ADMIN — PHENYLEPHRINE HYDROCHLORIDE 15 MCG/MIN: 10 INJECTION INTRAVENOUS at 10:27

## 2019-01-01 RX ADMIN — NOREPINEPHRINE BITARTRATE 16 MCG/MIN: 1 INJECTION, SOLUTION, CONCENTRATE INTRAVENOUS at 05:41

## 2019-01-01 RX ADMIN — OXYCODONE HYDROCHLORIDE AND ACETAMINOPHEN 1 TABLET: 5; 325 TABLET ORAL at 01:50

## 2019-01-01 RX ADMIN — SODIUM CHLORIDE 1000 ML: 900 INJECTION, SOLUTION INTRAVENOUS at 22:37

## 2019-01-01 RX ADMIN — VANCOMYCIN HYDROCHLORIDE 1500 MG: 10 INJECTION, POWDER, LYOPHILIZED, FOR SOLUTION INTRAVENOUS at 02:38

## 2019-01-01 RX ADMIN — POTASSIUM CHLORIDE 10 MEQ: 10 INJECTION, SOLUTION INTRAVENOUS at 18:33

## 2019-01-01 RX ADMIN — ALBUTEROL SULFATE 10 MG: 2.5 SOLUTION RESPIRATORY (INHALATION) at 23:03

## 2019-01-01 RX ADMIN — CALCIUM GLUCONATE 1 G: 94 INJECTION, SOLUTION INTRAVENOUS at 23:04

## 2019-01-01 RX ADMIN — IPRATROPIUM BROMIDE AND ALBUTEROL SULFATE 3 ML: .5; 3 SOLUTION RESPIRATORY (INHALATION) at 20:18

## 2019-01-01 RX ADMIN — CLINDAMYCIN PHOSPHATE 600 MG: 600 INJECTION, SOLUTION INTRAVENOUS at 05:12

## 2019-01-01 RX ADMIN — METHYLPREDNISOLONE SODIUM SUCCINATE 40 MG: 40 INJECTION, POWDER, FOR SOLUTION INTRAMUSCULAR; INTRAVENOUS at 15:37

## 2019-01-01 RX ADMIN — OXYCODONE HYDROCHLORIDE AND ACETAMINOPHEN 1 TABLET: 5; 325 TABLET ORAL at 06:09

## 2019-01-01 RX ADMIN — SODIUM CHLORIDE, SODIUM LACTATE, POTASSIUM CHLORIDE, AND CALCIUM CHLORIDE 1000 ML: 600; 310; 30; 20 INJECTION, SOLUTION INTRAVENOUS at 21:48

## 2019-01-01 RX ADMIN — INSULIN LISPRO 2 UNITS: 100 INJECTION, SOLUTION INTRAVENOUS; SUBCUTANEOUS at 09:43

## 2019-01-01 RX ADMIN — PIPERACILLIN SODIUM,TAZOBACTAM SODIUM 4.5 G: 4; .5 INJECTION, POWDER, FOR SOLUTION INTRAVENOUS at 09:03

## 2019-01-01 RX ADMIN — LIDOCAINE HYDROCHLORIDE 7.5 ML: 10 INJECTION, SOLUTION EPIDURAL; INFILTRATION; INTRACAUDAL; PERINEURAL at 00:51

## 2019-01-01 RX ADMIN — DILTIAZEM HYDROCHLORIDE 30 MG: 30 TABLET, FILM COATED ORAL at 08:57

## 2019-01-01 RX ADMIN — SODIUM CHLORIDE 1000 ML: 900 INJECTION, SOLUTION INTRAVENOUS at 00:50

## 2019-01-01 RX ADMIN — SODIUM CHLORIDE 150 ML/HR: 900 INJECTION, SOLUTION INTRAVENOUS at 22:28

## 2019-01-01 RX ADMIN — Medication 10 ML: at 05:48

## 2019-01-01 RX ADMIN — NOREPINEPHRINE BITARTRATE 3 MCG/MIN: 1 INJECTION, SOLUTION, CONCENTRATE INTRAVENOUS at 00:07

## 2019-01-01 RX ADMIN — POTASSIUM CHLORIDE 10 MEQ: 10 INJECTION, SOLUTION INTRAVENOUS at 10:19

## 2019-01-01 RX ADMIN — METHYLPREDNISOLONE SODIUM SUCCINATE 40 MG: 40 INJECTION, POWDER, FOR SOLUTION INTRAMUSCULAR; INTRAVENOUS at 07:39

## 2019-01-01 RX ADMIN — IPRATROPIUM BROMIDE AND ALBUTEROL SULFATE 3 ML: .5; 3 SOLUTION RESPIRATORY (INHALATION) at 07:46

## 2019-01-01 RX ADMIN — POTASSIUM CHLORIDE 10 MEQ: 10 INJECTION, SOLUTION INTRAVENOUS at 10:42

## 2019-01-01 RX ADMIN — METHYLPREDNISOLONE SODIUM SUCCINATE 40 MG: 40 INJECTION, POWDER, FOR SOLUTION INTRAMUSCULAR; INTRAVENOUS at 01:34

## 2019-01-01 RX ADMIN — Medication 10 ML: at 08:58

## 2019-01-01 RX ADMIN — PANTOPRAZOLE SODIUM 40 MG: 40 INJECTION, POWDER, LYOPHILIZED, FOR SOLUTION INTRAVENOUS at 22:18

## 2019-01-01 RX ADMIN — AMIODARONE HYDROCHLORIDE 1 MG/MIN: 1.8 INJECTION, SOLUTION INTRAVENOUS at 11:59

## 2019-01-01 RX ADMIN — ALBUMIN (HUMAN) 12.5 G: 0.25 INJECTION, SOLUTION INTRAVENOUS at 17:58

## 2019-01-01 RX ADMIN — IPRATROPIUM BROMIDE AND ALBUTEROL SULFATE 3 ML: .5; 3 SOLUTION RESPIRATORY (INHALATION) at 23:00

## 2019-01-01 RX ADMIN — LORAZEPAM 0.5 MG: 1 TABLET ORAL at 23:44

## 2019-01-01 RX ADMIN — METHYLPREDNISOLONE SODIUM SUCCINATE 40 MG: 40 INJECTION, POWDER, FOR SOLUTION INTRAMUSCULAR; INTRAVENOUS at 20:47

## 2019-01-01 RX ADMIN — PANTOPRAZOLE SODIUM 40 MG: 40 INJECTION, POWDER, LYOPHILIZED, FOR SOLUTION INTRAVENOUS at 22:23

## 2019-01-01 RX ADMIN — OXYCODONE HYDROCHLORIDE AND ACETAMINOPHEN 1 TABLET: 5; 325 TABLET ORAL at 04:55

## 2019-01-01 RX ADMIN — POTASSIUM CHLORIDE 10 MEQ: 10 INJECTION, SOLUTION INTRAVENOUS at 07:36

## 2019-01-01 RX ADMIN — DILTIAZEM HYDROCHLORIDE 30 MG: 30 TABLET, FILM COATED ORAL at 17:57

## 2019-01-01 RX ADMIN — ONDANSETRON 4 MG: 2 INJECTION INTRAMUSCULAR; INTRAVENOUS at 22:07

## 2019-01-01 RX ADMIN — Medication 10 ML: at 15:18

## 2019-01-01 RX ADMIN — PIPERACILLIN, TAZOBACTAM 4.5 G: 4; .5 INJECTION, POWDER, LYOPHILIZED, FOR SOLUTION INTRAVENOUS at 12:50

## 2019-01-01 RX ADMIN — ONDANSETRON 4 MG: 2 INJECTION INTRAMUSCULAR; INTRAVENOUS at 16:47

## 2019-01-01 RX ADMIN — PIPERACILLIN SODIUM,TAZOBACTAM SODIUM 4.5 G: 4; .5 INJECTION, POWDER, FOR SOLUTION INTRAVENOUS at 18:04

## 2019-01-01 RX ADMIN — SODIUM CHLORIDE 1000 ML: 900 INJECTION, SOLUTION INTRAVENOUS at 22:04

## 2019-01-01 RX ADMIN — OXYCODONE HYDROCHLORIDE AND ACETAMINOPHEN 1 TABLET: 5; 325 TABLET ORAL at 10:28

## 2019-01-01 RX ADMIN — DILTIAZEM HYDROCHLORIDE 30 MG: 30 TABLET, FILM COATED ORAL at 17:13

## 2019-01-01 RX ADMIN — ALBUMIN (HUMAN) 12.5 G: 0.25 INJECTION, SOLUTION INTRAVENOUS at 05:52

## 2019-01-01 RX ADMIN — Medication 2 SPRAY: at 21:10

## 2019-01-01 RX ADMIN — FAMOTIDINE 20 MG: 20 TABLET ORAL at 18:46

## 2019-01-01 RX ADMIN — PANTOPRAZOLE SODIUM 40 MG: 40 INJECTION, POWDER, LYOPHILIZED, FOR SOLUTION INTRAVENOUS at 08:57

## 2019-01-01 RX ADMIN — PANTOPRAZOLE SODIUM 40 MG: 40 INJECTION, POWDER, LYOPHILIZED, FOR SOLUTION INTRAVENOUS at 09:03

## 2019-01-01 RX ADMIN — METHYLPREDNISOLONE SODIUM SUCCINATE 20 MG: 40 INJECTION, POWDER, FOR SOLUTION INTRAMUSCULAR; INTRAVENOUS at 08:57

## 2019-01-01 RX ADMIN — IPRATROPIUM BROMIDE AND ALBUTEROL SULFATE 3 ML: .5; 3 SOLUTION RESPIRATORY (INHALATION) at 07:07

## 2019-01-01 RX ADMIN — NALOXONE HYDROCHLORIDE 0.4 MG: 0.4 INJECTION, SOLUTION INTRAMUSCULAR; INTRAVENOUS; SUBCUTANEOUS at 16:47

## 2019-01-01 RX ADMIN — POTASSIUM CHLORIDE 10 MEQ: 10 INJECTION, SOLUTION INTRAVENOUS at 22:38

## 2019-01-01 RX ADMIN — ALBUMIN (HUMAN) 12.5 G: 0.25 INJECTION, SOLUTION INTRAVENOUS at 23:51

## 2019-01-01 RX ADMIN — FOLIC ACID: 5 INJECTION, SOLUTION INTRAMUSCULAR; INTRAVENOUS; SUBCUTANEOUS at 09:15

## 2019-01-01 RX ADMIN — IPRATROPIUM BROMIDE AND ALBUTEROL SULFATE 3 ML: .5; 3 SOLUTION RESPIRATORY (INHALATION) at 20:43

## 2019-01-01 RX ADMIN — DILTIAZEM HYDROCHLORIDE 30 MG: 30 TABLET, FILM COATED ORAL at 08:50

## 2019-01-01 RX ADMIN — DILTIAZEM HYDROCHLORIDE 30 MG: 30 TABLET, FILM COATED ORAL at 12:58

## 2019-01-01 RX ADMIN — POTASSIUM CHLORIDE 40 MEQ: 1.5 POWDER, FOR SOLUTION ORAL at 09:17

## 2019-01-01 RX ADMIN — Medication 10 ML: at 22:00

## 2019-01-01 RX ADMIN — OXYCODONE HYDROCHLORIDE AND ACETAMINOPHEN 1 TABLET: 5; 325 TABLET ORAL at 12:49

## 2019-01-01 RX ADMIN — POTASSIUM CHLORIDE 10 MEQ: 10 INJECTION, SOLUTION INTRAVENOUS at 12:39

## 2019-01-01 RX ADMIN — SODIUM CHLORIDE 250 ML: 900 INJECTION, SOLUTION INTRAVENOUS at 21:38

## 2019-01-01 RX ADMIN — POTASSIUM CHLORIDE 40 MEQ: 1.5 POWDER, FOR SOLUTION ORAL at 06:38

## 2019-01-01 RX ADMIN — PHENYLEPHRINE HYDROCHLORIDE 20 MCG/MIN: 10 INJECTION INTRAVENOUS at 11:38

## 2019-01-01 RX ADMIN — POTASSIUM CHLORIDE 10 MEQ: 10 INJECTION, SOLUTION INTRAVENOUS at 16:45

## 2019-01-01 RX ADMIN — IOHEXOL 30 ML: 240 INJECTION, SOLUTION INTRATHECAL; INTRAVASCULAR; INTRAVENOUS; ORAL at 16:02

## 2019-01-01 RX ADMIN — CALCIUM GLUCONATE 1 G: 94 INJECTION, SOLUTION INTRAVENOUS at 16:56

## 2019-01-01 RX ADMIN — LEVOFLOXACIN 750 MG: 5 INJECTION, SOLUTION INTRAVENOUS at 20:11

## 2019-01-01 RX ADMIN — PANTOPRAZOLE SODIUM 40 MG: 40 INJECTION, POWDER, LYOPHILIZED, FOR SOLUTION INTRAVENOUS at 08:13

## 2019-01-01 RX ADMIN — ACETAMINOPHEN 650 MG: 325 SOLUTION ORAL at 04:54

## 2019-01-01 RX ADMIN — IPRATROPIUM BROMIDE AND ALBUTEROL SULFATE 3 ML: .5; 3 SOLUTION RESPIRATORY (INHALATION) at 12:05

## 2019-01-01 RX ADMIN — INSULIN LISPRO 2 UNITS: 100 INJECTION, SOLUTION INTRAVENOUS; SUBCUTANEOUS at 12:32

## 2019-01-01 RX ADMIN — KETOROLAC TROMETHAMINE 15 MG: 30 INJECTION, SOLUTION INTRAMUSCULAR at 00:51

## 2019-01-01 RX ADMIN — DILTIAZEM HYDROCHLORIDE 30 MG: 30 TABLET, FILM COATED ORAL at 22:18

## 2019-01-01 RX ADMIN — TRANEXAMIC ACID 1000 MG: 1 INJECTION, SOLUTION INTRAVENOUS at 19:16

## 2019-01-01 RX ADMIN — IOPAMIDOL 100 ML: 755 INJECTION, SOLUTION INTRAVENOUS at 17:54

## 2019-01-01 RX ADMIN — VANCOMYCIN HYDROCHLORIDE 1250 MG: 1.25 INJECTION, POWDER, LYOPHILIZED, FOR SOLUTION INTRAVENOUS at 20:49

## 2019-01-01 RX ADMIN — Medication 500 MG: at 09:02

## 2019-01-01 RX ADMIN — POTASSIUM CHLORIDE 10 MEQ: 10 INJECTION, SOLUTION INTRAVENOUS at 15:30

## 2019-01-01 RX ADMIN — ALBUMIN (HUMAN) 12.5 G: 0.25 INJECTION, SOLUTION INTRAVENOUS at 08:50

## 2019-01-01 RX ADMIN — POTASSIUM CHLORIDE 10 MEQ: 10 INJECTION, SOLUTION INTRAVENOUS at 01:42

## 2019-01-01 RX ADMIN — PANTOPRAZOLE SODIUM 40 MG: 40 INJECTION, POWDER, LYOPHILIZED, FOR SOLUTION INTRAVENOUS at 08:51

## 2019-01-01 RX ADMIN — DIPHENHYDRAMINE HYDROCHLORIDE 12.5 MG: 50 INJECTION, SOLUTION INTRAMUSCULAR; INTRAVENOUS at 23:11

## 2019-01-01 RX ADMIN — IPRATROPIUM BROMIDE AND ALBUTEROL SULFATE 3 ML: .5; 3 SOLUTION RESPIRATORY (INHALATION) at 11:28

## 2019-01-01 RX ADMIN — PIPERACILLIN SODIUM,TAZOBACTAM SODIUM 4.5 G: 4; .5 INJECTION, POWDER, FOR SOLUTION INTRAVENOUS at 22:18

## 2019-01-01 RX ADMIN — OXYCODONE HYDROCHLORIDE AND ACETAMINOPHEN 1 TABLET: 5; 325 TABLET ORAL at 08:57

## 2019-01-01 RX ADMIN — POTASSIUM CHLORIDE 10 MEQ: 10 INJECTION, SOLUTION INTRAVENOUS at 11:45

## 2019-01-01 RX ADMIN — PIPERACILLIN SODIUM,TAZOBACTAM SODIUM 4.5 G: 4; .5 INJECTION, POWDER, FOR SOLUTION INTRAVENOUS at 01:34

## 2019-01-01 RX ADMIN — NOREPINEPHRINE BITARTRATE 10 MCG/MIN: 1 INJECTION, SOLUTION, CONCENTRATE INTRAVENOUS at 01:17

## 2019-01-01 RX ADMIN — DIMETHICONE: 0.01 CREAM TOPICAL at 23:49

## 2019-01-01 RX ADMIN — MAGNESIUM SULFATE HEPTAHYDRATE 2 G: 40 INJECTION, SOLUTION INTRAVENOUS at 06:56

## 2019-01-01 RX ADMIN — PANTOPRAZOLE SODIUM 40 MG: 40 INJECTION, POWDER, LYOPHILIZED, FOR SOLUTION INTRAVENOUS at 08:48

## 2019-01-01 RX ADMIN — Medication 500 MG: at 08:56

## 2019-01-01 RX ADMIN — INSULIN LISPRO 2 UNITS: 100 INJECTION, SOLUTION INTRAVENOUS; SUBCUTANEOUS at 19:06

## 2019-01-01 RX ADMIN — Medication 10 ML: at 14:00

## 2019-01-01 RX ADMIN — Medication 10 ML: at 21:01

## 2019-01-01 RX ADMIN — HYDROCORTISONE SODIUM SUCCINATE 100 MG: 100 INJECTION, POWDER, FOR SOLUTION INTRAMUSCULAR; INTRAVENOUS at 10:13

## 2019-01-01 RX ADMIN — PANTOPRAZOLE SODIUM 40 MG: 40 INJECTION, POWDER, FOR SOLUTION INTRAVENOUS at 21:59

## 2019-01-01 RX ADMIN — INSULIN LISPRO 2 UNITS: 100 INJECTION, SOLUTION INTRAVENOUS; SUBCUTANEOUS at 15:37

## 2019-01-01 RX ADMIN — POTASSIUM CHLORIDE 10 MEQ: 10 INJECTION, SOLUTION INTRAVENOUS at 08:13

## 2019-01-01 RX ADMIN — PHENYLEPHRINE HYDROCHLORIDE 20 MCG/MIN: 10 INJECTION INTRAVENOUS at 09:08

## 2019-01-01 RX ADMIN — PIPERACILLIN, TAZOBACTAM 4.5 G: 4; .5 INJECTION, POWDER, LYOPHILIZED, FOR SOLUTION INTRAVENOUS at 00:45

## 2019-01-01 RX ADMIN — FOLIC ACID: 5 INJECTION, SOLUTION INTRAMUSCULAR; INTRAVENOUS; SUBCUTANEOUS at 09:00

## 2019-01-01 RX ADMIN — PIPERACILLIN SODIUM,TAZOBACTAM SODIUM 4.5 G: 4; .5 INJECTION, POWDER, FOR SOLUTION INTRAVENOUS at 17:39

## 2019-01-01 RX ADMIN — NOREPINEPHRINE BITARTRATE 14 MCG/MIN: 1 INJECTION, SOLUTION, CONCENTRATE INTRAVENOUS at 16:29

## 2019-01-01 RX ADMIN — Medication 10 ML: at 13:38

## 2019-01-01 RX ADMIN — Medication 10 ML: at 15:38

## 2019-01-01 RX ADMIN — PIPERACILLIN SODIUM,TAZOBACTAM SODIUM 4.5 G: 4; .5 INJECTION, POWDER, FOR SOLUTION INTRAVENOUS at 15:18

## 2019-01-01 RX ADMIN — OXYCODONE HYDROCHLORIDE AND ACETAMINOPHEN 1 TABLET: 5; 325 TABLET ORAL at 18:45

## 2019-01-01 RX ADMIN — INSULIN LISPRO 2 UNITS: 100 INJECTION, SOLUTION INTRAVENOUS; SUBCUTANEOUS at 18:25

## 2019-01-13 NOTE — LETTER
1/18/2019 Carl Padilla 
[address] [address] Dear  Wil Azul, [facility]  and had lab work performed. We would like to discuss these results with you. Please call the Emergency Department at your earliest convenience at [phone] between 10am-8pm to speak with one of our providers. Sincerely, Physician Emergency, MD 
 
 
[address] 
[phone]

## 2019-01-14 NOTE — ED PROVIDER NOTES
EMERGENCY DEPARTMENT HISTORY AND PHYSICAL EXAM 
 
Date: 1/13/2019 Patient Name: Carl Padilla History of Presenting Illness Chief Complaint Patient presents with  Flu History Provided By: Patient Chief Complaint: Cough Duration: Few Weeks Timing:  Worsening Location: Lungs Quality: Productive Severity: Mild Modifying Factors: No modifying factors Associated Symptoms: SOB, generalized myalgias, generalized weakness Additional History (Context):  
11:03 PM 
Carl Padilla is a 52 y.o. male with PMHX of HTN, Tobacco use, COPD, throat cancer, who presents via EMS to the emergency department C/O cough onset few weeks ago. Associated sxs include SOB, generalized myalgias, generalized weakness. Pt states that he has a trach due to throat cancer. Pt states that he saw his PCP a few weeks ago and was dx with the flu. Pt states that he was put on a Z-juan. Pt denies fever, chills, difficulty urinating, leg swelling,  actively getting chemotherapy, recent hospitalization, tobacco use, taking new medications, and any other sxs or complaints. PCP: Sheldon Durbin MD 
 
Current Facility-Administered Medications Medication Dose Route Frequency Provider Last Rate Last Dose  sodium chloride 0.9 % bolus infusion 1,000 mL  1,000 mL IntraVENous ONCE Sheron Howe MD      
 potassium chloride (K-DUR, KLOR-CON) SR tablet 40 mEq  40 mEq Oral NOW Sheron Howe MD      
 dimethicone 1 % topical cream   Topical NOW Sheron Howe MD      
 
Current Outpatient Medications Medication Sig Dispense Refill  potassium chloride (K-DUR, KLOR-CON) 20 mEq tablet Take 1 Tab by mouth three (3) times daily for 3 days. 9 Tab 0  
 oxyCODONE-acetaminophen (PERCOCET) 5-325 mg per tablet Take 1 Tab by mouth every four (4) hours as needed for Pain for up to 12 doses. Max Daily Amount: 6 Tabs.  12 Tab 0  
 naloxone (NARCAN) 4 mg/actuation nasal spray Use 1 spray intranasally into 1 nostril. Use a new Narcan nasal spray for subsequent doses and administer into alternating nostrils. May repeat every 2 to 3 minutes as needed. 2 Each 0  
 PARoxetine (PAXIL) 10 mg tablet 10 mg by Per G Tube route daily.  budesonide (PULMICORT) 0.5 mg/2 mL nbsp 500 mcg by Nebulization route two (2) times a day.  arformoterol (BROVANA) 15 mcg/2 mL nebu neb solution 2 mL by Nebulization route two (2) times a day. 60 Vial 2  
 budesonide (PULMICORT) 0.5 mg/2 mL nbsp 2 mL by Nebulization route two (2) times a day. 60 Each 2  
 ipratropium (ATROVENT) 0.02 % soln 2.5 mL by Nebulization route four (4) times daily. 120 Vial 2  predniSONE (DELTASONE) 10 mg tablet Prednisone 10mg tabs: p.o. 
4 tabs daily for 3 days then drop to  
3 tabs daily for 3 days then drop to  
2 tabs daily for 3 days then drop to  
1 tab daily for 3 days then stop. Dispense 30 tabs 30 Tab 0  cloNIDine (CATAPRES) 0.2 mg/24 hr patch 1 Patch by TransDERmal route every seven (7) days. (Patient not taking: Reported on 1/17/2018) 4 Patch 0  
 fentaNYL (DURAGESIC) 100 mcg/hr PATCH 1 Patch by TransDERmal route every seventy-two (72) hours. Max Daily Amount: 1 Patch. 5 Patch 0  
 guaiFENesin (ROBITUSSIN) 100 mg/5 mL liquid Take 5 mL by mouth every four (4) hours as needed for Cough. 200 mL 0  
 lisinopril (PRINIVIL, ZESTRIL) 5 mg tablet Take 1 Tab by mouth every twelve (12) hours. 30 Tab 0  
 magic mouthwash (FIRST-MOUTHWASH BLM) 023--40 mg/30 mL mwsh oral suspension Take 10 mL by mouth every four (4) hours as needed.  albuterol-ipratropium (DUO-NEB) 2.5 mg-0.5 mg/3 ml nebu 3 mL by Nebulization route every four (4) hours as needed. (Patient taking differently: 3 mL by Nebulization route every four (4) hours as needed (wheezing). ) 30 Nebule 1  
 multivitamin, tx-iron-ca-min (THERA-M W/ IRON) 9 mg iron-400 mcg tab tablet Take 1 Tab by mouth daily. 30 Tab 1  Nebulizer Accessories kit Use nebs Q4H PRN 1 Kit 0  
 
 
 Past History Past Medical History: 
Past Medical History:  
Diagnosis Date  Chronic obstructive pulmonary disease (HCC)   
 acute hypoxic respiratory failure 8/16  Hypertension  Ill-defined condition   
 peg tube in place  Ill-defined condition   
 chemo  S/P radiation therapy  Throat cancer ([facility] [address]  Tobacco abuse Past Surgical History: 
Past Surgical History:  
Procedure Laterality Date  HX GI    
 peg tub  HX HEENT    
 sinus surgery  HX TRACHEOSTOMY Family History: 
History reviewed. No pertinent family history. Social History: 
Social History Tobacco Use  Smoking status: Former Smoker Packs/day: 0.50  Smokeless tobacco: Never Used Substance Use Topics  Alcohol use: No  
 Drug use: No  
 
 
Allergies: 
No Known Allergies Review of Systems Review of Systems Respiratory: Positive for cough and shortness of breath. Cardiovascular: Negative for leg swelling. Genitourinary: Negative for difficulty urinating. Musculoskeletal: Positive for myalgias (generalized). Neurological: Positive for weakness (generalized). All other systems reviewed and are negative. Physical Exam  
 
Vitals:  
 01/13/19 2256 BP: 115/78 Pulse: (!) 104 Resp: 19 Temp: 98.3 °F (36.8 °C) SpO2: 100% Weight: 70.3 kg (155 lb) Height: 5' 7\" (1.702 m) Physical Exam  
Constitutional: He is oriented to person, place, and time. He appears well-developed and well-nourished. No distress. Middle aged appearing older than stated age uncomfortable appearning non toxic HENT:  
Head: Normocephalic and atraumatic. Right Ear: External ear normal.  
Left Ear: External ear normal.  
Mouth/Throat: Oropharynx is clear and moist. No oropharyngeal exudate. White caseous material to the posterior tongue does not appear to remove with scrapping Eyes: EOM are normal. Pupils are equal, round, and reactive to light. Right conjunctiva is injected. Left conjunctiva is injected. No scleral icterus. Mild pallor No icterus Neck: Normal range of motion. Neck supple. No JVD present. No tracheal deviation present. No thyromegaly present. Cardiovascular: Normal rate, regular rhythm and normal heart sounds. Pulmonary/Chest: Effort normal and breath sounds normal. No stridor. No respiratory distress. Taking deep hyperpneic breaths but otherwise clear Few scattered upper respiratory sounds Abdominal: Soft. Bowel sounds are normal. He exhibits no distension. There is no tenderness. There is no rebound and no guarding. Dressing removed peg tube with mild denuding of skin peripherally trace erythema no deep induration bleeding or purulent discharge Musculoskeletal: Normal range of motion. He exhibits no edema or tenderness. No soft tissue injuries Lymphadenopathy:  
  He has no cervical adenopathy. Neurological: He is alert and oriented to person, place, and time. He has normal reflexes. No cranial nerve deficit. Coordination normal.  
Generalized muscular atrophy Skin: Skin is warm and dry. No rash noted. He is not diaphoretic. No erythema. Decreased turgor Extensive excoriations and minor petechial marks but only with associated excoriations Psychiatric: His behavior is normal. Judgment and thought content normal. He exhibits a depressed mood (flat affect). Nursing note and vitals reviewed. Diagnostic Study Results Labs - Recent Results (from the past 12 hour(s)) INFLUENZA A & B AG (RAPID TEST) Collection Time: 01/14/19 12:20 AM  
Result Value Ref Range Influenza A Antigen NEGATIVE  NEG Influenza B Antigen NEGATIVE  NEG    
METABOLIC PANEL, COMPREHENSIVE Collection Time: 01/14/19  1:02 AM  
Result Value Ref Range Sodium 121 (L) 136 - 145 mmol/L Potassium 3.1 (L) 3.5 - 5.5 mmol/L Chloride 78 (L) 100 - 108 mmol/L  
 CO2 32 21 - 32 mmol/L  Anion gap 11 3.0 - 18 mmol/L  
 Glucose 112 (H) 74 - 99 mg/dL BUN 19 (H) 7.0 - 18 MG/DL Creatinine 0.67 0.6 - 1.3 MG/DL  
 BUN/Creatinine ratio 28 (H) 12 - 20 GFR est AA >60 >60 ml/min/1.73m2 GFR est non-AA >60 >60 ml/min/1.73m2 Calcium 7.8 (L) 8.5 - 10.1 MG/DL Bilirubin, total 0.4 0.2 - 1.0 MG/DL  
 ALT (SGPT) 32 16 - 61 U/L  
 AST (SGOT) 33 15 - 37 U/L Alk. phosphatase 221 (H) 45 - 117 U/L Protein, total 5.5 (L) 6.4 - 8.2 g/dL Albumin 1.8 (L) 3.4 - 5.0 g/dL Globulin 3.7 2.0 - 4.0 g/dL A-G Ratio 0.5 (L) 0.8 - 1.7 PROTHROMBIN TIME + INR Collection Time: 01/14/19  1:02 AM  
Result Value Ref Range Prothrombin time 12.5 11.5 - 15.2 sec INR 1.0 0.8 - 1.2 PTT Collection Time: 01/14/19  1:02 AM  
Result Value Ref Range aPTT 32.0 23.0 - 36.4 SEC CARDIAC PANEL,(CK, CKMB & TROPONIN) Collection Time: 01/14/19  1:02 AM  
Result Value Ref Range CK 33 (L) 39 - 308 U/L  
 CK - MB <1.0 <3.6 ng/ml CK-MB Index  0.0 - 4.0 % CALCULATION NOT PERFORMED WHEN RESULT IS BELOW LINEAR LIMIT Troponin-I, QT 0.06 (H) 0.0 - 0.045 NG/ML  
CBC WITH AUTOMATED DIFF Collection Time: 01/14/19  1:25 AM  
Result Value Ref Range WBC 9.7 4.6 - 13.2 K/uL  
 RBC 3.02 (L) 4.70 - 5.50 M/uL  
 HGB 10.4 (L) 13.0 - 16.0 g/dL HCT 29.8 (L) 36.0 - 48.0 % MCV 98.7 (H) 74.0 - 97.0 FL  
 MCH 34.4 (H) 24.0 - 34.0 PG  
 MCHC 34.9 31.0 - 37.0 g/dL  
 RDW 13.7 11.6 - 14.5 % PLATELET 838 234 - 808 K/uL MPV 9.5 9.2 - 11.8 FL  
 NEUTROPHILS 90 (H) 40 - 73 % LYMPHOCYTES 1 (L) 21 - 52 % MONOCYTES 9 3 - 10 % EOSINOPHILS 0 0 - 5 % BASOPHILS 0 0 - 2 %  
 ABS. NEUTROPHILS 8.7 (H) 1.8 - 8.0 K/UL  
 ABS. LYMPHOCYTES 0.1 (L) 0.9 - 3.6 K/UL  
 ABS. MONOCYTES 0.9 0.05 - 1.2 K/UL  
 ABS. EOSINOPHILS 0.0 0.0 - 0.4 K/UL  
 ABS. BASOPHILS 0.0 0.0 - 0.1 K/UL  
 DF AUTOMATED Radiologic Studies -  
XR CHEST PORT    (Results Pending) RADIOLOGY FINDINGS Chest X-ray shows no PNA, trach intact, no effusion Pending review by Radiologist 
Recorded by Charm Einstein, ED Scribe, as dictated by Chyna Vega. Bipin Vyas MD 
 
CT Results  (Last 48 hours) None CXR Results  (Last 48 hours) None Medications given in the ED- Medications  
dimethicone 1 % topical cream (not administered)  
sodium chloride 0.9 % bolus infusion 1,000 mL (not administered) potassium chloride (K-DUR, KLOR-CON) SR tablet 40 mEq (not administered)  
sodium chloride 0.9 % bolus infusion 1,000 mL (1,000 mL IntraVENous New Bag 1/14/19 0050) lidocaine (PF) (XYLOCAINE) 10 mg/mL (1 %) injection 7.5 mL (7.5 mL IntraVENous Given 1/14/19 0051)  
ketorolac (TORADOL) injection 15 mg (15 mg IntraVENous Given 1/14/19 0051) Medical Decision Making I am the first provider for this patient. I reviewed the vital signs, available nursing notes, past medical history, past surgical history, family history and social history. Vital Signs-Reviewed the patient's vital signs. Pulse Oximetry Analysis - 100% on RA Cardiac Monitor: 
Rate: 112 bpm 
Rhythm: Sinus tachycardia EKG interpretation: (Preliminary) 11:02 PM  
Sinus tachycardia, 112 bpm, QRS 0.080s EKG read by Chyan Vega. Bipin Vyas MD at 10:41 PM  
 
Records Reviewed: Nursing Notes and Old Medical Records Provider Notes (Medical Decision Making): Ddx: Flu like sxs actually from weeks ago closer inspection seems to be more chronic pain with dehydration lab works shows no other acute process. No findings to suggest new PNA or PE or ACS. Treat sxs replace potassium and pain management per his PCP. Procedures: 
Procedures ED Course:  
11:03 PM Initial assessment performed. The patients presenting problems have been discussed, and they are in agreement with the care plan formulated and outlined with them. I have encouraged them to ask questions as they arise throughout their visit. Diagnosis and Disposition DISCHARGE NOTE: 
2:57 AM 
 Lucrecia Aldana Castano's  results have been reviewed with him. He has been counseled regarding his diagnosis, treatment, and plan. He verbally conveys understanding and agreement of the signs, symptoms, diagnosis, treatment and prognosis and additionally agrees to follow up as discussed. He also agrees with the care-plan and conveys that all of his questions have been answered. I have also provided discharge instructions for him that include: educational information regarding their diagnosis and treatment, and list of reasons why they would want to return to the ED prior to their follow-up appointment, should his condition change. He has been provided with education for proper emergency department utilization. CLINICAL IMPRESSION: 
 
1. Dehydration 2. Hypokalemia 3. Fatigue, unspecified type 4. Chronic pain syndrome 5. Tracheostomy dependence ([facility] [address] 6. Throat cancer ([facility] [address] 7. Pulmonary emphysema, unspecified emphysema type [facility] PLAN: 
1. D/C Home 2. Current Discharge Medication List  
  
START taking these medications Details  
potassium chloride (K-DUR, KLOR-CON) 20 mEq tablet Take 1 Tab by mouth three (3) times daily for 3 days. Qty: 9 Tab, Refills: 0 CONTINUE these medications which have NOT CHANGED Details  
oxyCODONE-acetaminophen (PERCOCET) 5-325 mg per tablet Take 1 Tab by mouth every four (4) hours as needed for Pain for up to 12 doses. Max Daily Amount: 6 Tabs. Qty: 12 Tab, Refills: 0 Associated Diagnoses: Other chronic pain  
  
naloxone (NARCAN) 4 mg/actuation nasal spray Use 1 spray intranasally into 1 nostril. Use a new Narcan nasal spray for subsequent doses and administer into alternating nostrils. May repeat every 2 to 3 minutes as needed. Qty: 2 Each, Refills: 0 PARoxetine (PAXIL) 10 mg tablet 10 mg by Per G Tube route daily. !! budesonide (PULMICORT) 0.5 mg/2 mL nbsp 500 mcg by Nebulization route two (2) times a day. arformoterol (BROVANA) 15 mcg/2 mL nebu neb solution 2 mL by Nebulization route two (2) times a day. Qty: 60 Vial, Refills: 2  
  
!! budesonide (PULMICORT) 0.5 mg/2 mL nbsp 2 mL by Nebulization route two (2) times a day. Qty: 60 Each, Refills: 2  
  
ipratropium (ATROVENT) 0.02 % soln 2.5 mL by Nebulization route four (4) times daily. Qty: 120 Vial, Refills: 2  
  
predniSONE (DELTASONE) 10 mg tablet Prednisone 10mg tabs: p.o. 
4 tabs daily for 3 days then drop to  
3 tabs daily for 3 days then drop to  
2 tabs daily for 3 days then drop to  
1 tab daily for 3 days then stop. Dispense 30 tabs Qty: 30 Tab, Refills: 0  
  
cloNIDine (CATAPRES) 0.2 mg/24 hr patch 1 Patch by TransDERmal route every seven (7) days. Qty: 4 Patch, Refills: 0  
  
fentaNYL (DURAGESIC) 100 mcg/hr PATCH 1 Patch by TransDERmal route every seventy-two (72) hours. Max Daily Amount: 1 Patch. Qty: 5 Patch, Refills: 0  
  
guaiFENesin (ROBITUSSIN) 100 mg/5 mL liquid Take 5 mL by mouth every four (4) hours as needed for Cough. Qty: 200 mL, Refills: 0  
  
lisinopril (PRINIVIL, ZESTRIL) 5 mg tablet Take 1 Tab by mouth every twelve (12) hours. Qty: 30 Tab, Refills: 0  
  
magic mouthwash (FIRST-MOUTHWASH BLM) 569--40 mg/30 mL mwsh oral suspension Take 10 mL by mouth every four (4) hours as needed. albuterol-ipratropium (DUO-NEB) 2.5 mg-0.5 mg/3 ml nebu 3 mL by Nebulization route every four (4) hours as needed. Qty: 30 Nebule, Refills: 1  
  
multivitamin, tx-iron-ca-min (THERA-M W/ IRON) 9 mg iron-400 mcg tab tablet Take 1 Tab by mouth daily. Qty: 30 Tab, Refills: 1 Nebulizer Accessories kit Use nebs Q4H PRN Qty: 1 Kit, Refills: 0  
  
 !! - Potential duplicate medications found. Please discuss with provider. 3.  
Follow-up Information Follow up With Specialties Details Why Contact Info  Alejandra Marrero MD Family Practice Schedule an appointment as soon as possible for a visit For primary care follow up [address] [facility] [place] [phone] [facility] EMERGENCY DEPT Emergency Medicine Go to As needed, if symptoms worsen [address] [id number] 
[phone]  
  
 
_______________________________ Attestations: This note is prepared by [facility], acting as Scribe for Melanie. Edmundo Bucio MD. [employer]. Edmundo Bucio MD:  The scribe's documentation has been prepared under my direction and personally reviewed by me in its entirety. I confirm that the note above accurately reflects all work, treatment, procedures, and medical decision making performed by me. 
_______________________________

## 2019-01-14 NOTE — DISCHARGE INSTRUCTIONS
Fatigue: Care Instructions  Your Care Instructions    Fatigue is a feeling of tiredness, exhaustion, or lack of energy. You may feel fatigue because of too much or not enough activity. It can also come from stress, lack of sleep, boredom, and poor diet. Many medical problems, such as viral infections, can cause fatigue. Emotional problems, especially depression, are often the cause of fatigue. Fatigue is most often a symptom of another problem. Treatment for fatigue depends on the cause. For example, if you have fatigue because you have a certain health problem, treating this problem also treats your fatigue. If depression or anxiety is the cause, treatment may help. Follow-up care is a key part of your treatment and safety. Be sure to make and go to all appointments, and call your doctor if you are having problems. It's also a good idea to know your test results and keep a list of the medicines you take. How can you care for yourself at home? · Get regular exercise. But don't overdo it. Go back and forth between rest and exercise. · Get plenty of rest.  · Eat a healthy diet. Do not skip meals, especially breakfast.  · Reduce your use of caffeine, tobacco, and alcohol. Caffeine is most often found in coffee, tea, cola drinks, and chocolate. · Limit medicines that can cause fatigue. This includes tranquilizers and cold and allergy medicines. When should you call for help? Watch closely for changes in your health, and be sure to contact your doctor if:    · You have new symptoms such as fever or a rash.     · Your fatigue gets worse.     · You have been feeling down, depressed, or hopeless. Or you may have lost interest in things that you usually enjoy.     · You are not getting better as expected. Where can you learn more? Go to http://justine-serena.info/. Enter N647 in the search box to learn more about \"Fatigue: Care Instructions. \"  Current as of: November 20, 2017  Content Version: 11.8  © 4667-6564 [employer]. Care instructions adapted under license by [email or web address] (which disclaims liability or warranty for this information). If you have questions about a medical condition or this instruction, always ask your healthcare professional. [address] any warranty or liability for your use of this information. Patient Education        Dehydration: Care Instructions  Your Care Instructions  Dehydration happens when your body loses too much fluid. This might happen when you do not drink enough water or you lose large amounts of fluids from your body because of diarrhea, vomiting, or sweating. Severe dehydration can be life-threatening. Water and minerals called electrolytes help put your body fluids back in balance. Learn the early signs of fluid loss, and drink more fluids to prevent dehydration. Follow-up care is a key part of your treatment and safety. Be sure to make and go to all appointments, and call your doctor if you are having problems. It's also a good idea to know your test results and keep a list of the medicines you take. How can you care for yourself at home? · To prevent dehydration, drink plenty of fluids, enough so that your urine is light yellow or clear like water. Choose water and other caffeine-free clear liquids until you feel better. If you have kidney, heart, or liver disease and have to limit fluids, talk with your doctor before you increase the amount of fluids you drink. · If you do not feel like eating or drinking, try taking small sips of water, sports drinks, or other rehydration drinks. · Get plenty of rest.  To prevent dehydration  · Add more fluids to your diet and daily routine, unless your doctor has told you not to. · During hot weather, drink more fluids. Drink even more fluids if you exercise a lot. Stay away from drinks with alcohol or caffeine. · Watch for the symptoms of dehydration.  These include:  ? A dry, sticky mouth. ? Dark yellow urine, and not much of it. ? Dry and sunken eyes. ? Feeling very tired. · Learn what problems can lead to dehydration. These include:  ? Diarrhea, fever, and vomiting. ? Any illness with a fever, such as pneumonia or the flu. ? Activities that cause heavy sweating, such as endurance races and heavy outdoor work in hot or humid weather. ? Alcohol or drug abuse or withdrawal.  ? Certain medicines, such as cold and allergy pills (antihistamines), diet pills (diuretics), and laxatives. ? Certain diseases, such as diabetes, cancer, and heart or kidney disease. When should you call for help? Call 911 anytime you think you may need emergency care. For example, call if:    · You passed out (lost consciousness).    Call your doctor now or seek immediate medical care if:    · You are confused and cannot think clearly.     · You are dizzy or lightheaded, or you feel like you may faint.     · You have signs of needing more fluids. You have sunken eyes and a dry mouth, and you pass only a little dark urine.     · You cannot keep fluids down.    Watch closely for changes in your health, and be sure to contact your doctor if:    · You are not making tears.     · Your skin is very dry and sags slowly back into place after you pinch it.     · Your mouth and eyes are very dry. Where can you learn more? Go to http://justine-serena.info/. Enter O530 in the search box to learn more about \"Dehydration: Care Instructions. \"  Current as of: November 20, 2017  Content Version: 11.8  © 7553-1901 [email or web address]. Care instructions adapted under license by [employer] (which disclaims liability or warranty for this information). If you have questions about a medical condition or this instruction, always ask your healthcare professional. [address] any warranty or liability for your use of this information.          Patient Education        Hypokalemia: Care Instructions  Your Care Instructions    Hypokalemia (say \"[id number]-LORNE-[id number]-uh\") is a low level of potassium. The heart, muscles, kidneys, and nervous system all need potassium to work well. This problem has many different causes. Kidney problems, diet, and medicines like diuretics and laxatives can cause it. So can vomiting or diarrhea. In some cases, cancer is the cause. Your doctor may do tests to find the cause of your low potassium levels. You may need medicines to bring your potassium levels back to normal. You may also need regular blood tests to check your potassium. If you have very low potassium, you may need intravenous (IV) medicines. You also may need tests to check the electrical activity of your heart. Heart problems caused by low potassium levels can be very serious. Follow-up care is a key part of your treatment and safety. Be sure to make and go to all appointments, and call your doctor if you are having problems. It's also a good idea to know your test results and keep a list of the medicines you take. How can you care for yourself at home? · If your doctor recommends it, eat foods that have a lot of potassium. These include fresh fruits, juices, and vegetables. They also include nuts, beans, and milk. · Be safe with medicines. If your doctor prescribes medicines or potassium supplements, take them exactly as directed. Call your doctor if you have any problems with your medicines. · Get your potassium levels tested as often as your doctor tells you. When should you call for help? Call 911 anytime you think you may need emergency care. For example, call if:    · You feel like your heart is missing beats.  Heart problems caused by low potassium can cause death.     · You passed out (lost consciousness).     · You have a seizure.    Call your doctor now or seek immediate medical care if:    · You feel weak or unusually tired.     · You have severe arm or leg cramps.     · You have tingling or numbness.     · You feel sick to your stomach, or you vomit.     · You have belly cramps.     · You feel bloated or constipated.     · You have to urinate a lot.     · You feel very thirsty most of the time.     · You are dizzy or lightheaded, or you feel like you may faint.     · You feel depressed, or you lose touch with reality.    Watch closely for changes in your health, and be sure to contact your doctor if:    · You do not get better as expected. Where can you learn more? Go to http://justine-serena.info/. Enter G358 in the search box to learn more about \"Hypokalemia: Care Instructions. \"  Current as of: March 15, 2018  Content Version: 11.8  © 7568-1857 [employer]. Care instructions adapted under license by [employer] (which disclaims liability or warranty for this information). If you have questions about a medical condition or this instruction, always ask your healthcare professional. [address] any warranty or liability for your use of this information.

## 2019-01-14 NOTE — CALL BACK NOTE
Called number on file, no answer. Voicemail left requesting call back for results review. Pt with CXR with possible infiltrate. Pt may needs abx

## 2019-01-18 NOTE — CALL BACK NOTE
Attempt call again to discuss radiology results. No answer voicemail was left. This is second attempt. I will send certified letter today

## 2019-05-20 NOTE — DISCHARGE INSTRUCTIONS
Patient Education        Chest Pain: Care Instructions  Your Care Instructions    There are many things that can cause chest pain. Some are not serious and will get better on their own in a few days. But some kinds of chest pain need more testing and treatment. Your doctor may have recommended a follow-up visit in the next 8 to 12 hours. If you are not getting better, you may need more tests or treatment. Even though your doctor has released you, you still need to watch for any problems. The doctor carefully checked you, but sometimes problems can develop later. If you have new symptoms or if your symptoms do not get better, get medical care right away. If you have worse or different chest pain or pressure that lasts more than 5 minutes or you passed out (lost consciousness), call 911 or seek other emergency help right away. A medical visit is only one step in your treatment. Even if you feel better, you still need to do what your doctor recommends, such as going to all suggested follow-up appointments and taking medicines exactly as directed. This will help you recover and help prevent future problems. How can you care for yourself at home? · Rest until you feel better. · Take your medicine exactly as prescribed. Call your doctor if you think you are having a problem with your medicine. · Do not drive after taking a prescription pain medicine. When should you call for help? Call 911 if:    · You passed out (lost consciousness).     · You have severe difficulty breathing.     · You have symptoms of a heart attack. These may include:  ? Chest pain or pressure, or a strange feeling in your chest.  ? Sweating. ? Shortness of breath. ? Nausea or vomiting. ? Pain, pressure, or a strange feeling in your back, neck, jaw, or upper belly or in one or both shoulders or arms. ? Lightheadedness or sudden weakness. ? A fast or irregular heartbeat.   After you call 911, the  may tell you to chew 1 adult-strength or 2 to 4 low-dose aspirin. Wait for an ambulance. Do not try to drive yourself.    Call your doctor today if:    · You have any trouble breathing.     · Your chest pain gets worse.     · You are dizzy or lightheaded, or you feel like you may faint.     · You are not getting better as expected.     · You are having new or different chest pain. Where can you learn more? Go to http://justine-serena.info/. Enter A120 in the search box to learn more about \"Chest Pain: Care Instructions. \"  Current as of: September 23, 2018  Content Version: 11.9  © 1604-0283 [employer]. Care instructions adapted under license by [employer] (which disclaims liability or warranty for this information). If you have questions about a medical condition or this instruction, always ask your healthcare professional. [place] [address] any warranty or liability for your use of this information. Patient Education        Head and Neck Cancer: Care Instructions  Your Care Instructions    Head and neck cancer is the rapid growth of abnormal cells that usually starts in the mouth, nose, or throat area. These cancers can spread to nearby lymph nodes or organs, or to distant areas of the body. Most head and neck cancers are linked to tobacco or alcohol use. Treatment for head and neck cancer depends on what type of cancer you have and how far it has spread. You may need surgery to remove the cancer. You also may take medicines (chemotherapy) or get radiation treatments to kill the cancer cells, or you may need all three treatments. Treatment with chemotherapy or radiation can make you feel very tired and sick to your stomach and may cause vomiting and diarrhea. Radiation may also make the treated area red and sore, although these symptoms go away after treatment ends. These treatments also can weaken your immune system, raising your risk of infection.   Finding out that you have cancer is scary. You may feel many emotions and may need some help coping. Seek out family, friends, and counselors for support. You also can do things at home to make yourself feel better while you go through treatment. Call the [facility] ([phone]) or visit its website at [address] [employer] for more information. Follow-up care is a key part of your treatment and safety. Be sure to make and go to all appointments, and call your doctor if you are having problems. It's also a good idea to know your test results and keep a list of the medicines you take. How can you care for yourself at home? · Take your medicines exactly as prescribed. Call your doctor if you have any problems with your medicine. You may get medicine for nausea and vomiting if you have these side effects. · Eat healthy food. If you do not feel like eating, try to eat food that has protein and extra calories to keep up your strength and prevent weight loss. Drink liquid meal replacements for extra calories and protein. Try to eat your main meal early. Some people do better with small, frequent meals rather than one or two large ones. · Your doctor may suggest a feeding tube if your mouth and throat are too sore to chew and swallow. This will help you get the right nutrition, a key part of your recovery. · Get some physical activity every day, but do not get too tired. Keep doing the hobbies you enjoy as your energy allows. · Take steps to control your stress and workload. Learn relaxation techniques. ? Share your feelings. Stress and tension affect our emotions. By expressing your feelings to others, you may be able to understand and cope with them. ? Consider joining a support group. Talking about a problem with your spouse, a good friend, or other people with similar problems is a good way to reduce tension and stress. ? Express yourself through art. Try writing, dance, art, or crafts to relieve tension.  Some groups may be available just for people who have cancer. ? Be kind to your body and mind. Getting enough sleep, eating a healthy diet, and taking time to do things you enjoy can contribute to an overall feeling of balance in your life and can help reduce stress. ? Get help if you need it. Discuss your concerns with your doctor, counselor, or other health professional.  · If you are vomiting or have diarrhea:  ? Drink plenty of fluids (enough so that your urine is light yellow or clear like water) to prevent dehydration. Choose water and other caffeine-free clear liquids. If you have kidney, heart, or liver disease and have to limit fluids, talk with your doctor before you increase the amount of fluids you drink. ? When you are able to eat, try clear soups, mild foods, and liquids until all symptoms are gone for 12 to 48 hours. Other good choices include dry toast, crackers, cooked cereal, and gelatin dessert, such as Jell-O.  · If you have not already done so, prepare a list of advance directives. Advance directives are instructions to your doctor and family members about what kind of care you want if you become unable to speak or express yourself. When should you call for help? Call 911 anytime you think you may need emergency care. For example, call if:    · You passed out (lost consciousness).    Call your doctor now or seek immediate medical care if:    · You have a fever.     · You have abnormal bleeding.     · You have new or worse pain.     · You think you have an infection.     · You have new symptoms, such as a cough, belly pain, vomiting, diarrhea, or a rash.    Watch closely for changes in your health, and be sure to contact your doctor if:    · You are much more tired than usual.     · You have swollen glands in your armpits, groin, or neck.     · You do not get better as expected. Where can you learn more? Go to http://justine-serena.info/.   Enter V715 in the search box to learn more about \"Head and Neck Cancer: Care Instructions. \"  Current as of: March 27, 2018  Content Version: 11.9  © 6732-8447 [employer]. Care instructions adapted under license by [employer] (which disclaims liability or warranty for this information). If you have questions about a medical condition or this instruction, always ask your healthcare professional. [address] any warranty or liability for your use of this information. Patient Education        Chronic Pain: Care Instructions  Your Care Instructions    Chronic pain is pain that lasts a long time (months or even years) and may or may not have a clear cause. It is different from acute pain, which usually does have a clear cause--like an injury or illness--and gets better over time. Chronic pain:  · Lasts over time but may vary from day to day. · Does not go away despite efforts to end it. · May disrupt your sleep and lead to fatigue. · May cause depression or anxiety. · May make your muscles tense, causing more pain. · Can disrupt your work, hobbies, home life, and relationships with friends and family. Chronic pain is a very real condition. It is not just in your head. Treatment can help and usually includes several methods used together, such as medicines, physical therapy, exercise, and other treatments. Learning how to relax and changing negative thought patterns can also help you cope. Chronic pain is complex. Taking an active role in your treatment will help you better manage your pain. Tell your doctor if you have trouble dealing with your pain. You may have to try several things before you find what works best for you. Follow-up care is a key part of your treatment and safety. Be sure to make and go to all appointments, and call your doctor if you are having problems. It's also a good idea to know your test results and keep a list of the medicines you take.   How can you care for yourself at home? · Pace yourself. Break up large jobs into smaller tasks. Save harder tasks for days when you have less pain, or go back and forth between hard tasks and easier ones. Take rest breaks. · Relax, and reduce stress. Relaxation techniques such as deep breathing or meditation can help. · Keep moving. Gentle, daily exercise can help reduce pain over the long run. Try low- or no-impact exercises such as walking, swimming, and stationary biking. Do stretches to stay flexible. · Try heat, cold packs, and massage. · Get enough sleep. Chronic pain can make you tired and drain your energy. Talk with your doctor if you have trouble sleeping because of pain. · Think positive. Your thoughts can affect your pain level. Do things that you enjoy to distract yourself when you have pain instead of focusing on the pain. See a movie, read a book, listen to music, or spend time with a friend. · If you think you are depressed, talk to your doctor about treatment. · Keep a daily pain diary. Record how your moods, thoughts, sleep patterns, activities, and medicine affect your pain. You may find that your pain is worse during or after certain activities or when you are feeling a certain emotion. Having a record of your pain can help you and your doctor find the best ways to treat your pain. · Take pain medicines exactly as directed. ? If the doctor gave you a prescription medicine for pain, take it as prescribed. ? If you are not taking a prescription pain medicine, ask your doctor if you can take an over-the-counter medicine. Reducing constipation caused by pain medicine  · Include fruits, vegetables, beans, and whole grains in your diet each day. These foods are high in fiber. · Drink plenty of fluids, enough so that your urine is light yellow or clear like water. If you have kidney, heart, or liver disease and have to limit fluids, talk with your doctor before you increase the amount of fluids you drink.   · If your doctor recommends it, get more exercise. Walking is a good choice. Bit by bit, increase the amount you walk every day. Try for at least 30 minutes on most days of the week. · Schedule time each day for a bowel movement. A daily routine may help. Take your time and do not strain when having a bowel movement. When should you call for help? Call your doctor now or seek immediate medical care if:    · Your pain gets worse or is out of control.     · You feel down or blue, or you do not enjoy things like you once did. You may be depressed, which is common in people with chronic pain. Depression can be treated.     · You have vomiting or cramps for more than 2 hours.    Watch closely for changes in your health, and be sure to contact your doctor if:    · You cannot sleep because of pain.     · You are very worried or anxious about your pain.     · You have trouble taking your pain medicine.     · You have any concerns about your pain medicine.     · You have trouble with bowel movements, such as:  ? No bowel movement in 3 days. ? Blood in the anal area, in your stool, or on the toilet paper. ? Diarrhea for more than 24 hours. Where can you learn more? Go to http://justine-serena.info/. Enter N004 in the search box to learn more about \"Chronic Pain: Care Instructions. \"  Current as of: Laurita 3, 2018  Content Version: 11.9  © 6870-2031 [employer]. Care instructions adapted under license by [facility] (which disclaims liability or warranty for this information). If you have questions about a medical condition or this instruction, always ask your healthcare professional. [place] [address] any warranty or liability for your use of this information.          Patient Education        Musculoskeletal Chest Pain: Care Instructions  Your Care Instructions    Chest pain is not always a sign that something is wrong with your heart or that you have another serious problem. The doctor thinks your chest pain is caused by strained muscles or ligaments, inflamed chest cartilage, or another problem in your chest, rather than by your heart. You may need more tests to find the cause of your chest pain. Follow-up care is a key part of your treatment and safety. Be sure to make and go to all appointments, and call your doctor if you are having problems. It's also a good idea to know your test results and keep a list of the medicines you take. How can you care for yourself at home? · Take pain medicines exactly as directed. ? If the doctor gave you a prescription medicine for pain, take it as prescribed. ? If you are not taking a prescription pain medicine, ask your doctor if you can take an over-the-counter medicine. · Rest and protect the sore area. · Stop, change, or take a break from any activity that may be causing your pain or soreness. · Put ice or a cold pack on the sore area for 10 to 20 minutes at a time. Try to do this every 1 to 2 hours for the next 3 days (when you are awake) or until the swelling goes down. Put a thin cloth between the ice and your skin. · After 2 or 3 days, apply a heating pad set on low or a warm cloth to the area that hurts. Some doctors suggest that you go back and forth between hot and cold. · Do not wrap or tape your ribs for support. This may cause you to take smaller breaths, which could increase your risk of lung problems. · Mentholated creams such as Bengay or Icy Hot may soothe sore muscles. Follow the instructions on the package. · Follow your doctor's instructions for exercising. · Gentle stretching and massage may help you get better faster. Stretch slowly to the point just before pain begins, and hold the stretch for at least 15 to 30 seconds. Do this 3 or 4 times a day. Stretch just after you have applied heat. · As your pain gets better, slowly return to your normal activities.  Any increased pain may be a sign that you need to rest a while longer. When should you call for help? Call 911 anytime you think you may need emergency care. For example, call if:    · You have chest pain or pressure. This may occur with:  ? Sweating. ? Shortness of breath. ? Nausea or vomiting. ? Pain that spreads from the chest to the neck, jaw, or one or both shoulders or arms. ? Dizziness or lightheadedness. ? A fast or uneven pulse. After calling 911, chew 1 adult-strength aspirin. Wait for an ambulance. Do not try to drive yourself.     · You have sudden chest pain and shortness of breath, or you cough up blood.    Call your doctor now or seek immediate medical care if:    · You have any trouble breathing.     · Your chest pain gets worse.     · Your chest pain occurs consistently with exercise and is relieved by rest.    Watch closely for changes in your health, and be sure to contact your doctor if:    · Your chest pain does not get better after 1 week. Where can you learn more? Go to http://justine-serena.info/. Enter V293 in the search box to learn more about \"Musculoskeletal Chest Pain: Care Instructions. \"  Current as of: September 23, 2018  Content Version: 11.9  © 1611-3101 [employer]. Care instructions adapted under license by [employer] (which disclaims liability or warranty for this information). If you have questions about a medical condition or this instruction, always ask your healthcare professional. [place] [address] any warranty or liability for your use of this information.

## 2019-05-20 NOTE — ED PROVIDER NOTES
EMERGENCY DEPARTMENT HISTORY AND PHYSICAL EXAM 
 
Date: 5/19/2019 Patient Name: Andrews Muniz History of Presenting Illness Chief Complaint Patient presents with  Chest Pain History Provided By: Patient and Caregiver Additional History (Context):  
8:26 PM 
Andrews Muniz is a 52 y.o. male with PMHx of COPD who presents to the emergency department with complaint of intermittent, right sided chest pain (rated 10/10), onset 1 week ago. Associated sxs include productive cough with thick yellow sputum. He states that he was recently seen and admitted at [facility] for similar pain and was discharged with Flexeril and ibuprofen which provided no relief. Patient has tracheostomy in place secondary to throat CA and gains nutrition mainly from Ensure via PEG tube. He is also on Percocet daily which is managed by his PCP but states that he is out. He has a PCP appointment in 5 days. He endorses tobacco use. Pt denies urinary issues or any other sxs or complaints. PCP: Susi Durham MD 
 
Current Facility-Administered Medications Medication Dose Route Frequency Provider Last Rate Last Dose  sodium chloride 0.9 % bolus infusion 1,000 mL  1,000 mL IntraVENous ONCE Jodi Cummings MD      
 
Current Outpatient Medications Medication Sig Dispense Refill  oxyCODONE-acetaminophen (PERCOCET)  mg per tablet Take 1 Tab by mouth two (2) times a day.  oxyCODONE-acetaminophen (PERCOCET)  mg per tablet Take  by mouth. Indications: reports takes 30mg tablets BID. states out of medication  naloxone (NARCAN) 4 mg/actuation nasal spray Use 1 spray intranasally into 1 nostril. Use a new Narcan nasal spray for subsequent doses and administer into alternating nostrils. May repeat every 2 to 3 minutes as needed. 2 Each 0  
 budesonide (PULMICORT) 0.5 mg/2 mL nbsp 500 mcg by Nebulization route two (2) times a day.  arformoterol (BROVANA) 15 mcg/2 mL nebu neb solution 2 mL by Nebulization route two (2) times a day. 60 Vial 2  
 budesonide (PULMICORT) 0.5 mg/2 mL nbsp 2 mL by Nebulization route two (2) times a day. 60 Each 2  
 ipratropium (ATROVENT) 0.02 % soln 2.5 mL by Nebulization route four (4) times daily. 120 Vial 2  cloNIDine (CATAPRES) 0.2 mg/24 hr patch 1 Patch by TransDERmal route every seven (7) days. 4 Patch 0  
 albuterol-ipratropium (DUO-NEB) 2.5 mg-0.5 mg/3 ml nebu 3 mL by Nebulization route every four (4) hours as needed. (Patient taking differently: 3 mL by Nebulization route every four (4) hours as needed (wheezing). ) 30 Nebule 1  
 multivitamin, tx-iron-ca-min (THERA-M W/ IRON) 9 mg iron-400 mcg tab tablet Take 1 Tab by mouth daily. 30 Tab 1  Nebulizer Accessories kit Use nebs Q4H PRN 1 Kit 0  
 PARoxetine (PAXIL) 10 mg tablet 10 mg by Per G Tube route daily.  predniSONE (DELTASONE) 10 mg tablet Prednisone 10mg tabs: p.o. 
4 tabs daily for 3 days then drop to  
3 tabs daily for 3 days then drop to  
2 tabs daily for 3 days then drop to  
1 tab daily for 3 days then stop. Dispense 30 tabs 30 Tab 0  
 fentaNYL (DURAGESIC) 100 mcg/hr PATCH 1 Patch by TransDERmal route every seventy-two (72) hours. Max Daily Amount: 1 Patch. 5 Patch 0  
 guaiFENesin (ROBITUSSIN) 100 mg/5 mL liquid Take 5 mL by mouth every four (4) hours as needed for Cough. 200 mL 0  
 lisinopril (PRINIVIL, ZESTRIL) 5 mg tablet Take 1 Tab by mouth every twelve (12) hours. 30 Tab 0  
 magic mouthwash (FIRST-MOUTHWASH BLM) 673--40 mg/30 mL mwsh oral suspension Take 10 mL by mouth every four (4) hours as needed. Past History Past Medical History: 
Past Medical History:  
Diagnosis Date  Chronic obstructive pulmonary disease (HCC)   
 acute hypoxic respiratory failure 8/16  Hypertension  Ill-defined condition   
 peg tube in place  Ill-defined condition   
 chemo  S/P radiation therapy  Throat cancer ([facility] [address]  Tobacco abuse Past Surgical History: 
Past Surgical History:  
Procedure Laterality Date  HX GI    
 peg tub  HX HEENT    
 sinus surgery  HX TRACHEOSTOMY Family History: 
History reviewed. No pertinent family history. Social History: 
Social History Tobacco Use  Smoking status: Former Smoker Packs/day: 0.50  Smokeless tobacco: Never Used Substance Use Topics  Alcohol use: No  
 Drug use: No  
 
 
Allergies: 
No Known Allergies Review of Systems Review of Systems Constitutional: Negative for chills, diaphoresis, fever and unexpected weight change. HENT: Negative for congestion, drooling, ear pain, rhinorrhea, sore throat, tinnitus and trouble swallowing. Eyes: Negative for photophobia, pain, redness and visual disturbance. Respiratory: Positive for cough. Negative for choking, chest tightness, shortness of breath, wheezing and stridor. Cardiovascular: Positive for chest pain. Negative for palpitations and leg swelling. Gastrointestinal: Negative for abdominal distention, abdominal pain, anal bleeding, blood in stool, constipation, diarrhea, nausea and vomiting. Genitourinary: Negative for difficulty urinating, dysuria, flank pain, frequency, hematuria and urgency. Musculoskeletal: Negative for arthralgias, back pain and neck pain. Skin: Negative for color change, rash and wound. Neurological: Negative for dizziness, seizures, syncope, speech difficulty, light-headedness and headaches. Hematological: Does not bruise/bleed easily. Psychiatric/Behavioral: Negative for agitation, behavioral problems, hallucinations, self-injury and suicidal ideas. The patient is not hyperactive. Physical Exam  
 
Vitals:  
 05/19/19 2005 05/19/19 2015 05/19/19 2018 BP:  100/78 Pulse: (!) 130 (!) 130 Resp: 21 23 Temp: 97.5 °F (36.4 °C) SpO2: 96% 93% 94% Height: 5' 7\" (1.702 m) Physical Exam  
 Constitutional: He is oriented to person, place, and time. He appears well-developed and well-nourished. Anxious, uncomfortable appearing, nontoxic. HENT:  
Head: Normocephalic and atraumatic. Right Ear: External ear normal.  
Left Ear: External ear normal.  
Mouth/Throat: Oropharynx is clear and moist. Mucous membranes are dry. Abnormal dentition. No oropharyngeal exudate. Dry mucous membranes with poor dentition. Eyes: Pupils are equal, round, and reactive to light. Conjunctivae and EOM are normal. No scleral icterus. No pallor Neck: Normal range of motion. Neck supple. No JVD present. No tracheal deviation present. No thyromegaly present. Trach with a normal stoma to anterior neck. Cardiovascular: Regular rhythm and normal heart sounds. Tachycardia present. Pulmonary/Chest: Effort normal and breath sounds normal. No stridor. No respiratory distress. Abdominal: Soft. Bowel sounds are normal. He exhibits no distension. There is no tenderness. There is no rebound and no guarding. Abdomen with PEG tube, no erythema no drainage. Musculoskeletal: Normal range of motion. He exhibits no edema or tenderness. No soft tissue injuries Lymphadenopathy:  
  He has no cervical adenopathy. Neurological: He is alert and oriented to person, place, and time. He has normal reflexes. No cranial nerve deficit. Coordination normal.  
Skin: Skin is warm and dry. No rash noted. He is not diaphoretic. No erythema. Psychiatric: He has a normal mood and affect. His behavior is normal. Judgment and thought content normal.  
Nursing note and vitals reviewed. Diagnostic Study Results Labs - Recent Results (from the past 12 hour(s)) CBC WITH AUTOMATED DIFF Collection Time: 05/19/19  8:03 PM  
Result Value Ref Range WBC 8.9 4.6 - 13.2 K/uL  
 RBC 4.60 (L) 4.70 - 5.50 M/uL  
 HGB 15.4 13.0 - 16.0 g/dL HCT 44.2 36.0 - 48.0 %  MCV 96.1 74.0 - 97.0 FL  
 MCH 33.5 24.0 - 34.0 PG  
 MCHC 34.8 31.0 - 37.0 g/dL  
 RDW 17.2 (H) 11.6 - 14.5 % PLATELET 284 571 - 867 K/uL MPV 9.2 9.2 - 11.8 FL  
 NEUTROPHILS 82 (H) 40 - 73 % LYMPHOCYTES 10 (L) 21 - 52 % MONOCYTES 8 3 - 10 % EOSINOPHILS 0 0 - 5 % BASOPHILS 0 0 - 2 %  
 ABS. NEUTROPHILS 7.2 1.8 - 8.0 K/UL  
 ABS. LYMPHOCYTES 0.9 0.9 - 3.6 K/UL  
 ABS. MONOCYTES 0.7 0.05 - 1.2 K/UL  
 ABS. EOSINOPHILS 0.0 0.0 - 0.4 K/UL  
 ABS. BASOPHILS 0.0 0.0 - 0.1 K/UL  
 DF AUTOMATED    
EKG, 12 LEAD, INITIAL Collection Time: 05/19/19  8:06 PM  
Result Value Ref Range Ventricular Rate 129 BPM  
 Atrial Rate 129 BPM  
 P-R Interval 134 ms QRS Duration 72 ms Q-T Interval 304 ms QTC Calculation (Bezet) 445 ms Calculated P Axis 76 degrees Calculated R Axis 70 degrees Calculated T Axis 67 degrees Diagnosis Sinus tachycardia Otherwise normal ECG Confirmed by Sage Hayward MD, [address] [place] [address] ([id number]) on 5/19/2019 0:21:93 PM 
  
METABOLIC PANEL, COMPREHENSIVE Collection Time: 05/19/19  8:30 PM  
Result Value Ref Range Sodium 140 136 - 145 mmol/L Potassium 4.3 3.5 - 5.5 mmol/L Chloride 105 100 - 108 mmol/L  
 CO2 22 21 - 32 mmol/L Anion gap 13 3.0 - 18 mmol/L Glucose 98 74 - 99 mg/dL BUN 2 (L) 7.0 - 18 MG/DL Creatinine 0.47 (L) 0.6 - 1.3 MG/DL  
 BUN/Creatinine ratio 4 (L) 12 - 20 GFR est AA >60 >60 ml/min/1.73m2 GFR est non-AA >60 >60 ml/min/1.73m2 Calcium 8.3 (L) 8.5 - 10.1 MG/DL Bilirubin, total 0.1 (L) 0.2 - 1.0 MG/DL  
 ALT (SGPT) 96 (H) 16 - 61 U/L  
 AST (SGOT) 81 (H) 15 - 37 U/L Alk. phosphatase 160 (H) 45 - 117 U/L Protein, total 6.2 (L) 6.4 - 8.2 g/dL Albumin 2.4 (L) 3.4 - 5.0 g/dL Globulin 3.8 2.0 - 4.0 g/dL A-G Ratio 0.6 (L) 0.8 - 1.7 CARDIAC PANEL,(CK, CKMB & TROPONIN) Collection Time: 05/19/19  8:30 PM  
Result Value Ref Range CK 34 (L) 39 - 308 U/L  
 CK - MB 2.4 <3.6 ng/ml CK-MB Index 7.1 (H) 0.0 - 4.0 %  Troponin-I, QT <0.02 0.0 - 0.045 NG/ML  
 
 
 Radiologic Studies -   
XR CHEST PORT Final Result IMPRESSION:  No acute process Medical Decision Making I am the first provider for this patient. I reviewed the vital signs, available nursing notes, past medical history, past surgical history, family history and social history. Vital Signs-Reviewed the patient's vital signs. Pulse Oximetry Analysis - 94% on room air Cardiac Monitor: 
Rate: 130 bpm 
Rhythm: Sinus tachycardia EKG interpretation: (EMS) 
7:42 PM  
Sinus tachycardia at 133 bpm. QRS at 70 ms. EKG read by [employer]. Joshua William MD 
 
EKG interpretation: (Preliminary) 8:06 PM 
Sinus tachycardia at 129 bpm. No pre--excitation. Normal ST segments. EKG read by [employer]. Joshua William MD  
 
Records Reviewed: Nursing Notes and Old Medical Records Provider Notes (Medical Decision Making):  
Ddx: PE possible but unlikely, pain withdrawal followed by PNA, ACS, TAA unlikely, certainly concern for PTX. Procedures: 
Procedures MEDICATIONS GIVEN: 
Medications  
sodium chloride 0.9 % bolus infusion 1,000 mL (has no administration in time range) ED Course:  
8:26 PM  
Initial assessment performed. The patients presenting problems have been discussed, and they are in agreement with the care plan formulated and outlined with them. I have encouraged them to ask questions as they arise throughout their visit. Diagnosis and Disposition DISCHARGE NOTE: 
9:15 PM 
Patient became a bit upset that we would not give, nor prescribe rescue narcotics for his long term chronic pain condition. Stated that he no longer wanted to stay and wished to be released. MD Shobha Hanna's  results have been reviewed with him. He has been counseled regarding his diagnosis, treatment, and plan. He verbally conveys understanding and agreement of the signs, symptoms, diagnosis, treatment and prognosis and additionally agrees to follow up as discussed. He also agrees with the care-plan and conveys that all of his questions have been answered. I have also provided discharge instructions for him that include: educational information regarding their diagnosis and treatment, and list of reasons why they would want to return to the ED prior to their follow-up appointment, should his condition change. He has been provided with education for proper emergency department utilization. CLINICAL IMPRESSION: 
 
1. Chest pain, unspecified type 2. Other chronic pain 3. Laryngeal carcinoma ([facility] [address] PLAN: 
1. D/C Home 2. Discharge Medication List as of 5/19/2019  9:18 PM  
  
CONTINUE these medications which have NOT CHANGED Details  
!! oxyCODONE-acetaminophen (PERCOCET)  mg per tablet Take 1 Tab by mouth two (2) times a day., Historical Med  
  
!! oxyCODONE-acetaminophen (PERCOCET)  mg per tablet Take  by mouth. Indications: reports takes 30mg tablets BID. states out of medication, Historical Med  
  
naloxone (NARCAN) 4 mg/actuation nasal spray Use 1 spray intranasally into 1 nostril. Use a new Narcan nasal spray for subsequent doses and administer into alternating nostrils. May repeat every 2 to 3 minutes as needed. , Print, Disp-2 Each, R-0  
  
!! budesonide (PULMICORT) 0.5 mg/2 mL nbsp 500 mcg by Nebulization route two (2) times a day., Historical Med  
  
arformoterol (BROVANA) 15 mcg/2 mL nebu neb solution 2 mL by Nebulization route two (2) times a day., Normal, Disp-60 Vial, R-2  
  
!! budesonide (PULMICORT) 0.5 mg/2 mL nbsp 2 mL by Nebulization route two (2) times a day., Normal, Disp-60 Each, R-2  
  
ipratropium (ATROVENT) 0.02 % soln 2.5 mL by Nebulization route four (4) times daily. , Normal, Disp-120 Vial, R-2  
  
cloNIDine (CATAPRES) 0.2 mg/24 hr patch 1 Patch by TransDERmal route every seven (7) days. , Print, Disp-4 Patch, R-0  
  
albuterol-ipratropium (DUO-NEB) 2.5 mg-0.5 mg/3 ml nebu 3 mL by Nebulization route every four (4) hours as needed. , Print, Disp-30 Nebule, R-1  
  
multivitamin, tx-iron-ca-min (THERA-M W/ IRON) 9 mg iron-400 mcg tab tablet Take 1 Tab by mouth daily. , Print, Disp-30 Tab, R-1 Nebulizer Accessories kit Use nebs Q4H PRN, Print, Disp-1 Kit, R-0 PARoxetine (PAXIL) 10 mg tablet 10 mg by Per G Tube route daily. , Historical Med  
  
predniSONE (DELTASONE) 10 mg tablet Prednisone 10mg tabs: p.o. 
4 tabs daily for 3 days then drop to  
3 tabs daily for 3 days then drop to  
2 tabs daily for 3 days then drop to  
1 tab daily for 3 days then stop. Dispense 30 tabs, Print, Disp-30 Tab, R-0  
  
fentaNYL (DURAGESIC) 100 mcg/hr PATCH 1 Patch by TransDERmal route every seventy-two (72) hours. Max Daily Amount: 1 Patch., Print, Disp-5 Patch, R-0  
  
guaiFENesin (ROBITUSSIN) 100 mg/5 mL liquid Take 5 mL by mouth every four (4) hours as needed for Cough. , Print, Disp-200 mL, R-0  
  
lisinopril (PRINIVIL, ZESTRIL) 5 mg tablet Take 1 Tab by mouth every twelve (12) hours. , Print, Disp-30 Tab, R-0  
  
magic mouthwash (FIRST-MOUTHWASH BLM) 115--40 mg/30 mL mwsh oral suspension Take 10 mL by mouth every four (4) hours as needed., Historical Med  
  
 !! - Potential duplicate medications found. Please discuss with provider. 3.  
Follow-up Information Follow up With Specialties Details Why Contact Info Jewel Alston MD Family Practice In 1 week  [address] [facility] [address] [phone] [facility] EMERGENCY DEPT Emergency Medicine  As needed, If symptoms worsen [address] [phone] 
[phone]  
  
 
 
_______________________________ This note is prepared by Dianna Cazares, acting as Scribe for [employer]. Sheila Hyde MD. [employer]. Sheila Hyde MD:  The scribe's documentation has been prepared under my direction and personally reviewed by me in its entirety.   I confirm that the note above accurately reflects all work, treatment, procedures, and medical decision making performed by me.

## 2019-05-20 NOTE — ED TRIAGE NOTES
Presented to ED via EMS to be evaluated for reported right sided chest pain with onset x 1 week. Patient reports pain as documented. Denies any known injury. Patient states seen for same complaint and was dx with \"muscle strain\". Patient reports out of pain medication(Percocet 30mg) Patient denies any additional complaints at this time. Sepsis Screening completed (  )Patient meets SIRS criteria. ( x )Patient does not meet SIRS criteria. SIRS Criteria is achieved when two or more of the following are present ? Temperature < 96.8°F (36°C) or > 100.9°F (38.3°C) ? Heart Rate > 90 beats per minute ? Respiratory Rate > 20 breaths per minute ? WBC count > 12,000 or <4,000 or > 10% bands

## 2019-05-20 NOTE — ED NOTES
Patient wishes to be discharged at this point. This nurse asked patient if he would like to speak to the provider and he declined. Patient was asked why he was leaving and he replied \"we are not doing anything for him, I need pain medication, take out my IV\", IV removed and patient ambulated out of department without any difficulty. Dr. Fuentes Goldstein aware.

## 2019-07-13 PROBLEM — A41.9 SEPSIS (HCC): Status: ACTIVE | Noted: 2019-01-01

## 2019-07-13 PROBLEM — K92.0 HEMATEMESIS: Status: ACTIVE | Noted: 2019-01-01

## 2019-07-14 PROBLEM — I95.9 HYPOTENSION: Status: ACTIVE | Noted: 2019-01-01

## 2019-07-14 PROBLEM — E87.5 HYPERKALEMIA: Status: ACTIVE | Noted: 2019-01-01

## 2019-07-14 NOTE — PROGRESS NOTES
Antimicrobial Stewardship Team Note Broad Spectrum Antimicrobial Recommendation Patient: Kendell St MRN#: [id number] Attending: No name on file. Admission Date: 756646 Current Antimicrobial Medications Current Antimicrobial Therapy (168h ago, onward) Ordered     Start Stop  
 07/13/19 2223  vancomycin (VANCOCIN) 1,000 mg in 0.9% sodium chloride 250 mL IVPB  1,000 mg,   IntraVENous,   EVERY 8 HOURS    
 07/14/19 0700 --  
 07/13/19 2221  vancomycin (VANCOCIN) 1500 mg in  ml infusion  1,500 mg,   IntraVENous,   ONCE    
 07/13/19 2222 07/14/19 1021  
 07/13/19 2210  clindamycin (CLEOCIN) 600mg NS 50 mL IVPB (premix)  600 mg,   IntraVENous,   EVERY 8 HOURS    
 07/13/19 2211 --  
 07/13/19 2210  piperacillin-tazobactam (ZOSYN) 4.5 g in 0.9% sodium chloride (MBP/ADV) 100 mL MBP  4.5 g,   IntraVENous,   EVERY 6 HOURS    
 07/13/19 2211 --  
 07/13/19 2210  levoFLOXacin (LEVAQUIN) 750 mg in D5W IVPB  750 mg,   IntraVENous,   EVERY 24 HOURS    
 07/13/19 2211 --  
 
  
 
 
 
Current Indication/Therapy Vancomycin LD = 1500 mg X1   MD =Vanc 1 gm IV q 8 hr 
 
Assessment/Recommendation Estimated Pharmacokinetic Parameters (based on population kinetics) Vd: 42 L (0.7 L/kg) Yusuf: 0.104 hr-1 (T1/2 = 6.7 hrs) Dosing Recommendations Vancomycin dose: 1000 mg IV Q8hrs (infused over 1 hr) Estimated peak: 40 mcg/mL Estimated trough: 19.3 mcg/mL Estimated AUC:TAYLA: 686 mcg*hr/mL (assumed TAYLA 1 mcg/mL) A/P:  
1. Recommend vancomycin 1000 mg IV Q8hrs (17 mg/kg) 2. Consider a vancomycin trough level prior to the 4th dose. 3. Please monitor renal function (urine output, BUN/SCr). Dose adjustments may be necessary with a significant change in renal function. 4. Vancomycin loading dose of 1500 mg to facilitate rapid attainment of target trough serum vancomycin levels.  
Submitted by: Kush Boss, [facility]

## 2019-07-14 NOTE — PROGRESS NOTES
0725 - Bedside and Verbal shift change report received from Indio Toscano. Report included the following information SBAR, Kardex, Procedure Summary, Intake/Output, Recent Results and Cardiac Rhythm ST/SR. 
 
0800 Assessment complete 1100 Unable to facilitate CT due to staff limitations. 36  Called spouse at [phone] to obtain blood consent, left message awaiting call back from second message.

## 2019-07-14 NOTE — PROGRESS NOTES
Zosyn (Piperacillin/Tazobactam) Extended Infusion uCauhtemoc Giraldo, a 52 y.o. yo male, has been converted to an extended infusion of Zosyn while in the intensive care unit. Zosyn 4.5 gm IV q6h was changed to Extended Infusion Zosyn 4.5 gm IV q8h Extended infusions will run over 4 hours (240 minutes). Recent Labs  
  07/14/19 
0230 07/13/19 
2200 CREA 1.15 1.64* Ht Readings from Last 1 Encounters:  
07/13/19 182.9 cm (72\") Wt Readings from Last 1 Encounters:  
07/13/19 59.9 kg (132 lb 1 oz) CrCl : Serum creatinine: 1.15 mg/dL 07/14/19 0230 Estimated creatinine clearance: 61.5 mL/min Renal adjustment of extended infusion of Zosyn 3.375 or 4.5 gm every 8 hours for CrCl >/= 20 ml/min 3.375 or 4.5 gm every 12 hours for CrCl < 20 ml/min, intermittent HD or PD

## 2019-07-14 NOTE — CONSULTS
[facility] Pulmonary, Critical Care, and Sleep Medicine Name: Rah Allen MRN: [id number] : 1965 Hospital: [facility] [place] Date: 2019 Pulmonary Critical Care Consult IMPRESSION:  
Patient Active Problem List  
Diagnosis Code  Acute hypoxemic respiratory failure (HCC) J96.01  
 COPD exacerbation ([facility]) J44.1  Aspiration pneumonia ([address] J69.0  Throat cancer ([address] C14.0  Tracheostomy dependence ([address] Z93.0  Hematemesis K92.0  Status post insertion of percutaneous endoscopic gastrostomy (PEG) tube ([facility] [facility] Z93.1  Hyponatremia E87.1  Severe protein-calorie malnutrition Di Ivanoff: less than 60% of standard weight) ([facility]) E43  
 Hyperkalemia E87.5  Elevated lactate R79.89  Abnormal troponin R74.8  Cocaine abuse  ETOH abuse  · RECOMMENDATIONS:  
Continue HF via trach for now- adjust settings per ABG or for goal SPO2> 90% Aspiration prevention bundle, head of the bed at 30' all times Avoid sedation; monitor for any withdrawal symptoms Continue bronchodilators PRN, pulmonary hygiene care Steroids - taper clinical response Sepsis bundle per hospital protocol. Cultures drawn and will be followed. Sputum culture per trach suction Lactic acid initial elevated and repeat till normalized. Antibiotic choice: Zosyn, Levofloxacin, Clindamycin, Vancomycin. Will stop Clindamycin for now given Zosyn would provide similar coverage Fluids: NS Monitor hemodynamics Actively titrate vasopressors aim MAP >65mmHg Check cardiac panel, ECHO, EKG- spoke with RN 
Monitor Hgb Q6 hrs. PPI Q12 Await input from GI Glycemic control AM labs Diet NPO pending swallow Palliative care consult [place] Bundle Followed Will defer respective systems problem management to primary and other consultant and follow patient in ICU with primary and other medical team 
Further recommendations will be based on the patient's response to recommended treatment and results of the investigation ordered. Quality Care: PPI, DVT prophylaxis, HOB elevated, Infection control all reviewed and addressed. PAIN AND SEDATION: none · Skin/Wound: none · Nutrition: NPO 
· Prophylaxis: DVT and GI Prophylaxis reviewed. SCD, PPI · Restraints: none 
· PT/OT eval and treat: as needed when stable · Lines/Tubes: lines - R femoral CVC 7/13/19; diop 7/13/19 ADVANCE DIRECTIVE: Full code. Palliative care consulted DISCUSSION: spoke with patient, RN, RT, staff Events and notes from last 24 hours reviewed. Care plan discussed with nursing Subjective/History: Mr. Rosamaria Boswell has been seen and evaluated as Dr. Esperanza Alexander requested now for assisting with ICU care. The patient can not provide additional history due to tracheostomy. Patient is a 52 y.o. 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 In ER noted to have elevated lactate, hypotension elevated troponin, requiring IV fluids and then Levophed. UDS today positive for cociane and opiate and ETOH level positive. Per chart patient has history of polysubstance abuse, regularly uses alcohol through his peg tube; was going to be dismissed from hospice due to indiscrepencies with opiate usage namely fentanyl and Roxanol. In ER, patient was hypoxic and felt to have COPD exacerbation. He is started on HF O2 via tracheostomy, steroids and bronchodilator. Patient treated for LT sided lung empyema with decortication by CT surgeon at [facility] per chart. Other information is limited. Review of Systems: 
Review of systems not obtained due to patient factors. [x]The patient is critically ill on     
[]Mechanical ventilation [x]Pressors []BiPAP [] Latest lactic acid:  
Lactic acid Date Value Ref Range Status 07/14/2019 2.9 (HH) 0.4 - 2.0 MMOL/L Final  
  Comment:  
  CALLED TO AND CORRECTLY REPEATED BY: 
Arely Gong RN, ICU ON 07/14/2019 AT 0344 TO JDA 
  
01/04/2018 1.6 0.4 - 2.0 MMOL/L Final  
11/15/2017 1.8 0.4 - 2.0 MMOL/L Final  
 
 
Past Medical History: 
Past Medical History:  
Diagnosis Date  Chronic obstructive pulmonary disease (HCC)   
 acute hypoxic respiratory failure 8/16  Hematemesis 7/13/2019  Hypertension  Ill-defined condition   
 peg tube in place  Ill-defined condition   
 chemo  S/P radiation therapy  Throat cancer ([facility] [facility]  Tobacco abuse Past Surgical History: 
Past Surgical History:  
Procedure Laterality Date  HX GI    
 peg tub  HX HEENT    
 sinus surgery  HX TRACHEOSTOMY Medications: 
Prior to Admission medications Medication Sig Start Date End Date Taking? Authorizing Provider  
oxyCODONE-acetaminophen (PERCOCET)  mg per tablet Take 1 Tab by mouth two (2) times a day. Hugh aGrrido MD  
oxyCODONE-acetaminophen (PERCOCET)  mg per tablet Take  by mouth. Indications: reports takes 30mg tablets BID. states out of medication    Hugh Garrido MD  
naloxone (NARCAN) 4 mg/actuation nasal spray Use 1 spray intranasally into 1 nostril. Use a new Narcan nasal spray for subsequent doses and administer into alternating nostrils. May repeat every 2 to 3 minutes as needed. 6/25/18   Ramila Armas MD  
PARoxetine (PAXIL) 10 mg tablet 10 mg by Per G Tube route daily. Provider, Historical  
budesonide (PULMICORT) 0.5 mg/2 mL nbsp 500 mcg by Nebulization route two (2) times a day. Provider, Historical  
arformoterol (BROVANA) 15 mcg/2 mL nebu neb solution 2 mL by Nebulization route two (2) times a day. 1/9/18   Audrey Cuenca MD  
budesonide (PULMICORT) 0.5 mg/2 mL nbsp 2 mL by Nebulization route two (2) times a day.  1/9/18   Audrey Cuenca MD  
 ipratropium (ATROVENT) 0.02 % soln 2.5 mL by Nebulization route four (4) times daily. 1/9/18   Dago Caldera MD  
predniSONE (DELTASONE) 10 mg tablet Prednisone 10mg tabs: p.o. 
4 tabs daily for 3 days then drop to  
3 tabs daily for 3 days then drop to  
2 tabs daily for 3 days then drop to  
1 tab daily for 3 days then stop. Dispense 30 tabs 1/9/18   Dago Caldera MD  
cloNIDine (CATAPRES) 0.2 mg/24 hr patch 1 Patch by TransDERmal route every seven (7) days. 8/29/17   Tolu Mitchell MD  
fentaNYL (DURAGESIC) 100 mcg/hr PATCH 1 Patch by TransDERmal route every seventy-two (72) hours. Max Daily Amount: 1 Patch. 8/29/17   Tolu Mitchell MD  
guaiFENesin (ROBITUSSIN) 100 mg/5 mL liquid Take 5 mL by mouth every four (4) hours as needed for Cough. 8/29/17   Tolu Mitchell MD  
lisinopril (PRINIVIL, ZESTRIL) 5 mg tablet Take 1 Tab by mouth every twelve (12) hours. 8/29/17   Tolu Mitchell MD  
magic mouthwash (FIRST-MOUTHWASH BLM) 024--40 mg/30 mL mwsh oral suspension Take 10 mL by mouth every four (4) hours as needed. Provider, Historical  
albuterol-ipratropium (DUO-NEB) 2.5 mg-0.5 mg/3 ml nebu 3 mL by Nebulization route every four (4) hours as needed. Patient taking differently: 3 mL by Nebulization route every four (4) hours as needed (wheezing). 8/1/17   Flex Falcon MD  
multivitamin, tx-iron-ca-min (THERA-M W/ IRON) 9 mg iron-400 mcg tab tablet Take 1 Tab by mouth daily. 8/1/17   Flex Falcon MD  
Nebulizer Accessories kit Use nebs Q4H PRN 8/1/17   Flex Falcon MD  
 
 
Current Facility-Administered Medications Medication Dose Route Frequency  pantoprazole (PROTONIX) injection 40 mg  40 mg IntraVENous Q12H  
 sodium chloride (NS) flush 5-40 mL  5-40 mL IntraVENous Q8H  
 albuterol-ipratropium (DUO-NEB) 2.5 MG-0.5 MG/3 ML  3 mL Nebulization Q4H RT  
 0.9% sodium chloride infusion  150 mL/hr IntraVENous CONTINUOUS  
  methylPREDNISolone (PF) (SOLU-MEDROL) injection 40 mg  40 mg IntraVENous Q6H  
 sodium chloride (NS) flush 5-40 mL  5-40 mL IntraVENous Q8H  
 insulin regular (NOVOLIN R, HUMULIN R) injection 10 Units  10 Units IntraVENous ONCE  hydrocortisone Sod Succ (PF) (SOLU-CORTEF) injection 100 mg  100 mg IntraVENous ONCE  
 insulin lispro (HUMALOG) injection   SubCUTAneous Q4H  
 PHENYLephrine (KATHERIN-SYNEPHRINE) 30 mg in 0.9% sodium chloride 250 mL infusion   mcg/min IntraVENous TITRATE  albumin human 25% (BUMINATE) solution 12.5 g  12.5 g IntraVENous Q6H  
 clindamycin (CLEOCIN) 600mg NS 50 mL IVPB (premix)  600 mg IntraVENous Q8H  piperacillin-tazobactam (ZOSYN) 4.5 g in 0.9% sodium chloride (MBP/ADV) 100 mL MBP  4.5 g IntraVENous Q6H  
 levoFLOXacin (LEVAQUIN) 750 mg in D5W IVPB  750 mg IntraVENous Q24H  
 vancomycin (VANCOCIN) 1,000 mg in 0.9% sodium chloride 250 mL IVPB  1,000 mg IntraVENous Q8H  Vancomycin pharmacy to dose  1 Each Other Rx Dosing/Monitoring  NOREPINephrine (LEVOPHED) 8 mg in dextrose 5% 250 mL infusion  2-16 mcg/min IntraVENous TITRATE Allergy: No Known Allergies Social History: 
Social History Tobacco Use  Smoking status: Former Smoker Packs/day: 0.50  Smokeless tobacco: Never Used Substance Use Topics  Alcohol use: No  
 Drug use: No  
  
 
Family History: 
History reviewed. No pertinent family history. Objective:  
Vital Signs:   
Blood pressure 112/73, pulse (!) 104, temperature 98.8 °F (37.1 °C), resp. rate 11, height 6' (1.829 m), weight 59.9 kg (132 lb 1 oz), SpO2 (!) 9 %. Body mass index is 17.91 kg/m². O2 Device: Tracheal collar O2 Flow Rate (L/min): 20 l/min Temp (24hrs), Av.2 °F (36.8 °C), Min:97.7 °F (36.5 °C), Max:98.8 °F (37.1 °C) Intake/Output:  
Last shift:      No intake/output data recorded. Last 3 shifts: 1901 -  0700 In: 100 [I.V.:100] Out: 850 [Urine:850] Intake/Output Summary (Last 24 hours) at 7/14/2019 5712 Last data filed at 7/14/2019 7713 Gross per 24 hour Intake 100 ml Output 850 ml Net -750 ml Physical Exam: 
General: easy yo arouse, follows some simple commands, non-verbal, in no respiratory distress and acyanotic, cooperative, appears older than stated age HEENT: PERRL, oral mucosa dry Neck: No abnormally enlarged lymph nodes or thyroid, supple; tracheostomy tube in place Chest: normal 
Lungs: moderate air entry, few rhonchi scattered bilaterally, breathing normal , normal percussion bilaterally, no tenderness/ rash Heart: Regular rate and rhythm, S1S2 present or without murmur or extra heart sounds Abdomen: non distended, bowel sounds normoactive, tympanic, abdomen is soft without significant tenderness, masses, organomegaly or guarding, rigidity, rebound Extremity: negative for edema, cyanosis, clubbing Capillary refill: normal 
Neuro: weak looking but easy to arouse, moves all extremities well, no involuntary movements, exam limitations Skin: Skin color, texture, turgor fair. Skin dry, warm, diaphoretic Data:  
 
Recent Results (from the past 24 hour(s)) CULTURE, BLOOD Collection Time: 07/13/19 10:00 PM  
Result Value Ref Range Special Requests: NO SPECIAL REQUESTS Culture result: NO GROWTH AFTER 8 HOURS METABOLIC PANEL, COMPREHENSIVE Collection Time: 07/13/19 10:00 PM  
Result Value Ref Range Sodium 134 (L) 136 - 145 mmol/L Potassium 5.9 (H) 3.5 - 5.5 mmol/L Chloride 95 (L) 100 - 108 mmol/L  
 CO2 27 21 - 32 mmol/L Anion gap 12 3.0 - 18 mmol/L Glucose 112 (H) 74 - 99 mg/dL BUN 78 (H) 7.0 - 18 MG/DL Creatinine 1.64 (H) 0.6 - 1.3 MG/DL  
 BUN/Creatinine ratio 48 (H) 12 - 20 GFR est AA 53 (L) >60 ml/min/1.73m2 GFR est non-AA 44 (L) >60 ml/min/1.73m2 Calcium 8.4 (L) 8.5 - 10.1 MG/DL  Bilirubin, total 0.5 0.2 - 1.0 MG/DL  
 ALT (SGPT) 36 16 - 61 U/L  
 AST (SGOT) 40 (H) 15 - 37 U/L Alk. phosphatase 152 (H) 45 - 117 U/L Protein, total 5.3 (L) 6.4 - 8.2 g/dL Albumin 2.1 (L) 3.4 - 5.0 g/dL Globulin 3.2 2.0 - 4.0 g/dL A-G Ratio 0.7 (L) 0.8 - 1.7    
CBC WITH AUTOMATED DIFF Collection Time: 07/13/19 10:00 PM  
Result Value Ref Range WBC 12.8 4.6 - 13.2 K/uL  
 RBC 3.25 (L) 4.70 - 5.50 M/uL  
 HGB 11.0 (L) 13.0 - 16.0 g/dL HCT 33.1 (L) 36.0 - 48.0 % .8 (H) 74.0 - 97.0 FL  
 MCH 33.8 24.0 - 34.0 PG  
 MCHC 33.2 31.0 - 37.0 g/dL  
 RDW 15.6 (H) 11.6 - 14.5 % PLATELET 310 357 - 945 K/uL MPV 10.0 9.2 - 11.8 FL  
 NEUTROPHILS 86 (H) 40 - 73 % LYMPHOCYTES 5 (L) 21 - 52 % MONOCYTES 9 3 - 10 % EOSINOPHILS 0 0 - 5 % BASOPHILS 0 0 - 2 %  
 ABS. NEUTROPHILS 11.0 (H) 1.8 - 8.0 K/UL  
 ABS. LYMPHOCYTES 0.6 (L) 0.9 - 3.6 K/UL  
 ABS. MONOCYTES 1.1 0.05 - 1.2 K/UL  
 ABS. EOSINOPHILS 0.0 0.0 - 0.4 K/UL  
 ABS. BASOPHILS 0.0 0.0 - 0.1 K/UL  
 DF AUTOMATED PROTHROMBIN TIME + INR Collection Time: 07/13/19 10:00 PM  
Result Value Ref Range Prothrombin time 12.4 11.5 - 15.2 sec INR 1.0 0.8 - 1.2 MAGNESIUM Collection Time: 07/13/19 10:00 PM  
Result Value Ref Range Magnesium 1.8 1.6 - 2.6 mg/dL SALICYLATE Collection Time: 07/13/19 10:00 PM  
Result Value Ref Range Salicylate level <7.1 (L) 2.8 - 20.0 MG/DL  
ACETAMINOPHEN Collection Time: 07/13/19 10:00 PM  
Result Value Ref Range Acetaminophen level <2 (L) 10.0 - 30.0 ug/mL ETHYL ALCOHOL Collection Time: 07/13/19 10:00 PM  
Result Value Ref Range ALCOHOL(ETHYL),SERUM 5 (H) 0 - 3 MG/DL  
CARDIAC PANEL,(CK, CKMB & TROPONIN) Collection Time: 07/13/19 10:00 PM  
Result Value Ref Range CK 52 39 - 308 U/L  
 CK - MB 2.3 <3.6 ng/ml CK-MB Index 4.4 (H) 0.0 - 4.0 % Troponin-I, QT 0.55 (H) 0.0 - 0.045 NG/ML  
TYPE & SCREEN Collection Time: 07/13/19 10:00 PM  
Result Value Ref Range Crossmatch Expiration 07/16/2019 ABO/Rh(D) O POSITIVE Antibody screen NEG   
LIPASE Collection Time: 07/13/19 10:00 PM  
Result Value Ref Range Lipase 34 (L) 73 - 393 U/L  
POC LACTIC ACID Collection Time: 07/13/19 10:01 PM  
Result Value Ref Range Lactic Acid (POC) 3.53 (HH) 0.40 - 2.00 mmol/L  
CULTURE, BLOOD Collection Time: 07/13/19 10:15 PM  
Result Value Ref Range Special Requests: NO SPECIAL REQUESTS Culture result: NO GROWTH AFTER 8 HOURS    
POC CHEM8 Collection Time: 07/13/19 10:21 PM  
Result Value Ref Range CO2, POC 24 19 - 24 MMOL/L Glucose, POC 89 74 - 106 MG/DL  
 BUN, POC 63 (H) 7 - 18 MG/DL Creatinine, POC 1.1 0.6 - 1.3 MG/DL  
 GFRAA, POC >60 >60 ml/min/1.73m2 GFRNA, POC >60 >60 ml/min/1.73m2 Sodium,  (L) 136 - 145 MMOL/L Potassium, POC 4.9 3.5 - 5.5 MMOL/L Calcium, ionized (POC) 1.03 (L) 1.12 - 1.32 mmol/L Chloride,  100 - 108 MMOL/L Anion gap, POC 17 10 - 20 Hematocrit, POC 23 (L) 36 - 49 % Hemoglobin, POC 7.8 (L) 12 - 16 G/DL URINALYSIS W/ RFLX MICROSCOPIC Collection Time: 07/13/19 10:45 PM  
Result Value Ref Range Color YELLOW Appearance CLOUDY Specific gravity 1.018 1.005 - 1.030    
 pH (UA) 5.0 5.0 - 8.0 Protein TRACE (A) NEG mg/dL Glucose NEGATIVE  NEG mg/dL Ketone TRACE (A) NEG mg/dL Bilirubin NEGATIVE  NEG Blood NEGATIVE  NEG Urobilinogen 1.0 0.2 - 1.0 EU/dL Nitrites NEGATIVE  NEG Leukocyte Esterase TRACE (A) NEG    
DRUG SCREEN, URINE Collection Time: 07/13/19 10:45 PM  
Result Value Ref Range BENZODIAZEPINES NEGATIVE  NEG    
 BARBITURATES NEGATIVE  NEG    
 THC (TH-CANNABINOL) NEGATIVE  NEG    
 OPIATES POSITIVE (A) NEG    
 PCP(PHENCYCLIDINE) NEGATIVE  NEG    
 COCAINE POSITIVE (A) NEG    
 AMPHETAMINES NEGATIVE  NEG METHADONE NEGATIVE  NEG HDSCOM (NOTE) URINE MICROSCOPIC ONLY Collection Time: 07/13/19 10:45 PM  
Result Value Ref Range  WBC 0 to 1 0 - 5 /hpf  
 RBC NEGATIVE  0 - 5 /hpf Epithelial cells FEW 0 - 5 /lpf Bacteria NEGATIVE  NEG /hpf Mucus 3+ (A) NEG /lpf Amorphous Crystals FEW (A) NEG Hyaline cast 4 to 10 0 - 2 /lpf  
GLUCOSE, POC Collection Time: 07/14/19  2:07 AM  
Result Value Ref Range Glucose (POC) 156 (H) 70 - 110 mg/dL CARDIAC PANEL,(CK, CKMB & TROPONIN) Collection Time: 07/14/19  2:30 AM  
Result Value Ref Range  39 - 308 U/L  
 CK - MB 3.2 <3.6 ng/ml CK-MB Index 1.8 0.0 - 4.0 % Troponin-I, QT 0.66 (H) 0.0 - 9.583 NG/ML  
METABOLIC PANEL, COMPREHENSIVE Collection Time: 07/14/19  2:30 AM  
Result Value Ref Range Sodium 136 136 - 145 mmol/L Potassium 4.4 3.5 - 5.5 mmol/L Chloride 103 100 - 108 mmol/L  
 CO2 25 21 - 32 mmol/L Anion gap 8 3.0 - 18 mmol/L Glucose 143 (H) 74 - 99 mg/dL BUN 71 (H) 7.0 - 18 MG/DL Creatinine 1.15 0.6 - 1.3 MG/DL  
 BUN/Creatinine ratio 62 (H) 12 - 20 GFR est AA >60 >60 ml/min/1.73m2 GFR est non-AA >60 >60 ml/min/1.73m2 Calcium 7.5 (L) 8.5 - 10.1 MG/DL Bilirubin, total 0.5 0.2 - 1.0 MG/DL  
 ALT (SGPT) 26 16 - 61 U/L  
 AST (SGOT) 31 15 - 37 U/L Alk. phosphatase 105 45 - 117 U/L Protein, total 4.2 (L) 6.4 - 8.2 g/dL Albumin 1.7 (L) 3.4 - 5.0 g/dL Globulin 2.5 2.0 - 4.0 g/dL A-G Ratio 0.7 (L) 0.8 - 1.7    
CBC WITH AUTOMATED DIFF Collection Time: 07/14/19  2:30 AM  
Result Value Ref Range WBC 12.4 4.6 - 13.2 K/uL  
 RBC 2.38 (L) 4.70 - 5.50 M/uL HGB 8.0 (L) 13.0 - 16.0 g/dL HCT 24.0 (L) 36.0 - 48.0 % .8 (H) 74.0 - 97.0 FL  
 MCH 33.6 24.0 - 34.0 PG  
 MCHC 33.3 31.0 - 37.0 g/dL  
 RDW 15.5 (H) 11.6 - 14.5 % PLATELET 231 928 - 138 K/uL MPV 10.0 9.2 - 11.8 FL  
 NEUTROPHILS 89 (H) 40 - 73 % LYMPHOCYTES 3 (L) 21 - 52 % MONOCYTES 8 3 - 10 % EOSINOPHILS 0 0 - 5 % BASOPHILS 0 0 - 2 %  
 ABS. NEUTROPHILS 11.1 (H) 1.8 - 8.0 K/UL  
 ABS. LYMPHOCYTES 0.4 (L) 0.9 - 3.6 K/UL ABS. MONOCYTES 0.9 0.05 - 1.2 K/UL  
 ABS. EOSINOPHILS 0.0 0.0 - 0.4 K/UL  
 ABS. BASOPHILS 0.0 0.0 - 0.1 K/UL  
 DF AUTOMATED    
LACTIC ACID Collection Time: 07/14/19  2:30 AM  
Result Value Ref Range Lactic acid 2.9 (HH) 0.4 - 2.0 MMOL/L  
MAGNESIUM Collection Time: 07/14/19  2:30 AM  
Result Value Ref Range Magnesium 1.6 1.6 - 2.6 mg/dL HEMOGLOBIN A1C WITH EAG Collection Time: 07/14/19  2:30 AM  
Result Value Ref Range Hemoglobin A1c 4.7 4.2 - 5.6 % Est. average glucose Cannot be calculated mg/dL CALCIUM, IONIZED Collection Time: 07/14/19  2:30 AM  
Result Value Ref Range Ionized Calcium 1.16 1.12 - 1.32 MMOL/L  
POC G3 Collection Time: 07/14/19  3:02 AM  
Result Value Ref Range Device: TRACH COLLAR    
 FIO2 (POC) 50 % pH (POC) 7.354 7.35 - 7.45    
 pCO2 (POC) 39.3 35.0 - 45.0 MMHG  
 pO2 (POC) 149 (H) 80 - 100 MMHG  
 HCO3 (POC) 21.9 (L) 22 - 26 MMOL/L  
 sO2 (POC) 99 (H) 92 - 97 % Base deficit (POC) 4 mmol/L Allens test (POC) YES Total resp. rate 14 Site RIGHT RADIAL Patient temp. 37.0 Specimen type (POC) ARTERIAL Performed by Beulah Hendrickson GLUCOSE, POC Collection Time: 07/14/19  5:20 AM  
Result Value Ref Range Glucose (POC) 135 (H) 70 - 110 mg/dL Recent Labs  
  07/14/19 
0302 FIO2I 50 HCO3I 21.9*  
PCO2I 39.3 PHI 7.354 PO2I 149* All Micro Results Procedure Component Value Units Date/Time CULTURE, BLOOD [[id number]] Collected:  07/13/19 2200 Order Status:  Completed Specimen:  Blood Updated:  07/14/19 0710 Special Requests: NO SPECIAL REQUESTS Culture result: NO GROWTH AFTER 8 HOURS     
 CULTURE, BLOOD [[id number]] Collected:  07/13/19 2215 Order Status:  Completed Specimen:  Blood Updated:  07/14/19 0710 Special Requests: NO SPECIAL REQUESTS Culture result: NO GROWTH AFTER 8 HOURS     
 CULTURE, RESPIRATORY/SPUTUM/BRONCH Derik Shingles STAIN [[id number]] Order Status:  Sent Specimen:  Sputum Telemetry: normal sinus rhythm Imaging: 
[x]I have personally reviewed the patients chest radiographs images and report Results from Hospital Encounter encounter on 05/19/19 XR CHEST PORT Narrative EXAM:  AP Portable Chest X-ray 1 view INDICATION: Right-sided chest pain x1 week COMPARISON: January 14, 2019 
 
_______________ FINDINGS: There is a tracheostomy tube present. Heart and mediastinal contours 
are within normal limits for portable radiograph. Lungs are clear of active 
disease. There are no pleural effusions. No acute osseous findings. ________________ Impression IMPRESSION:  No acute process [x]See my orders for details My assessment, plan of care, findings, medications, side effects etc were discussed with: 
[x]nursing []PT/OT [x]respiratory therapy []Dr. Hany David [x]Patient [x]Total critical care time exclusive of procedures 50 minutes with complex decision making performed and > 50% time spent in face to face evaluation.  
 
Micky Hancock MD

## 2019-07-14 NOTE — PROGRESS NOTES
Pt transported to icu 5 with 50% venti adaptor with [id number]=[id number]% & no problems; rt then placed on high flow trach tube 20L & 50% with abg's pending

## 2019-07-14 NOTE — PROGRESS NOTES
Rt placed pt on 100% trach collar due to desaturating to 70's with pt also suctioned for moderate amount thick, tan, brown secretions; sao2 rising to 94% with hr 149; breath sounds were decreased throughout

## 2019-07-14 NOTE — PROGRESS NOTES
patient unstable, Triple lumen in femoral. Carmen Toro, [facility] ICU will call when patient becomes stable--10:55//sj

## 2019-07-14 NOTE — H&P
History & Physical 
 
Patient: Meghna Burrell MRN: [id number]  CSN: [id number] YOB: 1965  Age: 52 y.o. Sex: male DOA: 7/13/2019 Chief Complaint:  
Chief Complaint Patient presents with  Vomiting HPI:  
 
Meghna Burrell is a 52 y.o.  male who with hx of Throat cancer s/p XRT , Esophageal Stricture with PEG, COPD, Ethoh and Cocaine abuse, COPD who presents to ER with his hospice Nurse.; 
Patient had hematemesis several times this morning He told his hospice nurse he wanted treatment and wanted to resend his DNR ; She then told him to go to ER for vomiting blood But he did not until later this evening;  
Patient has known history of polysubstance abuse And regularly shoots alcohol through his peg tube; Patient also was going to be dismissed from hospice due to indiscrepencies with opiate usage namely fentanyl and Roxanol His Significant other is also an alcoholic and could not come to ER due to currently being intoxicated. Patient denies using cocaine but UDS today is positive and patient is experiencing hypotension and tachycardia in ER with slight elevation in troponin Patient recently treated for  Left sided lung emphysema from CT surgeon with partial lobectomy [address] [place] ER code sepsis called with elevation and lactate and hypotension ; central line femoral placed by ED And started on Fluids and Levophed Past Medical History:  
Diagnosis Date  Chronic obstructive pulmonary disease (HCC)   
 acute hypoxic respiratory failure 8/16  Hematemesis 7/13/2019  Hypertension  Ill-defined condition   
 peg tube in place  Ill-defined condition   
 chemo  S/P radiation therapy  Throat cancer ([facility] [address]  Tobacco abuse Past Surgical History:  
Procedure Laterality Date  HX GI    
 peg tub  HX HEENT    
 sinus surgery  HX TRACHEOSTOMY History reviewed. No pertinent family history. Social History Socioeconomic History  Marital status: COMMON LAW Spouse name: Not on file  Number of children: Not on file  Years of education: Not on file  Highest education level: Not on file Tobacco Use  Smoking status: Former Smoker Packs/day: 0.50  Smokeless tobacco: Never Used Substance and Sexual Activity  Alcohol use: No  
 Drug use: No  
 
 
Prior to Admission medications Medication Sig Start Date End Date Taking? Authorizing Provider  
oxyCODONE-acetaminophen (PERCOCET)  mg per tablet Take 1 Tab by mouth two (2) times a day. Hugh Garrido MD  
oxyCODONE-acetaminophen (PERCOCET)  mg per tablet Take  by mouth. Indications: reports takes 30mg tablets BID. states out of medication    Hugh Garrido MD  
naloxone (NARCAN) 4 mg/actuation nasal spray Use 1 spray intranasally into 1 nostril. Use a new Narcan nasal spray for subsequent doses and administer into alternating nostrils. May repeat every 2 to 3 minutes as needed. 6/25/18   Kevin Armas MD  
PARoxetine (PAXIL) 10 mg tablet 10 mg by Per G Tube route daily. Provider, Historical  
budesonide (PULMICORT) 0.5 mg/2 mL nbsp 500 mcg by Nebulization route two (2) times a day. Provider, Historical  
arformoterol (BROVANA) 15 mcg/2 mL nebu neb solution 2 mL by Nebulization route two (2) times a day. 1/9/18   Shahbaz Estrada MD  
budesonide (PULMICORT) 0.5 mg/2 mL nbsp 2 mL by Nebulization route two (2) times a day. 1/9/18   Shahbaz Estrada MD  
ipratropium (ATROVENT) 0.02 % soln 2.5 mL by Nebulization route four (4) times daily. 1/9/18   Shahbaz Estrada MD  
predniSONE (DELTASONE) 10 mg tablet Prednisone 10mg tabs: p.o. 
4 tabs daily for 3 days then drop to  
3 tabs daily for 3 days then drop to  
2 tabs daily for 3 days then drop to  
1 tab daily for 3 days then stop.  
 
Dispense 30 tabs 1/9/18   Shahbaz Estrada MD  
cloNIDine (CATAPRES) 0.2 mg/24 hr patch 1 Patch by TransDERmal route every seven (7) days. 17   Jeremías Prado MD  
fentaNYL (DURAGESIC) 100 mcg/hr PATCH 1 Patch by TransDERmal route every seventy-two (72) hours. Max Daily Amount: 1 Patch. 17   Jeremías Prado MD  
guaiFENesin (ROBITUSSIN) 100 mg/5 mL liquid Take 5 mL by mouth every four (4) hours as needed for Cough. 17   Jeremías Prado MD  
lisinopril (PRINIVIL, ZESTRIL) 5 mg tablet Take 1 Tab by mouth every twelve (12) hours. 17   Jeremías Prado MD  
magic mouthwash (FIRST-MOUTHWASH BLM) 003--40 mg/30 mL mwsh oral suspension Take 10 mL by mouth every four (4) hours as needed. Provider, Historical  
albuterol-ipratropium (DUO-NEB) 2.5 mg-0.5 mg/3 ml nebu 3 mL by Nebulization route every four (4) hours as needed. Patient taking differently: 3 mL by Nebulization route every four (4) hours as needed (wheezing). 17   Parker Devries MD  
multivitamin, tx-iron-ca-min (THERA-M W/ IRON) 9 mg iron-400 mcg tab tablet Take 1 Tab by mouth daily. 17   Parker Devries MD  
Nebulizer Accessories kit Use nebs Q4H PRN 17   Parker Devries MD  
 
 
No Known Allergies Review of Systems Limited due to lethargy and Omaira Lapine Physical Exam:  
 
Physical Exam: 
Visit Vitals BP 93/53 (BP 1 Location: Left arm) Pulse (!) 128 Temp 98.3 °F (36.8 °C) Resp 17 Ht 6' (1.829 m) Wt 59.9 kg (132 lb 1 oz) SpO2 98% BMI 17.91 kg/m² O2 Flow Rate (L/min): 10 l/min O2 Device: Tracheal collar Temp (24hrs), Av °F (36.7 °C), Min:97.7 °F (36.5 °C), Max:98.3 °F (36.8 °C) 
   1901 -  0700 In: 100 [I.V.:100] Out: -    No intake/output data recorded. General:  Lethargic but arousable  cooperative, no distress, appears stated age. Head: Normocephalic, without obvious abnormality, atraumatic. Eyes:  Conjunctivae/corneas clear. dialated  pupils Nose: Nares normal. No drainage or sinus tenderness.   
Neck: Supple, symmetrical, trach collar on with copious thick yellow foul smelling secretions . Lungs:   Clear to auscultation bilaterally. Diffuse rhonchi and wheeze Heart:  Regular rate and rhythm, S1, S2 normal.  
  Abdomen: Soft, non-tender. Bowel sounds normal. Peg site with evidence of irration Extremities: Extremities normal, atraumatic, no cyanosis or edema. Pulses: 2+ and symmetric all extremities. Skin:  No rashes or lesions Neurologic: AAOx person place not month year or full situation  No focal motor or sensory deficit. Labs Reviewed: All lab results for the last 24 hours reviewed. and EKG Procedures/imaging: see electronic medical records for all procedures/Xrays and details which were not copied into this note but were reviewed prior to creation of Plan Recent Results (from the past 24 hour(s)) METABOLIC PANEL, COMPREHENSIVE Collection Time: 07/13/19 10:00 PM  
Result Value Ref Range Sodium 134 (L) 136 - 145 mmol/L Potassium 5.9 (H) 3.5 - 5.5 mmol/L Chloride 95 (L) 100 - 108 mmol/L  
 CO2 27 21 - 32 mmol/L Anion gap 12 3.0 - 18 mmol/L Glucose 112 (H) 74 - 99 mg/dL BUN 78 (H) 7.0 - 18 MG/DL Creatinine 1.64 (H) 0.6 - 1.3 MG/DL  
 BUN/Creatinine ratio 48 (H) 12 - 20 GFR est AA 53 (L) >60 ml/min/1.73m2 GFR est non-AA 44 (L) >60 ml/min/1.73m2 Calcium 8.4 (L) 8.5 - 10.1 MG/DL Bilirubin, total 0.5 0.2 - 1.0 MG/DL  
 ALT (SGPT) 36 16 - 61 U/L  
 AST (SGOT) 40 (H) 15 - 37 U/L Alk. phosphatase 152 (H) 45 - 117 U/L Protein, total 5.3 (L) 6.4 - 8.2 g/dL Albumin 2.1 (L) 3.4 - 5.0 g/dL Globulin 3.2 2.0 - 4.0 g/dL A-G Ratio 0.7 (L) 0.8 - 1.7    
CBC WITH AUTOMATED DIFF Collection Time: 07/13/19 10:00 PM  
Result Value Ref Range WBC 12.8 4.6 - 13.2 K/uL  
 RBC 3.25 (L) 4.70 - 5.50 M/uL  
 HGB 11.0 (L) 13.0 - 16.0 g/dL HCT 33.1 (L) 36.0 - 48.0 % .8 (H) 74.0 - 97.0 FL  
 MCH 33.8 24.0 - 34.0 PG  
 MCHC 33.2 31.0 - 37.0 g/dL  
 RDW 15.6 (H) 11.6 - 14.5 % PLATELET 373 848 - 572 K/uL MPV 10.0 9.2 - 11.8 FL  
 NEUTROPHILS 86 (H) 40 - 73 % LYMPHOCYTES 5 (L) 21 - 52 % MONOCYTES 9 3 - 10 % EOSINOPHILS 0 0 - 5 % BASOPHILS 0 0 - 2 %  
 ABS. NEUTROPHILS 11.0 (H) 1.8 - 8.0 K/UL  
 ABS. LYMPHOCYTES 0.6 (L) 0.9 - 3.6 K/UL  
 ABS. MONOCYTES 1.1 0.05 - 1.2 K/UL  
 ABS. EOSINOPHILS 0.0 0.0 - 0.4 K/UL  
 ABS. BASOPHILS 0.0 0.0 - 0.1 K/UL  
 DF AUTOMATED PROTHROMBIN TIME + INR Collection Time: 07/13/19 10:00 PM  
Result Value Ref Range Prothrombin time 12.4 11.5 - 15.2 sec INR 1.0 0.8 - 1.2 MAGNESIUM Collection Time: 07/13/19 10:00 PM  
Result Value Ref Range Magnesium 1.8 1.6 - 2.6 mg/dL SALICYLATE Collection Time: 07/13/19 10:00 PM  
Result Value Ref Range Salicylate level <8.2 (L) 2.8 - 20.0 MG/DL  
ACETAMINOPHEN Collection Time: 07/13/19 10:00 PM  
Result Value Ref Range Acetaminophen level <2 (L) 10.0 - 30.0 ug/mL ETHYL ALCOHOL Collection Time: 07/13/19 10:00 PM  
Result Value Ref Range ALCOHOL(ETHYL),SERUM 5 (H) 0 - 3 MG/DL  
CARDIAC PANEL,(CK, CKMB & TROPONIN) Collection Time: 07/13/19 10:00 PM  
Result Value Ref Range CK 52 39 - 308 U/L  
 CK - MB 2.3 <3.6 ng/ml CK-MB Index 4.4 (H) 0.0 - 4.0 % Troponin-I, QT 0.55 (H) 0.0 - 0.045 NG/ML  
TYPE & SCREEN Collection Time: 07/13/19 10:00 PM  
Result Value Ref Range Crossmatch Expiration 07/16/2019 ABO/Rh(D) O POSITIVE Antibody screen NEG   
LIPASE Collection Time: 07/13/19 10:00 PM  
Result Value Ref Range Lipase 34 (L) 73 - 393 U/L  
POC LACTIC ACID Collection Time: 07/13/19 10:01 PM  
Result Value Ref Range Lactic Acid (POC) 3.53 (HH) 0.40 - 2.00 mmol/L  
POC CHEM8 Collection Time: 07/13/19 10:21 PM  
Result Value Ref Range CO2, POC 24 19 - 24 MMOL/L Glucose, POC 89 74 - 106 MG/DL  
 BUN, POC 63 (H) 7 - 18 MG/DL Creatinine, POC 1.1 0.6 - 1.3 MG/DL  
 GFRAA, POC >60 >60 ml/min/1.73m2 GFRNA, POC >60 >60 ml/min/1.73m2 Sodium,  (L) 136 - 145 MMOL/L Potassium, POC 4.9 3.5 - 5.5 MMOL/L Calcium, ionized (POC) 1.03 (L) 1.12 - 1.32 mmol/L Chloride,  100 - 108 MMOL/L Anion gap, POC 17 10 - 20 Hematocrit, POC 23 (L) 36 - 49 % Hemoglobin, POC 7.8 (L) 12 - 16 G/DL URINALYSIS W/ RFLX MICROSCOPIC Collection Time: 07/13/19 10:45 PM  
Result Value Ref Range Color YELLOW Appearance CLOUDY Specific gravity 1.018 1.005 - 1.030    
 pH (UA) 5.0 5.0 - 8.0 Protein TRACE (A) NEG mg/dL Glucose NEGATIVE  NEG mg/dL Ketone TRACE (A) NEG mg/dL Bilirubin NEGATIVE  NEG Blood NEGATIVE  NEG Urobilinogen 1.0 0.2 - 1.0 EU/dL Nitrites NEGATIVE  NEG Leukocyte Esterase TRACE (A) NEG    
DRUG SCREEN, URINE Collection Time: 07/13/19 10:45 PM  
Result Value Ref Range BENZODIAZEPINES NEGATIVE  NEG    
 BARBITURATES NEGATIVE  NEG    
 THC (TH-CANNABINOL) NEGATIVE  NEG    
 OPIATES POSITIVE (A) NEG    
 PCP(PHENCYCLIDINE) NEGATIVE  NEG    
 COCAINE POSITIVE (A) NEG    
 AMPHETAMINES NEGATIVE  NEG METHADONE NEGATIVE  NEG HDSCOM (NOTE) URINE MICROSCOPIC ONLY Collection Time: 07/13/19 10:45 PM  
Result Value Ref Range WBC 0 to 1 0 - 5 /hpf  
 RBC NEGATIVE  0 - 5 /hpf Epithelial cells FEW 0 - 5 /lpf Bacteria NEGATIVE  NEG /hpf Mucus 3+ (A) NEG /lpf Amorphous Crystals FEW (A) NEG Hyaline cast 4 to 10 0 - 2 /lpf Assessment/Plan Active Problems: 
  Aspiration pneumonia ([facility] [address] (11/15/2017) Hematemesis (7/13/2019) Sepsis ([facility] [address] (7/13/2019) GI Bleed Polysubstance Abuse Septic Shock Tachycardia Troponin Increase COPD 
 
CV: 
Troponin Elevation Tachycardia Cocaine abuse Will trend cardiac enzymes Obtain echo Obtain cardiology consult Hold lopressor due to hypotension  And cocaine Starting levophed drip  And albumin Resp Aspiration Pneumoia COPD Obtain Stat Abg 
Start Solumedrol RT consult Cover for Pseudomonas with chronic trach 
obtain sputum cultures GI 
PEG tube GI Bleed Consult GI Monitor H/H/ q 8 Start Protonix q 12 Start drip if active bleeding noted : 
MYLES Start aggressive fluids Hold arb ect. Maisha Mccabeey to gravity FEN ? hyperkalemia Repeat potassium Given calcium in ER Will check stat and treat with glucose insulin if still high ID Sputum and blood cultures Broad antibiotics to cover for Pseudo/Aspiration Zosyn/Levaq/Clinda Psych: 
ETOH abuse Cocaine abuse Place on CIWA Ativan prn Endo ? Adrenal insuf mild hyponatremia/hyperkalemia/hypotension Check cortisol actsh Cover with hydrocort ACTh stim test in am  
Sliding scale insulin Heme: DVT prevention with SCD for now Will need CTA chest not ordered When stable and creatine improves with hydration Will check v/q although would not likely give heparin in the setting of GI bleed DVT/GI Prophylaxis: SCD's Discussed with patient at bedside about hospital admission and my plan care, who understood and agree with my plan care.  
 
Minna Kwan MD 
7/14/2019 11:27 PM

## 2019-07-14 NOTE — PROGRESS NOTES
Cardiology Progress Note Patient: Meghna Burrell        Sex: male          DOA: 7/13/2019 YOB: 1965      Age:  52 y.o.        LOS:  LOS: 1 day Assessment/Plan I was called last night by ER Dr. Leslye Barthel for trop at [phone] about. No consult was requested. Discussed with Dr. Wally Castillo, no need to see pt at this time, will consult cardiology if there is any significant change in trop/cardiac status. If you have any questions/concerned, please do not hesitate to call me any time. THX Signed By: Shellie Brannon MD   
 July 14, 2019

## 2019-07-14 NOTE — ED PROVIDER NOTES
EMERGENCY DEPARTMENT HISTORY AND PHYSICAL EXAM 
 
Date: 7/13/2019 Patient Name: Marilia Rivas History of Presenting Illness Chief Complaint Patient presents with  Vomiting History Provided By: Caregiver and EMS Additional History (Context):  
Marilia Rivas is a 52 y.o. male with PMHX alcohol abuse, laryngeal cancer with likely metastasis to lung, history of noncompliance on medication, history of trach and PEG placement, on hospice presents to the emergency department via EMS after reported multiple episodes of hematemesis. EMS reports that initially this morning, patient did not want evaluation for his hematemesis. However after persistent hematemesis, patient reporting that \"I want to live\". Hospice nurse called to provide history. States that the patient has a history of noncompliance, patient with a history of alcohol abuse and putting on alcohol his PEG. History is limited as the patient is minimally responsive. However will open his eyes to verbal stimuli. Ilan Woodall PCP: Jason Farnsworth MD 
 
Current Facility-Administered Medications Medication Dose Route Frequency Provider Last Rate Last Dose  sodium chloride (NS) flush 5-10 mL  5-10 mL IntraVENous PRN Agbetzaida-Rony Douglas DO      
 sodium chloride 0.9 % bolus infusion 1,000 mL  1,000 mL IntraVENous ONCE Agustiady-Ryan Douglasntin Carmel DO      
 sodium chloride 0.9 % bolus infusion 1,000 mL  1,000 mL IntraVENous ONCE Agustiakirk-Rony Douglas DO      
 [COMPLETED] sodium chloride 0.9 % bolus infusion 1,000 mL  1,000 mL IntraVENous ONCE AgRony Saab DO 1,000 mL/hr at 07/13/19 2237 1,000 mL at 07/13/19 2237  clindamycin (CLEOCIN) 600mg NS 50 mL IVPB (premix)  600 mg IntraVENous Q8H Jo Lo DO   600 mg at 07/13/19 2226  piperacillin-tazobactam (ZOSYN) 4.5 g in 0.9% sodium chloride (MBP/ADV) 100 mL MBP  4.5 g IntraVENous Q6H Rony Lo DO      
  levoFLOXacin (LEVAQUIN) 750 mg in D5W IVPB  750 mg IntraVENous Q24H Jo Lo DO      
 vancomycin (VANCOCIN) 1500 mg in  ml infusion  1,500 mg IntraVENous ONCE Rajan Lo, DO      
 [START ON 7/14/2019] vancomycin (VANCOCIN) 1,000 mg in 0.9% sodium chloride 250 mL IVPB  1,000 mg IntraVENous Q8H Rajan Lo, DO      
 Vancomycin pharmacy to dose  1 Each Other Rx Dosing/Monitoring Teresita Rajanirma Apodaca, DO      
 sodium chloride (NS) flush 5-40 mL  5-40 mL IntraVENous PRN Rajan Lo, DO      
 NOREPINephrine (LEVOPHED) 8 mg in dextrose 5% 250 mL infusion  2-16 mcg/min IntraVENous TITRATE Rajan Lo, DO      
 
Current Outpatient Medications Medication Sig Dispense Refill  oxyCODONE-acetaminophen (PERCOCET)  mg per tablet Take 1 Tab by mouth two (2) times a day.  oxyCODONE-acetaminophen (PERCOCET)  mg per tablet Take  by mouth. Indications: reports takes 30mg tablets BID. states out of medication  naloxone (NARCAN) 4 mg/actuation nasal spray Use 1 spray intranasally into 1 nostril. Use a new Narcan nasal spray for subsequent doses and administer into alternating nostrils. May repeat every 2 to 3 minutes as needed. 2 Each 0  
 PARoxetine (PAXIL) 10 mg tablet 10 mg by Per G Tube route daily.  budesonide (PULMICORT) 0.5 mg/2 mL nbsp 500 mcg by Nebulization route two (2) times a day.  arformoterol (BROVANA) 15 mcg/2 mL nebu neb solution 2 mL by Nebulization route two (2) times a day. 60 Vial 2  
 budesonide (PULMICORT) 0.5 mg/2 mL nbsp 2 mL by Nebulization route two (2) times a day. 60 Each 2  
 ipratropium (ATROVENT) 0.02 % soln 2.5 mL by Nebulization route four (4) times daily. 120 Vial 2  predniSONE (DELTASONE) 10 mg tablet Prednisone 10mg tabs: p.o. 
4 tabs daily for 3 days then drop to  
3 tabs daily for 3 days then drop to 2 tabs daily for 3 days then drop to  
1 tab daily for 3 days then stop. Dispense 30 tabs 30 Tab 0  cloNIDine (CATAPRES) 0.2 mg/24 hr patch 1 Patch by TransDERmal route every seven (7) days. 4 Patch 0  
 fentaNYL (DURAGESIC) 100 mcg/hr PATCH 1 Patch by TransDERmal route every seventy-two (72) hours. Max Daily Amount: 1 Patch. 5 Patch 0  
 guaiFENesin (ROBITUSSIN) 100 mg/5 mL liquid Take 5 mL by mouth every four (4) hours as needed for Cough. 200 mL 0  
 lisinopril (PRINIVIL, ZESTRIL) 5 mg tablet Take 1 Tab by mouth every twelve (12) hours. 30 Tab 0  
 magic mouthwash (FIRST-MOUTHWASH BLM) 920--40 mg/30 mL mwsh oral suspension Take 10 mL by mouth every four (4) hours as needed.  albuterol-ipratropium (DUO-NEB) 2.5 mg-0.5 mg/3 ml nebu 3 mL by Nebulization route every four (4) hours as needed. (Patient taking differently: 3 mL by Nebulization route every four (4) hours as needed (wheezing). ) 30 Nebule 1  
 multivitamin, tx-iron-ca-min (THERA-M W/ IRON) 9 mg iron-400 mcg tab tablet Take 1 Tab by mouth daily. 30 Tab 1  Nebulizer Accessories kit Use nebs Q4H PRN 1 Kit 0 Past History Past Medical History: 
Past Medical History:  
Diagnosis Date  Chronic obstructive pulmonary disease (HCC)   
 acute hypoxic respiratory failure 8/16  Hematemesis 7/13/2019  Hypertension  Ill-defined condition   
 peg tube in place  Ill-defined condition   
 chemo  S/P radiation therapy  Throat cancer ([facility] [address]  Tobacco abuse Past Surgical History: 
Past Surgical History:  
Procedure Laterality Date  HX GI    
 peg tub  HX HEENT    
 sinus surgery  HX TRACHEOSTOMY Family History: 
History reviewed. No pertinent family history. Social History: 
Social History Tobacco Use  Smoking status: Former Smoker Packs/day: 0.50  Smokeless tobacco: Never Used Substance Use Topics  Alcohol use: No  
 Drug use: No  
 
 
Allergies: 
No Known Allergies Review of Systems Review of Systems Unable to perform ROS: Acuity of condition Physical Exam  
 
Vitals:  
 07/13/19 2221 07/13/19 2225 07/13/19 2235 07/13/19 2312 BP: 95/75  99/73 (!) 84/54 Pulse: (!) 137  (!) 148 (!) 148 Resp: 22  21 20 Temp: 97.7 °F (36.5 °C) SpO2:  94% Weight:      
Height:      
 
Physical Exam 
 
Nursing note and vitals reviewed Constitutional: Pale appearing middle-aged  male, in severe distress Head: Normocephalic, Atraumatic Eyes: Pupils are pinpoint, and reactive to light, EOMI Neck: Trach in place with purulent discharge from stoma ENT: Pale conjunctiva, dry mucous membranes Cardiovascular: Tachycardic, no murmurs, rubs, or gallops Chest:  symmetrical chest excursion bilaterally, hypoxic lungs: Diminished breath sounds bilaterally with crackles Abdomen: PEG tube in place, soft, nontender, nondistended Back: No evidence of trauma or deformity Extremities: No evidence of trauma or deformity, no LE edema Skin: Cool and dry, delayed cap refill greater than 3 seconds Neuro: Minimally responsive, will open his eyes to verbal stimuli Diagnostic Study Results Labs - Recent Results (from the past 12 hour(s)) METABOLIC PANEL, COMPREHENSIVE Collection Time: 07/13/19 10:00 PM  
Result Value Ref Range Sodium 134 (L) 136 - 145 mmol/L Potassium 5.9 (H) 3.5 - 5.5 mmol/L Chloride 95 (L) 100 - 108 mmol/L  
 CO2 27 21 - 32 mmol/L Anion gap 12 3.0 - 18 mmol/L Glucose 112 (H) 74 - 99 mg/dL BUN 78 (H) 7.0 - 18 MG/DL Creatinine 1.64 (H) 0.6 - 1.3 MG/DL  
 BUN/Creatinine ratio 48 (H) 12 - 20 GFR est AA 53 (L) >60 ml/min/1.73m2 GFR est non-AA 44 (L) >60 ml/min/1.73m2 Calcium 8.4 (L) 8.5 - 10.1 MG/DL Bilirubin, total 0.5 0.2 - 1.0 MG/DL  
 ALT (SGPT) 36 16 - 61 U/L  
 AST (SGOT) 40 (H) 15 - 37 U/L Alk. phosphatase 152 (H) 45 - 117 U/L Protein, total 5.3 (L) 6.4 - 8.2 g/dL Albumin 2.1 (L) 3.4 - 5.0 g/dL Globulin 3.2 2.0 - 4.0 g/dL A-G Ratio 0.7 (L) 0.8 - 1.7    
CBC WITH AUTOMATED DIFF Collection Time: 07/13/19 10:00 PM  
Result Value Ref Range WBC 12.8 4.6 - 13.2 K/uL  
 RBC 3.25 (L) 4.70 - 5.50 M/uL  
 HGB 11.0 (L) 13.0 - 16.0 g/dL HCT 33.1 (L) 36.0 - 48.0 % .8 (H) 74.0 - 97.0 FL  
 MCH 33.8 24.0 - 34.0 PG  
 MCHC 33.2 31.0 - 37.0 g/dL  
 RDW 15.6 (H) 11.6 - 14.5 % PLATELET 851 944 - 507 K/uL MPV 10.0 9.2 - 11.8 FL  
 NEUTROPHILS 86 (H) 40 - 73 % LYMPHOCYTES 5 (L) 21 - 52 % MONOCYTES 9 3 - 10 % EOSINOPHILS 0 0 - 5 % BASOPHILS 0 0 - 2 %  
 ABS. NEUTROPHILS 11.0 (H) 1.8 - 8.0 K/UL  
 ABS. LYMPHOCYTES 0.6 (L) 0.9 - 3.6 K/UL  
 ABS. MONOCYTES 1.1 0.05 - 1.2 K/UL  
 ABS. EOSINOPHILS 0.0 0.0 - 0.4 K/UL  
 ABS. BASOPHILS 0.0 0.0 - 0.1 K/UL  
 DF AUTOMATED PROTHROMBIN TIME + INR Collection Time: 07/13/19 10:00 PM  
Result Value Ref Range Prothrombin time 12.4 11.5 - 15.2 sec INR 1.0 0.8 - 1.2 MAGNESIUM Collection Time: 07/13/19 10:00 PM  
Result Value Ref Range Magnesium 1.8 1.6 - 2.6 mg/dL SALICYLATE Collection Time: 07/13/19 10:00 PM  
Result Value Ref Range Salicylate level <3.8 (L) 2.8 - 20.0 MG/DL  
ACETAMINOPHEN Collection Time: 07/13/19 10:00 PM  
Result Value Ref Range Acetaminophen level <2 (L) 10.0 - 30.0 ug/mL ETHYL ALCOHOL Collection Time: 07/13/19 10:00 PM  
Result Value Ref Range ALCOHOL(ETHYL),SERUM 5 (H) 0 - 3 MG/DL  
CARDIAC PANEL,(CK, CKMB & TROPONIN) Collection Time: 07/13/19 10:00 PM  
Result Value Ref Range CK 52 39 - 308 U/L  
 CK - MB 2.3 <3.6 ng/ml CK-MB Index 4.4 (H) 0.0 - 4.0 % Troponin-I, QT 0.55 (H) 0.0 - 0.045 NG/ML  
TYPE & SCREEN Collection Time: 07/13/19 10:00 PM  
Result Value Ref Range Crossmatch Expiration 07/16/2019 ABO/Rh(D) O POSITIVE Antibody screen NEG   
LIPASE Collection Time: 07/13/19 10:00 PM  
Result Value Ref Range Lipase 34 (L) 73 - 393 U/L  
POC LACTIC ACID Collection Time: 07/13/19 10:01 PM  
Result Value Ref Range Lactic Acid (POC) 3.53 (HH) 0.40 - 2.00 mmol/L  
POC CHEM8 Collection Time: 07/13/19 10:21 PM  
Result Value Ref Range CO2, POC 24 19 - 24 MMOL/L Glucose, POC 89 74 - 106 MG/DL  
 BUN, POC 63 (H) 7 - 18 MG/DL Creatinine, POC 1.1 0.6 - 1.3 MG/DL  
 GFRAA, POC >60 >60 ml/min/1.73m2 GFRNA, POC >60 >60 ml/min/1.73m2 Sodium,  (L) 136 - 145 MMOL/L Potassium, POC 4.9 3.5 - 5.5 MMOL/L Calcium, ionized (POC) 1.03 (L) 1.12 - 1.32 mmol/L Chloride,  100 - 108 MMOL/L Anion gap, POC 17 10 - 20 Hematocrit, POC 23 (L) 36 - 49 % Hemoglobin, POC 7.8 (L) 12 - 16 G/DL URINALYSIS W/ RFLX MICROSCOPIC Collection Time: 07/13/19 10:45 PM  
Result Value Ref Range Color YELLOW Appearance CLOUDY Specific gravity 1.018 1.005 - 1.030    
 pH (UA) 5.0 5.0 - 8.0 Protein TRACE (A) NEG mg/dL Glucose NEGATIVE  NEG mg/dL Ketone TRACE (A) NEG mg/dL Bilirubin NEGATIVE  NEG Blood NEGATIVE  NEG Urobilinogen 1.0 0.2 - 1.0 EU/dL Nitrites NEGATIVE  NEG Leukocyte Esterase TRACE (A) NEG    
DRUG SCREEN, URINE Collection Time: 07/13/19 10:45 PM  
Result Value Ref Range BENZODIAZEPINES NEGATIVE  NEG    
 BARBITURATES NEGATIVE  NEG    
 THC (TH-CANNABINOL) NEGATIVE  NEG    
 OPIATES POSITIVE (A) NEG    
 PCP(PHENCYCLIDINE) NEGATIVE  NEG    
 COCAINE POSITIVE (A) NEG    
 AMPHETAMINES NEGATIVE  NEG METHADONE NEGATIVE  NEG HDSCOM (NOTE) URINE MICROSCOPIC ONLY Collection Time: 07/13/19 10:45 PM  
Result Value Ref Range WBC 0 to 1 0 - 5 /hpf  
 RBC NEGATIVE  0 - 5 /hpf Epithelial cells FEW 0 - 5 /lpf Bacteria NEGATIVE  NEG /hpf Mucus 3+ (A) NEG /lpf Amorphous Crystals FEW (A) NEG Hyaline cast 4 to 10 0 - 2 /lpf Radiologic Studies -  
XR CHEST PORT    (Results Pending) RADIOLOGY FINDINGS 
 Chest x-ray shows patchy middle lobe opacity Pending review by Radiologist 
Recorded by Dia Lo DO 
 
CT Results  (Last 48 hours) None CXR Results  (Last 48 hours) None Medical Decision Making I am the first provider for this patient. I reviewed the vital signs, available nursing notes, past medical history, past surgical history, family history and social history. Vital Signs-Reviewed the patient's vital signs. Pulse Oximetry Analysis -54 % on room air Cardiac Monitor: 
Rate: 160 bpm 
Rhythm: Regular EKG interpretation: (Preliminary) 10:00 PM 
Sinus tachycardia 163 bpm.  QTc 474 ms. No acute ST elevation Records Reviewed: Nursing Notes and Old Medical Records Provider Notes:  
52 y.o. male coming from hospice, history of laryngeal cancer with likely metastasis to lung, status post trach and PEG, presenting with hematemesis. On arrival patient is in severe stress. Hypotensive, tachycardic with heart rates in the 160s, hypoxic. Minimally responsive, will open his eyes to verbal stimuli. Trach with purulence from the surrounding stoma. Will initiate sepsis work-up as patient meets sirs criteria. Patient withdrawn from hospice, would like to be full code according to hospice nurse at bedside. Procedures: 
Central Line 
Date/Time: 7/14/2019 12:41 AM 
Performed by: Sissy Garcia DO Authorized by: Sissy Garcia DO Consent:  
  Consent obtained:  Emergent situation Consent given by:  Patient Risks discussed:  Arterial puncture, incorrect placement, bleeding and infection Alternatives discussed:  No treatment Pre-procedure details:  
  Skin preparation:  ChloraPrep Skin preparation agent: Skin preparation agent completely dried prior to procedure Anesthesia (see MAR for exact dosages): Anesthesia method:  Local infiltration Local anesthetic:  Lidocaine 1% w/o epi Procedure details: Location:  R femoral 
  Patient position:  Flat Procedural supplies:  Triple lumen Catheter size:  7.5 Fr Landmarks identified: yes Ultrasound guidance: yes Sterile ultrasound techniques: Sterile gel and sterile probe covers were used Number of attempts:  1 Successful placement: yes Post-procedure details: Post-procedure:  Dressing applied Assessment:  Blood return through all ports Patient tolerance of procedure: Tolerated well, no immediate complications ED Course:  
10:00 PM Initial assessment performed. The patients presenting problems have been discussed, and they are in agreement with the care plan formulated and outlined with them. I have encouraged them to ask questions as they arise throughout their visit. ED Course as of Jul 13 2333 Sat Jul 13, 2019 2301 11:01 PM Elevated Trop 0.55. Discussed patient's history, exam, and available diagnostics results with Campos Maria MD cardiology, who agree with holding anticoagulation due to his hematemesis. Recommend echo during admission. May require BB during admission. Elevated Trop could be due to hypotension/hypoxia. [CA] ED Course User Index 
[CA] Courtney Lo,   
 
11:29 PM 
Patient meeting sepsis criteria with an elevated lactate at 3.5. Patient covered for aspiration pneumonia. Hemoglobin stable 11. CMP with several metabolic derangements including renal insufficiency with an elevated BUN at 78, creatinine 1.64. Hyperkalemia 5.9. Patient treated with calcium gluconate, albuterol. Patient receiving IV fluids with improvement in his blood pressure. However still hypotensive and tachycardic. Discussed the central line placement with hospice nurse at bedside. Patient deferring central line at this time. However insistent that he does not want to be DNR/DNI.   Attempted to contact his family to discuss goals of care at bedside. Family refusing to come in as they states that they are \"too intoxicated\". Palliative consult placed. Will start levophed peripherally, until goals of care discussed with patient and family during admission. Diagnosis and Disposition 11:29 PM 
I have spent 80 minutes of critical care time involved in lab review, consultations with specialist, family decision-making, and documentation. During this entire length of time I was immediately available to the patient. Critical Care: The reason for providing this level of medical care for this critically ill patient was due a critical illness that impaired one or more vital organ systems such that there was a high probability of imminent or life threatening deterioration in the patients condition. This care involved high complexity decision making to assess, manipulate, and support vital system functions, to treat this degreee vital organ system failure and to prevent further life threatening deterioration of the patients condition. Core Measures: 
For Hospitalized Patients: 
 
1. Hospitalization Decision Time: The decision to hospitalize the patient was made by Carlotta Ascencio DO at 11:32 PM on 7/13/2019 2. Aspirin: Aspirin was not given because the patient did not present with a stroke at the time of their Emergency Department evaluation 11:29 PM  Patient is being admitted to the hospital by Sharmin Khan MD . The results of their tests and reasons for their admission have been discussed with them and/or available family. They convey agreement and understanding for the need to be admitted and for their admission diagnosis. CONDITIONS ON ADMISSION: 
Sepsis is present at the time of admission. Urinary Tract Infection is not present at the time of admission. Pneumonia is present at the time of admission. CLINICAL IMPRESSION: 
 
1. Hematemesis with nausea 2. Sepsis, due to unspecified organism ([address] 3. Acute tracheitis without airway obstruction 4. Aspiration pneumonia of right lower lobe due to vomit ([address]   
 
____________________________________ Please note that this dictation was completed with [employer], the computer voice recognition software. Quite often unanticipated grammatical, syntax, homophones, and other interpretive errors are inadvertently transcribed by the computer software. Please disregard these errors. Please excuse any errors that have escaped final proofreading.

## 2019-07-14 NOTE — ED NOTES
Levo started in [facility] per Dr. Buzz Ojeda while she is putting the central line in.

## 2019-07-14 NOTE — ED NOTES
Chele Hipps - Life partner [phone], states to hospice nurse she will be here in the morning. [address] [place] [address] Talat NASCIMENTO -[phone]

## 2019-07-14 NOTE — PROGRESS NOTES
Pharmacy Dosing Services: Vancomycin SCr = 1.15 CrCl ~ 61.5 ml/min Afebrile WBC = 12.4 Vanc 1500 mg LD plus one maintenance dose of 1 gm given so far. Pt to continue on Vancomycin 1 gm IV q18h Pharmacy to continue to follow and adjust dose as necessary Сергей Garcia [facility] [phone]

## 2019-07-14 NOTE — PROGRESS NOTES
Hospitalist Progress Note Patient: Trupti Anders MRN: [id number]  CSN: [id number] YOB: 1965  Age: 52 y.o. Sex: male DOA: 7/13/2019 LOS:  LOS: 1 day IMPRESSION and Plan: 
 
Trupti Anders is a 52 y.o. male with Patient Active Problem List  
 Diagnosis Date Noted  Hyperkalemia 07/14/2019  Hypotension 07/14/2019  Hematemesis 07/13/2019  Sepsis ([address] 07/13/2019  Acute tracheitis 01/05/2018  Aspiration pneumonia ([address] 11/15/2017  Tracheostomy dependence ([address] 08/25/2017  Severe protein-calorie malnutrition Ben Caprice: less than 60% of standard weight) ([address] 08/18/2017  COPD exacerbation ([address] 08/16/2017  Acute hypoxemic respiratory failure ([address] 08/16/2017  Throat cancer ([address] 07/30/2017  Status post insertion of percutaneous endoscopic gastrostomy (PEG) tube ([address] 07/30/2017  Hyponatremia 07/30/2017 Principal Problem: 
  Sepsis ([address] (7/13/2019) Active Problems: 
  Aspiration pneumonia ([address] (11/15/2017) Hematemesis (7/13/2019) Hyperkalemia (7/14/2019) Hypotension (7/14/2019) Plan d/w Dr. Burke Bernheim Cont ICU care 
meds reviewed Labs reviewed Cont IV PPI 
cardiology and GI consult pending Echo today Avoid b-blocker due to cocaine use Supplemental o2 as ordered Patient's condition is fair Recommend to continue hospitalization. Discussed with patient. Chief Complaints: Chief Complaint Patient presents with  Vomiting SUBJECTIVE: 
Pt is seen and examined. Chart reviewed. Feels okay. Review of systems: 
 
Review of Systems Constitutional: Positive for malaise/fatigue. HENT: Negative. Eyes: Negative. Respiratory: Positive for shortness of breath. Cardiovascular: Positive for leg swelling. Negative for chest pain, palpitations and orthopnea. Gastrointestinal: Negative. Negative for abdominal pain, diarrhea and heartburn. Genitourinary: Negative for dysuria and hematuria. Skin: Negative. Neurological: Positive for weakness. Psychiatric/Behavioral: Negative for depression, substance abuse and suicidal ideas. The patient is not nervous/anxious. PE: 
Patient Vitals for the past 24 hrs: 
 BP Temp Pulse Resp SpO2 Height Weight  
07/14/19 1138  98.5 °F (36.9 °C)   100 %    
07/14/19 0709  98.8 °F (37.1 °C)   98 %    
07/14/19 0700 112/73  (!) 104 11     
07/14/19 0600 102/65  (!) 103 10     
07/14/19 0500 95/63  (!) 113 10     
07/14/19 0400 (!) 85/61 97.9 °F (36.6 °C) (!) 114 11 99 %    
07/14/19 0358  97.9 °F (36.6 °C)       
07/14/19 0347 (!) 86/53        
07/14/19 0315 (!) 86/62  (!) 116 13     
07/14/19 0308     99 %    
07/14/19 0300 (!) 85/56  (!) 118 13     
07/14/19 0245 (!) 88/55  (!) 122 12     
07/14/19 0230 (!) 79/49  (!) 130 12     
07/14/19 0215 103/70  (!) 130 13     
07/14/19 0200 99/77 98.3 °F (36.8 °C) (!) 131 14     
07/14/19 0155     100 %    
07/14/19 0131 (!) 77/57  (!) 134 12     
07/14/19 0117 93/53 98.3 °F (36.8 °C) (!) 128 17     
07/14/19 0035 (!) 84/60  (!) 149 15 98 %    
07/14/19 0030 98/66  (!) 148 16 100 %    
07/14/19 0015 (!) 86/64  (!) 147 15 96 %    
07/14/19 0009 (!) 83/53  (!) 147 20     
07/14/19 0000 (!) 65/47        
07/13/19 2312 (!) 84/54  (!) 148 20     
07/13/19 2235 99/73  (!) 148 21     
07/13/19 2225     94 %    
07/13/19 2221 95/75 97.7 °F (36.5 °C) (!) 137 22     
07/13/19 2153     90 %    
07/13/19 2144 (!) 84/51   23 (!) 55 % 6' (1.829 m) 59.9 kg (132 lb 1 oz) Intake/Output Summary (Last 24 hours) at 7/14/2019 1210 Last data filed at 7/14/2019 1017 Gross per 24 hour Intake 100 ml Output 1400 ml Net -1300 ml Patient Vitals for the past 120 hrs: 
 Weight  
07/13/19 2144 59.9 kg (132 lb 1 oz) Physical Exam  
Constitutional: He is oriented to person, place, and time.  He appears distressed. Neck: Normal range of motion. Neck supple. No JVD present. Cardiovascular: Normal rate, regular rhythm and normal heart sounds. Pulmonary/Chest: Breath sounds normal. He is in respiratory distress. Abdominal: Soft. Bowel sounds are normal. He exhibits no distension. There is no tenderness. There is no rebound. Musculoskeletal: Normal range of motion. He exhibits no edema. Neurological: He is alert and oriented to person, place, and time. Skin: Skin is warm and dry. Psychiatric: Affect normal.  
Nursing note and vitals reviewed. Intake and Output: 
Current Shift:  07/14 0701 - 07/14 1900 In: -  
Out: 550 [Urine:550] Last three shifts:  07/12 1901 - 07/14 0700 In: 100 [I.V.:100] Out: 850 [Urine:850] Lab/Data Reviewed: 
Recent Results (from the past 8 hour(s)) GLUCOSE, POC Collection Time: 07/14/19  5:20 AM  
Result Value Ref Range Glucose (POC) 135 (H) 70 - 110 mg/dL COSYNTROPIN ACTH STIMULATION TEST Collection Time: 07/14/19  8:10 AM  
Result Value Ref Range Cortisol, baseline PENDING ug/dL Cortisol, 30 min. RESCHEDULED ug/dL Cortisol, 60 min. RESCHEDULED ug/dL HGB & HCT Collection Time: 07/14/19  8:10 AM  
Result Value Ref Range HGB 7.3 (L) 13.0 - 16.0 g/dL HCT 21.7 (L) 36.0 - 48.0 % CARDIAC PANEL,(CK, CKMB & TROPONIN) Collection Time: 07/14/19  8:10 AM  
Result Value Ref Range  39 - 308 U/L  
 CK - MB 2.9 <3.6 ng/ml CK-MB Index 1.7 0.0 - 4.0 % Troponin-I, QT 0.51 (H) 0.0 - 0.045 NG/ML  
EKG, 12 LEAD, INITIAL Collection Time: 07/14/19 10:23 AM  
Result Value Ref Range Ventricular Rate 79 BPM  
 Atrial Rate 79 BPM  
 P-R Interval 134 ms QRS Duration 84 ms Q-T Interval 408 ms QTC Calculation (Bezet) 467 ms Calculated P Axis 62 degrees Calculated R Axis 48 degrees Calculated T Axis 31 degrees Diagnosis Sinus rhythm with premature atrial complexes Otherwise normal ECG 
 When compared with ECG of 13-JUL-2019 22:00, 
premature atrial complexes are now present Vent. rate has decreased BY  84 BPM 
QRS duration has increased Nonspecific T wave abnormality now evident in Inferior leads T wave amplitude has decreased in Anterior leads GLUCOSE, POC Collection Time: 07/14/19 10:44 AM  
Result Value Ref Range Glucose (POC) 157 (H) 70 - 110 mg/dL Medications: 
Current Facility-Administered Medications Medication Dose Route Frequency  pantoprazole (PROTONIX) injection 40 mg  40 mg IntraVENous Q12H  
 ondansetron (ZOFRAN) injection 4 mg  4 mg IntraVENous Q4H PRN  
 sodium chloride (NS) flush 5-40 mL  5-40 mL IntraVENous Q8H  
 sodium chloride (NS) flush 5-40 mL  5-40 mL IntraVENous PRN  
 LORazepam (ATIVAN) tablet 1 mg  1 mg Oral Q1H PRN Or  
 LORazepam (ATIVAN) injection 1 mg  1 mg IntraVENous Q1H PRN  
 LORazepam (ATIVAN) tablet 2 mg  2 mg Oral Q1H PRN Or  
 LORazepam (ATIVAN) injection 2 mg  2 mg IntraVENous Q1H PRN  
 LORazepam (ATIVAN) injection 3 mg  3 mg IntraVENous Q15MIN PRN  
 albuterol-ipratropium (DUO-NEB) 2.5 MG-0.5 MG/3 ML  3 mL Nebulization Q4H RT  
 albuterol-ipratropium (DUO-NEB) 2.5 MG-0.5 MG/3 ML  3 mL Nebulization Q4H PRN  
 0.9% sodium chloride infusion  150 mL/hr IntraVENous CONTINUOUS  
 methylPREDNISolone (PF) (SOLU-MEDROL) injection 40 mg  40 mg IntraVENous Q6H  
 sodium chloride (NS) flush 5-40 mL  5-40 mL IntraVENous Q8H  
 sodium chloride (NS) flush 5-40 mL  5-40 mL IntraVENous PRN  
 insulin regular (NOVOLIN R, HUMULIN R) injection 10 Units  10 Units IntraVENous ONCE  
 insulin lispro (HUMALOG) injection   SubCUTAneous Q4H  
 glucose chewable tablet 16 g  4 Tab Oral PRN  
 glucagon (GLUCAGEN) injection 1 mg  1 mg IntraMUSCular PRN  
 PHENYLephrine (KATHERIN-SYNEPHRINE) 30 mg in 0.9% sodium chloride 250 mL infusion   mcg/min IntraVENous TITRATE  albumin human 25% (BUMINATE) solution 12.5 g  12.5 g IntraVENous Q6H  
  sodium chloride (NS) flush 5-10 mL  5-10 mL IntraVENous PRN  piperacillin-tazobactam (ZOSYN) 4.5 g in 0.9% sodium chloride (MBP/ADV) 100 mL MBP  4.5 g IntraVENous Q6H  
 levoFLOXacin (LEVAQUIN) 750 mg in D5W IVPB  750 mg IntraVENous Q24H  
 vancomycin (VANCOCIN) 1,000 mg in 0.9% sodium chloride 250 mL IVPB  1,000 mg IntraVENous Q8H  Vancomycin pharmacy to dose  1 Each Other Rx Dosing/Monitoring  sodium chloride (NS) flush 5-40 mL  5-40 mL IntraVENous PRN  
 NOREPINephrine (LEVOPHED) 8 mg in dextrose 5% 250 mL infusion  2-16 mcg/min IntraVENous TITRATE Recent Results (from the past 24 hour(s)) CULTURE, BLOOD Collection Time: 07/13/19 10:00 PM  
Result Value Ref Range Special Requests: NO SPECIAL REQUESTS Culture result: NO GROWTH AFTER 8 HOURS METABOLIC PANEL, COMPREHENSIVE Collection Time: 07/13/19 10:00 PM  
Result Value Ref Range Sodium 134 (L) 136 - 145 mmol/L Potassium 5.9 (H) 3.5 - 5.5 mmol/L Chloride 95 (L) 100 - 108 mmol/L  
 CO2 27 21 - 32 mmol/L Anion gap 12 3.0 - 18 mmol/L Glucose 112 (H) 74 - 99 mg/dL BUN 78 (H) 7.0 - 18 MG/DL Creatinine 1.64 (H) 0.6 - 1.3 MG/DL  
 BUN/Creatinine ratio 48 (H) 12 - 20 GFR est AA 53 (L) >60 ml/min/1.73m2 GFR est non-AA 44 (L) >60 ml/min/1.73m2 Calcium 8.4 (L) 8.5 - 10.1 MG/DL Bilirubin, total 0.5 0.2 - 1.0 MG/DL  
 ALT (SGPT) 36 16 - 61 U/L  
 AST (SGOT) 40 (H) 15 - 37 U/L Alk. phosphatase 152 (H) 45 - 117 U/L Protein, total 5.3 (L) 6.4 - 8.2 g/dL Albumin 2.1 (L) 3.4 - 5.0 g/dL Globulin 3.2 2.0 - 4.0 g/dL A-G Ratio 0.7 (L) 0.8 - 1.7    
CBC WITH AUTOMATED DIFF Collection Time: 07/13/19 10:00 PM  
Result Value Ref Range WBC 12.8 4.6 - 13.2 K/uL  
 RBC 3.25 (L) 4.70 - 5.50 M/uL  
 HGB 11.0 (L) 13.0 - 16.0 g/dL HCT 33.1 (L) 36.0 - 48.0 % .8 (H) 74.0 - 97.0 FL  
 MCH 33.8 24.0 - 34.0 PG  
 MCHC 33.2 31.0 - 37.0 g/dL  
 RDW 15.6 (H) 11.6 - 14.5 % PLATELET 382 370 - 691 K/uL MPV 10.0 9.2 - 11.8 FL  
 NEUTROPHILS 86 (H) 40 - 73 % LYMPHOCYTES 5 (L) 21 - 52 % MONOCYTES 9 3 - 10 % EOSINOPHILS 0 0 - 5 % BASOPHILS 0 0 - 2 %  
 ABS. NEUTROPHILS 11.0 (H) 1.8 - 8.0 K/UL  
 ABS. LYMPHOCYTES 0.6 (L) 0.9 - 3.6 K/UL  
 ABS. MONOCYTES 1.1 0.05 - 1.2 K/UL  
 ABS. EOSINOPHILS 0.0 0.0 - 0.4 K/UL  
 ABS. BASOPHILS 0.0 0.0 - 0.1 K/UL  
 DF AUTOMATED PROTHROMBIN TIME + INR Collection Time: 07/13/19 10:00 PM  
Result Value Ref Range Prothrombin time 12.4 11.5 - 15.2 sec INR 1.0 0.8 - 1.2 MAGNESIUM Collection Time: 07/13/19 10:00 PM  
Result Value Ref Range Magnesium 1.8 1.6 - 2.6 mg/dL SALICYLATE Collection Time: 07/13/19 10:00 PM  
Result Value Ref Range Salicylate level <0.8 (L) 2.8 - 20.0 MG/DL  
ACETAMINOPHEN Collection Time: 07/13/19 10:00 PM  
Result Value Ref Range Acetaminophen level <2 (L) 10.0 - 30.0 ug/mL ETHYL ALCOHOL Collection Time: 07/13/19 10:00 PM  
Result Value Ref Range ALCOHOL(ETHYL),SERUM 5 (H) 0 - 3 MG/DL  
CARDIAC PANEL,(CK, CKMB & TROPONIN) Collection Time: 07/13/19 10:00 PM  
Result Value Ref Range CK 52 39 - 308 U/L  
 CK - MB 2.3 <3.6 ng/ml CK-MB Index 4.4 (H) 0.0 - 4.0 % Troponin-I, QT 0.55 (H) 0.0 - 0.045 NG/ML  
TYPE & SCREEN Collection Time: 07/13/19 10:00 PM  
Result Value Ref Range Crossmatch Expiration 07/16/2019 ABO/Rh(D) O POSITIVE Antibody screen NEG   
LIPASE Collection Time: 07/13/19 10:00 PM  
Result Value Ref Range Lipase 34 (L) 73 - 393 U/L  
POC LACTIC ACID Collection Time: 07/13/19 10:01 PM  
Result Value Ref Range Lactic Acid (POC) 3.53 (HH) 0.40 - 2.00 mmol/L  
CULTURE, BLOOD Collection Time: 07/13/19 10:15 PM  
Result Value Ref Range Special Requests: NO SPECIAL REQUESTS Culture result: NO GROWTH AFTER 8 HOURS    
POC CHEM8 Collection Time: 07/13/19 10:21 PM  
Result Value Ref Range  CO2, POC 24 19 - 24 MMOL/L  
 Glucose, POC 89 74 - 106 MG/DL  
 BUN, POC 63 (H) 7 - 18 MG/DL Creatinine, POC 1.1 0.6 - 1.3 MG/DL  
 GFRAA, POC >60 >60 ml/min/1.73m2 GFRNA, POC >60 >60 ml/min/1.73m2 Sodium,  (L) 136 - 145 MMOL/L Potassium, POC 4.9 3.5 - 5.5 MMOL/L Calcium, ionized (POC) 1.03 (L) 1.12 - 1.32 mmol/L Chloride,  100 - 108 MMOL/L Anion gap, POC 17 10 - 20 Hematocrit, POC 23 (L) 36 - 49 % Hemoglobin, POC 7.8 (L) 12 - 16 G/DL URINALYSIS W/ RFLX MICROSCOPIC Collection Time: 07/13/19 10:45 PM  
Result Value Ref Range Color YELLOW Appearance CLOUDY Specific gravity 1.018 1.005 - 1.030    
 pH (UA) 5.0 5.0 - 8.0 Protein TRACE (A) NEG mg/dL Glucose NEGATIVE  NEG mg/dL Ketone TRACE (A) NEG mg/dL Bilirubin NEGATIVE  NEG Blood NEGATIVE  NEG Urobilinogen 1.0 0.2 - 1.0 EU/dL Nitrites NEGATIVE  NEG Leukocyte Esterase TRACE (A) NEG    
DRUG SCREEN, URINE Collection Time: 07/13/19 10:45 PM  
Result Value Ref Range BENZODIAZEPINES NEGATIVE  NEG    
 BARBITURATES NEGATIVE  NEG    
 THC (TH-CANNABINOL) NEGATIVE  NEG    
 OPIATES POSITIVE (A) NEG    
 PCP(PHENCYCLIDINE) NEGATIVE  NEG    
 COCAINE POSITIVE (A) NEG    
 AMPHETAMINES NEGATIVE  NEG METHADONE NEGATIVE  NEG HDSCOM (NOTE) URINE MICROSCOPIC ONLY Collection Time: 07/13/19 10:45 PM  
Result Value Ref Range WBC 0 to 1 0 - 5 /hpf  
 RBC NEGATIVE  0 - 5 /hpf Epithelial cells FEW 0 - 5 /lpf Bacteria NEGATIVE  NEG /hpf Mucus 3+ (A) NEG /lpf Amorphous Crystals FEW (A) NEG Hyaline cast 4 to 10 0 - 2 /lpf  
GLUCOSE, POC Collection Time: 07/14/19  2:07 AM  
Result Value Ref Range Glucose (POC) 156 (H) 70 - 110 mg/dL CARDIAC PANEL,(CK, CKMB & TROPONIN) Collection Time: 07/14/19  2:30 AM  
Result Value Ref Range  39 - 308 U/L  
 CK - MB 3.2 <3.6 ng/ml CK-MB Index 1.8 0.0 - 4.0 %  Troponin-I, QT 0.66 (H) 0.0 - 0.045 NG/ML  
 METABOLIC PANEL, COMPREHENSIVE Collection Time: 07/14/19  2:30 AM  
Result Value Ref Range Sodium 136 136 - 145 mmol/L Potassium 4.4 3.5 - 5.5 mmol/L Chloride 103 100 - 108 mmol/L  
 CO2 25 21 - 32 mmol/L Anion gap 8 3.0 - 18 mmol/L Glucose 143 (H) 74 - 99 mg/dL BUN 71 (H) 7.0 - 18 MG/DL Creatinine 1.15 0.6 - 1.3 MG/DL  
 BUN/Creatinine ratio 62 (H) 12 - 20 GFR est AA >60 >60 ml/min/1.73m2 GFR est non-AA >60 >60 ml/min/1.73m2 Calcium 7.5 (L) 8.5 - 10.1 MG/DL Bilirubin, total 0.5 0.2 - 1.0 MG/DL  
 ALT (SGPT) 26 16 - 61 U/L  
 AST (SGOT) 31 15 - 37 U/L Alk. phosphatase 105 45 - 117 U/L Protein, total 4.2 (L) 6.4 - 8.2 g/dL Albumin 1.7 (L) 3.4 - 5.0 g/dL Globulin 2.5 2.0 - 4.0 g/dL A-G Ratio 0.7 (L) 0.8 - 1.7    
CBC WITH AUTOMATED DIFF Collection Time: 07/14/19  2:30 AM  
Result Value Ref Range WBC 12.4 4.6 - 13.2 K/uL  
 RBC 2.38 (L) 4.70 - 5.50 M/uL HGB 8.0 (L) 13.0 - 16.0 g/dL HCT 24.0 (L) 36.0 - 48.0 % .8 (H) 74.0 - 97.0 FL  
 MCH 33.6 24.0 - 34.0 PG  
 MCHC 33.3 31.0 - 37.0 g/dL  
 RDW 15.5 (H) 11.6 - 14.5 % PLATELET 272 069 - 739 K/uL MPV 10.0 9.2 - 11.8 FL  
 NEUTROPHILS 89 (H) 40 - 73 % LYMPHOCYTES 3 (L) 21 - 52 % MONOCYTES 8 3 - 10 % EOSINOPHILS 0 0 - 5 % BASOPHILS 0 0 - 2 %  
 ABS. NEUTROPHILS 11.1 (H) 1.8 - 8.0 K/UL  
 ABS. LYMPHOCYTES 0.4 (L) 0.9 - 3.6 K/UL  
 ABS. MONOCYTES 0.9 0.05 - 1.2 K/UL  
 ABS. EOSINOPHILS 0.0 0.0 - 0.4 K/UL  
 ABS. BASOPHILS 0.0 0.0 - 0.1 K/UL  
 DF AUTOMATED    
LACTIC ACID Collection Time: 07/14/19  2:30 AM  
Result Value Ref Range Lactic acid 2.9 (HH) 0.4 - 2.0 MMOL/L  
MAGNESIUM Collection Time: 07/14/19  2:30 AM  
Result Value Ref Range Magnesium 1.6 1.6 - 2.6 mg/dL HEMOGLOBIN A1C WITH EAG Collection Time: 07/14/19  2:30 AM  
Result Value Ref Range Hemoglobin A1c 4.7 4.2 - 5.6 % Est. average glucose Cannot be calculated mg/dL CALCIUM, IONIZED  
 Collection Time: 07/14/19  2:30 AM  
Result Value Ref Range Ionized Calcium 1.16 1.12 - 1.32 MMOL/L  
POC G3 Collection Time: 07/14/19  3:02 AM  
Result Value Ref Range Device: TRACH COLLAR    
 FIO2 (POC) 50 % pH (POC) 7.354 7.35 - 7.45    
 pCO2 (POC) 39.3 35.0 - 45.0 MMHG  
 pO2 (POC) 149 (H) 80 - 100 MMHG  
 HCO3 (POC) 21.9 (L) 22 - 26 MMOL/L  
 sO2 (POC) 99 (H) 92 - 97 % Base deficit (POC) 4 mmol/L Allens test (POC) YES Total resp. rate 14 Site RIGHT RADIAL Patient temp. 37.0 Specimen type (POC) ARTERIAL Performed by Carlos Méndez GLUCOSE, POC Collection Time: 07/14/19  5:20 AM  
Result Value Ref Range Glucose (POC) 135 (H) 70 - 110 mg/dL COSYNTROPIN ACTH STIMULATION TEST Collection Time: 07/14/19  8:10 AM  
Result Value Ref Range Cortisol, baseline PENDING ug/dL Cortisol, 30 min. RESCHEDULED ug/dL Cortisol, 60 min. RESCHEDULED ug/dL HGB & HCT Collection Time: 07/14/19  8:10 AM  
Result Value Ref Range HGB 7.3 (L) 13.0 - 16.0 g/dL HCT 21.7 (L) 36.0 - 48.0 % CARDIAC PANEL,(CK, CKMB & TROPONIN) Collection Time: 07/14/19  8:10 AM  
Result Value Ref Range  39 - 308 U/L  
 CK - MB 2.9 <3.6 ng/ml CK-MB Index 1.7 0.0 - 4.0 % Troponin-I, QT 0.51 (H) 0.0 - 0.045 NG/ML  
EKG, 12 LEAD, INITIAL Collection Time: 07/14/19 10:23 AM  
Result Value Ref Range Ventricular Rate 79 BPM  
 Atrial Rate 79 BPM  
 P-R Interval 134 ms QRS Duration 84 ms Q-T Interval 408 ms QTC Calculation (Bezet) 467 ms Calculated P Axis 62 degrees Calculated R Axis 48 degrees Calculated T Axis 31 degrees Diagnosis Sinus rhythm with premature atrial complexes Otherwise normal ECG When compared with ECG of 13-JUL-2019 22:00, 
premature atrial complexes are now present Vent. rate has decreased BY  84 BPM 
QRS duration has increased Nonspecific T wave abnormality now evident in Inferior leads T wave amplitude has decreased in Anterior leads GLUCOSE, POC Collection Time: 07/14/19 10:44 AM  
Result Value Ref Range Glucose (POC) 157 (H) 70 - 110 mg/dL Procedures/imaging: see electronic medical records for all procedures/Xrays and details which were not copied into this note but were reviewed prior to creation of Amber Virk MD  
7/14/2019, 12:10 PM

## 2019-07-14 NOTE — PROGRESS NOTES
0110 -  Verbal trasnfer report given to Rae Wyatt RN (oncoming nurse) by Juan A Flaherty RN (offgoing nurse). Report included the following information SBAR, ED Summary, Procedure Summary, MAR and Cardiac Rhythm ST. Levo at 5mcg/min and zosyn infusing per report. Pt received NS 2,000 and clindamycin in ED. Pt to received no further fluids per ED MD. Awaiting start of Vancomycin and Levaquin. 0200 - Pt transferred to ICU. Pt assessed and vitals obtained. Pt very lethargic, drowsy. Able to open eyes at times. Answers to name but unable to answer other orientation questions at this time. ST on EKG and BP 99/77 Map 81, pulses palpable throughout, Levo at 15mcg. Lung sounds coarse throughout, 6shiley cuffless trach in place, on high flow trach collar 20L 70%. Abdomen soft, tender, PEG tube in place, active bowel sounds. Marrero in place draining appropriately. PIV in L FA patent and infusing, R fem TL in place and infusing without difficulty. Skin intact other than small red and pink area in L buttock and ischium - mepilex applied. Will continue to monitor, see EMR for full details. 0200 - R TL Fem dressing saturated, saturated upon admission to ICU. Changed. 0235 - Titration of Levo to max 16mcg/min, start IVF NS at 150ml/hr. 12 - Trach dressing saturated and foul smelling, dressing removed with black foul drainage around stoma. Area cleaned with NS. Collar changed, ties changed, dressing changed. Peg Tube site foul and dressing adhered to belly. Old dressing removed and cleaned with NS. New 4x4 applied. 8470 - Critical Lactic reported to MD Isaías Pinto, no new orders. 26 - Start Guerrero at 10mcg/min  
0400 - Pt reassessed. Remains lethargic and very drowsy. Titration of pressors as needed to maintain Map greater than 65. ST continues. Will continue to monitor, see EMR for full details.   
0725 - Bedside and Verbal shift change report given to Domonique Porter RN (oncoming nurse) by Laure Knight RN (offgoing nurse). Report included the following information SBAR, Kardex, Procedure Summary, Intake/Output, Recent Results and Cardiac Rhythm ST/SR. Guerrero at 10mcg/min, Levo at 15mcg/min, NS at 150mls/hr

## 2019-07-15 PROBLEM — Z71.89 ADVANCED CARE PLANNING/COUNSELING DISCUSSION: Status: ACTIVE | Noted: 2019-01-01

## 2019-07-15 NOTE — PROGRESS NOTES
Hospitalist Progress Note Patient: German Minaya MRN: [id number]  CSN: [id number] YOB: 1965  Age: 52 y.o. Sex: male DOA: 7/13/2019 LOS:  LOS: 2 days Assessment/Plan Patient Active Problem List  
Diagnosis Code  Throat cancer ([facility] C14.0  Status post insertion of percutaneous endoscopic gastrostomy (PEG) tube ([facility] Z93.1  Hyponatremia E87.1  COPD exacerbation ([facility] J44.1  Acute hypoxemic respiratory failure (HCC) J96.01  
 Severe protein-calorie malnutrition Sherrin Cue: less than 60% of standard weight) ([facility] E43  
 Tracheostomy dependence ([facility] Z93.0  Aspiration pneumonia ([facility] J69.0  Acute tracheitis J04.10  Hematemesis K92.0  Sepsis ([facility] A41.9  Hyperkalemia E87.5  Hypotension I95.9  
  
 
 
 
52 y.o.  male who with hx of Throat cancer s/p XRT and tracheostomy, Esophageal Stricture with PEG, COPD, Ethoh and Cocaine abuse. Was on hospice but rescinded. Admitted for vomiting and hematemesis CRITICAL CARE PLAN Resp - Acute respiratory failure - on high flow oxygen, wean to oxygen by NC Possible aspiration. Aspiration prevention, HOB at 30 deg all times. ID - Follow up blood cx. ANTIBIOTICS vancomycin, zosyn, levofloxacin. Trend temps, wbc, LA improving. CVS - Monitor HD. Heme/onc - Follow H&H, plts. Renal - Trend BUN, Cr, follow I/O. Check and replace Mg, K, phos. Endocrine -  Follow FSG Neuro/ Pain/ Sedation -  
 
GI - PEG tube, tube feeding. Hematemesis - seen by GI, no need to EGD now. Follow H/H 
PPI Prophylaxis - DVT:SCD, GI: protonix [id number] 
45 minutes of critical care time spent in the direct evaluation and treatment of this high risk patient.  The reason for providing this level of medical care for this critically ill patient was due a critical illness that impaired one or more vital organ systems such that there was a high probability of imminent or life threatening deterioration in the patients condition. This care involved high complexity decision making to assess, manipulate, and support vital system functions, to treat this degreee vital organ system failure and to prevent further life threatening deterioration of the patients condition. Disposition : 2-3 days Review of systems General: No fevers or chills. Cardiovascular: No chest pain or pressure. No palpitations. Pulmonary: No shortness of breath. Gastrointestinal: see above, Physical Exam: 
General: Awake, cooperative, on oxygen by NC   
HEENT: NC, Atraumatic. PERRLA, anicteric sclerae. Trach in place Lungs: Decreased breath sounds lower lungs bilaterally Heart:  S1 S2,  No murmur, No Rubs, No Gallops Abdomen: Soft, Non distended, Non tender.  +Bowel sounds, PEG in place. Extremities: No c/c/e Psych:   Not anxious or agitated. Neurologic:  No acute neurological deficit. Vital signs/Intake and Output: 
Visit Vitals /75 Pulse (!) 107 Temp 97.9 °F (36.6 °C) Resp 21 Ht 6' (1.829 m) Wt 60.3 kg (132 lb 15 oz) SpO2 97% BMI 18.03 kg/m² Current Shift:  07/15 0701 - 07/15 1900 In: -  
Out: 250 [Urine:250] Last three shifts:  07/13 1901 - 07/15 0700 In: 414.2 [I.V.:100] Out: [facility] Labs: Results:  
   
Chemistry Recent Labs  
  07/15/19 
0500 07/14/19 
0230 07/13/19 
2200 *  102* 143* 112*   142 136 134* K 2.3*  2.2* 4.4 5.9*  
  105 103 95* CO2 30  30 25 27 BUN 24*  23* 71* 78* CREA 0.64  0.69 1.15 1.64* CA 7.6*  8.2* 7.5* 8.4* AGAP 7  7 8 12 BUCR 38*  33* 62* 48* AP 69 105 152* TP 4.7* 4.2* 5.3* ALB 2.4* 1.7* 2.1*  
GLOB 2.3 2.5 3.2 AGRAT 1.0 0.7* 0.7* CBC w/Diff Recent Labs  
  07/15/19 
0500 07/15/19 
0000 07/14/19 1415  07/14/19 
0230 07/13/19 
2200 WBC 5.5  --   --   --  12.4 12.8 RBC 2.26*  --   --   --  2.38* 3.25* HGB 7.2* 8.1* 7.0*   < > 8.0* 11.0*  
HCT 21.6* 24.0* 20.3*   < > 24.0* 33.1*  
  --   --   --  215 244 GRANS 93*  --   --   --  89* 86* LYMPH 3*  --   --   --  3* 5* EOS 0  --   --   --  0 0  
 < > = values in this interval not displayed. Cardiac Enzymes Recent Labs  
  07/14/19 [id number] 68 71 79 07/14/19 
0810  175 CKND1 1.6 1.7 Coagulation Recent Labs  
  07/13/19 
2200 PTP 12.4 INR 1.0 Lipid Panel No results found for: CHOL, CHOLPOCT, CHOLX, CHLST, CHOLV, 033462, HDL, LDL, LDLC, DLDLP, 231712, VLDLC, VLDL, TGLX, TRIGL, TRIGP, TGLPOCT, CHHD, CHHDX  
BNP No results for input(s): BNPP in the last 72 hours. Liver Enzymes Recent Labs  
  07/15/19 
0500 TP 4.7* ALB 2.4* AP 69 SGOT 36 Thyroid Studies Lab Results Component Value Date/Time TSH 0.32 (L) 01/05/2018 10:15 AM  
    
Procedures/imaging: see electronic medical records for all procedures/Xrays and details which were not copied into this note but were reviewed prior to creation of Plan

## 2019-07-15 NOTE — CONSULTS
Robert Gill Name:  Marin King 
MR#:   [id number] :  1965 ACCOUNT #:  [de-identified] DATE OF SERVICE:  2019 HISTORY OF PRESENT ILLNESS:  This is a 75-year-old male who was brought by his hospice nurse yesterday because of hematemesis. The patient has history of laryngeal cancer, had apparently laryngectomy and tracheostomy about two years ago, was treated also with chemotherapy. I do not have the details of this but we know that this patient was under the care of hospice. He apparently changed his mind recently and asked for full care. This patient also is well known for substance abuse including narcotic medication, alcohol and cocaine addiction who is still actively doing this. He is fed through a PEG tube that he had almost for two years. He can ingest his alcohol through the PEG tube. He did have some hematemesis. We are not sure about exactly the quantity, but it was coffee ground, and he claimed that he had some black stools before coming here. We did not witness this here. His hemoglobin did drop from 11 yesterday to 7 today. Presently, his PEG tube did not show any blood in it but when he was coughing at one time, there was some coffee-ground liquid coming up around his PEG tube according to the nurse. The patient claimed that he takes ibuprofen rarely. He had an endoscopy done by Dr. Jeane Chan on 2019 because he pulled his PEG tube and it was impacted. Dr. Jeane Chan apparently found that he has severe distal esophageal stenosis. We are putting another PEG tube through, it will be dangerous. The tube finally was inserted by Surgery using 18-[place]aur[place], using the same tract as before. I could not find the official endoscopy report from Dr. Jeane Chan.   This patient had also a problem of COPD and had multiple history of aspiration pneumonia with left-sided empyema that had to be drained surgically in 02/2019. Apparently, on endoscopy that was done on 03/14, there was a severe benign-appearing inflamed stricture in the distal esophagus consistent with actively inflamed peptic stricture. He was recommended to be on lifelong PPI, was started at that time on Protonix, but I do not see it on his list of medication. The patient claimed that he never had any colonoscopy, usually he has regular bowel movement every day. He is still smoking and drinking alcohol. Apparently, he is very sedentary. He does have a decubitus ulcer on his left buttock that measured at least 2.5 to 3 cm, that is stage II. He claimed that he gets up and walks. He appears malnourished. Apparently, he is unable to drink or eat anything by mouth. He has been admitted to ICU for aspiration pneumonia. He has been put on Zosyn, and he is coughing up copious amount of greenish secretions. The patient also is requiring high dose of narcotic medication to cope with his narcotic medication addiction. He was started on Protonix IV. MEDICATIONS:  At home consisted of Percocet 10/325 mg, Narcan 4 mg as needed intranasally, Paxil 10 mg, Pulmicort 0.5 mg, Brovana inhaler, Atrovent, prednisone pack 40 mg in tapered fashion, Catapres 0.2 mg dermal every 24 hours, Duragesic 100 mcg patch every 72 hours, Robitussin, Prinivil 5 mg, mouthwash, DuoNeb, multivitamins with iron and calcium. ALLERGIES:  HE DOES NOT HAVE ANY ALLERGIES. FAMILY HISTORY:  Apparently, his father had lung cancer. PHYSICAL EXAMINATION: 
GENERAL:  We have a 12-year-old  male who appears to be coughing frequently and bringing some clear and greenish sputum through his tracheostomy, needing constant aspiration. He does not speak and his attempted speech is incomprehensible. I have to read his lips, but this was hard to understand.  
VITAL SIGNS:  Temperature 98.5, pulse 77, blood pressure 146/92, breathing 15 to 18 and with 100% saturation, he is receiving oxygen through a tracheal collar, 20 L per minute. He weighs 132 pounds. He looks emaciated. He appears to be also pale. He is not a very good historian. I do not see him in pain. There is no rash. No stigmata of chronic liver disease. HEENT:  The eyes are remarkable for pale conjunctivae. The sclerae are anicteric. The pupils are equal and reactive to light. Oropharyngeal cavity, he has dry and pale moist mucous membrane. He has his own teeth. NECK:  He has a tracheostomy tube in his neck. No palpable lymph nodes. LUNGS:  Remarkable for bilateral rhonchi, more severe on the left side. HEART:  Cardiac rhythm is irregular, but according to the monitor, he is in sinus rhythm with multiple supraventricular ectopy, he does go sometimes in atrial fibrillation? ABDOMEN:  Scaphoid, nondistended, soft, nontender, no mass or organomegaly. He has the PEG tube in left epigastric area. There is no sign of cellulitis or purulent discharge. EXTREMITIES:  Thin. He does have some muscular wasting. NEUROLOGIC:  He is alert and oriented. Moves all his four extremities. LABORATORY DATA:  Blood tests, his hemoglobin dropped from 11 on arrival to 7. His BUN on arrival was 78 and creatinine 1.64, today is 71 and 1.15 after hydration. His BUN was 2 on 05/19/2019. His LFTs are normal except yesterday his AST was 40 and today 31, alkaline phosphatase was 152 and today 105. His troponin was 1.66 this morning and now, it is 0.35. Hemoglobin A1c 4.7, was 5.9 on 01/08/2018. The patient is presently receiving insulin because he is on steroids, Solu-Medrol. Chest x-ray yesterday showed that he has hazy opacities inferiorly in the left lung similar to previous x-ray. Tox screen was positive for cocaine, opiate, and alcohol was 5. Previously in January, it was 168. Blood culture has been so far negative. ASSESSMENT AND PLAN:  In conclusion, this is a 24-year-old male who has a history of laryngeal cancer removed surgically two years ago with terminal tracheostomy. He also has been fed with a PEG tube. This patient presented with hematemesis, dropped his hemoglobin from 11 to 7. His BUN was high, consistent with upper GI bleeding. This patient is still abusing alcohol and other drugs. Most likely, he bled from his esophagus. We know that on 03/14/2019 when he had his last endoscopy by Dr. Anabella Johnson, he mentioned that he has severe erosive or ulcerated and stenotic esophagitis that has not been treated. He is not taking any proton pump inhibitors on admission. He needs to go back on it and take it on a regular basis. For now, I do not think there is a need to do the endoscopy because we know that this is his problem. I told him that he should avoid taking any non-steroidal anti-inflammataory drugs, alcohol and take his medication at least twice a day for life. Later on, we could consider doing an endoscopy. If he continues to bleed, then I may have to do an endoscopy to do hemostasis. This patient never had any colonoscopy and later on could be considered. I am not sure if this patient is supposed to be terminal because he is already on hospice treatment whereas in this case, we do not need to be aggressive in his investigation. He also is substance abuser, and this is a limiting factor in dealing with him. He has presently some aspiration pneumonia with bronchitis that is being treated with antibiotics. Kiara PARK MD 
 
 
[id number] 
D:  07/14/2019 16:13 
T:  07/14/2019 20:01 
JOB #:  [id number] CC:  Dr Caitlyn Pagan

## 2019-07-15 NOTE — DIABETES MGMT
GLYCEMIC CONTROL PROGRESS NOTE: 
 
- chart reviewed, no known h/o DM 
- Solumedrol 40 mg Q 6 hours - BG out of target range ICU: 140-180 mg/dL - TDD = 6 units - Humalog Normal Insulin Sensitivity Corrective Coverage 
- recommend continue with current regimen Recent Glucose Results:  
Lab Results Component Value Date/Time  (H) 07/15/2019 05:00 AM  
  (H) 07/15/2019 05:00 AM  
 GLUCPOC 195 (H) 07/15/2019 09:35 AM  
 GLUCPOC 126 (H) 07/15/2019 05:49 AM  
 GLUCPOC 123 (H) 07/15/2019 01:20 AM  
 
Salima Kearney MS, RN, CDE Glycemic Control Team 
[phone] Pager [phone] (M-TH 8:00-4:30P) *After Hours pager [phone]

## 2019-07-15 NOTE — CONSULTS
Palliative Medicine Consult [facility]: [phone] ([phone]) [facility]ATHAWAYPrem[facility]: [phone] [facility]: [phone] Patient Name: Lynne Collado YOB: 1965 Date of Initial Consult: 7/15/2019 Reason for Consult: care decisions Requesting Provider: Dr Mena Dancer Primary Care Physician: So Morris MD 
  
 SUMMARY:  
Lynne Collado is a 52y.o. 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 S/P radiation and chemo per patient, ETOH and poly substance abuse, who was admitted on 7/13/2019 from home with a diagnosis of hematemesis . In the ER he was found to have an elevated lactate and hypotension. He was admitted to the ICU and stated on levophed for hypotension. Seen by GI, recent EGD in March, found esophagitis and placed on PPI. He has a history of non compliance. Recently on hospice services by revoked hospice and rescinded his DDNR. He affirmed this with us today. Current medical issues leading to Palliative Medicine involvement include: 52year old male who has history of laryngeal cancer with trach and PEG who has been on hospice services, wishes to revoke hospice and seek full treatment with full interventions including full code. Palliative medicine is consulted for support and care decision discussion. PALLIATIVE DIAGNOSES:  
1. Advanced care plan discussion 2. Throat cancer 3. Hematemesis 4. Sepsis PLAN:  
1. Advanced care plan discussion seen today along with PAU Osborn and Ms Joellen RN. Khoa Egan is alert and oriented x 4. Trach in place but speech understandable. He shares with us \" I did something stupid, I put alcohol in my tube and used drugs, trying to hang with my sons\" He tells us he was on hospice care at home for a very short time, but \"really did not want this\". He has revoked their services and wishes to seek full treatment. AMD introduced he was agreeable to completion today, naming his finance Lizeth Rao as MPOA. He declined to name a secondary. Medical wishes not denoted today as he needs time to consider. Goals of care discussed including benefits and burdens of resuscitation. He wishes to full code with full interventions. . We highly encouraged continued thought and discussion on goals of care and care decisions. Discussed interplay of life prolonging procedures and debility and effects on his quality of life. 2. Throat cancer per patient dx 3 years ago, s/p chemo and radiation. Has not recently followed up with oncology. He is not aware of any metastatic disease. Trach and PEG in place. He would like for to see if in the future his trach could be reversed. 3. Hematemesis Has been seen by GI, recent EGD in March 2019 with findings of severe distal esophageal stenosis with actively inflamed peptic stricture. It was recommended that he take a PPI for life time. He has a hx of non compliance 4. Sepsis currently on levophed, IVAB, attending team and PCCM managing. 5. Initial consult note routed to primary continuity provider 6. Communicated plan of care with: Palliative IDT Patient/Health Care Proxy Stated Goals: Prolong life TREATMENT PREFERENCES:  
Code Status: Full Code Advance Care Planning: 
[x] [facility] Interdisciplinary Team has updated the ACP Navigator with [address] and Patient Capacity Primary Decision Maker (Health Care Agent): Lizeth Cervantesjorge alberto Rahman Medical Interventions: Full interventions Artificially Administered Nutrition: Feeding tube long-term, if indicated Other: As far as possible, the palliative care team has discussed with patient / health care proxy about goals of care / treatment preferences for patient. HISTORY:  
 
History obtained from: chart and patient CHIEF COMPLAINT: hematemesis HPI/SUBJECTIVE: The patient is: [x] Verbal and participatory [] Non-participatory due to:  
Please see summary Clinical Pain Assessment (nonverbal scale for nonverbal patients): Clinical Pain Assessment Severity: 0 Activity (Movement): Lying quietly, normal position Duration: for how long has pt been experiencing pain (e.g., 2 days, 1 month, years) Frequency: how often pain is an issue (e.g., several times per day, once every few days, constant) FUNCTIONAL ASSESSMENT:  
 
Palliative Performance Scale (PPS): 50 ECOG 
ECOG Status : Ambulatory, but unable to carry out work activities PSYCHOSOCIAL/SPIRITUAL SCREENING:  
  
Any spiritual / Anglican concerns: 
[] Yes /  [x] No 
 
Caregiver Burnout: 
[] Yes /  [] No /  [x] No Caregiver Present Anticipatory grief assessment:  
[x] Normal  / [] Maladaptive REVIEW OF SYSTEMS:  
 
Positive and pertinent negative findings in ROS are noted above in HPI. The following systems were [x] reviewed / [] unable to be reviewed as noted in HPI Other findings are noted below. Systems: constitutional, ears/nose/mouth/throat, respiratory, gastrointestinal, genitourinary, musculoskeletal, integumentary, neurologic, psychiatric, endocrine. Positive findings noted below. Modified ESAS Completed by: provider Fatigue: 4 Drowsiness: 0 Depression: 0 Pain: 0 Anxiety: 0 Nausea: 0 Dyspnea: 0 PHYSICAL EXAM:  
 
Wt Readings from Last 3 Encounters:  
07/15/19 59.9 kg (132 lb)  
01/13/19 70.3 kg (155 lb) 08/14/18 70.3 kg (155 lb) Blood pressure 103/72, pulse 85, temperature 98.1 °F (36.7 °C), resp. rate 17, height 6' (1.829 m), weight 59.9 kg (132 lb), SpO2 99 %. Pain: 
Pain Scale 1: Numeric (0 - 10) Pain Intensity 1: 0 Last bowel movement: none recorded Constitutional: 52year old male who is sitting up in bed who is alert and oriented x 3 Eyes: pupils equal, anicteric ENMT: tracheostomy in place Respiratory: breathing not labored, coarse upper airway sounds Gastrointestinal: PEG in place Skin: warm, dry Neurologic: alert and oriented x 4 Psychiatric: full affect, no hallucinations HISTORY:  
 
Principal Problem: 
  Sepsis ([facility] (7/13/2019) Active Problems: 
  Aspiration pneumonia ([facility] (11/15/2017) Hematemesis (7/13/2019) Hyperkalemia (7/14/2019) Hypotension (7/14/2019) Past Medical History:  
Diagnosis Date  Chronic obstructive pulmonary disease (HCC)   
 acute hypoxic respiratory failure 8/16  Hematemesis 7/13/2019  Hypertension  Ill-defined condition   
 peg tube in place  Ill-defined condition   
 chemo  S/P radiation therapy  Throat cancer ([facility]  Tobacco abuse Past Surgical History:  
Procedure Laterality Date  HX GI    
 peg tub  HX HEENT    
 sinus surgery  HX TRACHEOSTOMY History reviewed. No pertinent family history. History reviewed, no pertinent family history. Social History Tobacco Use  Smoking status: Former Smoker Packs/day: 0.50  Smokeless tobacco: Never Used Substance Use Topics  Alcohol use: No  
 
No Known Allergies Current Facility-Administered Medications Medication Dose Route Frequency  amiodarone (NEXTERONE) 360 mg in dextrose 200 mL (1.8 mg/mL) infusion  0.5-1 mg/min IntraVENous TITRATE  ELECTROLYTE REPLACEMENT PROTOCOL - Potassium Standard Dosing   1 Each Other PRN  
 ELECTROLYTE REPLACEMENT PROTOCOL - Magnesium   1 Each Other PRN  
 methylPREDNISolone (PF) (SOLU-MEDROL) injection 40 mg  40 mg IntraVENous Q12H  
 0.9% sodium chloride 1,000 mL with mvi, adult no. 4 with vit K 10 mL, thiamine 984 mg, folic acid 1 mg infusion   IntraVENous DAILY  pantoprazole (PROTONIX) injection 40 mg  40 mg IntraVENous Q12H  
 ondansetron (ZOFRAN) injection 4 mg  4 mg IntraVENous Q4H PRN  
 LORazepam (ATIVAN) tablet 1 mg  1 mg Oral Q1H PRN  Or  
  LORazepam (ATIVAN) injection 1 mg  1 mg IntraVENous Q1H PRN  
 LORazepam (ATIVAN) tablet 2 mg  2 mg Oral Q1H PRN Or  
 LORazepam (ATIVAN) injection 2 mg  2 mg IntraVENous Q1H PRN  
 LORazepam (ATIVAN) injection 3 mg  3 mg IntraVENous Q15MIN PRN  
 albuterol-ipratropium (DUO-NEB) 2.5 MG-0.5 MG/3 ML  3 mL Nebulization Q4H RT  
 albuterol-ipratropium (DUO-NEB) 2.5 MG-0.5 MG/3 ML  3 mL Nebulization Q4H PRN  
 0.9% sodium chloride infusion  150 mL/hr IntraVENous CONTINUOUS  
 sodium chloride (NS) flush 5-40 mL  5-40 mL IntraVENous Q8H  
 insulin lispro (HUMALOG) injection   SubCUTAneous Q4H  
 glucose chewable tablet 16 g  4 Tab Oral PRN  
 glucagon (GLUCAGEN) injection 1 mg  1 mg IntraMUSCular PRN  
 PHENYLephrine (KATHERIN-SYNEPHRINE) 30 mg in 0.9% sodium chloride 250 mL infusion   mcg/min IntraVENous TITRATE  albumin human 25% (BUMINATE) solution 12.5 g  12.5 g IntraVENous Q6H  
 piperacillin-tazobactam (ZOSYN) 4.5 g in 0.9% sodium chloride (MBP/ADV) 100 mL MBP  4.5 g IntraVENous Q8H  
 vancomycin (VANCOCIN) 1,000 mg in 0.9% sodium chloride 250 mL IVPB  1,000 mg IntraVENous Q18H  
 sodium chloride (NS) flush 5-10 mL  5-10 mL IntraVENous PRN  
 levoFLOXacin (LEVAQUIN) 750 mg in D5W IVPB  750 mg IntraVENous Q24H  Vancomycin pharmacy to dose  1 Each Other Rx Dosing/Monitoring  sodium chloride (NS) flush 5-40 mL  5-40 mL IntraVENous PRN  
 NOREPINephrine (LEVOPHED) 8 mg in dextrose 5% 250 mL infusion  2-16 mcg/min IntraVENous TITRATE  
 
 
 LAB AND IMAGING FINDINGS:  
 
Lab Results Component Value Date/Time WBC 5.5 07/15/2019 05:00 AM  
 HGB 6.8 (L) 07/15/2019 01:45 PM  
 PLATELET 716 67/34/6671 05:00 AM  
 
Lab Results Component Value Date/Time  Sodium 142 07/15/2019 05:00 AM  
 Sodium 142 07/15/2019 05:00 AM  
 Potassium 2.2 (LL) 07/15/2019 05:00 AM  
 Potassium 2.3 (LL) 07/15/2019 05:00 AM  
 Chloride 105 07/15/2019 05:00 AM  
 Chloride 105 07/15/2019 05:00 AM  
 CO2 30 07/15/2019 05:00 AM  
 CO2 30 07/15/2019 05:00 AM  
 BUN 23 (H) 07/15/2019 05:00 AM  
 BUN 24 (H) 07/15/2019 05:00 AM  
 Creatinine 0.69 07/15/2019 05:00 AM  
 Creatinine 0.64 07/15/2019 05:00 AM  
 Calcium 8.2 (L) 07/15/2019 05:00 AM  
 Calcium 7.6 (L) 07/15/2019 05:00 AM  
 Magnesium 1.3 (L) 07/15/2019 05:00 AM  
 Phosphorus 1.6 (L) 07/15/2019 05:00 AM  
  
Lab Results Component Value Date/Time AST (SGOT) 36 07/15/2019 05:00 AM  
 Alk. phosphatase 69 07/15/2019 05:00 AM  
 Protein, total 4.7 (L) 07/15/2019 05:00 AM  
 Albumin 2.4 (L) 07/15/2019 05:00 AM  
 Globulin 2.3 07/15/2019 05:00 AM  
 
Lab Results Component Value Date/Time INR 1.0 07/13/2019 10:00 PM  
 Prothrombin time 12.4 07/13/2019 10:00 PM  
 aPTT 32.0 01/14/2019 01:02 AM  
  
No results found for: IRON, FE, TIBC, IBCT, PSAT, FERR No results found for: PH, PCO2, PO2 No components found for: [place] Point Lab Results Component Value Date/Time  07/14/2019 02:15 PM  
 CK - MB 2.8 07/14/2019 02:15 PM  
  
 
   
 
Total time: 50 minutes Counseling / coordination time, spent as noted above: 40 minutes  
> 50% counseling / coordination:  Yes with patient Prolonged service was provided for  []30 min   []75 min in face to face time in the presence of the patient, spent as noted above. Time Start:  
Time End:  
Note: this can only be billed with 20636 (initial) or 05023 (follow up). If multiple start / stop times, list each separately.

## 2019-07-15 NOTE — PROGRESS NOTES
1930: Bedside and Verbal shift change report given to Jaison Humphries, RN (oncoming nurse) by ANNALISA Grimaldo, AZAM (offgoing nurse). Report included the following information SBAR, ED Summary, Intake/Output, MAR, Recent Results and Cardiac Rhythm NSR/ ST. Patient on levophed and guerrero. 2000: Assessment completed. See EMR. 2045: Blood transfusion started. Guerrero stopped as patient's MAP in 100's and SBP in 150's. 
 
2230: Beginning to wean levophed. See MAR for titration. 2240: Blood transfusion completed. 0000: Reassessment completed. See EMR. H&H drawn. Hgb >8.0.  
 
0400: Reassessment completed. See EMR.  
 
4418: This RN in room with patient administering medications when alarm rang for HR in 150's-170's. This RN initially attempted to have patient bare down. 9226: EKG obtained. Dr. Guillermo Baumann paged along with Dr. Jerzy Mitchell. EKG confirms a-fib, orders for x1 dose digoxin by Dr. Guillermo Baumann. See MAR for administration. 0: Dr. Jerzy Mitchell repaged and called back. Updated on patient. Digoxin broke HR to 90's before increasing back to 150's. Orders for 10mg cardizem bolus and start gtt. Dr. Guillermo Baumann called back and in agreement with plan. 7406: Cardizem started. 0600: Levophed maxed out and guerrero restarted to combat hypotension related to a-fib and cardizem. 9296: Patient at 15mg/hr of cardizem. HR remains elevated in 150's-170's with SBP continuing to fluctuated. Dr. Jerzy Mitchell updated and orders to stop cardizem and start amiodarone. 0630: Amiodarone bolus given and gtt started at 1mg/min. Drip to be decreased to 0.5mg/min at 1230. Potassium also returned with critical low of 2.2. Dr. Jerzy Mitchell called and made aware. Orders to place patient on Potassium replacement and replace as ordered. 0700: Patient's magnesium also noted to be low. Placed on magnesium replacement protocol and replacement ordered as written. 0715: Patient converted back into NSR.

## 2019-07-15 NOTE — PROGRESS NOTES
Reason for Admission:   C/o emesis from trach and SOB 
               
RRAT Score:    14 Do you (patient/family) have any concerns for transition/discharge? Not at this time Plan for utilizing home health:  TBD, per chart pt withdrew from hospice services Current Advanced Directive/Advance Care Plan:  Not on chart at this time Transition of Care Plan:    Chart reviewed per ED notes pt arrived via EMS from home with respiratory distress, and reported multiple episodes of hematemesis, hx throat cancer,htn,copd,substance abuse, trach dependence. Cm will attend IDR's to discuss care and d/c plan. Care Management Interventions PCP Verified by CM: Yes 
Palliative Care Criteria Met (RRAT>21 & CHF Dx)?: No(consult was placed  by ED physician) Palliative Consult Recommended?: Yes Transition of Care Consult (CM Consult): Home Hospice Current Support Network: Other(live with SO) 1030- cm visited with pt at beside, pt states he remembers this cm for previous visits, pt informed cm that Jaky Canales which pt does address her as his wife  will be making his discharge decisions and that cm can call her if needed, pt did states that he does not want to cont hospice services, at this time pt doesn't recall agency name at this time. Cm will remain available and cont to assess for d/c needs.

## 2019-07-15 NOTE — PROGRESS NOTES
[facility] Pulmonary, Critical Care, and Sleep Medicine Name: Marilia Rivas MRN: [id number] : 1965 Hospital: [facility] [place] Date: 7/15/2019 Pulmonary Critical Care Note IMPRESSION:  
Patient Active Problem List  
Diagnosis Code  Acute hypoxemic respiratory failure (HCC) J96.01  
 COPD exacerbation ([facility]) J44.1  Aspiration pneumonia ([facility] J69.0  Throat cancer ([facility] C14.0  Tracheostomy dependence ([address] Z93.0  Hematemesis, GIB K92.0  Status post insertion of percutaneous endoscopic gastrostomy (PEG) tube ([facility] Z93.1  Hyponatremia, resolved E87.1  Severe protein-calorie malnutrition Srinivasan Osorio: less than 60% of standard weight) ([facility]) E43  
 Hyperkalemia E87.5  Elevated lactate, normalized  R79.89  Abnormal troponin R74.8  Cocaine abuse  ETOH abuse Acute blood loss anemia due to GIB S/p EGD 3/14/19 Dr. Dre Boothe: severe erosive or ulcerated and stenotic esophagitis Medication noncompliance ·  
  
RECOMMENDATIONS:  
Continue HF via trach for now- adjust settings per ABG or for goal SPO2> 90% Aspiration prevention bundle, head of the bed at 30' all times Avoid sedation; monitor for any withdrawal symptoms Continue bronchodilators PRN, pulmonary hygiene care Steroids - taper clinical response. Tapered to 40 q12 Sepsis bundle per hospital protocol. Sputum culture from trach suction Lactic acid initial elevated and repeat till normalized. Blood cx NGTD Antibiotic choice: Zosyn, Levofloxacin, Vancomycin. Narrow Abx per cx data. Fluids: NS Monitor hemodynamics Actively titrate vasopressors aim MAP >65mmHg. Levophed and jennifer being titrated down. Once off vasopressors, d/c femoral central line. Off cardizem drip. Now in NSR. Still on amio drip. Defer to cardio. K and Mg being replaced per ICU protocol Check cardiac panel, ECHO Monitor Hgb Q6 hrs.  PPI Q12 
 GI appreciated. EGD in 03/2019 had erosive esophagitis and stricture. Pt is noncompliant to PPI. EGD is active bleeding. Glycemic control Monitor for alcohol withdrawal. Advised to stop illicit drugs and alcohol use. Start banana bag daily. AM labs Restart PEG feeding when ok by GI Palliative care consult Diop Bundle Followed Will defer respective systems problem management to primary and other consultant and follow patient in ICU with primary and other medical team 
Further recommendations will be based on the patient's response to recommended treatment and results of the investigation ordered. Quality Care: PPI, DVT prophylaxis, HOB elevated, Infection control all reviewed and addressed. PAIN AND SEDATION: none · Skin/Wound: none · Nutrition: NPO 
· Prophylaxis: DVT and GI Prophylaxis reviewed. SCD, PPI · Restraints: none 
· PT/OT eval and treat: as needed when stable · Lines/Tubes: lines - R femoral CVC 7/13/19; diop 7/13/19 ADVANCE DIRECTIVE: Full code. Palliative care consulted DISCUSSION: spoke with patient, RN, RT, staff Events and notes from last 24 hours reviewed. Care plan discussed with nursing Subjective/History: Mr. Sharmaine Sampson has been seen and evaluated as Dr. Holly Gongora requested now for assisting with ICU care. The patient can not provide additional history due to tracheostomy. Patient is a 52 y.o. 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 In ER noted to have elevated lactate, hypotension elevated troponin, requiring IV fluids and then Levophed. UDS today positive for cociane and opiate and ETOH level positive.  Per chart patient has history of polysubstance abuse, regularly uses alcohol through his peg tube; was going to be dismissed from hospice due to indiscrepencies with opiate usage namely fentanyl and Roxanol. In ER, patient was hypoxic and felt to have COPD exacerbation. He is started on HF O2 via tracheostomy, steroids and bronchodilator. Patient treated for LT sided lung empyema with decortication by CT surgeon at [facility] per chart. Other information is limited. 7/15/2019 Remained in icu Received 1 prbc yesterday Hb stable Seen by GI No active hemetemesis currently 
afib converted to nsr this am at 7.35 am. Off cardizem drip since last night. Still on amio drip. On lveophed and jennifer; being titrated. Currently levo @8 and jennifer @ 15. Na normalized Mg, K being replaced No alcohol withdrawal 
Lactate normalized No other overnight issues. Review of Systems: 
Review of systems not obtained due to patient factors. [x]The patient is critically ill on     
[]Mechanical ventilation [x]Pressors []BiPAP [] Latest lactic acid:  
Lactic acid Date Value Ref Range Status 07/14/2019 1.5 0.4 - 2.0 MMOL/L Final  
07/14/2019 2.2 (HH) 0.4 - 2.0 MMOL/L Final  
  Comment:  
  CALLED TO AND CORRECTLY REPEATED BY: 
Elizabeth Amaya RN ICU 07/14/19 AT 1240 TMB 
  
07/14/2019 2.9 (HH) 0.4 - 2.0 MMOL/L Final  
  Comment:  
  CALLED TO AND CORRECTLY REPEATED BY: 
Bradley Silva RN, ICU ON 07/14/2019 AT 0344 TO JDA Past Medical History: 
Past Medical History:  
Diagnosis Date  Chronic obstructive pulmonary disease (HCC)   
 acute hypoxic respiratory failure 8/16  Hematemesis 7/13/2019  Hypertension  Ill-defined condition   
 peg tube in place  Ill-defined condition   
 chemo  S/P radiation therapy  Throat cancer ([facility]  Tobacco abuse Past Surgical History: 
Past Surgical History:  
Procedure Laterality Date  HX GI    
 peg tub  HX HEENT    
 sinus surgery  HX TRACHEOSTOMY Medications: 
Prior to Admission medications Medication Sig Start Date End Date Taking? Authorizing Provider  
oxyCODONE-acetaminophen (PERCOCET)  mg per tablet Take 1 Tab by mouth two (2) times a day. Hugh Garrido MD  
oxyCODONE-acetaminophen (PERCOCET)  mg per tablet Take  by mouth. Indications: reports takes 30mg tablets BID. states out of medication    OtherHugh MD  
naloxone (NARCAN) 4 mg/actuation nasal spray Use 1 spray intranasally into 1 nostril. Use a new Narcan nasal spray for subsequent doses and administer into alternating nostrils. May repeat every 2 to 3 minutes as needed. 6/25/18   Coy Armas MD  
PARoxetine (PAXIL) 10 mg tablet 10 mg by Per G Tube route daily. Provider, Historical  
budesonide (PULMICORT) 0.5 mg/2 mL nbsp 500 mcg by Nebulization route two (2) times a day. Provider, Historical  
arformoterol (BROVANA) 15 mcg/2 mL nebu neb solution 2 mL by Nebulization route two (2) times a day. 1/9/18   Val Darby MD  
budesonide (PULMICORT) 0.5 mg/2 mL nbsp 2 mL by Nebulization route two (2) times a day. 1/9/18   Val Darby MD  
ipratropium (ATROVENT) 0.02 % soln 2.5 mL by Nebulization route four (4) times daily. 1/9/18   Val Darby MD  
predniSONE (DELTASONE) 10 mg tablet Prednisone 10mg tabs: p.o. 
4 tabs daily for 3 days then drop to  
3 tabs daily for 3 days then drop to  
2 tabs daily for 3 days then drop to  
1 tab daily for 3 days then stop. Dispense 30 tabs 1/9/18   Val Darby MD  
cloNIDine (CATAPRES) 0.2 mg/24 hr patch 1 Patch by TransDERmal route every seven (7) days. 8/29/17   Ju Connell MD  
fentaNYL (DURAGESIC) 100 mcg/hr PATCH 1 Patch by TransDERmal route every seventy-two (72) hours. Max Daily Amount: 1 Patch. 8/29/17   Ju Connell MD  
guaiFENesin (ROBITUSSIN) 100 mg/5 mL liquid Take 5 mL by mouth every four (4) hours as needed for Cough.  8/29/17   Ju Connell MD  
lisinopril (PRINIVIL, ZESTRIL) 5 mg tablet Take 1 Tab by mouth every twelve (12) hours. 8/29/17   Akiko Moreau MD  
magic mouthwash (FIRST-MOUTHWASH BLM) 801--40 mg/30 mL mwsh oral suspension Take 10 mL by mouth every four (4) hours as needed. Provider, Historical  
albuterol-ipratropium (DUO-NEB) 2.5 mg-0.5 mg/3 ml nebu 3 mL by Nebulization route every four (4) hours as needed. Patient taking differently: 3 mL by Nebulization route every four (4) hours as needed (wheezing). 8/1/17   Elisa Conteh MD  
multivitamin, tx-iron-ca-min (THERA-M W/ IRON) 9 mg iron-400 mcg tab tablet Take 1 Tab by mouth daily. 8/1/17   Elisa Conteh MD  
Nebulizer Accessories kit Use nebs Q4H PRN 8/1/17   Elisa Conteh MD  
 
 
Current Facility-Administered Medications Medication Dose Route Frequency  amiodarone (NEXTERONE) 360 mg in dextrose 200 mL (1.8 mg/mL) infusion  0.5-1 mg/min IntraVENous TITRATE  potassium chloride 10 mEq in 100 ml IVPB  10 mEq IntraVENous Q1H  
 pantoprazole (PROTONIX) injection 40 mg  40 mg IntraVENous Q12H  
 albuterol-ipratropium (DUO-NEB) 2.5 MG-0.5 MG/3 ML  3 mL Nebulization Q4H RT  
 0.9% sodium chloride infusion  150 mL/hr IntraVENous CONTINUOUS  
 methylPREDNISolone (PF) (SOLU-MEDROL) injection 40 mg  40 mg IntraVENous Q6H  
 sodium chloride (NS) flush 5-40 mL  5-40 mL IntraVENous Q8H  
 insulin lispro (HUMALOG) injection   SubCUTAneous Q4H  
 PHENYLephrine (KATHERIN-SYNEPHRINE) 30 mg in 0.9% sodium chloride 250 mL infusion   mcg/min IntraVENous TITRATE  albumin human 25% (BUMINATE) solution 12.5 g  12.5 g IntraVENous Q6H  
 piperacillin-tazobactam (ZOSYN) 4.5 g in 0.9% sodium chloride (MBP/ADV) 100 mL MBP  4.5 g IntraVENous Q8H  
 vancomycin (VANCOCIN) 1,000 mg in 0.9% sodium chloride 250 mL IVPB  1,000 mg IntraVENous Q18H  
 levoFLOXacin (LEVAQUIN) 750 mg in D5W IVPB  750 mg IntraVENous Q24H  Vancomycin pharmacy to dose  1 Each Other Rx Dosing/Monitoring  NOREPINephrine (LEVOPHED) 8 mg in dextrose 5% 250 mL infusion  2-16 mcg/min IntraVENous TITRATE Allergy: No Known Allergies Social History: 
Social History Tobacco Use  Smoking status: Former Smoker Packs/day: 0.50  Smokeless tobacco: Never Used Substance Use Topics  Alcohol use: No  
 Drug use: No  
  
 
Family History: 
History reviewed. No pertinent family history. Objective:  
Vital Signs:   
Blood pressure 128/75, pulse 87, temperature 97.9 °F (36.6 °C), resp. rate 21, height 6' (1.829 m), weight 60.3 kg (132 lb 15 oz), SpO2 97 %. Body mass index is 18.03 kg/m². O2 Device: Tracheal collar O2 Flow Rate (L/min): 20 l/min Temp (24hrs), Av.1 °F (36.7 °C), Min:97.6 °F (36.4 °C), Max:98.5 °F (36.9 °C) Intake/Output:  
Last shift:      07/15 0701 - 07/15 190 In: -  
Out: 250 [Urine:250] Last 3 shifts: 1901 - 07/15 0700 In: 414.2 [I.V.:100] Out: Matilde Granger Intake/Output Summary (Last 24 hours) at 7/15/2019 1040 Last data filed at 7/15/2019 0800 Gross per 24 hour Intake 314.2 ml Output 6875 ml Net -6560.8 ml Physical Exam: 
General: aaox3. No focal deficit. Following all commands. HEENT: PERRL, oral mucosa moist 
Neck: No abnormally enlarged lymph nodes or thyroid, supple; tracheostomy tube in place Lungs: moderate air entry, no rhonchi, no wheezing, breathing normal , normal percussion bilaterally, no tenderness/ rash Heart: S1S2 present or without murmur or extra heart sounds Abdomen: non distended, bowel sounds normoactive, tympanic, abdomen is soft without significant tenderness, masses, organomegaly or guarding, rigidity, rebound. PEG in place. Extremity: negative for edema, cyanosis, clubbing Capillary refill: normal 
Neuro: weak looking but easy to arouse, moves all extremities well, no involuntary movements, exam limitations Skin: Skin color, texture, turgor fair. Skin dry, warm, diaphoretic Data:  
 
Recent Results (from the past 24 hour(s)) GLUCOSE, POC  
 Collection Time: 07/14/19 10:44 AM  
Result Value Ref Range Glucose (POC) 157 (H) 70 - 110 mg/dL LACTIC ACID Collection Time: 07/14/19 11:15 AM  
Result Value Ref Range Lactic acid 2.2 (HH) 0.4 - 2.0 MMOL/L  
HGB & HCT Collection Time: 07/14/19  2:15 PM  
Result Value Ref Range HGB 7.0 (L) 13.0 - 16.0 g/dL HCT 20.3 (L) 36.0 - 48.0 % CARDIAC PANEL,(CK, CKMB & TROPONIN) Collection Time: 07/14/19  2:15 PM  
Result Value Ref Range  39 - 308 U/L  
 CK - MB 2.8 <3.6 ng/ml CK-MB Index 1.6 0.0 - 4.0 % Troponin-I, QT 0.35 (H) 0.0 - 0.045 NG/ML  
GLUCOSE, POC Collection Time: 07/14/19  2:51 PM  
Result Value Ref Range Glucose (POC) 191 (H) 70 - 110 mg/dL GLUCOSE, POC Collection Time: 07/14/19  5:53 PM  
Result Value Ref Range Glucose (POC) 185 (H) 70 - 110 mg/dL LACTIC ACID Collection Time: 07/14/19  8:15 PM  
Result Value Ref Range Lactic acid 1.5 0.4 - 2.0 MMOL/L  
GLUCOSE, POC Collection Time: 07/14/19  9:31 PM  
Result Value Ref Range Glucose (POC) 138 (H) 70 - 110 mg/dL HGB & HCT Collection Time: 07/15/19 12:00 AM  
Result Value Ref Range HGB 8.1 (L) 13.0 - 16.0 g/dL HCT 24.0 (L) 36.0 - 48.0 % GLUCOSE, POC Collection Time: 07/15/19  1:20 AM  
Result Value Ref Range Glucose (POC) 123 (H) 70 - 110 mg/dL METABOLIC PANEL, BASIC Collection Time: 07/15/19  5:00 AM  
Result Value Ref Range Sodium 142 136 - 145 mmol/L Potassium 2.2 (LL) 3.5 - 5.5 mmol/L Chloride 105 100 - 108 mmol/L  
 CO2 30 21 - 32 mmol/L Anion gap 7 3.0 - 18 mmol/L Glucose 102 (H) 74 - 99 mg/dL BUN 23 (H) 7.0 - 18 MG/DL Creatinine 0.69 0.6 - 1.3 MG/DL  
 BUN/Creatinine ratio 33 (H) 12 - 20 GFR est AA >60 >60 ml/min/1.73m2 GFR est non-AA >60 >60 ml/min/1.73m2 Calcium 8.2 (L) 8.5 - 10.1 MG/DL  
CBC WITH AUTOMATED DIFF Collection Time: 07/15/19  5:00 AM  
Result Value Ref Range WBC 5.5 4.6 - 13.2 K/uL RBC 2.26 (L) 4.70 - 5.50 M/uL HGB 7.2 (L) 13.0 - 16.0 g/dL HCT 21.6 (L) 36.0 - 48.0 % MCV 95.6 74.0 - 97.0 FL  
 MCH 31.9 24.0 - 34.0 PG  
 MCHC 33.3 31.0 - 37.0 g/dL  
 RDW 18.9 (H) 11.6 - 14.5 % PLATELET 668 569 - 483 K/uL MPV 9.8 9.2 - 11.8 FL  
 NEUTROPHILS 93 (H) 40 - 73 % LYMPHOCYTES 3 (L) 21 - 52 % MONOCYTES 4 3 - 10 % EOSINOPHILS 0 0 - 5 % BASOPHILS 0 0 - 2 %  
 ABS. NEUTROPHILS 5.1 1.8 - 8.0 K/UL  
 ABS. LYMPHOCYTES 0.2 (L) 0.9 - 3.6 K/UL  
 ABS. MONOCYTES 0.2 0.05 - 1.2 K/UL  
 ABS. EOSINOPHILS 0.0 0.0 - 0.4 K/UL  
 ABS. BASOPHILS 0.0 0.0 - 0.1 K/UL  
 DF AUTOMATED METABOLIC PANEL, COMPREHENSIVE Collection Time: 07/15/19  5:00 AM  
Result Value Ref Range Sodium 142 136 - 145 mmol/L Potassium 2.3 (LL) 3.5 - 5.5 mmol/L Chloride 105 100 - 108 mmol/L  
 CO2 30 21 - 32 mmol/L Anion gap 7 3.0 - 18 mmol/L Glucose 101 (H) 74 - 99 mg/dL BUN 24 (H) 7.0 - 18 MG/DL Creatinine 0.64 0.6 - 1.3 MG/DL  
 BUN/Creatinine ratio 38 (H) 12 - 20 GFR est AA >60 >60 ml/min/1.73m2 GFR est non-AA >60 >60 ml/min/1.73m2 Calcium 7.6 (L) 8.5 - 10.1 MG/DL Bilirubin, total 1.0 0.2 - 1.0 MG/DL  
 ALT (SGPT) 20 16 - 61 U/L  
 AST (SGOT) 36 15 - 37 U/L Alk. phosphatase 69 45 - 117 U/L Protein, total 4.7 (L) 6.4 - 8.2 g/dL Albumin 2.4 (L) 3.4 - 5.0 g/dL Globulin 2.3 2.0 - 4.0 g/dL A-G Ratio 1.0 0.8 - 1.7 MAGNESIUM Collection Time: 07/15/19  5:00 AM  
Result Value Ref Range Magnesium 1.3 (L) 1.6 - 2.6 mg/dL PHOSPHORUS Collection Time: 07/15/19  5:00 AM  
Result Value Ref Range Phosphorus 1.6 (L) 2.5 - 4.9 MG/DL  
EKG, 12 LEAD, SUBSEQUENT Collection Time: 07/15/19  5:11 AM  
Result Value Ref Range Ventricular Rate 154 BPM  
 Atrial Rate 129 BPM  
 QRS Duration 100 ms Q-T Interval 290 ms QTC Calculation (Bezet) 464 ms Calculated R Axis 71 degrees Calculated T Axis -105 degrees Diagnosis Atrial fibrillation with rapid ventricular response Marked ST abnormality, possible inferior subendocardial injury Marked ST abnormality, possible anteroseptal subendocardial injury Abnormal ECG When compared with ECG of 14-JUL-2019 10:23, Atrial fibrillation has replaced Sinus rhythm Vent. rate has increased BY  75 BPM 
ST now depressed in Inferior leads ST now depressed in Anterolateral leads Nonspecific T wave abnormality, worse in Anterolateral leads GLUCOSE, POC Collection Time: 07/15/19  5:49 AM  
Result Value Ref Range Glucose (POC) 126 (H) 70 - 110 mg/dL GLUCOSE, POC Collection Time: 07/15/19  9:35 AM  
Result Value Ref Range Glucose (POC) 195 (H) 70 - 110 mg/dL Recent Labs  
  07/14/19 
0302 FIO2I 50 HCO3I 21.9*  
PCO2I 39.3 PHI 7.354 PO2I 149* All Micro Results Procedure Component Value Units Date/Time CULTURE, BLOOD [[id number]] Collected:  07/13/19 2200 Order Status:  Completed Specimen:  Blood Updated:  07/15/19 5227 Special Requests: NO SPECIAL REQUESTS Culture result: NO GROWTH 2 DAYS     
 CULTURE, BLOOD [[id number]] Collected:  07/13/19 2215 Order Status:  Completed Specimen:  Blood Updated:  07/15/19 2531 Special Requests: NO SPECIAL REQUESTS Culture result: NO GROWTH 2 DAYS     
 CULTURE, RESPIRATORY/SPUTUM/BRONCH Dareen Cole STAIN [[id number]] Order Status:  Sent Specimen:  Sputum Telemetry: normal sinus rhythm Imaging: 
[x]I have personally reviewed the patients chest radiographs images and report Results from Hospital Encounter encounter on 07/13/19 XR CHEST PORT Narrative EXAM: Portable upright chest radiograph. INDICATION: \"hematemesis. \" 
 
COMPARISON: May 19, 2019 
 
_______________ FINDINGS:   
 
   DEVICES:  Tracheostomy tube in standard appearance, unchanged. LUNGS:  
         --Expansion:  Adequate. --Consolidation:  None detected. --Pulmonary edema:  None detected. --Other:  A clavicle subtle hazy opacity in the left perihilar and 
lower third of the lung, similar to the prior. PLEURAL SPACE: 
         --Pleural effusion:  None detected. --Pneumothorax:  None detected. 
 
_______________ Impression IMPRESSION: 
 
Equivocal subtle hazy opacity inferiorly in the left lung, similar to the prior. _______________ [x]See my orders for details My assessment, plan of care, findings, medications, side effects etc were discussed with: 
[x]nursing, MDR []PT/OT [x]respiratory therapy []Dr. Iain Valadez [x]Patient [x]Total critical care time exclusive of procedures 36 minutes with complex decision making performed and > 50% time spent in face to face evaluation. Michael Freeman MD 
7/15/2019

## 2019-07-15 NOTE — PROGRESS NOTES
Bedside shift change report given to Deo Morelos (oncoming nurse) by Jay Herrera nurse). Report included the following information SBAR, Kardex, ED Summary, Intake/Output, MAR, Accordion, Recent Results, Med Rec Status, Cardiac Rhythm Afib and Quality Measures. Patient continues on Levophed and guerrero. 0730 Patient converted to NSR 
 
0800 Assessment completed, continue to titrate guerrero and Levophed drip. Patient denies any pain or distress. 0900 Echo completed 1113 Guerrero stopped 1133 Restarted Guerrero, patient requiring to continue on guerrero for hemodynamic stability. Unable to wean off this medication at this time. 1200 Reassessment completed five runs of potassium completed and magnesium replacement completed. 1345 Magnesium corrected,currently 2.3, was 1.3/ no new orders 2.4 potassium resulted, notified Dr. Dasilva is being repeated at this time. Order for 1 x dose 10meq while waiting iv, I unit  Rbc for H&H 6.8 /20.0 Dr Patricia Flores also paged at this time. Trickle feeds also stopped at this time. New orders per DR Patricia Flores

## 2019-07-15 NOTE — PROGRESS NOTES
Bedside and Verbal shift change report given to MANAN Tesfaye RN (oncoming nurse) by Sergei Alvarado RN (offgoing nurse). Report included the following information SBAR, ED Summary, Intake/Output, MAR, Recent Results and Cardiac Rhythm a-fib/NSR/ST. Levophed, jennifer, and amiodarone gtt reviewed and verified.

## 2019-07-15 NOTE — PROGRESS NOTES
Bedside shift change report given to [facility] AZAM Ortega(oncoming nurse) by Ozzie Elmore (offgoing nurse). Report included the following information SBAR, Kardex, ED Summary, OR Summary, Procedure Summary, Intake/Output, MAR, Accordion, Recent Results, Med Rec Status, Cardiac Rhythm NSR and Quality Measures.

## 2019-07-15 NOTE — ACP (ADVANCE CARE PLANNING)
Patient completed the healthcare agent portion of [place]. He appointed his fiance/significant other Srini Sanon [phone] as his healthcare agent/MPOA. He did not name a second healthcare agent. He did not want to complete the Health Care Instructions or Anatomical Gifts sections at this time. Goals of care at this time are Full Code. He plans to seek additional treatments if any are offered. He does not want to go back into Hospice stating he felt that this was not a good fit for him. \"they just want to pull the plug and have me die\".

## 2019-07-15 NOTE — PROGRESS NOTES
1655 hrs SBAR report [facility] . PT alert, oriented,Trach # 6 shiely, cuff,centered. At room air. Sats at 96% SR on the monitor. IV Gtt's levophed at 2 mcg/min, jennifer-synephrine at 10 mcg/min, amiodarone at 1  mg/hr, 1 PRBC transfusing. Peg tube clamped,Marrero patent draining  Yellow urine to the bag, SCD\"s to bilateral extremities,NAD 
 
1717 hrs PT transferred to CT department. 1802 hrs PT back to the unit. NAD 
 
1810 hrs Blood transfusion completed. 1930 hrs attempted to drawn lab from R femoral line. No blood returned.  aware. 1940 hrs SBAR report given to Levar Later

## 2019-07-15 NOTE — PROGRESS NOTES
INITIAL NUTRITION ASSESSMENT 
  
RECOMMENDATIONS/PLAN:  
 EN: Trickle feeds of TwoCal @ 10ml/hr -pt is high risk for refeeding due to significant wt loss, K-2.3, and pt is noncompliance on tube feeds Order daily Phos Replace electrolytes aggressively Monitor labs/lytes, tube feed tolerance, skin integrity, wt, fluid status, BM Adult Malnutrition Criteria:  
 
Nutrition assessment was completed by RDN and the patient was found to meet the following malnutrition criteria established by ASPEN/AND: 
 
Adult Malnutrition Guidelines: SEVERE PROTEIN CALORIE MALNUTRITION IN THE CONTEXT OF SOCIAL/BEHAVIORAL/ENVIRONMENTAL CIRCUMSTANCES Weight Loss: >5% x 1 month,  >7.5% x 3 months,  >10% x 6 months,  >20% x 12 months Energy Intake: <50% energy intake compared to estimated needs >1 month Chelsea Davila 
07/15/19 REASON FOR ASSESSMENT:  
 
[x] Positive Nutrition Screen: 
[x] BMI <19 [x] ICU admission NUTRITION ASSESSMENT:  
Client History: 52 yrs old Male admitted with hematemesis, ETOH abuse-alcohol through PEG tube, polysubstance abuse hx, hypotension, tachycardia, elevated trop, code sepsis called in ED PMHx: COPD, HTN, peg tube, chemo, s/p radiation therpay, throat cacner, tobacco abuse, hx etoh abuse Cultural/Jehovah's witness Food Preferences: None Identified FOOD/NUTRITION HISTORY Diet History: pt getting echo done at this time unable to do NFPE or get diet hx; MD note states that pt pours beer down PEG tube and does not eat anything by mouth; pt is known to dietitian staff as he has been to [facility] several times Food Allergies:  [x] NKFA Pertinent PTA Medications: oxycodone, prednisone, MVI NUTRITION INTAKE Diet Order: NPO Average PO Intake:      
No data found. Pertinent Medications:  [x] Reviewed; magnesium sulfate, methylprednisolone, protonix, KCl, Electrolyte Replacement Protocol: [x]K  [x]Mg  []PO4 Insulin:  [x] SSI  [] Pre-meal   []  Basal   [] Drip  [] None Pt expected to meet estimated nutrient needs through next review:          []  Yes     [x] No; NPO does not meet estimated needs ANTHROPOMETRICS Height: 6' (182.9 cm)       Weight: 60.3 kg (132 lb 15 oz) BMI: 18 kg/m^2  -  underweight (Less than or = to 18.5% BMI) Weight change: significant wt loss of -16.46% since Feb 2019 where pt was 158# currently 132# Comparison to Reference Standards: IBW: 178 lbs      %IBW: 74%      AdjBW: n./a NUTRITION-FOCUSED PHYSICAL ASSESSMENT Skin: decubitus ulcer on his left buttock - Stage II POA    
GI: No BM 
 
BIOCHEMICAL DATA & MEDICAL TESTS Pertinent Labs:  [x] Reviewed; K-2.3 Mg-1.3 NUTRITION PRESCRIPTION Calories: 2275 kcal/day based on [place] x 1.7 Protein:  g/day based on 1.5-2.0 g/kg CHO: 284 g/day based on 50% of total energy Fluid: 2275 ml/day based on 1 kcal/ml NUTRITION DIAGNOSES:  
1. Malnutrition related to noncompliance with tube feeds as evidence by BMI 18, -16.46% wt loss since Feb 2019, and MD notes that pt pours beer down PEG tube. NUTRITION INTERVENTIONS:  
INTERVENTIONS:        GOALS: 
1. EN: Trickle feeds of TwoCal @ 10ml/hr  1. Restart tube feeds by next review 2 days LEARNING NEEDS (Diet, Supplementation, Food/Nutrient-Drug Interaction):  
[] None Identified 
[] Inpatient education provided/documented   
[] Identified and patient:  [] Declined     [] Was not appropriate/indicated NUTRITION MONITORING /EVALUATION:  
Monitor wt Monitor renal labs, electrolytes, fluid status [x] Participated in Interdisciplinary Rounds 
[x] [address] [place] [address] Reviewed/Documented DISCHARGE NUTRITION RECOMMENDATIONS ADDRESSED:  
   [x] To be determined closer to discharge NUTRITION RISK:     [x]  At risk                     []  Not currently at risk Will follow-up per policy. Bernarda Murphy 9

## 2019-07-16 NOTE — PROGRESS NOTES
Problem: Mobility Impaired (Adult and Pediatric) Goal: *Acute Goals and Plan of Care (Insert Text) Description Physical Therapy Goals Initiated 7/16/2019 and to be accomplished within 5 day(s) 1. Patient will move from supine <> sit with S in prep for out of bed activity and change of position. 2.  Patient will perform sit<> stand with S with LRAD in prep for transfers/ambulation. 3.  Patient will transfer from bed <> chair with S with LRAD for time up in chair for completion of ADL activity. 4.  Patient will ambulate 150 feet with LRAD/S for improved functional mobility/safe discharge. Outcome: Progressing Towards Goal 
 PHYSICAL THERAPY EVALUATION Patient: Kendell St (63 y.o. male) Date: 7/16/2019 Primary Diagnosis: Sepsis ([facility] [A41.9] Aspiration pneumonia ([facility] [J69.0] Hematemesis [K92.0] Precautions: Fall ASSESSMENT : 
Based on the objective data described below, the patient presents with decrease independence w/ bed mobility, transfers, and gait. Pt seen in supine prior to session w/ trach,  telemonitor, peg tube, BP, pulse ox, and IV connected. Pt reported 8/10 generalized pain however per nurse pt just received pain meds. Pt reports being I w/ mobility PTA. Pt able to ambulate 10 ft w/ RW/GB however pt demonstrates an unsteady gait, decrease sushant, steppage gait, and decrease stride length. Pt transferred back to bed where pt was left in supine after session, call bell and tray in reach, vitals assessed WNLs, nurse notified after session. Patient will benefit from skilled intervention to address the above impairments. Patient?s rehabilitation potential is considered to be Good Factors which may influence rehabilitation potential include:  
? None noted ? Mental ability/status ? Medical condition ? Home/family situation and support systems ? Safety awareness 
? Pain tolerance/management 
? Other: PLAN : 
Recommendations and Planned Interventions: 
?           Bed Mobility Training             ? Neuromuscular Re-Education ? Transfer Training                   ? Orthotic/Prosthetic Training 
? Gait Training                          ? Modalities ? Therapeutic Exercises          ? Edema Management/Control ? Therapeutic Activities            ? Patient and Family Training/Education ? Other (comment): Frequency/Duration: Patient will be followed by physical therapy 1-2 times per day to address goals. Discharge Recommendations: Rehab vs Home Health Further Equipment Recommendations for Discharge: rolling walker SUBJECTIVE:  
Patient stated ? I'm down for getting out of this bed.? OBJECTIVE DATA SUMMARY:  
 
Past Medical History:  
Diagnosis Date Chronic obstructive pulmonary disease (HCC)   
 acute hypoxic respiratory failure 8/16 Hematemesis 7/13/2019 Hypertension Ill-defined condition   
 peg tube in place Ill-defined condition   
 chemo S/P radiation therapy Throat cancer ([facility] Tobacco abuse Past Surgical History:  
Procedure Laterality Date HX GI    
 peg tub HX HEENT    
 sinus surgery HX TRACHEOSTOMY Barriers to Learning/Limitations: yes;  physical 
Compensate with: Verbal Cues and Tactile Cues Prior Level of Function/Home Situation:  
Home Situation Home Environment: Private residence # Steps to Enter: 0 One/Two Story Residence: One story Living Alone: No 
Support Systems: Spouse/Significant Other/Partner Patient Expects to be Discharged to[de-identified] Private residence Current DME Used/Available at Home: Walker, rolling Critical Behavior: 
Neurologic State: Alert Orientation Level: Oriented X4 Cognition: Appropriate decision making; Follows commands Psychosocial 
Purposeful Interaction: Yes Pt Identified Daily Priority: Clinical issues (comment) Alen Process: Nurture loving kindness;Establish trust;Teaching/learning; Attend basic human needs;Create healing environment;Supportive expression Caring Interventions: Reassure Reassure: Informing;Caring rounds Therapeutic Modalities: Intentional therapeutic touch Skin Condition/Temp: Warm;Dry Skin Integrity: Wound (add Wound LDA) Skin Integumentary Skin Color: Appropriate for ethnicity Skin Condition/Temp: Warm;Dry Skin Integrity: Wound (add Wound LDA) Turgor: Non-tenting Hair Growth: Present Varicosities: Absent Strength:   
Strength: Generally decreased, functional 
Range Of Motion: 
AROM: Generally decreased, functional 
Functional Mobility: 
Bed Mobility: 
Supine to Sit: Stand-by assistance Sit to Supine: Stand-by assistance Scooting: Stand-by assistance Transfers: 
Sit to Stand: Minimum assistance Stand to Sit: Minimum assistance Balance:  
Sitting: Intact Standing: Impaired; With support Standing - Static: Fair Standing - Dynamic : Fair Ambulation/Gait Training: 
Distance (ft): 10 Feet (ft) Assistive Device: Gait belt;Walker, rolling Ambulation - Level of Assistance: Minimal assistance Gait Description (WDL): Exceptions to [facility] Gait Abnormalities: Decreased step clearance; Steppage gait; Step to gait Base of Support: Widened Speed/Vickie: Slow Step Length: Left shortened;Right shortened Swing Pattern: Left asymmetrical;Right asymmetrical 
Pain: 
Pain Scale 1: Numeric (0 - 10) Pain Intensity 1: 8 Pain Location 1: Generalized Pain Orientation 1: Anterior Pain Description 1: Aching Pain Intervention(s) 1: Medication (see MAR) Activity Tolerance:  
Fair Please refer to the flowsheet for vital signs taken during this treatment. After treatment:  
?         Patient left in no apparent distress sitting up in chair ? Patient left in no apparent distress in bed 
? Call bell left within reach ? Nursing notified ? Caregiver present ?         Bed alarm activated COMMUNICATION/EDUCATION:  
?         Fall prevention education was provided and the patient/caregiver indicated understanding. ? Patient/family have participated as able in goal setting and plan of care. ?         Patient/family agree to work toward stated goals and plan of care. ?         Patient understands intent and goals of therapy, but is neutral about his/her participation. ? Patient is unable to participate in goal setting and plan of care. Thank you for this referral. 
Silver Ordonez, PT Time Calculation: 23 mins Eval Complexity: History: HIGH Complexity :3+ comorbidities / personal factors will impact the outcome/ POC Exam:LOW Complexity : 1-2 Standardized tests and measures addressing body structure, function, activity limitation and / or participation in recreation  Presentation: LOW Complexity : Stable, uncomplicated  Clinical Decision Making:Medium Complexity ambulate >30ft  Overall Complexity:MEDIUM

## 2019-07-16 NOTE — PROGRESS NOTES
1646-Received pt from ICU via bed, no sign of distress, vss on RA via trach with valve, pt calm and cooperative, will continue to monitor 1940-Bedside and Verbal shift change report given to Delmy Mcgraw (oncoming nurse) by Олег Vincent RN (offgoing nurse).  Report included the following information SBAR, Kardex, Intake/Output, MAR, Recent Results and Cardiac Rhythm SR.

## 2019-07-16 NOTE — PROGRESS NOTES
Chart reviewed, attended IDR's, remains on CIWA  Monitoring, cm left verbal message with patients ED contact  Micha Vasquez [phone] regarding discharge plan for home per patients request, pt does not want to go to SNF, [facility] was declined.

## 2019-07-16 NOTE — PROGRESS NOTES
Palliative medicine team members Karol Martinez NP, Barbara Donohue MSW and myself saw Mr. Sulma Ladd for follow up. He was sitting up in bed using mouth swabs. He stated he was not allowed the ice chips any longer as he \"had aspiration\". He was irritable and stated \"I hurt all over\". He stated that the \"1 percocet doesn't help\". He shared that he had been going to pain management physician prior to entering hospice. Noted that he is on withdrawal protocol as he has admitted to putting drugs and alcohol in his PEG tube. He told us that no family had visited but that Aurelio Mane would come after work. Still planning to seek additional treatment when discharged from the hospital. He stated he felt tired and hoped that he could \"nap\". PM team left to allow him to rest as requested. We remain available for continued support.  
 
Kenneth Lou RN

## 2019-07-16 NOTE — CONSULTS
[facility]alliative [facility]: [phone] ([phone]) [facility]: [phone] [facility]: [phone] Patient Name: Luis Eduardo Velasco YOB: 1965 Date of Initial Consult: 7/15/2019, follow up 7/16/2019 Reason for Consult: care decisions Requesting Provider: Dr Yo Christensen Primary Care Physician: Shonna Day MD 
  
 SUMMARY:  
Luis Eduardo Velasco is a 52y.o. 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 S/P radiation and chemo per patient, ETOH and poly substance abuse, who was admitted on 7/13/2019 from home with a diagnosis of hematemesis . In the ER he was found to have an elevated lactate and hypotension. He was admitted to the ICU and stated on levophed for hypotension. Seen by GI, recent EGD in March, found esophagitis and placed on PPI. He has a history of non compliance. Recently on hospice services by revoked hospice and rescinded his DDNR. He affirmed this with us today. Current medical issues leading to Palliative Medicine involvement include: 52year old male who has history of laryngeal cancer with trach and PEG who has been on hospice services, wishes to revoke hospice and seek full treatment with full interventions including full code. Palliative medicine is consulted for support and care decision discussion. 7/16/2019 follow up with Mr Jasmin Rocha today. He is slightly agitated this am, \" I have not slept in 2 days and I hurt all over I Need more pain medicines. \" PALLIATIVE DIAGNOSES:  
1. Advanced care plan discussion 2. Throat cancer 3. Hematemesis 4. Sepsis PLAN:  
1. 7/16/2019 follow up along with PAU Lucia and Ms Monik Kelly RN. He is alert and oriented x 4 this am. A bit agitated this am. Asking for more pain meds, describing pain all over. Prior to hospice has been followed by pain management. He is currently on Percocet 5/325 PRN . He tells us he wants \" his ice chips back\" attempted to discuss aspiration risks, but irritated and stated \" I spit them out\" He was using the oral swabs moistened with water ( not spitting out). Currently no change in goals of care full code with full interventions. We will continue to follow for on going discussions. ( see below for previous conversations) 2. Advanced care plan discussion seen today along with PAU Noel and Ms Joellen RN. Thalia Sutton is alert and oriented x 4. Trach in place but speech understandable. He shares with us \" I did something stupid, I put alcohol in my tube and used drugs, trying to hang with my sons\" He tells us he was on hospice care at home for a very short time, but \"really did not want this\". He has revoked their services and wishes to seek full treatment. AMD introduced he was agreeable to completion today, naming his finance Virgene Peppers as Ev[employer]. He declined to name a secondary. Medical wishes not denoted today as he needs time to consider. Goals of care discussed including benefits and burdens of resuscitation. He wishes to full code with full interventions. . We highly encouraged continued thought and discussion on goals of care and care decisions. Discussed interplay of life prolonging procedures and debility and effects on his quality of life. 3. Throat cancer per patient dx 3 years ago, s/p chemo and radiation. Has not recently followed up with oncology. He is not aware of any metastatic disease. Trach and PEG in place. He would like for to see if in the future his trach could be reversed. 4. Hematemesis Has been seen by GI, recent EGD in March 2019 with findings of severe distal esophageal stenosis with actively inflamed peptic stricture. It was recommended that he take a PPI for life time. He has a hx of non compliance 5. Sepsis currently on levophed, IVAB, attending team and PCCM managing. 6. Initial consult note routed to primary continuity provider 7. Communicated plan of care with: Palliative IDT Patient/Health Care Proxy Stated Goals: Prolong life TREATMENT PREFERENCES:  
Code Status: Full Code Advance Care Planning: 
[x] [facility] Interdisciplinary Team has updated the ACP Navigator with [address] and Patient Capacity Primary Decision Maker (Health Care Agent): Kolton Rahman Medical Interventions: Full interventions Artificially Administered Nutrition: Feeding tube long-term, if indicated Other: As far as possible, the palliative care team has discussed with patient / health care proxy about goals of care / treatment preferences for patient. HISTORY:  
 
History obtained from: chart and patient CHIEF COMPLAINT: hematemesis HPI/SUBJECTIVE: The patient is:  
[x] Verbal and participatory [] Non-participatory due to:  
Please see summary Clinical Pain Assessment (nonverbal scale for nonverbal patients): Clinical Pain Assessment Severity: 7 Location: all over Character: was not specific Duration: all the time Effect: can't sleep Factors: injured back, trach surgery Frequency: most all the time Activity (Movement): Lying quietly, normal position Duration: for how long has pt been experiencing pain (e.g., 2 days, 1 month, years) Frequency: how often pain is an issue (e.g., several times per day, once every few days, constant) FUNCTIONAL ASSESSMENT:  
 
Palliative Performance Scale (PPS): 50 
PPS: 50 ECOG 
ECOG Status : Restricted PSYCHOSOCIAL/SPIRITUAL SCREENING:  
  
Any spiritual / Hoahaoism concerns: 
[] Yes /  [x] No 
 
Caregiver Burnout: 
[] Yes /  [] No /  [x] No Caregiver Present Anticipatory grief assessment:  
[x] Normal  / [] Maladaptive REVIEW OF SYSTEMS:  
 
Positive and pertinent negative findings in ROS are noted above in HPI.  
The following systems were [x] reviewed / [] unable to be reviewed as noted in HPI 
 Other findings are noted below. Systems: constitutional, ears/nose/mouth/throat, respiratory, gastrointestinal, genitourinary, musculoskeletal, integumentary, neurologic, psychiatric, endocrine. Positive findings noted below. Modified ESAS Completed by: provider Fatigue: 3 Drowsiness: 0 Depression: 0 Pain: 7 Anxiety: 0 Nausea: 0 Dyspnea: 0 PHYSICAL EXAM:  
 
Wt Readings from Last 3 Encounters:  
07/15/19 59.9 kg (132 lb)  
01/13/19 70.3 kg (155 lb) 08/14/18 70.3 kg (155 lb) Blood pressure 104/62, pulse 93, temperature 98.3 °F (36.8 °C), resp. rate 20, height 6' (1.829 m), weight 59.9 kg (132 lb), SpO2 99 %. Pain: 
Pain Scale 1: Numeric (0 - 10) Pain Intensity 1: 2 Pain Location 1: Generalized Pain Orientation 1: Anterior Pain Description 1: Aching Pain Intervention(s) 1: Medication (see MAR) Last bowel movement: none recorded Constitutional: sitting up in bed awake alert talkative in NAD Eyes: pupils equal, anicteric ENMT: tracheostomy in place Respiratory: breathing not labored. Gastrointestinal: PEG in place Skin: warm, dry Neurologic: alert and oriented x 4 Psychiatric: full affect, no hallucinations, slightly irritable HISTORY:  
 
Principal Problem: 
  Sepsis ([address] (7/13/2019) Active Problems: 
  Aspiration pneumonia ([address] (11/15/2017) Hematemesis (7/13/2019) Hyperkalemia (7/14/2019) Hypotension (7/14/2019) Advanced care planning/counseling discussion (7/15/2019) Past Medical History:  
Diagnosis Date  Chronic obstructive pulmonary disease (HCC)   
 acute hypoxic respiratory failure 8/16  Hematemesis 7/13/2019  Hypertension  Ill-defined condition   
 peg tube in place  Ill-defined condition   
 chemo  S/P radiation therapy  Throat cancer ([address]  Tobacco abuse Past Surgical History:  
Procedure Laterality Date  HX GI    
 peg tub  HX HEENT    
 sinus surgery  HX TRACHEOSTOMY History reviewed. No pertinent family history. History reviewed, no pertinent family history. Social History Tobacco Use  Smoking status: Former Smoker Packs/day: 0.50  Smokeless tobacco: Never Used Substance Use Topics  Alcohol use: No  
 
No Known Allergies Current Facility-Administered Medications Medication Dose Route Frequency  oxyCODONE-acetaminophen (PERCOCET) 5-325 mg per tablet 1 Tab  1 Tab Oral Q4H PRN  
 acetaminophen (TYLENOL) solution 650 mg  650 mg Oral Q6H PRN  
 insulin lispro (HUMALOG) injection   SubCUTAneous Q6H  
 ELECTROLYTE REPLACEMENT PROTOCOL - Phosphorus  Standard Dosing  1 Each Other PRN  
 sodium phosphate 18 mmol in dextrose 5% 250 mL infusion  18 mmol IntraVENous ONCE  
 [START ON 7/17/2019] methylPREDNISolone (PF) (SOLU-MEDROL) injection 40 mg  40 mg IntraVENous Q24H  
 dilTIAZem (CARDIZEM) IR tablet 30 mg  30 mg Oral QID  amiodarone (NEXTERONE) 360 mg in dextrose 200 mL (1.8 mg/mL) infusion  0.5-1 mg/min IntraVENous TITRATE  ELECTROLYTE REPLACEMENT PROTOCOL - Potassium Standard Dosing   1 Each Other PRN  
 ELECTROLYTE REPLACEMENT PROTOCOL - Magnesium   1 Each Other PRN  
 0.9% sodium chloride 1,000 mL with mvi, adult no. 4 with vit K 10 mL, thiamine 210 mg, folic acid 1 mg infusion   IntraVENous DAILY  0.9% sodium chloride infusion 250 mL  250 mL IntraVENous PRN  
 0.9% sodium chloride infusion 250 mL  250 mL IntraVENous PRN  
 LORazepam (ATIVAN) tablet 0.5 mg  0.5 mg Oral QHS PRN  pantoprazole (PROTONIX) injection 40 mg  40 mg IntraVENous Q12H  
 ondansetron (ZOFRAN) injection 4 mg  4 mg IntraVENous Q4H PRN  
 LORazepam (ATIVAN) tablet 1 mg  1 mg Oral Q1H PRN Or  
 LORazepam (ATIVAN) injection 1 mg  1 mg IntraVENous Q1H PRN  
 LORazepam (ATIVAN) tablet 2 mg  2 mg Oral Q1H PRN  Or  
 LORazepam (ATIVAN) injection 2 mg  2 mg IntraVENous Q1H PRN  
  LORazepam (ATIVAN) injection 3 mg  3 mg IntraVENous Q15MIN PRN  
 albuterol-ipratropium (DUO-NEB) 2.5 MG-0.5 MG/3 ML  3 mL Nebulization Q4H RT  
 albuterol-ipratropium (DUO-NEB) 2.5 MG-0.5 MG/3 ML  3 mL Nebulization Q4H PRN  
 0.9% sodium chloride infusion  150 mL/hr IntraVENous CONTINUOUS  
 sodium chloride (NS) flush 5-40 mL  5-40 mL IntraVENous Q8H  
 glucose chewable tablet 16 g  4 Tab Oral PRN  
 glucagon (GLUCAGEN) injection 1 mg  1 mg IntraMUSCular PRN  
 albumin human 25% (BUMINATE) solution 12.5 g  12.5 g IntraVENous Q6H  
 piperacillin-tazobactam (ZOSYN) 4.5 g in 0.9% sodium chloride (MBP/ADV) 100 mL MBP  4.5 g IntraVENous Q8H  
 sodium chloride (NS) flush 5-10 mL  5-10 mL IntraVENous PRN  
 sodium chloride (NS) flush 5-40 mL  5-40 mL IntraVENous PRN  
 
 
 LAB AND IMAGING FINDINGS:  
 
Lab Results Component Value Date/Time WBC 3.0 (L) 07/16/2019 04:05 AM  
 HGB 7.6 (L) 07/16/2019 09:54 AM  
 PLATELET 95 (L) 82/93/4528 04:05 AM  
 
Lab Results Component Value Date/Time Sodium 141 07/16/2019 04:05 AM  
 Potassium 3.3 (L) 07/16/2019 04:05 AM  
 Chloride 105 07/16/2019 04:05 AM  
 CO2 29 07/16/2019 04:05 AM  
 BUN 9 07/16/2019 04:05 AM  
 Creatinine 0.58 (L) 07/16/2019 04:05 AM  
 Calcium 7.2 (L) 07/16/2019 04:05 AM  
 Magnesium 1.8 07/16/2019 09:54 AM  
 Phosphorus 1.1 (L) 07/16/2019 04:05 AM  
  
Lab Results Component Value Date/Time AST (SGOT) 36 07/15/2019 05:00 AM  
 Alk. phosphatase 69 07/15/2019 05:00 AM  
 Protein, total 4.7 (L) 07/15/2019 05:00 AM  
 Albumin 2.4 (L) 07/15/2019 05:00 AM  
 Globulin 2.3 07/15/2019 05:00 AM  
 
Lab Results Component Value Date/Time INR 1.0 07/13/2019 10:00 PM  
 Prothrombin time 12.4 07/13/2019 10:00 PM  
 aPTT 32.0 01/14/2019 01:02 AM  
  
No results found for: IRON, FE, TIBC, IBCT, PSAT, FERR No results found for: PH, PCO2, PO2 No components found for: [place] Lab Results Component Value Date/Time   07/14/2019 02:15 PM  
 CK - MB 2.8 07/14/2019 02:15 PM  
  
 
   
 
Total time: 15 minutes Counseling / coordination time, spent as noted above: 10 minutes  
> 50% counseling / coordination:  Yes with patient Prolonged service was provided for  []30 min   []75 min in face to face time in the presence of the patient, spent as noted above. Time Start:  
Time End:  
Note: this can only be billed with 18092 (initial) or [id number] (follow up). If multiple start / stop times, list each separately.

## 2019-07-16 NOTE — PROGRESS NOTES
0710 hrs [facility] Assumed care. Pt  Alert, Oriented x 4. Trach collar in place # 6 shiely, NSR on the monitor,IV gtt's via pump, Peg tube in place, diop. NAD 
 
0800 hrs Assessment completed. see EMR  
 
0840 hrs Dr Nazia Mosley in to see pt. 
 
1120 hrs Per Dr Ezio Buchanan resume Tube feeding. 1200 hrs Reassessed pt without change 1220 hrs Dr Hong Simmons in to see pt, new orders in place 1450 hrs  Stopped Amiodarone per orders. PT c/o generalized pain 6/10 medication given. 1500 hrs notified Dr Sabillon Medic  Ionized calcium results. 1.0 new orders in place. 1540 hrs R femoral line removed per orders. no complications 1550 hrs TRANSFER - OUT REPORT: 
 
Verbal report given to [facility]) on Boyd Palacios  being transferred to [facility](unit) for routine progression of care Report consisted of patients Situation, Background, Assessment and  
Recommendations(SBAR). Information from the following report(s) SBAR, Kardex, ED Summary, Procedure Summary, Med Rec Status and Cardiac Rhythm NSR was reviewed with the receiving nurse. Lines:  
Triple Lumen Central Line R Fem TL 07/13/19 Anterior;Proximal;Right Femoral (Active) Central Line Being Utilized Yes 7/16/2019 12:00 PM  
Criteria for Appropriate Use Hemodynamically unstable, requiring monitoring lines, vasopressors, or volume resuscitation 7/16/2019 12:00 PM  
Site Assessment Clean, dry, & intact 7/16/2019 12:00 PM  
Infiltration Assessment 1 7/16/2019 12:00 PM  
Affected Extremity/Extremities Color distal to insertion site pink (or appropriate for race) 7/16/2019 12:00 PM  
Date of Last Dressing Change 07/14/19 7/16/2019 12:00 PM  
Dressing Status Clean, dry, & intact 7/16/2019 12:00 PM  
Dressing Type Disk with Chlorhexadine gluconate (CHG) 7/16/2019 12:00 PM  
Action Taken Open ports on tubing capped 7/16/2019 12:00 PM  
Proximal Hub Color/Line Status Infusing 7/16/2019 12:00 PM  
 Positive Blood Return (Medial Site) Yes 7/16/2019 12:00 PM  
Medial Hub Color/Line Status Infusing 7/16/2019 12:00 PM  
Positive Blood Return (Lateral Site) Yes 7/16/2019 12:00 PM  
Distal Hub Color/Line Status Infusing 7/16/2019 12:00 PM  
Positive Blood Return (Site #3) Yes 7/16/2019 12:00 PM  
Alcohol Cap Used Yes 7/16/2019 12:00 PM  
   
Peripheral IV 07/13/19 Left Hand (Active) Site Assessment Clean, dry, & intact 7/16/2019 12:00 PM  
Phlebitis Assessment 0 7/16/2019 12:00 PM  
Infiltration Assessment 0 7/16/2019 12:00 PM  
Dressing Status Clean, dry, & intact 7/16/2019 12:00 PM  
Dressing Type Tape;Transparent 7/16/2019 12:00 PM  
Hub Color/Line Status Capped 7/16/2019 12:00 PM  
Action Taken Open ports on tubing capped 7/16/2019 12:00 PM  
Alcohol Cap Used Yes 7/16/2019 12:00 PM  
   
Peripheral IV 07/15/19 Right (Active) Site Assessment Clean, dry, & intact 7/16/2019 12:00 PM  
Phlebitis Assessment 0 7/16/2019 12:00 PM  
Infiltration Assessment 0 7/16/2019 12:00 PM  
Dressing Status Clean, dry, & intact 7/16/2019 12:00 PM  
Dressing Type Tape;Transparent 7/16/2019 12:00 PM  
Hub Color/Line Status Capped 7/16/2019 12:00 PM  
Alcohol Cap Used Yes 7/16/2019 12:00 PM  
  
 
Opportunity for questions and clarification was provided. Patient transported with: 
 Registered Nurse

## 2019-07-16 NOTE — PROGRESS NOTES
Occupational Therapy Evaluation/Treatment Attempt Chart reviewed. Attempted Occupational Therapy Evaluation/Treatment, however, patient unable to be seen due to: 
[]  Nausea/vomiting 
[]  Eating 
[]  Pain 
[]  Patient too lethargic [x]  Off Unit for testing/procedure/patient being transported out of ICU in transition to bed 359 []  Dialysis treatment in progress  
[]  Telemetry Results []  Other:  
 
Will f/u later as patient's schedule allows.   
Thank you for this referral. 
Ana Hurt, OTR/L

## 2019-07-16 NOTE — CONSULTS
[facility] Consult Note Patient: Kendell St MRN: [id number]  SSN: [id number] YOB: 1965  Age: 52 y.o. Sex: male Date of Consultation: 07/15/2019 Referring Physician: Dr. Los Infante Reason for Consultation: Afib with RVR 
 
HPI : 
I was called by Dr. Los Infante and RN to see this patient for Afib with RVR. Kendell St is a 52 y.o.  male who with hx of Throat cancer s/p XRT , Esophageal Stricture with PEG, COPD, Ethoh and Cocaine abuse, COPD, polysubstance abuse, on hospice care came to hospital for hematemesis after changing his DNR status. Mild trop elevation and last night developed Afib with RVR with hypotension, inially treated with digoxin, Cardizem and amiodarone. Now converted to NSR. Echo EF is normal. 
 
 
 
Past Medical History:  
Diagnosis Date  Chronic obstructive pulmonary disease (HCC)   
 acute hypoxic respiratory failure 8/16  Hematemesis 7/13/2019  Hypertension  Ill-defined condition   
 peg tube in place  Ill-defined condition   
 chemo  S/P radiation therapy  Throat cancer ([facility] [address]  Tobacco abuse Past Surgical History:  
Procedure Laterality Date  HX GI    
 peg tub  HX HEENT    
 sinus surgery  HX TRACHEOSTOMY Current Facility-Administered Medications Medication Dose Route Frequency  amiodarone (NEXTERONE) 360 mg in dextrose 200 mL (1.8 mg/mL) infusion  0.5-1 mg/min IntraVENous TITRATE  ELECTROLYTE REPLACEMENT PROTOCOL - Potassium Standard Dosing   1 Each Other PRN  
 ELECTROLYTE REPLACEMENT PROTOCOL - Magnesium   1 Each Other PRN  
 methylPREDNISolone (PF) (SOLU-MEDROL) injection 40 mg  40 mg IntraVENous Q12H  
 0.9% sodium chloride 1,000 mL with mvi, adult no. 4 with vit K 10 mL, thiamine 057 mg, folic acid 1 mg infusion   IntraVENous DAILY  0.9% sodium chloride infusion 250 mL  250 mL IntraVENous PRN  
 0.9% sodium chloride infusion 250 mL  250 mL IntraVENous PRN  
  pantoprazole (PROTONIX) injection 40 mg  40 mg IntraVENous Q12H  
 ondansetron (ZOFRAN) injection 4 mg  4 mg IntraVENous Q4H PRN  
 LORazepam (ATIVAN) tablet 1 mg  1 mg Oral Q1H PRN Or  
 LORazepam (ATIVAN) injection 1 mg  1 mg IntraVENous Q1H PRN  
 LORazepam (ATIVAN) tablet 2 mg  2 mg Oral Q1H PRN Or  
 LORazepam (ATIVAN) injection 2 mg  2 mg IntraVENous Q1H PRN  
 LORazepam (ATIVAN) injection 3 mg  3 mg IntraVENous Q15MIN PRN  
 albuterol-ipratropium (DUO-NEB) 2.5 MG-0.5 MG/3 ML  3 mL Nebulization Q4H RT  
 albuterol-ipratropium (DUO-NEB) 2.5 MG-0.5 MG/3 ML  3 mL Nebulization Q4H PRN  
 0.9% sodium chloride infusion  150 mL/hr IntraVENous CONTINUOUS  
 sodium chloride (NS) flush 5-40 mL  5-40 mL IntraVENous Q8H  
 insulin lispro (HUMALOG) injection   SubCUTAneous Q4H  
 glucose chewable tablet 16 g  4 Tab Oral PRN  
 glucagon (GLUCAGEN) injection 1 mg  1 mg IntraMUSCular PRN  
 PHENYLephrine (KATHERIN-SYNEPHRINE) 30 mg in 0.9% sodium chloride 250 mL infusion   mcg/min IntraVENous TITRATE  albumin human 25% (BUMINATE) solution 12.5 g  12.5 g IntraVENous Q6H  
 piperacillin-tazobactam (ZOSYN) 4.5 g in 0.9% sodium chloride (MBP/ADV) 100 mL MBP  4.5 g IntraVENous Q8H  
 vancomycin (VANCOCIN) 1,000 mg in 0.9% sodium chloride 250 mL IVPB  1,000 mg IntraVENous Q18H  
 sodium chloride (NS) flush 5-10 mL  5-10 mL IntraVENous PRN  
 levoFLOXacin (LEVAQUIN) 750 mg in D5W IVPB  750 mg IntraVENous Q24H  Vancomycin pharmacy to dose  1 Each Other Rx Dosing/Monitoring  sodium chloride (NS) flush 5-40 mL  5-40 mL IntraVENous PRN  
 NOREPINephrine (LEVOPHED) 8 mg in dextrose 5% 250 mL infusion  2-16 mcg/min IntraVENous TITRATE Allergies and Intolerances:  
No Known Allergies Family History:  
History reviewed. No pertinent family history. Social History: He  reports that he has quit smoking. He smoked 0.50 packs per day.  He has never used smokeless tobacco.  He  reports that he does not drink alcohol. Review of Systems Gen: No fever, chills, malaise, weight loss/gain. Heent: No headache, rhinorrhea, epistaxis, ear pain, hearing loss, sinus pain, neck pain/stiffness, sore throat. Heart: No chest pain, +palpitations, RONQUILLO, pnd, or orthopnea. Resp: No cough, +hemoptysis, wheezing and shortness of breath. GI: No nausea, vomiting, diarrhea, constipation, melena or hematochezia. +hematemsis : No urinary obstruction, dysuria or hematuria. Derm: No rash, new skin lesion or pruritis. Musc/skeletal: no bone or joint complains. Vasc: No edema, cyanosis or claudication. Endo: No heat/cold intolerance, no polyuria,polydipsia or polyphagia. Neuro: No unilateral weakness, numbness, tingling. No seizures. Heme: No easy bruising or bleeding. Physical:  
Patient Vitals for the past 6 hrs: 
 Temp Pulse Resp BP SpO2  
07/15/19 1911 98.4 °F (36.9 °C)      
07/15/19 1900  80 12 121/70   
07/15/19 1800    145/81   
07/15/19 1700  (!) 102 23 109/57   
07/15/19 1645 98.2 °F (36.8 °C) 94 17 112/65 95 % 07/15/19 1644  93 14    
07/15/19 1630 98.2 °F (36.8 °C) 90 17 121/73   
07/15/19 1622     95 % 07/15/19 1615  91 14 134/68   
07/15/19 1608 98.2 °F (36.8 °C) 86 22 134/68   
07/15/19 1600  79 19 119/70   
07/15/19 1545    120/71   
07/15/19 1532 98.2 °F (36.8 °C)      
07/15/19 1530  87 19 122/70  Exam:  
General Appearance: Comfortable, not using accessory muscles of respiration. HEENT: ALEN. HEAD: Atraumatic NECK: No JVD, no thyroidomeglay. CAROTIDS: clear LUNGS: tracheostomy, Clear bilaterally. HEART: S1+S2 ABD: Non-tender, BS Audible, PEG tube EXT: No edema, and no cysnosis. VASCULAR EXAM: Pulses are intact. PSYCHIATRIC EXAM: Mood is appropriate. MUSCULOSKELETAL: Grossly no joint deformity. NEUROLOGICAL: Motor and sensory sytem intact and Cranial nerves II-XII intact. Review of Data:  
LABS:  
Lab Results Component Value Date/Time WBC 5.5 07/15/2019 05:00 AM  
 HGB 7.9 (L) 07/15/2019 07:58 PM  
 HCT 22.5 (L) 07/15/2019 07:58 PM  
 PLATELET 788 34/91/9969 05:00 AM  
 
Lab Results Component Value Date/Time Sodium 142 07/15/2019 05:00 AM  
 Sodium 142 07/15/2019 05:00 AM  
 Potassium 2.2 (LL) 07/15/2019 02:30 PM  
 Chloride 105 07/15/2019 05:00 AM  
 Chloride 105 07/15/2019 05:00 AM  
 CO2 30 07/15/2019 05:00 AM  
 CO2 30 07/15/2019 05:00 AM  
 Glucose 102 (H) 07/15/2019 05:00 AM  
 Glucose 101 (H) 07/15/2019 05:00 AM  
 BUN 23 (H) 07/15/2019 05:00 AM  
 BUN 24 (H) 07/15/2019 05:00 AM  
 Creatinine 0.69 07/15/2019 05:00 AM  
 Creatinine 0.64 07/15/2019 05:00 AM  
 
No results found for: CHOL, CHOLX, CHLST, CHOLV, HDL, LDL, LDLC, DLDLP, TGLX, TRIGL, TRIGP No results found for: GPT Lab Results Component Value Date/Time Hemoglobin A1c 4.7 07/14/2019 02:30 AM  
 
 
 
Cardiology Procedures:  
Results for orders placed or performed during the hospital encounter of 07/13/19 EKG, 12 LEAD, INITIAL Result Value Ref Range Ventricular Rate 79 BPM  
 Atrial Rate 79 BPM  
 P-R Interval 134 ms QRS Duration 84 ms Q-T Interval 408 ms QTC Calculation (Bezet) 467 ms Calculated P Axis 62 degrees Calculated R Axis 48 degrees Calculated T Axis 31 degrees Diagnosis Sinus rhythm with premature atrial complexes Otherwise normal ECG When compared with ECG of 13-JUL-2019 22:00, 
premature atrial complexes are now present Vent. rate has decreased BY  84 BPM 
Nonspecific T wave abnormality now evident in Inferior leads T wave amplitude has decreased in Anterior leads Confirmed by Ashley Maier MD. ([id number]) on 7/15/2019 8:26:29 PM 
  
 
 
 
 
Impression / Plan:   
Patient Active Problem List  
Diagnosis Code  [facility] C14.0  Status post insertion of percutaneous endoscopic gastrostomy (PEG) tube ([address] Z93.1  Hyponatremia E87.1  COPD exacerbation ([address] J44.1  Acute hypoxemic respiratory failure (HCC) J96.01  
 Severe protein-calorie malnutrition [place] Pel: less than 60% of standard weight) ([address] E43  
 Tracheostomy dependence ([address] Z93.0  Aspiration pneumonia ([address] J69.0  Acute tracheitis J04.10  Hematemesis K92.0  Sepsis ([address] A41.9  Hyperkalemia E87.5  Hypotension I95.9  Advanced care planning/counseling discussion Z71.89 A/P Afib with RVR Now converted to NSR on amiodarone Still on low dose levophed Will start Cardizem when off Levophed EF 65% Pt is not a candidate for therapeutic anticoagulation for thromboembolism prevention from Afib due GI bleed and anemia Discussed with patient. Signed By: Orin Stoner MD   
 July 15, 2019

## 2019-07-16 NOTE — PROGRESS NOTES
Pulmonary Specialists Pulmonary, Critical Care, and Sleep Medicine Name: Maurilio Hirsch MRN: [id number] : 1965 Hospital: [facility] [place] Date: 2019 Pulmonary Critical Care Note IMPRESSION:  
Patient Active Problem List  
Diagnosis Code  Acute hypoxemic respiratory failure (HCC) J96.01  
 COPD exacerbation ([facility]) J44.1  Aspiration pneumonia ([address] J69.0  Throat cancer ([address] C14.0  Tracheostomy dependence ([address] Z93.0  Hematemesis, GIB K92.0  Status post insertion of percutaneous endoscopic gastrostomy (PEG) tube ([facility] Z93.1  Hyponatremia, resolved E87.1  Severe protein-calorie malnutrition Rexanne Jones: less than 60% of standard weight) ([facility]) E43  
 Hyperkalemia E87.5  Elevated lactate, normalized  R79.89  Abnormal troponin R74.8  Cocaine abuse  ETOH abuse Acute blood loss anemia due to GIB S/p EGD 3/14/19 Dr. Sandhu Mantle: severe erosive or ulcerated and stenotic esophagitis Medication noncompliance ·  
  
RECOMMENDATIONS:  
Hypoxia status improved. On RA now. CT chest showed b/l asymmetric pulmonary edema vs multifocal pneumonia and atelectasis. No fever or leucocytosis. No sign of peripheral edema. Small plural effusion. Normal LVEF. Abx as below for possible aspiration pneumonia. He will need outpatient repeat CT chest to follow. Aspiration prevention bundle, head of the bed at 30' all times Avoid sedation; monitor for any withdrawal symptoms Continue bronchodilators PRN, pulmonary hygiene care Steroids - taper clinical response. Tapered to 40 q24 Sepsis bundle per hospital protocol. Sputum culture from trach suction Lactic acid initial elevated and repeat till normalized. Blood cx NGTD Antibiotic choice: Zosyn. D/c Levofloxacin, Vancomycin 7/15/19 Narrow Abx per cx data. Fluids: NS Off vasopressors. Monitor hemodynamics Echo normal LVEF. If remains off vasopressors, then d/c femoral central line before transfer out of icu. Off cardizem drip. Now in NSR. Still on amio drip. Defer to cardio. K and Mg being replaced per ICU protocol Monitor Hgb Q6 hrs. PPI Q12 
GI appreciated. EGD in 03/2019 had erosive esophagitis and stricture. Pt is noncompliant to PPI. EGD is active bleeding. Glycemic control Mild agitation, Monitor for alcohol withdrawal. Advised to stop illicit drugs and alcohol use. c/w banana bag daily, CIWA protocol- not needing ativan. AM labs Restart PEG feeding when ok by GI Palliative care consult: d/w team who had d/w pt. No change in code status or goal of care. He remains full code. Diop Bundle Followed  D/c diop today. Will defer respective systems problem management to primary and other consultant and follow patient in ICU with primary and other medical team 
Further recommendations will be based on the patient's response to recommended treatment and results of the investigation ordered. Quality Care: PPI, DVT prophylaxis, HOB elevated, Infection control all reviewed and addressed. PAIN AND SEDATION: none · Skin/Wound: none · Nutrition: NPO 
· Prophylaxis: DVT and GI Prophylaxis reviewed. SCD, PPI · Restraints: none 
· PT/OT eval and treat: as needed when stable · Lines/Tubes: lines - R femoral CVC 7/13/19; diop 7/13/19- to be d/c'ed 7/16/19 ADVANCE DIRECTIVE: Full code. Palliative care consulted DISCUSSION: spoke with patient, RN, RT, staff Events and notes from last 24 hours reviewed. Care plan discussed with nursing Subjective/History: Mr. Kendell St has been seen and evaluated as Dr. Zohreh Cowan requested now for assisting with ICU care. The patient can not provide additional history due to tracheostomy.  Patient is a 52 y.o. 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 In ER noted to have elevated lactate, hypotension elevated troponin, requiring IV fluids and then Levophed. UDS today positive for cociane and opiate and ETOH level positive. Per chart patient has history of polysubstance abuse, regularly uses alcohol through his peg tube; was going to be dismissed from hospice due to indiscrepencies with opiate usage namely fentanyl and Roxanol. In ER, patient was hypoxic and felt to have COPD exacerbation. He is started on HF O2 via tracheostomy, steroids and bronchodilator. Patient treated for LT sided lung empyema with decortication by CT surgeon at [facility] per chart. Other information is limited. 7/16/2019 Remained in icu Received 1 prbc yesterday for Hb 6.8, GI was notified. CTA abd and pelvis done yesterday, negative CT chest with multifocal likely pneumonia No active hemetemesis currently AFib converted ton NSR. Remains on amiodarone drip. Off levophed and jennifer drip since last night. BP stable. K and PO4 being replaced. No alcohol withdrawal 
Asking for pain medications, started on percocet last night. No other overnight issues. Review of Systems: 
Review of systems not obtained due to patient factors. Latest lactic acid:  
Lactic acid Date Value Ref Range Status 07/14/2019 1.5 0.4 - 2.0 MMOL/L Final  
07/14/2019 2.2 (HH) 0.4 - 2.0 MMOL/L Final  
  Comment:  
  CALLED TO AND CORRECTLY REPEATED BY: 
Coty Lorenzo RN ICU 07/14/19 AT 1240 TMB 
  
07/14/2019 2.9 (HH) 0.4 - 2.0 MMOL/L Final  
  Comment:  
  CALLED TO AND CORRECTLY REPEATED BY: 
Rinku Hyde RN, ICU ON 07/14/2019 AT 0344 TO JDA Past Medical History: 
Past Medical History:  
Diagnosis Date  Chronic obstructive pulmonary disease (HCC)   
 acute hypoxic respiratory failure 8/16  Hematemesis 7/13/2019  Hypertension  Ill-defined condition   
 peg tube in place  Ill-defined condition   
 chemo  S/P radiation therapy  Throat cancer ([facility]  Tobacco abuse Past Surgical History: 
Past Surgical History:  
Procedure Laterality Date  HX GI    
 peg tub  HX HEENT    
 sinus surgery  HX TRACHEOSTOMY Medications: 
Prior to Admission medications Medication Sig Start Date End Date Taking? Authorizing Provider  
oxyCODONE-acetaminophen (PERCOCET)  mg per tablet Take 1 Tab by mouth two (2) times a day. Hugh Garrido MD  
oxyCODONE-acetaminophen (PERCOCET)  mg per tablet Take  by mouth. Indications: reports takes 30mg tablets BID. states out of medication    Hugh Garrido MD  
naloxone (NARCAN) 4 mg/actuation nasal spray Use 1 spray intranasally into 1 nostril. Use a new Narcan nasal spray for subsequent doses and administer into alternating nostrils. May repeat every 2 to 3 minutes as needed. 6/25/18   Rosas Armas MD  
PARoxetine (PAXIL) 10 mg tablet 10 mg by Per G Tube route daily. Provider, Historical  
budesonide (PULMICORT) 0.5 mg/2 mL nbsp 500 mcg by Nebulization route two (2) times a day. Provider, Historical  
arformoterol (BROVANA) 15 mcg/2 mL nebu neb solution 2 mL by Nebulization route two (2) times a day. 1/9/18   Teresa Kelly MD  
budesonide (PULMICORT) 0.5 mg/2 mL nbsp 2 mL by Nebulization route two (2) times a day. 1/9/18   Teresa Kelly MD  
ipratropium (ATROVENT) 0.02 % soln 2.5 mL by Nebulization route four (4) times daily. 1/9/18   Teresa Kelly MD  
predniSONE (DELTASONE) 10 mg tablet Prednisone 10mg tabs: p.o. 
4 tabs daily for 3 days then drop to  
3 tabs daily for 3 days then drop to  
2 tabs daily for 3 days then drop to  
1 tab daily for 3 days then stop.  
 
Dispense 30 tabs 1/9/18   Teresa Kelly MD  
cloNIDine (CATAPRES) 0.2 mg/24 hr patch 1 Patch by TransDERmal route every seven (7) days. 8/29/17   Jeremías Prado MD  
fentaNYL (DURAGESIC) 100 mcg/hr PATCH 1 Patch by TransDERmal route every seventy-two (72) hours. Max Daily Amount: 1 Patch. 8/29/17   Jeremías Prado MD  
guaiFENesin (ROBITUSSIN) 100 mg/5 mL liquid Take 5 mL by mouth every four (4) hours as needed for Cough. 8/29/17   Jeremías Prado MD  
lisinopril (PRINIVIL, ZESTRIL) 5 mg tablet Take 1 Tab by mouth every twelve (12) hours. 8/29/17   Jeremías Prado MD  
magic mouthwash (FIRST-MOUTHWASH BLM) 254--40 mg/30 mL mwsh oral suspension Take 10 mL by mouth every four (4) hours as needed. Provider, Historical  
albuterol-ipratropium (DUO-NEB) 2.5 mg-0.5 mg/3 ml nebu 3 mL by Nebulization route every four (4) hours as needed. Patient taking differently: 3 mL by Nebulization route every four (4) hours as needed (wheezing). 8/1/17   Parker Devries MD  
multivitamin, tx-iron-ca-min (THERA-M W/ IRON) 9 mg iron-400 mcg tab tablet Take 1 Tab by mouth daily. 8/1/17   Parker Devries MD  
Nebulizer Accessories kit Use nebs Q4H PRN 8/1/17   Parker Devries MD  
 
 
Current Facility-Administered Medications Medication Dose Route Frequency  insulin lispro (HUMALOG) injection   SubCUTAneous Q6H  
 sodium phosphate 18 mmol in dextrose 5% 250 mL infusion  18 mmol IntraVENous ONCE  potassium chloride 10 mEq in 100 ml IVPB  10 mEq IntraVENous Q1H  
 amiodarone (NEXTERONE) 360 mg in dextrose 200 mL (1.8 mg/mL) infusion  0.5-1 mg/min IntraVENous TITRATE  methylPREDNISolone (PF) (SOLU-MEDROL) injection 40 mg  40 mg IntraVENous Q12H  
 0.9% sodium chloride 1,000 mL with mvi, adult no. 4 with vit K 10 mL, thiamine 426 mg, folic acid 1 mg infusion   IntraVENous DAILY  pantoprazole (PROTONIX) injection 40 mg  40 mg IntraVENous Q12H  
 albuterol-ipratropium (DUO-NEB) 2.5 MG-0.5 MG/3 ML  3 mL Nebulization Q4H RT  
 0.9% sodium chloride infusion  150 mL/hr IntraVENous CONTINUOUS  
  sodium chloride (NS) flush 5-40 mL  5-40 mL IntraVENous Q8H  
 PHENYLephrine (KATHERIN-SYNEPHRINE) 30 mg in 0.9% sodium chloride 250 mL infusion   mcg/min IntraVENous TITRATE  albumin human 25% (BUMINATE) solution 12.5 g  12.5 g IntraVENous Q6H  
 piperacillin-tazobactam (ZOSYN) 4.5 g in 0.9% sodium chloride (MBP/ADV) 100 mL MBP  4.5 g IntraVENous Q8H  
 vancomycin (VANCOCIN) 1,000 mg in 0.9% sodium chloride 250 mL IVPB  1,000 mg IntraVENous Q18H  
 levoFLOXacin (LEVAQUIN) 750 mg in D5W IVPB  750 mg IntraVENous Q24H  Vancomycin pharmacy to dose  1 Each Other Rx Dosing/Monitoring  NOREPINephrine (LEVOPHED) 8 mg in dextrose 5% 250 mL infusion  2-16 mcg/min IntraVENous TITRATE Allergy: No Known Allergies Social History: 
Social History Tobacco Use  Smoking status: Former Smoker Packs/day: 0.50  Smokeless tobacco: Never Used Substance Use Topics  Alcohol use: No  
 Drug use: No  
  
 
Family History: 
History reviewed. No pertinent family history. Objective:  
Vital Signs:   
Blood pressure 117/74, pulse 90, temperature 98.3 °F (36.8 °C), resp. rate 22, height 6' (1.829 m), weight 59.9 kg (132 lb), SpO2 100 %. Body mass index is 17.9 kg/m². O2 Device: Room air O2 Flow Rate (L/min): 6 l/min Temp (24hrs), Av.2 °F (36.8 °C), Min:98.1 °F (36.7 °C), Max:98.4 °F (36.9 °C) Intake/Output:  
Last shift:      No intake/output data recorded. Last 3 shifts:  1901 -  0700 In: 3572.4 [I.V.:2943.2] Out: Willis Robbins [VGYCB:] Intake/Output Summary (Last 24 hours) at 2019 4313 Last data filed at 2019 6050 Gross per 24 hour Intake 3258.21 ml Output 3150 ml Net 108.21 ml Physical Exam: 
General: aaox3. No focal deficit. Following all commands. HEENT:  oral mucosa moist 
Neck: No abnormally enlarged lymph nodes or thyroid, supple; tracheostomy tube in place Lungs: moderate air entry, no rhonchi, no wheezing, breathing normal , normal percussion bilaterally, no tenderness/ rash Heart: S1S2 present or without murmur or extra heart sounds Abdomen: non distended, bowel sounds normoactive, tympanic, abdomen is soft without significant tenderness, masses, organomegaly or guarding, rigidity, rebound. PEG in place. Extremity: negative for edema, cyanosis, clubbing Capillary refill: normal 
Neuro: weak looking. Aaox3,  moves all extremities well, no involuntary movements, exam limitations Skin: Skin color, texture, turgor fair. Skin dry, warm, diaphoretic Data:  
 
Recent Results (from the past 24 hour(s)) GLUCOSE, POC Collection Time: 07/15/19  9:35 AM  
Result Value Ref Range Glucose (POC) 195 (H) 70 - 110 mg/dL ECHO ADULT COMPLETE Collection Time: 07/15/19 10:56 AM  
Result Value Ref Range LA Volume 35.75 18 - 58 mL Right Atrial Area 4C 15.02 cm2 Ao Root D 3.62 cm Aortic Valve Systolic Peak Velocity 559.48 cm/s AoV VTI 39.74 cm Aortic Valve Area by Continuity of Peak Velocity 3.1 cm2 Aortic Valve Area by Continuity of VTI 3.0 cm2 AoV PG 18.3 mmHg LVIDd 4.12 (A) 4.2 - 5.9 cm  
 LVPWd 1.01 (A) 0.6 - 1.0 cm LVIDs 2.22 cm IVSd 1.21 (A) 0.6 - 1.0 cm  
 LV ED Vol A2C 55.5 mL  
 LV ES Vol A4C 17.2 mL  
 LV ES Vol BP 12.2 (A) 22 - 58 mL  
 LVOT d 2.08 cm  
 LVOT Peak Velocity 194.77 cm/s LVOT Peak Gradient 15.2 mmHg LVOT VTI 35.27 cm  
 LV E' Septal Velocity 10.00 cm/s LV E' Lateral Velocity 12.00 cm/s  
 MVA (PHT) 4.3 cm2  
 MV A Avtar 78.83 cm/s  
 MV E Avtar 150.55 cm/s  
 MV E/A 1.91   
 RVIDd 3.25 cm Aortic Valve Systolic Mean Gradient 74.6 mmHg BP EF 80.9 55 - 100 % LV Ejection Fraction MOD 4C 75 % LV Ejection Fraction MOD 2C 73 % LA Vol 4C 14.50 (A) 18 - 58 mL  
 LA Vol 2C 60.84 (A) 18 - 58 mL  
 LV Mass .3 88 - 224 g LV Mass AL Index 99.9 49 - 115 g/m2 E/E' lateral 12.55   
 E/E' septal 15.06   
 TAPSV 2.5 cm/s  IVC proximal 1.56 cm  
 E/E' ratio (averaged) 13.80 LV ES Vol A2C 15.0 mL  
 LVES Vol Index BP 6.8 mL/m2 LV ED Vol A4C 68.3 mL  
 LVED Vol Index BP 35.8 mL/m2 Mitral Valve E Wave Deceleration Time 174.7 ms  
 Mitral Valve Pressure Half-time 50.7 ms Left Atrium Major Axis 3.30 cm Pulmonic Valve Max Velocity 114.04 cm/s LV ED Vol BP 64.0 (A) 67 - 155 ml  
 LA Vol Index 20.02 16 - 28 ml/m2 LA Vol Index 34.07 16 - 28 ml/m2 LA Vol Index 8.12 16 - 28 ml/m2 LVED Vol Index A4C 38.3 mL/m2 LVED Vol Index A2C 31.1 mL/m2 LVES Vol Index A4C 9.6 mL/m2 LVES Vol Index A2C 8.4 mL/m2 PERCY/BSA Pk Avtar 1.8 cm2/m2 PERCY/BSA VTI 1.7 cm2/m2 PV peak gradient 5.2 mmHg HGB & HCT Collection Time: 07/15/19  1:45 PM  
Result Value Ref Range HGB 6.8 (L) 13.0 - 16.0 g/dL HCT 20.0 (L) 36.0 - 48.0 % POTASSIUM Collection Time: 07/15/19  1:45 PM  
Result Value Ref Range Potassium 2.4 (LL) 3.5 - 5.5 mmol/L MAGNESIUM Collection Time: 07/15/19  1:45 PM  
Result Value Ref Range Magnesium 2.3 1.6 - 2.6 mg/dL POTASSIUM Collection Time: 07/15/19  2:30 PM  
Result Value Ref Range Potassium 2.2 (LL) 3.5 - 5.5 mmol/L  
GLUCOSE, POC Collection Time: 07/15/19  3:29 PM  
Result Value Ref Range Glucose (POC) 161 (H) 70 - 110 mg/dL GLUCOSE, POC Collection Time: 07/15/19  6:21 PM  
Result Value Ref Range Glucose (POC) 150 (H) 70 - 110 mg/dL HGB & HCT Collection Time: 07/15/19  7:58 PM  
Result Value Ref Range HGB 7.9 (L) 13.0 - 16.0 g/dL HCT 22.5 (L) 36.0 - 48.0 % GLUCOSE, POC Collection Time: 07/15/19  9:37 PM  
Result Value Ref Range Glucose (POC) 121 (H) 70 - 110 mg/dL METABOLIC PANEL, BASIC Collection Time: 07/16/19  4:05 AM  
Result Value Ref Range Sodium 141 136 - 145 mmol/L Potassium 3.3 (L) 3.5 - 5.5 mmol/L Chloride 105 100 - 108 mmol/L  
 CO2 29 21 - 32 mmol/L Anion gap 7 3.0 - 18 mmol/L Glucose 108 (H) 74 - 99 mg/dL  BUN 9 7.0 - 18 MG/DL  
 Creatinine 0.58 (L) 0.6 - 1.3 MG/DL  
 BUN/Creatinine ratio 16 12 - 20 GFR est AA >60 >60 ml/min/1.73m2 GFR est non-AA >60 >60 ml/min/1.73m2 Calcium 7.2 (L) 8.5 - 10.1 MG/DL  
CBC WITH AUTOMATED DIFF Collection Time: 07/16/19  4:05 AM  
Result Value Ref Range WBC 3.0 (L) 4.6 - 13.2 K/uL  
 RBC 2.35 (L) 4.70 - 5.50 M/uL HGB 7.5 (L) 13.0 - 16.0 g/dL HCT 21.8 (L) 36.0 - 48.0 % MCV 92.8 74.0 - 97.0 FL  
 MCH 31.9 24.0 - 34.0 PG  
 MCHC 34.4 31.0 - 37.0 g/dL RDW 20.1 (H) 11.6 - 14.5 % PLATELET 95 (L) 870 - 420 K/uL MPV 9.5 9.2 - 11.8 FL  
 NEUTROPHILS 87 (H) 40 - 73 % LYMPHOCYTES 5 (L) 21 - 52 % MONOCYTES 8 3 - 10 % EOSINOPHILS 0 0 - 5 % BASOPHILS 0 0 - 2 %  
 ABS. NEUTROPHILS 2.6 1.8 - 8.0 K/UL  
 ABS. LYMPHOCYTES 0.2 (L) 0.9 - 3.6 K/UL  
 ABS. MONOCYTES 0.3 0.05 - 1.2 K/UL  
 ABS. EOSINOPHILS 0.0 0.0 - 0.4 K/UL  
 ABS. BASOPHILS 0.0 0.0 - 0.1 K/UL  
 DF AUTOMATED PHOSPHORUS Collection Time: 07/16/19  4:05 AM  
Result Value Ref Range Phosphorus 1.1 (L) 2.5 - 4.9 MG/DL  
GLUCOSE, POC Collection Time: 07/16/19  4:22 AM  
Result Value Ref Range Glucose (POC) 132 (H) 70 - 110 mg/dL Recent Labs  
  07/14/19 
0302 FIO2I 50 HCO3I 21.9*  
PCO2I 39.3 PHI 7.354 PO2I 149* All Micro Results Procedure Component Value Units Date/Time CULTURE, BLOOD [[id number]] Collected:  07/13/19 2200 Order Status:  Completed Specimen:  Blood Updated:  07/16/19 6219 Special Requests: NO SPECIAL REQUESTS Culture result: NO GROWTH 3 DAYS     
 CULTURE, BLOOD [[id number]] Collected:  07/13/19 2215 Order Status:  Completed Specimen:  Blood Updated:  07/16/19 4235 Special Requests: NO SPECIAL REQUESTS Culture result: NO GROWTH 3 DAYS     
 CULTURE, RESPIRATORY/SPUTUM/BRONCH Keely Sales STAIN [[id number]] Order Status:  Sent Specimen:  Sputum Telemetry: normal sinus rhythm Echo 7/15/19: 
 · Left Ventricle: Normal cavity size. Mildly increased wall thickness. Hyperdynamic systolic dysfunction. Estimated left ventricular ejection fraction is >70%. Moderate (grade 2) left ventricular diastolic dysfunction E/E' ratio is 13.80. · Aortic Valve: Probably trileaflet aortic valve. Aortic valve sclerosis with no significant stenosis. · Mitral Valve: Trace mitral valve regurgitation. Imaging: 
[x]I have personally reviewed the patients chest radiographs images and report CTA chest abd pelvis 7/15/19: 
Atherosclerotic calcification of the abdominal aorta without focal aneurysmal 
dilation. The abdominal aorta is patent. The celiac trunk, bilateral renal 
arteries, and SMA are patent. No acute process in abdomen or pelvis. No acute hematoma. Chronic appearing small left pleural effusion with adjacent atelectasis in 
keeping with known empyema. Patchy groundglass opacities are present within both lungs, either related to 
mild asymmetric edema or multifocal pneumonia. No focal consolidation. Mild 
interstitial edema on the left lower lung. Results from Hospital Encounter encounter on 07/13/19 XR CHEST PORT Narrative EXAM: Portable upright chest radiograph. INDICATION: \"hematemesis. \" 
 
COMPARISON: May 19, 2019 
 
_______________ FINDINGS:   
 
   DEVICES:  Tracheostomy tube in standard appearance, unchanged. LUNGS:  
         --Expansion:  Adequate. --Consolidation:  None detected. --Pulmonary edema:  None detected. --Other:  A clavicle subtle hazy opacity in the left perihilar and 
lower third of the lung, similar to the prior. PLEURAL SPACE: 
         --Pleural effusion:  None detected. --Pneumothorax:  None detected. 
 
_______________ Impression IMPRESSION: 
 
Equivocal subtle hazy opacity inferiorly in the left lung, similar to the prior. _______________ [x]See my orders for details My assessment, plan of care, findings, medications, side effects etc were discussed with: 
[x]nursing, MDR, palliative care team []PT/OT [x]respiratory therapy []Dr. Felicity Torres [x]Patient [x]Total critical care time exclusive of procedures 31 minutes with complex decision making performed and > 50% time spent in face to face evaluation. Melodie Kaplan MD 
7/16/2019

## 2019-07-16 NOTE — PROGRESS NOTES
Hospitalist Progress Note Patient: Lianne Guerra MRN: [id number]  CSN: [id number] YOB: 1965  Age: 52 y.o. Sex: male DOA: 7/13/2019 LOS:  LOS: 3 days Assessment/Plan Patient Active Problem List  
Diagnosis Code  Throat cancer ([facility] C14.0  Status post insertion of percutaneous endoscopic gastrostomy (PEG) tube ([facility] Z93.1  Hyponatremia E87.1  COPD exacerbation ([facility] J44.1  Acute hypoxemic respiratory failure (HCC) J96.01  
 Severe protein-calorie malnutrition Tyler Oniel: less than 60% of standard weight) ([facility] E43  
 Tracheostomy dependence ([facility] Z93.0  Aspiration pneumonia ([facility] J69.0  Acute tracheitis J04.10  Hematemesis K92.0  Sepsis ([facility] A41.9  Hyperkalemia E87.5  Hypotension I95.9  Advanced care planning/counseling discussion Z67.80  
  
 
 
 
47 y.o.  male who with hx of Throat cancer s/p XRT and tracheostomy, Esophageal Stricture with PEG, COPD, Ethoh and Cocaine abuse. Was on hospice but rescinded. Admitted for vomiting and hematemesis CRITICAL CARE PLAN Resp - Acute respiratory failure - on high flow oxygen, wean to oxygen by NC Possible aspiration. Aspiration prevention, HOB at 30 deg all times. ID - Follow up blood cx. ANTIBIOTICS vancomycin, zosyn, levofloxacin. Trend temps, wbc, LA improving. CVS - Monitor HD. off pressors Heme/onc - Follow H&H, plts. Renal - Trend BUN, Cr, follow I/O. Check and replace Mg, K, phos. Endocrine -  Follow FSG Neuro/ Pain/ Sedation -  
 
GI - PEG tube, tube feeding. Hematemesis - seen by GI, no need to EGD now. Follow H/H 
PPI Prophylaxis - DVT:SCD, GI: protonix Disposition : 2-3 days Review of systems General: No fevers or chills. Cardiovascular: No chest pain or pressure. No palpitations. Pulmonary: No shortness of breath. Gastrointestinal: see above, Physical Exam: 
General: Awake, cooperative, on oxygen by NC   
 HEENT: NC, Atraumatic. PERRLA, anicteric sclerae. Trach in place Lungs: Decreased breath sounds lower lungs bilaterally Heart:  S1 S2,  No murmur, No Rubs, No Gallops Abdomen: Soft, Non distended, Non tender.  +Bowel sounds, PEG in place. Extremities: No c/c/e Psych:   Not anxious or agitated. Neurologic:  No acute neurological deficit. Vital signs/Intake and Output: 
Visit Vitals BP 95/65 Pulse 94 Temp 97.9 °F (36.6 °C) Resp 23 Ht 6' (1.829 m) Wt 59.9 kg (132 lb) SpO2 99% BMI 17.90 kg/m² Current Shift:  07/16 0701 - 07/16 1900 In: 36 Out: 450 [Urine:450] Last three shifts:  07/14 1901 - 07/16 0700 In: 3572.4 [I.V.:2943.2] Out: Nithin Good [PRWCZ:[id number]] Labs: Results:  
   
Chemistry Recent Labs  
  07/16/19 
0405 07/15/19 
1430 07/15/19 
1345 07/15/19 
0500 07/14/19 
0230 07/13/19 
2200 *  --   --  101*  102* 143* 112*   --   --  142  142 136 134* K 3.3* 2.2* 2.4* 2.3*  2.2* 4.4 5.9*  
  --   --  105  105 103 95* CO2 29  --   --  30  30 25 27 BUN 9  --   --  24*  23* 71* 78* CREA 0.58*  --   --  0.64  0.69 1.15 1.64* CA 7.2*  --   --  7.6*  8.2* 7.5* 8.4* AGAP 7  --   --  7  7 8 12 BUCR 16  --   --  38*  33* 62* 48* AP  --   --   --  69 105 152* TP  --   --   --  4.7* 4.2* 5.3* ALB  --   --   --  2.4* 1.7* 2.1*  
GLOB  --   --   --  2.3 2.5 3.2 AGRAT  --   --   --  1.0 0.7* 0.7* CBC w/Diff Recent Labs  
  07/16/19 
1336 07/16/19 
0954 07/16/19 
0405  07/15/19 
0500  07/14/19 
0230 WBC  --   --  3.0*  --  5.5  --  12.4 RBC  --   --  2.35*  --  2.26*  --  2.38* HGB 7.1* 7.6* 7.5*   < > 7.2*   < > 8.0*  
HCT 20.5* 21.8* 21.8*   < > 21.6*   < > 24.0*  
PLT  --   --  95*  --  144  --  215 GRANS  --   --  87*  --  93*  --  89* LYMPH  --   --  5*  --  3*  --  3* EOS  --   --  0  --  0  --  0  
 < > = values in this interval not displayed. Cardiac Enzymes Recent Labs  
  07/14/19 26 07/14/19 
0810  175 CKND1 1.6 1.7 Coagulation Recent Labs  
  07/13/19 
2200 PTP 12.4 INR 1.0 Lipid Panel No results found for: CHOL, CHOLPOCT, CHOLX, CHLST, CHOLV, 944126, HDL, LDL, LDLC, DLDLP, 356053, VLDLC, VLDL, TGLX, TRIGL, TRIGP, TGLPOCT, CHHD, CHHDX  
BNP No results for input(s): BNPP in the last 72 hours. Liver Enzymes Recent Labs  
  07/15/19 
0500 TP 4.7* ALB 2.4* AP 69 SGOT 36 Thyroid Studies Lab Results Component Value Date/Time TSH 0.77 07/15/2019 05:00 AM  
    
Procedures/imaging: see electronic medical records for all procedures/Xrays and details which were not copied into this note but were reviewed prior to creation of Plan

## 2019-07-16 NOTE — PROGRESS NOTES
1930- Report and care received, assessment completed per flow sheet. Alert, oriented, cooperative, NAD. Sitting up in bed watching television. IVF and continuous drips infusing via IV pumps without difficulty. Tracheostomy secured, passy leodan valve in place. 0000- Reassessment completed and without change. 2092- Reassessment completed and without change.

## 2019-07-17 NOTE — PROGRESS NOTES
Hospitalist Progress Note    Patient: Rah Allen MRN: [id number]  CSN: [id number]    YOB: 1965  Age: 52 y.o. Sex: male    DOA: 7/13/2019 LOS:  LOS: 4 days                Assessment/Plan     Patient Active Problem List   Diagnosis Code    Throat cancer ([facility] C14.0    Status post insertion of percutaneous endoscopic gastrostomy (PEG) tube ([facility] Z93.1    Hyponatremia E87.1    COPD exacerbation ([address] J44.1    Acute hypoxemic respiratory failure ([address] J96.01    Severe protein-calorie malnutrition Di Ivanoff: less than 60% of standard weight) ([facility] E43    Tracheostomy dependence ([facility] [facility] Z93.0    Aspiration pneumonia ([facility] [facility] J69.0    Acute tracheitis J04.10    Hematemesis K92.0    Sepsis ([facility] [facility] A41.9    Hyperkalemia E87.5    Hypotension I95.9    Advanced care planning/counseling discussion Z66.80            45 y.o.  male who with hx of Throat cancer s/p XRT and tracheostomy, Esophageal Stricture with PEG, COPD, Ethoh and Cocaine abuse. Was on hospice but rescinded. Admitted for vomiting and hematemesis    CRITICAL CARE PLAN    Resp -   Acute respiratory failure - on high flow oxygen, wean to oxygen by NC  Possible aspiration. Aspiration prevention, HOB at 30 deg all times. ID - Follow up blood cx. ANTIBIOTICS vancomycin, zosyn, levofloxacin. Trend temps, wbc, LA improving. CVS - Monitor HD. off pressors    Heme/onc - Follow H&H, plts. Renal - Trend BUN, Cr, follow I/O. Check and replace Mg, K, phos. Endocrine -  Follow FSG    Neuro/ Pain/ Sedation -     GI - PEG tube, tube feeding. Hematemesis - seen by GI, for EGD today. Follow H/H  PPI    Prophylaxis - DVT:SCD, GI: protonix      Disposition : 2-3 days    Review of systems  General: No fevers or chills. Cardiovascular: No chest pain or pressure. No palpitations. Pulmonary: No shortness of breath.    Gastrointestinal: see above,        Physical Exam:  General: Awake, cooperative, on oxygen by NC    HEENT: NC, Atraumatic. PERRLA, anicteric sclerae. Trach in place  Lungs: Decreased breath sounds lower lungs bilaterally  Heart:  S1 S2,  No murmur, No Rubs, No Gallops  Abdomen: Soft, Non distended, Non tender.  +Bowel sounds, PEG in place. Extremities: No c/c/e  Psych:   Not anxious or agitated. Neurologic:  No acute neurological deficit. Vital signs/Intake and Output:  Visit Vitals  /78   Pulse 94   Temp 98.1 °F (36.7 °C)   Resp 27   Ht 6' (1.829 m)   Wt 61.1 kg (134 lb 11.2 oz)   SpO2 94%   BMI 18.27 kg/m²     Current Shift:  07/17 0701 - 07/17 1900  In: 160 [I.V.:100]  Out: 700 [Urine:700]  Last three shifts:  07/15 1901 - 07/17 0700  In: 3163.2 [I.V.:2943.2]  Out: 7662 [Urine:5445]            Labs: Results:       Chemistry Recent Labs     07/17/19  0115 07/16/19  1825 07/16/19  1400 07/16/19  0405  07/15/19  0500   GLU 93  --   --  108*  --  101*  102*     --   --  141  --  142  142   K 3.3* 2.8* 2.4* 3.3*   < > 2.3*  2.2*     --   --  105  --  105  105   CO2 28  --   --  29  --  30  30   BUN 6*  --   --  9  --  24*  23*   CREA 0.59*  --   --  0.58*  --  0.64  0.69   CA 7.9*  --   --  7.2*  --  7.6*  8.2*   AGAP 6  --   --  7  --  7  7   BUCR 10*  --   --  16  --  38*  33*   AP  --   --   --   --   --  69   TP  --   --   --   --   --  4.7*   ALB  --   --   --   --   --  2.4*   GLOB  --   --   --   --   --  2.3   AGRAT  --   --   --   --   --  1.0    < > = values in this interval not displayed. CBC w/Diff Recent Labs     07/17/19  0714 07/17/19  0115 07/16/19  1825  07/16/19  0405  07/15/19  0500   WBC  --  4.6  --   --  3.0*  --  5.5   RBC  --  2.50*  --   --  2.35*  --  2.26*   HGB 8.1* 8.1* 7.7*   < > 7.5*   < > 7.2*   HCT 23.7* 23.3* 22.1*   < > 21.8*   < > 21.6*   PLT  --  123*  --   --  95*  --  144   GRANS  --  81*  --   --  87*  --  93*   LYMPH  --  9*  --   --  5*  --  3*   EOS  --  0  --   --  0  --  0    < > = values in this interval not displayed.       Cardiac Enzymes No results for input(s): CPK, CKND1, CHUCKY in the last 72 hours. No lab exists for component: CKRMB, TROIP   Coagulation No results for input(s): PTP, INR, APTT in the last 72 hours. No lab exists for component: INREXT, INREXT    Lipid Panel No results found for: CHOL, CHOLPOCT, CHOLX, CHLST, CHOLV, 992793, HDL, LDL, LDLC, DLDLP, 771599, VLDLC, VLDL, TGLX, TRIGL, TRIGP, TGLPOCT, CHHD, CHHDX   BNP No results for input(s): BNPP in the last 72 hours.    Liver Enzymes Recent Labs     07/15/19  0500   TP 4.7*   ALB 2.4*   AP 69   SGOT 36      Thyroid Studies Lab Results   Component Value Date/Time    TSH 0.77 07/15/2019 05:00 AM        Procedures/imaging: see electronic medical records for all procedures/Xrays and details which were not copied into this note but were reviewed prior to creation of Plan

## 2019-07-17 NOTE — PROGRESS NOTES
NUTRITION FOLLOW-UP    RECOMMENDATIONS/PLAN:   - EN: Continue w/ trickle feeds as pt is refeeding K-3.3 Phos-1.2; will order Mg today   Once pt electrolytes correct will be able to adv tube feeds;   - Replace Electrolytes aggressively  - Add Daily Mg labs  - Add Vit C 500mg x 10d   Supplement: Lamont BID  Monitor labs/lytes, tube feed tolerance, skin integrity, wt, fluid status, BM    NUTRITION ASSESSMENT:   Client Update: 52 yrs old Male with vomiting, hematemesis, acute resp failure. Pt is in refeeding syndrome w/ K-3.3 and Phos-1.2. Unable to adv tube feeds until electrolytes improve. Would recc replacing electrolytes aggressively. FOOD/NUTRITION INTAKE   Diet Order:  Trickle feeds of TwoCal @ 10ml/hr    Food Allergies: NKFA/  Average PO Intake:      No data found.    Pertinent Medications:  [x] Reviewed; banana bag, methylprednisolone, protonix,   Electrolyte Replacement Protocol: []K []Mg []PO4  Insulin:  [x]SSI  []Pre-meal   []Basal    []Drip  []None  Cultural/Zoroastrian Food Preferences: None Identified    BIOCHEMICAL DATA & MEDICAL TESTS  Pertinent Labs:  [x] Reviewed; K-3.3 Phos-1.2   ANTHROPOMETRICS  Height: 6' (182.9 cm)       Weight: 61.1 kg (134 lb 11.2 oz)         BMI: 18.3 kg/m^2 underweight (Less than or = to 18.5% BMI)   Adm Weight: 132 lbs                Weight change: +2lbs  Adjusted Body Weight: n/a     NUTRITION-FOCUSED PHYSICAL ASSESSMENT  Skin: decubitus ulcer on his left buttock - Stage II POA         GI: No BM    NUTRITION PRESCRIPTION  Calories: 2275 kcal/day based on [place] x 1.7  Protein:  g/day based on 1.5-2.0 g/kg  CHO: 284 g/day based on 50% of total energy  Fluid: 2275 ml/day based on 1 kcal/ml         NUTRITION DIAGNOSES:   1. Malnutrition related to noncompliance with tube feeds as evidence by BMI 18, -16.46% wt loss since Feb 2019, and MD notes that pt pours beer down PEG tube.        NUTRITION INTERVENTIONS:   INTERVENTIONS:        GOALS:  1. EN: Continue w/ trickle feeds as pt is refeeding K-3.3 Phos-1.2; will order Mg today   Add Vit C 500mg x 10d   Supplement: Lamont BID 1. Continue w/ trickle feeds by next review 2 days     LEARNING NEEDS (Diet, Supplementation, Food/Nutrient-Drug Interaction):   [] None Identified   [] Education provided/documented      Identified and patient: [] Declined   [x] Was not appropriate/indicated        NUTRITION MONITORING /EVALUATION:   Adjust EN/PN as appropriate  Monitor wt  Monitor renal labs, electrolytes, fluid status     Previous Recommendations Implemented: yes        Previous Goals Met:  yes -tube feeds started      [] Participated in Interdisciplinary Rounds    [x] Interdisciplinary Care Plan Reviewed  DISCHARGE NUTRITION RECOMMENDATIONS ADDRESSED:      [x] To be determined closer to discharge    NUTRITION RISK:           [x] At risk                        [] Not currently at risk        Will follow-up per policy.   Mary Ann Ortiz 1

## 2019-07-17 NOTE — PROGRESS NOTES
0730-Received pt resting in bed with eyes open, no sign of distress, will continue to monitor  1300-Contacted by Endoscopy nurse, Tubefeeding on hold for possible endoscopy this evening, pt notified  1620-PT leaving for Endoscopy via bed  1750-Pt returned from Endoscopy, still waking up from sedation, confused, continue to reorient patient, incontinent of urine, pad, gown, and linen changed, tolerated well  1920-Bedside and Verbal shift change report given to Jewels (oncoming nurse) by Mayr Doe RN (offgoing nurse).  Report included the following information SBAR, Kardex, Intake/Output, MAR, Recent Results and Cardiac Rhythm SR.

## 2019-07-17 NOTE — PROGRESS NOTES
1940 Assumed care from IFTIKHAR Landry RN.    2027 Assessment completed. The documentation for this period is being entered following the guidelines as defined in the [facility] policy by Danie Stanford RN.    5358 Reassessment completed. 2416 Bedside and Verbal shift change report given to Ni Singleton RN (oncoming nurse) by Danie Stanford RN   (offgoing nurse). Report included the following information SBAR, Kardex, Intake/Output, MAR, Recent Results and Med Rec Status.

## 2019-07-17 NOTE — PROGRESS NOTES
Pharmacy Dosing Services:  Zosyn Dosing for the Floor    Indication:  Pneumonia  ( HCAP )    Previous Regimen Zosyn 4.5 gm IV ( over 4 hrs ) q8hrs per ICU Protocol   Serum Creatinine Lab Results   Component Value Date/Time    Creatinine 0.59 (L) 07/17/2019 01:15 AM    Creatinine, POC 1.1 07/13/2019 10:21 PM      Creatinine Clearance Estimated Creatinine Clearance: 122.3 mL/min (A) (based on SCr of 0.59 mg/dL (L)). BUN Lab Results   Component Value Date/Time    BUN 6 (L) 07/17/2019 01:15 AM    BUN, POC 63 (H) 07/13/2019 10:21 PM         Dose administration notes:   Changed to Zosyn 4.5 gm IV ( over 30 min ) q6hrs per Floor Protocol. Plan:  Continue to monitor     Gary Tovar  [phone]

## 2019-07-17 NOTE — PROCEDURES
[facility]  Esophagogastroduodenoscopy Procedure Report  _______________________________________________________  Patient: Olamide Chang                                         Attending Physician: Nadeem Young MD    Patient ID: [id number]                                      Referring Physician: Shameka Patel MD    Exam Date: July 17, 2019 _______________________________________________________      Introduction: A  52 y.o. male patient, presents for Esophagogastroduodenoscopy Procedure. Indication: UGI bleeding in the form of hematemesis and melena. hgb dropped to 6.8    : Nadeem Young MD    Sedation:    Benadryl 100 mg IV, Versed 9 mg IV, fentanyl 100 mcg IV      Procedure Details:  After infomed consent was obtained for the procedure, with all risks and benefits of procedure explained the patient was taken to the endoscopy suite and placed in the left lateral decubitus position. Following sequential administration of sedation as per above, the endoscope was inserted into the mouth and advanced under direct vision to third portion of the duodenum. A careful inspection was made as the gastroscope was withdrawn, including a retroflexed view of the proximal stomach; findings and interventions are described below. Findings:   HYPOPHARYNX AND LARYNX:  He still has his larynx but there is Diffuse edema. Nodule present on the posterior right vocal cord. From far away I can see the tracheostomy  Esophagus: Normal proximal esophagus. Moderately stenotic esophagus with moderate erosive circumferential erosive esophagitis of the distal and mid esophagus going all the way up to 30 cm but more severe distally. Allowing barely the passage of the gastroscope with some difficulty. There is a large deep distal esophageal ulcer occupying almost the 1/2 of the distal esophagus in the Rico's segment. Short segment Rico's maximum 2 cm and circumferential 1 cm. 2 cm Hiatal hernia. Diaphragmatic pinch located at 43 cm. Stomach: No food or liquid retention. Normal, cardia, fundus, body, lesser curvature, incisura, antrum and pylorus. Mucosa is normal. The PEG tube present in good location on the anterior gastric body wall with inflated balloon. Duodenum:   The bulb, second, third portions and major papilla are normal    Therapies:    none    Specimen:   none           Complications:   None    EBL:  None  IMPRESSION: He still has his larynx but there is Diffuse edema. Nodule present on the posterior right vocal cord. From far away I can see the tracheostomy. Moderately stenotic esophagus with moderate erosive circumferential erosive esophagitis of the distal and mid esophagus going all the way up to 30 cm but more severe distally. Allowing barely the passage of the gastroscope with some difficulty. There is a large deep distal esophageal ulcer occupying almost the 1/2 of the distal esophagus in the Rico's segment. Short segment Rico's maximum 2 cm and circumferential 1 cm. 2 cm Hiatal hernia. Recommendations: -Continue acid suppression with double dose proton pump inhibitor for life through the PEG without interruption. , recommend to add the Zantac 150 mg at bed time -Follow symptoms. , -resume tube feeding through the PEG tube, keep the head of the bed elevated at all time by 30 degree-No NSAID'S or alcohol, consider repeating the EGD and biopsy in no more than six months if condition allows.   Assistant: none    Ana Antunez MD  7/17/2019  4:50 PM

## 2019-07-17 NOTE — PERIOP NOTES
REPORT TO MARINA NASCIMENTO ON INPATIENT UNIT, ADVISED OF PROCEDURE MEDS GIVEN AND PLAN OF CARE PER DR. SANDHU, ADVISED TO RESTART TUBE FEED

## 2019-07-17 NOTE — PERIOP NOTES
Per Dr. Nova Barton hold/disconnect peg tube feed until after EGD then resume, I spoke with primary nurse \"kayley\" and he agreed to do so

## 2019-07-17 NOTE — PROGRESS NOTES
Problem: Falls - Risk of  Goal: *Absence of Falls  Description  Document Juan A Yanez Fall Risk and appropriate interventions in the flowsheet.   Outcome: Progressing Towards Goal  Note:   Fall Risk Interventions:       Mentation Interventions: Adequate sleep, hydration, pain control, Door open when patient unattended    Medication Interventions: Evaluate medications/consider consulting pharmacy    Elimination Interventions: Call light in reach, Patient to call for help with toileting needs, Toileting schedule/hourly rounds, Urinal in reach

## 2019-07-17 NOTE — PROGRESS NOTES
Cardiology Progress Note        Patient: Marilia Rivas        Sex: male          DOA: 7/13/2019  YOB: 1965      Age:  52 y.o.        LOS:  LOS: 4 days   Assessment/Plan     Principal Problem:    Sepsis ([facility] (7/13/2019)    Active Problems:    Aspiration pneumonia ([facility] [address] (11/15/2017)      Hematemesis (7/13/2019)      Hyperkalemia (7/14/2019)      Hypotension (7/14/2019)      Advanced care planning/counseling discussion (7/15/2019)        Plan:  Maintaining NSR  Off amiodarone  Continue with Cardizem 30 mg QID                    Subjective:    cc:  AFIB with RVR  SVT  Elevated trop        REVIEW OF SYSTEMS:   No cardiac symptoms  General: No fevers or chills. Cardiovascular: No chest pain or pressure. No palpitations. No ankle swelling  Pulmonary: No SOB, orthopnea, PND  Gastrointestinal: No nausea, vomiting or diarrhea      Objective:      Visit Vitals  /65 (BP 1 Location: Left arm, BP Patient Position: Supine)   Pulse 83   Temp 98.1 °F (36.7 °C)   Resp 18   Ht 6' (1.829 m)   Wt 61.1 kg (134 lb 11.2 oz)   SpO2 97%   BMI 18.27 kg/m²     Body mass index is 18.27 kg/m². Physical Exam:  General Appearance: Comfortable, tracheosty  NECK: No JVD, no thyroidomeglay. LUNGS: Clear bilaterally. HEART: S1+S2 audible,    ABD: Non-tender, BS Audible . PEG tube   EXT: No edema, and no cysnosis. VASCULAR EXAM: Pulses are intact. PSYCHIATRIC EXAM: Mood is appropriate.     Medication:  Current Facility-Administered Medications   Medication Dose Route Frequency    ascorbic acid (vitamin C) (VITAMIN C) tablet 500 mg  500 mg Oral DAILY    piperacillin-tazobactam (ZOSYN) 4.5 g in 0.9% sodium chloride (MBP/ADV) 100 mL MBP  4.5 g IntraVENous Q6H    [START ON 7/18/2019] methylPREDNISolone (PF) (SOLU-MEDROL) injection 20 mg  20 mg IntraVENous Q24H    0.9% sodium chloride infusion 1,000 mL  1,000 mL IntraVENous CONTINUOUS    midazolam (PF) (VERSED) injection 0.25-5 mg  0.25-5 mg IntraVENous Multiple    fentaNYL citrate (PF) injection 100 mcg  100 mcg IntraVENous Multiple    naloxone (NARCAN) injection 0.4 mg  0.4 mg IntraVENous Multiple    flumazenil (ROMAZICON) 0.1 mg/mL injection 0.2 mg  0.2 mg IntraVENous Multiple    oxyCODONE-acetaminophen (PERCOCET) 5-325 mg per tablet 1 Tab  1 Tab Oral Q4H PRN    acetaminophen (TYLENOL) solution 650 mg  650 mg Oral Q6H PRN    insulin lispro (HUMALOG) injection   SubCUTAneous Q6H    dilTIAZem (CARDIZEM) IR tablet 30 mg  30 mg Oral QID    diphenhydrAMINE (BENADRYL) injection 12.5 mg  12.5 mg IntraVENous Q6H PRN    0.9% sodium chloride 1,000 mL with mvi, adult no. 4 with vit K 10 mL, thiamine 395 mg, folic acid 1 mg infusion   IntraVENous DAILY    0.9% sodium chloride infusion 250 mL  250 mL IntraVENous PRN    0.9% sodium chloride infusion 250 mL  250 mL IntraVENous PRN    LORazepam (ATIVAN) tablet 0.5 mg  0.5 mg Oral QHS PRN    pantoprazole (PROTONIX) injection 40 mg  40 mg IntraVENous Q12H    ondansetron (ZOFRAN) injection 4 mg  4 mg IntraVENous Q4H PRN    LORazepam (ATIVAN) tablet 1 mg  1 mg Oral Q1H PRN    Or    LORazepam (ATIVAN) injection 1 mg  1 mg IntraVENous Q1H PRN    LORazepam (ATIVAN) tablet 2 mg  2 mg Oral Q1H PRN    Or    LORazepam (ATIVAN) injection 2 mg  2 mg IntraVENous Q1H PRN    LORazepam (ATIVAN) injection 3 mg  3 mg IntraVENous Q15MIN PRN    albuterol-ipratropium (DUO-NEB) 2.5 MG-0.5 MG/3 ML  3 mL Nebulization Q4H RT    albuterol-ipratropium (DUO-NEB) 2.5 MG-0.5 MG/3 ML  3 mL Nebulization Q4H PRN    sodium chloride (NS) flush 5-40 mL  5-40 mL IntraVENous Q8H    glucose chewable tablet 16 g  4 Tab Oral PRN    glucagon (GLUCAGEN) injection 1 mg  1 mg IntraMUSCular PRN    albumin human 25% (BUMINATE) solution 12.5 g  12.5 g IntraVENous Q6H    sodium chloride (NS) flush 5-10 mL  5-10 mL IntraVENous PRN    sodium chloride (NS) flush 5-40 mL  5-40 mL IntraVENous PRN               Lab/Data Reviewed:  Procedures/imaging: see electronic medical records for all procedures/Xrays   and details which were not copied into this note but were reviewed prior to creation of Plan       All lab results for the last 24 hours reviewed. Recent Labs     07/17/19  0714 07/17/19  0115 07/16/19  1825  07/16/19  0405  07/15/19  0500   WBC  --  4.6  --   --  3.0*  --  5.5   HGB 8.1* 8.1* 7.7*   < > 7.5*   < > 7.2*   HCT 23.7* 23.3* 22.1*   < > 21.8*   < > 21.6*   PLT  --  123*  --   --  95*  --  144    < > = values in this interval not displayed. Recent Labs     07/17/19  0115 07/16/19 1825 07/16/19  1400 07/16/19  0405  07/15/19  0500     --   --  141  --  142  142   K 3.3* 2.8* 2.4* 3.3*   < > 2.3*  2.2*     --   --  105  --  105  105   CO2 28  --   --  29  --  30  30   GLU 93  --   --  108*  --  101*  102*   BUN 6*  --   --  9  --  24*  23*   CREA 0.59*  --   --  0.58*  --  0.64  0.69   CA 7.9*  --   --  7.2*  --  7.6*  8.2*    < > = values in this interval not displayed.        Signed By: Jazmin Valdez MD     July 17, 2019

## 2019-07-17 NOTE — PROGRESS NOTES
Gastrointestinal Progress Note    Patient Name: Santa Bradshaw    YCDVH'Q Date: 7/17/2019    Admit Date: 7/13/2019    Subjective:     Diet: Patient is on tube feeding and is not tolerating. Nausea is not present. Vomiting is not present. Pain: Patient does not complain of abdominal pain. Bowel Movements: None for 4 days    Bleeding:  None    Current Facility-Administered Medications   Medication Dose Route Frequency    ascorbic acid (vitamin C) (VITAMIN C) tablet 500 mg  500 mg Oral DAILY    piperacillin-tazobactam (ZOSYN) 4.5 g in 0.9% sodium chloride (MBP/ADV) 100 mL MBP  4.5 g IntraVENous Q6H    [START ON 7/18/2019] methylPREDNISolone (PF) (SOLU-MEDROL) injection 20 mg  20 mg IntraVENous Q24H    0.9% sodium chloride infusion 1,000 mL  1,000 mL IntraVENous CONTINUOUS    midazolam (PF) (VERSED) injection 0.25-5 mg  0.25-5 mg IntraVENous Multiple    fentaNYL citrate (PF) injection 100 mcg  100 mcg IntraVENous Multiple    naloxone (NARCAN) injection 0.4 mg  0.4 mg IntraVENous Multiple    flumazenil (ROMAZICON) 0.1 mg/mL injection 0.2 mg  0.2 mg IntraVENous Multiple    oxyCODONE-acetaminophen (PERCOCET) 5-325 mg per tablet 1 Tab  1 Tab Oral Q4H PRN    acetaminophen (TYLENOL) solution 650 mg  650 mg Oral Q6H PRN    insulin lispro (HUMALOG) injection   SubCUTAneous Q6H    dilTIAZem (CARDIZEM) IR tablet 30 mg  30 mg Oral QID    diphenhydrAMINE (BENADRYL) injection 12.5 mg  12.5 mg IntraVENous Q6H PRN    0.9% sodium chloride 1,000 mL with mvi, adult no. 4 with vit K 10 mL, thiamine 784 mg, folic acid 1 mg infusion   IntraVENous DAILY    0.9% sodium chloride infusion 250 mL  250 mL IntraVENous PRN    0.9% sodium chloride infusion 250 mL  250 mL IntraVENous PRN    LORazepam (ATIVAN) tablet 0.5 mg  0.5 mg Oral QHS PRN    pantoprazole (PROTONIX) injection 40 mg  40 mg IntraVENous Q12H    ondansetron (ZOFRAN) injection 4 mg  4 mg IntraVENous Q4H PRN    LORazepam (ATIVAN) tablet 1 mg  1 mg Oral Q1H PRN    Or    LORazepam (ATIVAN) injection 1 mg  1 mg IntraVENous Q1H PRN    LORazepam (ATIVAN) tablet 2 mg  2 mg Oral Q1H PRN    Or    LORazepam (ATIVAN) injection 2 mg  2 mg IntraVENous Q1H PRN    LORazepam (ATIVAN) injection 3 mg  3 mg IntraVENous Q15MIN PRN    albuterol-ipratropium (DUO-NEB) 2.5 MG-0.5 MG/3 ML  3 mL Nebulization Q4H RT    albuterol-ipratropium (DUO-NEB) 2.5 MG-0.5 MG/3 ML  3 mL Nebulization Q4H PRN    sodium chloride (NS) flush 5-40 mL  5-40 mL IntraVENous Q8H    glucose chewable tablet 16 g  4 Tab Oral PRN    glucagon (GLUCAGEN) injection 1 mg  1 mg IntraMUSCular PRN    albumin human 25% (BUMINATE) solution 12.5 g  12.5 g IntraVENous Q6H    sodium chloride (NS) flush 5-10 mL  5-10 mL IntraVENous PRN    sodium chloride (NS) flush 5-40 mL  5-40 mL IntraVENous PRN          Objective:     Visit Vitals  /65 (BP 1 Location: Left arm, BP Patient Position: Supine)   Pulse 83   Temp 98.1 °F (36.7 °C)   Resp 18   Ht 6' (1.829 m)   Wt 61.1 kg (134 lb 11.2 oz)   SpO2 97%   BMI 18.27 kg/m²       07/15 1901 - 07/17 0700  In: 3163.2 [I.V.:2943.2]  Out: 8551 [Urine:5445]    General appearance: alert, fatigued, cooperative, no distress, appears stated age, pale  Abdomen: soft, non-tender. Bowel sounds normal. No masses,  no organomegaly.  PEG tube present in the epigastric area    Data Review:    Labs: Results:       Chemistry Recent Labs     07/17/19  0115 07/16/19  1825 07/16/19  1400 07/16/19  0405  07/15/19  0500   GLU 93  --   --  108*  --  101*  102*     --   --  141  --  142  142   K 3.3* 2.8* 2.4* 3.3*   < > 2.3*  2.2*     --   --  105  --  105  105   CO2 28  --   --  29  --  30  30   BUN 6*  --   --  9  --  24*  23*   CREA 0.59*  --   --  0.58*  --  0.64  0.69   CA 7.9*  --   --  7.2*  --  7.6*  8.2*   AGAP 6  --   --  7  --  7  7   BUCR 10*  --   --  16  --  38*  33*   AP  --   --   --   --   --  69   TP  --   --   --   --   --  4.7*   ALB  --   --   --   --   -- 2.4*   GLOB  --   --   --   --   --  2.3   AGRAT  --   --   --   --   --  1.0    < > = values in this interval not displayed. CBC w/Diff Recent Labs     07/17/19  0714 07/17/19  0115 07/16/19  1825  07/16/19  0405  07/15/19  0500   WBC  --  4.6  --   --  3.0*  --  5.5   RBC  --  2.50*  --   --  2.35*  --  2.26*   HGB 8.1* 8.1* 7.7*   < > 7.5*   < > 7.2*   HCT 23.7* 23.3* 22.1*   < > 21.8*   < > 21.6*   PLT  --  123*  --   --  95*  --  144   GRANS  --  81*  --   --  87*  --  93*   LYMPH  --  9*  --   --  5*  --  3*   EOS  --  0  --   --  0  --  0    < > = values in this interval not displayed. Coagulation No results for input(s): PTP, INR, APTT in the last 72 hours. No lab exists for component: INREXT    Liver Enzymes Recent Labs     07/15/19  0500   TP 4.7*   ALB 2.4*   AP 69   SGOT 36          Assessment:     Principal Problem:    Sepsis ([facility] [address] (7/13/2019)    Active Problems:    Aspiration pneumonia ([facility] [address] (11/15/2017)      Hematemesis (7/13/2019)      Hyperkalemia (7/14/2019)      Hypotension (7/14/2019)      Advanced care planning/counseling discussion (7/15/2019)        Plan:     UGI bleeding in the form of hematemesis and melena with drop in hgb to 6.8 but now hgb is stable at 8.1. CT angio no cirrhosis or active bleeding. The patient was found in March to have severe erosive stenotic esophagitis he was supposed to be on PPI for life but apparently he was not taking anything for the stomach Plan would do EGD today to evaluate most likely the same thing is still happening. I explained to him the procedure and the risks involved he agreed to proceed.      Acute blood loss anemia to 6.8 requiring 2 blood transfusions on July 13, 2019 Plan - hgb stable at 8.1 stopped bleeding  Substance abuse  Aspiration pneumonia recovering well on Zosyn  Hx of laryngeal cancer had laryngectomy with tracheostomy and PEG tube  He is completely dependent on his PEG tube            Pegge MD Chriss  July 17, 2019

## 2019-07-17 NOTE — PROGRESS NOTES
[facility]  Pulmonary, Critical Care, and Sleep Medicine    Name: Rosamaria Boswell MRN: [id number]   : 1965 Hospital: [facility] [place]   Date: 2019        Pulmonary Critical Care Note    IMPRESSION:   Patient Active Problem List   Diagnosis Code    Acute hypoxemic respiratory failure (HCC) J96.01    COPD exacerbation (HCC) J44.1    Aspiration pneumonia ([address] J69.0    Throat cancer ([address] C14.0    Tracheostomy dependence ([address] Z93.0    Hematemesis, GIB K92.0    Status post insertion of percutaneous endoscopic gastrostomy (PEG) tube ([address] Z93.1    Hyponatremia, resolved E87.1    Severe protein-calorie malnutrition Sergio Dash: less than 60% of standard weight) ([address] E43    Hyperkalemia E87.5    Elevated lactate, normalized  R79.89    Abnormal troponin R74.8    Cocaine abuse     ETOH abuse     Acute blood loss anemia due to GIB          S/p EGD 3/14/19 Dr. Ortiz Due: severe erosive or ulcerated and stenotic esophagitis      Medication noncompliance             ·      RECOMMENDATIONS:   Transferred to medical floor. On RA. No respiratory distress overnight. CT chest showed b/l asymmetric pulmonary edema vs multifocal pneumonia and atelectasis. He will need outpatient repeat CT chest to follow. No fever or leucocytosis. No sign of peripheral edema. Small plural effusion. Normal LVEF. Abx as below for possible aspiration pneumonia. Antibiotic choice: Zosyn. D/c'ed Levofloxacin, Vancomycin 7/15/19  Aspiration prevention bundle, head of the bed at 30' all times  Continue bronchodilators PRN, pulmonary hygiene care  Steroids - taper clinical response. Tapered to 20 q24, last dose 19  Lactic acid initial elevated and repeat till normalized. Blood cx NGTD    Will defer respective systems problem management to primary and other consultant    Will follow from pulmonary perspective.       Subjective/History:   Mr. Rosamaria Boswell has been seen and evaluated as Dr. Esperanza Alexander requested now for assisting with ICU care. The patient can not provide additional history due to tracheostomy. Patient is a 52 y.o. 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 In ER noted to have elevated lactate, hypotension elevated troponin, requiring IV fluids and then Levophed. UDS today positive for cociane and opiate and ETOH level positive. Per chart patient has history of polysubstance abuse, regularly uses alcohol through his peg tube; was going to be dismissed from hospice due to indiscrepencies with opiate usage namely fentanyl and Roxanol. In ER, patient was hypoxic and felt to have COPD exacerbation. He is started on HF O2 via tracheostomy, steroids and bronchodilator. Patient treated for LT sided lung empyema with decortication by CT surgeon at [facility] per chart. Other information is limited. 7/17/2019  Transferred out of icu. No respi distress overnight. On RA  No trach secretions  No fever chills  Minimal mild dry cough  BP stable. No other overnight respiratory issues. Review of Systems:  Review of systems not obtained due to patient factors.       Latest lactic acid:   Lactic acid   Date Value Ref Range Status   07/14/2019 1.5 0.4 - 2.0 MMOL/L Final   07/14/2019 2.2 (HH) 0.4 - 2.0 MMOL/L Final     Comment:     CALLED TO AND CORRECTLY REPEATED BY:  William Lyon RN ICU 07/14/19 AT 1240 TMB     07/14/2019 2.9 (HH) 0.4 - 2.0 MMOL/L Final     Comment:     CALLED TO AND CORRECTLY REPEATED BY:  Letitia Holt RN, ICU ON 07/14/2019 AT 0344 TO JDA         Past Medical History:  Past Medical History:   Diagnosis Date    Chronic obstructive pulmonary disease ([facility] [address]     acute hypoxic respiratory failure 8/16    Hematemesis 7/13/2019    Hypertension     Ill-defined condition     peg tube in place    Ill-defined condition     chemo    S/P radiation therapy     Throat cancer ([facility] [address]     Tobacco abuse         Past Surgical History:  Past Surgical History:   Procedure Laterality Date    HX GI      peg tub    HX HEENT      sinus surgery    HX TRACHEOSTOMY          Medications:  Prior to Admission medications    Medication Sig Start Date End Date Taking? Authorizing Provider   oxyCODONE-acetaminophen (PERCOCET)  mg per tablet Take 1 Tab by mouth two (2) times a day. Hugh Garrido MD   oxyCODONE-acetaminophen (PERCOCET)  mg per tablet Take  by mouth. Indications: reports takes 30mg tablets BID. states out of medication    Hugh Garrido MD   naloxone (NARCAN) 4 mg/actuation nasal spray Use 1 spray intranasally into 1 nostril. Use a new Narcan nasal spray for subsequent doses and administer into alternating nostrils. May repeat every 2 to 3 minutes as needed. 6/25/18   Nikhil Armas MD   PARoxetine (PAXIL) 10 mg tablet 10 mg by Per G Tube route daily. Provider, Historical   budesonide (PULMICORT) 0.5 mg/2 mL nbsp 500 mcg by Nebulization route two (2) times a day. Provider, Historical   arformoterol (BROVANA) 15 mcg/2 mL nebu neb solution 2 mL by Nebulization route two (2) times a day. 1/9/18   Eliecer Still MD   budesonide (PULMICORT) 0.5 mg/2 mL nbsp 2 mL by Nebulization route two (2) times a day. 1/9/18   Eliecer Still MD   ipratropium (ATROVENT) 0.02 % soln 2.5 mL by Nebulization route four (4) times daily. 1/9/18   Eliecer Still MD   predniSONE (DELTASONE) 10 mg tablet Prednisone 10mg tabs: p.o.  4 tabs daily for 3 days then drop to   3 tabs daily for 3 days then drop to   2 tabs daily for 3 days then drop to   1 tab daily for 3 days then stop. Dispense 30 tabs 1/9/18   Eliecer Still MD   cloNIDine (CATAPRES) 0.2 mg/24 hr patch 1 Patch by TransDERmal route every seven (7) days.  8/29/17   Zain Miller MD   fentaNYL (DURAGESIC) 100 mcg/hr PATCH 1 Patch by TransDERmal route every seventy-two (72) hours. Max Daily Amount: 1 Patch. 8/29/17   Haylie Banks MD   guaiFENesin (ROBITUSSIN) 100 mg/5 mL liquid Take 5 mL by mouth every four (4) hours as needed for Cough. 8/29/17   Haylie Banks MD   lisinopril (PRINIVIL, ZESTRIL) 5 mg tablet Take 1 Tab by mouth every twelve (12) hours. 8/29/17   Haylie Banks MD   magic mouthwash (FIRST-MOUTHWASH BLM) 823--40 mg/30 mL mwsh oral suspension Take 10 mL by mouth every four (4) hours as needed. Provider, Historical   albuterol-ipratropium (DUO-NEB) 2.5 mg-0.5 mg/3 ml nebu 3 mL by Nebulization route every four (4) hours as needed. Patient taking differently: 3 mL by Nebulization route every four (4) hours as needed (wheezing). 8/1/17   Abhijit Tamayo MD   multivitamin, tx-iron-ca-min (THERA-M W/ IRON) 9 mg iron-400 mcg tab tablet Take 1 Tab by mouth daily. 8/1/17   Abhijit Tamayo MD   Nebulizer Accessories kit Use nebs Q4H PRN 8/1/17   Abhijit Tamayo MD       Current Facility-Administered Medications   Medication Dose Route Frequency    ascorbic acid (vitamin C) (VITAMIN C) tablet 500 mg  500 mg Oral DAILY    piperacillin-tazobactam (ZOSYN) 4.5 g in 0.9% sodium chloride (MBP/ADV) 100 mL MBP  4.5 g IntraVENous Q6H    insulin lispro (HUMALOG) injection   SubCUTAneous Q6H    methylPREDNISolone (PF) (SOLU-MEDROL) injection 40 mg  40 mg IntraVENous Q24H    dilTIAZem (CARDIZEM) IR tablet 30 mg  30 mg Oral QID    0.9% sodium chloride 1,000 mL with mvi, adult no. 4 with vit K 10 mL, thiamine 515 mg, folic acid 1 mg infusion   IntraVENous DAILY    pantoprazole (PROTONIX) injection 40 mg  40 mg IntraVENous Q12H    albuterol-ipratropium (DUO-NEB) 2.5 MG-0.5 MG/3 ML  3 mL Nebulization Q4H RT    sodium chloride (NS) flush 5-40 mL  5-40 mL IntraVENous Q8H    albumin human 25% (BUMINATE) solution 12.5 g  12.5 g IntraVENous Q6H       Allergy:  No Known Allergies     Social History:  Social History     Tobacco Use    Smoking status: Former Smoker     Packs/day: 0.50    Smokeless tobacco: Never Used   Substance Use Topics    Alcohol use: No    Drug use: No        Family History:  History reviewed. No pertinent family history. Objective:   Vital Signs:    Blood pressure 122/73, pulse 86, temperature 98.2 °F (36.8 °C), resp. rate 20, height 6' (1.829 m), weight 61.1 kg (134 lb 11.2 oz), SpO2 99 %. Body mass index is 18.27 kg/m². O2 Device: Room air   O2 Flow Rate (L/min): 6 l/min   Temp (24hrs), Av.2 °F (36.8 °C), Min:97.6 °F (36.4 °C), Max:98.6 °F (37 °C)         Intake/Output:   Last shift:      701 -  190  In: 130 [I.V.:100]  Out: 700 [Urine:700]  Last 3 shifts: 07/15 1901 -  0700  In: 3163.2 [I.V.:2943.2]  Out: 1233 [Urine:5445]    Intake/Output Summary (Last 24 hours) at 2019 1258  Last data filed at 2019 1119  Gross per 24 hour   Intake 310 ml   Output 3325 ml   Net -3015 ml       Physical Exam:  General: aaox3. Following all commands. Neck: No abnormally enlarged lymph nodes or thyroid, supple; tracheostomy tube in place  Lungs: moderate air entry, no rhonchi, no wheezing, breathing normal , normal percussion bilaterally, no tenderness/ rash  Heart: S1S2 present or without murmur or extra heart sounds  Abdomen: non distended, bowel sounds normoactive, tympanic, abdomen is soft without significant tenderness, masses, organomegaly or guarding, rigidity, rebound. PEG in place.    Extremity: negative for edema, cyanosis, clubbing      Data:     Recent Results (from the past 24 hour(s))   HGB & HCT    Collection Time: 19  1:36 PM   Result Value Ref Range    HGB 7.1 (L) 13.0 - 16.0 g/dL    HCT 20.5 (L) 36.0 - 48.0 %   CALCIUM, IONIZED    Collection Time: 19  1:42 PM   Result Value Ref Range    Ionized Calcium 1.00 (L) 1.12 - 1.32 MMOL/L   POTASSIUM    Collection Time: 19  2:00 PM   Result Value Ref Range    Potassium 2.4 (LL) 3.5 - 5.5 mmol/L   GLUCOSE, POC    Collection Time: 07/16/19  4:50 PM   Result Value Ref Range    Glucose (POC) 128 (H) 70 - 110 mg/dL   POTASSIUM    Collection Time: 07/16/19  6:25 PM   Result Value Ref Range    Potassium 2.8 (LL) 3.5 - 5.5 mmol/L   HGB & HCT    Collection Time: 07/16/19  6:25 PM   Result Value Ref Range    HGB 7.7 (L) 13.0 - 16.0 g/dL    HCT 22.1 (L) 36.0 - 48.0 %   GLUCOSE, POC    Collection Time: 07/16/19  9:13 PM   Result Value Ref Range    Glucose (POC) 115 (H) 70 - 739 mg/dL   METABOLIC PANEL, BASIC    Collection Time: 07/17/19  1:15 AM   Result Value Ref Range    Sodium 142 136 - 145 mmol/L    Potassium 3.3 (L) 3.5 - 5.5 mmol/L    Chloride 108 100 - 108 mmol/L    CO2 28 21 - 32 mmol/L    Anion gap 6 3.0 - 18 mmol/L    Glucose 93 74 - 99 mg/dL    BUN 6 (L) 7.0 - 18 MG/DL    Creatinine 0.59 (L) 0.6 - 1.3 MG/DL    BUN/Creatinine ratio 10 (L) 12 - 20      GFR est AA >60 >60 ml/min/1.73m2    GFR est non-AA >60 >60 ml/min/1.73m2    Calcium 7.9 (L) 8.5 - 10.1 MG/DL   CBC WITH AUTOMATED DIFF    Collection Time: 07/17/19  1:15 AM   Result Value Ref Range    WBC 4.6 4.6 - 13.2 K/uL    RBC 2.50 (L) 4.70 - 5.50 M/uL    HGB 8.1 (L) 13.0 - 16.0 g/dL    HCT 23.3 (L) 36.0 - 48.0 %    MCV 93.2 74.0 - 97.0 FL    MCH 32.4 24.0 - 34.0 PG    MCHC 34.8 31.0 - 37.0 g/dL    RDW 20.0 (H) 11.6 - 14.5 %    PLATELET 312 (L) 484 - 420 K/uL    MPV 9.2 9.2 - 11.8 FL    NEUTROPHILS 81 (H) 40 - 73 %    LYMPHOCYTES 9 (L) 21 - 52 %    MONOCYTES 10 3 - 10 %    EOSINOPHILS 0 0 - 5 %    BASOPHILS 0 0 - 2 %    ABS. NEUTROPHILS 3.8 1.8 - 8.0 K/UL    ABS. LYMPHOCYTES 0.4 (L) 0.9 - 3.6 K/UL    ABS. MONOCYTES 0.5 0.05 - 1.2 K/UL    ABS. EOSINOPHILS 0.0 0.0 - 0.4 K/UL    ABS.  BASOPHILS 0.0 0.0 - 0.1 K/UL    DF AUTOMATED     PHOSPHORUS    Collection Time: 07/17/19  1:15 AM   Result Value Ref Range    Phosphorus 1.2 (L) 2.5 - 4.9 MG/DL   GLUCOSE, POC    Collection Time: 07/17/19  6:08 AM   Result Value Ref Range    Glucose (POC) 95 70 - 110 mg/dL   HGB & HCT    Collection Time: 07/17/19  7:14 AM   Result Value Ref Range    HGB 8.1 (L) 13.0 - 16.0 g/dL    HCT 23.7 (L) 36.0 - 48.0 %   MAGNESIUM    Collection Time: 07/17/19  7:14 AM   Result Value Ref Range    Magnesium 1.6 1.6 - 2.6 mg/dL   GLUCOSE, POC    Collection Time: 07/17/19 12:11 PM   Result Value Ref Range    Glucose (POC) 128 (H) 70 - 110 mg/dL           No results for input(s): FIO2I, IFO2, HCO3I, IHCO3, HCOPOC, PCO2I, PCOPOC, IPHI, PHI, PHPOC, PO2I, PO2POC in the last 72 hours. No lab exists for component: IPOC2    All Micro Results     Procedure Component Value Units Date/Time    CULTURE, BLOOD [[id number]] Collected:  07/13/19 2200    Order Status:  Completed Specimen:  Blood Updated:  07/17/19 0846     Special Requests: NO SPECIAL REQUESTS        Culture result: NO GROWTH 4 DAYS       CULTURE, BLOOD [[id number]] Collected:  07/13/19 2215    Order Status:  Completed Specimen:  Blood Updated:  07/17/19 0846     Special Requests: NO SPECIAL REQUESTS        Culture result: NO GROWTH 4 DAYS       CULTURE, RESPIRATORY/SPUTUM/BRONCH Patricia Hey STAIN [[id number]] Collected:  07/14/19 0145    Order Status:  Canceled Specimen:  Sputum           Telemetry: normal sinus rhythm    Echo 7/15/19:  · Left Ventricle: Normal cavity size. Mildly increased wall thickness. Hyperdynamic systolic dysfunction. Estimated left ventricular ejection fraction is >70%. Moderate (grade 2) left ventricular diastolic dysfunction E/E' ratio is 13.80. · Aortic Valve: Probably trileaflet aortic valve. Aortic valve sclerosis with no significant stenosis. · Mitral Valve: Trace mitral valve regurgitation. Imaging:  [x]I have personally reviewed the patients chest radiographs images and report     CTA chest abd pelvis 7/15/19:  Atherosclerotic calcification of the abdominal aorta without focal aneurysmal  dilation. The abdominal aorta is patent. The celiac trunk, bilateral renal  arteries, and SMA are patent.   No acute process in abdomen or pelvis. No acute hematoma. Chronic appearing small left pleural effusion with adjacent atelectasis in  keeping with known empyema. Patchy groundglass opacities are present within both lungs, either related to  mild asymmetric edema or multifocal pneumonia. No focal consolidation. Mild  interstitial edema on the left lower lung. Results from [place] encounter on 07/13/19   XR CHEST PORT    Narrative EXAM: Portable upright chest radiograph. INDICATION: \"hematemesis. \"    COMPARISON: May 19, 2019    _______________    FINDINGS:         DEVICES:  Tracheostomy tube in standard appearance, unchanged. LUNGS:            --Expansion:  Adequate. --Consolidation:  None detected. --Pulmonary edema:  None detected. --Other:  A clavicle subtle hazy opacity in the left perihilar and  lower third of the lung, similar to the prior. PLEURAL SPACE:           --Pleural effusion:  None detected. --Pneumothorax:  None detected.    _______________      Impression IMPRESSION:    Equivocal subtle hazy opacity inferiorly in the left lung, similar to the prior.       _______________                   [x]See my orders for details      Huy Rawls MD  7/17/2019

## 2019-07-17 NOTE — PROGRESS NOTES
Problem: Mobility Impaired (Adult and Pediatric)  Goal: *Acute Goals and Plan of Care (Insert Text)  Description  Physical Therapy Goals   Initiated 7/16/2019 and to be accomplished within 5 day(s)  1. Patient will move from supine <> sit with S in prep for out of bed activity and change of position. 2.  Patient will perform sit<> stand with S with LRAD in prep for transfers/ambulation. 3.  Patient will transfer from bed <> chair with S with LRAD for time up in chair for completion of ADL activity. 4.  Patient will ambulate 150 feet with LRAD/S for improved functional mobility/safe discharge. Outcome: Progressing Towards Goal     PHYSICAL THERAPY TREATMENT    Patient: Beka Hays (41 y.o. male)  Date: 7/17/2019  Diagnosis: Sepsis ([facility] [address] [A41.9]  Aspiration pneumonia ([facility] [address] [J69.0]  Hematemesis [K92.0] Sepsis ([facility] [address]  Procedure(s) (LRB):  EGD room 259 (N/A) Day of Surgery  Precautions: Fall   Chart, physical therapy assessment, plan of care and goals were reviewed. ASSESSMENT:  Pt demonstrates great improvement in all functional areas. Pt with good grasp of limitations and rests accordingly. Will likely clear on next session. Progression toward goals:  ?      Improving appropriately and progressing toward goals  ? Improving slowly and progressing toward goals  ? Not making progress toward goals and plan of care will be adjusted     PLAN:  Patient continues to benefit from skilled intervention to address the above impairments. Continue treatment per established plan of care. Discharge Recommendations:  Home Health  Further Equipment Recommendations for Discharge:  rolling walker     SUBJECTIVE:   Patient stated I need to get out of this bed.     OBJECTIVE DATA SUMMARY:   Critical Behavior:  Neurologic State: Alert  Orientation Level: Oriented X4  Cognition: Follows commands, Appropriate decision making, Appropriate for age attention/concentration, Appropriate safety awareness  Functional Mobility Training:  Bed Mobility:  Rolling: Supervision  Supine to Sit: Supervision  Sit to Supine: Supervision  Scooting: Supervision  Transfers:  Sit to Stand: Contact guard assistance  Stand to Sit: Contact guard assistance  Balance:  Sitting: Intact  Standing: Impaired; With support  Standing - Static: Good  Standing - Dynamic : Fair  Ambulation/Gait Training:  Distance (ft): 350 Feet (ft)  Assistive Device: Gait belt;Walker, rolling  Ambulation - Level of Assistance: Minimal assistance  Gait Abnormalities: Decreased step clearance; Steppage gait; Step to gait  Base of Support: Widened  Speed/Vickie: Slow  Step Length: Left shortened;Right shortened  Stairs:  Number of Stairs Trained: 4  Stairs - Level of Assistance: Supervision  Rail Use: Both  Pain:  Pain Scale 1: Visual  Pain Intensity 1: 0  Pain Location 1: Neck;Rib cage  Pain Description 1: Aching; Sore  Pain Intervention(s) 1: Medication (see MAR)  Activity Tolerance:   Good   Please refer to the flowsheet for vital signs taken during this treatment. After treatment:   ? Patient left in no apparent distress sitting up in chair  ? Patient left in no apparent distress in bed  ? Call bell left within reach  ? Nursing notified  ? Caregiver present  ?  Bed alarm activated      Barbie Bowser PTA   Time Calculation: 40 mins

## 2019-07-17 NOTE — PROGRESS NOTES
UAI completed at [facility] on 2019 cm will ask patient to sign a release of information and send it to [place] to obtain a copy of UAI    [id number] cm has received UAI from [facility]. Met with patient at bedside, along with Dr. Spencer Zarate. Patient states his mother  and left the house to him and his sister. However, sister has  so he and his girlfriend live there. Patient states he was with @ [facility] hospice but his girlfriend tricked him into that he informed cm he is not going home with hospice. Patient states he would like to use HHC states not sure which one yet. Patient has trach but only uses humidity no oxygen. Has peg wit tube feedings. States the Hospice nurse set up making those arrangements. Patient reports he uses a RW at home. He has a nebulizer. Cm has asked Markus Boudreaux from [facility] to see if patient qualifies for medicaid. @ home care came to visit patient. patien not sure if he will be going home with @ home hospice.  He will let cm know  Cm will continue to follow  patint reports he is IADL's at home  Patient has medicare and has Dr. Jocelyne Pereyra as PCP

## 2019-07-18 PROBLEM — J96.01 ACUTE HYPOXEMIC RESPIRATORY FAILURE (HCC): Status: RESOLVED | Noted: 2017-08-16 | Resolved: 2019-01-01

## 2019-07-18 PROBLEM — K92.0 HEMATEMESIS: Status: RESOLVED | Noted: 2019-01-01 | Resolved: 2019-01-01

## 2019-07-18 PROBLEM — E87.5 HYPERKALEMIA: Status: RESOLVED | Noted: 2019-01-01 | Resolved: 2019-01-01

## 2019-07-18 PROBLEM — I95.9 HYPOTENSION: Status: RESOLVED | Noted: 2019-01-01 | Resolved: 2019-01-01

## 2019-07-18 PROBLEM — K22.10 EROSIVE ESOPHAGITIS: Status: ACTIVE | Noted: 2019-01-01

## 2019-07-18 PROBLEM — K22.10 ESOPHAGEAL ULCER: Status: ACTIVE | Noted: 2019-01-01

## 2019-07-18 PROBLEM — E87.1 HYPONATREMIA: Status: RESOLVED | Noted: 2017-07-30 | Resolved: 2019-01-01

## 2019-07-18 NOTE — PROGRESS NOTES
Problem: Self Care Deficits Care Plan (Adult)  Goal: *Acute Goals and Plan of Care (Insert Text)  Description  Initial Occupational Therapy Goals (7/18/2019) Within 7 day(s):    1. Patient will perform grooming standing at sink with Supervision/SBA x 5 minutes for increased independence with ADLs. 2. Patient will perform bathing with setup seated sinkside for increased independence with ADLs. 3. Patient will perform all aspects of toileting (in bathroom) with Supervision/mod I for increased independence in ADLs  4. Patient will independently apply energy conservation techniques with 1 verbal cue(s) for increased independence with ADLs. 5. Patient will utilize good body mechanics during ADLs with 1 verbal cue(s). 6. Patient will perform dressing with mod I for increased independence with ADLs. 7. Patient will perform a simulated tub transfer with Supervision. Outcome: Progressing Towards Goal     OCCUPATIONAL THERAPY EVALUATION    Patient: Jenifer Montes De Oca (9 y.o. male)  Date: 7/18/2019  Primary Diagnosis: Sepsis ([facility] [address] [A41.9]  Aspiration pneumonia ([facility] [address] [J69.0]  Hematemesis [K92.0]  Procedure(s) (LRB):  EGD room 259 (N/A) 1 Day Post-Op   Precautions:  Aspiration, Fall, Seizure(CIWA)  PLOF: Patient reports mod I at baseline w/ RW as his fiance' works. Pt reports baseline difficulty getting in/out of tub shower. ASSESSMENT :  Based on the objective data described below, the patient presents with decreased functional endurance to support standing ADLs. Pt initially demanding and poor participation, but when pt reported wanting to wash his hair and this OT offering shampoo cap, pt agreeable to participation. Pt setup w/ urinal use in bed, declining walking to bathroom. Pt reports smearing on sheet and wanting to perform posterior hygiene in bed. Encouraged OOB, but pt declined. Pt able to bridge for pad removal and able to complete hygiene.  In standing, pt then agreeable for more attempts at hygiene to get completely clean and able to complete w/ setup. Pt stood at sinkside ~3-4 minutes w/ modA for hair washing. Pt w/ limited ability to maintain UE's against gravity. Pt able to comb hair. Pt CGA for mobility only d/t wrapping self up in tubes/wires which pt states she isn't hooked up to a pump for his PEG feeding. Pt reports he pours in the feeding through the tube. Education: Patient instructed on home safety, body mechanics for optimal respiratory effort, Energy Conservation/Work Simplification Techniques, adaptive strategies and adaptive dressing techniques including clothing modifications with patient  verbalizing understanding at this time. Patient will benefit from skilled intervention to address the above impairments. Patient's rehabilitation potential is considered to be Fair  Factors which may influence rehabilitation potential include:   ? None noted  ? Mental ability/status  ? Medical condition  ? Home/family situation and support systems  ? Safety awareness  ? Pain tolerance/management  ? Other:      PLAN :  Recommendations and Planned Interventions:   ?               Self Care Training                  ? Therapeutic Activities  ? Functional Mobility Training   ? Cognitive Retraining  ? Therapeutic Exercises           ? Endurance Activities  ? Balance Training                    ? Neuromuscular Re-Education  ? Visual/Perceptual Training     ? Home Safety Training  ? Patient Education                   ? Family Training/Education  ? Other (comment):    Frequency/Duration: Patient will be followed by occupational therapy 1-2 times per day/2-4 days per week to address goals. Discharge Recommendations: Home Health  Further Equipment Recommendations for Discharge:  To Be Determined (TBD) at next level of care       SUBJECTIVE:   Patient stated I'm going home today.     OBJECTIVE DATA SUMMARY:     Past Medical History:   Diagnosis Date    Chronic obstructive pulmonary disease ([facility] [address]     acute hypoxic respiratory failure 8/16    Hematemesis 7/13/2019    Hypertension     Ill-defined condition     peg tube in place    Ill-defined condition     chemo    S/P radiation therapy     Throat cancer (HCC)     Tobacco abuse      Past Surgical History:   Procedure Laterality Date    HX GI      peg tub    HX HEENT      sinus surgery    HX TRACHEOSTOMY       Barriers to Learning/Limitations: yes;  emotional, cognitive and physical  Compensate with: visual, verbal, tactile, kinesthetic cues/model    Home Situation:   Home Situation  Home Environment: Private residence  # Steps to Enter: 0  One/Two Story Residence: One story  Living Alone: No  Support Systems: Spouse/Significant Other/Partner(fiance')  Patient Expects to be Discharged to[de-identified] Private residence  Current DME Used/Available at Home: Walker, rolling, Shower chair, Grab bars  Tub or Shower Type: Tub/Shower combination  ? Right hand dominant   ? Left hand dominant    Cognitive/Behavioral Status:  Neurologic State: Alert  Orientation Level: Oriented X4  Cognition: Follows commands; Impaired decision making; Impulsive;Poor safety awareness;Memory loss  Safety/Judgement: Decreased awareness of need for assistance;Decreased awareness of need for safety;Decreased insight into deficits    Skin: fragile, but grossly intact except PEG   Edema: No significant edema noted     Vision/Perceptual:     Appears grossly WFL      Coordination: BUE  Coordination: Within functional limits  Fine Motor Skills-Upper: Left Intact; Right Intact    Gross Motor Skills-Upper: Left Intact; Right Intact    Balance:  Sitting: Intact  Standing: Intact; With support    Strength: BUE  Strength: Generally decreased, functional    Tone & Sensation: BUE  Tone: Normal  Sensation: Intact    Range of Motion: BUE  AROM: Generally decreased, functional  PROM: Generally decreased, functional    Functional Mobility and Transfers for ADLs:  Bed Mobility:  Rolling: Supervision  Supine to Sit: Supervision  Sit to Supine: Supervision  Scooting: Supervision  Transfers:  Sit to Stand: Supervision; Adaptive equipment  Bed to Chair: Supervision;Contact guard assistance; Adaptive equipment(CGA d/t tubes/wires)   Toilet Transfer : Supervision;Contact guard assistance; Adaptive equipment(CGA d/t tubes/wires)   Bathroom Mobility: Contact guard assistance;Supervision/set up(CGA d/t tubes/wires)    ADL Assessment:   Feeding: Setup(ice chips w/ spoon)    Oral Facial Hygiene/Grooming: Minimum assistance; Moderate assistance; Additional time    Bathing: Contact guard assistance;Minimum assistance; Additional time    Upper Body Dressing: Setup    Lower Body Dressing: Stand-by assistance    Toileting: Stand by assistance    ADL Intervention:  Feeding  Food to Mouth: Set-up(ice chips)    Grooming  Washing Hands: Set-up(hand wipes, seated)  Brushing/Combing Hair: Supervision(standing sinkside)    Upper Body Bathing  Bathing Assistance: Moderate assistance(washing hair standing sinkside)    Lower Body Dressing Assistance  Socks: Set-up(ankle-over-knee, seated EOB)  Position Performed: Seated edge of bed    Toileting  Bladder Hygiene: Set-up; Modified independent(use of urinal)    Cognitive Retraining  Problem Solving: Inductive reason; Identifying the task; Identifying the problem;General alternative solution;Deductive reason; Awareness of environment  Executive Functions: Executing cognitive plans;Managing time;Regulating behavior  Organizing/Sequencing: Breaking task down;Prioritizing  Attention to Task: Distractibility; Single task  Maintains Attention For (Time): 30 seconds  Following Commands: Awareness of environment; Follows one step commands/directions  Safety/Judgement: Decreased awareness of need for assistance;Decreased awareness of need for safety;Decreased insight into deficits  Cues: Tactile cues provided;Verbal cues provided;Visual cues provided    Pain:  Pain level pre-treatment: 3/10   Pain level post-treatment: 3/10   Pain Intervention(s): Medication provided by Nursing (see MAR); Rest, Repositioning   Response to intervention: Nurse notified, See doc flow sheet    Activity Tolerance:   Fair/Fair(-). Patient able to stand 3-4 minute(s). Patient able to complete ADLs with frequent rest breaks. Patient limited by cognition, strength, functional endurance, respiratory status, medical non-compliance. Patient unsteady. Please refer to the flowsheet for vital signs taken during this treatment. After treatment:   ? Patient left in no apparent distress sitting up in chair  ? Patient left in no apparent distress in bed/sitting EOB  ? Call bell left within reach  ? Nursing notified  ? Caregiver present  ? Bed alarm activated    COMMUNICATION/EDUCATION:   ? Role of Occupational Therapy in the acute care setting  ? Home safety education was provided and the patient/caregiver indicated understanding. ? Patient/family have participated as able in goal setting and plan of care. ? Patient/family agree to work toward stated goals and plan of care. ? Patient understands intent and goals of therapy, but is neutral about his/her participation. ? Patient is unable to participate in goal setting and plan of care. Thank you for this referral.  Claudia Mata  Time Calculation: 54 mins    Eval Complexity: History: HIGH Complexity : Extensive review of history including physical, cognitive and psychosocial history ; Examination: HIGH Complexity : 5 or more performance deficits relating to physical, cognitive , or psychosocial skils that result in activity limitations and / or participation restrictions; Decision Making:HIGH Complexity : Patient presents with comorbidities that affect occupational performance.  Signifigant modification of tasks or assistance (eg, physical or verbal) with assessment (s) is necessary to enable patient to complete evaluation For Medical Surgical Hx obtained at Admission, Please see Provider H&P

## 2019-07-18 NOTE — PROGRESS NOTES
Discharge instructions reviewed with the patient. Patient verbalized understanding and verified by teach back. All questions answered. IV discontinued, no redness, swelling or pain noted. Patient awaiting friend for transportation home, with an ETA of 1hr. . Patient discharged off the unit via wheelchair.  Patient armband removed and shredded

## 2019-07-18 NOTE — PROGRESS NOTES
NUTRITION UPDATE    -Unable to adv tube feeds today as electrolytes still showing refeeding syndrome (K-2.5, Phos-1.5, Mg-1.5)   - Replace electrolytes aggressively     Once electrolytes have normalized recc adv tube feeds to 50ml/hr of TwoCal to provide 2200kcal 92g PRO 770ml H20 240g CHO 100g Fat w/ 240ml Free H20 to flushes Q4    Once tolerating continuous tube feeds can look at transitioning to bolus feeds to go home       Janna Bueno, RD  PAGER:  [phone]

## 2019-07-18 NOTE — PROGRESS NOTES
1935: Assumed patient care from IFTIKHAR Mckay [place]. Patient is alert and oriented to person, place, time and situation. Respiratory status is stable on room air. Vital signs are stable. MEWS score is a one. Patient denies any pain, discomfort, nausea vomiting dizziness or anxiety. White board and fall card is updated. Bed is locked and in lowest position. Call bell, water and personal belongings are within reach. Patient has no questions, comments or concerns after bedside shift report. [id number].  was call and updated on the patient's critical result. She ordered 40 mEq Klor-Con packet to be given every four hours for two doses via PEG tube.    0700: Late Entry: Patient had an uneventful shift. Respiratory status, vital signs and MEWS score remained stable. Patient was resting quietly with no signs of distress noted. Bed locked and in lowest position. Call bell water and personal belongings were within reach. Patient had no questions, comments or concerns after bedside shift report. Bedside report given to IFTIKHAR Romero R.N.

## 2019-07-18 NOTE — DISCHARGE SUMMARY
Discharge Summary    Patient: Aga Webb MRN: [id number]  CSN: [id number]    YOB: 1965  Age: 52 y.o.   Sex: male    DOA: 7/13/2019 LOS:  LOS: 5 days   Discharge Date: 7/18/2019     Primary Care Provider:  Evita Gauthier MD    Admission Diagnoses: Sepsis [facility]) [A41.9]  Aspiration pneumonia [facility]) [J69.0]  Hematemesis [K92.0]    Discharge Diagnoses:    Problem List as of 7/18/2019 Date Reviewed: 7/14/2019          Codes Class Noted - Resolved    Erosive esophagitis ICD-10-CM: K22.10  ICD-9-CM: 530.19  7/18/2019 - Present        Esophageal ulcer ICD-10-CM: K22.10  ICD-9-CM: 530.20  7/18/2019 - Present        * (Principal) Sepsis ([facility] ICD-10-CM: A41.9  ICD-9-CM: 038.9, 995.91  7/13/2019 - Present        Aspiration pneumonia ([facility] ICD-10-CM: J69.0  ICD-9-CM: 507.0  11/15/2017 - Present        Tracheostomy dependence ([facility] ICD-10-CM: Z93.0  ICD-9-CM: V44.0  8/25/2017 - Present        Severe protein-calorie malnutrition David Hurl: less than 60% of standard weight) ([facility] ICD-10-CM: E43  ICD-9-CM: 262  8/18/2017 - Present        COPD exacerbation ([facility] ICD-10-CM: J44.1  ICD-9-CM: 491.21  8/16/2017 - Present        Throat cancer ([facility] ICD-10-CM: C14.0  ICD-9-CM: 149.0  7/30/2017 - Present        Status post insertion of percutaneous endoscopic gastrostomy (PEG) tube ([facility] ICD-10-CM: Z93.1  ICD-9-CM: V44.1  7/30/2017 - Present        RESOLVED: Hyperkalemia ICD-10-CM: E87.5  ICD-9-CM: 276.7  7/14/2019 - 7/18/2019        RESOLVED: Hypotension ICD-10-CM: I95.9  ICD-9-CM: 458.9  7/14/2019 - 7/18/2019        RESOLVED: Hematemesis ICD-10-CM: K92.0  ICD-9-CM: 578.0  7/13/2019 - 7/18/2019        RESOLVED: Acute hypoxemic respiratory failure ([facility] ICD-10-CM: J96.01  ICD-9-CM: 518.81  8/16/2017 - 7/18/2019        RESOLVED: Hyponatremia ICD-10-CM: E87.1  ICD-9-CM: 276.1  7/30/2017 - 7/18/2019              Discharge Medications:     Discharge Medication List as of 7/18/2019  1:55 PM      START taking these medications Details   pantoprazole (PROTONIX) 40 mg granules for oral suspension 40 mg by Per G Tube route Before breakfast and dinner., Normal, Disp-60 Each, R-2      raNITIdine (ZANTAC) 150 mg tablet 1 Tab by Per G Tube route nightly., Normal, Disp-30 Tab, R-2      amoxicillin-clavulanate (AUGMENTIN) 875-125 mg per tablet 1 Tab by Per G Tube route every twelve (12) hours for 7 days. , Normal, Disp-14 Tab, R-0         CONTINUE these medications which have NOT CHANGED    Details   oxyCODONE-acetaminophen (PERCOCET)  mg per tablet Take 1 Tab by mouth two (2) times a day., Historical Med      naloxone (NARCAN) 4 mg/actuation nasal spray Use 1 spray intranasally into 1 nostril. Use a new Narcan nasal spray for subsequent doses and administer into alternating nostrils. May repeat every 2 to 3 minutes as needed. , Print, Disp-2 Each, R-0      PARoxetine (PAXIL) 10 mg tablet 10 mg by Per G Tube route daily. , Historical Med      arformoterol (BROVANA) 15 mcg/2 mL nebu neb solution 2 mL by Nebulization route two (2) times a day., Normal, Disp-60 Vial, R-2      budesonide (PULMICORT) 0.5 mg/2 mL nbsp 2 mL by Nebulization route two (2) times a day., Normal, Disp-60 Each, R-2      ipratropium (ATROVENT) 0.02 % soln 2.5 mL by Nebulization route four (4) times daily. , Normal, Disp-120 Vial, R-2      cloNIDine (CATAPRES) 0.2 mg/24 hr patch 1 Patch by TransDERmal route every seven (7) days. , Print, Disp-4 Patch, R-0      fentaNYL (DURAGESIC) 100 mcg/hr PATCH 1 Patch by TransDERmal route every seventy-two (72) hours. Max Daily Amount: 1 Patch., Print, Disp-5 Patch, R-0      guaiFENesin (ROBITUSSIN) 100 mg/5 mL liquid Take 5 mL by mouth every four (4) hours as needed for Cough. , Print, Disp-200 mL, R-0      lisinopril (PRINIVIL, ZESTRIL) 5 mg tablet Take 1 Tab by mouth every twelve (12) hours. , Print, Disp-30 Tab, R-0      magic mouthwash (FIRST-MOUTHWASH BLM) 931--40 mg/30 mL mwsh oral suspension Take 10 mL by mouth every four (4) hours as needed., Historical Med      albuterol-ipratropium (DUO-NEB) 2.5 mg-0.5 mg/3 ml nebu 3 mL by Nebulization route every four (4) hours as needed. , Print, Disp-30 Nebule, R-1      multivitamin, tx-iron-ca-min (THERA-M W/ IRON) 9 mg iron-400 mcg tab tablet Take 1 Tab by mouth daily. , Print, Disp-30 Tab, R-1      Nebulizer Accessories kit Use nebs Q4H PRN, Print, Disp-1 Kit, R-0         STOP taking these medications       predniSONE (DELTASONE) 10 mg tablet Comments:   Reason for Stopping:               Discharge Condition: Good    Procedures : EGD    Consults: Gastroenterology and Pulmonary/Critical Care      PHYSICAL EXAM   Visit Vitals  /77 (BP 1 Location: Left arm, BP Patient Position: At rest)   Pulse 81   Temp 98.1 °F (36.7 °C)   Resp 18   Ht 6' (1.829 m)   Wt 60.3 kg (133 lb)   SpO2 98%   BMI 18.04 kg/m²     General: Awake, cooperative, no acute distress    HEENT: NC, Atraumatic. PERRLA, EOMI. Anicteric sclerae. Lungs:  CTA Bilaterally. No Wheezing/Rhonchi/Rales. Heart:  Regular  rhythm,  No murmur, No Rubs, No Gallops  Abdomen: Soft, Non distended, Non tender. +Bowel sounds,   Extremities: No c/c/e  Psych:   Not anxious or agitated. Neurologic:  No acute neurological deficits. Admission HPI :   Maurilio Hirsch is a 52 y.o.  male who with hx of Throat cancer s/p XRT , Esophageal Stricture with PEG, COPD, Ethoh and Cocaine abuse, COPD who presents to ER with his hospice Nurse.;  Patient had hematemesis several times this morning   He told his hospice nurse he wanted treatment and wanted to resend his DNR ; She then told him to go to ER for vomiting blood   But he did not until later this evening;   Patient has known history of polysubstance abuse  And regularly shoots alcohol through his peg tube;   Patient also was going to be dismissed from hospice due to indiscrepencies with opiate usage namely fentanyl and Roxanol  His Significant other is also an alcoholic and could not come to ER due to currently being intoxicated. Patient denies using cocaine but UDS today is positive and patient is experiencing hypotension and tachycardia in ER with slight elevation in troponin   Patient recently treated for  Left sided lung emphysema from CT surgeon with partial lobectomy [place]       IN ER code sepsis called with elevation and lactate and hypotension ; central line femoral placed by ED  And started on Fluids and Levophed     Hospital Course :   Mr. Charlotte Baxter was initially admitted to ICU, he was seen and followed by GI, pulmonary critical care. He did not had any acute events during hospitalization. Hematemesis - secondary to esophageal ulcer and erosive esophagitis in a patient with history of throat ca, s/p trach dependent. He underwent EGD with findings of erosive esophagitis, esophageal ulcer and kumar's esophagus. GI recommended protonix bid and zantac hs. 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 Anemia - secondary to GI bleed, monitored H/H    Aspiration pneumonia - he was started on vancomycin, zosyn and levaquin. Will discharge on augmentin. ETOH withdrawal - was started on banana bag and placed on CIWA protocol. He is now stable and he does not want to go back with hospice. Activity: Activity as tolerated    Diet: PEG tube feeding    Follow-up: PCP, GI, ENT    Disposition: home with home health    Minutes spent on discharge: 45       Labs: Results:       Chemistry Recent Labs     07/18/19  0414 07/17/19  0115 07/16/19  1825  07/16/19  0405   GLU 78 93  --   --  108*    142  --   --  141   K 2.5* 3.3* 2.8*   < > 3.3*    108  --   --  105   CO2 29 28  --   --  29   BUN 7 6*  --   --  9   CREA 0.64 0.59*  --   --  0.58*   CA 8.3* 7.9*  --   --  7.2*   AGAP 9 6  --   --  7   BUCR 11* 10*  --   --  16    < > = values in this interval not displayed.       CBC w/Diff Recent Labs 07/18/19  0414 07/17/19  0714 07/17/19  0115  07/16/19  0405   WBC 3.7*  --  4.6  --  3.0*   RBC 2.70*  --  2.50*  --  2.35*   HGB 8.6* 8.1* 8.1*   < > 7.5*   HCT 25.3* 23.7* 23.3*   < > 21.8*     --  123*  --  95*   GRANS 79*  --  81*  --  87*   LYMPH 8*  --  9*  --  5*   EOS 0  --  0  --  0    < > = values in this interval not displayed. Cardiac Enzymes No results for input(s): CPK, CKND1, CHUCKY in the last 72 hours. No lab exists for component: CKRMB, TROIP   Coagulation No results for input(s): PTP, INR, APTT in the last 72 hours. No lab exists for component: INREXT, INREXT    Lipid Panel No results found for: CHOL, CHOLPOCT, CHOLX, CHLST, CHOLV, 762340, HDL, LDL, LDLC, DLDLP, 314968, VLDLC, VLDL, TGLX, TRIGL, TRIGP, TGLPOCT, CHHD, CHHDX   BNP No results for input(s): BNPP in the last 72 hours. Liver Enzymes No results for input(s): TP, ALB, TBIL, AP, SGOT, GPT in the last 72 hours. No lab exists for component: DBIL   Thyroid Studies Lab Results   Component Value Date/Time    TSH 0.77 07/15/2019 05:00 AM            Significant Diagnostic Studies: Cta Chest Abd Pelv W Wo Cont    Result Date: 7/16/2019  CTA Chest, Abdomen, Pelvis INDICATION: Low hematocrit. Shortness of breath with hematemesis. Patient has a tracheostomy. Chronic left lung empyema. Elevated troponin. COMPARISON: No prior relevant imaging available for comparison. TECHNIQUE: Multiple axial CT images of the chest, abdomen, and pelvis were obtained extending from the thoracic inlet to the level of the acetabulum after the administration of IV contrast utilizing a CTA protocol. Additional coronal and sagittal reformations were performed. Multiplanar 3D CT reconstructions were performed at a separate workstation. One or more dose reduction techniques were used on this CT: automated exposure control, adjustment of the mAs and/or kVp according to patient size, and iterative reconstruction techniques.   The specific techniques used on this CT exam have been documented in the patient's electronic medical record. Digital Imaging and Communications in Medicine (DICOM) format image data are available to nonaffiliated external healthcare facilities or entities on a secured, media free, reciprocally searchable basis with patient authorization for at least a 12-month period after this study. _______________ FINDINGS: NONCONTRAST CHEST CT FOR AORTIC INTRAMURAL HEMATOMA: Negative for evidence of aortic intramural hematoma. CT ARTERIOGRAM: There is a three-vessel aortic arch. There is no evidence of intimal flap or aneurysmal dilatation of the thoracic aorta. There is no aneurysmal dilatation of the abdominal aorta. Moderate atherosclerotic and atheromatous plaques noted in the abdominal aorta. =============== Examination of the bowel and solid abdominal organs is limited by arterial phase (as opposed to venous phase) of imaging. =============== CHEST: AIRWAYS: Unremarkable. LUNGS AND PLEURAL SPACE: Patchy groundglass opacities are present within both lungs, concerning for multifocal pneumonia, early asymmetric edema is a less likely diagnostic consideration. Mild intralobular septal thickening is present in the left lower lung, suggestive of mild edema within this region, adjacent to the empyema. Scarring is present in the left lower lung. Chronic appearing left pleural thickening with a small amount of fluid is present in the left pleural space, related to the known empyema. Tracheostomy is present. HEART AND MEDIASTINUM: A 1.4 cm mildly prominent subcarinal lymph node is present. A 1.3 cm right hilar lymph node is present. LYMPH NODES: Unremarkable. =============== ABDOMEN/PELVIS: LIVER: Unremarkable. GALLBLADDER: Unremarkable. BILE DUCTS: Unremarkable. PANCREAS: Unremarkable. SPLEEN: Unremarkable. ADRENALS: Unremarkable. KIDNEYS, URETERS, BLADDER: Marrero catheter is in place. No hydronephrosis.  GASTROINTESTINAL TRACT AND PERITONEAL CAVITY: Gastrostomy tube is present. LYMPH NODES: Unremarkable. PELVIC ORGANS: Tip of the right femoral catheter is in the right common femoral vein. =============== SUPERFICIAL SOFT TISSUES AND BONES: Unremarkable. _______________     IMPRESSION: Atherosclerotic calcification of the abdominal aorta without focal aneurysmal dilation. The abdominal aorta is patent. The celiac trunk, bilateral renal arteries, and SMA are patent. No acute process in abdomen or pelvis. No acute hematoma. Chronic appearing small left pleural effusion with adjacent atelectasis in keeping with known empyema. Patchy groundglass opacities are present within both lungs, either related to mild asymmetric edema or multifocal pneumonia. No focal consolidation. Mild interstitial edema on the left lower lung. Xr Chest Port    Result Date: 7/14/2019  EXAM: Portable upright chest radiograph. INDICATION: \"hematemesis. \" COMPARISON: May 19, 2019 _______________ FINDINGS:     DEVICES:  Tracheostomy tube in standard appearance, unchanged. LUNGS:          --Expansion:  Adequate. --Consolidation:  None detected. --Pulmonary edema:  None detected. --Other:  A clavicle subtle hazy opacity in the left perihilar and lower third of the lung, similar to the prior. PLEURAL SPACE:          --Pleural effusion:  None detected. --Pneumothorax:  None detected. _______________     IMPRESSION: Equivocal subtle hazy opacity inferiorly in the left lung, similar to the prior. _______________         No results found for this or any previous visit.         CC: Shelton Gee MD

## 2019-07-18 NOTE — PROGRESS NOTES
[facility]  Pulmonary, Critical Care, and Sleep Medicine    Name: Jenifer Montes De Oca MRN: [id number]   : 1965 Hospital: [facility] [place]   Date: 2019        Pulmonary Critical Care Note    IMPRESSION:   Patient Active Problem List   Diagnosis Code    Acute hypoxemic respiratory failure (HCC) J96.01    COPD exacerbation ([address] J44.1    Aspiration pneumonia ([facility] J69.0    Throat cancer ([facility] C14.0    Right post vocal cord nodule reported in EGD. Need outpatient ENT eval.      Tracheostomy dependence ([address] Z93.0    Hematemesis, GIB K92.0    Status post insertion of percutaneous endoscopic gastrostomy (PEG) tube ([facility] Z93.1    Hyponatremia, resolved E87.1    Severe protein-calorie malnutrition Gladys Al: less than 60% of standard weight) ([facility]) E43    Hyperkalemia E87.5    Elevated lactate, normalized  R79.89    Abnormal troponin R74.8    Cocaine abuse     ETOH abuse     Acute blood loss anemia due to GIB          S/p EGD 3/14/19 Dr. Charles Push: severe erosive or ulcerated and stenotic esophagitis      Medication noncompliance      S/p EGD 19       ·      RECOMMENDATIONS:   S/p EGD yesterday. Right post vocal cord nodule reported. Recommend ENT consult which can be done as outpatient. D/w Dr. Lamont Rasmussen. On RA. No respiratory distress overnight. CT chest showed b/l asymmetric pulmonary edema vs multifocal pneumonia and atelectasis. He will need outpatient repeat CT chest to follow with his PCP. No fever or leucocytosis. No sign of peripheral edema. Small plural effusion. Normal LVEF. Abx as below for possible aspiration pneumonia. Antibiotic choice:  D/c'ed Levofloxacin, Vancomycin 7/15/19. Change zosyn to augmenting to complete total 7 day course on discharge. Aspiration prevention bundle, head of the bed at 30' all times  Continue bronchodilators PRN, pulmonary hygiene care  Steroids - taper clinical response.  Tapered to 20 q24, last dose today 19  Blood cx NGTD    Will defer respective systems problem management to primary and other consultant    Ok to d/c from pulmonary standpoint. PCCM will sign off. Call us with any questions. Subjective/History:   Mr. Santa Bradshaw has been seen and evaluated as Dr. Brunilda Turcios requested now for assisting with ICU care. The patient can not provide additional history due to tracheostomy. Patient is a 52 y.o. 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 In ER noted to have elevated lactate, hypotension elevated troponin, requiring IV fluids and then Levophed. UDS today positive for cociane and opiate and ETOH level positive. Per chart patient has history of polysubstance abuse, regularly uses alcohol through his peg tube; was going to be dismissed from hospice due to indiscrepencies with opiate usage namely fentanyl and Roxanol. In ER, patient was hypoxic and felt to have COPD exacerbation. He is started on HF O2 via tracheostomy, steroids and bronchodilator. Patient treated for LT sided lung empyema with decortication by CT surgeon at [facility] per chart. Other information is limited. 7/18/2019  S/p egd yesterday  No respi distress overnight. On RA  No trach secretions  No fever chills  Minimal mild dry cough  BP stable. No other overnight respiratory issues. Review of Systems:  Review of systems not obtained due to patient factors.       Latest lactic acid:   Lactic acid   Date Value Ref Range Status   07/14/2019 1.5 0.4 - 2.0 MMOL/L Final   07/14/2019 2.2 (HH) 0.4 - 2.0 MMOL/L Final     Comment:     CALLED TO AND CORRECTLY REPEATED BY:  Coty Lorenzo RN ICU 07/14/19 AT 1240 TMB     07/14/2019 2.9 (HH) 0.4 - 2.0 MMOL/L Final     Comment:     CALLED TO AND CORRECTLY REPEATED BY: Ronald Mcclendon RN, ICU ON 07/14/2019 AT 0344 TO [facility]         Past Medical History:  Past Medical History:   Diagnosis Date    Chronic obstructive pulmonary disease ([facility]     acute hypoxic respiratory failure 8/16    Hematemesis 7/13/2019    Hypertension     Ill-defined condition     peg tube in place    Ill-defined condition     chemo    S/P radiation therapy     Throat cancer ([facility]     Tobacco abuse         Past Surgical History:  Past Surgical History:   Procedure Laterality Date    HX GI      peg tub    HX HEENT      sinus surgery    HX TRACHEOSTOMY          Medications:  Prior to Admission medications    Medication Sig Start Date End Date Taking? Authorizing Provider   oxyCODONE-acetaminophen (PERCOCET)  mg per tablet Take 1 Tab by mouth two (2) times a day. Other, MD Hugh   oxyCODONE-acetaminophen (PERCOCET)  mg per tablet Take  by mouth. Indications: reports takes 30mg tablets BID. states out of medication    OtherHugh MD   naloxone (NARCAN) 4 mg/actuation nasal spray Use 1 spray intranasally into 1 nostril. Use a new Narcan nasal spray for subsequent doses and administer into alternating nostrils. May repeat every 2 to 3 minutes as needed. 6/25/18   Devante Armas MD   PARoxetine (PAXIL) 10 mg tablet 10 mg by Per G Tube route daily. Provider, Historical   budesonide (PULMICORT) 0.5 mg/2 mL nbsp 500 mcg by Nebulization route two (2) times a day. Provider, Historical   arformoterol (BROVANA) 15 mcg/2 mL nebu neb solution 2 mL by Nebulization route two (2) times a day. 1/9/18   Refugia Cogan, MD   budesonide (PULMICORT) 0.5 mg/2 mL nbsp 2 mL by Nebulization route two (2) times a day. 1/9/18   Refugia Cogan, MD   ipratropium (ATROVENT) 0.02 % soln 2.5 mL by Nebulization route four (4) times daily.  1/9/18   Refugia Cogan, MD   predniSONE (DELTASONE) 10 mg tablet Prednisone 10mg tabs: p.o.  4 tabs daily for 3 days then drop to   3 tabs daily for 3 days then drop to   2 tabs daily for 3 days then drop to   1 tab daily for 3 days then stop. Dispense 30 tabs 1/9/18   Val Darby MD   cloNIDine (CATAPRES) 0.2 mg/24 hr patch 1 Patch by TransDERmal route every seven (7) days. 8/29/17   Ju Connell MD   fentaNYL (DURAGESIC) 100 mcg/hr PATCH 1 Patch by TransDERmal route every seventy-two (72) hours. Max Daily Amount: 1 Patch. 8/29/17   Ju Connell MD   guaiFENesin (ROBITUSSIN) 100 mg/5 mL liquid Take 5 mL by mouth every four (4) hours as needed for Cough. 8/29/17   Ju Connell MD   lisinopril (PRINIVIL, ZESTRIL) 5 mg tablet Take 1 Tab by mouth every twelve (12) hours. 8/29/17   Ju Connell MD   magic mouthwash (FIRST-MOUTHWASH BLM) 168--40 mg/30 mL mwsh oral suspension Take 10 mL by mouth every four (4) hours as needed. Provider, Historical   albuterol-ipratropium (DUO-NEB) 2.5 mg-0.5 mg/3 ml nebu 3 mL by Nebulization route every four (4) hours as needed. Patient taking differently: 3 mL by Nebulization route every four (4) hours as needed (wheezing). 8/1/17   Ori Maynard MD   multivitamin, tx-iron-ca-min (THERA-M W/ IRON) 9 mg iron-400 mcg tab tablet Take 1 Tab by mouth daily. 8/1/17   Ori Maynard MD   Nebulizer Accessories kit Use nebs Q4H PRN 8/1/17   Ori Maynard MD       Current Facility-Administered Medications   Medication Dose Route Frequency    sodium chloride (NS) flush 5-40 mL  5-40 mL IntraVENous Q8H    ascorbic acid (vitamin C) (VITAMIN C) tablet 500 mg  500 mg Oral DAILY    piperacillin-tazobactam (ZOSYN) 4.5 g in 0.9% sodium chloride (MBP/ADV) 100 mL MBP  4.5 g IntraVENous Q6H    famotidine (PEPCID) tablet 20 mg  20 mg Per G Tube QPM    insulin lispro (HUMALOG) injection   SubCUTAneous Q6H    dilTIAZem (CARDIZEM) IR tablet 30 mg  30 mg Oral QID    0.9% sodium chloride 1,000 mL with mvi, adult no. 4 with vit K 10 mL, thiamine 082 mg, folic acid 1 mg infusion   IntraVENous DAILY    pantoprazole (PROTONIX) injection 40 mg  40 mg IntraVENous Q12H    albuterol-ipratropium (DUO-NEB) 2.5 MG-0.5 MG/3 ML  3 mL Nebulization Q4H RT    sodium chloride (NS) flush 5-40 mL  5-40 mL IntraVENous Q8H    albumin human 25% (BUMINATE) solution 12.5 g  12.5 g IntraVENous Q6H       Allergy:  No Known Allergies     Social History:  Social History     Tobacco Use    Smoking status: Former Smoker     Packs/day: 0.50    Smokeless tobacco: Never Used   Substance Use Topics    Alcohol use: No    Drug use: No        Family History:  History reviewed. No pertinent family history. Objective:   Vital Signs:    Blood pressure 117/75, pulse 85, temperature 97.8 °F (36.6 °C), resp. rate 18, height 6' (1.829 m), weight 60.3 kg (133 lb), SpO2 94 %. Body mass index is 18.04 kg/m². O2 Device: Room air   O2 Flow Rate (L/min): 0 l/min   Temp (24hrs), Av.8 °F (36.6 °C), Min:97.3 °F (36.3 °C), Max:98.1 °F (36.7 °C)         Intake/Output:   Last shift:       07 -  1900  In: 20   Out: 900 [Urine:900]  Last 3 shifts:  190 -  0700  In: 400 [I.V.:100]  Out: 4825 [Urine:4825]    Intake/Output Summary (Last 24 hours) at 2019 1128  Last data filed at 2019 0900  Gross per 24 hour   Intake 160 ml   Output 2800 ml   Net -2640 ml       Physical Exam:  General: aaox3. Following all commands. Neck: No abnormally enlarged lymph nodes or thyroid, supple; tracheostomy tube in place  Lungs: moderate air entry, no rhonchi, no wheezing, breathing normal , normal percussion bilaterally, no tenderness/ rash  Heart: S1S2 present or without murmur or extra heart sounds  Abdomen: non distended, bowel sounds normoactive, tympanic, abdomen is soft without significant tenderness, masses, organomegaly or guarding, rigidity, rebound. PEG in place.    Extremity: negative for edema, cyanosis, clubbing      Data:     Recent Results (from the past 24 hour(s))   GLUCOSE, POC    Collection Time: 19 12:11 PM   Result Value Ref Range Glucose (POC) 128 (H) 70 - 110 mg/dL   GLUCOSE, POC    Collection Time: 07/17/19  6:12 PM   Result Value Ref Range    Glucose (POC) 125 (H) 70 - 110 mg/dL   GLUCOSE, POC    Collection Time: 07/18/19 12:12 AM   Result Value Ref Range    Glucose (POC) 96 70 - 366 mg/dL   METABOLIC PANEL, BASIC    Collection Time: 07/18/19  4:14 AM   Result Value Ref Range    Sodium 141 136 - 145 mmol/L    Potassium 2.5 (LL) 3.5 - 5.5 mmol/L    Chloride 103 100 - 111 mmol/L    CO2 29 21 - 32 mmol/L    Anion gap 9 3.0 - 18 mmol/L    Glucose 78 74 - 99 mg/dL    BUN 7 7.0 - 18 MG/DL    Creatinine 0.64 0.6 - 1.3 MG/DL    BUN/Creatinine ratio 11 (L) 12 - 20      GFR est AA >60 >60 ml/min/1.73m2    GFR est non-AA >60 >60 ml/min/1.73m2    Calcium 8.3 (L) 8.5 - 10.1 MG/DL   CBC WITH AUTOMATED DIFF    Collection Time: 07/18/19  4:14 AM   Result Value Ref Range    WBC 3.7 (L) 4.6 - 13.2 K/uL    RBC 2.70 (L) 4.70 - 5.50 M/uL    HGB 8.6 (L) 13.0 - 16.0 g/dL    HCT 25.3 (L) 36.0 - 48.0 %    MCV 93.7 74.0 - 97.0 FL    MCH 31.9 24.0 - 34.0 PG    MCHC 34.0 31.0 - 37.0 g/dL    RDW 19.5 (H) 11.6 - 14.5 %    PLATELET 663 513 - 723 K/uL    MPV 10.0 9.2 - 11.8 FL    NEUTROPHILS 79 (H) 40 - 73 %    LYMPHOCYTES 8 (L) 21 - 52 %    MONOCYTES 13 (H) 3 - 10 %    EOSINOPHILS 0 0 - 5 %    BASOPHILS 0 0 - 2 %    ABS. NEUTROPHILS 2.9 1.8 - 8.0 K/UL    ABS. LYMPHOCYTES 0.3 (L) 0.9 - 3.6 K/UL    ABS. MONOCYTES 0.5 0.05 - 1.2 K/UL    ABS. EOSINOPHILS 0.0 0.0 - 0.4 K/UL    ABS.  BASOPHILS 0.0 0.0 - 0.1 K/UL    DF AUTOMATED     PHOSPHORUS    Collection Time: 07/18/19  4:14 AM   Result Value Ref Range    Phosphorus 1.5 (L) 2.5 - 4.9 MG/DL   MAGNESIUM    Collection Time: 07/18/19  4:14 AM   Result Value Ref Range    Magnesium 1.5 (L) 1.6 - 2.6 mg/dL   GLUCOSE, POC    Collection Time: 07/18/19  6:03 AM   Result Value Ref Range    Glucose (POC) 89 70 - 110 mg/dL           No results for input(s): FIO2I, IFO2, HCO3I, IHCO3, HCOPOC, PCO2I, PCOPOC, IPHI, PHI, PHPOC, PO2I, PO2POC in the last 72 hours. No lab exists for component: IPOC2    All Micro Results     Procedure Component Value Units Date/Time    CULTURE, BLOOD [[id number]] Collected:  07/13/19 2200    Order Status:  Completed Specimen:  Blood Updated:  07/18/19 0712     Special Requests: NO SPECIAL REQUESTS        Culture result: NO GROWTH 5 DAYS       CULTURE, BLOOD [[id number]] Collected:  07/13/19 2215    Order Status:  Completed Specimen:  Blood Updated:  07/18/19 0712     Special Requests: NO SPECIAL REQUESTS        Culture result: NO GROWTH 5 DAYS       CULTURE, RESPIRATORY/SPUTUM/BRONCH Alisa Elder STAIN [[id number]] Collected:  07/14/19 0145    Order Status:  Canceled Specimen:  Sputum           Telemetry: normal sinus rhythm    Echo 7/15/19:  · Left Ventricle: Normal cavity size. Mildly increased wall thickness. Hyperdynamic systolic dysfunction. Estimated left ventricular ejection fraction is >70%. Moderate (grade 2) left ventricular diastolic dysfunction E/E' ratio is 13.80. · Aortic Valve: Probably trileaflet aortic valve. Aortic valve sclerosis with no significant stenosis. · Mitral Valve: Trace mitral valve regurgitation. Imaging:  [x]I have personally reviewed the patients chest radiographs images and report     CTA chest abd pelvis 7/15/19:  Atherosclerotic calcification of the abdominal aorta without focal aneurysmal  dilation. The abdominal aorta is patent. The celiac trunk, bilateral renal  arteries, and SMA are patent. No acute process in abdomen or pelvis. No acute hematoma. Chronic appearing small left pleural effusion with adjacent atelectasis in  keeping with known empyema. Patchy groundglass opacities are present within both lungs, either related to  mild asymmetric edema or multifocal pneumonia. No focal consolidation. Mild  interstitial edema on the left lower lung.       Results from [place] encounter on 07/13/19   XR CHEST PORT    Narrative EXAM: Portable upright chest radiograph. INDICATION: \"hematemesis. \"    COMPARISON: May 19, 2019    _______________    FINDINGS:         DEVICES:  Tracheostomy tube in standard appearance, unchanged. LUNGS:            --Expansion:  Adequate. --Consolidation:  None detected. --Pulmonary edema:  None detected. --Other:  A clavicle subtle hazy opacity in the left perihilar and  lower third of the lung, similar to the prior. PLEURAL SPACE:           --Pleural effusion:  None detected. --Pneumothorax:  None detected.    _______________      Impression IMPRESSION:    Equivocal subtle hazy opacity inferiorly in the left lung, similar to the prior.       _______________                   [x]See my orders for details      Rishi Luke MD  7/18/2019

## 2019-07-18 NOTE — PROGRESS NOTES
Transition of care: [facility]  Met with patient and Dr. Hoa Valdez at bedside. Patient wants to go home today. He reports he is not going home with hospice. Referral for [facility] placed per patient request with foc. Patient denies he needs TF he has over 3 cases enough to last him a month. States he knows how to do this he plans to give self bolus feedings he does not want to be hooked to a pump. States he has trach care supplies enough for a month  And he knows how to use and care for trach. Rn please do teach back prior to leaving. Patient has \"wife\" picking him up. He does need followup with Dr. Gomez Wiseman ENT and pcp Dr. Beny Charlton. He has medicare for insurance and med assist has screened him for medicaid. [facility]AI has been placed on chart if needed. He denies other needs he has rw and nebulizer. He uses humidity to trach no oxygen a this time. Patient declines AA number    these medications from [address] [place] [address]    amoxicillin-clavulanate    pantoprazole    raNITIdine      Follow up with Anu Ribeiro MD on 7/24/2019    Specialty: [address]   [address] [facility]   [phone]    Follow up appointment scheduled for July 24, 2019 at 11:15 a.m. Braydon Quinonez DO    Next steps: Follow up on 7/25/2019    Braydon Quinonez DO   ENT   [address]   [phone]    Follow up appointment scheduled for July 25, 2019 at 2:00 p.m. [facility] arrive at 1:30 p.m. for check in. [facility] bring picture ID, Insurance Cards and curent list of medications to f/u appointment.       Follow up with Mary Lay MD on 8/1/2019    Specialty: Gastroenterology    [address]   [address]   [phone]    Follow up appointment scheduled for August 1, 2019 at 3:20 p.m.  Please arrive at 3:00 p.m. for check in.  Please bring picture ID, Insurance Cards and current list of medications to your follow up appointment   Care Management Interventions  PCP Verified by CM:  Yes  Palliative Care Criteria Met (RRAT>21 & CHF Dx)?: No(consult was placed  by ED physician)  Palliative Consult Recommended?: Yes  Transition of Care Consult (CM Consult): [address]: Yes  Physical Therapy Consult: Yes  Occupational Therapy Consult: Yes  Current Support Network: Lives with Spouse  Confirm Follow Up Transport: Family  Plan discussed with Pt/Family/Caregiver: Yes  Freedom of Choice Offered: Yes  Discharge Location  Discharge Placement: Home with home health

## 2019-07-18 NOTE — PROGRESS NOTES
0730-Received pt resting in bed with eyes closed, easily awakened, no sign of distress, TF running through PEG, tolerating well, will continue to monitor  1257-Discharge order received, discharge instructions to be completed, pt unable to reach ride at this time

## 2019-07-18 NOTE — PROGRESS NOTES
Problem: Mobility Impaired (Adult and Pediatric)  Goal: *Acute Goals and Plan of Care (Insert Text)  Description  Physical Therapy Goals   Initiated 7/16/2019 and to be accomplished within 5 day(s)  1. Patient will move from supine <> sit with S in prep for out of bed activity and change of position. 2.  Patient will perform sit<> stand with S with LRAD in prep for transfers/ambulation. 3.  Patient will transfer from bed <> chair with S with LRAD for time up in chair for completion of ADL activity. 4.  Patient will ambulate 150 feet with LRAD/S for improved functional mobility/safe discharge. Outcome: Resolved/Met     PHYSICAL THERAPY TREATMENT/DISCHARGE    Patient: Frank Serrano (45 y.o. male)  Date: 7/18/2019  Diagnosis: Sepsis ([facility] [A41.9]  Aspiration pneumonia ([facility] [J69.0]  Hematemesis [K92.0] Sepsis ([facility]  Procedure(s) (LRB):  EGD room 259 (N/A) 1 Day Post-Op  Precautions: Aspiration, Fall, Seizure(CIWA)  Chart, physical therapy assessment, plan of care and goals were reviewed. ASSESSMENT:  Pt meets needs for safe home mobility and is cleared from this level of PT. Line management is the biggest barrier to safety. Pt will benefit from supervision/ assist if mobilizing lines. Otherwise moving independently. Progression toward goals:  ?      Goals met  ? Improving appropriately and progressing toward goals  ? Improving slowly and progressing toward goals  ? Not making progress toward goals and plan of care will be adjusted     PLAN:  Patient will be discharged from physical therapy at this time. Rationale for discharge:  ? Goals Achieved  ? Plateau Reached  ? Patient not participating in therapy  ? Other:  Discharge Recommendations:  Home Health  Further Equipment Recommendations for Discharge:  rolling walker     SUBJECTIVE:   Patient stated I'm leaving today, I hope.     OBJECTIVE DATA SUMMARY:   Critical Behavior:  Neurologic State: Alert  Orientation Level: Oriented X4  Cognition: Follows commands, Impaired decision making, Impulsive, Poor safety awareness, Memory loss  Safety/Judgement: Decreased awareness of need for assistance, Decreased awareness of need for safety, Decreased insight into deficits  Functional Mobility Training:  Bed Mobility:  Rolling: Modified independent  Supine to Sit: Modified independent  Sit to Supine: Modified independent  Scooting: Modified independent  Transfers:  Sit to Stand: Supervision  Stand to Sit: Supervision  Bed to Chair: Supervision  Balance:  Sitting: Intact  Standing: Intact; With support  Standing - Static: Good  Standing - Dynamic : Fair  Ambulation/Gait Training:  Distance (ft): 300 Feet (ft)  Assistive Device: Gait belt;Walker, rolling  Ambulation - Level of Assistance: Supervision  Gait Abnormalities: Decreased step clearance; Steppage gait; Step to gait  Base of Support: Widened  Speed/Vickie: Slow  Step Length: Right shortened;Left shortened  Swing Pattern: Right asymmetrical;Left asymmetrical  Pain:  Pain Scale 1: Visual  Pain Intensity 1: 0  Pain Location 1: Neck  Activity Tolerance:   Good  Please refer to the flowsheet for vital signs taken during this treatment. After treatment:   ? Patient left in no apparent distress sitting up in chair  ? Patient left in no apparent distress in bed  ? Call bell left within reach  ? Nursing notified  ? Caregiver present  ?  Bed alarm activated  Meaghan Stroud PTA   Time Calculation: 25 mins

## 2019-07-18 NOTE — PALLIATIVE CARE
Thank you for consulting palliative medicine. We have completed an AMD with Mr Mey Coleman. Discussed goals of care. He has rescinded hospice and wished to be a full code. Palliative medicine will sign off at this time. Please re consult if we can be of assistance.

## 2019-08-16 PROBLEM — R00.0 SINUS TACHYCARDIA: Status: ACTIVE | Noted: 2019-01-01

## 2019-08-16 PROBLEM — R06.02 SOB (SHORTNESS OF BREATH): Status: ACTIVE | Noted: 2019-01-01

## 2019-08-16 PROBLEM — F11.90 OPIATE USE: Status: ACTIVE | Noted: 2019-01-01

## 2019-08-16 PROBLEM — F10.920 ALCOHOLIC INTOXICATION WITHOUT COMPLICATION (HCC): Status: ACTIVE | Noted: 2019-01-01

## 2019-08-16 NOTE — ED PROVIDER NOTES
EMERGENCY DEPARTMENT HISTORY AND PHYSICAL EXAM    Date: 8/16/2019  Patient Name: Shanice Puente    History of Presenting Illness     Chief Complaint   Patient presents with    Respiratory Distress         History Provided By: Patient      Shanice Puente is a 62 y.o. male with PMHX of alcohol abuse, throat cancer, tobacco use, tracheostomy, COPD, frequent pneumonias who presents to the emergency department C/O difficulty breathing. They state the family called EMS as he seemed like he was very lethargic over the last week progressing and today looks like he was having worsening in his breathing. They state the patient is not on supplemental oxygen. Patient is on pain medications at home including narcotic pills as well as fentanyl patches. He does admit to drinking around 4-5 beers a day. States he did not have a fever upon initial evaluation and his vital signs were normal although his pulse ox was around 80% on room air. They stated improved with supplemental oxygen. [facility] PCP: Shonna Day MD    Current Outpatient Medications   Medication Sig Dispense Refill    pantoprazole (PROTONIX) 40 mg granules for oral suspension 40 mg by Per G Tube route Before breakfast and dinner. (Patient not taking: Reported on 7/22/2019) 60 Each 2    raNITIdine (ZANTAC) 150 mg tablet 1 Tab by Per G Tube route nightly. 30 Tab 2    naloxone (NARCAN) 4 mg/actuation nasal spray Use 1 spray intranasally into 1 nostril. Use a new Narcan nasal spray for subsequent doses and administer into alternating nostrils. May repeat every 2 to 3 minutes as needed. (Patient not taking: Reported on 7/22/2019) 2 Each 0    arformoterol (BROVANA) 15 mcg/2 mL nebu neb solution 2 mL by Nebulization route two (2) times a day. (Patient not taking: Reported on 7/22/2019) 60 Vial 2    budesonide (PULMICORT) 0.5 mg/2 mL nbsp 2 mL by Nebulization route two (2) times a day.  (Patient not taking: Reported on 7/22/2019) 60 Each 2    ipratropium (ATROVENT) 0.02 % soln 2.5 mL by Nebulization route four (4) times daily. (Patient not taking: Reported on 7/22/2019) 120 Vial 2    cloNIDine (CATAPRES) 0.2 mg/24 hr patch 1 Patch by TransDERmal route every seven (7) days. (Patient not taking: Reported on 7/22/2019) 4 Patch 0    fentaNYL (DURAGESIC) 100 mcg/hr PATCH 1 Patch by TransDERmal route every seventy-two (72) hours. Max Daily Amount: 1 Patch. (Patient not taking: Reported on 7/22/2019) 5 Patch 0    guaiFENesin (ROBITUSSIN) 100 mg/5 mL liquid Take 5 mL by mouth every four (4) hours as needed for Cough. (Patient not taking: Reported on 7/22/2019) 200 mL 0    lisinopril (PRINIVIL, ZESTRIL) 5 mg tablet Take 1 Tab by mouth every twelve (12) hours. (Patient not taking: Reported on 7/22/2019) 30 Tab 0    albuterol-ipratropium (DUO-NEB) 2.5 mg-0.5 mg/3 ml nebu 3 mL by Nebulization route every four (4) hours as needed. (Patient taking differently: 3 mL by Nebulization route every four (4) hours as needed (wheezing). ) 30 Nebule 1    multivitamin, tx-iron-ca-min (THERA-M W/ IRON) 9 mg iron-400 mcg tab tablet Take 1 Tab by mouth daily. (Patient not taking: Reported on 7/22/2019) 30 Tab 1    Nebulizer Accessories kit Use nebs Q4H PRN (Patient not taking: Reported on 7/22/2019) 1 Kit 0       Past History     Past Medical History:  Past Medical History:   Diagnosis Date    Chronic obstructive pulmonary disease ([facility] [address]     acute hypoxic respiratory failure 8/16    Hematemesis 7/13/2019    Hypertension     Ill-defined condition     peg tube in place    Ill-defined condition     chemo    S/P radiation therapy     Throat cancer ([facility] [address]     Tobacco abuse        Past Surgical History:  Past Surgical History:   Procedure Laterality Date    HX GI      peg tub    HX HEENT      sinus surgery    HX TRACHEOSTOMY         Family History:  History reviewed. No pertinent family history.     Social History:  Social History     Tobacco Use    Smoking status: Former Smoker     Packs/day: 0.50    Smokeless tobacco: Never Used   Substance Use Topics    Alcohol use: No    Drug use: No       Allergies:  No Known Allergies      Review of Systems   Review of Systems   Constitutional: Positive for fatigue. Negative for fever. Respiratory: Positive for shortness of breath and wheezing. Cardiovascular: Negative for chest pain. Gastrointestinal: Negative for abdominal pain, nausea and vomiting. All other systems reviewed and are negative. Physical Exam     Vitals:    08/16/19 1640 08/16/19 1721   BP: (!) 137/91    Pulse: (!) 128    Resp: 15    Temp: 97.9 °F (36.6 °C)    SpO2: 97% 97%   Weight: 56.2 kg (124 lb)    Height: 5' 8\" (1.727 m)      Physical Exam    Nursing notes and vital signs reviewed    Constitutional: Chronically ill-appearing, mild distress, patient continuously appearing like he is falling asleep. Head: Normocephalic, Atraumatic  Eyes: Pupils appear slightly pinpoint approximately 2 mm bilaterally reactive to light. Neck: Supple, tracheostomy is in place without drainage or surrounding bleeding or erythema. Cardiovascular: Tachycardic, regular rhythm, no murmurs, rubs, or gallops  Chest: Decreased effort of breathing and chest excursion bilaterally  Lungs: Mild coarse wheezes bilaterally  Abdomen: Soft, non tender, non distended, normoactive bowel sounds, PEG tube is in place without surrounding erythema.   Back: No evidence of trauma or deformity  Extremities: No evidence of trauma or deformity, no LE edema  Skin: Warm and dry, normal cap refill  Neuro: Patient is somnolent but reactive to verbal stimulation and oriented x3 CN intact, normal speech, strength and sensation full and symmetric bilaterally, normal gait, normal coordination  Psychiatric: Normal mood and affect      Diagnostic Study Results     Labs -     Recent Results (from the past 12 hour(s))   EKG, 12 LEAD, INITIAL    Collection Time: 08/16/19  4:44 PM   Result Value Ref Range    Ventricular Rate 125 BPM Atrial Rate 125 BPM    P-R Interval 142 ms    QRS Duration 64 ms    Q-T Interval 306 ms    QTC Calculation (Bezet) 441 ms    Calculated P Axis 69 degrees    Calculated R Axis 60 degrees    Calculated T Axis 60 degrees    Diagnosis       Sinus tachycardia  Septal infarct , age undetermined  Abnormal ECG  When compared with ECG of 15-JUL-2019 05:11,  Sinus rhythm has replaced Atrial fibrillation  Questionable change in QRS duration  Septal infarct is now present     CBC WITH AUTOMATED DIFF    Collection Time: 08/16/19  4:50 PM   Result Value Ref Range    WBC 4.5 (L) 4.6 - 13.2 K/uL    RBC 3.67 (L) 4.70 - 5.50 M/uL    HGB 12.0 (L) 13.0 - 16.0 g/dL    HCT 36.0 36.0 - 48.0 %    MCV 98.1 (H) 74.0 - 97.0 FL    MCH 32.7 24.0 - 34.0 PG    MCHC 33.3 31.0 - 37.0 g/dL    RDW 16.5 (H) 11.6 - 14.5 %    PLATELET 892 294 - 062 K/uL    MPV 9.7 9.2 - 11.8 FL    NEUTROPHILS 70 40 - 73 %    LYMPHOCYTES 16 (L) 21 - 52 %    MONOCYTES 12 (H) 3 - 10 %    EOSINOPHILS 2 0 - 5 %    BASOPHILS 0 0 - 2 %    ABS. NEUTROPHILS 3.1 1.8 - 8.0 K/UL    ABS. LYMPHOCYTES 0.7 (L) 0.9 - 3.6 K/UL    ABS. MONOCYTES 0.5 0.05 - 1.2 K/UL    ABS. EOSINOPHILS 0.1 0.0 - 0.4 K/UL    ABS. BASOPHILS 0.0 0.0 - 0.1 K/UL    DF AUTOMATED     POC VENOUS BLOOD GAS    Collection Time: 08/16/19  4:52 PM   Result Value Ref Range    Device: NASAL CANNULA      Flow rate (POC) 3 L/M    FIO2 (POC) 32 %    pH, venous (POC) 7.380 7.32 - 7.42      pCO2, venous (POC) 52.6 (H) 41 - 51 MMHG    pO2, venous (POC) 87 (H) 25 - 40 mmHg    HCO3, venous (POC) 31.1 (H) 23.0 - 28.0 MMOL/L    sO2, venous (POC) 96 (H) 65 - 88 %    Base excess, venous (POC) 6 mmol/L    Allens test (POC) N/A      Total resp.  rate 20      Site [facility]      Specimen type (POC) VENOUS BLOOD      Performed by [facility] ALCOHOL    Collection Time: 08/16/19  6:25 PM   Result Value Ref Range    ALCOHOL(ETHYL),SERUM 94 (H) 0 - 3 MG/DL   METABOLIC PANEL, COMPREHENSIVE    Collection Time: 08/16/19  6:25 PM Result Value Ref Range    Sodium 138 136 - 145 mmol/L    Potassium 3.9 3.5 - 5.5 mmol/L    Chloride 102 100 - 111 mmol/L    CO2 27 21 - 32 mmol/L    Anion gap 9 3.0 - 18 mmol/L    Glucose 124 (H) 74 - 99 mg/dL    BUN 3 (L) 7.0 - 18 MG/DL    Creatinine 0.54 (L) 0.6 - 1.3 MG/DL    BUN/Creatinine ratio 6 (L) 12 - 20      GFR est AA >60 >60 ml/min/1.73m2    GFR est non-AA >60 >60 ml/min/1.73m2    Calcium 7.6 (L) 8.5 - 10.1 MG/DL    Bilirubin, total 0.2 0.2 - 1.0 MG/DL    ALT (SGPT) 39 16 - 61 U/L    AST (SGOT) 54 (H) 10 - 38 U/L    Alk. phosphatase 189 (H) 45 - 117 U/L    Protein, total 5.6 (L) 6.4 - 8.2 g/dL    Albumin 2.6 (L) 3.4 - 5.0 g/dL    Globulin 3.0 2.0 - 4.0 g/dL    A-G Ratio 0.9 0.8 - 1.7     SALICYLATE    Collection Time: 08/16/19  6:25 PM   Result Value Ref Range    Salicylate level <3.6 (L) 2.8 - 20.0 MG/DL   ACETAMINOPHEN    Collection Time: 08/16/19  6:25 PM   Result Value Ref Range    Acetaminophen level <2 (L) 10.0 - 30.0 ug/mL       Radiologic Studies -   XR CHEST PORT   Final Result   IMPRESSION:      1. Stable tracheostomy. Otherwise, No acute cardiopulmonary process. CT Results  (Last 48 hours)    None        CXR Results  (Last 48 hours)               08/16/19 1715  XR CHEST PORT Final result    Impression:  IMPRESSION:       1. Stable tracheostomy. Otherwise, No acute cardiopulmonary process. Narrative:  EXAM: XR CHEST PORT       CLINICAL INDICATION/HISTORY: sob   -Additional: None       COMPARISON: 7/13/2019, 5/19/2019       TECHNIQUE: Frontal view of the chest       _______________       FINDINGS:   SUPPORT LINES AND TUBES:Stable tracheostomy position at the thoracic inlet. HEART AND MEDIASTINUM: Midline cardiac silhouette, normal in size. Unremarkable   hilar vascular structures. LUNGS AND PLEURAL SPACES: No focal consolidation, parenchymal opacity. No   pneumothorax or pleural effusion.        BONY THORAX AND SOFT TISSUES: No acute or destructive osseous abnormality. _______________                 Medications given in the ED-  Medications   albuterol CONCENTRATE 2.5mg/0.5 mL neb soln (2.5 mg Nebulization Given 8/16/19 1654)   naloxone [facility]) injection 0.4 mg (0.4 mg IntraVENous Given 8/16/19 1647)   ondansetron (ZOFRAN) injection 4 mg (4 mg IntraVENous Given 8/16/19 1647)   sodium chloride 0.9 % bolus infusion 1,000 mL (1,000 mL IntraVENous New Bag 8/16/19 1827)         Medical Decision Making   I am the first provider for this patient. I reviewed the vital signs, available nursing notes, past medical history, past surgical history, family history and social history. Vital Signs-Reviewed the patient's vital signs. Pulse Oximetry Analysis - 97% on 3L NC     Cardiac Monitor:  Rate: 125 bpm  Rhythm: sinus    EKG interpretation: (Preliminary)  EKG read by Dr. Patricia Uribe    EKG rate 125 sinus tachycardia, normal axis, no ST changes    Records Reviewed: Nursing Notes and Old Medical Records    Provider Notes (Medical Decision Making): Shanice Puente is a 62 y.o. male with complaints of difficulty breathing and generalized weakness worsening. Patient was noted to be hypoxic. Upon initial evaluation patient has poor effort of breathing, pinpoint pupils and appears very somnolent. Question if there is a metabolic cause including possible opiate overdose versus alcohol intoxication that could be contributing to his difficulty breathing. Will obtain laboratory work and give supplemental oxygen as well as IV Narcan to assess for improvement    Procedures:  Procedures    ED Course:   Laboratory findings unremarkable. Alcohol level noted to be 94. Chest x-ray normal.  Patient's tracheostomy was suctioned and returned a large amount of mucus with improvement of his trouble breathing. He was also given Narcan with improvement of his somnolence. Patient is oxygenating well on room air.   He does continue to have sinus tachycardia, IV fluids were given    Patient's wife is present at bedside and states that he has had a fast heart rate for a long time and that is normal for him. Patient states he is feeling significantly better and wife states that she would like to take him home. I recommended a follow-up with their primary care physician and continue taking care of his tracheostomy and to return to the emergency department if there are symptoms worsen. They understand and agree to plan    Diagnosis and Disposition       DISCHARGE NOTE:    Bahman Castano's  results have been reviewed with him. He has been counseled regarding his diagnosis, treatment, and plan. He verbally conveys understanding and agreement of the signs, symptoms, diagnosis, treatment and prognosis and additionally agrees to follow up as discussed. He also agrees with the care-plan and conveys that all of his questions have been answered. I have also provided discharge instructions for him that include: educational information regarding their diagnosis and treatment, and list of reasons why they would want to return to the ED prior to their follow-up appointment, should his condition change. He has been provided with education for proper emergency department utilization. CLINICAL IMPRESSION:    1. Sinus tachycardia    2. SOB (shortness of breath)    3. Tracheostomy dependent ([address]    4. Alcoholic intoxication without complication (HCC)    5. Opiate use        PLAN:  1. D/C Home  2. Current Discharge Medication List        3.    Follow-up Information     Follow up With Specialties Details Why Contact Info    Faizan Crespo MD Family Practice Schedule an appointment as soon as possible for a visit   [address] EMERGENCY DEPT Emergency Medicine  If symptoms worsen [address] [place] [id number]  [phone]        _______________________________      Please note that this dictation was completed with Kristyn the computer voice recognition software. Quite often unanticipated grammatical, syntax, homophones, and other interpretive errors are inadvertently transcribed by the computer software. Please disregard these errors. Please excuse any errors that have escaped final proofreading.

## 2019-08-16 NOTE — ED TRIAGE NOTES
Pt arrives per EMS w/ c/o respiratory distress for ~1 hour PTA. EMS reports o2 sats on arrival were between 80-88%, pt trach was suctioned and sats increased to high 90s. EMS reports pt had scattered wheezing and given JAREK tx PTA. Pt actively falling asleep during triage.

## 2019-08-16 NOTE — PROGRESS NOTES
Patient brought in by EMS with breathing difficulty that has increased over time. Patient has a size 6 trach with passey leodan in place. RT removed inner cannula and found inner cannula to be completely blocked with a mucus plug. Cleaned inner cannula, passey leodan, trach site, and changed trach tie and gauze. Patient states he can breathe better.

## 2019-10-09 PROBLEM — I48.91 A-FIB (HCC): Status: ACTIVE | Noted: 2019-01-01

## 2019-10-09 PROBLEM — J95.01 TRACHEAL HEMORRHAGE (HCC): Status: ACTIVE | Noted: 2019-01-01

## 2019-10-09 NOTE — ED TRIAGE NOTES
Pt arrives per EMS w/ trach maintenance need and coughing up blood. Pt son called EMS after coming home to pt coughing and SOB. Pt actively spitting up blood and trying to cough up mucus and blood. Pt maintaining airway NRB placed during triage.

## 2019-10-09 NOTE — ED PROVIDER NOTES
EMERGENCY DEPARTMENT HISTORY AND PHYSICAL EXAM 
 
Date: 10/9/2019 Patient Name: Sanket Estrada History of Presenting Illness Chief Complaint Patient presents with  Shortness of Breath  Hemoptysis History Provided By: Patient Chief Complaint: hemoptysis Duration: Hours Timing:  Progressive Location: tracheostomy Quality: no pain Severity: Severe Modifying Factors: coughing Associated Symptoms: SOB Additional History (Context):  
6:58 PM 
Sanket Estrada is a 62 y.o. male with PMHX of laryngeal cancer who presents to the emergency department C/O hemoptysis. Associated sxs include SOB. Pt denies pain, and any other sxs or complaints. Patient had onset of hemoptysis this morning which got progressively worse during the day. Denies pain or fevers. Son found patient at home with active hemoptysis and called EMS. Pt actively spitting up blood and trying to cough up mucus. Pt maintainined airway, NRB placed during triage. Patient has been under the care of Anastasiia Drew at [facility] in [place] as well as ENT and CT surgery at [facility]. PCP: Rajan Staples MD 
 
Current Facility-Administered Medications Medication Dose Route Frequency Provider Last Rate Last Dose  levoFLOXacin (LEVAQUIN) 750 mg in D5W IVPB  750 mg IntraVENous Q24H Robert Nails MD   Stopped at 10/09/19 2045  pantoprazole (PROTONIX) injection 40 mg  40 mg IntraVENous Q12H Patricia Cross MD   40 mg at 10/09/19 2159  [START ON 10/10/2019] albuterol-ipratropium (DUO-NEB) 2.5 MG-0.5 MG/3 ML  3 mL Nebulization Q4H RT Patricia Cross MD      
 lactated Ringers infusion 1,000 mL  1,000 mL IntraVENous ONCE Robert Nails MD      
 [START ON 10/10/2019] piperacillin-tazobactam (ZOSYN) 4.5 g in 0.9% sodium chloride (MBP/ADV) 100 mL MBP  4.5 g IntraVENous Q6H Robert Nails MD      
 
Current Outpatient Medications Medication Sig Dispense Refill  pantoprazole (PROTONIX) 40 mg granules for oral suspension 40 mg by Per G Tube route Before breakfast and dinner. (Patient not taking: Reported on 7/22/2019) 60 Each 2  
 raNITIdine (ZANTAC) 150 mg tablet 1 Tab by Per G Tube route nightly. 30 Tab 2  
 naloxone (NARCAN) 4 mg/actuation nasal spray Use 1 spray intranasally into 1 nostril. Use a new Narcan nasal spray for subsequent doses and administer into alternating nostrils. May repeat every 2 to 3 minutes as needed. (Patient not taking: Reported on 7/22/2019) 2 Each 0  
 arformoterol (BROVANA) 15 mcg/2 mL nebu neb solution 2 mL by Nebulization route two (2) times a day. (Patient not taking: Reported on 7/22/2019) 60 Vial 2  
 budesonide (PULMICORT) 0.5 mg/2 mL nbsp 2 mL by Nebulization route two (2) times a day. (Patient not taking: Reported on 7/22/2019) 60 Each 2  
 ipratropium (ATROVENT) 0.02 % soln 2.5 mL by Nebulization route four (4) times daily. (Patient not taking: Reported on 7/22/2019) 120 Vial 2  cloNIDine (CATAPRES) 0.2 mg/24 hr patch 1 Patch by TransDERmal route every seven (7) days. (Patient not taking: Reported on 7/22/2019) 4 Patch 0  
 fentaNYL (DURAGESIC) 100 mcg/hr PATCH 1 Patch by TransDERmal route every seventy-two (72) hours. Max Daily Amount: 1 Patch. (Patient not taking: Reported on 7/22/2019) 5 Patch 0  
 guaiFENesin (ROBITUSSIN) 100 mg/5 mL liquid Take 5 mL by mouth every four (4) hours as needed for Cough. (Patient not taking: Reported on 7/22/2019) 200 mL 0  
 lisinopril (PRINIVIL, ZESTRIL) 5 mg tablet Take 1 Tab by mouth every twelve (12) hours. (Patient not taking: Reported on 7/22/2019) 30 Tab 0  
 albuterol-ipratropium (DUO-NEB) 2.5 mg-0.5 mg/3 ml nebu 3 mL by Nebulization route every four (4) hours as needed. (Patient taking differently: 3 mL by Nebulization route every four (4) hours as needed (wheezing). ) 30 Nebule 1  
 multivitamin, tx-iron-ca-min (THERA-M W/ IRON) 9 mg iron-400 mcg tab tablet Take 1 Tab by mouth daily. (Patient not taking: Reported on 7/22/2019) 30 Tab 1  Nebulizer Accessories kit Use nebs Q4H PRN (Patient not taking: Reported on 7/22/2019) 1 Kit 0 Past History Past Medical History: 
Past Medical History:  
Diagnosis Date  Chronic obstructive pulmonary disease (HCC)   
 acute hypoxic respiratory failure 8/16  Hematemesis 7/13/2019  Hypertension  Ill-defined condition   
 peg tube in place  Ill-defined condition   
 chemo  S/P radiation therapy  Throat cancer ([facility] [address]  Tobacco abuse Past Surgical History: 
Past Surgical History:  
Procedure Laterality Date  HX GI    
 peg tub  HX HEENT    
 sinus surgery  HX TRACHEOSTOMY Family History: 
History reviewed. No pertinent family history. Social History: 
Social History Tobacco Use  Smoking status: Former Smoker Packs/day: 0.50  Smokeless tobacco: Never Used Substance Use Topics  Alcohol use: No  
 Drug use: No  
 
 
Allergies: 
No Known Allergies Review of Systems Review of Systems Constitutional: Negative for chills and fever. HENT: Negative for congestion and sore throat. Eyes: Negative for pain and redness. Respiratory: Positive for cough and shortness of breath. Active hemoptysis Cardiovascular: Negative for chest pain and palpitations. Gastrointestinal: Negative for abdominal pain, diarrhea, nausea and vomiting. Endocrine: Negative for polydipsia and polyuria. Genitourinary: Negative for difficulty urinating and dysuria. Musculoskeletal: Negative for back pain and neck pain. Skin: Negative for pallor and rash. Neurological: Negative for weakness and headaches. Hematological: Bruises/bleeds easily. All other systems reviewed and are negative. Physical Exam  
 
Vitals:  
 10/09/19 2230 10/09/19 2245 10/09/19 2301 10/09/19 2303 BP: 135/80 112/78 109/88 116/87 Pulse: (!) 111  (!) 111 (!) 112 Resp: 20  16 16 Temp:      
SpO2: 100%  100% 100% Weight:      
 
Physical Exam  
Constitutional: He is oriented to person, place, and time. He appears well-developed and well-nourished. HENT:  
Head: Normocephalic and atraumatic. Right Ear: External ear normal.  
Left Ear: External ear normal.  
Nose: Nose normal.  
Mouth/Throat: Oropharynx is clear and moist.  
Eyes: Pupils are equal, round, and reactive to light. Conjunctivae and EOM are normal.  
Neck: Normal range of motion. Neck supple. No JVD present. No tracheal deviation present. No thyromegaly present. Tracheostomy stoma intact with hemoptysis, no active bleeding seen at stoma. Cardiovascular: Normal heart sounds and intact distal pulses. Exam reveals no gallop and no friction rub. No murmur heard. Fast rate and rhythm Pulmonary/Chest: No stridor. No respiratory distress. He has no wheezes. He has no rales. With increased work of breathing Coarse BS bilaterally with frequent hemoptysis Ecchymosis on anterior chest wall Abdominal: Soft. Bowel sounds are normal. He exhibits no distension and no mass. There is no tenderness. There is no rebound and no guarding. Musculoskeletal: Normal range of motion. Neurological: He is alert and oriented to person, place, and time. He has normal reflexes. No cranial nerve deficit. Skin: Skin is warm and dry. No rash noted. Psychiatric: He has a normal mood and affect. His behavior is normal.  
Nursing note and vitals reviewed. Diagnostic Study Results Labs - Recent Results (from the past 24 hour(s)) CBC WITH AUTOMATED DIFF Collection Time: 10/09/19  6:56 PM  
Result Value Ref Range WBC 10.9 4.6 - 13.2 K/uL  
 RBC 3.49 (L) 4.70 - 5.50 M/uL  
 HGB 12.0 (L) 13.0 - 16.0 g/dL HCT 35.8 (L) 36.0 - 48.0 % .6 (H) 74.0 - 97.0 FL  
 MCH 34.4 (H) 24.0 - 34.0 PG  
 MCHC 33.5 31.0 - 37.0 g/dL  
 RDW 16.6 (H) 11.6 - 14.5 % PLATELET 434 706 - 387 K/uL MPV 9.6 9.2 - 11.8 FL  
 NEUTROPHILS 89 (H) 40 - 73 % LYMPHOCYTES 5 (L) 21 - 52 % MONOCYTES 6 3 - 10 % EOSINOPHILS 0 0 - 5 % BASOPHILS 0 0 - 2 %  
 ABS. NEUTROPHILS 9.7 (H) 1.8 - 8.0 K/UL  
 ABS. LYMPHOCYTES 0.6 (L) 0.9 - 3.6 K/UL  
 ABS. MONOCYTES 0.6 0.05 - 1.2 K/UL  
 ABS. EOSINOPHILS 0.0 0.0 - 0.4 K/UL  
 ABS. BASOPHILS 0.0 0.0 - 0.1 K/UL  
 DF AUTOMATED PROTHROMBIN TIME + INR Collection Time: 10/09/19  6:56 PM  
Result Value Ref Range Prothrombin time 11.7 11.5 - 15.2 sec INR 0.9 0.8 - 1.2 METABOLIC PANEL, COMPREHENSIVE Collection Time: 10/09/19  6:56 PM  
Result Value Ref Range Sodium 133 (L) 136 - 145 mmol/L Potassium 4.6 3.5 - 5.5 mmol/L Chloride 94 (L) 100 - 111 mmol/L  
 CO2 24 21 - 32 mmol/L Anion gap 15 3.0 - 18 mmol/L Glucose 107 (H) 74 - 99 mg/dL BUN 9 7.0 - 18 MG/DL Creatinine 0.60 0.6 - 1.3 MG/DL  
 BUN/Creatinine ratio 15 12 - 20 GFR est AA >60 >60 ml/min/1.73m2 GFR est non-AA >60 >60 ml/min/1.73m2 Calcium 8.1 (L) 8.5 - 10.1 MG/DL Bilirubin, total 0.3 0.2 - 1.0 MG/DL  
 ALT (SGPT) 67 (H) 16 - 61 U/L  
 AST (SGOT) 125 (H) 10 - 38 U/L Alk. phosphatase 375 (H) 45 - 117 U/L Protein, total 6.0 (L) 6.4 - 8.2 g/dL Albumin 2.1 (L) 3.4 - 5.0 g/dL Globulin 3.9 2.0 - 4.0 g/dL A-G Ratio 0.5 (L) 0.8 - 1.7 NT-PRO BNP Collection Time: 10/09/19  6:56 PM  
Result Value Ref Range NT pro- 0 - 900 PG/ML  
TROPONIN I Collection Time: 10/09/19  6:56 PM  
Result Value Ref Range Troponin-I, QT <0.02 0.0 - 0.045 NG/ML  
TYPE & SCREEN Collection Time: 10/09/19  7:00 PM  
Result Value Ref Range Crossmatch Expiration 10/12/2019 ABO/Rh(D) O POSITIVE Antibody screen NEG   
POC LACTIC ACID Collection Time: 10/09/19  7:02 PM  
Result Value Ref Range Lactic Acid (POC) 4.97 (HH) 0.40 - 2.00 mmol/L  
EKG, 12 LEAD, INITIAL Collection Time: 10/09/19  7:12 PM  
Result Value Ref Range Ventricular Rate 207 BPM  
 Atrial Rate 441 BPM  
 QRS Duration 62 ms Q-T Interval 162 ms QTC Calculation (Bezet) 300 ms Calculated R Axis 47 degrees Calculated T Axis 77 degrees Diagnosis Sinus tachycardia Nonspecific T wave abnormality Abnormal ECG Confirmed by Sohail Cabrera MD, [facility] ([id number]) on 10/9/2019 10:20:52 PM 
  
URINALYSIS W/ RFLX MICROSCOPIC Collection Time: 10/09/19  8:25 PM  
Result Value Ref Range Color YELLOW Appearance CLEAR Specific gravity 1.009 1.005 - 1.030    
 pH (UA) 6.5 5.0 - 8.0 Protein NEGATIVE  NEG mg/dL Glucose NEGATIVE  NEG mg/dL Ketone NEGATIVE  NEG mg/dL Bilirubin NEGATIVE  NEG Blood TRACE (A) NEG Urobilinogen 0.2 0.2 - 1.0 EU/dL Nitrites NEGATIVE  NEG Leukocyte Esterase NEGATIVE  NEG    
URINE MICROSCOPIC ONLY Collection Time: 10/09/19  8:25 PM  
Result Value Ref Range WBC RARE 0 - 5 /hpf  
 RBC NEGATIVE  0 - 5 /hpf Epithelial cells RARE 0 - 5 /lpf Bacteria NEGATIVE  NEG /hpf DRUG SCREEN, URINE Collection Time: 10/09/19  8:25 PM  
Result Value Ref Range BENZODIAZEPINES NEGATIVE  NEG    
 BARBITURATES NEGATIVE  NEG    
 THC (TH-CANNABINOL) NEGATIVE  NEG    
 OPIATES NEGATIVE  NEG    
 PCP(PHENCYCLIDINE) NEGATIVE  NEG    
 COCAINE NEGATIVE  NEG    
 AMPHETAMINES NEGATIVE  NEG METHADONE NEGATIVE  NEG HDSCOM (NOTE) ETHYL ALCOHOL Collection Time: 10/09/19 10:30 PM  
Result Value Ref Range ALCOHOL(ETHYL),SERUM 87 (H) 0 - 3 MG/DL  
CBC WITH AUTOMATED DIFF Collection Time: 10/09/19 10:30 PM  
Result Value Ref Range WBC 7.4 4.6 - 13.2 K/uL  
 RBC 2.75 (L) 4.70 - 5.50 M/uL HGB 9.6 (L) 13.0 - 16.0 g/dL HCT 28.8 (L) 36.0 - 48.0 % .7 (H) 74.0 - 97.0 FL  
 MCH 34.9 (H) 24.0 - 34.0 PG  
 MCHC 33.3 31.0 - 37.0 g/dL  
 RDW 16.8 (H) 11.6 - 14.5 % PLATELET 497 343 - 548 K/uL MPV 9.2 9.2 - 11.8 FL  
 NEUTROPHILS 88 (H) 40 - 73 % LYMPHOCYTES 5 (L) 21 - 52 % MONOCYTES 7 3 - 10 % EOSINOPHILS 0 0 - 5 % BASOPHILS 0 0 - 2 %  
 ABS. NEUTROPHILS 6.5 1.8 - 8.0 K/UL  
 ABS. LYMPHOCYTES 0.4 (L) 0.9 - 3.6 K/UL  
 ABS. MONOCYTES 0.5 0.05 - 1.2 K/UL  
 ABS. EOSINOPHILS 0.0 0.0 - 0.4 K/UL  
 ABS. BASOPHILS 0.0 0.0 - 0.1 K/UL  
 DF AUTOMATED CARDIAC PANEL,(CK, CKMB & TROPONIN) Collection Time: 10/09/19 10:30 PM  
Result Value Ref Range CK 46 39 - 308 U/L  
 CK - MB 2.7 <3.6 ng/ml CK-MB Index 5.9 (H) 0.0 - 4.0 % Troponin-I, QT 0.03 0.0 - 0.045 NG/ML Radiologic Studies - 
XR CHEST PORT    (Results Pending) CTA CHEST W OR W WO CONT    (Results Pending) CT Results  (Last 48 hours) None CXR Results  (Last 48 hours) None Medications given in the ED- Medications  
levoFLOXacin (LEVAQUIN) 750 mg in D5W IVPB (0 mg IntraVENous IV Completed 10/9/19 2045) pantoprazole (PROTONIX) injection 40 mg (40 mg IntraVENous Given 10/9/19 2159)  
albuterol-ipratropium (DUO-NEB) 2.5 MG-0.5 MG/3 ML (has no administration in time range)  
lactated Ringers infusion 1,000 mL (has no administration in time range)  
piperacillin-tazobactam (ZOSYN) 4.5 g in 0.9% sodium chloride (MBP/ADV) 100 mL MBP (has no administration in time range)  
sodium chloride 0.9 % bolus infusion 1,000 mL (0 mL IntraVENous IV Completed 10/9/19 2011)  
tranexamic acid (CYKLOKAPRON) 1,000 mg in 0.9% sodium chloride (MBP/ADV) 110 mL MBP (1,000 mg IntraVENous Given 10/9/19 1916)  
vancomycin (VANCOCIN) 1,250 mg in 0.9% sodium chloride 250 mL (VIAL-MATE) (1,250 mg IntraVENous Given 10/9/19 2049)  
0.9% sodium chloride infusion (150 mL/hr IntraVENous New Bag 10/9/19 1955) morphine injection 4 mg (4 mg IntraVENous Given 10/9/19 2027)  
ondansetron (ZOFRAN) injection 4 mg (4 mg IntraVENous Given 10/9/19 2042) oxymetazoline (AFRIN) 0.05 % nasal spray 2 Spray (2 Sprays Other Given by Provider 10/9/19 2110) lidocaine (XYLOCAINE) 4 % (40 mg/mL) topical solution ( Topical Given by Provider 10/9/19 2110)  
0.9% sodium chloride infusion 250 mL (0 mL IntraVENous IV Completed 10/9/19 2144) iopamidol (ISOVUE-370) 76 % injection  mL (80 mL IntraVENous Given 10/9/19 2217) Medical Decision Making I am the first provider for this patient. I reviewed the vital signs, available nursing notes, past medical history, past surgical history, family history and social history. Vital Signs-Reviewed the patient's vital signs. EKG interpretation: (Preliminary) Sinus tachycardia at 150 with low voltage and no acute ST-T wave changes ECG read by Karissa Salazar MD 
 
Records Reviewed: Nursing Notes Provider Notes (Medical Decision Making): DDx- arterial tracheal fistula, tumor, coagulopathy, trauma, platelet dysfunction form alcohol intoxication/uremia. Procedures: 
Procedures ED Course:  
Initial assessment performed. The patients presenting problems have been discussed, and they are in agreement with the care plan formulated and outlined with them. I have encouraged them to ask questions as they arise throughout their visit. 19:28 Case discussed with pulmonary intensivist Dr. Pam Albert who recommends ENT consultation. Doesn't recommend intubation secondary to laryngeal cancer. 19:45 Case with Dr. Lona Conner ENT who will come into the ED to evaluate the patient 20:15 Case discussed with Dr. Maliha Wolf who recommends ENT evaluation from Dr. Lona Conner 21:15 Dr. Lona Conner ENT is in ED evaluating the patient. 21:30 As per Dr. Lona Conner, hemoptysis source is likey pulmonary. Will transfer to [facility]. Patent agrees with plan. 22:10 Patient improved with marked decrease in hemoptysis. Patient resting comfortably in no respiratory distress on a minified oxygen blood pressure 90/60 heart rate of 107 O2 saturation 98% respiratory rate of 14. 
 
23:00 Case discussed with Dr. Mile Benitez surgery at [facility] who agrees with patient transfer. Case discussed with Dr. Maryuri Mcgill ED attending who accepts transfer. Diagnosis and Disposition DISCUSSION: 
Patient presented with tachycardia and hypotension with active hemoptysis. Patient was coughing bright red blood through his tracheostomy tube. Patient was given humidified oxygen by facemask. Given IV normal saline hydration and TXA 1 g IV stat. Patient had marked decreased hemoptysis. Chest x-ray was unremarkable no evidence of effusion or infiltrate. ECG and troponin showed no evidence of ACS. Serum lactate elevated. Labs remarkable for leukocytosis, anemia, elevated LFTs. Hb dropped from 12 to 9.6 during resuscitation. Patient was treated per sepsis protocol an was given Vancomycin, zosyn and Levaquin with IV NS bolus. Case discussed with ENT Dr. Campos Hsieh who evaluated patient [facility] ED noting hemoptysis source likely pulmonary. Case discussed with [facility] CT surgery and ED attending who accepts patient transfer. Critical care time: 125 minutes 23:00 I have spent 125 minutes of critical care time involved in emergent treatment of severe hemoptysis, Sepsis, lab review, consultations with specialist, family decision-making, and documentation. During this entire length of time I was immediately available to the patient. Critical Care: The reason for providing this level of medical care for this critically ill patient was due a critical illness that impaired one or more vital organ systems such that there was a high probability of imminent or life threatening deterioration in the patients condition. This care involved high complexity decision making to assess, manipulate, and support vital system functions, to treat this degreee vital organ system failure and to prevent further life threatening deterioration of the patients condition. CONSULT NOTE:  
23:00 I spoke with Dr. Vincent Camarillo Specialty: ED Attending Discussed pt's hx, disposition, and available diagnostic and imaging results. Reviewed care plans. Consulting physician agrees with plans as outlined. Transfer to [facility]. Written by Tano Emerson MD 
 
TRANSFER PROGRESS NOTE: 
 
23:00 Discussed impending transfer with Patient and/or family. Pt and/or family instructed that Pt would be transferred to [facility]. Discussed reasoning for transfer and future treatment plan. Family and Pt understands and agrees with care plan. Written by Tano Emerson MD 
 
Labs Reviewed CBC WITH AUTOMATED DIFF - Abnormal; Notable for the following components:  
    Result Value RBC 3.49 (*) HGB 12.0 (*) HCT 35.8 (*) .6 (*) MCH 34.4 (*)   
 RDW 16.6 (*) NEUTROPHILS 89 (*) LYMPHOCYTES 5 (*)   
 ABS. NEUTROPHILS 9.7 (*)   
 ABS. LYMPHOCYTES 0.6 (*) All other components within normal limits METABOLIC PANEL, COMPREHENSIVE - Abnormal; Notable for the following components:  
 Sodium 133 (*) Chloride 94 (*) Glucose 107 (*) Calcium 8.1 (*) ALT (SGPT) 67 (*) AST (SGOT) 125 (*) Alk. phosphatase 375 (*) Protein, total 6.0 (*) Albumin 2.1 (*) A-G Ratio 0.5 (*) All other components within normal limits URINALYSIS W/ RFLX MICROSCOPIC - Abnormal; Notable for the following components:  
 Blood TRACE (*) All other components within normal limits ETHYL ALCOHOL - Abnormal; Notable for the following components:  
 ALCOHOL(ETHYL),SERUM 87 (*) All other components within normal limits CBC WITH AUTOMATED DIFF - Abnormal; Notable for the following components: RBC 2.75 (*) HGB 9.6 (*) HCT 28.8 (*) .7 (*) MCH 34.9 (*)   
 RDW 16.8 (*) NEUTROPHILS 88 (*) LYMPHOCYTES 5 (*)   
 ABS. LYMPHOCYTES 0.4 (*) All other components within normal limits CARDIAC PANEL,(CK, CKMB & TROPONIN) - Abnormal; Notable for the following components:  
 CK-MB Index 5.9 (*) All other components within normal limits POC LACTIC ACID - Abnormal; Notable for the following components:  
 Lactic Acid (POC) 4.97 (*) All other components within normal limits PROTHROMBIN TIME + INR  
NT-PRO BNP  
TROPONIN I  
URINE MICROSCOPIC ONLY  
DRUG SCREEN, URINE  
CBC WITH AUTOMATED DIFF  
METABOLIC PANEL, COMPREHENSIVE PROTHROMBIN TIME + INR  
CBC WITH AUTOMATED DIFF  
CARDIAC PANEL,(CK, CKMB & TROPONIN) TYPE & SCREEN Recent Results (from the past 12 hour(s)) CBC WITH AUTOMATED DIFF Collection Time: 10/09/19  6:56 PM  
Result Value Ref Range WBC 10.9 4.6 - 13.2 K/uL  
 RBC 3.49 (L) 4.70 - 5.50 M/uL  
 HGB 12.0 (L) 13.0 - 16.0 g/dL HCT 35.8 (L) 36.0 - 48.0 % .6 (H) 74.0 - 97.0 FL  
 MCH 34.4 (H) 24.0 - 34.0 PG  
 MCHC 33.5 31.0 - 37.0 g/dL  
 RDW 16.6 (H) 11.6 - 14.5 % PLATELET 915 471 - 422 K/uL MPV 9.6 9.2 - 11.8 FL  
 NEUTROPHILS 89 (H) 40 - 73 % LYMPHOCYTES 5 (L) 21 - 52 % MONOCYTES 6 3 - 10 % EOSINOPHILS 0 0 - 5 % BASOPHILS 0 0 - 2 %  
 ABS. NEUTROPHILS 9.7 (H) 1.8 - 8.0 K/UL  
 ABS. LYMPHOCYTES 0.6 (L) 0.9 - 3.6 K/UL  
 ABS. MONOCYTES 0.6 0.05 - 1.2 K/UL  
 ABS. EOSINOPHILS 0.0 0.0 - 0.4 K/UL  
 ABS. BASOPHILS 0.0 0.0 - 0.1 K/UL  
 DF AUTOMATED PROTHROMBIN TIME + INR Collection Time: 10/09/19  6:56 PM  
Result Value Ref Range Prothrombin time 11.7 11.5 - 15.2 sec INR 0.9 0.8 - 1.2 METABOLIC PANEL, COMPREHENSIVE Collection Time: 10/09/19  6:56 PM  
Result Value Ref Range Sodium 133 (L) 136 - 145 mmol/L Potassium 4.6 3.5 - 5.5 mmol/L Chloride 94 (L) 100 - 111 mmol/L  
 CO2 24 21 - 32 mmol/L Anion gap 15 3.0 - 18 mmol/L Glucose 107 (H) 74 - 99 mg/dL BUN 9 7.0 - 18 MG/DL Creatinine 0.60 0.6 - 1.3 MG/DL  
 BUN/Creatinine ratio 15 12 - 20 GFR est AA >60 >60 ml/min/1.73m2 GFR est non-AA >60 >60 ml/min/1.73m2 Calcium 8.1 (L) 8.5 - 10.1 MG/DL  Bilirubin, total 0.3 0.2 - 1.0 MG/DL  
 ALT (SGPT) 67 (H) 16 - 61 U/L  
 AST (SGOT) 125 (H) 10 - 38 U/L Alk. phosphatase 375 (H) 45 - 117 U/L Protein, total 6.0 (L) 6.4 - 8.2 g/dL Albumin 2.1 (L) 3.4 - 5.0 g/dL Globulin 3.9 2.0 - 4.0 g/dL A-G Ratio 0.5 (L) 0.8 - 1.7 NT-PRO BNP Collection Time: 10/09/19  6:56 PM  
Result Value Ref Range NT pro- 0 - 900 PG/ML  
TROPONIN I Collection Time: 10/09/19  6:56 PM  
Result Value Ref Range Troponin-I, QT <0.02 0.0 - 0.045 NG/ML  
TYPE & SCREEN Collection Time: 10/09/19  7:00 PM  
Result Value Ref Range Crossmatch Expiration 10/12/2019 ABO/Rh(D) O POSITIVE Antibody screen NEG   
POC LACTIC ACID Collection Time: 10/09/19  7:02 PM  
Result Value Ref Range Lactic Acid (POC) 4.97 (HH) 0.40 - 2.00 mmol/L  
EKG, 12 LEAD, INITIAL Collection Time: 10/09/19  7:12 PM  
Result Value Ref Range Ventricular Rate 207 BPM  
 Atrial Rate 441 BPM  
 QRS Duration 62 ms Q-T Interval 162 ms QTC Calculation (Bezet) 300 ms Calculated R Axis 47 degrees Calculated T Axis 77 degrees Diagnosis Sinus tachycardia Nonspecific T wave abnormality Abnormal ECG Confirmed by Naty Shah MD, [facility] ([id number]) on 10/9/2019 10:20:52 PM 
  
URINALYSIS W/ RFLX MICROSCOPIC Collection Time: 10/09/19  8:25 PM  
Result Value Ref Range Color YELLOW Appearance CLEAR Specific gravity 1.009 1.005 - 1.030    
 pH (UA) 6.5 5.0 - 8.0 Protein NEGATIVE  NEG mg/dL Glucose NEGATIVE  NEG mg/dL Ketone NEGATIVE  NEG mg/dL Bilirubin NEGATIVE  NEG Blood TRACE (A) NEG Urobilinogen 0.2 0.2 - 1.0 EU/dL Nitrites NEGATIVE  NEG Leukocyte Esterase NEGATIVE  NEG    
URINE MICROSCOPIC ONLY Collection Time: 10/09/19  8:25 PM  
Result Value Ref Range WBC RARE 0 - 5 /hpf  
 RBC NEGATIVE  0 - 5 /hpf Epithelial cells RARE 0 - 5 /lpf Bacteria NEGATIVE  NEG /hpf DRUG SCREEN, URINE Collection Time: 10/09/19  8:25 PM  
Result Value Ref Range  BENZODIAZEPINES NEGATIVE  NEG    
 BARBITURATES NEGATIVE  NEG    
 THC (TH-CANNABINOL) NEGATIVE  NEG    
 OPIATES NEGATIVE  NEG    
 PCP(PHENCYCLIDINE) NEGATIVE  NEG    
 COCAINE NEGATIVE  NEG    
 AMPHETAMINES NEGATIVE  NEG METHADONE NEGATIVE  NEG HDSCOM (NOTE) ETHYL ALCOHOL Collection Time: 10/09/19 10:30 PM  
Result Value Ref Range ALCOHOL(ETHYL),SERUM 87 (H) 0 - 3 MG/DL  
CBC WITH AUTOMATED DIFF Collection Time: 10/09/19 10:30 PM  
Result Value Ref Range WBC 7.4 4.6 - 13.2 K/uL  
 RBC 2.75 (L) 4.70 - 5.50 M/uL HGB 9.6 (L) 13.0 - 16.0 g/dL HCT 28.8 (L) 36.0 - 48.0 % .7 (H) 74.0 - 97.0 FL  
 MCH 34.9 (H) 24.0 - 34.0 PG  
 MCHC 33.3 31.0 - 37.0 g/dL  
 RDW 16.8 (H) 11.6 - 14.5 % PLATELET 737 776 - 194 K/uL MPV 9.2 9.2 - 11.8 FL  
 NEUTROPHILS 88 (H) 40 - 73 % LYMPHOCYTES 5 (L) 21 - 52 % MONOCYTES 7 3 - 10 % EOSINOPHILS 0 0 - 5 % BASOPHILS 0 0 - 2 %  
 ABS. NEUTROPHILS 6.5 1.8 - 8.0 K/UL  
 ABS. LYMPHOCYTES 0.4 (L) 0.9 - 3.6 K/UL  
 ABS. MONOCYTES 0.5 0.05 - 1.2 K/UL  
 ABS. EOSINOPHILS 0.0 0.0 - 0.4 K/UL  
 ABS. BASOPHILS 0.0 0.0 - 0.1 K/UL  
 DF AUTOMATED CARDIAC PANEL,(CK, CKMB & TROPONIN) Collection Time: 10/09/19 10:30 PM  
Result Value Ref Range CK 46 39 - 308 U/L  
 CK - MB 2.7 <3.6 ng/ml CK-MB Index 5.9 (H) 0.0 - 4.0 % Troponin-I, QT 0.03 0.0 - 0.045 NG/ML  
 
 
CLINICAL IMPRESSION 1. Hemoptysis 2. Tracheostomy hemorrhage ([address] 3. Laryngeal carcinoma ([address] 4. Sepsis, due to unspecified organism, unspecified whether acute organ dysfunction present ([address] 5. Alcoholic intoxication without complication ([address] PLAN: 
1. Transfer to [facility] Please note that this dictation was completed with [employer], the computer voice recognition software. Quite often unanticipated grammatical, syntax, homophones, and other interpretive errors are inadvertently transcribed by the computer software. Please disregard these errors. Please excuse any errors that have escaped final proofreading.

## 2019-10-09 NOTE — H&P
History & Physical/ER consult Patient: Ronal Poe MRN: [id number]  CSN: [id number] YOB: 1965  Age: 62 y.o. Sex: male DOA: 10/9/2019 Chief Complaint:  
Chief Complaint Patient presents with  Shortness of Breath  Hemoptysis HPI:  
 
Ronal Poe is a 62 y.o.  male who has hx of Larngeal Cancer post XRT in 2016 Recently admitted in 7/2019 after having hospice reversed for polysubstance abuse and drinking alcohol through peg tub That admission he had endoscopy and was found to have Erosive Esophagitis as cause of bleeding /hematemesis Today comes in with cough for 3 days and bleeding from trach for last 3-4 hrs Also with worsening dyspnea destpite using oxygen in trach collar Substantial amount of blood through trach opening and into  Trach collar Patient had not followed up with ENT as planned from before ? tumorburder vs. Vocal cord polyp near trach area In ER sat 100 percent on 10 liters trach collar He does want intubation  And is full code Xray negative  So far Pending Eval by ENT to determine disposition possible transfer to [facility] Past Medical History:  
Diagnosis Date  Chronic obstructive pulmonary disease (HCC)   
 acute hypoxic respiratory failure 8/16  Hematemesis 7/13/2019  Hypertension  Ill-defined condition   
 peg tube in place  Ill-defined condition   
 chemo  S/P radiation therapy  Throat cancer ([facility] [address]  Tobacco abuse Past Surgical History:  
Procedure Laterality Date  HX GI    
 peg tub  HX HEENT    
 sinus surgery  HX TRACHEOSTOMY History reviewed. No pertinent family history. [unfilled] Prior to Admission medications Medication Sig Start Date End Date Taking? Authorizing Provider  
pantoprazole (PROTONIX) 40 mg granules for oral suspension 40 mg by Per G Tube route Before breakfast and dinner. Patient not taking: Reported on 7/22/2019 7/18/19   German Toure MD  
raNITIdine (ZANTAC) 150 mg tablet 1 Tab by Per G Tube route nightly. 7/18/19   German Toure MD  
naloxone [facility]) 4 mg/actuation nasal spray Use 1 spray intranasally into 1 nostril. Use a new Narcan nasal spray for subsequent doses and administer into alternating nostrils. May repeat every 2 to 3 minutes as needed. Patient not taking: Reported on 7/22/2019 6/25/18   Mario Avilez MD  
arformoterol (BROVANA) 15 mcg/2 mL nebu neb solution 2 mL by Nebulization route two (2) times a day. Patient not taking: Reported on 7/22/2019 1/9/18   German Toure MD  
budesonide (PULMICORT) 0.5 mg/2 mL nbsp 2 mL by Nebulization route two (2) times a day. Patient not taking: Reported on 7/22/2019 1/9/18   German Toure MD  
ipratropium (ATROVENT) 0.02 % soln 2.5 mL by Nebulization route four (4) times daily. Patient not taking: Reported on 7/22/2019 1/9/18   German Toure MD  
cloNIDine (CATAPRES) 0.2 mg/24 hr patch 1 Patch by TransDERmal route every seven (7) days. Patient not taking: Reported on 7/22/2019 8/29/17   Kanu Herrera MD  
fentaNYL (DURAGESIC) 100 mcg/hr PATCH 1 Patch by TransDERmal route every seventy-two (72) hours. Max Daily Amount: 1 Patch. Patient not taking: Reported on 7/22/2019 8/29/17   Kanu Herrera MD  
guaiFENesin (ROBITUSSIN) 100 mg/5 mL liquid Take 5 mL by mouth every four (4) hours as needed for Cough. Patient not taking: Reported on 7/22/2019 8/29/17   Kanu Herrera MD  
lisinopril (PRINIVIL, ZESTRIL) 5 mg tablet Take 1 Tab by mouth every twelve (12) hours. Patient not taking: Reported on 7/22/2019 8/29/17   Kanu Herrera MD  
albuterol-ipratropium (DUO-NEB) 2.5 mg-0.5 mg/3 ml nebu 3 mL by Nebulization route every four (4) hours as needed. Patient taking differently: 3 mL by Nebulization route every four (4) hours as needed (wheezing).  8/1/17   Fede Holliday MD  
 multivitamin, tx-iron-ca-min (THERA-M W/ IRON) 9 mg iron-400 mcg tab tablet Take 1 Tab by mouth daily. Patient not taking: Reported on 2019   Iain Segura MD  
Nebulizer Accessories kit Use nebs Q4H PRN Patient not taking: Reported on 2019   Iain Segura MD  
 
 
No Known Allergies Review of Systems Limited due to trach Blood coming out of trach collar And peg site with dark drainage Physical Exam:  
 
Physical Exam: 
Visit Vitals /90 (BP 1 Location: Right arm, BP Patient Position: At rest) Pulse (!) 131 Temp 98.3 °F (36.8 °C) Resp 20 Wt 57.2 kg (126 lb) SpO2 93% BMI 19.16 kg/m² O2 Device: Non-rebreather mask(15 L over trach) Temp (24hrs), Av.3 °F (36.8 °C), Min:98.3 °F (36.8 °C), Max:98.3 °F (36.8 °C) No intake/output data recorded. No intake/output data recorded. General:  Alert, cooperative, no distress, appears stated age. Copious amount of blood being expeled through trach collar Head: Normocephalic, without obvious abnormality, atraumatic. Eyes:  Conjunctivae/corneas EOMs intact. Nose: Nares normal. No drainage or sinus tenderness. Neck: Supple, symmetrical, trachea midline, no adenopathy, thyroid: no enlargement, no carotid bruit and no JVD. Lungs:   Clear to auscultation bilaterally wheezing stridor neck area . Heart:  Irregularly irregular Penne Severs Abdomen: Soft, non-tender. Bowel sounds normal.  Peg site with dark [place] Extremities: Extremities normal, atraumatic, no cyanosis or edema. Pulses: 2+ and symmetric all extremities. Skin:  No rashes or lesions Neurologic: AAOx3, No focal motor or sensory deficit. Labs Reviewed: All lab results for the last 24 hours reviewed. Recent Results (from the past 24 hour(s)) CBC WITH AUTOMATED DIFF Collection Time: 10/09/19  6:56 PM  
Result Value Ref Range WBC 10.9 4.6 - 13.2 K/uL  
 RBC 3.49 (L) 4.70 - 5.50 M/uL HGB 12.0 (L) 13.0 - 16.0 g/dL HCT 35.8 (L) 36.0 - 48.0 % .6 (H) 74.0 - 97.0 FL  
 MCH 34.4 (H) 24.0 - 34.0 PG  
 MCHC 33.5 31.0 - 37.0 g/dL  
 RDW 16.6 (H) 11.6 - 14.5 % PLATELET 415 890 - 667 K/uL MPV 9.6 9.2 - 11.8 FL  
 NEUTROPHILS 89 (H) 40 - 73 % LYMPHOCYTES 5 (L) 21 - 52 % MONOCYTES 6 3 - 10 % EOSINOPHILS 0 0 - 5 % BASOPHILS 0 0 - 2 %  
 ABS. NEUTROPHILS 9.7 (H) 1.8 - 8.0 K/UL  
 ABS. LYMPHOCYTES 0.6 (L) 0.9 - 3.6 K/UL  
 ABS. MONOCYTES 0.6 0.05 - 1.2 K/UL  
 ABS. EOSINOPHILS 0.0 0.0 - 0.4 K/UL  
 ABS. BASOPHILS 0.0 0.0 - 0.1 K/UL  
 DF AUTOMATED PROTHROMBIN TIME + INR Collection Time: 10/09/19  6:56 PM  
Result Value Ref Range Prothrombin time 11.7 11.5 - 15.2 sec INR 0.9 0.8 - 1.2 METABOLIC PANEL, COMPREHENSIVE Collection Time: 10/09/19  6:56 PM  
Result Value Ref Range Sodium 133 (L) 136 - 145 mmol/L Potassium 4.6 3.5 - 5.5 mmol/L Chloride 94 (L) 100 - 111 mmol/L  
 CO2 24 21 - 32 mmol/L Anion gap 15 3.0 - 18 mmol/L Glucose 107 (H) 74 - 99 mg/dL BUN 9 7.0 - 18 MG/DL Creatinine 0.60 0.6 - 1.3 MG/DL  
 BUN/Creatinine ratio 15 12 - 20 GFR est AA >60 >60 ml/min/1.73m2 GFR est non-AA >60 >60 ml/min/1.73m2 Calcium 8.1 (L) 8.5 - 10.1 MG/DL Bilirubin, total 0.3 0.2 - 1.0 MG/DL  
 ALT (SGPT) 67 (H) 16 - 61 U/L  
 AST (SGOT) 125 (H) 10 - 38 U/L Alk. phosphatase 375 (H) 45 - 117 U/L Protein, total 6.0 (L) 6.4 - 8.2 g/dL Albumin 2.1 (L) 3.4 - 5.0 g/dL Globulin 3.9 2.0 - 4.0 g/dL A-G Ratio 0.5 (L) 0.8 - 1.7 NT-PRO BNP Collection Time: 10/09/19  6:56 PM  
Result Value Ref Range NT pro- 0 - 900 PG/ML  
TROPONIN I Collection Time: 10/09/19  6:56 PM  
Result Value Ref Range Troponin-I, QT <0.02 0.0 - 0.045 NG/ML  
TYPE & SCREEN Collection Time: 10/09/19  7:00 PM  
Result Value Ref Range Crossmatch Expiration 10/12/2019 ABO/Rh(D) O POSITIVE  Antibody screen NEG   
POC LACTIC ACID  
 Collection Time: 10/09/19  7:02 PM  
Result Value Ref Range Lactic Acid (POC) 4.97 (HH) 0.40 - 2.00 mmol/L  
EKG, 12 LEAD, INITIAL Collection Time: 10/09/19  7:12 PM  
Result Value Ref Range Ventricular Rate 207 BPM  
 Atrial Rate 441 BPM  
 QRS Duration 62 ms Q-T Interval 162 ms QTC Calculation (Bezet) 300 ms Calculated R Axis 47 degrees Calculated T Axis 77 degrees Diagnosis Atrial fibrillation with rapid ventricular response with premature  
ventricular or aberrantly conducted complexes Nonspecific T wave abnormality , probably digitalis effect Abnormal ECG When compared with ECG of 16-AUG-2019 16:44, Atrial fibrillation has replaced Sinus rhythm Vent. rate has increased BY  82 BPM 
Criteria for Septal infarct are no longer present Nonspecific T wave abnormality now evident in Inferior leads T wave inversion now evident in Lateral leads URINALYSIS W/ RFLX MICROSCOPIC Collection Time: 10/09/19  8:25 PM  
Result Value Ref Range Color YELLOW Appearance CLEAR Specific gravity 1.009 1.005 - 1.030    
 pH (UA) 6.5 5.0 - 8.0 Protein NEGATIVE  NEG mg/dL Glucose NEGATIVE  NEG mg/dL Ketone NEGATIVE  NEG mg/dL Bilirubin NEGATIVE  NEG Blood TRACE (A) NEG Urobilinogen 0.2 0.2 - 1.0 EU/dL Nitrites NEGATIVE  NEG Leukocyte Esterase NEGATIVE  NEG    
URINE MICROSCOPIC ONLY Collection Time: 10/09/19  8:25 PM  
Result Value Ref Range WBC RARE 0 - 5 /hpf  
 RBC NEGATIVE  0 - 5 /hpf Epithelial cells RARE 0 - 5 /lpf Bacteria NEGATIVE  NEG /hpf  
 and EKG Procedures/imaging: see electronic medical records for all procedures/Xrays and details which were not copied into this note but were reviewed prior to creation of Plan Assessment/Plan Active Problems: * No active hospital problems. * 
CV 
1. Afib RVR Check enzymes Check echo 2. ENT 
larngeal Cancer ? Vocal cord ENT at bedside attempting cautery Not able to do decision made to transfer to where there is CT surgeon [address] 3. Pulmonary ? Aspiration Pneumonitis Cont airway control through collar Cover with Zosyn/Lev/Vanc 4. Heme Not cani date for heparin due to bleeding Continue daily Trex Transfuse FFP as needed ? Metastatic cancer patient claims remission Will check CTA neck CHEST Abdomen 5. GI Esophatis ERosisve with hx of bleed IV Protonix Pschy Hx of Poly substance abuse Resume oxycode Place on CIWA ID Blood cultures Urine cultures Broad abx for now FEN Place on electrolyte protocol STill Full code ER doctor Attempting To transfer to [place] Bleeding source not found DVT/GI Prophylaxis: SCD's Discussed with patient at bedside about hospital admission and my plan care, who understood and agree with my plan care.  
 
Clarissa Reyna MD 
10/9/2019 7:30 PM

## 2019-10-09 NOTE — CARDIO/PULMONARY
Pulmonary on call Case discussed with ED pending discussion with Hospitalist after [employer] consultation 61-year-old male with past medical history of  Laryngeal cancer with trach size 6, COPD history of cocaine and opiates use history of hospice DNR noticed on the previous admission 
patient has a  history istory of esophageal stricture recent GI bleed history of PEG tube history of carcinoma of the larynx   tracheostomy size 6 as per previous ENT notes Previously GI/aspiration pneumonia. Presents with shortness of breath and hemoptysis. Currently oxygenation stable. No known anticoagulation as per ED I recommend supportive care. Oxygen to keep saturation > 95. ABG. Blood transfusion if hemoglobin drops less than 10. Possible platelet transfusion if aspirin use FFP if recent use of anticoagulation including hospital anticoagulation. Add Vit K if recent hospitalization  heparin use. Oxygen support. I would not try to attempt to change his trach due to laryngeal cancer. I would consult ENT. I would not attempt intubation via oropharynx or trach without discussion with ENT again due to advanced laryngeal cancer. Possible strictures. Possible risk of bleeding. Supportive care with oxygen/bronchodilators. Resume bronchodilators patient was on Brovana. Resume Pulmicort Chest x-ray. Broad-spectrum antibiotics including vancomycin and Zosyn Please avoid deep suctioning of the trach. Okay to give low-dose of codeine are fentanyl for pain and severe cough. If not better consider CT of the chest. 
I have notice in the chart documented hospice DNR please confirm. If any issues please call I would discuss case to night with ENT due to advanced malignancy of the larynx. Sia Baez Pulmonary critical care

## 2019-10-10 NOTE — CONSULTS
Robert Gill Name:  Molly Umana 
MR#:   [id number] :  1965 ACCOUNT #:  [de-identified] DATE OF SERVICE:  10/09/2019 REASON FOR CONSULTATION:  Hemoptysis. HISTORY OF PRESENT ILLNESS:  The patient is a 40-year-old male with past medical history significant for a T3 N0 M0, squamous cell carcinoma of the supraglottis, treated with radiation and chemotherapy. The patient was under the care of Dr. Tim Bryan at [address] [place] [address]. This treatment was completed in . The patient was subsequently at his care and transferred to Dr. Ana Alvarez at [facility]. The patient states, however, that he has not seen her in some time. The patient underwent a  PET scan performed at some location, uncertain whether this was done. In any event, the patient is not a very accurate historian. Developed some difficulties with significant hemoptysis as well as emesis of blood early this morning. The patient states it came both out of the trachea and the oral cavity, equal amounts were noted at that time. The patient subsequently came to the emergency room, this afternoon or this evening, since he was still having some bloody secretions. The patient did have a chest x-ray. Chest x-ray was unremarkable. The patient was given IV TXA, with somewhat improvement in his symptoms, still with some mild amount of bleeding at this time. Consultation now requested for evaluation regarding the above. PAST MEDICAL HISTORY:  Significant for respiratory failure in 2016, hematemesis at this time, hypertension, PEG tube, radiation therapy, tobacco use, alcohol use. PAST SURGICAL HISTORY:  Includes a PEG tube placement as well as a tracheotomy. Apparently, he also has had some sinus surgery in the remote past. 
 
SOCIAL HISTORY:  The patient admits to smoking approximately four cigarettes per day. Admits to some alcohol intake. FAMILY HISTORY:  Noncontributory. REVIEW OF SYSTEMS:  Contributory for cough, shortness of breath, active hemoptysis. No other significant findings are noted. PHYSICAL EXAMINATION: 
GENERAL:  Reveals a well-developed and well-nourished white male, thin, somewhat nervous appearing. HEENT:  Intraoral exam reveals no evidence of any specific abnormalities. The intranasal exam reveals mild septal deviation. NECK:  Reveals what appears to be #8 Shiley trach in place, this was removed. Tracheotomy site is fairly well healed. Significant amount of blood noted in and around this area. Tracheostomy performed under local anesthesia. There is a significant amount of blood pooling up at the reji, both sides. Clear-cut visualization of bleeding could not be visualized, however, this does appear to be either pooling in the lungs from above or is coming from the lungs itself. The scope was removed. Flexible endoscopy was then performed through the nasal site. Nasal laryngoscopy was performed. This revealed some significant swelling of the epiglottis and the larynx. True vocal cords cannot be visualized. There does appear to be some bulky swelling present. I am uncertain whether any active tumor may be present. It is somewhat difficult to visualize secondary to the patient's swollen epiglottis secondary to radiation effect changes. No evidence of any blood is noted in the laryngeal inlet. No evidence of any other significant findings are noted. IMPRESSION:  Hematemesis. PLAN: 
1.  I have discussed the situation with Dr Jazmin Anne. At this point, I strongly suspect that the bleeding is coming from the lungs. It certainly does not appear to be coming from the nose nor the nasopharynx, oropharynx or the larynx. No evidence of any fresh blood is seen. Fresh blood noted, however, in the lungs as described in tracheostomy and upper bronchoscopy.  
2.  At this point, I would defer to emergency room personnel for disposition. The patient will probably need admission, however, I am uncertain whether he needs a higher level of care than what can be provided at [facility]. Pulmonology is available, however, the patient has had previous lung surgery within the past four months, uncertain whether this may be the issue as well. At this point, may be prudent to simply transfer to a location with both pulmonology and cardiothoracic services. The patient has been seen by Cardiothoracic at [facility] within the last three months for this above issue. Thank you for the consultation. Eze Julien MD 
 
 
[id number] 
D:  10/09/2019 21:57 
T:  10/10/2019 1:29 
JOB #:  [id number]

## 2019-10-10 NOTE — PROGRESS NOTES
Pharmacy Dosing Services: Vancomycin Consult for Vancomycin Dosing by Pharmacy by Dr. Lukas Cano Consult provided for this 62y.o. year old male , for indication of Aspiration Pneumonitis. Day of Therapy 1 Ht Readings from Last 1 Encounters:  
08/16/19 172.7 cm (68\") Wt Readings from Last 1 Encounters:  
10/09/19 57.2 kg (126 lb) Other Current Antibiotics Zosyn / Levofloxacin Significant Cultures pending Serum Creatinine Lab Results Component Value Date/Time Creatinine 0.60 10/09/2019 06:56 PM  
 Creatinine, POC 1.1 07/13/2019 10:21 PM  
 
  
Creatinine Clearance CrCl cannot be calculated (Unknown ideal weight.). BUN Lab Results Component Value Date/Time BUN 9 10/09/2019 06:56 PM  
 BUN, POC 63 (H) 07/13/2019 10:21 PM  
 
  
WBC Lab Results Component Value Date/Time WBC 7.4 10/09/2019 10:30 PM  
 
  
H/H Lab Results Component Value Date/Time HGB 9.6 (L) 10/09/2019 10:30 PM  
 
  
Platelets Lab Results Component Value Date/Time PLATELET 649 79/77/0740 10:30 PM  
 
  
Temp 99 °F (37.2 °C) Patient received loading dose:  Vancomycin 1250 mg IV once, administered 10/9/19 at 19:25 Follow with maintenance dose of Vancomycin 1000 mg IV q8h Goal therapeutic trough of 15 - 20 mcg/mL. Pharmacy to follow daily and will make changes to dose and/or frequency based on clinical status. Pharmacist Librado Hawk, [facility]

## 2019-10-10 NOTE — ED NOTES
TRANSFER - OUT REPORT: 
 
Verbal report given to 2020 Nestor Cornejo RN(name) on Sarah Burton  being transferred to [facility] ED(unit) for urgent transfer Report consisted of patients Situation, Background, Assessment and  
Recommendations(SBAR). Information from the following report(s) SBAR and ED Summary was reviewed with the receiving nurse. Lines:  
Peripheral IV 10/09/19 Right Antecubital (Active) Site Assessment Clean, dry, & intact 10/9/2019  7:16 PM  
Phlebitis Assessment 0 10/9/2019  7:16 PM  
Infiltration Assessment 0 10/9/2019  7:16 PM  
Dressing Status Clean, dry, & intact 10/9/2019  7:16 PM  
Dressing Type Transparent;Tape 10/9/2019  7:16 PM  
Hub Color/Line Status Pink 10/9/2019  7:16 PM  
Action Taken Blood drawn 10/9/2019  7:16 PM  
Alcohol Cap Used Yes 10/9/2019  7:16 PM  
   
Peripheral IV 10/09/19 Right Wrist (Active) Site Assessment Clean, dry, & intact 10/9/2019 10:07 PM  
Phlebitis Assessment 0 10/9/2019 10:07 PM  
Infiltration Assessment 0 10/9/2019 10:07 PM  
Dressing Status Clean, dry, & intact 10/9/2019 10:07 PM  
Dressing Type Transparent 10/9/2019 10:07 PM  
Hub Color/Line Status Blue 10/9/2019 10:07 PM  
Alcohol Cap Used Yes 10/9/2019 10:07 PM  
  
 
Opportunity for questions and clarification was provided. Patient transported with: 
 [facility] 
 
 
 
IV x 2 left in place for transfer to [facility] ED.

## 2019-12-03 NOTE — ED NOTES
I have reviewed discharge instructions with the patient. The patient verbalized understanding. Pt transported home by [facility]

## 2019-12-03 NOTE — ED PROVIDER NOTES
EMERGENCY DEPARTMENT HISTORY AND PHYSICAL EXAM 
 
Date: 12/3/2019 Patient Name: Saint Sprout History of Presenting Illness Chief Complaint Patient presents with  Feeding Tube Problem History Provided By: Patient Chief Complaint: feeding tube problem Additional History (Context):  
2:18 PM 
Saint Sprout is a 62 y.o. male with PMHX hypertension, throat cancer, COPD, PEG tube presents to the emergency department C/O feeding tube came out sometime last night. PCP: Ila Rodgers MD 
 
Current Outpatient Medications Medication Sig Dispense Refill  pantoprazole (PROTONIX) 40 mg granules for oral suspension 40 mg by Per G Tube route Before breakfast and dinner. (Patient not taking: Reported on 7/22/2019) 60 Each 2  
 raNITIdine (ZANTAC) 150 mg tablet 1 Tab by Per G Tube route nightly. 30 Tab 2  
 naloxone (NARCAN) 4 mg/actuation nasal spray Use 1 spray intranasally into 1 nostril. Use a new Narcan nasal spray for subsequent doses and administer into alternating nostrils. May repeat every 2 to 3 minutes as needed. (Patient not taking: Reported on 7/22/2019) 2 Each 0  
 arformoterol (BROVANA) 15 mcg/2 mL nebu neb solution 2 mL by Nebulization route two (2) times a day. (Patient not taking: Reported on 7/22/2019) 60 Vial 2  
 budesonide (PULMICORT) 0.5 mg/2 mL nbsp 2 mL by Nebulization route two (2) times a day. (Patient not taking: Reported on 7/22/2019) 60 Each 2  
 ipratropium (ATROVENT) 0.02 % soln 2.5 mL by Nebulization route four (4) times daily. (Patient not taking: Reported on 7/22/2019) 120 Vial 2  cloNIDine (CATAPRES) 0.2 mg/24 hr patch 1 Patch by TransDERmal route every seven (7) days. (Patient not taking: Reported on 7/22/2019) 4 Patch 0  
 fentaNYL (DURAGESIC) 100 mcg/hr PATCH 1 Patch by TransDERmal route every seventy-two (72) hours. Max Daily Amount: 1 Patch.  (Patient not taking: Reported on 7/22/2019) 5 Patch 0  
  guaiFENesin (ROBITUSSIN) 100 mg/5 mL liquid Take 5 mL by mouth every four (4) hours as needed for Cough. (Patient not taking: Reported on 7/22/2019) 200 mL 0  
 lisinopril (PRINIVIL, ZESTRIL) 5 mg tablet Take 1 Tab by mouth every twelve (12) hours. (Patient not taking: Reported on 7/22/2019) 30 Tab 0  
 albuterol-ipratropium (DUO-NEB) 2.5 mg-0.5 mg/3 ml nebu 3 mL by Nebulization route every four (4) hours as needed. (Patient taking differently: 3 mL by Nebulization route every four (4) hours as needed (wheezing). ) 30 Nebule 1  
 multivitamin, tx-iron-ca-min (THERA-M W/ IRON) 9 mg iron-400 mcg tab tablet Take 1 Tab by mouth daily. (Patient not taking: Reported on 7/22/2019) 30 Tab 1  Nebulizer Accessories kit Use nebs Q4H PRN (Patient not taking: Reported on 7/22/2019) 1 Kit 0 Past History Past Medical History: 
Past Medical History:  
Diagnosis Date  Chronic obstructive pulmonary disease (HCC)   
 acute hypoxic respiratory failure 8/16  Hematemesis 7/13/2019  Hypertension  Ill-defined condition   
 peg tube in place  Ill-defined condition   
 chemo  S/P radiation therapy  Throat cancer ([facility] [address]  Tobacco abuse Past Surgical History: 
Past Surgical History:  
Procedure Laterality Date  HX GI    
 peg tub  HX HEENT    
 sinus surgery  HX TRACHEOSTOMY Family History: 
History reviewed. No pertinent family history. Social History: 
Social History Tobacco Use  Smoking status: Former Smoker Packs/day: 0.50  Smokeless tobacco: Never Used Substance Use Topics  Alcohol use: No  
 Drug use: No  
 
 
Allergies: 
No Known Allergies Review of Systems Review of Systems Constitutional: Negative for chills and fever. Gastrointestinal: Negative for abdominal pain. Dislodged feeding tube All other systems reviewed and are negative. Physical Exam  
 
Vitals:  
 12/03/19 1406 12/03/19 1600 BP: 102/65 94/70 Pulse: 97   
 Resp: 18 Temp: 97.7 °F (36.5 °C) SpO2: 97% 99% Weight: 59 kg (130 lb) Height: 5' 7\" (1.702 m) Physical Exam 
Vitals signs and nursing note reviewed. Constitutional:   
   Appearance: He is well-developed. Comments: Sitting up on stretcher no acute distress HENT:  
   Head: Normocephalic and atraumatic. Neck: Musculoskeletal: Normal range of motion and neck supple. Cardiovascular:  
   Rate and Rhythm: Normal rate and regular rhythm. Heart sounds: Normal heart sounds. No murmur. Pulmonary:  
   Effort: Pulmonary effort is normal. No respiratory distress. Breath sounds: Normal breath sounds. No wheezing or rales. Abdominal:  
   General: Bowel sounds are normal.  
   Palpations: Abdomen is soft. Tenderness: There is no tenderness. Comments: Small hole at the site where the PEG tube had previously been situated. No surrounding erythema or drainage Neurological:  
   Mental Status: He is alert and oriented to person, place, and time. Psychiatric:     
   Judgment: Judgment normal.  
 
 
 
Diagnostic Study Results Labs: 
 No results found for this or any previous visit (from the past 12 hour(s)). Radiologic Studies: XR ABD (KUB) Final Result IMPRESSION:  
1. Percutaneous gastrostomy catheter confirmed within the stomach. No  
superimposed acute abnormality CT Results  (Last 48 hours) None CXR Results  (Last 48 hours) None Medical Decision Making I am the first provider for this patient. I reviewed the vital signs, available nursing notes, past medical history, past surgical history, family history and social history. Vital Signs: Reviewed the patient's vital signs. Pulse Oximetry Analysis: 97% on RA Records Reviewed: Nursing Notes and Old Medical Records Small urinary catheter inserted into the PEG site to maintain patency.   No resistance met will attempt smallest PEG tube that we have in the [facility]. 
 
Procedures: 
Procedures ED Course:  
2:18 PM Initial assessment performed. The patients presenting problems have been discussed, and they are in agreement with the care plan formulated and outlined with them. I have encouraged them to ask questions as they arise throughout their visit. Spoke with , radiology who agrees with performing KUB with Gastrografin to assess for PEG tube placement 3:51 PM 
Gastrografin pushed through PEG tube. X-ray appears to suggest normal placement Discussion: Pt presents with PEG tube replaced and confirmed with x-ray will discharge and have him follow-up with his primary doctor and GI. Strict return precautions given, pt offering no questions or complaints. Diagnosis and Disposition DISCHARGE NOTE: 
Emily Castano's  results have been reviewed with him. He has been counseled regarding his diagnosis, treatment, and plan. He verbally conveys understanding and agreement of the signs, symptoms, diagnosis, treatment and prognosis and additionally agrees to follow up as discussed. He also agrees with the care-plan and conveys that all of his questions have been answered. I have also provided discharge instructions for him that include: educational information regarding their diagnosis and treatment, and list of reasons why they would want to return to the ED prior to their follow-up appointment, should his condition change. He has been provided with education for proper emergency department utilization. CLINICAL IMPRESSION: 
 
1. PEG (percutaneous endoscopic gastrostomy) adjustment/replacement[facility]removal ([facility] PLAN: 
1. D/C Home 2. Current Discharge Medication List  
  
 
3. Follow-up Information Follow up With Specialties Details Why Contact Info Bradley Irby MD Gastroenterology  call for follow up and recheck  [address] [place] [address] [address] [place] [address] 
[phone] [facility] EMERGENCY DEPT Emergency Medicine  If symptoms worsen [address] [place] [id number] 
[phone] Please note that this dictation was completed with [employer], the computer voice recognition software. Quite often unanticipated grammatical, syntax, homophones, and other interpretive errors are inadvertently transcribed by the computer software. Please disregard these errors. Please excuse any errors that have escaped final proofreading.

## 2022-01-27 NOTE — ED NOTES
D/C instructions/RX reviewed with pt and his \"wife\". Understanding acknowledged by both. Pt ambulatory upon d/c home with steady gait. Pt left with his \"wife\". no

## 2022-04-20 NOTE — PROGRESS NOTES
Met with pt and discussed concerns with obtaining medications following previous discharge. Pt has indicated he now has the nebulizer and his medications at home. Pt has been discharged and his wife will be transporting him home today. No there plan of care needs have been identified. Will obtain MRI of C spine--if normal can consider MRI of brachial plexus  Call pain management  Will start PT  Medrol dose pack--use directions per pack  Robaxin 750 mg at least once a day but may use up to 3 times a day

## 2025-03-07 NOTE — ED TRIAGE NOTES
Orthopedic Joint Progress Note    2025  Admit Date: 3/6/2025  Admit Diagnosis: Prosthetic shoulder infection [T84.59XA, Z96.619]  Presence of left artificial shoulder joint [Z96.612]  Prosthetic shoulder infection, initial encounter [T84.59XA, Z96.619]  Infection of prosthetic total shoulder joint, initial encounter [T84.59XA, Z96.619]    1 Day Post-Op    Subjective: some pain     Zack Treviño     Review of Systems: pertinent items are noted in HPI    Objective:     PT/OT:     PATIENT MOBILITY                           Vital Signs:    Blood pressure 126/81, pulse 76, temperature 98.1 °F (36.7 °C), temperature source Oral, resp. rate 18, height 1.676 m (5' 5.98\"), weight 77.2 kg (170 lb 4.8 oz), SpO2 96%.  Temp (24hrs), Av °F (36.7 °C), Min:97.7 °F (36.5 °C), Max:98.6 °F (37 °C)      Pain Control:        Meds:  Current Facility-Administered Medications   Medication Dose Route Frequency    HYDROmorphone HCl PF (DILAUDID) injection 1 mg  1 mg IntraVENous Q2H PRN    docusate sodium (COLACE) capsule 100 mg  100 mg Oral BID    bisacodyl (DULCOLAX) suppository 10 mg  10 mg Rectal Daily PRN    ondansetron (ZOFRAN-ODT) disintegrating tablet 4 mg  4 mg Oral Q8H PRN    promethazine (PHENERGAN) tablet 25 mg  25 mg Oral Q4H PRN    sodium phosphate (FLEET) rectal enema 1 enema  1 enema Rectal Once PRN    ferrous sulfate (IRON 325) tablet 325 mg  325 mg Oral BID WC    sodium chloride flush 0.9 % injection 5-40 mL  5-40 mL IntraVENous 2 times per day    sodium chloride flush 0.9 % injection 5-40 mL  5-40 mL IntraVENous PRN    0.9 % sodium chloride infusion   IntraVENous PRN    oxyCODONE (ROXICODONE) immediate release tablet 10 mg  10 mg Oral Q3H PRN    oxyCODONE (ROXICODONE) immediate release tablet 20 mg  20 mg Oral Q3H PRN    DULoxetine (CYMBALTA) extended release capsule 30 mg  30 mg Oral Daily    lisinopril (PRINIVIL;ZESTRIL) tablet 40 mg  40 mg Oral Daily    pravastatin (PRAVACHOL) tablet 80 mg  80 mg Oral  Pt recently started on Cipro for aspiration pneumonia. Pt states he started vomiting yesterday and is having a low grade fever. Pt has PEG tube and states he has not been able to feed himself at home. Pt also states he has been unable to take his Percocet and is having pain.

## 2025-04-21 NOTE — PROGRESS NOTES
Cardiology Progress Note Patient: [address]        Sex: male          DOA: 7/13/2019 YOB: 1965      Age:  52 y.o.        LOS:  LOS: 3 days Assessment/Plan Principal Problem: 
  Sepsis ([facility] [address] (7/13/2019) Active Problems: 
  Aspiration pneumonia ([facility] [address] (11/15/2017) Hematemesis (7/13/2019) Hyperkalemia (7/14/2019) Hypotension (7/14/2019) Advanced care planning/counseling discussion (7/15/2019) Plan: 
 
Maintaining NSR on amiodarone Start PO Cardizem then DC amiodarone Pt is not a candidate for anticoagulation due to GI bleed and anemia Discussed with pt. Afib Elevated trop 
hypotension Subjective:  
 cc: Afib Elevated trop 
hypotension REVIEW OF SYSTEMS:  
 
General: No fevers or chills. Cardiovascular: No chest pain or pressure. No palpitations. No ankle swelling Pulmonary: No SOB, orthopnea, PND Gastrointestinal: No nausea, vomiting or diarrhea Objective:  
  
Visit Vitals BP (!) 86/35 Pulse 94 Temp 98.3 °F (36.8 °C) Resp 23 Ht 6' (1.829 m) Wt 59.9 kg (132 lb) SpO2 99% BMI 17.90 kg/m² Body mass index is 17.9 kg/m². Physical Exam: 
General Appearance: Comfortable, not using accessory muscles of respiration. tracheostomy NECK: No JVD, no thyroidomeglay. LUNGS: Clear bilaterally. HEART: S1+S2 audible, ABD: Non-tender, BS Audible , PEG tube EXT: No edema, and no cysnosis. VASCULAR EXAM: Pulses are intact. PSYCHIATRIC EXAM: Mood is appropriate. Medication: 
Current Facility-Administered Medications Medication Dose Route Frequency  oxyCODONE-acetaminophen (PERCOCET) 5-325 mg per tablet 1 Tab  1 Tab Oral Q4H PRN  
 acetaminophen (TYLENOL) solution 650 mg  650 mg Oral Q6H PRN  
 insulin lispro (HUMALOG) injection   SubCUTAneous Q6H  
 ELECTROLYTE REPLACEMENT PROTOCOL - Phosphorus  Standard Dosing  1 Each Other PRN  
 \"Have you been to the ER, urgent care clinic since your last visit?  Hospitalized since your last visit?\"    NO    “Have you seen or consulted any other health care providers outside our system since your last visit?”    NO             sodium phosphate 18 mmol in dextrose 5% 250 mL infusion  18 mmol IntraVENous ONCE  potassium chloride 10 mEq in 100 ml IVPB  10 mEq IntraVENous Q1H  
 [START ON 7/17/2019] methylPREDNISolone (PF) (SOLU-MEDROL) injection 40 mg  40 mg IntraVENous Q24H  
 dilTIAZem (CARDIZEM) IR tablet 30 mg  30 mg Oral QID  amiodarone (NEXTERONE) 360 mg in dextrose 200 mL (1.8 mg/mL) infusion  0.5-1 mg/min IntraVENous TITRATE  ELECTROLYTE REPLACEMENT PROTOCOL - Potassium Standard Dosing   1 Each Other PRN  
 ELECTROLYTE REPLACEMENT PROTOCOL - Magnesium   1 Each Other PRN  
 0.9% sodium chloride 1,000 mL with mvi, adult no. 4 with vit K 10 mL, thiamine 034 mg, folic acid 1 mg infusion   IntraVENous DAILY  0.9% sodium chloride infusion 250 mL  250 mL IntraVENous PRN  
 0.9% sodium chloride infusion 250 mL  250 mL IntraVENous PRN  
 LORazepam (ATIVAN) tablet 0.5 mg  0.5 mg Oral QHS PRN  pantoprazole (PROTONIX) injection 40 mg  40 mg IntraVENous Q12H  
 ondansetron (ZOFRAN) injection 4 mg  4 mg IntraVENous Q4H PRN  
 LORazepam (ATIVAN) tablet 1 mg  1 mg Oral Q1H PRN Or  
 LORazepam (ATIVAN) injection 1 mg  1 mg IntraVENous Q1H PRN  
 LORazepam (ATIVAN) tablet 2 mg  2 mg Oral Q1H PRN  Or  
 LORazepam (ATIVAN) injection 2 mg  2 mg IntraVENous Q1H PRN  
 LORazepam (ATIVAN) injection 3 mg  3 mg IntraVENous Q15MIN PRN  
 albuterol-ipratropium (DUO-NEB) 2.5 MG-0.5 MG/3 ML  3 mL Nebulization Q4H RT  
 albuterol-ipratropium (DUO-NEB) 2.5 MG-0.5 MG/3 ML  3 mL Nebulization Q4H PRN  
 0.9% sodium chloride infusion  150 mL/hr IntraVENous CONTINUOUS  
 sodium chloride (NS) flush 5-40 mL  5-40 mL IntraVENous Q8H  
 glucose chewable tablet 16 g  4 Tab Oral PRN  
 glucagon (GLUCAGEN) injection 1 mg  1 mg IntraMUSCular PRN  
 albumin human 25% (BUMINATE) solution 12.5 g  12.5 g IntraVENous Q6H  
 piperacillin-tazobactam (ZOSYN) 4.5 g in 0.9% sodium chloride (MBP/ADV) 100 mL MBP  4.5 g IntraVENous Q8H  
 DO                sodium chloride (NS) flush 5-10 mL  5-10 mL IntraVENous PRN  
 sodium chloride (NS) flush 5-40 mL  5-40 mL IntraVENous PRN Lab/Data Reviewed: 
Procedures/imaging: see electronic medical records for all procedures/Xrays  
and details which were not copied into this note but were reviewed prior to creation of Plan 
  
 
All lab results for the last 24 hours reviewed. Recent Labs  
  07/16/19 
0954 07/16/19 
0405 07/15/19 
1958  07/15/19 
0500  07/14/19 
0230 WBC  --  3.0*  --   --  5.5  --  12.4 HGB 7.6* 7.5* 7.9*   < > 7.2*   < > 8.0*  
HCT 21.8* 21.8* 22.5*   < > 21.6*   < > 24.0*  
PLT  --  95*  --   --  144  --  215  
 < > = values in this interval not displayed. Recent Labs  
  07/16/19 
0405 07/15/19 
1430 07/15/19 
1345 07/15/19 
0500 07/14/19 
0230   --   --  142  142 136  
K 3.3* 2.2* 2.4* 2.3*  2.2* 4.4  
  --   --  105  105 103 CO2 29  --   --  30  30 25 *  --   --  101*  102* 143* BUN 9  --   --  24*  23* 71* CREA 0.58*  --   --  0.64  0.69 1.15  
CA 7.2*  --   --  7.6*  8.2* 7.5* Signed By: Jazmin Valdez MD   
 July 16, 2019

## 2025-05-26 NOTE — H&P
Refill Decision Note   Driss Hernandez  is requesting a refill authorization.  Brief Assessment and Rationale for Refill:  Approve     Medication Therapy Plan:        Comments:     Note composed:11:41 AM 05/26/2025               Medicine History and Physical    Patient: Carl Padilla   Age:  45 y.o. PCP:[facility]    Chief Complaint:    HPI:   Carl Padilla is a 45 y.o. male who c/o SOB  Has peg in place after chemo and XRT done for throat cancer diagnosed 8 mo. Ago and tx completed 2 mo ago-for PET scan in December as folllow-up-treated at [facility] to swallow water recently even though not supposed to and choked on it-worsening wheezing, cough, congestion, sore throat X 1 week    Low Na noted on ER labs    Notes he doesn't get enough water, tries to get 6 cans ensure in a day, with water as well, but was hoping to drink some so cut back on this-and he started smoking heavily again recently as well    No CP, no diarrhea, no rash    Past Medical History:  Past Medical History:   Diagnosis Date    Hypertension     Ill-defined condition     peg tube in place    Ill-defined condition     chemo    S/P radiation therapy     Throat cancer ([address]     Tobacco abuse        Past Surgical History:  Past Surgical History:   Procedure Laterality Date    HX HEENT      sinus surgery       Family History:no oropharyngeal cancer      Social History:smokes 1 PPD  Social History     Social History    Marital status:      Spouse name: N/A    Number of children: N/A    Years of education: N/A     Social History Main Topics    Smoking status: Current Every Day Smoker    Smokeless tobacco: Not on file    Alcohol use Not on file    Drug use: Not on file    Sexual activity: Not on file     Other Topics Concern    Not on file     Social History Narrative       Home Medications:  Prior to Admission medications    Medication Sig Start Date End Date Taking? Authorizing Provider   OXYCODONE HCL/ACETAMINOPHEN (PERCOCET PO) Take  by mouth. Yes Hugh Garrido MD   LISINOPRIL PO Take  by mouth. Phys MD Radhika   diazepam (VALIUM) 10 mg tablet Take 1 Tab by mouth every eight (8) hours as needed for Anxiety.  8/7/12   Meghna Arizmendi PA   traMADol (ULTRAM) 50 mg tablet Take 1 Tab by mouth every six (6) hours as needed for Pain. 8/7/12   MADI Abdullahi   predniSONE (STERAPRED) 5 mg dose pack See administration instruction per 5mg dose pack 8/7/12   MADI Sampson       Allergies:  No Known Allergies    Review of Systems  Review of systems:    Constitutional:no fever, no chills  HEENT:no earache, very sore throat, no rhinitis, no visual changes or eye pain  Neuro:no headaches, no dizziness, no lightheadedness, no syncope, no seizures  CVS:no chest pain, no palpitations, no leg edema  Resp; coughing and wheezing, on and off, spits mucus constantly  GI:vomiting after drinking water no diarrhea, no blood in stool, no abdominal pain  :no dysuria, no urinary hesitancy, no hematuria  Heme:no bleeding or easy bruisability  Musculoskeletal:no myalgias or arthralgias  Endocrine:no heat or cold intolerance, no hair loss, no skin changes  Skin:no rashes or new lesions on skin      Physical Exam:     Visit Vitals    /72 (BP 1 Location: Right arm, BP Patient Position: At rest)    Pulse (!) 105    Temp 97.9 °F (36.6 °C)    Resp 20    Ht 5' 7\" (1.702 m)    Wt 73 kg (161 lb)    SpO2 96%    BMI 25.22 kg/m2       Physical Exam:  General appearance: alert, peter facies, cooperative, no distress, appears stated age  Head: Normocephalic, without obvious abnormality, atraumatic  Neck: supple, trachea midline  Lungs: clear to auscultation bilaterally  Heart: regular rate and rhythm, S1, S2 normal, no murmur, click, rub or gallop  Abdomen: soft, non-tender.  Peg tube,  Bowel sounds normal. No masses,  no organomegaly  Extremities: extremities normal, atraumatic, no cyanosis or edema  Skin: Skin color, texture, turgor normal. No rashes or lesions  Neurologic: Grossly normal        Lab/Data Reviewed:reviewed  [unfilled]        Assessment/Plan   Principal Problem:    Aspiration into airway (7/30/2017)    Active Problems:    Throat cancer ([facility] [address] (7/30/2017)      Status post insertion of percutaneous endoscopic gastrostomy (PEG) tube ([facility] (7/30/2017)    44 yo male with throat cancer s/p chemo and XRT completed 2 months ago, with peg in place for meds and feedings-here with weakness, cough, choking episode after drinking water-and possible aspiration    Aspiration pneumonitis  Hyponatremia  Dehydration  Tobacco abuse  throat cancer  Peg tube dependence  COPD exacerbation    Continue clinda IV Q6H  No SIRS noted  NPO  Resume tube feeds and water via peg tomorrow  Continue IVF hydration, repeat BMP in am  Add nicoderm atch as began smoking again  pain medicine ordered as on this chronically  DVT proph-lovenox  Add mucinex thru peg  With IV steroids for COPD/wheezing-start SSI PRN    Expected LOS 2 days

## (undated) DEVICE — ROUND DISSECTORS: Brand: DEROYAL

## (undated) DEVICE — BSHR MAJOR BASIN PACK-LF: Brand: MEDLINE INDUSTRIES, INC.

## (undated) DEVICE — SWAB CULT SGL AMIES W/O CHAR FOR THRT VAG SKIN HRT CULTSWAB

## (undated) DEVICE — PACK,EENT,STERILE,PK I: Brand: MEDLINE

## (undated) DEVICE — DEVON™ KNEE AND BODY STRAP 60" X 3" (1.5 M X 7.6 CM): Brand: DEVON

## (undated) DEVICE — FLEX ADVANTAGE 1500CC: Brand: FLEX ADVANTAGE

## (undated) DEVICE — SOL IRRIGATION INJ NACL 0.9% 500ML BTL

## (undated) DEVICE — SUT SLK 2-0SH 30IN BLK --

## (undated) DEVICE — TRAP SPEC COLL POLYP POLYSTYR --

## (undated) DEVICE — ENDO CARRY-ON PROCEDURE KIT INCLUDES ENZYMATIC SPONGE, GAUZE, BIOHAZARD LABEL, TRAY, LUBRICANT, DIRTY SCOPE LABEL, WATER LABEL, TRAY, DRAWSTRING PAD, AND DEFENDO 4-PIECE KIT.: Brand: ENDO CARRY-ON PROCEDURE KIT

## (undated) DEVICE — SKIN MARKER FINE TIP WITH RULER AND LABELS: Brand: DEVON

## (undated) DEVICE — INTENDED FOR TISSUE SEPARATION, AND OTHER PROCEDURES THAT REQUIRE A SHARP SURGICAL BLADE TO PUNCTURE OR CUT.: Brand: BARD-PARKER ® CARBON RIB-BACK BLADES

## (undated) DEVICE — REM POLYHESIVE ADULT PATIENT RETURN ELECTRODE: Brand: VALLEYLAB

## (undated) DEVICE — PREP CHLORAPREP 10.5 ML ORG --

## (undated) DEVICE — SPONGE LAP RND 3/8X0.25 IN 5 COUNT HOLDER WHT AMER WHT CROSS

## (undated) DEVICE — (D)SYR 10ML 1/5ML GRAD NSAF -- PKGING CHANGE USE ITEM 338027

## (undated) DEVICE — SINGLE PORT MANIFOLD: Brand: NEPTUNE 2

## (undated) DEVICE — SPONGE GZ W4XL4IN COT 12 PLY TYP VII WVN C FLD DSGN

## (undated) DEVICE — GOWN,AURORA,NONRNF,XL,30/CS: Brand: MEDLINE

## (undated) DEVICE — MEDI-VAC NON-CONDUCTIVE SUCTION TUBING: Brand: CARDINAL HEALTH

## (undated) DEVICE — SWAB CULT LIQ STUART AGR AERB MOD IN BRK SGL RAYON TIP PLAS 220099] BECTON DICKINSON MICRO]

## (undated) DEVICE — NEEDLE HYPO 25GA L1.5IN BLU POLYPR HUB S STL REG BVL STR

## (undated) DEVICE — HOLDER TRACH TB W1IN FIT UPTO 19.5IN NK 2 PC ADJ BLU

## (undated) DEVICE — HEAD REST BAGEL: Brand: DEVON

## (undated) DEVICE — MOUTHPIECE ENDOSCP 20X27MM --

## (undated) DEVICE — INNER CANNULA XLT,DISPOSABLE: Brand: SHILEY

## (undated) DEVICE — TRAP MUCUS SPECIMEN 40ML -- MEDICHOICE

## (undated) DEVICE — KENDALL RADIOLUCENT FOAM MONITORING ELECTRODE RECTANGULAR SHAPE: Brand: KENDALL

## (undated) DEVICE — KENDALL SCD EXPRESS SLEEVES, KNEE LENGTH, MEDIUM: Brand: KENDALL SCD

## (undated) DEVICE — DRAPE TWL SURG 16X26IN BLU ORB04] ALLCARE INC]

## (undated) DEVICE — SUTURE PERMAHAND SZ 2-0 L30IN NONABSORBABLE BLK SILK W/O A305H

## (undated) DEVICE — MAJ-1414 SINGLE USE ADPATER BIOPSY VALV: Brand: SINGLE USE ADAPTOR BIOPSY VALVE

## (undated) DEVICE — SUTURE ETHLN SZ 4-0 L18IN NONABSORBABLE BLK L19MM PS-2 3/8 1667H

## (undated) DEVICE — INSULATED BLADE ELECTRODE: Brand: EDGE

## (undated) DEVICE — SUT SLK 2-0 18IN FS BLK --

## (undated) DEVICE — FLEXIBLE YANKAUER,MEDIUM TIP, NO VACUUM CONTROL: Brand: ARGYLE